# Patient Record
Sex: FEMALE | Race: WHITE | NOT HISPANIC OR LATINO
[De-identification: names, ages, dates, MRNs, and addresses within clinical notes are randomized per-mention and may not be internally consistent; named-entity substitution may affect disease eponyms.]

---

## 2017-01-26 ENCOUNTER — APPOINTMENT (OUTPATIENT)
Dept: OTOLARYNGOLOGY | Facility: CLINIC | Age: 82
End: 2017-01-26

## 2017-01-26 VITALS
HEIGHT: 60 IN | DIASTOLIC BLOOD PRESSURE: 66 MMHG | SYSTOLIC BLOOD PRESSURE: 123 MMHG | HEART RATE: 62 BPM | BODY MASS INDEX: 18.85 KG/M2 | WEIGHT: 96 LBS

## 2017-01-26 DIAGNOSIS — Z85.828 PERSONAL HISTORY OF OTHER MALIGNANT NEOPLASM OF SKIN: ICD-10-CM

## 2017-01-26 DIAGNOSIS — K64.9 UNSPECIFIED HEMORRHOIDS: ICD-10-CM

## 2017-01-26 DIAGNOSIS — M81.0 AGE-RELATED OSTEOPOROSIS W/OUT CURRENT PATHOLOGICAL FRACTURE: ICD-10-CM

## 2017-01-26 DIAGNOSIS — Z87.891 PERSONAL HISTORY OF NICOTINE DEPENDENCE: ICD-10-CM

## 2017-02-09 ENCOUNTER — OUTPATIENT (OUTPATIENT)
Dept: OUTPATIENT SERVICES | Facility: HOSPITAL | Age: 82
LOS: 1 days | End: 2017-02-09
Payer: MEDICARE

## 2017-02-09 PROCEDURE — 71250 CT THORAX DX C-: CPT | Mod: 26

## 2017-02-09 PROCEDURE — 71250 CT THORAX DX C-: CPT

## 2017-07-26 ENCOUNTER — APPOINTMENT (OUTPATIENT)
Dept: OTOLARYNGOLOGY | Facility: CLINIC | Age: 82
End: 2017-07-26

## 2017-07-26 VITALS — HEART RATE: 61 BPM | DIASTOLIC BLOOD PRESSURE: 66 MMHG | SYSTOLIC BLOOD PRESSURE: 127 MMHG

## 2017-08-05 ENCOUNTER — OUTPATIENT (OUTPATIENT)
Dept: OUTPATIENT SERVICES | Facility: HOSPITAL | Age: 82
LOS: 1 days | End: 2017-08-05
Payer: MEDICARE

## 2017-08-05 PROCEDURE — 70486 CT MAXILLOFACIAL W/O DYE: CPT | Mod: 26

## 2017-08-05 PROCEDURE — 70486 CT MAXILLOFACIAL W/O DYE: CPT

## 2017-08-10 ENCOUNTER — APPOINTMENT (OUTPATIENT)
Dept: OTOLARYNGOLOGY | Facility: CLINIC | Age: 82
End: 2017-08-10
Payer: MEDICARE

## 2017-08-10 VITALS
WEIGHT: 92 LBS | DIASTOLIC BLOOD PRESSURE: 61 MMHG | HEART RATE: 65 BPM | HEIGHT: 60 IN | BODY MASS INDEX: 18.06 KG/M2 | SYSTOLIC BLOOD PRESSURE: 113 MMHG

## 2017-08-10 PROCEDURE — 69210 REMOVE IMPACTED EAR WAX UNI: CPT

## 2017-08-10 PROCEDURE — 99214 OFFICE O/P EST MOD 30 MIN: CPT | Mod: 25

## 2018-02-14 ENCOUNTER — APPOINTMENT (OUTPATIENT)
Dept: OTOLARYNGOLOGY | Facility: CLINIC | Age: 83
End: 2018-02-14
Payer: MEDICARE

## 2018-02-14 VITALS
DIASTOLIC BLOOD PRESSURE: 53 MMHG | HEIGHT: 60 IN | BODY MASS INDEX: 18.06 KG/M2 | HEART RATE: 56 BPM | WEIGHT: 92 LBS | SYSTOLIC BLOOD PRESSURE: 125 MMHG

## 2018-02-14 PROCEDURE — 92700 UNLISTED ORL SERVICE/PX: CPT

## 2018-02-14 PROCEDURE — 31231 NASAL ENDOSCOPY DX: CPT

## 2018-02-14 PROCEDURE — 92557 COMPREHENSIVE HEARING TEST: CPT

## 2018-02-14 PROCEDURE — 99214 OFFICE O/P EST MOD 30 MIN: CPT | Mod: 25

## 2018-02-14 PROCEDURE — 92550 TYMPANOMETRY & REFLEX THRESH: CPT

## 2018-03-04 ENCOUNTER — FORM ENCOUNTER (OUTPATIENT)
Age: 83
End: 2018-03-04

## 2018-03-05 ENCOUNTER — APPOINTMENT (OUTPATIENT)
Dept: MRI IMAGING | Facility: HOSPITAL | Age: 83
End: 2018-03-05
Payer: MEDICARE

## 2018-03-05 ENCOUNTER — OUTPATIENT (OUTPATIENT)
Dept: OUTPATIENT SERVICES | Facility: HOSPITAL | Age: 83
LOS: 1 days | End: 2018-03-05
Payer: MEDICARE

## 2018-03-05 PROCEDURE — A9585: CPT

## 2018-03-05 PROCEDURE — 70553 MRI BRAIN STEM W/O & W/DYE: CPT | Mod: 26

## 2018-03-05 PROCEDURE — 70553 MRI BRAIN STEM W/O & W/DYE: CPT

## 2018-03-14 ENCOUNTER — APPOINTMENT (OUTPATIENT)
Dept: OTOLARYNGOLOGY | Facility: CLINIC | Age: 83
End: 2018-03-14
Payer: MEDICARE

## 2018-03-14 VITALS
SYSTOLIC BLOOD PRESSURE: 126 MMHG | WEIGHT: 92 LBS | DIASTOLIC BLOOD PRESSURE: 67 MMHG | HEIGHT: 60 IN | HEART RATE: 70 BPM | BODY MASS INDEX: 18.06 KG/M2

## 2018-03-14 PROCEDURE — 99213 OFFICE O/P EST LOW 20 MIN: CPT

## 2018-06-14 RX ORDER — CALCIUM/D3/MAG OX/COP/MANG/ZN 600 MG-20
600-800 TABLET ORAL
Refills: 0 | Status: DISCONTINUED | COMMUNITY
End: 2018-03-14

## 2018-06-15 ENCOUNTER — APPOINTMENT (OUTPATIENT)
Dept: OTOLARYNGOLOGY | Facility: CLINIC | Age: 83
End: 2018-06-15
Payer: MEDICARE

## 2018-06-15 VITALS — HEIGHT: 60 IN | BODY MASS INDEX: 17.87 KG/M2 | WEIGHT: 91 LBS

## 2018-06-15 VITALS
DIASTOLIC BLOOD PRESSURE: 74 MMHG | TEMPERATURE: 97.8 F | SYSTOLIC BLOOD PRESSURE: 122 MMHG | OXYGEN SATURATION: 100 % | HEART RATE: 63 BPM

## 2018-06-15 DIAGNOSIS — Z83.3 FAMILY HISTORY OF DIABETES MELLITUS: ICD-10-CM

## 2018-06-15 DIAGNOSIS — Z82.49 FAMILY HISTORY OF ISCHEMIC HEART DISEASE AND OTHER DISEASES OF THE CIRCULATORY SYSTEM: ICD-10-CM

## 2018-06-15 DIAGNOSIS — I99.9 UNSPECIFIED DISORDER OF CIRCULATORY SYSTEM: ICD-10-CM

## 2018-06-15 PROCEDURE — 31231 NASAL ENDOSCOPY DX: CPT

## 2018-06-15 PROCEDURE — 99214 OFFICE O/P EST MOD 30 MIN: CPT | Mod: 25

## 2018-06-20 ENCOUNTER — FORM ENCOUNTER (OUTPATIENT)
Age: 83
End: 2018-06-20

## 2018-06-21 ENCOUNTER — OUTPATIENT (OUTPATIENT)
Dept: OUTPATIENT SERVICES | Facility: HOSPITAL | Age: 83
LOS: 1 days | End: 2018-06-21
Payer: MEDICARE

## 2018-06-21 ENCOUNTER — APPOINTMENT (OUTPATIENT)
Dept: CT IMAGING | Facility: HOSPITAL | Age: 83
End: 2018-06-21
Payer: MEDICARE

## 2018-06-21 PROCEDURE — 70486 CT MAXILLOFACIAL W/O DYE: CPT

## 2018-06-21 PROCEDURE — 70486 CT MAXILLOFACIAL W/O DYE: CPT | Mod: 26

## 2018-06-29 ENCOUNTER — APPOINTMENT (OUTPATIENT)
Dept: OTOLARYNGOLOGY | Facility: CLINIC | Age: 83
End: 2018-06-29
Payer: MEDICARE

## 2018-06-29 VITALS
RESPIRATION RATE: 15 BRPM | DIASTOLIC BLOOD PRESSURE: 52 MMHG | HEART RATE: 63 BPM | SYSTOLIC BLOOD PRESSURE: 119 MMHG | TEMPERATURE: 98 F | OXYGEN SATURATION: 99 %

## 2018-06-29 PROCEDURE — 99214 OFFICE O/P EST MOD 30 MIN: CPT

## 2018-07-13 ENCOUNTER — APPOINTMENT (OUTPATIENT)
Dept: OTOLARYNGOLOGY | Facility: CLINIC | Age: 83
End: 2018-07-13
Payer: MEDICARE

## 2018-07-13 VITALS
OXYGEN SATURATION: 99 % | SYSTOLIC BLOOD PRESSURE: 106 MMHG | DIASTOLIC BLOOD PRESSURE: 68 MMHG | HEART RATE: 68 BPM | TEMPERATURE: 98 F

## 2018-07-13 PROCEDURE — 99214 OFFICE O/P EST MOD 30 MIN: CPT

## 2018-09-19 ENCOUNTER — APPOINTMENT (OUTPATIENT)
Dept: OTOLARYNGOLOGY | Facility: CLINIC | Age: 83
End: 2018-09-19
Payer: MEDICARE

## 2018-09-19 VITALS
SYSTOLIC BLOOD PRESSURE: 140 MMHG | DIASTOLIC BLOOD PRESSURE: 58 MMHG | BODY MASS INDEX: 18.26 KG/M2 | HEIGHT: 60 IN | HEART RATE: 68 BPM | WEIGHT: 93 LBS

## 2018-09-19 DIAGNOSIS — J34.89 OTHER SPECIFIED DISORDERS OF NOSE AND NASAL SINUSES: ICD-10-CM

## 2018-09-19 DIAGNOSIS — Z87.898 PERSONAL HISTORY OF OTHER SPECIFIED CONDITIONS: ICD-10-CM

## 2018-09-19 DIAGNOSIS — H61.23 IMPACTED CERUMEN, BILATERAL: ICD-10-CM

## 2018-09-19 DIAGNOSIS — Z87.09 PERSONAL HISTORY OF OTHER DISEASES OF THE RESPIRATORY SYSTEM: ICD-10-CM

## 2018-09-19 PROCEDURE — 99213 OFFICE O/P EST LOW 20 MIN: CPT | Mod: 25

## 2018-09-19 PROCEDURE — 31231 NASAL ENDOSCOPY DX: CPT | Mod: 52

## 2018-09-19 RX ORDER — MOMETASONE 50 UG/1
50 SPRAY, METERED NASAL TWICE DAILY
Qty: 1 | Refills: 3 | Status: COMPLETED | COMMUNITY
Start: 2018-02-14 | End: 2018-09-19

## 2018-09-19 RX ORDER — LEVOFLOXACIN 500 MG/1
500 TABLET, FILM COATED ORAL DAILY
Qty: 10 | Refills: 0 | Status: COMPLETED | COMMUNITY
Start: 2018-06-15 | End: 2018-09-19

## 2018-09-19 RX ORDER — LEVOFLOXACIN 500 MG/1
500 TABLET, FILM COATED ORAL DAILY
Qty: 10 | Refills: 0 | Status: COMPLETED | COMMUNITY
Start: 2018-06-29 | End: 2018-09-19

## 2018-09-19 RX ORDER — FLUTICASONE PROPIONATE 50 UG/1
50 SPRAY, METERED NASAL DAILY
Qty: 1 | Refills: 2 | Status: COMPLETED | COMMUNITY
Start: 2018-06-15 | End: 2018-09-19

## 2018-09-26 PROBLEM — Z87.09 HISTORY OF SINUSITIS: Status: RESOLVED | Noted: 2018-06-15 | Resolved: 2018-09-26

## 2018-09-26 NOTE — CONSULT LETTER
[Dear  ___] : Dear  [unfilled], [Courtesy Letter:] : I had the pleasure of seeing your patient, [unfilled], in my office today. [Consult Closing:] : Thank you very much for allowing me to participate in the care of this patient.  If you have any questions, please do not hesitate to contact me. [Sincerely,] : Sincerely, [FreeTextEntry2] : Alena Worthington MD\par [address] [facility]\par [place] [id number] [FreeTextEntry1] : \par Enclosed please find my office notes for September 19, 2018. \par \par \par  [FreeTextEntry3] : \par Genny Garces MD \par Otolaryngology, Head and Neck Surgery \par Residency site , [facility]\par  [___] : [unfilled]

## 2018-09-26 NOTE — PROCEDURE
[FreeTextEntry6] : \par Indication:  chronic sinusitis, nasal polyps\par -Verbal consent was obtained from patient prior to exam. \par - Donavon-Synephrine  spray applied to nose bilaterally.\par Nasal endoscopy was performed with flexible  scope.\par Findings: \par -- Inferior turbinates  edematous and  boggy  bilateral.  Inferior meatus clear bilateral.\par -- Septum was midline  .\par -- Polypoid change left middle turbinate. \par -- Mucus clear bilateral\par -- Middle and superior turbinates edematous bilateral\par -- Middle meatus congested bilateral.  SER congested bilateral.\par -- Nasopharynx without mass or exudate\par -- Adenoids were absent  \par -- Eustachian orifices were clear bilateral\par \par The patient tolerated the procedure well.\par \par

## 2018-09-26 NOTE — ASSESSMENT
[FreeTextEntry1] : Ms. Decker was evaluated with the following issues today:\par \par 1.) s/p acute exacerbation of chronic sinusitis - she does not want to start another nasal steroid spray because she is fearful of side effects.  I told her that diarrhea and leg pain are unlikely side effects of the fluticasone.\par We have discussed sinus surgery several times over the years, including today.\par 2.) very small nasal polyps\par 3.) right cerumen impaction removed\par \par PLAN:\par - Alkalol rinses to nose daily.  Samples given for her to try over next few days.\par If no change in sx, she will reconsider sinus surgery.\par Return in 3 months

## 2018-09-26 NOTE — HISTORY OF PRESENT ILLNESS
[de-identified] : Ms. Decker complains of continued nasal congestion and rhinorrhea.\par Was treated with 20 days of Levaquin and fluticasone nasal spray for acute sinusitis in June by Dr. Loya.  At that time, she had severe pain in L>R ethmoid areas and swelling of the nose.  Acute sx resolved after antibiotics.\par She stopped fluticasone nasal spray a few weeks ago because legs started to hurt and she developed diarrhea, both of which she attributed to the spray.\par Currently, chronic rhinorrhea and nasal stuffiness are at baseline.  No facial pain/swelling.\par Hx of small nasal polyps.\par Dizziness resolved since March 2018.\par \par She is currently taking no medications.\par \par \par CT SINUS noncontrast (June 21, 2018) at [facility] \par - Prior Studies: 08/05/2017 CT \par - Findings:\par *  Prior Surgery: None is evident\par *  Sinuses: As on the prior study there is paranasal sinus mucosal thickening throughout. Compared with the prior study, there is increase in mucosal thickening and minimal opacification of the left maxillary sinus whereas the right maxillary sinus shows similar but slightly decreased mucosal thickening. The sphenoid sinuses show fairly similar \par mild circumflex proximal mucosal thickening, and the ethmoid sinuses show persistent moderate bilateral opacification, though slightly improved. The frontal sinuses are better ventilated on the current exam. No fluid level, although there is bubbly secretions/debris in the left maxillary sinus lumen. No evidence of odontogenic sinusitis.\par *  Sinocranial and Sino-orbital Junctions: There remains intact and symmetric excretion at the anterior skull base. The bony orbits are preserved.\par *  Ostiomeatal Units: The bilateral maxillary ostia are opacified and again, the uncinate processes appear lateralized or even atelectatic along the lateral walls nasal cavity.\par *  Frontal Sinus Outflow Tracts: Difficult to trace bilaterally, though likely narrowed in the presence of ovarian frontal ethmoidal air cells. Sagittal image 38 shows a 6 mm rounded focus of tissue in the anterior middle meatus that could be a polyp.\par *  Sphenoethmoidal Recesses: Opacified on the right, presumably from inflammatory mucosa or polyp there is similar to slightly increased from the prior study. The left sphenoethmoidal recess is ventilated.\par *  Nasal Cavity/Nasopharynx: As above, there is small rounded focus of soft tissue the left middle meatus lateral to the middle turbinate that could be a polyp. There is additional asymmetric mucosal thickening or other soft tissue lateral the posterior middle turbinate. There is diffuse nasal cavity mucosal thickening at the olfactory clefts and sphenoethmoidal recesses, as on prior imaging. The nasal septum this predominantly midline. The noncontrast CT appearance of the nasopharynx is unremarkable.\par *  Orbits: No abnormal soft tissue or mass effect. The native ocular lenses have been replaced. There is unchanged symmetric degree of enophthalmos but is likely age-related with relative paucity of intraorbital fat. 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

## 2018-09-26 NOTE — PHYSICAL EXAM
[Nasal Endoscopy Performed] : nasal endoscopy was performed, see procedure section for findings [Midline] : trachea located in midline position [Removed] : palatine tonsils previously removed [Normal] : no neck adenopathy [de-identified] : Right cerumen impaction removed with curette.  Left EAC clear. [de-identified] : dental restorations

## 2018-12-19 ENCOUNTER — APPOINTMENT (OUTPATIENT)
Dept: OTOLARYNGOLOGY | Facility: CLINIC | Age: 83
End: 2018-12-19
Payer: MEDICARE

## 2018-12-19 VITALS
WEIGHT: 88 LBS | HEIGHT: 60 IN | BODY MASS INDEX: 17.28 KG/M2 | HEART RATE: 68 BPM | DIASTOLIC BLOOD PRESSURE: 58 MMHG | SYSTOLIC BLOOD PRESSURE: 112 MMHG

## 2018-12-19 PROCEDURE — 99213 OFFICE O/P EST LOW 20 MIN: CPT

## 2019-04-19 ENCOUNTER — APPOINTMENT (OUTPATIENT)
Dept: OTOLARYNGOLOGY | Facility: CLINIC | Age: 84
End: 2019-04-19
Payer: MEDICARE

## 2019-04-19 VITALS
HEIGHT: 60 IN | HEART RATE: 64 BPM | DIASTOLIC BLOOD PRESSURE: 63 MMHG | WEIGHT: 90 LBS | SYSTOLIC BLOOD PRESSURE: 142 MMHG | BODY MASS INDEX: 17.67 KG/M2

## 2019-04-19 DIAGNOSIS — H61.21 IMPACTED CERUMEN, RIGHT EAR: ICD-10-CM

## 2019-04-19 DIAGNOSIS — J06.9 ACUTE UPPER RESPIRATORY INFECTION, UNSPECIFIED: ICD-10-CM

## 2019-04-19 PROCEDURE — 92557 COMPREHENSIVE HEARING TEST: CPT

## 2019-04-19 PROCEDURE — 31231 NASAL ENDOSCOPY DX: CPT

## 2019-04-19 PROCEDURE — 99214 OFFICE O/P EST MOD 30 MIN: CPT | Mod: 25

## 2019-04-19 PROCEDURE — 92550 TYMPANOMETRY & REFLEX THRESH: CPT

## 2019-04-24 PROBLEM — J06.9 ACUTE URI: Status: RESOLVED | Noted: 2018-12-19 | Resolved: 2019-04-24

## 2019-04-24 PROBLEM — H61.21 IMPACTED CERUMEN OF RIGHT EAR: Status: RESOLVED | Noted: 2018-09-19 | Resolved: 2019-04-24

## 2019-04-24 NOTE — CONSULT LETTER
[Dear  ___] : Dear  [unfilled], [Courtesy Letter:] : I had the pleasure of seeing your patient, [unfilled], in my office today. [Consult Closing:] : Thank you very much for allowing me to participate in the care of this patient.  If you have any questions, please do not hesitate to contact me. [Sincerely,] : Sincerely, [FreeTextEntry1] : \par \par Enclosed please find my office notes for April 19, 2019. \par \par \par \par \par \par  [FreeTextEntry2] : Alena Worthington MD\par [address] [facility]\par [place] [id number] [FreeTextEntry3] : \par Genny Garces MD \par Otolaryngology, Head and Neck Surgery \par Residency site , [facility]\par

## 2019-04-24 NOTE — ASSESSMENT
[FreeTextEntry1] : Ms. JONES had the following issues addressed today:\par \par 1.) Left serous otitis media after URI\par 2.) Left eustachian tube dysfunction -- very slow to return to normal.  She has had ETD in both ears in past.\par 3.) Chronic sinusitis and Nasal polyps are not visible today\par \par Plan:\par - prednisone 20 mg daily x 5 days\par - continue Alkalol rinses\par \par Return in 2 weeks if no resolution of ear sx.

## 2019-04-24 NOTE — PHYSICAL EXAM
[Nasal Endoscopy Performed] : nasal endoscopy was performed, see procedure section for findings [Midline] : trachea located in midline position [Removed] : palatine tonsils previously removed [Normal] : no neck adenopathy [de-identified] : Small cerumen bilateral, removed with curette. [de-identified] : dental restorations [de-identified] : TMs intact bilateral.  Serous fluid in left middle ear.

## 2019-04-24 NOTE — DATA REVIEWED
[de-identified] : \par AUDIOGRAM (4/22/2019)\par RIGHT:  Hearing within normal limits through 3K Hz, sloping to mild SNHL.\par LEFT:   Hearing within normal limits through 1K Hz, sloping to mild to moderately-severe mixed HL\par Tympanograms  B  bilateral \par Word recognition 100%  on right; 90% on left\par \par AUDIOGRAM  (02/14/2018)\par RIGHT:  Hearing within normal limits.\par LEFT:   Hearing within normal limits through 2K Hz, sloping to moderate to mild SNHL.\par Tympanograms  As on left; C on right  \par Word recognition 100%  bilateral\par \par AUDIOGRAM (4/25/2010)\par RIGHT:  Hearing with mild conductive HL lower frequencies, otherwise normal limits.    \par LEFT:   Hearing with mild conductive HL lower frequencies, otherwise normal limits.     \par Tympanograms  A on  right; B on  left\par Word recognition 90% on right; 88%  on left\par

## 2019-04-24 NOTE — PROCEDURE
[FreeTextEntry6] : \par Indication: ETD\par -Verbal consent was obtained from patient prior to exam. \par - Donavon-Synephrine spray applied to nose bilaterally.\par Nasal endoscopy was performed with flexible scope.\par Findings: \par -- Inferior turbinates mildly edematous bilateral.\par -- Septum was S-shaped.\par -- No polyps seen today on either side nose\par -- Mucous clear bilateral\par -- Middle and superior turbinates normal bilateral\par -- Middle meatus mild congestion bilateral\par -- Nasopharynx without mass or exudate\par -- Adenoids were absent\par -- Eustachian orifices were mildly edematous but unobstructed bilateral\par \par The patient tolerated the procedure well.\par

## 2019-04-24 NOTE — HISTORY OF PRESENT ILLNESS
[de-identified] : Ms. JONES  is a 84 year old F being seen today for new ear issue. \par \par She was seen in Urgent Care twice (4/8, 4/15) for clogging and muffled hearing in left ear; was told she had left serous otitis media; no medications given.  She had preceding URI in late March.\par No ear pain, but left ear felt completely clogged for a few days.  She feels like there is something in ear. \par Still with muffled hearing on left that produces an echo. \par No drainage. No dizziness or tinnitus. \par When she sneezes or yawns, she feels increased pressure and popping (crackling) sound in left ear, but no relief with hearing. \par Right ear is fine. \par \par She reports slight nasal congestion and rhinorrhea, which are controlled with Alkalol nasal rinses; used daily x 1 week, then PRN.  She is happy with state of her nose at this time.\par Hx of small nasal polyps and recurrent acute sinusitis.  No sinus infections since last year. \par

## 2019-05-03 ENCOUNTER — APPOINTMENT (OUTPATIENT)
Dept: OTOLARYNGOLOGY | Facility: CLINIC | Age: 84
End: 2019-05-03
Payer: MEDICARE

## 2019-05-03 VITALS
DIASTOLIC BLOOD PRESSURE: 58 MMHG | BODY MASS INDEX: 18.06 KG/M2 | HEIGHT: 60 IN | HEART RATE: 61 BPM | SYSTOLIC BLOOD PRESSURE: 121 MMHG | WEIGHT: 92 LBS

## 2019-05-03 PROCEDURE — 99213 OFFICE O/P EST LOW 20 MIN: CPT

## 2019-05-03 RX ORDER — PREDNISONE 20 MG/1
20 TABLET ORAL DAILY
Qty: 5 | Refills: 0 | Status: COMPLETED | COMMUNITY
Start: 2019-04-19 | End: 2019-05-03

## 2019-05-03 NOTE — PHYSICAL EXAM
[Removed] : palatine tonsils previously removed [Midline] : trachea located in midline position [Normal] : no neck adenopathy [de-identified] : Mild edema bilateral inferior turbinates [de-identified] : dental restorations

## 2019-05-03 NOTE — HISTORY OF PRESENT ILLNESS
[de-identified] : Ms. Decker was seen in f/up for left acute serous OM.\par Left ear feels just about back to normal.  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  Has chronic rhinorrhea x years, as well as small polyps.\par \par PRIOR HX 4/19/2019:\par Seen in Urgent Care twice (4/8, 4/15) for clogging and muffled hearing in left ear; was told she had left serous otitis media; no medications given. She had preceding URI in late March.\par No ear pain, but left ear felt completely clogged for a few days. She feels like there is something in ear. \par Still with muffled hearing on left that produces an echo.   No drainage. No dizziness or tinnitus. \par When she sneezes or yawns, increased pressure and popping (crackling) sound in left ear, but no relief with hearing. \par Slight nasal congestion and rhinorrhea, which are controlled with Alkalol nasal rinses; used daily x 1 week, then PRN. She is happy with state of her nose at this time.\par Hx of small nasal polyps and recurrent acute sinusitis. No sinus infections since last year. \par \par

## 2019-05-03 NOTE — ASSESSMENT
[FreeTextEntry1] : Ms. JONES had the following issues addressed today:\par \par 1.) Left serous otitis media - resolved clinically and subjectively\par 2.) Left eustachian tube dysfunction -- resolved\par \par Plan:\par - continue Alkalol rinses for chronic sinusitis\par \par Return in December for reassessment sinuses/polyps

## 2019-05-03 NOTE — CONSULT LETTER
[Dear  ___] : Dear  [unfilled], [Sincerely,] : Sincerely, [Courtesy Letter:] : I had the pleasure of seeing your patient, [unfilled], in my office today. [Consult Closing:] : Thank you very much for allowing me to participate in the care of this patient.  If you have any questions, please do not hesitate to contact me. [FreeTextEntry1] : \par \par Enclosed please find my office notes for May 3, 2019. \par \par \par \par \par \par  [FreeTextEntry2] : Alena Worthington MD\par [address] [facility] [address]\par [place] [id number] [FreeTextEntry3] : \par Genny Garces MD \par Otolaryngology, Head and Neck Surgery \par Residency site , [facility]\par

## 2019-12-18 ENCOUNTER — APPOINTMENT (OUTPATIENT)
Dept: OTOLARYNGOLOGY | Facility: CLINIC | Age: 84
End: 2019-12-18
Payer: MEDICARE

## 2019-12-18 VITALS
HEIGHT: 60 IN | WEIGHT: 92 LBS | BODY MASS INDEX: 18.06 KG/M2 | HEART RATE: 65 BPM | SYSTOLIC BLOOD PRESSURE: 116 MMHG | DIASTOLIC BLOOD PRESSURE: 69 MMHG

## 2019-12-18 DIAGNOSIS — Z87.81 PERSONAL HISTORY OF (HEALED) TRAUMATIC FRACTURE: ICD-10-CM

## 2019-12-18 DIAGNOSIS — J01.80 OTHER ACUTE SINUSITIS: ICD-10-CM

## 2019-12-18 DIAGNOSIS — H65.02 ACUTE SEROUS OTITIS MEDIA, LEFT EAR: ICD-10-CM

## 2019-12-18 DIAGNOSIS — H69.82 OTHER SPECIFIED DISORDERS OF EUSTACHIAN TUBE, LEFT EAR: ICD-10-CM

## 2019-12-18 PROCEDURE — 31231 NASAL ENDOSCOPY DX: CPT | Mod: 52

## 2019-12-18 PROCEDURE — 99214 OFFICE O/P EST MOD 30 MIN: CPT | Mod: 25

## 2019-12-18 NOTE — PROCEDURE
[FreeTextEntry6] : \par Indication:  recurrent acute sinusitis, polyps\par -Verbal consent was obtained from patient prior to exam. \par - Donavon-Synephrine  spray applied to nose bilaterally.\par Nasal endoscopy was performed with flexible scope.\par Findings: \par -- Inferior turbinates moderate  edema bilateral.  Inferior meatus clear bilateral.\par -- Septum was mildly  deviated to the left \par -- Small polyps in left middle meatus which is congested and has cloudy mucus drainage.  Right middle meatus seems clear; tiny nonobstructing polyps.\par -- Middle turbinates mild edema.  Superior turbinates normal bilateral\par --  SER clear bilateral.\par -- Nasopharynx without mass or exudate\par -- Adenoids were absent  \par -- Eustachian orifices were clear bilateral\par \par The patient tolerated the procedure well.\par \par

## 2019-12-18 NOTE — CONSULT LETTER
[Dear  ___] : Dear  [unfilled], [Courtesy Letter:] : I had the pleasure of seeing your patient, [unfilled], in my office today. [Consult Closing:] : Thank you very much for allowing me to participate in the care of this patient.  If you have any questions, please do not hesitate to contact me. [Sincerely,] : Sincerely, [FreeTextEntry2] : Alena Worthington MD\par [address] [facility]\par [place] [id number] [FreeTextEntry1] : \par \par Enclosed please find my office notes for December 18, 2019. \par \par \par \par \par \par  [FreeTextEntry3] : \par Genny Garces MD \par Otolaryngology, Head and Neck Surgery \par Residency site , [facility]\par

## 2019-12-18 NOTE — ASSESSMENT
[FreeTextEntry1] : Ms. JONES had the following issues addressed today:\par \par 1.) Left acute sinusitis exacerbation of chronic sinusitis\par 2.) Small nasal polyps in middle meatus, L>R\par 3.) SNHL asymmetric - being followed\par \par Plan:\par - oxymetazoline nasal spray BID x 5 days\par - antibiotics if no change after 5 days\par - continue Alkalol rinses and saline spray for chronic sinusitis\par \par Return in April 2020 for new audiogram\par

## 2019-12-18 NOTE — PHYSICAL EXAM
[de-identified] : Excessive cerumen removed with curette bilaterally. [Nasal Endoscopy Performed] : nasal endoscopy was performed, see procedure section for findings [] : septum deviated to the left [Midline] : trachea located in midline position [Removed] : palatine tonsils previously removed [de-identified] : dental restorations [Normal] : no masses and lesions seen, face is symmetric

## 2019-12-18 NOTE — DATA REVIEWED
[de-identified] : \par AUDIOGRAM (4/22/2019)\par RIGHT:  Hearing within normal limits through 3K Hz, sloping to mild SNHL.\par LEFT:   Hearing within normal limits through 1K Hz, sloping to mild to moderately-severe mixed HL\par Tympanograms  B  bilateral \par Word recognition 100%  on right; 90% on left\par \par AUDIOGRAM  (02/14/2018)\par RIGHT:  Hearing within normal limits.\par LEFT:   Hearing within normal limits through 2K Hz, sloping to moderate to mild SNHL.\par Tympanograms  As on left; C on right  \par Word recognition 100%  bilateral\par \par AUDIOGRAM (4/25/2010)\par RIGHT:  Hearing with mild conductive HL lower frequencies, otherwise normal limits.    \par LEFT:   Hearing with mild conductive HL lower frequencies, otherwise normal limits.     \par Tympanograms  A on  right; B on  left\par Word recognition 90% on right; 88%  on left\par

## 2019-12-18 NOTE — HISTORY OF PRESENT ILLNESS
[de-identified] : Ms. Decker complains of increased nasal congestion and rhinorrhea from left nasal cavity over past 2 weeks.  \par No facial pain, fever, cough or vision change.  Has a baseline chronic rhinorrhea x years.\par Taking nasal saline spray and PRN Alkalol nasal rinses.\par Hx of small nasal polyps and recurrent acute sinusitis. No sinus infections since last year. 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

## 2020-10-19 ENCOUNTER — EMERGENCY (EMERGENCY)
Facility: HOSPITAL | Age: 85
LOS: 1 days | Discharge: HOME | End: 2020-10-19
Attending: EMERGENCY MEDICINE | Admitting: EMERGENCY MEDICINE
Payer: MEDICARE

## 2020-10-19 VITALS
HEART RATE: 70 BPM | SYSTOLIC BLOOD PRESSURE: 154 MMHG | DIASTOLIC BLOOD PRESSURE: 52 MMHG | WEIGHT: 82.89 LBS | TEMPERATURE: 98 F | RESPIRATION RATE: 18 BRPM | OXYGEN SATURATION: 96 % | HEIGHT: 60 IN

## 2020-10-19 VITALS
RESPIRATION RATE: 18 BRPM | DIASTOLIC BLOOD PRESSURE: 70 MMHG | HEART RATE: 63 BPM | OXYGEN SATURATION: 99 % | SYSTOLIC BLOOD PRESSURE: 146 MMHG

## 2020-10-19 DIAGNOSIS — Z98.49 CATARACT EXTRACTION STATUS, UNSPECIFIED EYE: Chronic | ICD-10-CM

## 2020-10-19 LAB
ALBUMIN SERPL ELPH-MCNC: 4.2 G/DL — SIGNIFICANT CHANGE UP (ref 3.3–5)
ALP SERPL-CCNC: 62 U/L — SIGNIFICANT CHANGE UP (ref 40–120)
ALT FLD-CCNC: 12 U/L — SIGNIFICANT CHANGE UP (ref 10–45)
ANION GAP SERPL CALC-SCNC: 14 MMOL/L — SIGNIFICANT CHANGE UP (ref 5–17)
APPEARANCE UR: CLEAR — SIGNIFICANT CHANGE UP
APTT BLD: 29.3 SEC — SIGNIFICANT CHANGE UP (ref 27.5–35.5)
AST SERPL-CCNC: 27 U/L — SIGNIFICANT CHANGE UP (ref 10–40)
BACTERIA # UR AUTO: PRESENT /HPF
BASOPHILS # BLD AUTO: 0.07 K/UL — SIGNIFICANT CHANGE UP (ref 0–0.2)
BASOPHILS NFR BLD AUTO: 0.9 % — SIGNIFICANT CHANGE UP (ref 0–2)
BILIRUB SERPL-MCNC: 0.3 MG/DL — SIGNIFICANT CHANGE UP (ref 0.2–1.2)
BILIRUB UR-MCNC: NEGATIVE — SIGNIFICANT CHANGE UP
BUN SERPL-MCNC: 20 MG/DL — SIGNIFICANT CHANGE UP (ref 7–23)
CALCIUM SERPL-MCNC: 9.3 MG/DL — SIGNIFICANT CHANGE UP (ref 8.4–10.5)
CHLORIDE SERPL-SCNC: 103 MMOL/L — SIGNIFICANT CHANGE UP (ref 96–108)
CO2 SERPL-SCNC: 22 MMOL/L — SIGNIFICANT CHANGE UP (ref 22–31)
COLOR SPEC: YELLOW — SIGNIFICANT CHANGE UP
CREAT SERPL-MCNC: 0.91 MG/DL — SIGNIFICANT CHANGE UP (ref 0.5–1.3)
DIFF PNL FLD: ABNORMAL
EOSINOPHIL # BLD AUTO: 0.14 K/UL — SIGNIFICANT CHANGE UP (ref 0–0.5)
EOSINOPHIL NFR BLD AUTO: 1.9 % — SIGNIFICANT CHANGE UP (ref 0–6)
EPI CELLS # UR: ABNORMAL /HPF (ref 0–5)
GLUCOSE SERPL-MCNC: 93 MG/DL — SIGNIFICANT CHANGE UP (ref 70–99)
GLUCOSE UR QL: NEGATIVE — SIGNIFICANT CHANGE UP
HCT VFR BLD CALC: 42 % — SIGNIFICANT CHANGE UP (ref 34.5–45)
HGB BLD-MCNC: 13.2 G/DL — SIGNIFICANT CHANGE UP (ref 11.5–15.5)
IMM GRANULOCYTES NFR BLD AUTO: 0.5 % — SIGNIFICANT CHANGE UP (ref 0–1.5)
INR BLD: 0.98 — SIGNIFICANT CHANGE UP (ref 0.88–1.16)
KETONES UR-MCNC: NEGATIVE — SIGNIFICANT CHANGE UP
LEUKOCYTE ESTERASE UR-ACNC: ABNORMAL
LYMPHOCYTES # BLD AUTO: 1.02 K/UL — SIGNIFICANT CHANGE UP (ref 1–3.3)
LYMPHOCYTES # BLD AUTO: 13.6 % — SIGNIFICANT CHANGE UP (ref 13–44)
MCHC RBC-ENTMCNC: 28.8 PG — SIGNIFICANT CHANGE UP (ref 27–34)
MCHC RBC-ENTMCNC: 31.4 GM/DL — LOW (ref 32–36)
MCV RBC AUTO: 91.5 FL — SIGNIFICANT CHANGE UP (ref 80–100)
MONOCYTES # BLD AUTO: 0.74 K/UL — SIGNIFICANT CHANGE UP (ref 0–0.9)
MONOCYTES NFR BLD AUTO: 9.8 % — SIGNIFICANT CHANGE UP (ref 2–14)
NEUTROPHILS # BLD AUTO: 5.51 K/UL — SIGNIFICANT CHANGE UP (ref 1.8–7.4)
NEUTROPHILS NFR BLD AUTO: 73.3 % — SIGNIFICANT CHANGE UP (ref 43–77)
NITRITE UR-MCNC: NEGATIVE — SIGNIFICANT CHANGE UP
NRBC # BLD: 0 /100 WBCS — SIGNIFICANT CHANGE UP (ref 0–0)
PH UR: 6 — SIGNIFICANT CHANGE UP (ref 5–8)
PLATELET # BLD AUTO: 244 K/UL — SIGNIFICANT CHANGE UP (ref 150–400)
POTASSIUM SERPL-MCNC: 4.6 MMOL/L — SIGNIFICANT CHANGE UP (ref 3.5–5.3)
POTASSIUM SERPL-SCNC: 4.6 MMOL/L — SIGNIFICANT CHANGE UP (ref 3.5–5.3)
PROT SERPL-MCNC: 7.5 G/DL — SIGNIFICANT CHANGE UP (ref 6–8.3)
PROT UR-MCNC: NEGATIVE MG/DL — SIGNIFICANT CHANGE UP
PROTHROM AB SERPL-ACNC: 11.8 SEC — SIGNIFICANT CHANGE UP (ref 10.6–13.6)
RBC # BLD: 4.59 M/UL — SIGNIFICANT CHANGE UP (ref 3.8–5.2)
RBC # FLD: 13.3 % — SIGNIFICANT CHANGE UP (ref 10.3–14.5)
RBC CASTS # UR COMP ASSIST: ABNORMAL /HPF
SARS-COV-2 RNA SPEC QL NAA+PROBE: SIGNIFICANT CHANGE UP
SODIUM SERPL-SCNC: 139 MMOL/L — SIGNIFICANT CHANGE UP (ref 135–145)
SP GR SPEC: 1.02 — SIGNIFICANT CHANGE UP (ref 1–1.03)
TROPONIN T SERPL-MCNC: <0.01 NG/ML — SIGNIFICANT CHANGE UP (ref 0–0.01)
UROBILINOGEN FLD QL: 0.2 E.U./DL — SIGNIFICANT CHANGE UP
WBC # BLD: 7.52 K/UL — SIGNIFICANT CHANGE UP (ref 3.8–10.5)
WBC # FLD AUTO: 7.52 K/UL — SIGNIFICANT CHANGE UP (ref 3.8–10.5)
WBC UR QL: ABNORMAL /HPF

## 2020-10-19 PROCEDURE — U0003: CPT

## 2020-10-19 PROCEDURE — 83690 ASSAY OF LIPASE: CPT

## 2020-10-19 PROCEDURE — 71046 X-RAY EXAM CHEST 2 VIEWS: CPT | Mod: 26

## 2020-10-19 PROCEDURE — 85610 PROTHROMBIN TIME: CPT

## 2020-10-19 PROCEDURE — 99284 EMERGENCY DEPT VISIT MOD MDM: CPT | Mod: 25

## 2020-10-19 PROCEDURE — 80053 COMPREHEN METABOLIC PANEL: CPT

## 2020-10-19 PROCEDURE — 99285 EMERGENCY DEPT VISIT HI MDM: CPT

## 2020-10-19 PROCEDURE — 93010 ELECTROCARDIOGRAM REPORT: CPT

## 2020-10-19 PROCEDURE — 85730 THROMBOPLASTIN TIME PARTIAL: CPT

## 2020-10-19 PROCEDURE — 87086 URINE CULTURE/COLONY COUNT: CPT

## 2020-10-19 PROCEDURE — 85025 COMPLETE CBC W/AUTO DIFF WBC: CPT

## 2020-10-19 PROCEDURE — 71046 X-RAY EXAM CHEST 2 VIEWS: CPT

## 2020-10-19 PROCEDURE — 36415 COLL VENOUS BLD VENIPUNCTURE: CPT

## 2020-10-19 PROCEDURE — 93005 ELECTROCARDIOGRAM TRACING: CPT

## 2020-10-19 PROCEDURE — 70450 CT HEAD/BRAIN W/O DYE: CPT | Mod: 26

## 2020-10-19 PROCEDURE — 70450 CT HEAD/BRAIN W/O DYE: CPT

## 2020-10-19 PROCEDURE — 81001 URINALYSIS AUTO W/SCOPE: CPT

## 2020-10-19 PROCEDURE — 84484 ASSAY OF TROPONIN QUANT: CPT

## 2020-10-19 NOTE — ED ADULT NURSE NOTE - OBJECTIVE STATEMENT
86 y/o female w/o significant PMHx presents to the ED from  for evaluation of abnormal EKG. Pt initially went to  because she states, "I haven't been feeling myself lately." Pt reports random episodes of lightheadedness over the past couple of days. Pt reports sx are not worse after exertion and happen at random. Denies fever, chills, cough, CP, SOB, abdominal pain, NVD, urinary sx, dizziness, headache, vision changes, and any other associated sx.

## 2020-10-19 NOTE — ED PROVIDER NOTE - OBJECTIVE STATEMENT
84 y/o F w/ PMHx osteoporosis, cataract surgery, no chronic meds, presents to the ED sent from  c/o intermittent episodes of dizziness w/ associated nausea for last x4 days w/ episodes lasting from 5-10 minutes before self resolving, not worse w/ sitting to standing, not associated w/ exertion, per pt episodes completely random, when episode occurs usually sits down until they go away. Denies feeling near syncopal. Denies vomiting, HA, CP, SOB, fever, chills, tinnitus or any other acute sx. Normal PO intake.

## 2020-10-19 NOTE — ED PROVIDER NOTE - PROGRESS NOTE DETAILS
doesnt want admission for workup, has important meeting w/  tomorrow. was able to arrange for 9am appt w/ cardiology dr. hallman instead. DC home in NAD with strict return precautions given.

## 2020-10-19 NOTE — ED PROVIDER NOTE - PATIENT PORTAL LINK FT
You can access the FollowMyHealth Patient Portal offered by [facility] by registering at the following website: [email or web address]. By joining [employer]’s FollowMyHealth portal, you will also be able to view your health information using other applications (apps) compatible with our system.

## 2020-10-19 NOTE — ED PROVIDER NOTE - CLINICAL SUMMARY MEDICAL DECISION MAKING FREE TEXT BOX
86 y/o otherwise healthy F no chronic meds presents c/o vague dizziness for last x4 days. Exam no focal findings. Will do infectious workup. Will order labs, CT Head, CXR and EKG. Likely d/c home for outpatient f/u.

## 2020-10-20 ENCOUNTER — APPOINTMENT (OUTPATIENT)
Dept: HEART AND VASCULAR | Facility: CLINIC | Age: 85
End: 2020-10-20

## 2020-10-20 ENCOUNTER — APPOINTMENT (OUTPATIENT)
Dept: HEART AND VASCULAR | Facility: CLINIC | Age: 85
End: 2020-10-20
Payer: MEDICARE

## 2020-10-20 VITALS
BODY MASS INDEX: 18.26 KG/M2 | HEIGHT: 60 IN | WEIGHT: 93 LBS | HEART RATE: 72 BPM | TEMPERATURE: 98.5 F | SYSTOLIC BLOOD PRESSURE: 120 MMHG | DIASTOLIC BLOOD PRESSURE: 70 MMHG | OXYGEN SATURATION: 97 %

## 2020-10-20 DIAGNOSIS — R94.31 ABNORMAL ELECTROCARDIOGRAM [ECG] [EKG]: ICD-10-CM

## 2020-10-20 DIAGNOSIS — R06.02 SHORTNESS OF BREATH: ICD-10-CM

## 2020-10-20 LAB
CULTURE RESULTS: NO GROWTH — SIGNIFICANT CHANGE UP
SPECIMEN SOURCE: SIGNIFICANT CHANGE UP

## 2020-10-20 PROCEDURE — 93306 TTE W/DOPPLER COMPLETE: CPT

## 2020-10-20 PROCEDURE — 99204 OFFICE O/P NEW MOD 45 MIN: CPT

## 2020-10-20 PROCEDURE — 93880 EXTRACRANIAL BILAT STUDY: CPT

## 2020-10-20 RX ORDER — MEN/EUC/THY/CAMP/BENZ/NACL/POT
SOLUTION, NON-ORAL NASAL
Qty: 1 | Refills: 0 | Status: DISCONTINUED | COMMUNITY
Start: 2018-09-19 | End: 2020-10-20

## 2020-10-20 RX ORDER — SULFAMETHOXAZOLE AND TRIMETHOPRIM 400; 80 MG/1; MG/1
400-80 TABLET ORAL TWICE DAILY
Qty: 20 | Refills: 0 | Status: DISCONTINUED | COMMUNITY
Start: 2019-12-18 | End: 2020-10-20

## 2020-10-20 NOTE — DISCUSSION/SUMMARY
[FreeTextEntry1] : negative non invasive cardiac evaluation\par probable vasovagal event\par results discussed/ reassurance\par to f/u if episodes persist

## 2020-10-20 NOTE — REVIEW OF SYSTEMS
[see HPI] : see HPI [Joint Pain] : joint pain [Joint Stiffness] : joint stiffness [Dizziness] : dizziness [Negative] : Heme/Lymph

## 2020-10-20 NOTE — REASON FOR VISIT
[Initial Evaluation] : an initial evaluation of [Abnormal ECG] : an abnormal ECG [Dizziness] : dizziness [Dyspnea] : dyspnea [FreeTextEntry1] : near syncope

## 2020-10-20 NOTE — PHYSICAL EXAM
[General Appearance - Well Developed] : well developed [Normal Appearance] : normal appearance [Well Groomed] : well groomed [General Appearance - Well Nourished] : well nourished [No Deformities] : no deformities [General Appearance - In No Acute Distress] : no acute distress [Normal Conjunctiva] : the conjunctiva exhibited no abnormalities [Eyelids - No Xanthelasma] : the eyelids demonstrated no xanthelasmas [Normal Oral Mucosa] : normal oral mucosa [No Oral Pallor] : no oral pallor [No Oral Cyanosis] : no oral cyanosis [Normal Jugular Venous A Waves Present] : normal jugular venous A waves present [Normal Jugular Venous V Waves Present] : normal jugular venous V waves present [No Jugular Venous Shea A Waves] : no jugular venous shea A waves [Respiration, Rhythm And Depth] : normal respiratory rhythm and effort [Auscultation Breath Sounds / Voice Sounds] : lungs were clear to auscultation bilaterally [Exaggerated Use Of Accessory Muscles For Inspiration] : no accessory muscle use [Heart Rate And Rhythm] : heart rate and rhythm were normal [Murmurs] : no murmurs present [Heart Sounds] : normal S1 and S2 [Abdomen Soft] : soft [Abdomen Tenderness] : non-tender [Abdomen Mass (___ Cm)] : no abdominal mass palpated [Abnormal Walk] : normal gait [Gait - Sufficient For Exercise Testing] : the gait was sufficient for exercise testing [Nail Clubbing] : no clubbing of the fingernails [Cyanosis, Localized] : no localized cyanosis [Petechial Hemorrhages (___cm)] : no petechial hemorrhages [] : no ischemic changes [Oriented To Time, Place, And Person] : oriented to person, place, and time [Affect] : the affect was normal [Mood] : the mood was normal [No Anxiety] : not feeling anxious

## 2020-10-20 NOTE — HISTORY OF PRESENT ILLNESS
[FreeTextEntry1] : seen in  yesterday and sent to ER\par several episodes over past several days of feeling dizziness and near syncope\par episodes come and go\par not related to activity\par sudden feeling dizziness and nausea\par some sob, no cp or palpitation\par last seconds, no episodes today\par CT head negative, r/o MI\par ecg sinus karon\par

## 2020-10-23 DIAGNOSIS — Z20.828 CONTACT WITH AND (SUSPECTED) EXPOSURE TO OTHER VIRAL COMMUNICABLE DISEASES: ICD-10-CM

## 2020-10-23 DIAGNOSIS — R42 DIZZINESS AND GIDDINESS: ICD-10-CM

## 2020-11-04 ENCOUNTER — APPOINTMENT (OUTPATIENT)
Dept: OTOLARYNGOLOGY | Facility: CLINIC | Age: 85
End: 2020-11-04
Payer: MEDICARE

## 2020-11-04 ENCOUNTER — APPOINTMENT (OUTPATIENT)
Dept: HEART AND VASCULAR | Facility: CLINIC | Age: 85
End: 2020-11-04

## 2020-11-04 VITALS
WEIGHT: 91.2 LBS | HEART RATE: 74 BPM | BODY MASS INDEX: 17.91 KG/M2 | DIASTOLIC BLOOD PRESSURE: 71 MMHG | TEMPERATURE: 97.4 F | HEIGHT: 60 IN | SYSTOLIC BLOOD PRESSURE: 151 MMHG

## 2020-11-04 DIAGNOSIS — R55 SYNCOPE AND COLLAPSE: ICD-10-CM

## 2020-11-04 DIAGNOSIS — R42 DIZZINESS AND GIDDINESS: ICD-10-CM

## 2020-11-04 DIAGNOSIS — J32.8 OTHER CHRONIC SINUSITIS: ICD-10-CM

## 2020-11-04 DIAGNOSIS — H90.3 SENSORINEURAL HEARING LOSS, BILATERAL: ICD-10-CM

## 2020-11-04 PROCEDURE — 31231 NASAL ENDOSCOPY DX: CPT

## 2020-11-04 PROCEDURE — 99072 ADDL SUPL MATRL&STAF TM PHE: CPT

## 2020-11-04 PROCEDURE — 92550 TYMPANOMETRY & REFLEX THRESH: CPT

## 2020-11-04 PROCEDURE — 92557 COMPREHENSIVE HEARING TEST: CPT

## 2020-11-04 PROCEDURE — 99214 OFFICE O/P EST MOD 30 MIN: CPT | Mod: 25

## 2020-11-21 ENCOUNTER — INPATIENT (INPATIENT)
Facility: HOSPITAL | Age: 85
LOS: 9 days | Discharge: HOME HEALTH | DRG: 163 | End: 2020-12-01
Attending: SPECIALIST | Admitting: SPECIALIST
Payer: MEDICARE

## 2020-11-21 VITALS
HEART RATE: 74 BPM | OXYGEN SATURATION: 98 % | HEIGHT: 60 IN | DIASTOLIC BLOOD PRESSURE: 75 MMHG | TEMPERATURE: 98 F | RESPIRATION RATE: 17 BRPM | WEIGHT: 91.93 LBS | SYSTOLIC BLOOD PRESSURE: 131 MMHG

## 2020-11-21 DIAGNOSIS — Z98.890 OTHER SPECIFIED POSTPROCEDURAL STATES: Chronic | ICD-10-CM

## 2020-11-21 DIAGNOSIS — R91.8 OTHER NONSPECIFIC ABNORMAL FINDING OF LUNG FIELD: ICD-10-CM

## 2020-11-21 DIAGNOSIS — Z90.49 ACQUIRED ABSENCE OF OTHER SPECIFIED PARTS OF DIGESTIVE TRACT: Chronic | ICD-10-CM

## 2020-11-21 DIAGNOSIS — J44.1 CHRONIC OBSTRUCTIVE PULMONARY DISEASE WITH (ACUTE) EXACERBATION: ICD-10-CM

## 2020-11-21 DIAGNOSIS — R63.8 OTHER SYMPTOMS AND SIGNS CONCERNING FOOD AND FLUID INTAKE: ICD-10-CM

## 2020-11-21 DIAGNOSIS — Z98.49 CATARACT EXTRACTION STATUS, UNSPECIFIED EYE: Chronic | ICD-10-CM

## 2020-11-21 LAB
APTT BLD: 27.6 SEC — SIGNIFICANT CHANGE UP (ref 27.5–35.5)
BASOPHILS # BLD AUTO: 0.05 K/UL — SIGNIFICANT CHANGE UP (ref 0–0.2)
BASOPHILS NFR BLD AUTO: 0.8 % — SIGNIFICANT CHANGE UP (ref 0–2)
BLD GP AB SCN SERPL QL: NEGATIVE — SIGNIFICANT CHANGE UP
EOSINOPHIL # BLD AUTO: 0.1 K/UL — SIGNIFICANT CHANGE UP (ref 0–0.5)
EOSINOPHIL NFR BLD AUTO: 1.5 % — SIGNIFICANT CHANGE UP (ref 0–6)
HCT VFR BLD CALC: 39.3 % — SIGNIFICANT CHANGE UP (ref 34.5–45)
HGB BLD-MCNC: 12.7 G/DL — SIGNIFICANT CHANGE UP (ref 11.5–15.5)
IMM GRANULOCYTES NFR BLD AUTO: 0.5 % — SIGNIFICANT CHANGE UP (ref 0–1.5)
INR BLD: 1.02 — SIGNIFICANT CHANGE UP (ref 0.88–1.16)
LYMPHOCYTES # BLD AUTO: 0.88 K/UL — LOW (ref 1–3.3)
LYMPHOCYTES # BLD AUTO: 13.6 % — SIGNIFICANT CHANGE UP (ref 13–44)
MCHC RBC-ENTMCNC: 29.7 PG — SIGNIFICANT CHANGE UP (ref 27–34)
MCHC RBC-ENTMCNC: 32.3 GM/DL — SIGNIFICANT CHANGE UP (ref 32–36)
MCV RBC AUTO: 91.8 FL — SIGNIFICANT CHANGE UP (ref 80–100)
MONOCYTES # BLD AUTO: 0.54 K/UL — SIGNIFICANT CHANGE UP (ref 0–0.9)
MONOCYTES NFR BLD AUTO: 8.3 % — SIGNIFICANT CHANGE UP (ref 2–14)
NEUTROPHILS # BLD AUTO: 4.88 K/UL — SIGNIFICANT CHANGE UP (ref 1.8–7.4)
NEUTROPHILS NFR BLD AUTO: 75.3 % — SIGNIFICANT CHANGE UP (ref 43–77)
NRBC # BLD: 0 /100 WBCS — SIGNIFICANT CHANGE UP (ref 0–0)
NT-PROBNP SERPL-SCNC: 196 PG/ML — SIGNIFICANT CHANGE UP (ref 0–300)
PLATELET # BLD AUTO: 255 K/UL — SIGNIFICANT CHANGE UP (ref 150–400)
PROTHROM AB SERPL-ACNC: 12.2 SEC — SIGNIFICANT CHANGE UP (ref 10.6–13.6)
RBC # BLD: 4.28 M/UL — SIGNIFICANT CHANGE UP (ref 3.8–5.2)
RBC # FLD: 13.4 % — SIGNIFICANT CHANGE UP (ref 10.3–14.5)
RH IG SCN BLD-IMP: POSITIVE — SIGNIFICANT CHANGE UP
SARS-COV-2 RNA SPEC QL NAA+PROBE: SIGNIFICANT CHANGE UP
TROPONIN T SERPL-MCNC: <0.01 NG/ML — SIGNIFICANT CHANGE UP (ref 0–0.01)
WBC # BLD: 6.48 K/UL — SIGNIFICANT CHANGE UP (ref 3.8–10.5)
WBC # FLD AUTO: 6.48 K/UL — SIGNIFICANT CHANGE UP (ref 3.8–10.5)

## 2020-11-21 PROCEDURE — 71275 CT ANGIOGRAPHY CHEST: CPT | Mod: 26

## 2020-11-21 PROCEDURE — 99285 EMERGENCY DEPT VISIT HI MDM: CPT

## 2020-11-21 PROCEDURE — 93010 ELECTROCARDIOGRAM REPORT: CPT

## 2020-11-21 PROCEDURE — 71045 X-RAY EXAM CHEST 1 VIEW: CPT | Mod: 26

## 2020-11-21 RX ORDER — IPRATROPIUM/ALBUTEROL SULFATE 18-103MCG
3 AEROSOL WITH ADAPTER (GRAM) INHALATION ONCE
Refills: 0 | Status: COMPLETED | OUTPATIENT
Start: 2020-11-21 | End: 2020-11-21

## 2020-11-21 RX ORDER — IPRATROPIUM/ALBUTEROL SULFATE 18-103MCG
3 AEROSOL WITH ADAPTER (GRAM) INHALATION EVERY 4 HOURS
Refills: 0 | Status: DISCONTINUED | OUTPATIENT
Start: 2020-11-21 | End: 2020-12-01

## 2020-11-21 RX ORDER — ENOXAPARIN SODIUM 100 MG/ML
30 INJECTION SUBCUTANEOUS ONCE
Refills: 0 | Status: COMPLETED | OUTPATIENT
Start: 2020-11-22 | End: 2020-11-22

## 2020-11-21 RX ORDER — PANTOPRAZOLE SODIUM 20 MG/1
40 TABLET, DELAYED RELEASE ORAL
Refills: 0 | Status: DISCONTINUED | OUTPATIENT
Start: 2020-11-22 | End: 2020-12-01

## 2020-11-21 RX ADMIN — Medication 40 MILLIGRAM(S): at 23:15

## 2020-11-21 RX ADMIN — Medication 3 MILLILITER(S): at 15:20

## 2020-11-21 RX ADMIN — Medication 3 MILLILITER(S): at 15:17

## 2020-11-21 RX ADMIN — Medication 125 MILLIGRAM(S): at 15:19

## 2020-11-21 NOTE — ED PROVIDER NOTE - PHYSICAL EXAMINATION
VITAL SIGNS: I have reviewed nursing notes and confirm.  CONSTITUTIONAL: Well-developed; well-nourished; in no acute distress.   SKIN:  warm and dry, no acute rash.   HEAD:  normocephalic, atraumatic.  EYES: EOM intact; conjunctiva and sclera clear.  ENT: No nasal discharge; airway clear.   NECK: Supple; non tender.  CARD: S1, S2 normal; Regular rate and rhythm.   RESP:  Mildly tachypneic, scattered rhonchi b/l with good air movement.   ABD: Normal bowel sounds; soft; non-distended; non-tender; no guarding/ rebound.  EXT: Normal ROM. No clubbing, cyanosis or edema. 2+ pulses to b/l ue/le.  NEURO: Alert, oriented, grossly unremarkable  PSYCH: Cooperative, mood and affect appropriate. VITAL SIGNS: I have reviewed nursing notes and confirm.  CONSTITUTIONAL: Well-developed; well-nourished; in no acute distress.   SKIN:  warm and dry, no acute rash.   HEAD:  normocephalic, atraumatic.  EYES: EOM intact; conjunctiva and sclera clear.  ENT: No nasal discharge; airway clear.   NECK: Supple; non tender.  CARD: S1, S2 normal; Regular rate and rhythm.   RESP:  Mildly tachypneic, scattered rhonchi b/l with good air movement.   ABD: Normal bowel sounds; soft; non-distended; non-tender; no guarding/ rebound.  EXT: Normal ROM. No clubbing, cyanosis or edema.  NEURO: Alert, oriented, grossly unremarkable  PSYCH: Cooperative, mood and affect appropriate.

## 2020-11-21 NOTE — ED ADULT NURSE NOTE - CHPI ED NUR SYMPTOMS NEG
no hemoptysis/no fever/no chills/no body aches/no cough/no edema/no headache/no chest pain/no diaphoresis/no wheezing

## 2020-11-21 NOTE — PROGRESS NOTE ADULT - SUBJECTIVE AND OBJECTIVE BOX
Interventional, Pulmonary, Critical, Chest Special Procedures. initial ER    Pt was seen and fully examined by myself.     Time spent with patient in minutes:    Patient is a 85y old  Female who presents with a chief complaint of progressively worsening SOB  HPI:      REVIEW OF SYSTEMS:  Constitutional: No fever, weight loss, chills or fatigue  Eyes: No eye pain, visual disturbances, or discharge  ENMT:  No difficulty hearing, tinnitus, vertigo; No sinus or throat pain. No epistaxis, dysphagia, dysphonia, hoarseness or odynophagia  Neck: No pain, stiffness or neck swelling.  No masses or deformities  Respiratory: No cough, wheezing, hemoptysis  - COPD  - ILD   - PE   - ASTHMA     - PNEUMONIA  Cardiovascular: No chest pain, dysrhythmia, palpitations, dizziness or edema - CAD   - CHF   - HTN  Gastrointestinal: No abdominal or epigastric pain. No nausea, vomiting or hematemesis; No diarrhea or constipation. No melena or hematochezia, Icterus.          Genitourinary: No dysuria, frequency, hematuria or incontinence   - CKD/RIDDHI      - ESRD  Neurological: No headaches, memory loss, loss of strength, numbness or tremors      -DEMENTIA     - STROKE    - SEIZURE  Skin: No itching, burning, rashes or lesions   Lymph Nodes: No enlarged glands  Endocrine: No heat or cold intolerance; No hair loss       - DM     - THYROID DISORDER  Musculoskeletal: No joint pain or swelling; No muscle, back or extremity pain, No edema  Psychiatric: No depression, anxiety, mood swings or difficulty sleeping  Heme/Lymph: No easy bruising or bleeding gums         - ANEMIA      - CANCER   -COAGULOPATHY  Allergy and Immunologic: No hives or eczema    PAST MEDICAL & SURGICAL HISTORY:  Osteoporosis  Osteoporosis    History of cataract surgery      FAMILY HISTORY:    SOCIAL HISTORY:      - Tobacco     - ETOH    Allergies    No Known Allergies    Intolerances      Vital Signs Last 24 Hrs  T(C): 36.6 (21 Nov 2020 14:01), Max: 36.6 (21 Nov 2020 14:01)  T(F): 97.8 (21 Nov 2020 14:01), Max: 97.8 (21 Nov 2020 14:01)  HR: 74 (21 Nov 2020 14:01) (74 - 74)  BP: 131/75 (21 Nov 2020 14:01) (131/75 - 131/75)  BP(mean): --  RR: 17 (21 Nov 2020 14:01) (17 - 17)  SpO2: 98% (21 Nov 2020 14:01) (98% - 98%)        MEDICATIONS:  MEDICATIONS  (STANDING):  albuterol/ipratropium for Nebulization. 3 milliLiter(s) Nebulizer once  albuterol/ipratropium for Nebulization. 3 milliLiter(s) Nebulizer once  methylPREDNISolone sodium succinate Injectable 125 milliGRAM(s) IV Push Once    MEDICATIONS  (PRN):      PHYSICAL EXAM:  Comfortable, no distress  Eyes: PERRL, EOM intact; conjunctiva and sclera clear  Head: Normocephalic;  No Trauma  ENMT: No nasal discharge, hoarseness, cough or hemoptysis  Neck: Supple; non tender; no masses or deformities.    No JVD  Respiratory: CTA,  - WHEEZING   - RHONCHI  - RALES  - CRACKLES.  Diminished breath sounds  BILATERAL  RIGHT  LEFT  Cardiovascular: Regular rate and rhythm. S1 and S2 Normal; No murmurs, gallops or rubs     - PPM/AICD  Gastrointestinal: Soft non-tender, non-distended; Normal bowel sounds; No hepatosplenomegaly.     -PEG    -  GT   - ALVES  Genitourinary: No costovertebral angle tenderness. No dysuria  Extremities: AROM, No clubbing, cyanosis or edema    Vascular: Peripheral pulses palpable 2+ bilaterally  Neurological: Alert and responisve to stimuli   Skin: Warm and dry. No obvious rash  Lymph Nodes: No acute cervical or supraclavicular adenopathy  Psychiatric: Cooperative and appropriate mood    DEVICES:  - DENTURES   +IV R / L     - ETUBE   -TRACH   -CTUBE  R / L    LABS:                          12.7   6.48  )-----------( 255      ( 21 Nov 2020 14:33 )             39.3     11-21    147<H>  |  112<H>  |  22  ----------------------------<  106<H>  4.5   |  23  |  1.07    Ca    9.4      21 Nov 2020 14:33    TPro  6.4  /  Alb  3.9  /  TBili  0.3  /  DBili  x   /  AST  24  /  ALT  11  /  AlkPhos  53  11-21    PT/INR - ( 21 Nov 2020 14:33 )   PT: 12.2 sec;   INR: 1.02          PTT - ( 21 Nov 2020 14:33 )  PTT:27.6 sec  RADIOLOGY & ADDITIONAL STUDIES (The following images were personally reviewed): outside PET CT reviewed Interventional, Pulmonary, Critical, Chest Special Procedures. initial ER    Pt was seen and fully examined by myself.     Time spent with patient in minutes:177    Patient is a 85y old  Female who presents with a chief complaint of progressively worsening SOB.   HPI:84 y/o F ex smoker quit 21 years ago PMH COPD not on home O2, not on home inhalers who presents due to increasing shortness of breath. Patient reports she has been short of breath for a while (few months?) now but that in the past 3 days it has gotten much worse.  Patient with chronic mild cough, no worsening cough, no wheezing, no chest pain, fevers, chills. Seen by PCP Dr Worthington and CT showed pulmonary nodules suspicious for malignancy Patient planned for lung biopsy on Monday but presented earlier due to increasing sob  Patient had PET scan one week ago that was consistent with prior CT showing large mediastinal mass suspicious for small cell carcinoma.       REVIEW OF SYSTEMS:  Constitutional: No fever, weight loss or fatigue  ENMT:  No difficulty hearing, tinnitus, vertigo; No sinus or throat pain  Respiratory: No cough, wheezing, chills or hemoptysis SOB better today  Cardiovascular: No chest pain, palpitations, dizziness or leg swelling  Gastrointestinal: No abdominal or epigastric pain. No nausea, vomiting or hematemesis; No diarrhea or constipation. No melena or hematochezia.  Skin: No itching, burning, rashes or lesions   Musculoskeletal: No joint pain or swelling; No muscle, back or extremity pain    PAST MEDICAL & SURGICAL HISTORY:  Osteoporosis  Osteoporosis    History of cataract surgery      FAMILY HISTORY:    SOCIAL HISTORY:      - Tobacco     - ETOH    Allergies    No Known Allergies    Intolerances      Vital Signs Last 24 Hrs  T(C): 36.6 (21 Nov 2020 14:01), Max: 36.6 (21 Nov 2020 14:01)  T(F): 97.8 (21 Nov 2020 14:01), Max: 97.8 (21 Nov 2020 14:01)  HR: 74 (21 Nov 2020 14:01) (74 - 74)  BP: 131/75 (21 Nov 2020 14:01) (131/75 - 131/75)  BP(mean): --  RR: 17 (21 Nov 2020 14:01) (17 - 17)  SpO2: 98% (21 Nov 2020 14:01) (98% - 98%)        MEDICATIONS:  MEDICATIONS  (STANDING):  albuterol/ipratropium for Nebulization. 3 milliLiter(s) Nebulizer once  albuterol/ipratropium for Nebulization. 3 milliLiter(s) Nebulizer once  methylPREDNISolone sodium succinate Injectable 125 milliGRAM(s) IV Push Once    MEDICATIONS  (PRN):      PHYSICAL EXAM:  Un Comfortable, no distress  Eyes: PERRL, EOM intact; conjunctiva and sclera clear  Head: Normocephalic;  No Trauma  ENMT: No nasal discharge,+ hoarseness, cough no  hemoptysis  Neck: Supple; non tender; no masses or deformities.    No JVD  Respiratory:,  - WHEEZING  + central  RHONCHI  - RALES  - CRACKLES.  Diminished breath sounds  BILATERAL  RIGHT  LEFT  Cardiovascular: Regular rate and rhythm. S1 and S2 Normal; No murmurs, gallops or rubs     - PPM/AICD  Gastrointestinal: Soft non-tender, non-distended; Normal bowel sounds; No hepatosplenomegaly.     -PEG    -  GT   - ALVES  Genitourinary: No costovertebral angle tenderness. No dysuria  Extremities: AROM, No clubbing, cyanosis or edema    Vascular: Peripheral pulses palpable 2+ bilaterally  Neurological: Alert and responisve to stimuli   Skin: Warm and dry. No obvious rash  Lymph Nodes: No acute cervical or supraclavicular adenopathy  Psychiatric: Cooperative and appropriate mood    DEVICES:  - DENTURES   +IV R / L     - ETUBE   -TRACH   -CTUBE  R / L    LABS:                          12.7   6.48  )-----------( 255      ( 21 Nov 2020 14:33 )             39.3     11-21    147<H>  |  112<H>  |  22  ----------------------------<  106<H>  4.5   |  23  |  1.07    Ca    9.4      21 Nov 2020 14:33    TPro  6.4  /  Alb  3.9  /  TBili  0.3  /  DBili  x   /  AST  24  /  ALT  11  /  AlkPhos  53  11-21    PT/INR - ( 21 Nov 2020 14:33 )   PT: 12.2 sec;   INR: 1.02          PTT - ( 21 Nov 2020 14:33 )  PTT:27.6 sec  RADIOLOGY & ADDITIONAL STUDIES (The following images were personally reviewed): outside PET CT reviewed

## 2020-11-21 NOTE — H&P ADULT - ASSESSMENT
86 y/o F PMH COPD with recent imaging findings c/f lung ca pending biopsy who presented due to shortness of breath admitted for COPD exacerbation and biopsy. 86 y/o F PMH COPD with recent imaging findings c/f lung ca pending biopsy who presented due to shortness of breath admitted for COPD exacerbation vs symptomatic lung ca and biopsy.

## 2020-11-21 NOTE — ED ADULT NURSE NOTE - NSFALLRSKOUTCOME_ED_ALL_ED
1/16/2018      RE: Brionna Rollins  [address]  [place] [address]       DERMATOLOGY CLINIC VISIT NOTE        DERMATOLOGY PROBLEM LIST:   1.  Seborrheic keratoses.   2.  Irritated seborrheic keratoses.   3.  Cherry angiomas.     CHIEF COMPLAINT:  Followup irritated seborrheic keratoses.      HISTORY OF PRESENT ILLNESS:  Ms. Rollins a 71-year-old female returning to Dermatology Clinic for ongoing evaluation of irritated seborrheic keratoses.  The patient has history of extensive, hundreds, seborrheic keratoses.  Occasionally, several of them become irritated and require treatment.  She was last seen in clinic on 10/31/2017. She has two lesions on the L breast and L flank that are itching.      Patient Active Problem List   Diagnosis     Osteoporosis     Internal derangement of knee     Estrogen replacement therapy     Colon Polyps     Microscopic hematuria     CARDIOVASCULAR SCREENING; LDL GOAL LESS THAN 160     ACP (advance care planning)     Mixed incontinence urge and stress     Fracture of distal end of radius     Hypertension goal BP (blood pressure) < 140/90     Atopic rhinitis     Allergy to anti-infective agent     Enteritis     Osteoarthritis of hip     Osteoarthritis of knee     Acute posthemorrhagic anemia     Surgical follow-up care     Diverticulitis of sigmoid colon     Inflamed seborrheic keratosis     SK (seborrheic keratosis)     Cherry angioma       Allergies   Allergen Reactions     Ciprofloxacin Swelling     Stiff and swollen neck     Amoxicillin-Pot Clavulanate [Augmentin]      Cefprozil Rash     Contrast Dye      gets cold and feels terrible  Pt was ok with Isovue-[id number] and no pre-meds     Doxycycline Hyclate Rash     Erythromycin Swelling     Flagyl [Metronidazole Hcl]      Meperidine Hcl      low blood pressure     Oxycodone      Prochlorperazine      agitated     Sulfa Drugs Rash     Tetracycline Rash         Current Outpatient Prescriptions   Medication     acetaminophen-codeine  (TYLENOL #3) 300-30 MG per tablet     ASPIRIN PO     cyclobenzaprine (FLEXERIL) 5 MG tablet     ranitidine (ZANTAC) 150 MG tablet     diclofenac (VOLTAREN) 1 % GEL     fluticasone (FLONASE) 50 MCG/ACT nasal spray     LACTOBACILLUS RHAMNOSUS, GG, PO     acetaminophen (MAPAP) 325 MG tablet     IBUPROFEN PO     hydrocortisone (CORTAID) 1 % cream     naproxen (NAPROSYN) 500 MG tablet     No current facility-administered medications for this visit.           SOCIAL HISTORY:  The patient is .  She lives in [place].  She has a daughter in the area.      REVIEW OF SYSTEMS:  Feels well without additional skin concerns.      PHYSICAL EXAMINATION:   GENERAL:  The patient is a healthy-appearing 71-year-old female in no distress.   HEENT:  Conjunctivae are clear.   PULMONARY:  Breathing comfortably on room air.   ABDOMEN:  No abdominal distention.   SKIN:  Examination today included the face and neck, back, chest, abdomen.  Skin exam was normal except for as follows:   - Waxy 3-6 mm papules >100 on the chest, back, abdomen, L inferior breast, flanks  - Examination of the forehead, lateral cheeks and infraorbital rims with scattered-appearing waxy, 2-3 mm papules.      ASSESSMENT AND PLAN:   1.  Seborrheic keratoses; benign keratotic papules.  No treatment advised.   2.  Irritated seborrheic keratoses around the eyes.  A total of 3 lesions were treated with 10-second freeze-thaw cycle with liquid nitrogen on the L flank and L breast. Wound info provided.      The patient to return to Dermatology Clinic as needed.     Keisha Carrillo MD  Dermatology Staff       cc:   Roe Connolly MD   [facility]   [address] [place]  [address]      Fall with Harm Risk

## 2020-11-21 NOTE — ED ADULT NURSE NOTE - NSIMPLEMENTINTERV_GEN_ALL_ED
Implemented All Fall with Harm Risk Interventions:  Deputy to call system. Call bell, personal items and telephone within reach. Instruct patient to call for assistance. Room bathroom lighting operational. Non-slip footwear when patient is off stretcher. Physically safe environment: no spills, clutter or unnecessary equipment. Stretcher in lowest position, wheels locked, appropriate side rails in place. Provide visual cue, wrist band, yellow gown, etc. Monitor gait and stability. Monitor for mental status changes and reorient to person, place, and time. Review medications for side effects contributing to fall risk. Reinforce activity limits and safety measures with patient and family. Provide visual clues: red socks.

## 2020-11-21 NOTE — H&P ADULT - PROBLEM SELECTOR PLAN 3
F: None  N: regular  DVT: lovenox 30 on 11/22 AM then hold for biopsy  PPI: while on steroids  FULL CODE  DISPO: MABLE

## 2020-11-21 NOTE — H&P ADULT - NSICDXPASTMEDICALHX_GEN_ALL_CORE_FT
PAST MEDICAL HISTORY:  Chronic obstructive pulmonary disease     Osteoporosis Osteoporosis     PAST MEDICAL HISTORY:  Basal cell carcinoma x2 - upper lip and arm    Chronic obstructive pulmonary disease     Hearing loss left ear - sees Dr. Garces ENT    Osteoporosis Osteoporosis

## 2020-11-21 NOTE — ED PROVIDER NOTE - PROGRESS NOTE DETAILS
Dr. Day, pulmonology, is in the ER to see the patient and we discussed the case. He requests a CT angio of the chest to r/o PE. If PE then recommend to put on Heparin. Either way plan to admit patient for biopsy on Monday. Noted that her recent CT scan showed a large mediastinal mass suspicious for small cell carcinoma, and the PET scan showed the same. Dr. Day, pulmonology, is in the ED to see the patient and we discussed the case. He requests a CT angio of the chest to r/o PE. If PE then recommend to put on Heparin. Either way plan to admit patient for biopsy on Monday. Noted that her recent CT scan showed a large mediastinal mass suspicious for small cell carcinoma, and the PET scan showed the same.

## 2020-11-21 NOTE — H&P ADULT - HISTORY OF PRESENT ILLNESS
86 y/o F PMH COPD not on home O2 who presents due to shortness of breath. Patient planned for lung biopsy on Monday but presented earlier due to symptoms. 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   In ED patient underwent CT-PE protocol which was negative.     ED vitals: afebrile, p74, /75, RR 17, 98% on RA  ED labs: Na 147, Cl 112,   EKG: NSR at 62    ED imaging  1. Since 2/9/2017, there are three new lung nodules in the right upper lobe and there is new large mediastinal and right hilar lymphadenopathy. These findings are suspicious for lung cancer with lymph node metastases.    2. No evidence of pulmonary embolism, but the right hilar adenopathy compresses right upper lobe pulmonary arteries, resulting in one branch being occluded and the other branch nearly occluded.    3. Moderate compression of superior vena cava by adenopathy with dilated internal jugular veins bilaterally.      Patient received solumedrol IV 125mg, duonebs x 2   84 y/o F PMH COPD not on home O2, not on home inhalers who presents due to shortness of breath. Patient reports she has been short of breath for a while (few months?) now but that in the past 3 days it has gotten much worse. She used to be able to walk 30 blocks without issue and now can walk less than half a block before becoming short of breath. Patient with chronic mild cough, no worsening cough, no wheezing, no chest pain, fevers, chills. Patient planned for lung biopsy on Monday but presented earlier due to these symptoms at the suggestion of her doctor. 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     ED vitals: afebrile, p74, /75, RR 17, 98% on RA  ED labs: Na 147, Cl 112,   EKG: NSR at 62    ED imaging  1. Since 2/9/2017, there are three new lung nodules in the right upper lobe and there is new large mediastinal and right hilar lymphadenopathy. These findings are suspicious for lung cancer with lymph node metastases.    2. No evidence of pulmonary embolism, but the right hilar adenopathy compresses right upper lobe pulmonary arteries, resulting in one branch being occluded and the other branch nearly occluded.    3. Moderate compression of superior vena cava by adenopathy with dilated internal jugular veins bilaterally.      Patient received solumedrol IV 125mg, duonebs x 2

## 2020-11-21 NOTE — H&P ADULT - NSHPSOCIALHISTORY_GEN_ALL_CORE
Lives alone. Support/family in the area are her cousins. No drug use. Smoked for 40 years (about 2.5 packs per day but might not finish whole cigarette) and quit 21 years ago. Has not drank alcohol in 47 years but was never a heavy drinker.

## 2020-11-21 NOTE — H&P ADULT - PROBLEM SELECTOR PLAN 1
s/p solumedrol 125 in ED  c/w solumedrol 40mg IV q8 per pulm recs  duonebs q4 prn some rhonchi, no wheezing but patient with symptomatic improvement after duonebs  s/p solumedrol 125 in ED  c/w solumedrol 40mg IV q8 per pulm recs  duonebs q4 prn

## 2020-11-21 NOTE — H&P ADULT - PROBLEM SELECTOR PLAN 2
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

## 2020-11-21 NOTE — ED PROVIDER NOTE - CLINICAL SUMMARY MEDICAL DECISION MAKING FREE TEXT BOX
Patient is an 85 year old female ex smoker with PMH of COPD who presents today with increasing SOB secondary to acute COPD exacerbation. Will obtain blood work, administer steroid, get chest x-ray. Will speak with pulmonologist, Dr. Day, and re evaluate. Patient is an 85 year old female ex smoker with PMH of COPD who presents today with increasing SOB secondary to acute COPD exacerbation. Will obtain blood work, administer steroid, get chest x-ray. Will speak with pulmonologist, Dr. Day, and re evaluate.  ED course: Pt HD stable. In NAD. CXR with no acute infiltrate. Given nebs and steroids for COPD exacerbation. CTA obtained and with no PE but with mass noted. Pt better post nebs and steroids and resting comfortably. Admitted for lung biopsy in a couple of days. Stable in ED.

## 2020-11-21 NOTE — ED PROVIDER NOTE - OBJECTIVE STATEMENT
Patient is an 85 year old female with PMH of COPD, not dependent on home oxygen and no intubation in the past for COPD, denies chronic medications and is an ex smoker who quit 20+ years ago. She presents to the ED today with 2 days of increasing SOB. Patient is followed by Dr. Day for pulmonology with whom she has a lung biopsy scheduled on Monday (2 days from now), but due to worsening SOB she came to the ED today for further evaluation.   Patient states that she has f/u CT scans for COPD every few years and she had a CT lungs a few weeks ago that showed a "shadow". She then proceeded to have a PET scan approximately one week ago and the patient is currently unaware of those results. She reportedly was told by Dr. Day and Dr. Camilo, PCP, to come for biopsy on Monday.   She reports a chronic cough. Pt denies chest pain, fever or chills, nausea, vomiting, diarrhea, and no history of COVID19 during the pandemic. Patient is an 85 year old female with PMH of COPD, not dependent on home oxygen and no intubations in the past for COPD, denies chronic medications and is an ex smoker who quit 20+ years ago presents to the ED today with 2 days of increasing SOB. Patient is followed by Dr. Day for pulmonology with whom she has a lung biopsy scheduled on Monday (2 days from now), but due to worsening SOB she came to the ED today for further evaluation.   Patient states that she has f/u CT scans for COPD every few years and she had a CT lungs a few weeks ago that showed a "shadow". She then proceeded to have a PET scan approximately one week ago and the patient is currently unaware of those results. She reportedly was told by Dr. Day and Dr. Camilo, PCP, to come for biopsy on Monday.    She reports a chronic cough. Pt denies chest pain, fever or chills, nausea, vomiting, diarrhea, and no history of COVID19 during the pandemic.

## 2020-11-21 NOTE — ED ADULT TRIAGE NOTE - CHIEF COMPLAINT QUOTE
"I have shortness of breath for a couple of days and my doctor told me to come to ER and I am getting a lung biopsy on monday".

## 2020-11-21 NOTE — H&P ADULT - NSICDXPASTSURGICALHX_GEN_ALL_CORE_FT
PAST SURGICAL HISTORY:  History of cataract surgery      PAST SURGICAL HISTORY:  H/O breast biopsy > 5 years ago, mass was benign and self-resolved    History of appendectomy     History of cataract surgery

## 2020-11-21 NOTE — H&P ADULT - NSHPLABSRESULTS_GEN_ALL_CORE
LABS:                         12.7   6.48  )-----------( 255      ( 21 Nov 2020 14:33 )             39.3     11-21    147<H>  |  112<H>  |  22  ----------------------------<  106<H>  4.5   |  23  |  1.07    Ca    9.4      21 Nov 2020 14:33  Mg     2.1     11-21    TPro  6.4  /  Alb  3.9  /  TBili  0.3  /  DBili  x   /  AST  24  /  ALT  11  /  AlkPhos  53  11-21    PT/INR - ( 21 Nov 2020 14:33 )   PT: 12.2 sec;   INR: 1.02          PTT - ( 21 Nov 2020 14:33 )  PTT:27.6 sec    CARDIAC MARKERS ( 21 Nov 2020 14:33 )  x     / <0.01 ng/mL / x     / x     / x          Serum Pro-Brain Natriuretic Peptide: 196 pg/mL (11-21 @ 14:33)        RADIOLOGY, EKG & ADDITIONAL TESTS: Reviewed.

## 2020-11-21 NOTE — H&P ADULT - NSHPPHYSICALEXAM_GEN_ALL_CORE
VITAL SIGNS:  T(C): 36.8 (11-21-20 @ 20:01), Max: 36.8 (11-21-20 @ 20:01)  T(F): 98.2 (11-21-20 @ 20:01), Max: 98.2 (11-21-20 @ 20:01)  HR: 74 (11-21-20 @ 20:01) (70 - 74)  BP: 143/72 (11-21-20 @ 20:01) (113/56 - 143/72)  BP(mean): --  RR: 18 (11-21-20 @ 20:01) (16 - 19)  SpO2: 97% (11-21-20 @ 20:01) (96% - 98%)  Wt(kg): --    PHYSICAL EXAM:    Constitutional: WDWN, lying comfortably in bed, NAD  Head: Nc/At  Eyes: PERRL, EOMI, clear conjunctiva  ENT: no nasal discharge; uvula midline, no oropharyngeal erythema or exudates; MMM  Neck: supple; no JVD or thyromegaly  Respiratory: CTA b/l, no wheezes, rales, or rhonchi  Cardiac: +S1/S2, +RRR, no murmurs, rubs, or gallops  Gastrointestinal: soft, non-tender, non-distended, no rebound/guarding, no palpable masses, normoactive bowel sounds x4  Back: spine midline, no bony tenderness or step-offs; no CVAT B/L  Extremities: WWP, no clubbing or cyanosis, no peripheral edema  Musculoskeletal: NROM x4; no joint swelling, tenderness or erythema  Vascular: 2+ radial and DP pulses b/l  Dermatologic: skin warm, dry and intact, no rashes, wounds, or scars  Lymphatic: no submandibular or cervical LAD  Neurologic: AAOx3, CNII-XII grossly intact, no focal deficits  Psychiatric: affect and characteristics of appearance, verbalizations, behaviors are appropriate PHYSICAL EXAM:    Constitutional: Thin but well-appearing lady in NAD  Head: Nc/At  Eyes: PERRL, EOMI, clear conjunctiva  ENT: no nasal discharge; uvula midline, no oropharyngeal erythema or exudates  Neck: supple  Respiratory: CTA b/l, no wheezes, rales, minimal rhonchi at bases, able to speak in full sentences, no increased WOB  Cardiac: +S1/S2, +RRR, no murmurs, rubs, or gallops  Gastrointestinal: soft, non-tender, non-distended, no rebound/guarding, no palpable masses, normoactive bowel sounds x4  Extremities: WWP, no clubbing or cyanosis, no peripheral edema  Musculoskeletal: NROM x4; no joint swelling, tenderness or erythema  Vascular: 2+ radial and DP pulses b/l  Dermatologic: skin warm, dry and intact, no rashes, wounds, or scars  Lymphatic: no submandibular or cervical LAD  Neurologic: AAOx3, CNII-XII grossly intact, no focal deficits  Psychiatric: affect and characteristics of appearance, verbalizations, behaviors are appropriate

## 2020-11-21 NOTE — ED ADULT NURSE NOTE - OBJECTIVE STATEMENT
85y F, A&ox3, presents to ed for worsening sob for weeks. Reports saw possible nodule in lung scan and was sent in for biopsy, states "they said I'll get one tomorrow" No cp, no fevers, no chills, no cough, no hemoptysis. EKG performed, iv placed, lab drawn. 85y F, A&ox3, presents to ed for worsening sob for weeks. Reports saw possible nodule in lung scan and was sent in for biopsy, states "they said I'll get one tomorrow" Minimal rhonchi noted bilateral.  No cp, no fevers, no chills, no cough, no hemoptysis. EKG performed, iv placed, lab drawn.

## 2020-11-22 LAB
ANION GAP SERPL CALC-SCNC: 16 MMOL/L — SIGNIFICANT CHANGE UP (ref 5–17)
BUN SERPL-MCNC: 21 MG/DL — SIGNIFICANT CHANGE UP (ref 7–23)
CALCIUM SERPL-MCNC: 8.8 MG/DL — SIGNIFICANT CHANGE UP (ref 8.4–10.5)
CHLORIDE SERPL-SCNC: 107 MMOL/L — SIGNIFICANT CHANGE UP (ref 96–108)
CO2 SERPL-SCNC: 21 MMOL/L — LOW (ref 22–31)
CREAT SERPL-MCNC: 1.01 MG/DL — SIGNIFICANT CHANGE UP (ref 0.5–1.3)
GLUCOSE SERPL-MCNC: 129 MG/DL — HIGH (ref 70–99)
MAGNESIUM SERPL-MCNC: 2.3 MG/DL — SIGNIFICANT CHANGE UP (ref 1.6–2.6)
PHOSPHATE SERPL-MCNC: 2.9 MG/DL — SIGNIFICANT CHANGE UP (ref 2.5–4.5)
POTASSIUM SERPL-MCNC: 4.2 MMOL/L — SIGNIFICANT CHANGE UP (ref 3.5–5.3)
POTASSIUM SERPL-SCNC: 4.2 MMOL/L — SIGNIFICANT CHANGE UP (ref 3.5–5.3)
SARS-COV-2 IGG SERPL QL IA: NEGATIVE — SIGNIFICANT CHANGE UP
SARS-COV-2 IGM SERPL IA-ACNC: 0.1 INDEX — SIGNIFICANT CHANGE UP
SODIUM SERPL-SCNC: 144 MMOL/L — SIGNIFICANT CHANGE UP (ref 135–145)

## 2020-11-22 RX ORDER — INFLUENZA VIRUS VACCINE 15; 15; 15; 15 UG/.5ML; UG/.5ML; UG/.5ML; UG/.5ML
0.7 SUSPENSION INTRAMUSCULAR ONCE
Refills: 0 | Status: COMPLETED | OUTPATIENT
Start: 2020-11-22 | End: 2020-11-22

## 2020-11-22 RX ORDER — INFLUENZA VIRUS VACCINE 15; 15; 15; 15 UG/.5ML; UG/.5ML; UG/.5ML; UG/.5ML
0.5 SUSPENSION INTRAMUSCULAR ONCE
Refills: 0 | Status: DISCONTINUED | OUTPATIENT
Start: 2020-11-22 | End: 2020-11-22

## 2020-11-22 RX ADMIN — PANTOPRAZOLE SODIUM 40 MILLIGRAM(S): 20 TABLET, DELAYED RELEASE ORAL at 06:35

## 2020-11-22 RX ADMIN — Medication 40 MILLIGRAM(S): at 22:42

## 2020-11-22 RX ADMIN — ENOXAPARIN SODIUM 30 MILLIGRAM(S): 100 INJECTION SUBCUTANEOUS at 06:35

## 2020-11-22 RX ADMIN — Medication 40 MILLIGRAM(S): at 06:35

## 2020-11-22 RX ADMIN — Medication 40 MILLIGRAM(S): at 13:11

## 2020-11-22 NOTE — PROGRESS NOTE ADULT - SUBJECTIVE AND OBJECTIVE BOX
86 y/o F ex smoker quit 21 years ago PMH COPD not on home O2, not on home inhalers who presents due to increasing shortness of breath. Patient reports she has been short of breath for a while (few months?) now but that in the past 3 days it has gotten much worse.  Patient with chronic mild cough, no worsening cough, no wheezing, no chest pain, fevers, chills. Seen by PCP Dr Worthington and CT showed pulmonary nodules suspicious for malignancy Patient planned for lung biopsy on Monday but presented earlier due to increasing sob  Patient had PET scan one week ago that was consistent with prior CT showing large mediastinal mass suspicious for small cell carcinoma.     Patient received solumedrol IV 125mg, duonebs x 2   (21 Nov 2020 20:14)      REVIEW OF SYSTEMS:  Constitutional: No fever, weight loss or fatigue  ENMT:  No difficulty hearing, tinnitus, vertigo; No sinus or throat pain  Respiratory: No cough, wheezing, chills or hemoptysis SOB better today  Cardiovascular: No chest pain, palpitations, dizziness or leg swelling  Gastrointestinal: No abdominal or epigastric pain. No nausea, vomiting or hematemesis; No diarrhea or constipation. No melena or hematochezia.  Skin: No itching, burning, rashes or lesions   Musculoskeletal: No joint pain or swelling; No muscle, back or extremity pain    PAST MEDICAL & SURGICAL HISTORY:  Hearing loss  left ear - sees Dr. Garces ENT    Basal cell carcinoma  x2 - upper lip and arm    Chronic obstructive pulmonary disease    Osteoporosis  Osteoporosis    H/O breast biopsy  &gt; 5 years ago, mass was benign and self-resolved    History of appendectomy    History of cataract surgery        FAMILY HISTORY:      SOCIAL HISTORY:  Smoking Status: [ ] Current, [ ] Former, [ ] Never  Pack Years:    MEDICATIONS:  MEDICATIONS  (STANDING):  influenza  Vaccine (HIGH DOSE) 0.7 milliLiter(s) IntraMuscular once  methylPREDNISolone sodium succinate Injectable 40 milliGRAM(s) IV Push every 8 hours  pantoprazole    Tablet 40 milliGRAM(s) Oral before breakfast    MEDICATIONS  (PRN):  albuterol/ipratropium for Nebulization. 3 milliLiter(s) Nebulizer every 4 hours PRN Shortness of Breath and/or Wheezing      Allergies    No Known Allergies    Intolerances        Vital Signs Last 24 Hrs  T(C): 36.2 (22 Nov 2020 08:15), Max: 36.8 (21 Nov 2020 20:01)  T(F): 97.2 (22 Nov 2020 08:15), Max: 98.2 (21 Nov 2020 20:01)  HR: 66 (22 Nov 2020 08:15) (65 - 75)  BP: 108/62 (22 Nov 2020 08:15) (107/60 - 143/72)  BP(mean): --  RR: 18 (22 Nov 2020 08:15) (16 - 19)  SpO2: 98% (22 Nov 2020 08:15) (95% - 99%)        PHYSICAL EXAM:    General: thin, pleasant; in no acute distress  HEENT: MMM, conjunctiva and sclera clear  Lungs: clear  Heart: regular  Gastrointestinal: Soft, non-tender non-distended; Normal bowel sounds; No rebound or guarding  Extremities: Normal range of motion, No clubbing, cyanosis or edema  Neurological: Alert and oriented x3  Skin: Warm and dry. No obvious rash      LABS:                        12.7   6.48  )-----------( 255      ( 21 Nov 2020 14:33 )             39.3     11-22    144  |  107  |  21  ----------------------------<  129<H>  4.2   |  21<L>  |  1.01    Ca    8.8      22 Nov 2020 06:45  Phos  2.9     11-22  Mg     2.3     11-22    TPro  6.4  /  Alb  3.9  /  TBili  0.3  /  DBili  x   /  AST  24  /  ALT  11  /  AlkPhos  53  11-21      Culture Results:   No growth at 12 hours (11-21 @ 16:23)  Culture Results:   No growth at 12 hours (11-21 @ 16:23)      RADIOLOGY & ADDITIONAL STUDIES:

## 2020-11-22 NOTE — PROGRESS NOTE ADULT - SUBJECTIVE AND OBJECTIVE BOX
OVERNIGHT EVENTS: EZE    SUBJECTIVE / INTERVAL HPI: Patient seen and examined at bedside. Denies dyspnea, chest pain, throat pain, headache, lightheadedness, abdominal pain, n/v, d/c.    VITAL SIGNS:  Vital Signs Last 24 Hrs  T(C): 36.4 (22 Nov 2020 15:43), Max: 36.8 (21 Nov 2020 20:01)  T(F): 97.6 (22 Nov 2020 15:43), Max: 98.2 (21 Nov 2020 20:01)  HR: 67 (22 Nov 2020 15:43) (65 - 75)  BP: 147/77 (22 Nov 2020 15:43) (107/60 - 147/77)  BP(mean): --  RR: 18 (22 Nov 2020 15:43) (16 - 19)  SpO2: 100% (22 Nov 2020 15:43) (95% - 100%)    PHYSICAL EXAM:    General: WDWN  HEENT: NC/AT; PERRL, anicteric sclera; MMM  Neck: supple  Cardiovascular: +S1/S2, RRR  Respiratory: CTA B/L; no W/R/R  Gastrointestinal: soft, NT/ND; +BSx4  Extremities: WWP; no edema, clubbing or cyanosis  Vascular: 2+ radial, DP/PT pulses B/L  Neurological: AAOx3; no focal deficits    MEDICATIONS:  MEDICATIONS  (STANDING):  influenza  Vaccine (HIGH DOSE) 0.7 milliLiter(s) IntraMuscular once  methylPREDNISolone sodium succinate Injectable 40 milliGRAM(s) IV Push every 8 hours  pantoprazole    Tablet 40 milliGRAM(s) Oral before breakfast    MEDICATIONS  (PRN):  albuterol/ipratropium for Nebulization. 3 milliLiter(s) Nebulizer every 4 hours PRN Shortness of Breath and/or Wheezing      ALLERGIES:  Allergies    No Known Allergies    Intolerances        LABS:                        12.7   6.48  )-----------( 255      ( 21 Nov 2020 14:33 )             39.3     11-22    144  |  107  |  21  ----------------------------<  129<H>  4.2   |  21<L>  |  1.01    Ca    8.8      22 Nov 2020 06:45  Phos  2.9     11-22  Mg     2.3     11-22    TPro  6.4  /  Alb  3.9  /  TBili  0.3  /  DBili  x   /  AST  24  /  ALT  11  /  AlkPhos  53  11-21    PT/INR - ( 21 Nov 2020 14:33 )   PT: 12.2 sec;   INR: 1.02          PTT - ( 21 Nov 2020 14:33 )  PTT:27.6 sec    CAPILLARY BLOOD GLUCOSE          RADIOLOGY & ADDITIONAL TESTS: Reviewed.

## 2020-11-22 NOTE — DIETITIAN INITIAL EVALUATION ADULT. - OTHER INFO
86 yo/female with PMHx COPD, admitted w/SOB. Had recent PET scan c/w prior CT showing large mediastinal mass. Suspicious for small cell carcinoma. Planned for biopsy on Monday. Pt seen in room, awake, alert, very pleasant. She endorsed having a very good appetite and eating all foods. She often cooks and likes to have oatmeal, eggs, salad, chicken, fish, sandwiches. NKFA. She does not drink ONS and does not feel the need for it at this time. She endorsed 100% intake of breakfast (I[place]ic toast, banana, and coffee). No N/V/C/D reported at this time. +BM 11/21. She denied difficulty chewing or swallowing. Skin intact pressure-wise; no edema present. No pain reported. She did endorse losing "a few pounds" over the past few months but unsure of UBW- per visit in ED 1 month ago, she has gained weight. NFPE significant for muscle and fat losses, though suspect much of it is age-related, as she endorsed being very active and eating consistently well. Encouraged PO intake with focus on protein foods. Will continue to follow per RD protocol.

## 2020-11-22 NOTE — DIETITIAN INITIAL EVALUATION ADULT. - OTHER CALCULATIONS
ActualBW used for calculations as pt between % of IBW (92% IBW). Needs estimated for age and adjusted for mild malnutrition, and hypermetabolic state

## 2020-11-22 NOTE — PROGRESS NOTE ADULT - SUBJECTIVE AND OBJECTIVE BOX
Interventional, Pulmonary, Critical, Chest Special Procedures.    Pt was seen and fully examined by myself.     Time spent with patient in minutes:77    Patient is a 85y old  Female who presents with a chief complaint of accelerated SOB (22 Nov 2020 13:26) The patient c dry cough and sob when talking, eupneic and engaging today    HPI:  84 y/o F PMH COPD not on home O2, not on home inhalers who presents due to shortness of breath. Patient reports she has been short of breath for a while (few months?) now but that in the past 3 days it has gotten much worse. She used to be able to walk 30 blocks without issue and now can walk less than half a block before becoming short of breath. Patient with chronic mild cough, no worsening cough, no wheezing, no chest pain, fevers, chills. Patient planned for lung biopsy on Monday but presented earlier due to these symptoms at the suggestion of her doctor. 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     ED vitals: afebrile, p74, /75, RR 17, 98% on RA  ED labs: Na 147, Cl 112,   EKG: NSR at 62    ED imaging  1. Since 2/9/2017, there are three new lung nodules in the right upper lobe and there is new large mediastinal and right hilar lymphadenopathy. These findings are suspicious for lung cancer with lymph node metastases.    2. No evidence of pulmonary embolism, but the right hilar adenopathy compresses right upper lobe pulmonary arteries, resulting in one branch being occluded and the other branch nearly occluded.    3. Moderate compression of superior vena cava by adenopathy with dilated internal jugular veins bilaterally.      Patient received solumedrol IV 125mg, duonebs x 2   (21 Nov 2020 20:14)      REVIEW OF SYSTEMS:  Constitutional: No fever, weight loss or fatigue  ENMT:  No difficulty hearing, tinnitus, vertigo; No sinus or throat pain  Respiratory: No cough, wheezing, chills or hemoptysis SOB better today  Cardiovascular: No chest pain, palpitations, dizziness or leg swelling  Gastrointestinal: No abdominal or epigastric pain. No nausea, vomiting or hematemesis; No diarrhea or constipation. No melena or hematochezia.  Skin: No itching, burning, rashes or lesions   Musculoskeletal: No joint pain or swelling; No muscle, back or extremity pain    PAST MEDICAL & SURGICAL HISTORY:  Hearing loss  left ear - sees Dr. Garces ENT    Basal cell carcinoma  x2 - upper lip and arm    Chronic obstructive pulmonary disease    Osteoporosis  Osteoporosis    H/O breast biopsy  &gt; 5 years ago, mass was benign and self-resolved    History of appendectomy    History of cataract surgery      FAMILY HISTORY:    SOCIAL HISTORY:      - Tobacco     - ETOH    Allergies    No Known Allergies    Intolerances      Vital Signs Last 24 Hrs  T(C): 36.4 (22 Nov 2020 15:43), Max: 36.8 (21 Nov 2020 20:01)  T(F): 97.6 (22 Nov 2020 15:43), Max: 98.2 (21 Nov 2020 20:01)  HR: 67 (22 Nov 2020 15:43) (65 - 75)  BP: 147/77 (22 Nov 2020 15:43) (107/60 - 147/77)  BP(mean): --  RR: 18 (22 Nov 2020 15:43) (16 - 19)  SpO2: 100% (22 Nov 2020 15:43) (95% - 100%)        MEDICATIONS:  MEDICATIONS  (STANDING):  influenza  Vaccine (HIGH DOSE) 0.7 milliLiter(s) IntraMuscular once  methylPREDNISolone sodium succinate Injectable 40 milliGRAM(s) IV Push every 8 hours  pantoprazole    Tablet 40 milliGRAM(s) Oral before breakfast    MEDICATIONS  (PRN):  albuterol/ipratropium for Nebulization. 3 milliLiter(s) Nebulizer every 4 hours PRN Shortness of Breath and/or Wheezing      PHYSICAL EXAM:  Un Comfortable, no distress  Eyes: PERRL, EOM intact; conjunctiva and sclera clear  Head: Normocephalic;  No Trauma  ENMT: No nasal discharge,+ hoarseness, cough no  hemoptysis  Neck: Supple; non tender; no masses or deformities.    No JVD  Respiratory:,  - WHEEZING  + central  RHONCHI  - RALES  - CRACKLES.  Diminished breath sounds  BILATERAL  RIGHT  LEFT  Cardiovascular: Regular rate and rhythm. S1 and S2 Normal; No murmurs, gallops or rubs     - PPM/AICD  Gastrointestinal: Soft non-tender, non-distended; Normal bowel sounds; No hepatosplenomegaly.     -PEG    -  GT   - ALVES  Genitourinary: No costovertebral angle tenderness. No dysuria  Extremities: AROM, No clubbing, cyanosis or edema    Vascular: Peripheral pulses palpable 2+ bilaterally  Neurological: Alert and responisve to stimuli   Skin: Warm and dry. No obvious rash  Lymph Nodes: No acute cervical or supraclavicular adenopathy  Psychiatric: Cooperative and appropriate mood  DEVICES:  - DENTURES   +IV R / L     - ETUBE   -TRACH   -CTUBE  R / L    LABS:                          12.7   6.48  )-----------( 255      ( 21 Nov 2020 14:33 )             39.3     11-22    144  |  107  |  21  ----------------------------<  129<H>  4.2   |  21<L>  |  1.01    Ca    8.8      22 Nov 2020 06:45  Phos  2.9     11-22  Mg     2.3     11-22    TPro  6.4  /  Alb  3.9  /  TBili  0.3  /  DBili  x   /  AST  24  /  ALT  11  /  AlkPhos  53  11-21    PT/INR - ( 21 Nov 2020 14:33 )   PT: 12.2 sec;   INR: 1.02          PTT - ( 21 Nov 2020 14:33 )  PTT:27.6 sec  RADIOLOGY & ADDITIONAL STUDIES (The following images were personally reviewed):< from: CT Angio Chest PE Protocol w/ IV Cont (11.21.20 @ 17:22) >  EXAM:  CT ANGIO CHEST PE PROTOCOL IC                          PROCEDURE DATE:  11/21/2020          INTERPRETATION:  CTA (CT angiography) of the CHEST and CT scan of Abdomen and Pelvis    INDICATION: Shortness of breath. Assess for pulmonary embolism.    TECHNIQUE: CT angiography of the chest was performed during bolus injection of intravenous contrast. Post-processing including the production of axial, coronal and sagittal multiplanar reformatted images and axial and coronal maximum intensity projections (MIPs) was performed.    PRIOR STUDY: Comparison made with most recent chest CT from 2/9/2017 and with additional prior CT scans dating back to 1/28/2015. Chest x-ray from 10/19/2020.    FINDINGS:    Pulmonary arteries: No pulmonary embolism is seen. However, one branch of the pulmonary artery to the right upper lobe is occluded by large right hilar lymphadenopathy and another branch is severely narrowed and nearly occluded.    Lungs and large airways: There are three new nodular opacities in the anterior segment of the right upper lobe. They measure 19 x 14 mm, 23 x 16 mm, and 6 mm. There is again mild biapical pleural-parenchymal scarring. Moderate centrilobular emphysema. Bronchial wall thickening. No change 8 mm nodular opacity apical posterior segment left upper lobe. There are again a few additional micronodules bilaterally.    Pleura:  No pleural effusion.    Mediastinum and hilar regions: There is new large mediastinal and right hilar lymphadenopathy. Adenopathy extends along the entire right paratracheal chain, with the largest 2R lymph node measuring 2.1 cm and the largest 4R lymph node measuring 2.9 cm. Station 10R lymph node measures 2.9 cm. Station 11Rs lymph node measures 2.6 cm. Station 12Rs lymph node measures 2.8 cm. Station 4L lymph node measures 1.1 cm.    Cardiovascular:  Heart size is normal. Small calcified plaque thoracic aorta. Moderate soft plaque descending aorta. Large calcified plaque abdominal aorta. The superior vena cava is moderately narrowed by the mediastinal and right hilar lymphadenopathy. Both internal jugular veins are dilated. Mild coronary artery calcification.    Pericardial effusion: No pericardial effusion.    Chest wall and lower neck:  Normal.    Upper abdomen: 3.3 cm and 1.5 cm cysts left kidney. Tiny gallstone.    Bones: Bones demineralized. Compression fracture T9 vertebral body, new from prior CT scans but present on prior chest x-rays. Degenerative changes of spine.      IMPRESSION:  1. Since 2/9/2017, there are three new lung nodules in the right upper lobe and there is new large mediastinal and right hilar lymphadenopathy. These findings are suspicious for lung cancer with lymph node metastases.    2. No evidence of pulmonary embolism, but the right hilar adenopathy compresses right upper lobe pulmonary arteries, resulting in one branch being occluded and the other branch nearly occluded.    3. Moderate compression of superior vena cava by adenopathy with dilated internal jugular veins bilaterally.    < end of copied text >

## 2020-11-22 NOTE — PROGRESS NOTE ADULT - PROBLEM SELECTOR PLAN 2
Pt found to have CT chest findings suggestive of lung CA with metastasis to lymph nodes. Pt notably has 80 pack year smoking history.  - NPO at midnight for biopsy tomorrow 11/23 at 9:30am  - SCDs only for DVT ppx, hold ACs for procedure  - f/u with Dr. Jones for recs post-operatively  - Dr. Day following as well

## 2020-11-22 NOTE — DIETITIAN INITIAL EVALUATION ADULT. - ADD RECOMMEND
1. Continue to encourage PO intake and provide assistance at meal times 2. Monitor lytes and replete prn. 3. Recommend multivitamin 4. Pain and bowel regimens

## 2020-11-22 NOTE — CHART NOTE - TREATMENT: THE FOLLOWING DIET HAS BEEN RECOMMENDED
Patient meets criteria for mild protein-calorie malnutrition 2/2 physical assessment    Continue with current diet and encourage PO intake  Pain and bowel regimens per team  Recommend multivitamin

## 2020-11-22 NOTE — DIETITIAN INITIAL EVALUATION ADULT. - NAME AND PHONE
Samantha Gitlin, RD, CDN, [facility], [id number] or available on [facility]

## 2020-11-22 NOTE — DIETITIAN INITIAL EVALUATION ADULT. - PROBLEM SELECTOR PLAN 1
some rhonchi, no wheezing but patient with symptomatic improvement after duonebs  s/p solumedrol 125 in ED  c/w solumedrol 40mg IV q8 per pulm recs  duonebs q4 prn

## 2020-11-22 NOTE — PROGRESS NOTE ADULT - PROBLEM SELECTOR PLAN 1
Pt presented initially with some rhonchi, no wheezing but patient with symptomatic improvement after duonebs. Currently feels subjectively well with no wheezes or rhonchi on exam.  s/p solumedrol 125 in ED  c/w solumedrol 40mg IV q8 per pulm recs  duonebs q4 prn

## 2020-11-23 ENCOUNTER — RESULT REVIEW (OUTPATIENT)
Age: 85
End: 2020-11-23

## 2020-11-23 LAB
ANION GAP SERPL CALC-SCNC: 9 MMOL/L — SIGNIFICANT CHANGE UP (ref 5–17)
APTT BLD: 24.8 SEC — LOW (ref 27.5–35.5)
BUN SERPL-MCNC: 32 MG/DL — HIGH (ref 7–23)
CALCIUM SERPL-MCNC: 9.6 MG/DL — SIGNIFICANT CHANGE UP (ref 8.4–10.5)
CHLORIDE SERPL-SCNC: 106 MMOL/L — SIGNIFICANT CHANGE UP (ref 96–108)
CO2 SERPL-SCNC: 25 MMOL/L — SIGNIFICANT CHANGE UP (ref 22–31)
CREAT SERPL-MCNC: 1.2 MG/DL — SIGNIFICANT CHANGE UP (ref 0.5–1.3)
GLUCOSE SERPL-MCNC: 125 MG/DL — HIGH (ref 70–99)
GRAM STN FLD: SIGNIFICANT CHANGE UP
HCT VFR BLD CALC: 39.2 % — SIGNIFICANT CHANGE UP (ref 34.5–45)
HGB BLD-MCNC: 12.6 G/DL — SIGNIFICANT CHANGE UP (ref 11.5–15.5)
INR BLD: 0.99 — SIGNIFICANT CHANGE UP (ref 0.88–1.16)
MAGNESIUM SERPL-MCNC: 2.2 MG/DL — SIGNIFICANT CHANGE UP (ref 1.6–2.6)
MCHC RBC-ENTMCNC: 29.3 PG — SIGNIFICANT CHANGE UP (ref 27–34)
MCHC RBC-ENTMCNC: 32.1 GM/DL — SIGNIFICANT CHANGE UP (ref 32–36)
MCV RBC AUTO: 91.2 FL — SIGNIFICANT CHANGE UP (ref 80–100)
NRBC # BLD: 0 /100 WBCS — SIGNIFICANT CHANGE UP (ref 0–0)
PLATELET # BLD AUTO: 257 K/UL — SIGNIFICANT CHANGE UP (ref 150–400)
POTASSIUM SERPL-MCNC: 4.9 MMOL/L — SIGNIFICANT CHANGE UP (ref 3.5–5.3)
POTASSIUM SERPL-SCNC: 4.9 MMOL/L — SIGNIFICANT CHANGE UP (ref 3.5–5.3)
PROTHROM AB SERPL-ACNC: 11.9 SEC — SIGNIFICANT CHANGE UP (ref 10.6–13.6)
RBC # BLD: 4.3 M/UL — SIGNIFICANT CHANGE UP (ref 3.8–5.2)
RBC # FLD: 13.4 % — SIGNIFICANT CHANGE UP (ref 10.3–14.5)
SODIUM SERPL-SCNC: 140 MMOL/L — SIGNIFICANT CHANGE UP (ref 135–145)
SPECIMEN SOURCE: SIGNIFICANT CHANGE UP
WBC # BLD: 16.06 K/UL — HIGH (ref 3.8–10.5)
WBC # FLD AUTO: 16.06 K/UL — HIGH (ref 3.8–10.5)

## 2020-11-23 PROCEDURE — 88342 IMHCHEM/IMCYTCHM 1ST ANTB: CPT | Mod: 26,59

## 2020-11-23 PROCEDURE — 88341 IMHCHEM/IMCYTCHM EA ADD ANTB: CPT | Mod: 26,59

## 2020-11-23 PROCEDURE — 88173 CYTOPATH EVAL FNA REPORT: CPT | Mod: 26

## 2020-11-23 PROCEDURE — 88112 CYTOPATH CELL ENHANCE TECH: CPT | Mod: 26,59

## 2020-11-23 PROCEDURE — 88344 IMHCHEM/IMCYTCHM EA MLT ANTB: CPT | Mod: 26,59

## 2020-11-23 PROCEDURE — 88305 TISSUE EXAM BY PATHOLOGIST: CPT | Mod: 26

## 2020-11-23 PROCEDURE — 88360 TUMOR IMMUNOHISTOCHEM/MANUAL: CPT | Mod: 26

## 2020-11-23 RX ADMIN — Medication 40 MILLIGRAM(S): at 06:37

## 2020-11-23 RX ADMIN — Medication 40 MILLIGRAM(S): at 22:40

## 2020-11-23 RX ADMIN — Medication 40 MILLIGRAM(S): at 15:11

## 2020-11-23 NOTE — PROGRESS NOTE ADULT - SUBJECTIVE AND OBJECTIVE BOX
Pt seen and examined   no complaints  breathing better    REVIEW OF SYSTEMS:  Constitutional: No fever,   Cardiovascular: No chest pain, palpitations, dizziness or leg swelling  Resp: breathing better  Gastrointestinal: No abdominal or epigastric pain. No nausea, vomiting or hematemesis; No diarrhea   Skin: No itching, burning, rashes or lesions       MEDICATIONS:  MEDICATIONS  (STANDING):  influenza  Vaccine (HIGH DOSE) 0.7 milliLiter(s) IntraMuscular once  methylPREDNISolone sodium succinate Injectable 40 milliGRAM(s) IV Push every 8 hours  pantoprazole    Tablet 40 milliGRAM(s) Oral before breakfast    MEDICATIONS  (PRN):  albuterol/ipratropium for Nebulization. 3 milliLiter(s) Nebulizer every 4 hours PRN Shortness of Breath and/or Wheezing      Allergies    No Known Allergies    Intolerances        Vital Signs Last 24 Hrs  T(C): 36.3 (23 Nov 2020 08:42), Max: 36.5 (22 Nov 2020 22:26)  T(F): 97.3 (23 Nov 2020 08:42), Max: 97.7 (22 Nov 2020 22:26)  HR: 65 (23 Nov 2020 08:42) (65 - 71)  BP: 134/66 (23 Nov 2020 08:42) (123/62 - 147/77)  BP(mean): --  RR: 18 (23 Nov 2020 08:42) (18 - 18)  SpO2: 95% (23 Nov 2020 08:42) (95% - 100%)      PHYSICAL EXAM:    General: thin in no acute distress  HEENT: MMM, conjunctiva and sclera clear  Lungs: clear  Heart: regular  Gastrointestinal: Soft non-tender non-distended; Normal bowel sounds;   Skin: Warm and dry. No obvious rash    LABS:      CBC Full  -  ( 23 Nov 2020 08:24 )  WBC Count : 16.06 K/uL  RBC Count : 4.30 M/uL  Hemoglobin : 12.6 g/dL  Hematocrit : 39.2 %  Platelet Count - Automated : 257 K/uL  Mean Cell Volume : 91.2 fl  Mean Cell Hemoglobin : 29.3 pg  Mean Cell Hemoglobin Concentration : 32.1 gm/dL  Auto Neutrophil # : x  Auto Lymphocyte # : x  Auto Monocyte # : x  Auto Eosinophil # : x  Auto Basophil # : x  Auto Neutrophil % : x  Auto Lymphocyte % : x  Auto Monocyte % : x  Auto Eosinophil % : x  Auto Basophil % : x    11-23    140  |  106  |  x   ----------------------------<  125<H>  4.9   |  25  |  1.20    Ca    9.6      23 Nov 2020 08:24  Phos  2.9     11-22  Mg     2.2     11-23    TPro  6.4  /  Alb  3.9  /  TBili  0.3  /  DBili  x   /  AST  24  /  ALT  11  /  AlkPhos  53  11-21    PT/INR - ( 23 Nov 2020 08:24 )   PT: 11.9 sec;   INR: 0.99          PTT - ( 23 Nov 2020 08:24 )  PTT:24.8 sec                  RADIOLOGY & ADDITIONAL STUDIES (The following images were personally reviewed):

## 2020-11-23 NOTE — PROGRESS NOTE ADULT - SUBJECTIVE AND OBJECTIVE BOX
OVERNIGHT EVENTS: EZE, NPO for procedure today    SUBJECTIVE / INTERVAL HPI: Patient seen and examined at bedside. Denies chest pain, dyspnea, abdominal pain, fever, n/v, d/c.    VITAL SIGNS:  Vital Signs Last 24 Hrs  T(C): 36.3 (23 Nov 2020 08:42), Max: 36.5 (22 Nov 2020 22:26)  T(F): 97.3 (23 Nov 2020 08:42), Max: 97.7 (22 Nov 2020 22:26)  HR: 65 (23 Nov 2020 08:42) (65 - 71)  BP: 134/66 (23 Nov 2020 08:42) (123/62 - 147/77)  BP(mean): --  RR: 18 (23 Nov 2020 08:42) (18 - 18)  SpO2: 95% (23 Nov 2020 08:42) (95% - 100%)    PHYSICAL EXAM:    General: WDWN  HEENT: NC/AT; PERRL, anicteric sclera; MMM  Neck: supple  Cardiovascular: +S1/S2, RRR  Respiratory: CTA B/L; no W/R/R  Gastrointestinal: soft, NT/ND; +BSx4  Extremities: WWP; no edema, clubbing or cyanosis  Vascular: 2+ radial, DP/PT pulses B/L  Neurological: AAOx3; no focal deficits    MEDICATIONS:  MEDICATIONS  (STANDING):  influenza  Vaccine (HIGH DOSE) 0.7 milliLiter(s) IntraMuscular once  methylPREDNISolone sodium succinate Injectable 40 milliGRAM(s) IV Push every 8 hours  pantoprazole    Tablet 40 milliGRAM(s) Oral before breakfast    MEDICATIONS  (PRN):  albuterol/ipratropium for Nebulization. 3 milliLiter(s) Nebulizer every 4 hours PRN Shortness of Breath and/or Wheezing      ALLERGIES:  Allergies    No Known Allergies    Intolerances        LABS:                        12.6   16.06 )-----------( 257      ( 23 Nov 2020 08:24 )             39.2     11-23    140  |  106  |  32<H>  ----------------------------<  125<H>  4.9   |  25  |  1.20    Ca    9.6      23 Nov 2020 08:24  Phos  2.9     11-22  Mg     2.2     11-23      PT/INR - ( 23 Nov 2020 08:24 )   PT: 11.9 sec;   INR: 0.99          PTT - ( 23 Nov 2020 08:24 )  PTT:24.8 sec    CAPILLARY BLOOD GLUCOSE          RADIOLOGY & ADDITIONAL TESTS: Reviewed.

## 2020-11-23 NOTE — CONSULT NOTE ADULT - ATTENDING COMMENTS
CEA antigen  should be drawn.    PET-CT scan   biopsy to include  enough material for molecular  analysis

## 2020-11-23 NOTE — PROGRESS NOTE ADULT - SUBJECTIVE AND OBJECTIVE BOX
Interventional, Pulmonary, Critical, Chest Special Procedures.    Pt was seen and fully examined by myself.     Time spent with patient in minutes:47    Patient is a 85y old  Female who presents with a chief complaint of worsening sob (23 Nov 2020 12:20) The patient  seen post op complex airway procedure and mediastinal mass BX.  Please see formal OR report. The pt now alert, pain free, eupneic on 2LNC, no hemoptysis, no dysphagia, some dysphonia present.    HPI:  86 y/o F PMH COPD not on home O2, not on home inhalers who presents due to shortness of breath. Patient reports she has been short of breath for a while (few months?) now but that in the past 3 days it has gotten much worse. She used to be able to walk 30 blocks without issue and now can walk less than half a block before becoming short of breath. Patient with chronic mild cough, no worsening cough, no wheezing, no chest pain, fevers, chills. Patient planned for lung biopsy on Monday but presented earlier due to these symptoms at the suggestion of her doctor. 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     ED vitals: afebrile, p74, /75, RR 17, 98% on RA  ED labs: Na 147, Cl 112,   EKG: NSR at 62    ED imaging  1. Since 2/9/2017, there are three new lung nodules in the right upper lobe and there is new large mediastinal and right hilar lymphadenopathy. These findings are suspicious for lung cancer with lymph node metastases.    2. No evidence of pulmonary embolism, but the right hilar adenopathy compresses right upper lobe pulmonary arteries, resulting in one branch being occluded and the other branch nearly occluded.    3. Moderate compression of superior vena cava by adenopathy with dilated internal jugular veins bilaterally.      Patient received solumedrol IV 125mg, duonebs x 2   (21 Nov 2020 20:14)      REVIEW OF SYSTEMS:  Constitutional: No fever, weight loss or fatigue  ENMT:  No difficulty hearing, tinnitus, vertigo; No sinus or throat pain  Respiratory: No cough, wheezing, chills or hemoptysis SOB better today  Cardiovascular: No chest pain, palpitations, dizziness or leg swelling  Gastrointestinal: No abdominal or epigastric pain. No nausea, vomiting or hematemesis; No diarrhea or constipation. No melena or hematochezia.  Skin: No itching, burning, rashes or lesions   Musculoskeletal: No joint pain or swelling; No muscle, back or extremity pain    PAST MEDICAL & SURGICAL HISTORY:  Hearing loss  left ear - sees Dr. Garces ENT    Basal cell carcinoma  x2 - upper lip and arm    Chronic obstructive pulmonary disease    Osteoporosis  Osteoporosis    H/O breast biopsy  &gt; 5 years ago, mass was benign and self-resolved    History of appendectomy    History of cataract surgery      FAMILY HISTORY:    SOCIAL HISTORY:      - Tobacco     - ETOH    Allergies    No Known Allergies    Intolerances      Vital Signs Last 24 Hrs  T(C): 36.3 (23 Nov 2020 08:42), Max: 36.5 (22 Nov 2020 22:26)  T(F): 97.3 (23 Nov 2020 08:42), Max: 97.7 (22 Nov 2020 22:26)  HR: 65 (23 Nov 2020 08:42) (65 - 71)  BP: 134/66 (23 Nov 2020 08:42) (123/62 - 147/77)  BP(mean): --  RR: 18 (23 Nov 2020 08:42) (18 - 18)  SpO2: 95% (23 Nov 2020 08:42) (95% - 100%)        MEDICATIONS:  MEDICATIONS  (STANDING):  influenza  Vaccine (HIGH DOSE) 0.7 milliLiter(s) IntraMuscular once  methylPREDNISolone sodium succinate Injectable 40 milliGRAM(s) IV Push every 8 hours  pantoprazole    Tablet 40 milliGRAM(s) Oral before breakfast    MEDICATIONS  (PRN):  albuterol/ipratropium for Nebulization. 3 milliLiter(s) Nebulizer every 4 hours PRN Shortness of Breath and/or Wheezing      PHYSICAL EXAM:  Un Comfortable, no distress  Eyes: PERRL, EOM intact; conjunctiva and sclera clear  Head: Normocephalic;  No Trauma  ENMT: No nasal discharge,+ hoarseness, cough no  hemoptysis  Neck: Supple; non tender; no masses or deformities.    No JVD  Respiratory:,  - WHEEZING  + central  RHONCHI  - RALES  - CRACKLES.  Diminished breath sounds  BILATERAL  RIGHT  LEFT  Cardiovascular: Regular rate and rhythm. S1 and S2 Normal; No murmurs, gallops or rubs     - PPM/AICD  Gastrointestinal: Soft non-tender, non-distended; Normal bowel sounds; No hepatosplenomegaly.     -PEG    -  GT   - ALVES  Genitourinary: No costovertebral angle tenderness. No dysuria  Extremities: AROM, No clubbing, cyanosis or edema    Vascular: Peripheral pulses palpable 2+ bilaterally  Neurological: Alert and responisve to stimuli   Skin: Warm and dry. No obvious rash  Lymph Nodes: No acute cervical or supraclavicular adenopathy    DEVICES:  - DENTURES   +IV R / L     - ETUBE   -TRACH   -CTUBE  R / L    LABS:                          12.6   16.06 )-----------( 257      ( 23 Nov 2020 08:24 )             39.2     11-23    140  |  106  |  32<H>  ----------------------------<  125<H>  4.9   |  25  |  1.20    Ca    9.6      23 Nov 2020 08:24  Phos  2.9     11-22  Mg     2.2     11-23      PT/INR - ( 23 Nov 2020 08:24 )   PT: 11.9 sec;   INR: 0.99          PTT - ( 23 Nov 2020 08:24 )  PTT:24.8 sec  RADIOLOGY & ADDITIONAL STUDIES (The following images were personally reviewed):

## 2020-11-23 NOTE — PROGRESS NOTE ADULT - PROBLEM SELECTOR PLAN 2
Pt found to have CT chest findings suggestive of lung CA with metastasis to lymph nodes. Pt notably has 80 pack year smoking history.  - s/p biopsy today, pending pathology results  - Restart Lovenox for DVT ppx  - CEA in AM per Dr. Jones  - f/u Dr. Jones/Dr. Day rec

## 2020-11-23 NOTE — CONSULT NOTE ADULT - SUBJECTIVE AND OBJECTIVE BOX
Patient is a 85y old  Female who presents with a chief complaint of progressively worsening SOB.   HPI:86 y/o F ex smoker quit 21 years ago PMH COPD not on home O2, not on home inhalers who presents due to increasing shortness of breath. Patient reports she has been short of breath for a while (few months?) now but that in the past 3 days it has gotten much worse.  Patient with chronic mild cough, no worsening cough, no wheezing, no chest pain, fevers, chills. Seen by PCP Dr Worthington and CT showed pulmonary nodules suspicious for malignancy Patient planned for lung biopsy on Monday but presented earlier due to increasing sob  Patient had PET scan one week ago that was consistent with prior CT showing large mediastinal mass suspicious for small cell carcinoma.       REVIEW OF SYSTEMS:  Constitutional: No fever, weight loss or fatigue  ENMT:  No difficulty hearing, tinnitus, vertigo; No sinus or throat pain  Respiratory: No cough, wheezing, chills or hemoptysis SOB better today  Cardiovascular: No chest pain, palpitations, dizziness or leg swelling  Gastrointestinal: No abdominal or epigastric pain. No nausea, vomiting or hematemesis; No diarrhea or constipation. No melena or hematochezia.  Skin: No itching, burning, rashes or lesions   Musculoskeletal: No joint pain or swelling; No muscle, back or extremity pain    PAST MEDICAL & SURGICAL HISTORY:  Osteoporosis  Osteoporosis    History of cataract surgery      FAMILY HISTORY:    SOCIAL HISTORY:      - Tobacco     - ETOH    Allergies    No Known Allergies    Intolerances      Vital Signs Last 24 Hrs  T(C): 36.6 (21 Nov 2020 14:01), Max: 36.6 (21 Nov 2020 14:01)  T(F): 97.8 (21 Nov 2020 14:01), Max: 97.8 (21 Nov 2020 14:01)  HR: 74 (21 Nov 2020 14:01) (74 - 74)  BP: 131/75 (21 Nov 2020 14:01) (131/75 - 131/75)  BP(mean): --  RR: 17 (21 Nov 2020 14:01) (17 - 17)  SpO2: 98% (21 Nov 2020 14:01) (98% - 98%)        MEDICATIONS:  MEDICATIONS  (STANDING):  albuterol/ipratropium for Nebulization. 3 milliLiter(s) Nebulizer once  albuterol/ipratropium for Nebulization. 3 milliLiter(s) Nebulizer once  methylPREDNISolone sodium succinate Injectable 125 milliGRAM(s) IV Push Once    MEDICATIONS  (PRN):      PHYSICAL EXAM:  Un Comfortable, no distress  Eyes: PERRL, EOM intact; conjunctiva and sclera clear  Head: Normocephalic;  No Trauma  ENMT: No nasal discharge,+ hoarseness, cough no  hemoptysis  Neck: Supple; non tender; no masses or deformities.    No JVD  Respiratory:,  - WHEEZING  + central  RHONCHI  - RALES  - CRACKLES.  Diminished breath sounds  BILATERAL  RIGHT  LEFT  Cardiovascular: Regular rate and rhythm. S1 and S2 Normal; No murmurs, gallops or rubs     - PPM/AICD  Gastrointestinal: Soft non-tender, non-distended; Normal bowel sounds; No hepatosplenomegaly.     -PEG    -  GT   - ALVES  Genitourinary: No costovertebral angle tenderness. No dysuria  Extremities: AROM, No clubbing, cyanosis or edema    Vascular: Peripheral pulses palpable 2+ bilaterally  Neurological: Alert and responisve to stimuli   Skin: Warm and dry. No obvious rash  Lymph Nodes: No acute cervical or supraclavicular adenopathy

## 2020-11-23 NOTE — PROGRESS NOTE ADULT - SUBJECTIVE AND OBJECTIVE BOX
CCM PUD IP ATTENDING    Being asked to assist, monitor hemodynamic and respiratory physiology, prevent and manage possible complications.    85 year old female retired [place]  with massive mediastinal lymphadenopathy (likely 4R, 4L and 7 and more) with RUL nodule, malignant appearing.  COPD, quit smoking 21 years ago, tumor encasing pulmonary artery, malnourished, muscle atrophy.    Discussed at length with anesthesia, Dr Day, Dr Parisi and bronchoscopy team    Fiberoptic bronchoscopic intubation with EMMANUEL 8.5.    Th190 for RUL lavage and airway surveillance of both lungs, including NDI visualization.    Spontaneous bleed of anterior segment of RUL.    Laser safety level 4. FiO2 < 0.3.    Argon plasma 1.5 mm, pulmonary tumor setting, 27 W, flow 0.5, forced.    Bleed stopped    Linear EBUS for safe evaluation.    Olympus #25 needle, 3 passes with VIVIEN.    Cell block included.    Observation of phyiology, including ETCO2 and SO2.    Observed pre and post extubation.    PACU monitoring.    PAST MEDICAL & SURGICAL HISTORY:  Hearing loss  left ear - sees Dr. Garces ENT    Basal cell carcinoma  x2 - upper lip and arm    Chronic obstructive pulmonary disease    Osteoporosis  Osteoporosis    H/O breast biopsy  &gt; 5 years ago, mass was benign and self-resolved    History of appendectomy    History of cataract surgery        FAMILY HISTORY:      SOCIAL HISTORY:    REVIEW OF SYSTEMS:  Constitutional: () weight change, () fever,  () chills, () fatigue, () night sweats  Eyes: () discharge, () eye pain, () visual change  ENT:  () hearing difficulty, () vertigo, () sinus pain,  () throat pain, () epistaxis, () dysphagia, () hoarseness  Neck: () pain, () stiffness, () swelling  Respiratory: () cough, () wheezing, () hemoptysis      Cardiovascular: () chest pain, ()palpitations, () dizziness   Gastrointestinal: () abdominal pain, () nausea, () vomiting, () hematemesis, () diarrhea,  () constipation, () melena  Genitourinary:  () dysuria, () frequency, () hematuria, () incontinence      Neurologic: () headache, () memory loss, () loss of strength, () numbness, () tremor     Skin: () itching, () burning, () rash, () lesions   Lymphatic: () enlarged lymph nodes  Endocrine: () hair loss, () temperature intolerance         Musculoskeletal: () back pain, () joint pain,  () extremity pain  Psychiatric: () visual change, () auditory change, () depression, () anxiety, () suicidal  Sleep: () disorder, () insomnia, () sleep deprivation  Heme/Lymph: () easy bruising, () bleeding gums            Allergy and Immunologic: () hives, () eczema    Vital Signs Last 24 Hrs  T(C): 36.3 (23 Nov 2020 08:42), Max: 36.5 (22 Nov 2020 22:26)  T(F): 97.3 (23 Nov 2020 08:42), Max: 97.7 (22 Nov 2020 22:26)  HR: 65 (23 Nov 2020 08:42) (65 - 71)  BP: 134/66 (23 Nov 2020 08:42) (123/62 - 147/77)  BP(mean): --  RR: 18 (23 Nov 2020 08:42) (18 - 18)  SpO2: 95% (23 Nov 2020 08:42) (95% - 100%)    I&O's Detail    PHYSICAL EXAM:    Malnourished, well developed, comfortable, - acute distress; vital signs are monitored continuously  Eyes: PERRLA, EOMI, -conjunctivitis, -scleritis   Head: no focal deficit, normocephalic,  no trauma  ENMT: moist tongue, no thrush, -nasal discharge, -hoarseness, normal hearing, -cough, -hemoptysis, trachea midline  Neck: supple, - lymphadenopathy,  -masses, -JVD  Respiratory: bilateral diminished breath sounds, -wheezing, -rhonchi, -rales, -crackles  Chest: -accessory muscle use, -paradoxical breathing  Cardiovascular: regular rate and sinus rhythm, S1 S2 normal, -S3, -S4, -murmur, -gallop, -rub  Gastrointestinal: soft, nontender, nondistended, normal bowel sounds, no hepatosplenomegaly  Genitourinary: -flank pain, -dysuria  Extremities: -clubbing, -cyanosis, -edema    Vascular: peripheral pulses palpable 2+ bilaterally  Neurological: alert, oriented x 3, no focal deficit, -tremor   Skin: warm, dry, -erythema, iv site intact and a line  Lymph nodes; no cervical, supraclavicular or axillary adenopathy  Psychiatric: cooperative, appropriate mood    MEDICATIONS  (STANDING):  influenza  Vaccine (HIGH DOSE) 0.7 milliLiter(s) IntraMuscular once  methylPREDNISolone sodium succinate Injectable 40 milliGRAM(s) IV Push every 8 hours  pantoprazole    Tablet 40 milliGRAM(s) Oral before breakfast    MEDICATIONS  (PRN):  albuterol/ipratropium for Nebulization. 3 milliLiter(s) Nebulizer every 4 hours PRN Shortness of Breath and/or Wheezing      Allergies    No Known Allergies    Intolerances        LABS:                        12.6   16.06 )-----------( 257      ( 23 Nov 2020 08:24 )             39.2     11-23    140  |  106  |  32<H>  ----------------------------<  125<H>  4.9   |  25  |  1.20    Ca    9.6      23 Nov 2020 08:24  Phos  2.9     11-22  Mg     2.2     11-23    TPro  6.4  /  Alb  3.9  /  TBili  0.3  /  DBili  x   /  AST  24  /  ALT  11  /  AlkPhos  53  11-21    PT/INR - ( 23 Nov 2020 08:24 )   PT: 11.9 sec;   INR: 0.99          PTT - ( 23 Nov 2020 08:24 )  PTT:24.8 sec    +DVT prophylaxis  +Sleep  +Nutrition goals  -Pain  -Decubital ulcer  +GI prophylaxis (PPV, coagulopathy, Hx)  +Aspiration prophylaxis (45 degrees)  Ventilator synchronized  Tracheal cuff pressure LOW throughout  +Sedation/analgesia stopped once  +ID (phos, CH, mupi, SB)  -Delirium  +Cardiac Beta/ACEI-ARB/ASA/statin  +Prevention  +Education  +Medication reviewed (drug-drug interactions, PDA)  Medical devices  Discussed with Isak Donahue, bronchoscopy team    RADIOLOGY & ADDITIONAL STUDIES:    tg    EXAM:  CT ANGIO CHEST PE PROTOCOL IC                          PROCEDURE DATE:  11/21/2020          INTERPRETATION:  CTA (CT angiography) of the CHEST and CT scan of Abdomen and Pelvis    INDICATION: Shortness of breath. Assess for pulmonary embolism.    TECHNIQUE: CT angiography of the chest was performed during bolus injection of intravenous contrast. Post-processing including the production of axial, coronal and sagittal multiplanar reformatted images and axial and coronal maximum intensity projections (MIPs) was performed.    PRIOR STUDY: Comparison made with most recent chest CT from 2/9/2017 and with additional prior CT scans dating back to 1/28/2015. Chest x-ray from 10/19/2020.    FINDINGS:    Pulmonary arteries: No pulmonary embolism is seen. However, one branch of the pulmonary artery to the right upper lobe is occluded by large right hilar lymphadenopathy and another branch is severely narrowed and nearly occluded.    Lungs and large airways: There are three new nodular opacities in the anterior segment of the right upper lobe. They measure 19 x 14 mm, 23 x 16 mm, and 6 mm. There is again mild biapical pleural-parenchymal scarring. Moderate centrilobular emphysema. Bronchial wall thickening. No change 8 mm nodular opacity apical posterior segment left upper lobe. There are again a few additional micronodules bilaterally.    Pleura:  No pleural effusion.    Mediastinum and hilar regions: There is new large mediastinal and right hilar lymphadenopathy. Adenopathy extends along the entire right paratracheal chain, with the largest 2R lymph node measuring 2.1 cm and the largest 4R lymph node measuring 2.9 cm. Station 10R lymph node measures 2.9 cm. Station 11Rs lymph node measures 2.6 cm. Station 12Rs lymph node measures 2.8 cm. Station 4L lymph node measures 1.1 cm.    Cardiovascular:  Heart size is normal. Small calcified plaque thoracic aorta. Moderate soft plaque descending aorta. Large calcified plaque abdominal aorta. The superior vena cava is moderately narrowed by the mediastinal and right hilar lymphadenopathy. Both internal jugular veins are dilated. Mild coronary artery calcification.    Pericardial effusion: No pericardial effusion.    Chest wall and lower neck:  Normal.    Upper abdomen: 3.3 cm and 1.5 cm cysts left kidney. Tiny gallstone.    Bones: Bones demineralized. Compression fracture T9 vertebral body, new from prior CT scans but present on prior chest x-rays. Degenerative changes of spine.      IMPRESSION:  1. Since 2/9/2017, there are three new lung nodules in the right upper lobe and there is new large mediastinal and right hilar lymphadenopathy. These findings are suspicious for lung cancer with lymph node metastases.    2. No evidence of pulmonary embolism, but the right hilar adenopathy compresses right upper lobe pulmonary arteries, resulting in one branch being occluded and the other branch nearly occluded.    3. Moderate compression of superior vena cava by adenopathy with dilated internal jugular veins bilaterally.          INTERPRETATION:  CT of the CHEST without intravenous contrast dated   2/9/2017 2:39 PM    INDICATION: History of basal cell carcinoma; follow-up pulmonary nodules    TECHNIQUE: CT of the chest was performed without intravenous contrast.    Intravenous contrast was not used , as requested. Axial, sagittal and   coronal images were produced and reviewed.    PRIOR STUDIES: CT chest 8/8/2016, 1/31/2016 and CTA of 1/28/2015    FINDINGS: The heart is normal in size. There is mild calcification of the   left coronary artery. No pericardial effusion is seen.  Evaluation of the   vasculature is limited without intravenous contrast, but there is no   aneurysmal dilatation of great vessels. There is mild calcification of   thoracic aorta.  Evaluation for adenopathy is limited without intravenous   contrast. Within that limitation, no mediastinal or hilar lymphadenopathy   is seen.    No pleural effusions are seen. There are stable tiny subpleural nodular   opacities which probably represent inflammatory process. Evaluation of   the pulmonary parenchyma demonstrates moderate centrilobular and mild   paraseptal emphysema.   There are multiple pulmonary nodules the largest   measures 0.4 cm subpleural apical segment of the right upper lobe (image   59 series 4) and 0.3 cm nodule lingula (image 222 series 4).     These nodules are stable since 1/28/2015.     No change in branching tubular opacity within posterior segment of the left upper lobe (image 128 series   4) which represent mucous. There is a 0.5 cm nodular opacity within   lingula adjacent to diaphragm which may represent scarring and stable   since 1/28/2015. There is also mucosal plugging within posterior segment   of the right upper lobe (image 132 series 4) unchanged. There is also 0.4   cm nodule in the left upper lobe (image 142 series 4) which probably   represent mucus plugging.  0.4 cm nodule in the superior segment of the   right upper lobe is not identified on current study. No change in 0.7 cm   groundglass opacity anterior result segment of the right lower lobe which   probably represent scarring. Stable 0.4 cm. Fissural nodule along right   minor fissure.    Limited evaluation of the upper abdomen demonstrates left renal cysts,   measuring up to 1.7 cm. There is dense calcification of upper abdominal   aorta. There are 2 tiny calcifications within second portion of the   duodenum, unchanged since 8/8/2016 and of unknown clinical significance.    Evaluation of the osseous structures demonstrates mild degenerative   changes. There is tiny calcification in the left glenohumeral joint.    IMPRESSION:  1. Moderate centrilobular and mild paraseptal emphysema.  2. Since 1/20/2015, no change in pulmonary nodules measuring up to 0.4   cm. No follow-up is needed as per Fleischner criteria.  3. Stable mucous plugging in the right and left upper lobes.

## 2020-11-24 LAB
ANION GAP SERPL CALC-SCNC: 10 MMOL/L — SIGNIFICANT CHANGE UP (ref 5–17)
BUN SERPL-MCNC: 27 MG/DL — HIGH (ref 7–23)
CALCIUM SERPL-MCNC: 8.8 MG/DL — SIGNIFICANT CHANGE UP (ref 8.4–10.5)
CEA SERPL-MCNC: 1.5 NG/ML — SIGNIFICANT CHANGE UP (ref 0–3.8)
CHLORIDE SERPL-SCNC: 103 MMOL/L — SIGNIFICANT CHANGE UP (ref 96–108)
CO2 SERPL-SCNC: 26 MMOL/L — SIGNIFICANT CHANGE UP (ref 22–31)
CREAT SERPL-MCNC: 1.08 MG/DL — SIGNIFICANT CHANGE UP (ref 0.5–1.3)
GLUCOSE SERPL-MCNC: 115 MG/DL — HIGH (ref 70–99)
HCT VFR BLD CALC: 41.7 % — SIGNIFICANT CHANGE UP (ref 34.5–45)
HGB BLD-MCNC: 13.3 G/DL — SIGNIFICANT CHANGE UP (ref 11.5–15.5)
MAGNESIUM SERPL-MCNC: 2.3 MG/DL — SIGNIFICANT CHANGE UP (ref 1.6–2.6)
MCHC RBC-ENTMCNC: 29.3 PG — SIGNIFICANT CHANGE UP (ref 27–34)
MCHC RBC-ENTMCNC: 31.9 GM/DL — LOW (ref 32–36)
MCV RBC AUTO: 91.9 FL — SIGNIFICANT CHANGE UP (ref 80–100)
NON-GYNECOLOGICAL CYTOLOGY STUDY: SIGNIFICANT CHANGE UP
NRBC # BLD: 0 /100 WBCS — SIGNIFICANT CHANGE UP (ref 0–0)
PLATELET # BLD AUTO: 274 K/UL — SIGNIFICANT CHANGE UP (ref 150–400)
POTASSIUM SERPL-MCNC: 4.8 MMOL/L — SIGNIFICANT CHANGE UP (ref 3.5–5.3)
POTASSIUM SERPL-SCNC: 4.8 MMOL/L — SIGNIFICANT CHANGE UP (ref 3.5–5.3)
RBC # BLD: 4.54 M/UL — SIGNIFICANT CHANGE UP (ref 3.8–5.2)
RBC # FLD: 13.4 % — SIGNIFICANT CHANGE UP (ref 10.3–14.5)
SODIUM SERPL-SCNC: 139 MMOL/L — SIGNIFICANT CHANGE UP (ref 135–145)
WBC # BLD: 15 K/UL — HIGH (ref 3.8–10.5)
WBC # FLD AUTO: 15 K/UL — HIGH (ref 3.8–10.5)

## 2020-11-24 RX ORDER — HEPARIN SODIUM 5000 [USP'U]/ML
5000 INJECTION INTRAVENOUS; SUBCUTANEOUS ONCE
Refills: 0 | Status: COMPLETED | OUTPATIENT
Start: 2020-11-24 | End: 2020-11-24

## 2020-11-24 RX ADMIN — PANTOPRAZOLE SODIUM 40 MILLIGRAM(S): 20 TABLET, DELAYED RELEASE ORAL at 07:07

## 2020-11-24 RX ADMIN — Medication 40 MILLIGRAM(S): at 07:07

## 2020-11-24 RX ADMIN — Medication 40 MILLIGRAM(S): at 13:35

## 2020-11-24 RX ADMIN — Medication 40 MILLIGRAM(S): at 22:21

## 2020-11-24 RX ADMIN — HEPARIN SODIUM 5000 UNIT(S): 5000 INJECTION INTRAVENOUS; SUBCUTANEOUS at 17:55

## 2020-11-24 NOTE — PROGRESS NOTE ADULT - SUBJECTIVE AND OBJECTIVE BOX
Interventional, Pulmonary, Critical, Chest Special Procedures.    Pt was seen and fully examined by myself.     Time spent with patient in minutes:37    Patient is a 85y old  Female who presents with a chief complaint of lung biopsy (24 Nov 2020 13:56) The paatient reports feeling much better today, cough subsided, no hemoptysis.    HPI:  84 y/o F PMH COPD not on home O2, not on home inhalers who presents due to shortness of breath. Patient reports she has been short of breath for a while (few months?) now but that in the past 3 days it has gotten much worse. She used to be able to walk 30 blocks without issue and now can walk less than half a block before becoming short of breath. Patient with chronic mild cough, no worsening cough, no wheezing, no chest pain, fevers, chills. Patient planned for lung biopsy on Monday but presented earlier due to these symptoms at the suggestion of her doctor. 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     ED vitals: afebrile, p74, /75, RR 17, 98% on RA  ED labs: Na 147, Cl 112,   EKG: NSR at 62    ED imaging  1. Since 2/9/2017, there are three new lung nodules in the right upper lobe and there is new large mediastinal and right hilar lymphadenopathy. These findings are suspicious for lung cancer with lymph node metastases.    2. No evidence of pulmonary embolism, but the right hilar adenopathy compresses right upper lobe pulmonary arteries, resulting in one branch being occluded and the other branch nearly occluded.    3. Moderate compression of superior vena cava by adenopathy with dilated internal jugular veins bilaterally.      Patient received solumedrol IV 125mg, duonebs x 2   (21 Nov 2020 20:14)      REVIEW OF SYSTEMS:  Constitutional: No fever, weight loss or fatigue  ENMT:  No difficulty hearing, tinnitus, vertigo; No sinus or throat pain  Respiratory: No cough, wheezing, chills or hemoptysis SOB better today  Cardiovascular: No chest pain, palpitations, dizziness or leg swelling  Gastrointestinal: No abdominal or epigastric pain. No nausea, vomiting or hematemesis; No diarrhea or constipation. No melena or hematochezia.  Skin: No itching, burning, rashes or lesions   Musculoskeletal: No joint pain or swelling; No muscle, back or extremity pain    PAST MEDICAL & SURGICAL HISTORY:  Chronic obstructive pulmonary disease, unspecified COPD type    Hearing loss  left ear - sees Dr. Garces ENT    Basal cell carcinoma  x2 - upper lip and arm    Chronic obstructive pulmonary disease    Osteoporosis  Osteoporosis    H/O breast biopsy  &gt; 5 years ago, mass was benign and self-resolved    History of appendectomy    History of cataract surgery      FAMILY HISTORY:    SOCIAL HISTORY:      - Tobacco     - ETOH    Allergies    No Known Allergies    Intolerances      Vital Signs Last 24 Hrs  T(C): 36.6 (24 Nov 2020 08:49), Max: 36.6 (24 Nov 2020 08:49)  T(F): 97.9 (24 Nov 2020 08:49), Max: 97.9 (24 Nov 2020 08:49)  HR: 67 (24 Nov 2020 08:49) (66 - 74)  BP: 140/57 (24 Nov 2020 08:49) (104/64 - 140/57)  BP(mean): --  RR: 16 (24 Nov 2020 08:49) (16 - 18)  SpO2: 95% (24 Nov 2020 08:49) (94% - 95%)        MEDICATIONS:  MEDICATIONS  (STANDING):  methylPREDNISolone sodium succinate Injectable 40 milliGRAM(s) IV Push every 8 hours  pantoprazole    Tablet 40 milliGRAM(s) Oral before breakfast    MEDICATIONS  (PRN):  albuterol/ipratropium for Nebulization. 3 milliLiter(s) Nebulizer every 4 hours PRN Shortness of Breath and/or Wheezing      PHYSICAL EXAM:  Comfortable, no distress  Eyes: PERRL, EOM intact; conjunctiva and sclera clear  Head: Normocephalic;  No Trauma  ENMT: No nasal discharge,+ hoarseness, cough no  hemoptysis  Neck: Supple; non tender; no masses or deformities.    No JVD  Respiratory:,  - WHEEZING  + central  RHONCHI  - RALES  - CRACKLES.  Diminished breath sounds  BILATERAL  RIGHT  LEFT  Cardiovascular: Regular rate and rhythm. S1 and S2 Normal; No murmurs, gallops or rubs     - PPM/AICD  Gastrointestinal: Soft non-tender, non-distended; Normal bowel sounds; No hepatosplenomegaly.     -PEG    -  GT   - ALVES  Genitourinary: No costovertebral angle tenderness. No dysuria  Extremities: AROM, No clubbing, cyanosis or edema    Vascular: Peripheral pulses palpable 2+ bilaterally  Neurological: Alert and responisve to stimuli   Skin: Warm and dry. No obvious rash  Lymph Nodes: No acute cervical or supraclavicular adenopathy    DEVICES:  - DENTURES   +IV R / L     - ETUBE   -TRACH   -CTUBE  R / L    LABS:                          13.3   15.00 )-----------( 274      ( 24 Nov 2020 07:36 )             41.7     11-24    139  |  103  |  27<H>  ----------------------------<  115<H>  4.8   |  26  |  1.08    Ca    8.8      24 Nov 2020 07:36  Mg     2.3     11-24      PT/INR - ( 23 Nov 2020 08:24 )   PT: 11.9 sec;   INR: 0.99          PTT - ( 23 Nov 2020 08:24 )  PTT:24.8 sec  RADIOLOGY & ADDITIONAL STUDIES (The following images were personally reviewed):

## 2020-11-24 NOTE — PROGRESS NOTE ADULT - PROBLEM SELECTOR PLAN 1
Pt presented initially with some rhonchi, no wheezing but patient with symptomatic improvement after duonebs. Currently feels subjectively well with no wheezes or rhonchi on exam.  - s/p solumedrol 125 in ED  - Taper from Solumedrol 40mg IV q8 to 40mg IV q12 beginning 11/25  - c/w Duonebs q4 prn

## 2020-11-24 NOTE — PROGRESS NOTE ADULT - ATTENDING COMMENTS
pathology is pending on this patient but it might be good to schedule her for a deep venous access device placed by Interventional Radiology tomorrow.  I will discuss this with the patient.  She has very high performance  And should be treated by her performance not by her age.

## 2020-11-24 NOTE — PROGRESS NOTE ADULT - SUBJECTIVE AND OBJECTIVE BOX
the patient feels well after her lung biopsy and has absolutely no constitutional complaints today.  The patient has a vigorous appetite and looks forward to breakfast.    Patient is a 85y old  Female who presents with a chief complaint of progressively worsening SOB.   HPI:84 y/o F ex smoker quit 21 years ago PMH COPD not on home O2, not on home inhalers who presents due to increasing shortness of breath. Patient reports she has been short of breath for a while (few months?) now but that in the past 3 days it has gotten much worse.  Patient with chronic mild cough, no worsening cough, no wheezing, no chest pain, fevers, chills. Seen by PCP Dr Worthington and CT showed pulmonary nodules suspicious for malignancy Patient planned for lung biopsy on Monday but presented earlier due to increasing sob.  The patient has approximately 120 pack-year smoking history but gave it up some 21 years ago.  The patient was hospitalized here with a shoulder  injury that did not require surgery.   The patient's father  in his early 60s from an illness that required a short hospital stay in which he ultimately succumbed and there was no diagnostic efforts made.  The patient's mother lived into her late 80s and  of complications of heart disease.  The patient had a brother who  of a heart attack.  The patient has never been pregnant and has no children.  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      Examination of the head ears eyes nose and throat demonstrates no abnormality.    Examination of the neck reveals no palpable lymphadenopathy, jugular venous distention or thyromegaly.    The lungs are clear to percussion and auscultation with somewhat distant breath sounds.  There is no forced expiratory wheeze.    The heart sounds are regular with no evidence of murmur, rub, gallop or ectopy.    There are no palpable axillary or breast pathologies found on breast exam bilaterally.    There is no truncal obesity and there is no obvious hepatosplenomegaly.    The patient has no chronic venous stasis changes in the lower extremities.  Neurologically   there is no gross abnormality in higher cortical, cerebellar, motor or sensory functions.  The patient has no rash.

## 2020-11-24 NOTE — PROGRESS NOTE ADULT - SUBJECTIVE AND OBJECTIVE BOX
Pt seen and examined   no complaints  no sob    REVIEW OF SYSTEMS:  Constitutional: No fever, weight loss or fatigue  Cardiovascular: No chest pain, palpitations, dizziness or leg swelling  Gastrointestinal: No abdominal or epigastric pain. No nausea, vomiting or hematemesis; No diarrhea or constipation. No melena or hematochezia.  Skin: No itching, burning, rashes or lesions       MEDICATIONS:  MEDICATIONS  (STANDING):  influenza  Vaccine (HIGH DOSE) 0.7 milliLiter(s) IntraMuscular once  methylPREDNISolone sodium succinate Injectable 40 milliGRAM(s) IV Push every 8 hours  pantoprazole    Tablet 40 milliGRAM(s) Oral before breakfast    MEDICATIONS  (PRN):  albuterol/ipratropium for Nebulization. 3 milliLiter(s) Nebulizer every 4 hours PRN Shortness of Breath and/or Wheezing      Allergies    No Known Allergies    Intolerances        Vital Signs Last 24 Hrs  T(C): 36.6 (24 Nov 2020 08:49), Max: 36.6 (24 Nov 2020 08:49)  T(F): 97.9 (24 Nov 2020 08:49), Max: 97.9 (24 Nov 2020 08:49)  HR: 67 (24 Nov 2020 08:49) (66 - 74)  BP: 140/57 (24 Nov 2020 08:49) (104/64 - 140/57)  BP(mean): --  RR: 16 (24 Nov 2020 08:49) (16 - 18)  SpO2: 95% (24 Nov 2020 08:49) (94% - 95%)      PHYSICAL EXAM:    General: thin; in no acute distress  HEENT: MMM, conjunctiva and sclera clear  Lungs: clear  Heart: regular  Gastrointestinal: Soft non-tender non-distended; Normal bowel sounds; No hepatosplenomegaly  Skin: Warm and dry. No obvious rash  Ext: no clubbing, no cyanosis    LABS:      CBC Full  -  ( 24 Nov 2020 07:36 )  WBC Count : 15.00 K/uL  RBC Count : 4.54 M/uL  Hemoglobin : 13.3 g/dL  Hematocrit : 41.7 %  Platelet Count - Automated : 274 K/uL  Mean Cell Volume : 91.9 fl  Mean Cell Hemoglobin : 29.3 pg  Mean Cell Hemoglobin Concentration : 31.9 gm/dL  Auto Neutrophil # : x  Auto Lymphocyte # : x  Auto Monocyte # : x  Auto Eosinophil # : x  Auto Basophil # : x  Auto Neutrophil % : x  Auto Lymphocyte % : x  Auto Monocyte % : x  Auto Eosinophil % : x  Auto Basophil % : x    11-24    139  |  103  |  27<H>  ----------------------------<  115<H>  4.8   |  26  |  1.08    Ca    8.8      24 Nov 2020 07:36  Mg     2.3     11-24      PT/INR - ( 23 Nov 2020 08:24 )   PT: 11.9 sec;   INR: 0.99          PTT - ( 23 Nov 2020 08:24 )  PTT:24.8 sec                  RADIOLOGY & ADDITIONAL STUDIES (The following images were personally reviewed):

## 2020-11-24 NOTE — PROGRESS NOTE ADULT - SUBJECTIVE AND OBJECTIVE BOX
OVERNIGHT EVENTS:    SUBJECTIVE / INTERVAL HPI: Patient seen and examined at bedside.     VITAL SIGNS:  Vital Signs Last 24 Hrs  T(C): 36.6 (24 Nov 2020 08:49), Max: 36.6 (24 Nov 2020 08:49)  T(F): 97.9 (24 Nov 2020 08:49), Max: 97.9 (24 Nov 2020 08:49)  HR: 67 (24 Nov 2020 08:49) (66 - 74)  BP: 140/57 (24 Nov 2020 08:49) (104/64 - 140/57)  BP(mean): --  RR: 16 (24 Nov 2020 08:49) (16 - 18)  SpO2: 95% (24 Nov 2020 08:49) (94% - 95%)    PHYSICAL EXAM:    General: WDWN  HEENT: NC/AT; PERRL, anicteric sclera; MMM  Neck: supple  Cardiovascular: +S1/S2, RRR  Respiratory: CTA B/L; no W/R/R  Gastrointestinal: soft, NT/ND; +BSx4  Extremities: WWP; no edema, clubbing or cyanosis  Vascular: 2+ radial, DP/PT pulses B/L  Neurological: AAOx3; no focal deficits    MEDICATIONS:  MEDICATIONS  (STANDING):  methylPREDNISolone sodium succinate Injectable 40 milliGRAM(s) IV Push every 8 hours  pantoprazole    Tablet 40 milliGRAM(s) Oral before breakfast    MEDICATIONS  (PRN):  albuterol/ipratropium for Nebulization. 3 milliLiter(s) Nebulizer every 4 hours PRN Shortness of Breath and/or Wheezing      ALLERGIES:  Allergies    No Known Allergies    Intolerances        LABS:                        13.3   15.00 )-----------( 274      ( 24 Nov 2020 07:36 )             41.7     11-24    139  |  103  |  27<H>  ----------------------------<  115<H>  4.8   |  26  |  1.08    Ca    8.8      24 Nov 2020 07:36  Mg     2.3     11-24      PT/INR - ( 23 Nov 2020 08:24 )   PT: 11.9 sec;   INR: 0.99          PTT - ( 23 Nov 2020 08:24 )  PTT:24.8 sec    CAPILLARY BLOOD GLUCOSE          RADIOLOGY & ADDITIONAL TESTS: Reviewed. OVERNIGHT EVENTS: EZE    SUBJECTIVE / INTERVAL HPI: Patient seen and examined at bedside. Denies dyspnea, chest pain, fatigue; in good spirits subjectively.    VITAL SIGNS:  Vital Signs Last 24 Hrs  T(C): 36.6 (24 Nov 2020 08:49), Max: 36.6 (24 Nov 2020 08:49)  T(F): 97.9 (24 Nov 2020 08:49), Max: 97.9 (24 Nov 2020 08:49)  HR: 67 (24 Nov 2020 08:49) (66 - 74)  BP: 140/57 (24 Nov 2020 08:49) (104/64 - 140/57)  BP(mean): --  RR: 16 (24 Nov 2020 08:49) (16 - 18)  SpO2: 95% (24 Nov 2020 08:49) (94% - 95%)    PHYSICAL EXAM:    General: WDWN  HEENT: NC/AT; PERRL, anicteric sclera; MMM  Neck: supple  Cardiovascular: +S1/S2, RRR  Respiratory: CTA B/L; no W/R/R  Gastrointestinal: soft, NT/ND; +BSx4  Extremities: WWP; no edema, clubbing or cyanosis  Vascular: 2+ radial, DP/PT pulses B/L  Neurological: AAOx3; no focal deficits    MEDICATIONS:  MEDICATIONS  (STANDING):  methylPREDNISolone sodium succinate Injectable 40 milliGRAM(s) IV Push every 8 hours  pantoprazole    Tablet 40 milliGRAM(s) Oral before breakfast    MEDICATIONS  (PRN):  albuterol/ipratropium for Nebulization. 3 milliLiter(s) Nebulizer every 4 hours PRN Shortness of Breath and/or Wheezing      ALLERGIES:  Allergies    No Known Allergies    Intolerances        LABS:                        13.3   15.00 )-----------( 274      ( 24 Nov 2020 07:36 )             41.7     11-24    139  |  103  |  27<H>  ----------------------------<  115<H>  4.8   |  26  |  1.08    Ca    8.8      24 Nov 2020 07:36  Mg     2.3     11-24      PT/INR - ( 23 Nov 2020 08:24 )   PT: 11.9 sec;   INR: 0.99          PTT - ( 23 Nov 2020 08:24 )  PTT:24.8 sec    CAPILLARY BLOOD GLUCOSE          RADIOLOGY & ADDITIONAL TESTS: Reviewed.

## 2020-11-25 ENCOUNTER — TRANSCRIPTION ENCOUNTER (OUTPATIENT)
Age: 85
End: 2020-11-25

## 2020-11-25 PROBLEM — R55 NEAR SYNCOPE: Status: ACTIVE | Noted: 2020-10-20

## 2020-11-25 PROBLEM — R42 DIZZINESS: Status: ACTIVE | Noted: 2020-10-20

## 2020-11-25 PROBLEM — H90.3 BILATERAL SENSORINEURAL HEARING LOSS: Status: ACTIVE | Noted: 2020-11-25

## 2020-11-25 PROBLEM — J32.8 OTHER CHRONIC SINUSITIS: Status: ACTIVE | Noted: 2017-01-26

## 2020-11-25 LAB
ANION GAP SERPL CALC-SCNC: 10 MMOL/L — SIGNIFICANT CHANGE UP (ref 5–17)
APTT BLD: 23.3 SEC — LOW (ref 27.5–35.5)
BASOPHILS # BLD AUTO: 0 K/UL — SIGNIFICANT CHANGE UP (ref 0–0.2)
BASOPHILS NFR BLD AUTO: 0 % — SIGNIFICANT CHANGE UP (ref 0–2)
BLD GP AB SCN SERPL QL: NEGATIVE — SIGNIFICANT CHANGE UP
BUN SERPL-MCNC: 35 MG/DL — HIGH (ref 7–23)
CALCIUM SERPL-MCNC: 8.6 MG/DL — SIGNIFICANT CHANGE UP (ref 8.4–10.5)
CHLORIDE SERPL-SCNC: 105 MMOL/L — SIGNIFICANT CHANGE UP (ref 96–108)
CO2 SERPL-SCNC: 24 MMOL/L — SIGNIFICANT CHANGE UP (ref 22–31)
CREAT SERPL-MCNC: 1.09 MG/DL — SIGNIFICANT CHANGE UP (ref 0.5–1.3)
EOSINOPHIL # BLD AUTO: 0 K/UL — SIGNIFICANT CHANGE UP (ref 0–0.5)
EOSINOPHIL NFR BLD AUTO: 0 % — SIGNIFICANT CHANGE UP (ref 0–6)
GIANT PLATELETS BLD QL SMEAR: PRESENT — SIGNIFICANT CHANGE UP
GLUCOSE SERPL-MCNC: 115 MG/DL — HIGH (ref 70–99)
HCT VFR BLD CALC: 39.2 % — SIGNIFICANT CHANGE UP (ref 34.5–45)
HGB BLD-MCNC: 12.9 G/DL — SIGNIFICANT CHANGE UP (ref 11.5–15.5)
INR BLD: 1.01 — SIGNIFICANT CHANGE UP (ref 0.88–1.16)
LYMPHOCYTES # BLD AUTO: 0.71 K/UL — LOW (ref 1–3.3)
LYMPHOCYTES # BLD AUTO: 5.2 % — LOW (ref 13–44)
MAGNESIUM SERPL-MCNC: 2.1 MG/DL — SIGNIFICANT CHANGE UP (ref 1.6–2.6)
MANUAL SMEAR VERIFICATION: SIGNIFICANT CHANGE UP
MCHC RBC-ENTMCNC: 29.9 PG — SIGNIFICANT CHANGE UP (ref 27–34)
MCHC RBC-ENTMCNC: 32.9 GM/DL — SIGNIFICANT CHANGE UP (ref 32–36)
MCV RBC AUTO: 91 FL — SIGNIFICANT CHANGE UP (ref 80–100)
MONOCYTES # BLD AUTO: 0.12 K/UL — SIGNIFICANT CHANGE UP (ref 0–0.9)
MONOCYTES NFR BLD AUTO: 0.9 % — LOW (ref 2–14)
NEUTROPHILS # BLD AUTO: 12.7 K/UL — HIGH (ref 1.8–7.4)
NEUTROPHILS NFR BLD AUTO: 93 % — HIGH (ref 43–77)
NON-GYNECOLOGICAL CYTOLOGY STUDY: SIGNIFICANT CHANGE UP
PLAT MORPH BLD: NORMAL — SIGNIFICANT CHANGE UP
PLATELET # BLD AUTO: 240 K/UL — SIGNIFICANT CHANGE UP (ref 150–400)
POTASSIUM SERPL-MCNC: 4.3 MMOL/L — SIGNIFICANT CHANGE UP (ref 3.5–5.3)
POTASSIUM SERPL-SCNC: 4.3 MMOL/L — SIGNIFICANT CHANGE UP (ref 3.5–5.3)
PROTHROM AB SERPL-ACNC: 12.1 SEC — SIGNIFICANT CHANGE UP (ref 10.6–13.6)
RBC # BLD: 4.31 M/UL — SIGNIFICANT CHANGE UP (ref 3.8–5.2)
RBC # FLD: 13.2 % — SIGNIFICANT CHANGE UP (ref 10.3–14.5)
RBC BLD AUTO: NORMAL — SIGNIFICANT CHANGE UP
RH IG SCN BLD-IMP: POSITIVE — SIGNIFICANT CHANGE UP
SODIUM SERPL-SCNC: 139 MMOL/L — SIGNIFICANT CHANGE UP (ref 135–145)
VARIANT LYMPHS # BLD: 0.9 % — SIGNIFICANT CHANGE UP (ref 0–6)
WBC # BLD: 13.66 K/UL — HIGH (ref 3.8–10.5)
WBC # FLD AUTO: 13.66 K/UL — HIGH (ref 3.8–10.5)

## 2020-11-25 PROCEDURE — 99152 MOD SED SAME PHYS/QHP 5/>YRS: CPT

## 2020-11-25 PROCEDURE — 77001 FLUOROGUIDE FOR VEIN DEVICE: CPT | Mod: 26

## 2020-11-25 PROCEDURE — 36561 INSERT TUNNELED CV CATH: CPT

## 2020-11-25 PROCEDURE — 76937 US GUIDE VASCULAR ACCESS: CPT | Mod: 26

## 2020-11-25 RX ORDER — IPRATROPIUM BROMIDE 42 UG/1
0.06 SPRAY NASAL
Qty: 1 | Refills: 5 | Status: ACTIVE | COMMUNITY
Start: 2020-11-25 | End: 1900-01-01

## 2020-11-25 RX ORDER — OXYMETAZOLINE HYDROCHLORIDE 0.05 G/100ML
0.05 SPRAY NASAL
Qty: 1 | Refills: 0 | Status: COMPLETED | COMMUNITY
Start: 2019-12-18 | End: 2019-12-30

## 2020-11-25 RX ADMIN — Medication 40 MILLIGRAM(S): at 21:19

## 2020-11-25 RX ADMIN — Medication 40 MILLIGRAM(S): at 10:09

## 2020-11-25 NOTE — HISTORY OF PRESENT ILLNESS
[de-identified] : Ms. Decker reports that she has been having random episodes of feeling funny x a few minutes each time over last 3-4 weeks.\par First episode occurred when she was walking outside - "felt funny" so stopped  for a few minutes.\par Since then, episodes can happen at any time - "weird feeling" happens - when standing, when sitting still. \par Occ nausea with the episodes but no vomiting.\par No actual vertigo.  No headache or blurred vision. No preceding illness or head trauma.\par Went to Urgent Care and EKG was done --> told to go to [facility] ED where multiple tests were done but not abnormal.\par She saw Dr. Hewitt who concluded no cardiac issues; maybe vasovagal reaction.\par \par Feels worse hearing on left side x few months.  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 \par MRI IAC +/- negative for IAC/CPA lesion in 2018.\par \par Nose was fine re rhinorrhea until the cold weather started.  Rhinorrhea is clear.  Mild nasal congestion.\par Hx of chronic sinusitis and tiny polyps.\par \par \par MR BRAIN with/without contrast (03/05/2018) at [facility]: \par - comparison to CT sinuses dated 8/5/2017 and CT temporal bones dated 4/24/2009.\par FINDINGS: \par -- Internal auditory canals and cerebellopontine angles demonstrate no mass lesion or abnormal enhancement. The left mastoid air cells are opacified at the mastoid process which appears to be a chronic finding comparing back to 2009.\par -- Ventricles, cisternal spaces, and cortical sulci to be appropriate for the patient's stated age. A few scattered \par punctate foci of increased T2 signal within the periventricular and subcortical white matter which is nonspecific and most likely represents the sequela of small vessel ischemic disease in this patient, mild in degree. The diffusion-weighted images demonstrate no evidence of recent infarction. The postcontrast images demonstrate no abnormal intracranial \par enhancement. There are preserved large vascular flow-voids. \par -- Pansinus inflammatory mucosal thickening with moderate circumferential mucosal thickening in the maxillary sinuses, and diffuse moderate opacification of the ethmoid air cells and right sphenoid sinus, similar to the prior CT sinus findings of 8/5/17. \par \par CT SINUS noncontrast (June 21, 2018) at [facility] \par - Prior Studies: 08/05/2017 CT \par - Findings:\par * Prior Surgery: None is evident\par * Sinuses: As on the prior study there is paranasal sinus mucosal thickening throughout. Compared with the prior study, there is increase in mucosal thickening and minimal opacification of the left maxillary sinus whereas the right maxillary sinus shows similar but slightly decreased mucosal thickening. The sphenoid sinuses show fairly similar \par mild circumflex proximal mucosal thickening, and the ethmoid sinuses show persistent moderate bilateral opacification, though slightly improved. The frontal sinuses are better ventilated on the current exam. No fluid level, although there is bubbly secretions/debris in the left maxillary sinus lumen. No evidence of odontogenic sinusitis.\par * Sinocranial and Sino-orbital Junctions: There remains intact and symmetric excretion at the anterior skull base. The bony orbits are preserved.\par * Ostiomeatal Units: The bilateral maxillary ostia are opacified and again, the uncinate processes appear lateralized or even atelectatic along the lateral walls nasal cavity.\par * Frontal Sinus Outflow Tracts: Difficult to trace bilaterally, though likely narrowed in the presence of ovarian frontal ethmoidal air cells. Sagittal image 38 shows a 6 mm rounded focus of tissue in the anterior middle meatus that could be a polyp.\par * Sphenoethmoidal Recesses: Opacified on the right, presumably from inflammatory mucosa or polyp there is similar to slightly increased from the prior study. The left sphenoethmoidal recess is ventilated.\par * Nasal Cavity/Nasopharynx: As above, there is small rounded focus of soft tissue the left middle meatus lateral to the middle turbinate that could be a polyp. There is additional asymmetric mucosal thickening or other soft tissue lateral the posterior middle turbinate. There is diffuse nasal cavity mucosal thickening at the olfactory clefts and sphenoethmoidal recesses, as on prior imaging. The nasal septum this predominantly midline. The noncontrast CT appearance of the nasopharynx is unremarkable.\par * Orbits: No abnormal soft tissue or mass effect. The native ocular lenses have been replaced. There is unchanged symmetric degree of enophthalmos but is likely age-related with relative paucity of intraorbital fat. 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

## 2020-11-25 NOTE — PROGRESS NOTE ADULT - PROBLEM SELECTOR PLAN 1
Pt presented initially with some rhonchi, no wheezing but patient with symptomatic improvement after duonebs. Currently feels subjectively well with no wheezes or rhonchi on exam.  - s/p solumedrol 125 in ED  - Taper from Solumedrol 40mg IV q8 to 40mg IV q12 beginning 11/25  -f/u pulm to discuss solumedrol taper  - c/w Duonebs q4 prn

## 2020-11-25 NOTE — PROGRESS NOTE ADULT - PROBLEM SELECTOR PLAN 2
Pt found to have CT chest findings suggestive of lung CA with metastasis to lymph nodes. Pt notably has 80 pack year smoking history.  - s/p biopsy, pending pathology results  - Per Dr. Day, Unfractionated Heparin for DVT ppx tonight 11/24  - NPO for IR chemoport placement tomorrow 11/25  - CEA 1.5 today  - Continue to f/u with Dr. Jones and Dr. Day for recs

## 2020-11-25 NOTE — PROGRESS NOTE ADULT - ATTENDING COMMENTS
The patient will have a deep venous access device placed which can be a low profile port.  The patient is very thin and frail and should not have a double lumen port.

## 2020-11-25 NOTE — PROGRESS NOTE ADULT - SUBJECTIVE AND OBJECTIVE BOX
Pt seen and examined   co complaints  cytology ?negative    REVIEW OF SYSTEMS:  Constitutional: No fever,   Cardiovascular: No chest pain, palpitations, dizziness or leg swelling  Gastrointestinal: No abdominal or epigastric pain. No nausea, vomiting ; No diarrhea   Skin: No itching, burning, rashes or lesions       MEDICATIONS:  MEDICATIONS  (STANDING):  methylPREDNISolone sodium succinate Injectable 40 milliGRAM(s) IV Push every 12 hours  pantoprazole    Tablet 40 milliGRAM(s) Oral before breakfast    MEDICATIONS  (PRN):  albuterol/ipratropium for Nebulization. 3 milliLiter(s) Nebulizer every 4 hours PRN Shortness of Breath and/or Wheezing      Allergies    No Known Allergies    Intolerances        Vital Signs Last 24 Hrs  T(C): 36.6 (25 Nov 2020 08:40), Max: 36.6 (24 Nov 2020 21:51)  T(F): 97.8 (25 Nov 2020 08:40), Max: 97.8 (24 Nov 2020 21:51)  HR: 66 (25 Nov 2020 08:40) (66 - 74)  BP: 138/65 (25 Nov 2020 08:40) (120/71 - 138/65)  BP(mean): --  RR: 18 (25 Nov 2020 08:40) (16 - 18)  SpO2: 97% (25 Nov 2020 08:40) (95% - 97%)      PHYSICAL EXAM:    General: thin; in no acute distress  HEENT: MMM, conjunctiva and sclera clear  Lungs: rhonchi  Heart: regular  Gastrointestinal: Soft non-tender non-distended; Normal bowel sounds;  Skin: Warm and dry. No obvious rash    LABS:      CBC Full  -  ( 25 Nov 2020 07:28 )  WBC Count : 13.66 K/uL  RBC Count : 4.31 M/uL  Hemoglobin : 12.9 g/dL  Hematocrit : 39.2 %  Platelet Count - Automated : 240 K/uL  Mean Cell Volume : 91.0 fl  Mean Cell Hemoglobin : 29.9 pg  Mean Cell Hemoglobin Concentration : 32.9 gm/dL  Auto Neutrophil # : 12.70 K/uL  Auto Lymphocyte # : 0.71 K/uL  Auto Monocyte # : 0.12 K/uL  Auto Eosinophil # : 0.00 K/uL  Auto Basophil # : 0.00 K/uL  Auto Neutrophil % : 93.0 %  Auto Lymphocyte % : 5.2 %  Auto Monocyte % : 0.9 %  Auto Eosinophil % : 0.0 %  Auto Basophil % : 0.0 %    11-25    139  |  105  |  35<H>  ----------------------------<  115<H>  4.3   |  24  |  1.09    Ca    8.6      25 Nov 2020 07:28  Mg     2.1     11-25      PT/INR - ( 25 Nov 2020 07:28 )   PT: 12.1 sec;   INR: 1.01          PTT - ( 25 Nov 2020 07:28 )  PTT:23.3 sec                  RADIOLOGY & ADDITIONAL STUDIES (The following images were personally reviewed):

## 2020-11-25 NOTE — CONSULT LETTER
[Dear  ___] : Dear  [unfilled], [Courtesy Letter:] : I had the pleasure of seeing your patient, [unfilled], in my office today. [Consult Closing:] : Thank you very much for allowing me to participate in the care of this patient.  If you have any questions, please do not hesitate to contact me. [Sincerely,] : Sincerely, [FreeTextEntry2] : Alena Worthington MD\par [address] [facility] [address]\par [place] [id number] [FreeTextEntry1] : \par \par \par \par \par  [FreeTextEntry3] : \par Genny Garces MD \par Otolaryngology, Head and Neck Surgery \par Residency site , [facility]\par

## 2020-11-25 NOTE — PHYSICAL EXAM
[Nasal Endoscopy Performed] : nasal endoscopy was performed, see procedure section for findings [Midline] : trachea located in midline position [Removed] : palatine tonsils previously removed [Normal] : no rashes [de-identified] : Mild .edema [de-identified] : dental restorations [] : Past Pointing test is negative [de-identified] : Carotid pulses 2+ bilateral.

## 2020-11-25 NOTE — PROCEDURE
[FreeTextEntry6] : \par Indication: chronic sinusitis, polyps\par -Verbal consent was obtained from patient prior to exam. \par - Donavon-Synephrine  spray applied to nose bilaterally.\par Nasal endoscopy was performed with flexible scope.\par Findings: \par -- Inferior turbinates mild  edema bilateral.  Inferior meatus clear bilateral.\par -- Septum was mildly deviated to the left \par -- Tiny polyps in left and right middle meati.  Clear mucus.\par -- Middle turbinates mild edema.  Superior turbinates normal bilateral\par --  SER clear bilateral.\par -- Nasopharynx without mass or exudate\par -- Adenoids were absent  \par -- Eustachian orifices were clear bilateral\par \par The patient tolerated the procedure well.\par \par

## 2020-11-25 NOTE — ASSESSMENT
[FreeTextEntry1] : Ms. Decker has new episodes of not quite dizziness but "funny feeling" with nausea over past 3-4 weeks.\par I do not think she has imbalance related to her ears.\par Cardiac assessment by Dr. Hewitt was unremarkable. \par Will refer to Dr. 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  Hearing on right is almost completely within normal limits.  Her word recognition is excellent.  \par She is not interested in hearing aids.\par MRI Brain with/without contrast in 2018 showed no IAC or CP angle lesion/abnormality.\par \par Chronic rhinosinusitis with active rhinorrhea now that cold weather has started.\par Nasal polyps are barely visible in middle meati now.  There is no acute infection.\par Will try ipratropium nasal spray to dry nose.\par \par Return in 4-5 months

## 2020-11-25 NOTE — DISCHARGE NOTE PROVIDER - CARE PROVIDER_API CALL
Leonel Jones)  Internal Medicine; Medical Oncology  [address] [place] [address]  Phone: [phone]  Fax: [phone]  Established Patient  Follow Up Time: 1 week    Thanh Day)  Medicine  [address] [place] [address]  Phone: [phone]  Fax: [phone]  Established Patient  Follow Up Time: 1 week   Leonel Jones)  Internal Medicine; Medical Oncology  [address] [place] [address]  Phone: [phone]  Fax: [phone]  Established Patient  Follow Up Time: 2 weeks    Alena Worthington  INTERNAL MEDICINE  [address] [place] [address]  Phone: [phone]  Fax: [phone]  Scheduled Appointment: 12/07/2020 02:15 PM

## 2020-11-25 NOTE — DISCHARGE NOTE PROVIDER - HOSPITAL COURSE
#Discharge: do not delete    Patient is 84yo F with past medical history of COPD  Presented with shortness of breath, found to have lung mass  Problem List/Main Diagnoses (system-based):      #Lung mass  Pt found to have CT chest findings suggestive of lung CA with metastasis to lymph nodes. Pt notably has 80 pack year smoking history.  - s/p biopsy, pending pathology results  - s/p chemoport placement    #Chronic obstructive pulmonary disease with acute exacerbation  Pt presented initially with some rhonchi, no wheezing but patient with symptomatic improvement after duonebs. Currently feels subjectively well with no wheezes or rhonchi on exam.  - s/p solumedrol 125 in ED  - Taper from Solumedrol 40mg IV q8 to 40mg IV q12 beginning 11/25, with last dose given 11/26 in AM    Inpatient treatment course: see above    Labs to be followed outpatient: lung pathology report     Patient is 86yo F with past medical history of COPD  Presented with shortness of breath, found to have lung mass  Problem List/Main Diagnoses (system-based):      #Lung mass  Pt found to have CT chest findings suggestive of lung CA with metastasis to lymph nodes. Pt notably has 80 pack year smoking history.  - s/p biopsy, pending pathology results  - s/p chemoport placement    #Chronic obstructive pulmonary disease with acute exacerbation  Pt presented initially with some rhonchi, no wheezing but patient with symptomatic improvement after duonebs. Currently feels subjectively well with no wheezes or rhonchi on exam.  - s/p solumedrol 125 in ED  - Taper from Solumedrol 40mg IV q8 to 40mg IV q12 beginning 11/25, with last dose given 11/26 in AM    Inpatient treatment course: see above    Labs to be followed outpatient: lung pathology report       86 y/o F PMH COPD with recent imaging findings c/f lung ca pending biopsy who presented due to shortness of breath admitted for COPD exacerbation and found to have metastatic lung adenocarcinoma.      Problem List/Main Diagnoses (system-based):      #Chronic obstructive pulmonary disease with acute exacerbation  Pt presented initially with some rhonchi, no wheezing but patient with symptomatic improvement after duonebs. Currently feels subjectively well with no wheezes or rhonchi on exam.  RESOLVED  - s/p solumedrol 125 in ED  - Tapered from Solumedrol 40mg IV q8 to 40mg IV q12 beginning 11/25, completed on 11/26    #Lung mass  Pt found to have CT chest findings suggestive of lung CA with metastasis to lymph nodes. Pt notably has 80 pack year smoking history.  - s/p biopsy, pathology showing poorly differentiated adenocarcinoma  - received chemotherapy consisting of: tamoxifen, gefitinib, and navelbine  - CXR, CT Chest confirming small, R apical pneumothorax  - PET CT  Impression:  1. Two hypermetabolic right upper lobe pulmonary nodules, largest measuring up to 2.3 cm (biopsy proven poorly differentiated carcinoma) with right hilar, paratracheal and supracervical lymphadenopathy.  2. Small right pneumothorax, likely postprocedural.    #CAP (community acquired pneumonia)  bronchial sputum culture growing strep pneumo  -c/w Ceftriaxone 1g X5 days (completed 12/1)    Inpatient treatment course: patient admitted for likely COPD exacerbation and completed IV to PO steroid taper.  CT chest suggestive of lung CA with lymph node metastasis.  biopsy confirmed poorly diferentiated adenocarcinoma of the lung, stage IIIb and received chemotherapy consisting of high-dose tamoxifen, gefitinib and navelbine.  bronchial sputum culture growing strep pneumo so treated with 5 day course of CTX for CAP.    Labs to be followed outpatient: none  Exam: none

## 2020-11-25 NOTE — PROGRESS NOTE ADULT - SUBJECTIVE AND OBJECTIVE BOX
Interventional, Pulmonary, Critical, Chest Special Procedures.    Pt was seen and fully examined by myself.     Time spent with patient in minutes:37    Patient is a 85y old  Female who presents with a chief complaint of sob (25 Nov 2020 10:54) Rhe patient c no new complaints en route Port placement    HPI:  84 y/o F PMH COPD not on home O2, not on home inhalers who presents due to shortness of breath. Patient reports she has been short of breath for a while (few months?) now but that in the past 3 days it has gotten much worse. She used to be able to walk 30 blocks without issue and now can walk less than half a block before becoming short of breath. Patient with chronic mild cough, no worsening cough, no wheezing, no chest pain, fevers, chills. Patient planned for lung biopsy on Monday but presented earlier due to these symptoms at the suggestion of her doctor. 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     ED vitals: afebrile, p74, /75, RR 17, 98% on RA  ED labs: Na 147, Cl 112,   EKG: NSR at 62    ED imaging  1. Since 2/9/2017, there are three new lung nodules in the right upper lobe and there is new large mediastinal and right hilar lymphadenopathy. These findings are suspicious for lung cancer with lymph node metastases.    2. No evidence of pulmonary embolism, but the right hilar adenopathy compresses right upper lobe pulmonary arteries, resulting in one branch being occluded and the other branch nearly occluded.    3. Moderate compression of superior vena cava by adenopathy with dilated internal jugular veins bilaterally.      Patient received solumedrol IV 125mg, duonebs x 2   (21 Nov 2020 20:14)      REVIEW OF SYSTEMS:  Constitutional: No fever, weight loss or fatigue  ENMT:  No difficulty hearing, tinnitus, vertigo; No sinus or throat pain  Respiratory: No cough, wheezing, chills or hemoptysis SOB better today  Cardiovascular: No chest pain, palpitations, dizziness or leg swelling  Gastrointestinal: No abdominal or epigastric pain. No nausea, vomiting or hematemesis; No diarrhea or constipation. No melena or hematochezia.  Skin: No itching, burning, rashes or lesions   Musculoskeletal: No joint pain or swelling; No muscle, back or extremity pain    PAST MEDICAL & SURGICAL HISTORY:  Chronic obstructive pulmonary disease, unspecified COPD type    Hearing loss  left ear - sees Dr. Garces ENT    Basal cell carcinoma  x2 - upper lip and arm    Chronic obstructive pulmonary disease    Osteoporosis  Osteoporosis    H/O breast biopsy  &gt; 5 years ago, mass was benign and self-resolved    History of appendectomy    History of cataract surgery      FAMILY HISTORY:    SOCIAL HISTORY:      - Tobacco     - ETOH    Allergies    No Known Allergies    Intolerances      Vital Signs Last 24 Hrs  T(C): 36.6 (25 Nov 2020 08:40), Max: 36.6 (24 Nov 2020 21:51)  T(F): 97.8 (25 Nov 2020 08:40), Max: 97.8 (24 Nov 2020 21:51)  HR: 66 (25 Nov 2020 08:40) (66 - 74)  BP: 138/65 (25 Nov 2020 08:40) (120/71 - 138/65)  BP(mean): --  RR: 18 (25 Nov 2020 08:40) (16 - 18)  SpO2: 97% (25 Nov 2020 08:40) (95% - 97%)        MEDICATIONS:  MEDICATIONS  (STANDING):  methylPREDNISolone sodium succinate Injectable 40 milliGRAM(s) IV Push every 12 hours  pantoprazole    Tablet 40 milliGRAM(s) Oral before breakfast    MEDICATIONS  (PRN):  albuterol/ipratropium for Nebulization. 3 milliLiter(s) Nebulizer every 4 hours PRN Shortness of Breath and/or Wheezing      PHYSICAL EXAM:  Comfortable, no distress  Eyes: PERRL, EOM intact; conjunctiva and sclera clear  Head: Normocephalic;  No Trauma  ENMT: No nasal discharge,+ hoarseness, cough no  hemoptysis  Neck: Supple; non tender; no masses or deformities.    No JVD  Respiratory:,  - WHEEZING  + central  RHONCHI  - RALES  - CRACKLES.  Diminished breath sounds  BILATERAL  RIGHT  LEFT  Cardiovascular: Regular rate and rhythm. S1 and S2 Normal; No murmurs, gallops or rubs     - PPM/AICD  Gastrointestinal: Soft non-tender, non-distended; Normal bowel sounds; No hepatosplenomegaly.     -PEG    -  GT   - ALVES  Genitourinary: No costovertebral angle tenderness. No dysuria  Extremities: AROM, No clubbing, cyanosis or edema    Vascular: Peripheral pulses palpable 2+ bilaterally  Neurological: Alert and responisve to stimuli   Skin: Warm and dry. No obvious rash  Lymph Nodes: No acute cervical or supraclavicular adenopathy    DEVICES:  - DENTURES   +IV R / L     - ETUBE   -TRACH   -CTUBE  R / L    LABS:                          12.9   13.66 )-----------( 240      ( 25 Nov 2020 07:28 )             39.2     11-25    139  |  105  |  35<H>  ----------------------------<  115<H>  4.3   |  24  |  1.09    Ca    8.6      25 Nov 2020 07:28  Mg     2.1     11-25      PT/INR - ( 25 Nov 2020 07:28 )   PT: 12.1 sec;   INR: 1.01          PTT - ( 25 Nov 2020 07:28 )  PTT:23.3 sec  RADIOLOGY & ADDITIONAL STUDIES (The following images were personally reviewed):

## 2020-11-25 NOTE — DISCHARGE NOTE PROVIDER - PROVIDER TOKENS
PROVIDER:[TOKEN:[[id number]],FOLLOWUP:[1 week],ESTABLISHEDPATIENT:[T]],PROVIDER:[[id number]],FOLLOWUP:[1 week],ESTABLISHEDPATIENT:[T]] PROVIDER:[TOKEN:[[id number]],FOLLOWUP:[2 weeks],ESTABLISHEDPATIENT:[T]],PROVIDER:[TOKEN:[[id number]],SCHEDULEDAPPT:[12/07/2020],SCHEDULEDAPPTTIME:[02:15 PM]]

## 2020-11-25 NOTE — DATA REVIEWED
[de-identified] : \par AUDIOGRAM (11/04/2020)\par RIGHT:  Hearing within normal limits except for mild SNHL at 8K Hz\par LEFT:   Hearing within normal limits through 2K Hz, sloping precipitously to mild to moderately severe SNHL through 4K Hz, then rising to moderate HL\par Tympanograms  B  bilateral \par Word recognition 100%  on right; 90% on left\par \par AUDIOGRAM (4/22/2019)\par RIGHT:  Hearing within normal limits through 3K Hz, sloping to mild SNHL.\par LEFT:   Hearing within normal limits through 1K Hz, sloping to mild to moderately-severe mixed HL\par Tympanograms  B  bilateral \par Word recognition 100%  on right; 90% on left\par \par AUDIOGRAM  (02/14/2018)\par RIGHT:  Hearing within normal limits.\par LEFT:   Hearing within normal limits through 2K Hz, sloping to moderate to mild SNHL.\par Tympanograms  As on left; C on right  \par Word recognition 100%  bilateral\par \par AUDIOGRAM (4/25/2010)\par RIGHT:  Hearing with mild conductive HL lower frequencies, otherwise normal limits.    \par LEFT:   Hearing with mild conductive HL lower frequencies, otherwise normal limits.     \par Tympanograms  A on  right; B on  left\par Word recognition 90% on right; 88%  on left\par

## 2020-11-25 NOTE — DISCHARGE NOTE PROVIDER - CARE PROVIDERS DIRECT ADDRESSES
,DirectAddress_Unknown,DirectAddress_Unknown ,DirectAddress_Unknown,[email or web address]

## 2020-11-25 NOTE — PROGRESS NOTE ADULT - SUBJECTIVE AND OBJECTIVE BOX
INTERVAL HPI/OVERNIGHT EVENTS: Overnight there were no acute events. This morning the patient has no acute complaints    .  VITAL SIGNS:  T(C): 36.6 (11-25-20 @ 05:32), Max: 36.6 (11-24-20 @ 08:49)  T(F): 97.8 (11-25-20 @ 05:32), Max: 97.9 (11-24-20 @ 08:49)  HR: 66 (11-25-20 @ 05:32) (66 - 74)  BP: 129/69 (11-25-20 @ 05:32) (120/71 - 140/57)  BP(mean): --  RR: 17 (11-25-20 @ 05:32) (16 - 17)  SpO2: 97% (11-25-20 @ 05:32) (95% - 97%)  Wt(kg): --    PHYSICAL EXAM:    Constitutional: WDWN resting comfortably in bed; NAD  Head: NC/AT  Neck: supple; no JVD or thyromegaly  Respiratory: CTA B/L; no W/R/R, no retractions  Cardiac: +S1/S2; RRR; no M/R/G;  Gastrointestinal: soft, NT/ND; no rebound or guarding;  Musculoskeletal: NROM x4; no joint swelling, tenderness or erythema  Vascular: 2+ radial, femoral, DP/PT pulses B/L  Dermatologic: skin warm, dry and intact; no rashes, wounds, or scars  Neurologic: AAOx3;  Psychiatric: affect and characteristics of appearance, verbalizations, behaviors are appropriate    MEDICATIONS  (STANDING):  methylPREDNISolone sodium succinate Injectable 40 milliGRAM(s) IV Push every 12 hours  pantoprazole    Tablet 40 milliGRAM(s) Oral before breakfast    MEDICATIONS  (PRN):  albuterol/ipratropium for Nebulization. 3 milliLiter(s) Nebulizer every 4 hours PRN Shortness of Breath and/or Wheezing      Allergies    No Known Allergies    Intolerances        LABS:                        12.9   13.66 )-----------( 240      ( 25 Nov 2020 07:28 )             39.2     11-25    139  |  105  |  35<H>  ----------------------------<  115<H>  4.3   |  24  |  1.09    Ca    8.6      25 Nov 2020 07:28  Mg     2.1     11-25      PT/INR - ( 25 Nov 2020 07:28 )   PT: 12.1 sec;   INR: 1.01          PTT - ( 25 Nov 2020 07:28 )  PTT:23.3 sec      RADIOLOGY & ADDITIONAL TESTS: Reviewed

## 2020-11-25 NOTE — CONSULT NOTE ADULT - ASSESSMENT
Assessment: case reviewed with Dr. Rodriguez and Dr. Tsang, will plan for double lumen port placement. elevated WBC count due to pt on steroid treatment        Recommendations: NPO at midnight prior to procedure with sedation. Last dose subq heparin 11/24/20 5 pm.         Communicated with: medicine team

## 2020-11-25 NOTE — DISCHARGE NOTE PROVIDER - NSDCCPCAREPLAN_GEN_ALL_CORE_FT
PRINCIPAL DISCHARGE DIAGNOSIS  Diagnosis: Lung mass  Assessment and Plan of Treatment: You were found to have a lung mass on imaging of your chest. Based on the imaging, it is highly likely that the diagnosis will be a type of lung cancer. You had a chemoport placed in your chest to assist in receiving treatment for the cancer.  When you follow up with your outpatient doctor you will receive a definitive diagnosis of what the mass is based on the result of the pathology report.       PRINCIPAL DISCHARGE DIAGNOSIS  Diagnosis: Lung mass  Assessment and Plan of Treatment: You were found to have a lung mass on imaging of your chest. Based on the imaging, it is highly likely that the diagnosis will be a type of lung cancer. You had a chemoport placed in your chest to assist in receiving treatment for the cancer.  When you follow up with your outpatient doctor you will receive a definitive diagnosis of what the mass is based on the result of the pathology report. Please schedule an appointment to see Dr. Jones within 1 week's time, [phone], or as soon as possible.       PRINCIPAL DISCHARGE DIAGNOSIS  Diagnosis: COPD exacerbation  Assessment and Plan of Treatment: COPD is a lung disease that makes it hard to breathe. In people with COPD, the airways (the branching tubes that carry air within the lungs) become narrow and damaged. This makes people feel out of breath and tired.  You might have heard COPD referred to as "chronic bronchitis" or "emphysema." These are types of COPD.  Current smoking or a previous history of smoking is a major risk factor.  We treated you with steroids to help calm down your shortness of breath.      SECONDARY DISCHARGE DIAGNOSES  Diagnosis: Lung cancer  Assessment and Plan of Treatment: This means that you have a cancer that started in your lungs and is starting to spread to other parts of your body.  This was discovered after we did a CT scan of your chest and confirmed with biopsy of your lymph nodes.  Dr. Jones diagnosed you with Adenocarcinoma of the lung, stage IIIb and started you on chemotherapy treatment here in the hospital.  It will be important for you to follow up with him at an appointment within 2 weeks of leaving the hospital.  You should hear back from his office but if you don't please call them at the following number and mention that Dr. Jones saw you in the hospital and wanted you to follow up with him.  His number is: [phone].  Please also follow up with your primary care physician, Dr. Worthingotn.  You have an appointment already scheduled on 12/7/20 at 2:15pm.    Diagnosis: CAP (community acquired pneumonia)  Assessment and Plan of Treatment: You were found to have bacteria growing in your lungs causing pneumonia.  We treated you with antibiotics for 5 days.

## 2020-11-25 NOTE — DISCHARGE NOTE PROVIDER - NSDCFUADDAPPT_GEN_ALL_CORE_FT
Please follow up with Dr. Worthington on 12/7/20 at 2:15pm.      Dr. Jones's office should be calling you to schedule an appointment within 2 weeks.  If you do not hear back from them within a few days, please call them at [phone].

## 2020-11-25 NOTE — CONSULT NOTE ADULT - SUBJECTIVE AND OBJECTIVE BOX
86 y/o F PMH COPD with recent imaging findings c/f lung ca pending biopsy who presented due to shortness of breath admitted for COPD exacerbation vs symptomatic lung ca and biopsy.    Clinical History: LUNG MASS    Handoff    MEWS Score    Chronic obstructive pulmonary disease, unspecified COPD type    Hearing loss    Basal cell carcinoma    Chronic obstructive pulmonary disease    Osteoporosis    Chronic obstructive pulmonary disease, unspecified COPD type    Lung mass    Nutrition, metabolism, and development symptoms    Lung mass    Chronic obstructive pulmonary disease with acute exacerbation    H/O breast biopsy    History of appendectomy    History of cataract surgery    History of cataract surgery    SOB    33    [email or web address]        Meds:albuterol/ipratropium for Nebulization. 3 milliLiter(s) Nebulizer every 4 hours PRN  methylPREDNISolone sodium succinate Injectable 40 milliGRAM(s) IV Push every 12 hours  pantoprazole    Tablet 40 milliGRAM(s) Oral before breakfast      Allergies:No Known Allergies        Labs:                           12.9   13.66 )-----------( 240      ( 25 Nov 2020 07:28 )             39.2     PT/INR - ( 25 Nov 2020 07:28 )   PT: 12.1 sec;   INR: 1.01          PTT - ( 25 Nov 2020 07:28 )  PTT:23.3 sec  11-25    139  |  105  |  35<H>  ----------------------------<  115<H>  4.3   |  24  |  1.09    Ca    8.6      25 Nov 2020 07:28  Mg     2.1     11-25

## 2020-11-25 NOTE — PROGRESS NOTE ADULT - SUBJECTIVE AND OBJECTIVE BOX
the patient feels generally well.  The patient has no pain.  The patient has no constitutional complaints.  She is anxious to find out what kind of cancer she has.    Examination of the head ears eyes nose and throat demonstrates no abnormality.    Examination of the neck reveals no palpable lymphadenopathy, jugular venous distention or thyromegaly.    The lungs are clear to percussion and auscultation with somewhat distant breath sounds.  There is no forced expiratory wheeze.    The heart sounds are regular with no evidence of murmur, rub, gallop or ectopy.    There are no palpable axillary or breast pathologies found on breast exam bilaterally.    There is no truncal obesity and there is no obvious hepatosplenomegaly.    The patient has no chronic venous stasis changes in the lower extremities.  Neurologically   there is no gross abnormality in higher cortical, cerebellar, motor or sensory functions.  The patient has no rash.

## 2020-11-26 ENCOUNTER — TRANSCRIPTION ENCOUNTER (OUTPATIENT)
Age: 85
End: 2020-11-26

## 2020-11-26 LAB
-  CEFTRIAXONE: SIGNIFICANT CHANGE UP
-  CLINDAMYCIN: SIGNIFICANT CHANGE UP
-  ERYTHROMYCIN: SIGNIFICANT CHANGE UP
-  PENICILLIN: SIGNIFICANT CHANGE UP
-  VANCOMYCIN: SIGNIFICANT CHANGE UP
ALBUMIN SERPL ELPH-MCNC: 3.4 G/DL — SIGNIFICANT CHANGE UP (ref 3.3–5)
ALP SERPL-CCNC: 50 U/L — SIGNIFICANT CHANGE UP (ref 40–120)
ALT FLD-CCNC: 17 U/L — SIGNIFICANT CHANGE UP (ref 10–45)
ANION GAP SERPL CALC-SCNC: 12 MMOL/L — SIGNIFICANT CHANGE UP (ref 5–17)
AST SERPL-CCNC: 16 U/L — SIGNIFICANT CHANGE UP (ref 10–40)
BASOPHILS # BLD AUTO: 0.02 K/UL — SIGNIFICANT CHANGE UP (ref 0–0.2)
BASOPHILS NFR BLD AUTO: 0.2 % — SIGNIFICANT CHANGE UP (ref 0–2)
BILIRUB SERPL-MCNC: 0.3 MG/DL — SIGNIFICANT CHANGE UP (ref 0.2–1.2)
BUN SERPL-MCNC: 30 MG/DL — HIGH (ref 7–23)
CALCIUM SERPL-MCNC: 8.5 MG/DL — SIGNIFICANT CHANGE UP (ref 8.4–10.5)
CHLORIDE SERPL-SCNC: 104 MMOL/L — SIGNIFICANT CHANGE UP (ref 96–108)
CO2 SERPL-SCNC: 22 MMOL/L — SIGNIFICANT CHANGE UP (ref 22–31)
CREAT SERPL-MCNC: 1.16 MG/DL — SIGNIFICANT CHANGE UP (ref 0.5–1.3)
CULTURE RESULTS: SIGNIFICANT CHANGE UP
EOSINOPHIL # BLD AUTO: 0 K/UL — SIGNIFICANT CHANGE UP (ref 0–0.5)
EOSINOPHIL NFR BLD AUTO: 0 % — SIGNIFICANT CHANGE UP (ref 0–6)
GLUCOSE BLDC GLUCOMTR-MCNC: 108 MG/DL — HIGH (ref 70–99)
GLUCOSE SERPL-MCNC: 120 MG/DL — HIGH (ref 70–99)
HCT VFR BLD CALC: 41 % — SIGNIFICANT CHANGE UP (ref 34.5–45)
HGB BLD-MCNC: 13.2 G/DL — SIGNIFICANT CHANGE UP (ref 11.5–15.5)
IMM GRANULOCYTES NFR BLD AUTO: 1.2 % — SIGNIFICANT CHANGE UP (ref 0–1.5)
LYMPHOCYTES # BLD AUTO: 0.39 K/UL — LOW (ref 1–3.3)
LYMPHOCYTES # BLD AUTO: 3.6 % — LOW (ref 13–44)
MAGNESIUM SERPL-MCNC: 2.2 MG/DL — SIGNIFICANT CHANGE UP (ref 1.6–2.6)
MCHC RBC-ENTMCNC: 29.1 PG — SIGNIFICANT CHANGE UP (ref 27–34)
MCHC RBC-ENTMCNC: 32.2 GM/DL — SIGNIFICANT CHANGE UP (ref 32–36)
MCV RBC AUTO: 90.3 FL — SIGNIFICANT CHANGE UP (ref 80–100)
METHOD TYPE: SIGNIFICANT CHANGE UP
METHOD TYPE: SIGNIFICANT CHANGE UP
MONOCYTES # BLD AUTO: 0.48 K/UL — SIGNIFICANT CHANGE UP (ref 0–0.9)
MONOCYTES NFR BLD AUTO: 4.4 % — SIGNIFICANT CHANGE UP (ref 2–14)
NEUTROPHILS # BLD AUTO: 9.91 K/UL — HIGH (ref 1.8–7.4)
NEUTROPHILS NFR BLD AUTO: 90.6 % — HIGH (ref 43–77)
NRBC # BLD: 0 /100 WBCS — SIGNIFICANT CHANGE UP (ref 0–0)
ORGANISM # SPEC MICROSCOPIC CNT: SIGNIFICANT CHANGE UP
PHOSPHATE SERPL-MCNC: 3.1 MG/DL — SIGNIFICANT CHANGE UP (ref 2.5–4.5)
PLATELET # BLD AUTO: 228 K/UL — SIGNIFICANT CHANGE UP (ref 150–400)
POTASSIUM SERPL-MCNC: 4.6 MMOL/L — SIGNIFICANT CHANGE UP (ref 3.5–5.3)
POTASSIUM SERPL-SCNC: 4.6 MMOL/L — SIGNIFICANT CHANGE UP (ref 3.5–5.3)
PROT SERPL-MCNC: 5.9 G/DL — LOW (ref 6–8.3)
RBC # BLD: 4.54 M/UL — SIGNIFICANT CHANGE UP (ref 3.8–5.2)
RBC # FLD: 13.2 % — SIGNIFICANT CHANGE UP (ref 10.3–14.5)
SODIUM SERPL-SCNC: 138 MMOL/L — SIGNIFICANT CHANGE UP (ref 135–145)
SPECIMEN SOURCE: SIGNIFICANT CHANGE UP
WBC # BLD: 10.93 K/UL — HIGH (ref 3.8–10.5)
WBC # FLD AUTO: 10.93 K/UL — HIGH (ref 3.8–10.5)

## 2020-11-26 RX ORDER — CALCIUM CARBONATE 500(1250)
1 TABLET ORAL ONCE
Refills: 0 | Status: COMPLETED | OUTPATIENT
Start: 2020-11-26 | End: 2020-11-26

## 2020-11-26 RX ORDER — CEFTRIAXONE 500 MG/1
1000 INJECTION, POWDER, FOR SOLUTION INTRAMUSCULAR; INTRAVENOUS EVERY 24 HOURS
Refills: 0 | Status: COMPLETED | OUTPATIENT
Start: 2020-11-26 | End: 2020-11-30

## 2020-11-26 RX ADMIN — Medication 40 MILLIGRAM(S): at 09:17

## 2020-11-26 RX ADMIN — PANTOPRAZOLE SODIUM 40 MILLIGRAM(S): 20 TABLET, DELAYED RELEASE ORAL at 06:25

## 2020-11-26 RX ADMIN — CEFTRIAXONE 100 MILLIGRAM(S): 500 INJECTION, POWDER, FOR SOLUTION INTRAMUSCULAR; INTRAVENOUS at 11:27

## 2020-11-26 RX ADMIN — Medication 1 TABLET(S): at 23:46

## 2020-11-26 NOTE — PROGRESS NOTE ADULT - SUBJECTIVE AND OBJECTIVE BOX
Interventional, Pulmonary, Critical, Chest Special Procedures.    Pt was seen and fully examined by myself.     Time spent with patient in minutes:37    Patient is a 85y old  Female who presents with a chief complaint of lung biopsy (26 Nov 2020 14:07) The patient c persisting dry cough, pain free when seen and eupneic.    HPI:  84 y/o F PMH COPD not on home O2, not on home inhalers who presents due to shortness of breath. Patient reports she has been short of breath for a while (few months?) now but that in the past 3 days it has gotten much worse. She used to be able to walk 30 blocks without issue and now can walk less than half a block before becoming short of breath. Patient with chronic mild cough, no worsening cough, no wheezing, no chest pain, fevers, chills. Patient planned for lung biopsy on Monday but presented earlier due to these symptoms at the suggestion of her doctor. 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     ED vitals: afebrile, p74, /75, RR 17, 98% on RA  ED labs: Na 147, Cl 112,   EKG: NSR at 62    ED imaging  1. Since 2/9/2017, there are three new lung nodules in the right upper lobe and there is new large mediastinal and right hilar lymphadenopathy. These findings are suspicious for lung cancer with lymph node metastases.    2. No evidence of pulmonary embolism, but the right hilar adenopathy compresses right upper lobe pulmonary arteries, resulting in one branch being occluded and the other branch nearly occluded.    3. Moderate compression of superior vena cava by adenopathy with dilated internal jugular veins bilaterally.      Patient received solumedrol IV 125mg, duonebs x 2   (21 Nov 2020 20:14)      REVIEW OF SYSTEMS:  Constitutional: No fever, weight loss or fatigue  ENMT:  No difficulty hearing, tinnitus, vertigo; No sinus or throat pain  Respiratory: No cough, wheezing, chills or hemoptysis SOB better today  Cardiovascular: No chest pain, palpitations, dizziness or leg swelling  Gastrointestinal: No abdominal or epigastric pain. No nausea, vomiting or hematemesis; No diarrhea or constipation. No melena or hematochezia.  Skin: No itching, burning, rashes or lesions   Musculoskeletal: No joint pain or swelling; No muscle, back or extremity pain  PAST MEDICAL & SURGICAL HISTORY:  Chronic obstructive pulmonary disease, unspecified COPD type    Hearing loss  left ear - sees Dr. Garces ENT    Basal cell carcinoma  x2 - upper lip and arm    Chronic obstructive pulmonary disease    Osteoporosis  Osteoporosis    H/O breast biopsy  &gt; 5 years ago, mass was benign and self-resolved    History of appendectomy    History of cataract surgery      FAMILY HISTORY:    SOCIAL HISTORY:      - Tobacco     - ETOH    Allergies    No Known Allergies    Intolerances      Vital Signs Last 24 Hrs  T(C): 36.3 (26 Nov 2020 08:48), Max: 36.5 (25 Nov 2020 16:36)  T(F): 97.3 (26 Nov 2020 08:48), Max: 97.7 (25 Nov 2020 16:36)  HR: 74 (26 Nov 2020 08:48) (69 - 76)  BP: 143/65 (26 Nov 2020 08:48) (129/59 - 144/72)  BP(mean): --  RR: 18 (26 Nov 2020 08:48) (16 - 18)  SpO2: 97% (26 Nov 2020 08:48) (94% - 97%)        MEDICATIONS:  MEDICATIONS  (STANDING):  cefTRIAXone   IVPB 1000 milliGRAM(s) IV Intermittent every 24 hours  pantoprazole    Tablet 40 milliGRAM(s) Oral before breakfast    MEDICATIONS  (PRN):  albuterol/ipratropium for Nebulization. 3 milliLiter(s) Nebulizer every 4 hours PRN Shortness of Breath and/or Wheezing      PHYSICAL EXAM:  Comfortable, no distress  Eyes: PERRL, EOM intact; conjunctiva and sclera clear  Head: Normocephalic;  No Trauma  ENMT: No nasal discharge,+ hoarseness, cough no  hemoptysis  Neck: Supple; non tender; no masses or deformities.    No JVD  Respiratory:,  - WHEEZING  + central  RHONCHI  - RALES  - CRACKLES.  Diminished breath sounds  BILATERAL  RIGHT  LEFT  Cardiovascular: Regular rate and rhythm. S1 and S2 Normal; No murmurs, gallops or rubs     - PPM/AICD  Gastrointestinal: Soft non-tender, non-distended; Normal bowel sounds; No hepatosplenomegaly.     -PEG    -  GT   - ALVES  Genitourinary: No costovertebral angle tenderness. No dysuria  Extremities: AROM, No clubbing, cyanosis or edema    Vascular: Peripheral pulses palpable 2+ bilaterally  Neurological: Alert and responisve to stimuli   Skin: Warm and dry. No obvious rash  Lymph Nodes: No acute cervical or supraclavicular adenopathy    DEVICES:  - DENTURES   +IV R / L     - ETUBE   -TRACH   -CTUBE  R / L    LABS:                          13.2   10.93 )-----------( 228      ( 26 Nov 2020 05:57 )             41.0     11-26    138  |  104  |  30<H>  ----------------------------<  120<H>  4.6   |  22  |  1.16    Ca    8.5      26 Nov 2020 05:57  Phos  3.1     11-26  Mg     2.2     11-26    TPro  5.9<L>  /  Alb  3.4  /  TBili  0.3  /  DBili  x   /  AST  16  /  ALT  17  /  AlkPhos  50  11-26    PT/INR - ( 25 Nov 2020 07:28 )   PT: 12.1 sec;   INR: 1.01          PTT - ( 25 Nov 2020 07:28 )  PTT:23.3 sec  RADIOLOGY & ADDITIONAL STUDIES (The following images were personally reviewed):

## 2020-11-26 NOTE — PROGRESS NOTE ADULT - SUBJECTIVE AND OBJECTIVE BOX
Pt seen and examined   no new complaints  she was told of + undiff adenoCa by Dr Day    REVIEW OF SYSTEMS:  Constitutional: No fever, weight loss or fatigue  Cardiovascular: No chest pain, palpitations, dizziness or leg swelling  Gastrointestinal: No abdominal or epigastric pain. No nausea, vomiting or hematemesis; No diarrhea or constipation. No melena or hematochezia.  Skin: No itching, burning, rashes or lesions       MEDICATIONS:  MEDICATIONS  (STANDING):  cefTRIAXone   IVPB 1000 milliGRAM(s) IV Intermittent every 24 hours  pantoprazole    Tablet 40 milliGRAM(s) Oral before breakfast    MEDICATIONS  (PRN):  albuterol/ipratropium for Nebulization. 3 milliLiter(s) Nebulizer every 4 hours PRN Shortness of Breath and/or Wheezing      Allergies    No Known Allergies    Intolerances        Vital Signs Last 24 Hrs  T(C): 36.3 (26 Nov 2020 08:48), Max: 36.5 (25 Nov 2020 16:36)  T(F): 97.3 (26 Nov 2020 08:48), Max: 97.7 (25 Nov 2020 16:36)  HR: 74 (26 Nov 2020 08:48) (69 - 76)  BP: 143/65 (26 Nov 2020 08:48) (129/59 - 144/72)  BP(mean): --  RR: 18 (26 Nov 2020 08:48) (16 - 18)  SpO2: 97% (26 Nov 2020 08:48) (94% - 97%)      PHYSICAL EXAM:    General:  in no acute distress  HEENT: MMM, conjunctiva and sclera clear  Lungs: clear  Heart: regular  Gastrointestinal: Soft non-tender non-distended; Normal bowel sounds;   Skin: Warm and dry. No obvious rash    LABS:      CBC Full  -  ( 26 Nov 2020 05:57 )  WBC Count : 10.93 K/uL  RBC Count : 4.54 M/uL  Hemoglobin : 13.2 g/dL  Hematocrit : 41.0 %  Platelet Count - Automated : 228 K/uL  Mean Cell Volume : 90.3 fl  Mean Cell Hemoglobin : 29.1 pg  Mean Cell Hemoglobin Concentration : 32.2 gm/dL  Auto Neutrophil # : 9.91 K/uL  Auto Lymphocyte # : 0.39 K/uL  Auto Monocyte # : 0.48 K/uL  Auto Eosinophil # : 0.00 K/uL  Auto Basophil # : 0.02 K/uL  Auto Neutrophil % : 90.6 %  Auto Lymphocyte % : 3.6 %  Auto Monocyte % : 4.4 %  Auto Eosinophil % : 0.0 %  Auto Basophil % : 0.2 %    11-26    138  |  104  |  30<H>  ----------------------------<  120<H>  4.6   |  22  |  1.16    Ca    8.5      26 Nov 2020 05:57  Phos  3.1     11-26  Mg     2.2     11-26    TPro  5.9<L>  /  Alb  3.4  /  TBili  0.3  /  DBili  x   /  AST  16  /  ALT  17  /  AlkPhos  50  11-26    PT/INR - ( 25 Nov 2020 07:28 )   PT: 12.1 sec;   INR: 1.01          PTT - ( 25 Nov 2020 07:28 )  PTT:23.3 sec                  RADIOLOGY & ADDITIONAL STUDIES (The following images were personally reviewed):

## 2020-11-26 NOTE — DISCHARGE NOTE NURSING/CASE MANAGEMENT/SOCIAL WORK - PATIENT PORTAL LINK FT
You can access the FollowMyHealth Patient Portal offered by [facility] by registering at the following website: [email or web address]. By joining [employer]’s FollowMyHealth portal, you will also be able to view your health information using other applications (apps) compatible with our system.

## 2020-11-27 ENCOUNTER — RESULT REVIEW (OUTPATIENT)
Age: 85
End: 2020-11-27

## 2020-11-27 DIAGNOSIS — J18.9 PNEUMONIA, UNSPECIFIED ORGANISM: ICD-10-CM

## 2020-11-27 LAB
ANION GAP SERPL CALC-SCNC: 9 MMOL/L — SIGNIFICANT CHANGE UP (ref 5–17)
APTT BLD: 21.8 SEC — LOW (ref 27.5–35.5)
BLD GP AB SCN SERPL QL: NEGATIVE — SIGNIFICANT CHANGE UP
BUN SERPL-MCNC: 34 MG/DL — HIGH (ref 7–23)
CALCIUM SERPL-MCNC: 9.3 MG/DL — SIGNIFICANT CHANGE UP (ref 8.4–10.5)
CHLORIDE SERPL-SCNC: 103 MMOL/L — SIGNIFICANT CHANGE UP (ref 96–108)
CO2 SERPL-SCNC: 27 MMOL/L — SIGNIFICANT CHANGE UP (ref 22–31)
CREAT SERPL-MCNC: 1.14 MG/DL — SIGNIFICANT CHANGE UP (ref 0.5–1.3)
GLUCOSE SERPL-MCNC: 82 MG/DL — SIGNIFICANT CHANGE UP (ref 70–99)
HCT VFR BLD CALC: 42.5 % — SIGNIFICANT CHANGE UP (ref 34.5–45)
HGB BLD-MCNC: 13.8 G/DL — SIGNIFICANT CHANGE UP (ref 11.5–15.5)
INR BLD: 1.01 — SIGNIFICANT CHANGE UP (ref 0.88–1.16)
MAGNESIUM SERPL-MCNC: 2.2 MG/DL — SIGNIFICANT CHANGE UP (ref 1.6–2.6)
MCHC RBC-ENTMCNC: 29.3 PG — SIGNIFICANT CHANGE UP (ref 27–34)
MCHC RBC-ENTMCNC: 32.5 GM/DL — SIGNIFICANT CHANGE UP (ref 32–36)
MCV RBC AUTO: 90.2 FL — SIGNIFICANT CHANGE UP (ref 80–100)
NRBC # BLD: 0 /100 WBCS — SIGNIFICANT CHANGE UP (ref 0–0)
PLATELET # BLD AUTO: 225 K/UL — SIGNIFICANT CHANGE UP (ref 150–400)
POTASSIUM SERPL-MCNC: 4.6 MMOL/L — SIGNIFICANT CHANGE UP (ref 3.5–5.3)
POTASSIUM SERPL-SCNC: 4.6 MMOL/L — SIGNIFICANT CHANGE UP (ref 3.5–5.3)
PROTHROM AB SERPL-ACNC: 12.1 SEC — SIGNIFICANT CHANGE UP (ref 10.6–13.6)
RBC # BLD: 4.71 M/UL — SIGNIFICANT CHANGE UP (ref 3.8–5.2)
RBC # FLD: 13.1 % — SIGNIFICANT CHANGE UP (ref 10.3–14.5)
RH IG SCN BLD-IMP: POSITIVE — SIGNIFICANT CHANGE UP
SODIUM SERPL-SCNC: 139 MMOL/L — SIGNIFICANT CHANGE UP (ref 135–145)
WBC # BLD: 11.78 K/UL — HIGH (ref 3.8–10.5)
WBC # FLD AUTO: 11.78 K/UL — HIGH (ref 3.8–10.5)

## 2020-11-27 PROCEDURE — 71045 X-RAY EXAM CHEST 1 VIEW: CPT | Mod: 26,76

## 2020-11-27 PROCEDURE — 32405: CPT | Mod: 79

## 2020-11-27 PROCEDURE — 88342 IMHCHEM/IMCYTCHM 1ST ANTB: CPT | Mod: 26,59

## 2020-11-27 PROCEDURE — 88341 IMHCHEM/IMCYTCHM EA ADD ANTB: CPT | Mod: 26,59

## 2020-11-27 PROCEDURE — 88305 TISSUE EXAM BY PATHOLOGIST: CPT | Mod: 26

## 2020-11-27 PROCEDURE — 77012 CT SCAN FOR NEEDLE BIOPSY: CPT | Mod: 26

## 2020-11-27 PROCEDURE — 88173 CYTOPATH EVAL FNA REPORT: CPT | Mod: 26

## 2020-11-27 PROCEDURE — 88360 TUMOR IMMUNOHISTOCHEM/MANUAL: CPT | Mod: 26

## 2020-11-27 RX ORDER — VINORELBINE 10 MG/ML
40 INJECTION, SOLUTION INTRAVENOUS ONCE
Refills: 0 | Status: COMPLETED | OUTPATIENT
Start: 2020-11-28 | End: 2020-11-28

## 2020-11-27 RX ORDER — GEFITINIB 250 MG/1
250 TABLET, FILM COATED ORAL
Refills: 0 | Status: COMPLETED | OUTPATIENT
Start: 2020-11-27 | End: 2020-11-29

## 2020-11-27 RX ORDER — TAMOXIFEN CITRATE 20 MG/1
110 TABLET, FILM COATED ORAL
Refills: 0 | Status: COMPLETED | OUTPATIENT
Start: 2020-11-27 | End: 2020-11-29

## 2020-11-27 RX ORDER — ENOXAPARIN SODIUM 100 MG/ML
30 INJECTION SUBCUTANEOUS EVERY 24 HOURS
Refills: 0 | Status: DISCONTINUED | OUTPATIENT
Start: 2020-11-27 | End: 2020-12-01

## 2020-11-27 RX ADMIN — GEFITINIB 250 MILLIGRAM(S): 250 TABLET, FILM COATED ORAL at 13:55

## 2020-11-27 RX ADMIN — TAMOXIFEN CITRATE 110 MILLIGRAM(S): 20 TABLET, FILM COATED ORAL at 13:56

## 2020-11-27 RX ADMIN — CEFTRIAXONE 100 MILLIGRAM(S): 500 INJECTION, POWDER, FOR SOLUTION INTRAMUSCULAR; INTRAVENOUS at 14:12

## 2020-11-27 RX ADMIN — TAMOXIFEN CITRATE 110 MILLIGRAM(S): 20 TABLET, FILM COATED ORAL at 23:59

## 2020-11-27 RX ADMIN — PANTOPRAZOLE SODIUM 40 MILLIGRAM(S): 20 TABLET, DELAYED RELEASE ORAL at 06:03

## 2020-11-27 RX ADMIN — GEFITINIB 250 MILLIGRAM(S): 250 TABLET, FILM COATED ORAL at 21:12

## 2020-11-27 RX ADMIN — ENOXAPARIN SODIUM 30 MILLIGRAM(S): 100 INJECTION SUBCUTANEOUS at 13:55

## 2020-11-27 NOTE — PROGRESS NOTE ADULT - PROBLEM SELECTOR PLAN 2
Pt found to have CT chest findings suggestive of lung CA with metastasis to lymph nodes. Pt notably has 80 pack year smoking history.  - s/p biopsy, pending pathology results  - on gefitinib and tamoxifen chemo  - f/u formal read of post-transfusion lung bx. radiology resident wet read w/small RUL pneumo.  repeat cxr later in day with wet read resolution of pneumo.  - Continue to f/u with Dr. Jones and Dr. Day for recs

## 2020-11-27 NOTE — PROGRESS NOTE ADULT - SUBJECTIVE AND OBJECTIVE BOX
the patient feels well and has no ill affects from the low-profile small port placed into the patient's  left anterior chest.  She is actually looking forward to the chemotherapy.    Examination of the head ears eyes nose and throat demonstrates no abnormality.    Examination of the neck reveals no palpable lymphadenopathy, jugular venous distention or thyromegaly.    The lungs are clear to percussion and auscultation with somewhat distant breath sounds.  There is no forced expiratory wheeze.    The heart sounds are regular with no evidence of murmur, rub, gallop or ectopy.    There are no palpable axillary or breast pathologies found on breast exam bilaterally.    There is no truncal obesity and there is no obvious hepatosplenomegaly.    The patient has no chronic venous stasis changes in the lower extremities.  Neurologically   there is no gross abnormality in higher cortical, cerebellar, motor or sensory functions.  The patient has no rash.

## 2020-11-27 NOTE — PROGRESS NOTE ADULT - SUBJECTIVE AND OBJECTIVE BOX
Interventional, Pulmonary, Critical, Chest Special Procedures.    Pt was seen and fully examined by myself.     Time spent with patient in minutes:67    Patient is a 85y old  Female who presents with a chief complaint of lung biopsy (27 Nov 2020 09:44) The patient perioperative RUL IR CT guided BX.  Dry cough persisting, pain free     HPI:  86 y/o F PMH COPD not on home O2, not on home inhalers who presents due to shortness of breath. Patient reports she has been short of breath for a while (few months?) now but that in the past 3 days it has gotten much worse. She used to be able to walk 30 blocks without issue and now can walk less than half a block before becoming short of breath. Patient with chronic mild cough, no worsening cough, no wheezing, no chest pain, fevers, chills. Patient planned for lung biopsy on Monday but presented earlier due to these symptoms at the suggestion of her doctor. 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     ED vitals: afebrile, p74, /75, RR 17, 98% on RA  ED labs: Na 147, Cl 112,   EKG: NSR at 62    ED imaging  1. Since 2/9/2017, there are three new lung nodules in the right upper lobe and there is new large mediastinal and right hilar lymphadenopathy. These findings are suspicious for lung cancer with lymph node metastases.    2. No evidence of pulmonary embolism, but the right hilar adenopathy compresses right upper lobe pulmonary arteries, resulting in one branch being occluded and the other branch nearly occluded.    3. Moderate compression of superior vena cava by adenopathy with dilated internal jugular veins bilaterally.      Patient received solumedrol IV 125mg, duonebs x 2   (21 Nov 2020 20:14)      REVIEW OF SYSTEMS:  Constitutional: No fever, weight loss or fatigue  ENMT:  No difficulty hearing, tinnitus, vertigo; No sinus or throat pain  Respiratory: No cough, wheezing, chills or hemoptysis SOB better today  Cardiovascular: No chest pain, palpitations, dizziness or leg swelling  Gastrointestinal: No abdominal or epigastric pain. No nausea, vomiting or hematemesis; No diarrhea or constipation. No melena or hematochezia.  Skin: No itching, burning, rashes or lesions   Musculoskeletal: No joint pain or swelling; No muscle, back or extremity pain  PAST MEDICAL & SURGICAL HISTORY:  Chronic obstructive pulmonary disease, unspecified COPD type    PAST MEDICAL & SURGICAL HISTORY:  Chronic obstructive pulmonary disease, unspecified COPD type    Hearing loss  left ear - sees Dr. Garces ENT    Basal cell carcinoma  x2 - upper lip and arm    Chronic obstructive pulmonary disease    Osteoporosis  Osteoporosis    H/O breast biopsy  &gt; 5 years ago, mass was benign and self-resolved    History of appendectomy    History of cataract surgery      FAMILY HISTORY:    SOCIAL HISTORY:      - Tobacco     - ETOH    Allergies    No Known Allergies    Intolerances      Vital Signs Last 24 Hrs  T(C): 36.3 (27 Nov 2020 05:38), Max: 36.6 (26 Nov 2020 17:06)  T(F): 97.4 (27 Nov 2020 05:38), Max: 97.8 (26 Nov 2020 17:06)  HR: 62 (27 Nov 2020 05:38) (62 - 78)  BP: 136/65 (27 Nov 2020 05:38) (114/58 - 150/69)  BP(mean): --  RR: 18 (27 Nov 2020 05:38) (16 - 18)  SpO2: 94% (27 Nov 2020 05:38) (94% - 95%)        MEDICATIONS:  MEDICATIONS  (STANDING):  cefTRIAXone   IVPB 1000 milliGRAM(s) IV Intermittent every 24 hours  gefitinib 250 milliGRAM(s) Oral <User Schedule>  pantoprazole    Tablet 40 milliGRAM(s) Oral before breakfast  tamoxifen 110 milliGRAM(s) Oral two times a day    MEDICATIONS  (PRN):  albuterol/ipratropium for Nebulization. 3 milliLiter(s) Nebulizer every 4 hours PRN Shortness of Breath and/or Wheezing      PHYSICAL EXAM:  UN Comfortable, no distress  Eyes: PERRL, EOM intact; conjunctiva and sclera clear  Head: Normocephalic;  No Trauma  ENMT: No nasal discharge,+ hoarseness, cough no  hemoptysis  Neck: Supple; non tender; no masses or deformities.    No JVD  Respiratory:,  - WHEEZING  + central  RHONCHI  - RALES  - CRACKLES.  Diminished breath sounds  BILATERAL  RIGHT  LEFT  Cardiovascular: Regular rate and rhythm. S1 and S2 Normal; No murmurs, gallops or rubs     - PPM/AICD  Gastrointestinal: Soft non-tender, non-distended; Normal bowel sounds; No hepatosplenomegaly.     -PEG    -  GT   - ALVES  Genitourinary: No costovertebral angle tenderness. No dysuria  Extremities: AROM, No clubbing, cyanosis or edema    Vascular: Peripheral pulses palpable 2+ bilaterally  Neurological: Alert and responisve to stimuli   Skin: Warm and dry. No obvious rash  Lymph Nodes: No acute cervical or supraclavicular adenopathy  DEVICES:  - DENTURES   +IV R / L     - ETUBE   -TRACH   -CTUBE  R / L    LABS:                          13.8   11.78 )-----------( 225      ( 27 Nov 2020 06:02 )             42.5     11-27    139  |  103  |  34<H>  ----------------------------<  82  4.6   |  27  |  1.14    Ca    9.3      27 Nov 2020 06:02  Phos  3.1     11-26  Mg     2.2     11-27    TPro  5.9<L>  /  Alb  3.4  /  TBili  0.3  /  DBili  x   /  AST  16  /  ALT  17  /  AlkPhos  50  11-26    PT/INR - ( 27 Nov 2020 06:02 )   PT: 12.1 sec;   INR: 1.01          PTT - ( 27 Nov 2020 06:02 )  PTT:21.8 sec  RADIOLOGY & ADDITIONAL STUDIES (The following images were personally reviewed):< from: CT Biopsy Lung/Mediastinum (11.27.20 @ 09:23) >  EXAM:  CT BX LUNG OR MEDIASTINUM#                          PROCEDURE DATE:  11/27/2020          INTERPRETATION:  Procedures performed:  1.  CT guidance.  2.  Right upper lobe lung mass fine-needle aspiration.  3.  Right upper lobe mass core needle biopsy.    Clinical indication: Lung cancer. Needs tissue for immunostains.    Attending: Jarred Rodriguez M.D.    Medications/sedation: Moderate conscious sedation time 30 minutes.    Contrast: None.    Description of procedure/findings:    Informedconsent was obtained.    Patient was placed supine on the CT table. CT images through the upper lobes were obtained. In the right upper lobe there is a 2.2 cm lung mass. Overlying skin prepped in usual sterile fashion. From a parasternal approach, taking care to avoid the internal mammary artery, a 19-gauge coaxial was advanced into the mass from a medial to lateral approach. Initially, two 22-gauge fine-needle aspiration samples followed by three 20-gauge core needle biopsies were obtained. Postbiopsy images demonstrate no immediate complications. Sterile dressing was applied.    Impression:    Right upper lobe lung mass fine-needle aspiration and core needle biopsy.    < end of copied text >

## 2020-11-27 NOTE — PROGRESS NOTE ADULT - SUBJECTIVE AND OBJECTIVE BOX
Pt seen and examined   no new complaints    REVIEW OF SYSTEMS:  Constitutional: No fever  Cardiovascular: No chest pain, palpitations, dizziness or leg swelling  Gastrointestinal: No abdominal or epigastric pain. No nausea; No diarrhea  Skin: No itching, burning, rashes or lesions       MEDICATIONS:  MEDICATIONS  (STANDING):  cefTRIAXone   IVPB 1000 milliGRAM(s) IV Intermittent every 24 hours  gefitinib 250 milliGRAM(s) Oral <User Schedule>  pantoprazole    Tablet 40 milliGRAM(s) Oral before breakfast  tamoxifen 110 milliGRAM(s) Oral two times a day    MEDICATIONS  (PRN):  albuterol/ipratropium for Nebulization. 3 milliLiter(s) Nebulizer every 4 hours PRN Shortness of Breath and/or Wheezing      Allergies    No Known Allergies    Intolerances        Vital Signs Last 24 Hrs  T(C): 36.3 (27 Nov 2020 05:38), Max: 36.6 (26 Nov 2020 17:06)  T(F): 97.4 (27 Nov 2020 05:38), Max: 97.8 (26 Nov 2020 17:06)  HR: 62 (27 Nov 2020 05:38) (62 - 78)  BP: 136/65 (27 Nov 2020 05:38) (114/58 - 150/69)  BP(mean): --  RR: 18 (27 Nov 2020 05:38) (16 - 18)  SpO2: 94% (27 Nov 2020 05:38) (94% - 95%)      PHYSICAL EXAM:    General: thin; in no acute distress  HEENT: MMM, conjunctiva and sclera clear  Lungs clear  Heart:regular  Gastrointestinal: Soft non-tender non-distended; Normal bowel sounds;   Skin: Warm and dry. No obvious rash    LABS:      CBC Full  -  ( 27 Nov 2020 06:02 )  WBC Count : 11.78 K/uL  RBC Count : 4.71 M/uL  Hemoglobin : 13.8 g/dL  Hematocrit : 42.5 %  Platelet Count - Automated : 225 K/uL  Mean Cell Volume : 90.2 fl  Mean Cell Hemoglobin : 29.3 pg  Mean Cell Hemoglobin Concentration : 32.5 gm/dL  Auto Neutrophil # : x  Auto Lymphocyte # : x  Auto Monocyte # : x  Auto Eosinophil # : x  Auto Basophil # : x  Auto Neutrophil % : x  Auto Lymphocyte % : x  Auto Monocyte % : x  Auto Eosinophil % : x  Auto Basophil % : x    11-27    139  |  103  |  34<H>  ----------------------------<  82  4.6   |  27  |  1.14    Ca    9.3      27 Nov 2020 06:02  Phos  3.1     11-26  Mg     2.2     11-27    TPro  5.9<L>  /  Alb  3.4  /  TBili  0.3  /  DBili  x   /  AST  16  /  ALT  17  /  AlkPhos  50  11-26    PT/INR - ( 27 Nov 2020 06:02 )   PT: 12.1 sec;   INR: 1.01          PTT - ( 27 Nov 2020 06:02 )  PTT:21.8 sec                  RADIOLOGY & ADDITIONAL STUDIES (The following images were personally reviewed):

## 2020-11-27 NOTE — PROGRESS NOTE ADULT - ATTENDING COMMENTS
Transfer to [address] for chemotherapy.    Have the patient spend all meals in a chair and out of bed walking in the hallways

## 2020-11-27 NOTE — PROGRESS NOTE ADULT - SUBJECTIVE AND OBJECTIVE BOX
TRANSFER ACCEPT FROM [facility] TO [address] [place] [address] Course: 84 y/o F PMH COPD with recent imaging findings c/f lung ca pending biopsy who presented due to shortness of breath admitted for COPD exacerbation vs symptomatic lung ca and biopsy. Patient had presented initially with some rhonchi, no wheezing but patient with symptomatic improvement after duonebs. Currently feels subjectively well with no wheezes or rhonchi on exam. She is s/p solumedrol 125 in ED as well as a solumedrol taper that was completed on 11/26. Pt found to have CT chest findings suggestive of lung CA with metastasis to lymph nodes. Pt notably has 80 pack year smoking history. She is s/p biopsy, pending pathology results. Had bronchoscopy performed on 11/26, rare strep pneumo found. She is pending IR transthoracic biopsy of RUL for immune studies, to be performed on 11/27. Patient is currently being transfered to [facility] for initiation of chemotherapy.       SUBJECTIVE / INTERVAL HPI: Patient seen and examined at bedside.  C/o back pain (hx of hairline fx).  Upon ROS, endorses dark black melanotic stool wed.  had a normal brown stool yesterday.  denies any blood in stool.  endorses urinating more frequently but no pain, blood, or drinking more than usual.      VITAL SIGNS:  Vital Signs Last 24 Hrs  T(C): 36.3 (27 Nov 2020 12:02), Max: 36.6 (26 Nov 2020 21:28)  T(F): 97.4 (27 Nov 2020 12:02), Max: 97.8 (26 Nov 2020 21:28)  HR: 66 (27 Nov 2020 12:02) (62 - 71)  BP: 139/64 (27 Nov 2020 12:02) (136/65 - 150/69)  BP(mean): --  RR: 18 (27 Nov 2020 12:02) (18 - 18)  SpO2: 99% (27 Nov 2020 12:02) (94% - 99%)    PHYSICAL EXAM:    General: WDWN  HEENT: NCAT; PERRL, anicteric sclera; MMM  Neck: supple, trachea midline  Cardiovascular: S1, S2 normal; RRR, no M/G/R  Respiratory: CTABL; no W/R/R, no egophany  Gastrointestinal: soft, nontender, nondistended. bowel sounds present.  Skin: no ulcerations or visible rashes appreciated  Back: tenderness inferior and medial to R scapula, no spinal tenderness down spinal column  Extremities: WWP; no edema, clubbing or cyanosis  Vascular: 2+ radial, DP/PT pulses B/L  Neurological: AAOx3; CN II-XII grossly intact; no focal deficits    MEDICATIONS:  MEDICATIONS  (STANDING):  cefTRIAXone   IVPB 1000 milliGRAM(s) IV Intermittent every 24 hours  enoxaparin Injectable 30 milliGRAM(s) SubCutaneous every 24 hours  gefitinib 250 milliGRAM(s) Oral <User Schedule>  pantoprazole    Tablet 40 milliGRAM(s) Oral before breakfast  tamoxifen 110 milliGRAM(s) Oral two times a day    MEDICATIONS  (PRN):  albuterol/ipratropium for Nebulization. 3 milliLiter(s) Nebulizer every 4 hours PRN Shortness of Breath and/or Wheezing      ALLERGIES:  Allergies    No Known Allergies    Intolerances        LABS:                        13.8   11.78 )-----------( 225      ( 27 Nov 2020 06:02 )             42.5     11-27    139  |  103  |  34<H>  ----------------------------<  82  4.6   |  27  |  1.14    Ca    9.3      27 Nov 2020 06:02  Phos  3.1     11-26  Mg     2.2     11-27    TPro  5.9<L>  /  Alb  3.4  /  TBili  0.3  /  DBili  x   /  AST  16  /  ALT  17  /  AlkPhos  50  11-26    PT/INR - ( 27 Nov 2020 06:02 )   PT: 12.1 sec;   INR: 1.01          PTT - ( 27 Nov 2020 06:02 )  PTT:21.8 sec    CAPILLARY BLOOD GLUCOSE      POCT Blood Glucose.: 108 mg/dL (26 Nov 2020 23:33)      RADIOLOGY & ADDITIONAL TESTS: Reviewed.

## 2020-11-27 NOTE — PROGRESS NOTE ADULT - SUBJECTIVE AND OBJECTIVE BOX
INTERVAL HPI/OVERNIGHT EVENTS: Overnight there were no acute events. This morning the patient denies any acute complaints    VITAL SIGNS:  T(C): 36.3 (11-27-20 @ 05:38), Max: 36.6 (11-26-20 @ 17:06)  T(F): 97.4 (11-27-20 @ 05:38), Max: 97.8 (11-26-20 @ 17:06)  HR: 62 (11-27-20 @ 05:38) (62 - 78)  BP: 136/65 (11-27-20 @ 05:38) (114/58 - 150/69)  BP(mean): --  RR: 18 (11-27-20 @ 05:38) (16 - 18)  SpO2: 94% (11-27-20 @ 05:38) (94% - 97%)  Wt(kg): --    PHYSICAL EXAM:    Constitutional: WDWN resting comfortably in bed; NAD  Head: NC/AT  Eyes: PERRL, EOMI, anicteric sclera  ENT: no nasal discharge;   Neck: supple;   Respiratory: CTA B/L; no W/R/R, no retractions  Cardiac: +S1/S2; RRR; no M/R/G;  Gastrointestinal: soft, NT/ND; no rebound or guarding;  Dermatologic: skin warm, dry and intact; no rashes, wounds, or scars  Neurologic: AAOx3;   Psychiatric: affect and characteristics of appearance, verbalizations, behaviors are appropriate    MEDICATIONS  (STANDING):  cefTRIAXone   IVPB 1000 milliGRAM(s) IV Intermittent every 24 hours  pantoprazole    Tablet 40 milliGRAM(s) Oral before breakfast    MEDICATIONS  (PRN):  albuterol/ipratropium for Nebulization. 3 milliLiter(s) Nebulizer every 4 hours PRN Shortness of Breath and/or Wheezing      Allergies    No Known Allergies    Intolerances        LABS:                        13.8   11.78 )-----------( 225      ( 27 Nov 2020 06:02 )             42.5     11-27    139  |  103  |  34<H>  ----------------------------<  82  4.6   |  27  |  1.14    Ca    9.3      27 Nov 2020 06:02  Phos  3.1     11-26  Mg     2.2     11-27    TPro  5.9<L>  /  Alb  3.4  /  TBili  0.3  /  DBili  x   /  AST  16  /  ALT  17  /  AlkPhos  50  11-26    PT/INR - ( 27 Nov 2020 06:02 )   PT: 12.1 sec;   INR: 1.01          PTT - ( 27 Nov 2020 06:02 )  PTT:21.8 sec      RADIOLOGY & ADDITIONAL TESTS: Reviewed     Hospital Course: 84 y/o F PMH COPD with recent imaging findings c/f lung ca pending biopsy who presented due to shortness of breath admitted for COPD exacerbation vs symptomatic lung ca and biopsy.       INTERVAL HPI/OVERNIGHT EVENTS: Overnight there were no acute events. This morning the patient denies any acute complaints.    VITAL SIGNS:  T(C): 36.3 (11-27-20 @ 05:38), Max: 36.6 (11-26-20 @ 17:06)  T(F): 97.4 (11-27-20 @ 05:38), Max: 97.8 (11-26-20 @ 17:06)  HR: 62 (11-27-20 @ 05:38) (62 - 78)  BP: 136/65 (11-27-20 @ 05:38) (114/58 - 150/69)  BP(mean): --  RR: 18 (11-27-20 @ 05:38) (16 - 18)  SpO2: 94% (11-27-20 @ 05:38) (94% - 97%)  Wt(kg): --    PHYSICAL EXAM:    Constitutional: WDWN resting comfortably in bed; NAD  Head: NC/AT  Eyes: PERRL, EOMI, anicteric sclera  ENT: no nasal discharge;   Neck: supple;   Respiratory: CTA B/L; no W/R/R, no retractions  Cardiac: +S1/S2; RRR; no M/R/G;  Gastrointestinal: soft, NT/ND; no rebound or guarding;  Dermatologic: skin warm, dry and intact; no rashes, wounds, or scars  Neurologic: AAOx3;   Psychiatric: affect and characteristics of appearance, verbalizations, behaviors are appropriate    MEDICATIONS  (STANDING):  cefTRIAXone   IVPB 1000 milliGRAM(s) IV Intermittent every 24 hours  pantoprazole    Tablet 40 milliGRAM(s) Oral before breakfast    MEDICATIONS  (PRN):  albuterol/ipratropium for Nebulization. 3 milliLiter(s) Nebulizer every 4 hours PRN Shortness of Breath and/or Wheezing      Allergies    No Known Allergies    Intolerances        LABS:                        13.8   11.78 )-----------( 225      ( 27 Nov 2020 06:02 )             42.5     11-27    139  |  103  |  34<H>  ----------------------------<  82  4.6   |  27  |  1.14    Ca    9.3      27 Nov 2020 06:02  Phos  3.1     11-26  Mg     2.2     11-27    TPro  5.9<L>  /  Alb  3.4  /  TBili  0.3  /  DBili  x   /  AST  16  /  ALT  17  /  AlkPhos  50  11-26    PT/INR - ( 27 Nov 2020 06:02 )   PT: 12.1 sec;   INR: 1.01          PTT - ( 27 Nov 2020 06:02 )  PTT:21.8 sec      RADIOLOGY & ADDITIONAL TESTS: Reviewed     Hospital Course: 84 y/o F PMH COPD with recent imaging findings c/f lung ca pending biopsy who presented due to shortness of breath admitted for COPD exacerbation vs symptomatic lung ca and biopsy. Patient had presented initially with some rhonchi, no wheezing but patient with symptomatic improvement after duonebs. Currently feels subjectively well with no wheezes or rhonchi on exam. She is s/p solumedrol 125 in ED as well as a solumedrol taper that was completed on 11/26. Pt found to have CT chest findings suggestive of lung CA with metastasis to lymph nodes. Pt notably has 80 pack year smoking history. She is s/p biopsy, pending pathology results. Had bronchoscopy performed on 11/26, rare strep pneumo found. She is pending IR transthoracic biopsy of RUL for immune studies, to be performed on 11/27. Patient is currently being transfered to [facility] for initiation of chemotherapy.     INTERVAL HPI/OVERNIGHT EVENTS: Overnight there were no acute events. This morning the patient denies any acute complaints.    VITAL SIGNS:  T(C): 36.3 (11-27-20 @ 05:38), Max: 36.6 (11-26-20 @ 17:06)  T(F): 97.4 (11-27-20 @ 05:38), Max: 97.8 (11-26-20 @ 17:06)  HR: 62 (11-27-20 @ 05:38) (62 - 78)  BP: 136/65 (11-27-20 @ 05:38) (114/58 - 150/69)  BP(mean): --  RR: 18 (11-27-20 @ 05:38) (16 - 18)  SpO2: 94% (11-27-20 @ 05:38) (94% - 97%)  Wt(kg): --    PHYSICAL EXAM:    Constitutional: WDWN resting comfortably in bed; NAD  Head: NC/AT  Eyes: PERRL, EOMI, anicteric sclera  ENT: no nasal discharge;   Neck: supple;   Respiratory: CTA B/L; no W/R/R, no retractions  Cardiac: +S1/S2; RRR; no M/R/G;  Gastrointestinal: soft, NT/ND; no rebound or guarding;  Dermatologic: skin warm, dry and intact; no rashes, wounds, or scars  Neurologic: AAOx3;   Psychiatric: affect and characteristics of appearance, verbalizations, behaviors are appropriate    MEDICATIONS  (STANDING):  cefTRIAXone   IVPB 1000 milliGRAM(s) IV Intermittent every 24 hours  pantoprazole    Tablet 40 milliGRAM(s) Oral before breakfast    MEDICATIONS  (PRN):  albuterol/ipratropium for Nebulization. 3 milliLiter(s) Nebulizer every 4 hours PRN Shortness of Breath and/or Wheezing      Allergies    No Known Allergies    Intolerances        LABS:                        13.8   11.78 )-----------( 225      ( 27 Nov 2020 06:02 )             42.5     11-27    139  |  103  |  34<H>  ----------------------------<  82  4.6   |  27  |  1.14    Ca    9.3      27 Nov 2020 06:02  Phos  3.1     11-26  Mg     2.2     11-27    TPro  5.9<L>  /  Alb  3.4  /  TBili  0.3  /  DBili  x   /  AST  16  /  ALT  17  /  AlkPhos  50  11-26    PT/INR - ( 27 Nov 2020 06:02 )   PT: 12.1 sec;   INR: 1.01          PTT - ( 27 Nov 2020 06:02 )  PTT:21.8 sec      RADIOLOGY & ADDITIONAL TESTS: Reviewed

## 2020-11-27 NOTE — PROGRESS NOTE ADULT - PROBLEM SELECTOR PLAN 1
Pt presented initially with some rhonchi, no wheezing but patient with symptomatic improvement after duonebs. Currently feels subjectively well with no wheezes or rhonchi on exam.  - s/p solumedrol 125 in ED  - Taper from Solumedrol 40mg IV q8 to 40mg IV q12 beginning 11/25  -f/u pulm to discuss solumedrol taper  - c/w Duonebs q4 prn Pt presented initially with some rhonchi, no wheezing but patient with symptomatic improvement after duonebs. Currently feels subjectively well with no wheezes or rhonchi on exam.  - s/p solumedrol 125 in ED  - Tapered from Solumedrol 40mg IV q8 to 40mg IV q12 beginning 11/25, completed on 11/26  - c/w Duonebs q4 prn

## 2020-11-27 NOTE — CHART NOTE - NSCHARTNOTEFT_GEN_A_CORE
Admitting Diagnosis:   Patient is a 85y old  Female who presents with a chief complaint of lung biopsy (27 Nov 2020 09:44)      PAST MEDICAL & SURGICAL HISTORY:  Chronic obstructive pulmonary disease, unspecified COPD type    Hearing loss  left ear - sees Dr. Garces ENT    Basal cell carcinoma  x2 - upper lip and arm    Chronic obstructive pulmonary disease    Osteoporosis  Osteoporosis    H/O breast biopsy  &gt; 5 years ago, mass was benign and self-resolved    History of appendectomy    History of cataract surgery        Current Nutrition Order:   Diet, NPO after Midnight:      NPO Start Date: 26-Nov-2020,   NPO Start Time: 23:59  Except Medications (11-26-20 @ 12:25)    PO Intake: Good (%) [   ]  Fair (50-75%) [   ] Poor (<25%) [   ]  Please see Below    GI Issues:   WDL per flow sheets    Pain:  none per flow sheets    Skin Integrity:  No edema  No pressure ulcers     Labs:   11-27    139  |  103  |  34<H>  ----------------------------<  82  4.6   |  27  |  1.14    Ca    9.3      27 Nov 2020 06:02  Phos  3.1     11-26  Mg     2.2     11-27    TPro  5.9<L>  /  Alb  3.4  /  TBili  0.3  /  DBili  x   /  AST  16  /  ALT  17  /  AlkPhos  50  11-26    CAPILLARY BLOOD GLUCOSE      POCT Blood Glucose.: 108 mg/dL (26 Nov 2020 23:33)      Medications:  MEDICATIONS  (STANDING):  cefTRIAXone   IVPB 1000 milliGRAM(s) IV Intermittent every 24 hours  gefitinib 250 milliGRAM(s) Oral <User Schedule>  pantoprazole    Tablet 40 milliGRAM(s) Oral before breakfast  tamoxifen 110 milliGRAM(s) Oral two times a day    MEDICATIONS  (PRN):  albuterol/ipratropium for Nebulization. 3 milliLiter(s) Nebulizer every 4 hours PRN Shortness of Breath and/or Wheezing    5'0''  pounds  Weight 91 pounds   BMI 18; %IBW=91    No new EMR wts at this time   Pt does endorse losing "a few pounds" over the past few months but unsure of UBW- per visit in ED 1 month ago, she has gained weight.     NFPE significant for muscle and fat losses, though suspect much of it is age-related; please see RD 11/22 for full details + malnutrition note 11/22     Estimated energy needs:   ActualBW used for calculations as pt between % of IBW (92% IBW)  Needs estimated for age and adjusted for mild malnutrition, and hypermetabolic state  1025-1230kcal/day 25-30kcal/kg  49-57gm/day 1.2-1.4gm/kg  1025-1230ml/day 25-30ml/day  ** Aim for Upper End of needs     Subjective:   84 yo/female with PMHx COPD, admitted w/SOB. Had recent PET scan c/w prior CT showing large mediastinal mass. 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 Per RN plan to transfer to [facility] Pending. Pt D/C pending hospital course.   RD attempted to see pt for F/U today, pt at IR for CT/BX - RN reports pt NPO for this; noted orders for Regular diet 11/25, NPO@12 11/26. RN unsure of %PO intake prior to NPO as this is her first day with the pt. Per RD initial assessment PTA pt endorsed having a very good appetite and eating all foods; often cooks and likes to have oatmeal, eggs, salad, chicken, fish, sandwiches. NKFA. She does not drink ONS and does not feel the need for it at this time. Had been with good PO intake at that time and denied difficulty chewing or swallowing.   Please see below for nutritions recommendations.     Prior PES: Increased Nutrient Needs Etiology RT increased demand for protein-calorie intake Signs/Symptoms AEB hypermetabolic state.   ON GOING AT THIS TIME   Goal/Expected Outcome Pt will consistently meet % of EER.    Recommendations:  1. Continue with Regular diet.  2. Encourage PO intake during meals.  3. Monitor %PO intake and diet tolerance.  > If agreeable recommend use of oral nutrition supplements, noted pt declined prior.  4. Labs: monitor BMP, CBC, glucose, lytes, trend renal indices, LFTs.  5. Monitor Skin, Wts, GI, GOC.  6. RD to remain available for additional nutrition interventions as needed.     Education: NA pt off unit     Risk Level: High [  x ] Moderate [   ] Low [   ]

## 2020-11-27 NOTE — PROGRESS NOTE ADULT - PROBLEM SELECTOR PLAN 1
Pt presented initially with some rhonchi, no wheezing but patient with symptomatic improvement after duonebs. Currently feels subjectively well with no wheezes or rhonchi on exam.  - s/p solumedrol 125 in ED  - Tapered from Solumedrol 40mg IV q8 to 40mg IV q12 beginning 11/25, completed on 11/26  - c/w Duonebs q4 prn

## 2020-11-27 NOTE — PROGRESS NOTE ADULT - PROBLEM SELECTOR PLAN 2
Pt found to have CT chest findings suggestive of lung CA with metastasis to lymph nodes. Pt notably has 80 pack year smoking history.  - s/p biopsy, pending pathology results  - Per Dr. Day, Unfractionated Heparin for DVT ppx tonight 11/24  - NPO for IR chemoport placement tomorrow 11/25  - CEA 1.5 today  - Continue to f/u with Dr. Jones and Dr. Day for recs Pt found to have CT chest findings suggestive of lung CA with metastasis to lymph nodes. Pt notably has 80 pack year smoking history.  - s/p biopsy, pending pathology results  - Per Dr. Day, Unfractionated Heparin for DVT ppx tonight 11/24  - NPO for IR chemoport placement tomorrow 11/25  - s/p bronchoscopy on 11/26, rare strep pneumo found  - pending IR transthoracic biopsy of RUL for immune studies, to be performed on 11/27  - transfer to [facility] for initiation of chemotherapy   - Continue to f/u with Dr. Jones and Dr. Day for recs Pt found to have CT chest findings suggestive of lung CA with metastasis to lymph nodes. Pt notably has 80 pack year smoking history.  - s/p biopsy, pending pathology results  - Per Dr. Day, Unfractionated Heparin for DVT ppx tonight 11/24  - s/p bronchoscopy on 11/26, rare strep pneumo found  - pending IR transthoracic biopsy of RUL for immune studies, to be performed on 11/27  - transfer to [facility] for initiation of chemotherapy   - Continue to f/u with Dr. Jones and Dr. Day for recs

## 2020-11-28 LAB
ALBUMIN SERPL ELPH-MCNC: 3.2 G/DL — LOW (ref 3.3–5)
ALP SERPL-CCNC: 51 U/L — SIGNIFICANT CHANGE UP (ref 40–120)
ALT FLD-CCNC: 18 U/L — SIGNIFICANT CHANGE UP (ref 10–45)
ANION GAP SERPL CALC-SCNC: 13 MMOL/L — SIGNIFICANT CHANGE UP (ref 5–17)
AST SERPL-CCNC: 21 U/L — SIGNIFICANT CHANGE UP (ref 10–40)
BILIRUB SERPL-MCNC: 0.4 MG/DL — SIGNIFICANT CHANGE UP (ref 0.2–1.2)
BUN SERPL-MCNC: 29 MG/DL — HIGH (ref 7–23)
CALCIUM SERPL-MCNC: 9 MG/DL — SIGNIFICANT CHANGE UP (ref 8.4–10.5)
CHLORIDE SERPL-SCNC: 100 MMOL/L — SIGNIFICANT CHANGE UP (ref 96–108)
CO2 SERPL-SCNC: 25 MMOL/L — SIGNIFICANT CHANGE UP (ref 22–31)
CREAT SERPL-MCNC: 1.04 MG/DL — SIGNIFICANT CHANGE UP (ref 0.5–1.3)
GLUCOSE SERPL-MCNC: 87 MG/DL — SIGNIFICANT CHANGE UP (ref 70–99)
HCT VFR BLD CALC: 43.9 % — SIGNIFICANT CHANGE UP (ref 34.5–45)
HGB BLD-MCNC: 14.2 G/DL — SIGNIFICANT CHANGE UP (ref 11.5–15.5)
MAGNESIUM SERPL-MCNC: 2.2 MG/DL — SIGNIFICANT CHANGE UP (ref 1.6–2.6)
MCHC RBC-ENTMCNC: 29 PG — SIGNIFICANT CHANGE UP (ref 27–34)
MCHC RBC-ENTMCNC: 32.3 GM/DL — SIGNIFICANT CHANGE UP (ref 32–36)
MCV RBC AUTO: 89.6 FL — SIGNIFICANT CHANGE UP (ref 80–100)
NRBC # BLD: 0 /100 WBCS — SIGNIFICANT CHANGE UP (ref 0–0)
PLATELET # BLD AUTO: 206 K/UL — SIGNIFICANT CHANGE UP (ref 150–400)
POTASSIUM SERPL-MCNC: 4.4 MMOL/L — SIGNIFICANT CHANGE UP (ref 3.5–5.3)
POTASSIUM SERPL-SCNC: 4.4 MMOL/L — SIGNIFICANT CHANGE UP (ref 3.5–5.3)
PROT SERPL-MCNC: 5.8 G/DL — LOW (ref 6–8.3)
RBC # BLD: 4.9 M/UL — SIGNIFICANT CHANGE UP (ref 3.8–5.2)
RBC # FLD: 13 % — SIGNIFICANT CHANGE UP (ref 10.3–14.5)
SODIUM SERPL-SCNC: 138 MMOL/L — SIGNIFICANT CHANGE UP (ref 135–145)
WBC # BLD: 10.04 K/UL — SIGNIFICANT CHANGE UP (ref 3.8–10.5)
WBC # FLD AUTO: 10.04 K/UL — SIGNIFICANT CHANGE UP (ref 3.8–10.5)

## 2020-11-28 PROCEDURE — 71046 X-RAY EXAM CHEST 2 VIEWS: CPT | Mod: 26

## 2020-11-28 RX ORDER — DEXAMETHASONE 0.5 MG/5ML
10 ELIXIR ORAL ONCE
Refills: 0 | Status: COMPLETED | OUTPATIENT
Start: 2020-11-28 | End: 2020-11-28

## 2020-11-28 RX ORDER — ONDANSETRON 8 MG/1
10 TABLET, FILM COATED ORAL ONCE
Refills: 0 | Status: COMPLETED | OUTPATIENT
Start: 2020-11-28 | End: 2020-11-28

## 2020-11-28 RX ADMIN — CEFTRIAXONE 100 MILLIGRAM(S): 500 INJECTION, POWDER, FOR SOLUTION INTRAMUSCULAR; INTRAVENOUS at 10:06

## 2020-11-28 RX ADMIN — ENOXAPARIN SODIUM 30 MILLIGRAM(S): 100 INJECTION SUBCUTANEOUS at 14:33

## 2020-11-28 RX ADMIN — Medication 153 MILLIGRAM(S): at 14:33

## 2020-11-28 RX ADMIN — VINORELBINE 208 MILLIGRAM(S): 10 INJECTION, SOLUTION INTRAVENOUS at 15:22

## 2020-11-28 RX ADMIN — ONDANSETRON 165 MILLIGRAM(S): 8 TABLET, FILM COATED ORAL at 14:55

## 2020-11-28 RX ADMIN — GEFITINIB 250 MILLIGRAM(S): 250 TABLET, FILM COATED ORAL at 22:02

## 2020-11-28 RX ADMIN — GEFITINIB 250 MILLIGRAM(S): 250 TABLET, FILM COATED ORAL at 10:05

## 2020-11-28 RX ADMIN — TAMOXIFEN CITRATE 110 MILLIGRAM(S): 20 TABLET, FILM COATED ORAL at 23:17

## 2020-11-28 RX ADMIN — TAMOXIFEN CITRATE 110 MILLIGRAM(S): 20 TABLET, FILM COATED ORAL at 10:06

## 2020-11-28 RX ADMIN — PANTOPRAZOLE SODIUM 40 MILLIGRAM(S): 20 TABLET, DELAYED RELEASE ORAL at 07:23

## 2020-11-28 NOTE — PROGRESS NOTE ADULT - SUBJECTIVE AND OBJECTIVE BOX
Pt seen and examined   no complaints   no sob    REVIEW OF SYSTEMS:  Constitutional: No fever,   Cardiovascular: No chest pain, palpitations, dizziness or leg swelling  Gastrointestinal: No abdominal or epigastric pain. No nausea, no vomiting  Skin: No itching, burning, rashes or lesions       MEDICATIONS:  MEDICATIONS  (STANDING):  cefTRIAXone   IVPB 1000 milliGRAM(s) IV Intermittent every 24 hours  enoxaparin Injectable 30 milliGRAM(s) SubCutaneous every 24 hours  gefitinib 250 milliGRAM(s) Oral <User Schedule>  pantoprazole    Tablet 40 milliGRAM(s) Oral before breakfast  tamoxifen 110 milliGRAM(s) Oral two times a day    MEDICATIONS  (PRN):  albuterol/ipratropium for Nebulization. 3 milliLiter(s) Nebulizer every 4 hours PRN Shortness of Breath and/or Wheezing      Allergies    No Known Allergies    Intolerances        Vital Signs Last 24 Hrs  T(C): 36.4 (28 Nov 2020 05:17), Max: 36.6 (27 Nov 2020 21:08)  T(F): 97.6 (28 Nov 2020 05:17), Max: 97.8 (27 Nov 2020 21:08)  HR: 68 (28 Nov 2020 05:17) (66 - 76)  BP: 146/68 (28 Nov 2020 05:17) (124/65 - 146/68)  BP(mean): --  RR: 19 (28 Nov 2020 05:17) (17 - 19)  SpO2: 94% (28 Nov 2020 05:17) (94% - 99%)      PHYSICAL EXAM:    General: thin; in no acute distress  HEENT: MMM, conjunctiva and sclera clear  Heart: regular  Lungs: clear port left chest  Gastrointestinal: Soft non-tender non-distended; Normal bowel sounds;   Skin: Warm and dry. No obvious rash    LABS:      CBC Full  -  ( 28 Nov 2020 06:26 )  WBC Count : 10.04 K/uL  RBC Count : 4.90 M/uL  Hemoglobin : 14.2 g/dL  Hematocrit : 43.9 %  Platelet Count - Automated : 206 K/uL  Mean Cell Volume : 89.6 fl  Mean Cell Hemoglobin : 29.0 pg  Mean Cell Hemoglobin Concentration : 32.3 gm/dL  Auto Neutrophil # : x  Auto Lymphocyte # : x  Auto Monocyte # : x  Auto Eosinophil # : x  Auto Basophil # : x  Auto Neutrophil % : x  Auto Lymphocyte % : x  Auto Monocyte % : x  Auto Eosinophil % : x  Auto Basophil % : x    11-28    138  |  100  |  29<H>  ----------------------------<  87  4.4   |  25  |  1.04    Ca    9.0      28 Nov 2020 06:26  Mg     2.2     11-28    TPro  5.8<L>  /  Alb  3.2<L>  /  TBili  0.4  /  DBili  x   /  AST  21  /  ALT  18  /  AlkPhos  51  11-28    PT/INR - ( 27 Nov 2020 06:02 )   PT: 12.1 sec;   INR: 1.01          PTT - ( 27 Nov 2020 06:02 )  PTT:21.8 sec                  RADIOLOGY & ADDITIONAL STUDIES (The following images were personally reviewed):

## 2020-11-28 NOTE — PROGRESS NOTE ADULT - SUBJECTIVE AND OBJECTIVE BOX
Interventional, Pulmonary, Critical, Chest Special Procedures.    Pt was seen and fully examined by myself.     Time spent with patient in minutes:37    Patient is a 85y old  Female who presents with a chief complaint of lung biopsy (28 Nov 2020 11:05) The patient paucisymptomatic, eupneic on RA, pain free when seen    HPI:  86 y/o F PMH COPD not on home O2, not on home inhalers who presents due to shortness of breath. Patient reports she has been short of breath for a while (few months?) now but that in the past 3 days it has gotten much worse. She used to be able to walk 30 blocks without issue and now can walk less than half a block before becoming short of breath. Patient with chronic mild cough, no worsening cough, no wheezing, no chest pain, fevers, chills. Patient planned for lung biopsy on Monday but presented earlier due to these symptoms at the suggestion of her doctor. 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     ED vitals: afebrile, p74, /75, RR 17, 98% on RA  ED labs: Na 147, Cl 112,   EKG: NSR at 62    ED imaging  1. Since 2/9/2017, there are three new lung nodules in the right upper lobe and there is new large mediastinal and right hilar lymphadenopathy. These findings are suspicious for lung cancer with lymph node metastases.    2. No evidence of pulmonary embolism, but the right hilar adenopathy compresses right upper lobe pulmonary arteries, resulting in one branch being occluded and the other branch nearly occluded.    3. Moderate compression of superior vena cava by adenopathy with dilated internal jugular veins bilaterally.      Patient received solumedrol IV 125mg, duonebs x 2   (21 Nov 2020 20:14)      REVIEW OF SYSTEMS:  Constitutional: No fever, weight loss or fatigue  ENMT:  No difficulty hearing, tinnitus, vertigo; No sinus or throat pain  Respiratory: No cough, wheezing, chills or hemoptysis SOB better today  Cardiovascular: No chest pain, palpitations, dizziness or leg swelling  Gastrointestinal: No abdominal or epigastric pain. No nausea, vomiting or hematemesis; No diarrhea or constipation. No melena or hematochezia.  Skin: No itching, burning, rashes or lesions   Musculoskeletal: No joint pain or swelling; No muscle, back or extremity pain  PAST MEDICAL & SURGICAL HISTORY:  Chronic obstructive pulmonary disease, unspecified COPD type  PAST MEDICAL & SURGICAL HISTORY:  Chronic obstructive pulmonary disease, unspecified COPD type    Hearing loss  left ear - sees Dr. Garces ENT    Basal cell carcinoma  x2 - upper lip and arm    Chronic obstructive pulmonary disease    Osteoporosis  Osteoporosis    H/O breast biopsy  &gt; 5 years ago, mass was benign and self-resolved    History of appendectomy    History of cataract surgery      FAMILY HISTORY:    SOCIAL HISTORY:      - Tobacco     - ETOH    Allergies    No Known Allergies    Intolerances      Vital Signs Last 24 Hrs  T(C): 36.4 (28 Nov 2020 05:17), Max: 36.6 (27 Nov 2020 21:08)  T(F): 97.6 (28 Nov 2020 05:17), Max: 97.8 (27 Nov 2020 21:08)  HR: 68 (28 Nov 2020 05:17) (68 - 76)  BP: 146/68 (28 Nov 2020 05:17) (124/65 - 146/68)  BP(mean): --  RR: 19 (28 Nov 2020 05:17) (17 - 19)  SpO2: 94% (28 Nov 2020 05:17) (94% - 98%)        MEDICATIONS:  MEDICATIONS  (STANDING):  cefTRIAXone   IVPB 1000 milliGRAM(s) IV Intermittent every 24 hours  dexAMETHasone  IVPB 10 milliGRAM(s) IV Intermittent once  enoxaparin Injectable 30 milliGRAM(s) SubCutaneous every 24 hours  gefitinib 250 milliGRAM(s) Oral <User Schedule>  ondansetron  IVPB 10 milliGRAM(s) IV Intermittent once  pantoprazole    Tablet 40 milliGRAM(s) Oral before breakfast  tamoxifen 110 milliGRAM(s) Oral two times a day  vinORELbine IVPB (eMAR) 40 milliGRAM(s) IV Intermittent once    MEDICATIONS  (PRN):  albuterol/ipratropium for Nebulization. 3 milliLiter(s) Nebulizer every 4 hours PRN Shortness of Breath and/or Wheezing      PHYSICAL EXAM:  UN Comfortable, no distress  Eyes: PERRL, EOM intact; conjunctiva and sclera clear  Head: Normocephalic;  No Trauma  ENMT: No nasal discharge,+ hoarseness, cough no  hemoptysis  Neck: Supple; non tender; no masses or deformities.    No JVD  Respiratory:,  - WHEEZING  + central  RHONCHI  - RALES  - CRACKLES.  Diminished breath sounds  BILATERAL  RIGHT  LEFT  Cardiovascular: Regular rate and rhythm. S1 and S2 Normal; No murmurs, gallops or rubs     - PPM/AICD  Gastrointestinal: Soft non-tender, non-distended; Normal bowel sounds; No hepatosplenomegaly.     -PEG    -  GT   - ALVES  Genitourinary: No costovertebral angle tenderness. No dysuria  Extremities: AROM, No clubbing, cyanosis or edema    Vascular: Peripheral pulses palpable 2+ bilaterally  Neurological: Alert and responisve to stimuli   Skin: Warm and dry. No obvious rash  Lymph Nodes: No acute cervical or supraclavicular adenopathy    DEVICES:  - DENTURES   +IV R / L     - ETUBE   -TRACH   -CTUBE  R / L    LABS:                          14.2   10.04 )-----------( 206      ( 28 Nov 2020 06:26 )             43.9     11-28    138  |  100  |  29<H>  ----------------------------<  87  4.4   |  25  |  1.04    Ca    9.0      28 Nov 2020 06:26  Mg     2.2     11-28    TPro  5.8<L>  /  Alb  3.2<L>  /  TBili  0.4  /  DBili  x   /  AST  21  /  ALT  18  /  AlkPhos  51  11-28    PT/INR - ( 27 Nov 2020 06:02 )   PT: 12.1 sec;   INR: 1.01          PTT - ( 27 Nov 2020 06:02 )  PTT:21.8 sec  RADIOLOGY & ADDITIONAL STUDIES (The following images were personally reviewed):< from: Xray Chest 1 View- PORTABLE-Routine (Xray Chest 1 View- PORTABLE-Routine .) (11.27.20 @ 19:52) >  EXAM:  XR CHEST PORTABLE ROUTINE 1V                          PROCEDURE DATE:  11/27/2020          INTERPRETATION:  Clinical History: Postbiopsy.    Frontal examination of the chest demonstrates the heart to be within normal limits in transverse diameter. Previously described minimal right apical pneumothorax noted on films dated 11/27/2020 12:35 PM is not demonstrated on present examination. Otherwise no interval change lung pathology in comparison to prior examination of the chest. No intervalchange this remaining support devices. Calcification right flank region which represent renal calculus    Impression: No evidence of pneumothorax. Otherwise no interval change lung pathology in comparison to prior examination of the chest 11/27/2020    < end of copied text >

## 2020-11-29 LAB
ALBUMIN SERPL ELPH-MCNC: 3.4 G/DL — SIGNIFICANT CHANGE UP (ref 3.3–5)
ALP SERPL-CCNC: 52 U/L — SIGNIFICANT CHANGE UP (ref 40–120)
ALT FLD-CCNC: 19 U/L — SIGNIFICANT CHANGE UP (ref 10–45)
ANION GAP SERPL CALC-SCNC: 10 MMOL/L — SIGNIFICANT CHANGE UP (ref 5–17)
AST SERPL-CCNC: 22 U/L — SIGNIFICANT CHANGE UP (ref 10–40)
BILIRUB SERPL-MCNC: 0.6 MG/DL — SIGNIFICANT CHANGE UP (ref 0.2–1.2)
BUN SERPL-MCNC: 29 MG/DL — HIGH (ref 7–23)
CALCIUM SERPL-MCNC: 8.8 MG/DL — SIGNIFICANT CHANGE UP (ref 8.4–10.5)
CHLORIDE SERPL-SCNC: 102 MMOL/L — SIGNIFICANT CHANGE UP (ref 96–108)
CO2 SERPL-SCNC: 26 MMOL/L — SIGNIFICANT CHANGE UP (ref 22–31)
CREAT SERPL-MCNC: 1.07 MG/DL — SIGNIFICANT CHANGE UP (ref 0.5–1.3)
GLUCOSE SERPL-MCNC: 124 MG/DL — HIGH (ref 70–99)
HCT VFR BLD CALC: 44.2 % — SIGNIFICANT CHANGE UP (ref 34.5–45)
HGB BLD-MCNC: 14.3 G/DL — SIGNIFICANT CHANGE UP (ref 11.5–15.5)
MAGNESIUM SERPL-MCNC: 2.3 MG/DL — SIGNIFICANT CHANGE UP (ref 1.6–2.6)
MCHC RBC-ENTMCNC: 29.1 PG — SIGNIFICANT CHANGE UP (ref 27–34)
MCHC RBC-ENTMCNC: 32.4 GM/DL — SIGNIFICANT CHANGE UP (ref 32–36)
MCV RBC AUTO: 90 FL — SIGNIFICANT CHANGE UP (ref 80–100)
NRBC # BLD: 0 /100 WBCS — SIGNIFICANT CHANGE UP (ref 0–0)
PHOSPHATE SERPL-MCNC: 3.2 MG/DL — SIGNIFICANT CHANGE UP (ref 2.5–4.5)
PLATELET # BLD AUTO: 165 K/UL — SIGNIFICANT CHANGE UP (ref 150–400)
POTASSIUM SERPL-MCNC: 4.4 MMOL/L — SIGNIFICANT CHANGE UP (ref 3.5–5.3)
POTASSIUM SERPL-SCNC: 4.4 MMOL/L — SIGNIFICANT CHANGE UP (ref 3.5–5.3)
PROT SERPL-MCNC: 6.1 G/DL — SIGNIFICANT CHANGE UP (ref 6–8.3)
RBC # BLD: 4.91 M/UL — SIGNIFICANT CHANGE UP (ref 3.8–5.2)
RBC # FLD: 12.9 % — SIGNIFICANT CHANGE UP (ref 10.3–14.5)
SODIUM SERPL-SCNC: 138 MMOL/L — SIGNIFICANT CHANGE UP (ref 135–145)
WBC # BLD: 12.4 K/UL — HIGH (ref 3.8–10.5)
WBC # FLD AUTO: 12.4 K/UL — HIGH (ref 3.8–10.5)

## 2020-11-29 PROCEDURE — 71045 X-RAY EXAM CHEST 1 VIEW: CPT | Mod: 26

## 2020-11-29 RX ORDER — ACETAMINOPHEN 500 MG
500 TABLET ORAL EVERY 6 HOURS
Refills: 0 | Status: DISCONTINUED | OUTPATIENT
Start: 2020-11-29 | End: 2020-12-01

## 2020-11-29 RX ADMIN — ENOXAPARIN SODIUM 30 MILLIGRAM(S): 100 INJECTION SUBCUTANEOUS at 15:06

## 2020-11-29 RX ADMIN — TAMOXIFEN CITRATE 110 MILLIGRAM(S): 20 TABLET, FILM COATED ORAL at 21:36

## 2020-11-29 RX ADMIN — GEFITINIB 250 MILLIGRAM(S): 250 TABLET, FILM COATED ORAL at 10:54

## 2020-11-29 RX ADMIN — PANTOPRAZOLE SODIUM 40 MILLIGRAM(S): 20 TABLET, DELAYED RELEASE ORAL at 08:02

## 2020-11-29 RX ADMIN — TAMOXIFEN CITRATE 110 MILLIGRAM(S): 20 TABLET, FILM COATED ORAL at 10:55

## 2020-11-29 RX ADMIN — GEFITINIB 250 MILLIGRAM(S): 250 TABLET, FILM COATED ORAL at 21:36

## 2020-11-29 RX ADMIN — Medication 500 MILLIGRAM(S): at 15:05

## 2020-11-29 RX ADMIN — Medication 500 MILLIGRAM(S): at 15:35

## 2020-11-29 RX ADMIN — CEFTRIAXONE 100 MILLIGRAM(S): 500 INJECTION, POWDER, FOR SOLUTION INTRAMUSCULAR; INTRAVENOUS at 10:55

## 2020-11-29 NOTE — PROGRESS NOTE ADULT - SUBJECTIVE AND OBJECTIVE BOX
Pt seen and examined   no complaints except back pain "left side"    REVIEW OF SYSTEMS:  Constitutional: No fever  Cardiovascular: No chest pain, palpitations, dizziness or leg swelling  Resp: no sob  Gastrointestinal: No abdominal or epigastric pain. No nausea, no vomiting  Skin: No itching, burning, rashes or lesions       MEDICATIONS:  MEDICATIONS  (STANDING):  cefTRIAXone   IVPB 1000 milliGRAM(s) IV Intermittent every 24 hours  enoxaparin Injectable 30 milliGRAM(s) SubCutaneous every 24 hours  gefitinib 250 milliGRAM(s) Oral <User Schedule>  pantoprazole    Tablet 40 milliGRAM(s) Oral before breakfast  tamoxifen 110 milliGRAM(s) Oral two times a day    MEDICATIONS  (PRN):  albuterol/ipratropium for Nebulization. 3 milliLiter(s) Nebulizer every 4 hours PRN Shortness of Breath and/or Wheezing      Allergies    No Known Allergies    Intolerances        Vital Signs Last 24 Hrs  T(C): 36.6 (29 Nov 2020 12:45), Max: 36.7 (28 Nov 2020 15:03)  T(F): 97.8 (29 Nov 2020 12:45), Max: 98.1 (28 Nov 2020 15:03)  HR: 70 (29 Nov 2020 12:45) (66 - 73)  BP: 138/78 (29 Nov 2020 12:45) (127/62 - 138/78)  BP(mean): --  RR: 16 (29 Nov 2020 12:45) (16 - 18)  SpO2: 96% (29 Nov 2020 12:45) (92% - 97%)      PHYSICAL EXAM:    General: ; in no acute distress  HEENT: MMM, conjunctiva and sclera clear  Lungs: decreased BS  Heart: regular  Gastrointestinal: Soft non-tender non-distended; Normal bowel sounds;  Skin: Warm and dry. No obvious rash    LABS:      CBC Full  -  ( 29 Nov 2020 06:38 )  WBC Count : 12.40 K/uL  RBC Count : 4.91 M/uL  Hemoglobin : 14.3 g/dL  Hematocrit : 44.2 %  Platelet Count - Automated : 165 K/uL  Mean Cell Volume : 90.0 fl  Mean Cell Hemoglobin : 29.1 pg  Mean Cell Hemoglobin Concentration : 32.4 gm/dL  Auto Neutrophil # : x  Auto Lymphocyte # : x  Auto Monocyte # : x  Auto Eosinophil # : x  Auto Basophil # : x  Auto Neutrophil % : x  Auto Lymphocyte % : x  Auto Monocyte % : x  Auto Eosinophil % : x  Auto Basophil % : x    11-29    138  |  102  |  29<H>  ----------------------------<  124<H>  4.4   |  26  |  1.07    Ca    8.8      29 Nov 2020 06:38  Phos  3.2     11-29  Mg     2.3     11-29    TPro  6.1  /  Alb  3.4  /  TBili  0.6  /  DBili  x   /  AST  22  /  ALT  19  /  AlkPhos  52  11-29                      RADIOLOGY & ADDITIONAL STUDIES (The following images were personally reviewed):

## 2020-11-29 NOTE — PROGRESS NOTE ADULT - PROBLEM SELECTOR PLAN 1
Pt presented initially with some rhonchi, no wheezing but patient with symptomatic improvement after duonebs. Currently feels subjectively well with no wheezes or rhonchi on exam.  - s/p solumedrol 125 in ED  - Tapered from Solumedrol 40mg IV q8 to 40mg IV q12 beginning 11/25, completed on 11/26  - c/w Duonebs q4 prn  -CXR showed right side pneumothorax  -f/u CT chest  -pt is not in respiratory distress

## 2020-11-29 NOTE — PROGRESS NOTE ADULT - SUBJECTIVE AND OBJECTIVE BOX
Interventional, Pulmonary, Critical, Chest Special Procedures.    Pt was seen and fully examined by myself.     Time spent with patient in minutes:38    Patient is a 85y old  Female who presents with a chief complaint of lung biopsy (29 Nov 2020 13:39)The patient paucisymptomatic, eupneic on RA, pain free when seen. Mngmnt of R apical PTX discussed c her and .    HPI:  84 y/o F PMH COPD not on home O2, not on home inhalers who presents due to shortness of breath. Patient reports she has been short of breath for a while (few months?) now but that in the past 3 days it has gotten much worse. She used to be able to walk 30 blocks without issue and now can walk less than half a block before becoming short of breath. Patient with chronic mild cough, no worsening cough, no wheezing, no chest pain, fevers, chills. Patient planned for lung biopsy on Monday but presented earlier due to these symptoms at the suggestion of her doctor. 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     ED vitals: afebrile, p74, /75, RR 17, 98% on RA  ED labs: Na 147, Cl 112,   EKG: NSR at 62    ED imaging  1. Since 2/9/2017, there are three new lung nodules in the right upper lobe and there is new large mediastinal and right hilar lymphadenopathy. These findings are suspicious for lung cancer with lymph node metastases.    2. No evidence of pulmonary embolism, but the right hilar adenopathy compresses right upper lobe pulmonary arteries, resulting in one branch being occluded and the other branch nearly occluded.    3. Moderate compression of superior vena cava by adenopathy with dilated internal jugular veins bilaterally.      Patient received solumedrol IV 125mg, duonebs x 2   (21 Nov 2020 20:14)      REVIEW OF SYSTEMS:  Constitutional: No fever, weight loss or fatigue  ENMT:  No difficulty hearing, tinnitus, vertigo; No sinus or throat pain  Respiratory: No cough, wheezing, chills or hemoptysis SOB better today  Cardiovascular: No chest pain, palpitations, dizziness or leg swelling  Gastrointestinal: No abdominal or epigastric pain. No nausea, vomiting or hematemesis; No diarrhea or constipation. No melena or hematochezia.  Skin: No itching, burning, rashes or lesions   Musculoskeletal: No joint pain or swelling; No muscle, back or extremity pain  PAST MEDICAL & SURGICAL HISTORY:  Chronic obstructive pulmonary disease, unspecified COPD type  PAST MEDICAL & SURGICAL HISTORY:  Chronic obstructive pulmonary disease, unspecified COPD type    PAST MEDICAL & SURGICAL HISTORY:  Chronic obstructive pulmonary disease, unspecified COPD type    Hearing loss  left ear - sees Dr. Garces ENT    Basal cell carcinoma  x2 - upper lip and arm    Chronic obstructive pulmonary disease    Osteoporosis  Osteoporosis    H/O breast biopsy  &gt; 5 years ago, mass was benign and self-resolved    History of appendectomy    History of cataract surgery      FAMILY HISTORY:    SOCIAL HISTORY:      - Tobacco     - ETOH    Allergies    No Known Allergies    Intolerances      Vital Signs Last 24 Hrs  T(C): 36.6 (29 Nov 2020 12:45), Max: 36.6 (28 Nov 2020 16:34)  T(F): 97.8 (29 Nov 2020 12:45), Max: 97.8 (28 Nov 2020 16:34)  HR: 70 (29 Nov 2020 12:45) (66 - 73)  BP: 138/78 (29 Nov 2020 12:45) (127/62 - 138/78)  BP(mean): --  RR: 16 (29 Nov 2020 12:45) (16 - 18)  SpO2: 96% (29 Nov 2020 12:45) (92% - 96%)        MEDICATIONS:  MEDICATIONS  (STANDING):  cefTRIAXone   IVPB 1000 milliGRAM(s) IV Intermittent every 24 hours  enoxaparin Injectable 30 milliGRAM(s) SubCutaneous every 24 hours  gefitinib 250 milliGRAM(s) Oral <User Schedule>  pantoprazole    Tablet 40 milliGRAM(s) Oral before breakfast  tamoxifen 110 milliGRAM(s) Oral two times a day    MEDICATIONS  (PRN):  acetaminophen   Tablet .. 500 milliGRAM(s) Oral every 6 hours PRN Mild Pain (1 - 3)  albuterol/ipratropium for Nebulization. 3 milliLiter(s) Nebulizer every 4 hours PRN Shortness of Breath and/or Wheezing      PHYSICAL EXAM:  UN Comfortable, no distress  Eyes: PERRL, EOM intact; conjunctiva and sclera clear  Head: Normocephalic;  No Trauma  ENMT: No nasal discharge,+ hoarseness, cough no  hemoptysis  Neck: Supple; non tender; no masses or deformities.    No JVD  Respiratory:,  - WHEEZING  + central  RHONCHI  - RALES  - CRACKLES.  Diminished breath sounds  R 1/4 cephalad today  Cardiovascular: Regular rate and rhythm. S1 and S2 Normal; No murmurs, gallops or rubs     - PPM/AICD  Gastrointestinal: Soft non-tender, non-distended; Normal bowel sounds; No hepatosplenomegaly.     -PEG    -  GT   - ALVES  Genitourinary: No costovertebral angle tenderness. No dysuria  Extremities: AROM, No clubbing, cyanosis or edema    Vascular: Peripheral pulses palpable 2+ bilaterally  Neurological: Alert and responisve to stimuli   Skin: Warm and dry. No obvious rash  Lymph Nodes: No acute cervical or supraclavicular adenopathy    DEVICES:  - DENTURES   +IV R / L     - ETUBE   -TRACH   -CTUBE  R / L    LABS:                          14.3   12.40 )-----------( 165      ( 29 Nov 2020 06:38 )             44.2     11-29    138  |  102  |  29<H>  ----------------------------<  124<H>  4.4   |  26  |  1.07    Ca    8.8      29 Nov 2020 06:38  Phos  3.2     11-29  Mg     2.3     11-29    TPro  6.1  /  Alb  3.4  /  TBili  0.6  /  DBili  x   /  AST  22  /  ALT  19  /  AlkPhos  52  11-29      RADIOLOGY & ADDITIONAL STUDIES (The following images were personally reviewed):< from: Xray Chest 1 View AP/PA (11.29.20 @ 09:42) >    EXAM:  XR CHEST AP OR PA 1V                          PROCEDURE DATE:  11/29/2020          INTERPRETATION:  Clinical History: Postbiopsy     Frontal examination chest demonstrates no interval change lung pathology in comparison to prior examination of the chest 11/28/2020. Right apical pneumothorax noted. No interval change position left Port-A-Cath.    Impression: No interval change lung pathology. Right apical pneumothorax.    < end of copied text >

## 2020-11-29 NOTE — PROGRESS NOTE ADULT - SUBJECTIVE AND OBJECTIVE BOX
OVERNIGHT EVENTS:    SUBJECTIVE / INTERVAL HPI: Patient seen and examined at bedside.     VITAL SIGNS:  Vital Signs Last 24 Hrs  T(C): 36.3 (29 Nov 2020 05:36), Max: 36.7 (28 Nov 2020 15:03)  T(F): 97.4 (29 Nov 2020 05:36), Max: 98.1 (28 Nov 2020 15:03)  HR: 66 (29 Nov 2020 05:36) (66 - 73)  BP: 135/66 (29 Nov 2020 05:36) (127/62 - 138/78)  BP(mean): --  RR: 16 (29 Nov 2020 05:36) (16 - 18)  SpO2: 94% (29 Nov 2020 05:36) (92% - 97%)    PHYSICAL EXAM:    General: Well developed, well nourished, no acute distress  HEENT: NC/AT; PERRL, anicteric sclera; MMM  Neck: supple  Cardiovascular: +S1/S2, RRR, no murmurs, rubs, gallops  Respiratory: CTA B/L; no W/R/R  Gastrointestinal: soft, NT/ND; +BSx4  Extremities: WWP; no edema, clubbing or cyanosis  Vascular: 2+ radial, DP/PT pulses B/L  Neurological: AAOx3; no focal deficits    MEDICATIONS:  MEDICATIONS  (STANDING):  cefTRIAXone   IVPB 1000 milliGRAM(s) IV Intermittent every 24 hours  enoxaparin Injectable 30 milliGRAM(s) SubCutaneous every 24 hours  gefitinib 250 milliGRAM(s) Oral <User Schedule>  pantoprazole    Tablet 40 milliGRAM(s) Oral before breakfast  tamoxifen 110 milliGRAM(s) Oral two times a day    MEDICATIONS  (PRN):  albuterol/ipratropium for Nebulization. 3 milliLiter(s) Nebulizer every 4 hours PRN Shortness of Breath and/or Wheezing      ALLERGIES:  Allergies    No Known Allergies    Intolerances        LABS:                        14.3   12.40 )-----------( 165      ( 29 Nov 2020 06:38 )             44.2     11-29    138  |  102  |  29<H>  ----------------------------<  124<H>  4.4   |  26  |  1.07    Ca    8.8      29 Nov 2020 06:38  Phos  3.2     11-29  Mg     2.3     11-29    TPro  6.1  /  Alb  3.4  /  TBili  0.6  /  DBili  x   /  AST  22  /  ALT  19  /  AlkPhos  52  11-29        CAPILLARY BLOOD GLUCOSE          RADIOLOGY & ADDITIONAL TESTS: Reviewed.    PLAN: OVERNIGHT EVENTS: EZE    SUBJECTIVE / INTERVAL HPI: Patient seen and examined at bedside. She reports pain in right side of chest which getting worst with breathing. She denies SOB, dizziness, n/v,     VITAL SIGNS:  Vital Signs Last 24 Hrs  T(C): 36.3 (29 Nov 2020 05:36), Max: 36.7 (28 Nov 2020 15:03)  T(F): 97.4 (29 Nov 2020 05:36), Max: 98.1 (28 Nov 2020 15:03)  HR: 66 (29 Nov 2020 05:36) (66 - 73)  BP: 135/66 (29 Nov 2020 05:36) (127/62 - 138/78)  BP(mean): --  RR: 16 (29 Nov 2020 05:36) (16 - 18)  SpO2: 94% (29 Nov 2020 05:36) (92% - 97%)    PHYSICAL EXAM:    General: Well developed, well nourished, no acute distress  HEENT: NC/AT; PERRL, anicteric sclera; MMM  Neck: supple  Cardiovascular: +S1/S2, RRR, no murmurs, rubs, gallops  Respiratory: CTA B/L; no W/R/R  Gastrointestinal: soft, NT/ND; +BSx4  Extremities: WWP; no edema, clubbing or cyanosis  Vascular: 2+ radial, DP/PT pulses B/L  Neurological: AAOx3; no focal deficits    MEDICATIONS:  MEDICATIONS  (STANDING):  cefTRIAXone   IVPB 1000 milliGRAM(s) IV Intermittent every 24 hours  enoxaparin Injectable 30 milliGRAM(s) SubCutaneous every 24 hours  gefitinib 250 milliGRAM(s) Oral <User Schedule>  pantoprazole    Tablet 40 milliGRAM(s) Oral before breakfast  tamoxifen 110 milliGRAM(s) Oral two times a day    MEDICATIONS  (PRN):  albuterol/ipratropium for Nebulization. 3 milliLiter(s) Nebulizer every 4 hours PRN Shortness of Breath and/or Wheezing      ALLERGIES:  Allergies    No Known Allergies    Intolerances        LABS:                        14.3   12.40 )-----------( 165      ( 29 Nov 2020 06:38 )             44.2     11-29    138  |  102  |  29<H>  ----------------------------<  124<H>  4.4   |  26  |  1.07    Ca    8.8      29 Nov 2020 06:38  Phos  3.2     11-29  Mg     2.3     11-29    TPro  6.1  /  Alb  3.4  /  TBili  0.6  /  DBili  x   /  AST  22  /  ALT  19  /  AlkPhos  52  11-29        CAPILLARY BLOOD GLUCOSE          RADIOLOGY & ADDITIONAL TESTS: Reviewed.    PLAN: OVERNIGHT EVENTS: EZE    SUBJECTIVE / INTERVAL HPI: Patient seen and examined at bedside. She reports pain in right side of chest which getting worst with breathing. She denies SOB, dizziness, n/v, abdominal pain, c/d.     VITAL SIGNS:  Vital Signs Last 24 Hrs  T(C): 36.3 (29 Nov 2020 05:36), Max: 36.7 (28 Nov 2020 15:03)  T(F): 97.4 (29 Nov 2020 05:36), Max: 98.1 (28 Nov 2020 15:03)  HR: 66 (29 Nov 2020 05:36) (66 - 73)  BP: 135/66 (29 Nov 2020 05:36) (127/62 - 138/78)  BP(mean): --  RR: 16 (29 Nov 2020 05:36) (16 - 18)  SpO2: 94% (29 Nov 2020 05:36) (92% - 97%)    PHYSICAL EXAM:    General: WDWN  HEENT: NCAT; PERRL, anicteric sclera; MMM  Neck: supple, trachea midline  Cardiovascular: S1, S2 normal; RRR, no M/G/R  Respiratory: CTABL; no W/R/R, no egophany,   Gastrointestinal: soft, nontender, nondistended. bowel sounds present.  Skin: no ulcerations or visible rashes appreciated  Back: tenderness inferior and medial to R scapula, no spinal tenderness down spinal column  Extremities: WWP; no edema, clubbing or cyanosis  Vascular: 2+ radial, DP/PT pulses B/L  Neurological: AAOx3; CN II-XII grossly intact; no focal deficits    MEDICATIONS:  MEDICATIONS  (STANDING):  cefTRIAXone   IVPB 1000 milliGRAM(s) IV Intermittent every 24 hours  enoxaparin Injectable 30 milliGRAM(s) SubCutaneous every 24 hours  gefitinib 250 milliGRAM(s) Oral <User Schedule>  pantoprazole    Tablet 40 milliGRAM(s) Oral before breakfast  tamoxifen 110 milliGRAM(s) Oral two times a day    MEDICATIONS  (PRN):  albuterol/ipratropium for Nebulization. 3 milliLiter(s) Nebulizer every 4 hours PRN Shortness of Breath and/or Wheezing      ALLERGIES:  Allergies    No Known Allergies    Intolerances        LABS:                        14.3   12.40 )-----------( 165      ( 29 Nov 2020 06:38 )             44.2     11-29    138  |  102  |  29<H>  ----------------------------<  124<H>  4.4   |  26  |  1.07    Ca    8.8      29 Nov 2020 06:38  Phos  3.2     11-29  Mg     2.3     11-29    TPro  6.1  /  Alb  3.4  /  TBili  0.6  /  DBili  x   /  AST  22  /  ALT  19  /  AlkPhos  52  11-29        CAPILLARY BLOOD GLUCOSE          RADIOLOGY & ADDITIONAL TESTS: Reviewed.    PLAN:

## 2020-11-30 LAB
ANION GAP SERPL CALC-SCNC: 13 MMOL/L — SIGNIFICANT CHANGE UP (ref 5–17)
BUN SERPL-MCNC: 37 MG/DL — HIGH (ref 7–23)
CALCIUM SERPL-MCNC: 8.8 MG/DL — SIGNIFICANT CHANGE UP (ref 8.4–10.5)
CHLORIDE SERPL-SCNC: 105 MMOL/L — SIGNIFICANT CHANGE UP (ref 96–108)
CO2 SERPL-SCNC: 22 MMOL/L — SIGNIFICANT CHANGE UP (ref 22–31)
CREAT SERPL-MCNC: 1.13 MG/DL — SIGNIFICANT CHANGE UP (ref 0.5–1.3)
GLUCOSE BLDC GLUCOMTR-MCNC: 89 MG/DL — SIGNIFICANT CHANGE UP (ref 70–99)
GLUCOSE SERPL-MCNC: 104 MG/DL — HIGH (ref 70–99)
HCT VFR BLD CALC: 41.9 % — SIGNIFICANT CHANGE UP (ref 34.5–45)
HGB BLD-MCNC: 13.4 G/DL — SIGNIFICANT CHANGE UP (ref 11.5–15.5)
MCHC RBC-ENTMCNC: 28.6 PG — SIGNIFICANT CHANGE UP (ref 27–34)
MCHC RBC-ENTMCNC: 32 GM/DL — SIGNIFICANT CHANGE UP (ref 32–36)
MCV RBC AUTO: 89.3 FL — SIGNIFICANT CHANGE UP (ref 80–100)
NRBC # BLD: 0 /100 WBCS — SIGNIFICANT CHANGE UP (ref 0–0)
PLATELET # BLD AUTO: 113 K/UL — LOW (ref 150–400)
POTASSIUM SERPL-MCNC: 5 MMOL/L — SIGNIFICANT CHANGE UP (ref 3.5–5.3)
POTASSIUM SERPL-SCNC: 5 MMOL/L — SIGNIFICANT CHANGE UP (ref 3.5–5.3)
RBC # BLD: 4.69 M/UL — SIGNIFICANT CHANGE UP (ref 3.8–5.2)
RBC # FLD: 13 % — SIGNIFICANT CHANGE UP (ref 10.3–14.5)
SODIUM SERPL-SCNC: 140 MMOL/L — SIGNIFICANT CHANGE UP (ref 135–145)
WBC # BLD: 9.25 K/UL — SIGNIFICANT CHANGE UP (ref 3.8–10.5)
WBC # FLD AUTO: 9.25 K/UL — SIGNIFICANT CHANGE UP (ref 3.8–10.5)

## 2020-11-30 PROCEDURE — 71250 CT THORAX DX C-: CPT | Mod: 26

## 2020-11-30 PROCEDURE — 78815 PET IMAGE W/CT SKULL-THIGH: CPT | Mod: 26,PI

## 2020-11-30 RX ORDER — SODIUM CHLORIDE 9 MG/ML
500 INJECTION INTRAMUSCULAR; INTRAVENOUS; SUBCUTANEOUS ONCE
Refills: 0 | Status: COMPLETED | OUTPATIENT
Start: 2020-11-30 | End: 2020-11-30

## 2020-11-30 RX ADMIN — PANTOPRAZOLE SODIUM 40 MILLIGRAM(S): 20 TABLET, DELAYED RELEASE ORAL at 06:05

## 2020-11-30 RX ADMIN — Medication 500 MILLIGRAM(S): at 06:11

## 2020-11-30 RX ADMIN — ENOXAPARIN SODIUM 30 MILLIGRAM(S): 100 INJECTION SUBCUTANEOUS at 17:51

## 2020-11-30 RX ADMIN — CEFTRIAXONE 100 MILLIGRAM(S): 500 INJECTION, POWDER, FOR SOLUTION INTRAMUSCULAR; INTRAVENOUS at 11:15

## 2020-11-30 RX ADMIN — Medication 500 MILLIGRAM(S): at 20:44

## 2020-11-30 RX ADMIN — Medication 500 MILLIGRAM(S): at 21:50

## 2020-11-30 RX ADMIN — Medication 500 MILLIGRAM(S): at 07:11

## 2020-11-30 RX ADMIN — SODIUM CHLORIDE 500 MILLILITER(S): 9 INJECTION INTRAMUSCULAR; INTRAVENOUS; SUBCUTANEOUS at 07:50

## 2020-11-30 NOTE — PHYSICAL THERAPY INITIAL EVALUATION ADULT - CRITERIA FOR SKILLED THERAPEUTIC INTERVENTIONS
risk reduction/prevention/functional limitations in following categories/predicted duration of therapy intervention/anticipated equipment needs at discharge/impairments found/rehab potential/therapy frequency/anticipated discharge recommendation

## 2020-11-30 NOTE — PROGRESS NOTE ADULT - SUBJECTIVE AND OBJECTIVE BOX
Interventional, Pulmonary, Critical, Chest Special Procedures.    Pt was seen and fully examined by myself.     Time spent with patient in minutes:    Patient is a 85y old  Female who presents with a chief complaint of lung biopsy, sob (30 Nov 2020 12:35)    HPI:  86 y/o F PMH COPD not on home O2, not on home inhalers who presents due to shortness of breath. Patient reports she has been short of breath for a while (few months?) now but that in the past 3 days it has gotten much worse. She used to be able to walk 30 blocks without issue and now can walk less than half a block before becoming short of breath. Patient with chronic mild cough, no worsening cough, no wheezing, no chest pain, fevers, chills. Patient planned for lung biopsy on Monday but presented earlier due to these symptoms at the suggestion of her doctor. 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     ED vitals: afebrile, p74, /75, RR 17, 98% on RA  ED labs: Na 147, Cl 112,   EKG: NSR at 62    ED imaging  1. Since 2/9/2017, there are three new lung nodules in the right upper lobe and there is new large mediastinal and right hilar lymphadenopathy. These findings are suspicious for lung cancer with lymph node metastases.    2. No evidence of pulmonary embolism, but the right hilar adenopathy compresses right upper lobe pulmonary arteries, resulting in one branch being occluded and the other branch nearly occluded.    3. Moderate compression of superior vena cava by adenopathy with dilated internal jugular veins bilaterally.      Patient received solumedrol IV 125mg, duonebs x 2   (21 Nov 2020 20:14)      REVIEW OF SYSTEMS:  Constitutional: No fever, weight loss, chills or fatigue  Eyes: No eye pain, visual disturbances, or discharge  ENMT:  No difficulty hearing, tinnitus, vertigo; No sinus or throat pain. No epistaxis, dysphagia, dysphonia, hoarseness or odynophagia  Neck: No pain, stiffness or neck swelling.  No masses or deformities  Respiratory: No cough, wheezing, hemoptysis  - COPD  - ILD   - PE   - ASTHMA     - PNEUMONIA  Cardiovascular: No chest pain, dysrhythmia, palpitations, dizziness or edema - CAD   - CHF   - HTN  Gastrointestinal: No abdominal or epigastric pain. No nausea, vomiting or hematemesis; No diarrhea or constipation. No melena or hematochezia, Icterus.          Genitourinary: No dysuria, frequency, hematuria or incontinence   - CKD/RIDDHI      - ESRD  Neurological: No headaches, memory loss, loss of strength, numbness or tremors      -DEMENTIA     - STROKE    - SEIZURE  Skin: No itching, burning, rashes or lesions   Lymph Nodes: No enlarged glands  Endocrine: No heat or cold intolerance; No hair loss       - DM     - THYROID DISORDER  Musculoskeletal: No joint pain or swelling; No muscle, back or extremity pain, No edema  Psychiatric: No depression, anxiety, mood swings or difficulty sleeping  Heme/Lymph: No easy bruising or bleeding gums         - ANEMIA      - CANCER   -COAGULOPATHY  Allergy and Immunologic: No hives or eczema    PAST MEDICAL & SURGICAL HISTORY:  Chronic obstructive pulmonary disease, unspecified COPD type    Hearing loss  left ear - sees Dr. Garces ENT    Basal cell carcinoma  x2 - upper lip and arm    Chronic obstructive pulmonary disease    Osteoporosis  Osteoporosis    H/O breast biopsy  &gt; 5 years ago, mass was benign and self-resolved    History of appendectomy    History of cataract surgery      FAMILY HISTORY:    SOCIAL HISTORY:      - Tobacco     - ETOH    Allergies    No Known Allergies    Intolerances      Vital Signs Last 24 Hrs  T(C): 36.6 (30 Nov 2020 11:20), Max: 36.7 (29 Nov 2020 21:34)  T(F): 97.8 (30 Nov 2020 11:20), Max: 98.1 (29 Nov 2020 21:34)  HR: 76 (30 Nov 2020 11:20) (75 - 87)  BP: 99/64 (30 Nov 2020 13:45) (95/58 - 130/68)  BP(mean): --  RR: 16 (30 Nov 2020 11:20) (16 - 16)  SpO2: 95% (30 Nov 2020 11:20) (92% - 95%)        MEDICATIONS:  MEDICATIONS  (STANDING):  enoxaparin Injectable 30 milliGRAM(s) SubCutaneous every 24 hours  pantoprazole    Tablet 40 milliGRAM(s) Oral before breakfast    MEDICATIONS  (PRN):  acetaminophen   Tablet .. 500 milliGRAM(s) Oral every 6 hours PRN Mild Pain (1 - 3)  albuterol/ipratropium for Nebulization. 3 milliLiter(s) Nebulizer every 4 hours PRN Shortness of Breath and/or Wheezing      PHYSICAL EXAM:  Comfortable, no distress  Eyes: PERRL, EOM intact; conjunctiva and sclera clear  Head: Normocephalic;  No Trauma  ENMT: No nasal discharge, hoarseness, cough or hemoptysis  Neck: Supple; non tender; no masses or deformities.    No JVD  Respiratory: CTA,  - WHEEZING   - RHONCHI  - RALES  - CRACKLES.  Diminished breath sounds  BILATERAL  RIGHT  LEFT  Cardiovascular: Regular rate and rhythm. S1 and S2 Normal; No murmurs, gallops or rubs     - PPM/AICD  Gastrointestinal: Soft non-tender, non-distended; Normal bowel sounds; No hepatosplenomegaly.     -PEG    -  GT   - ALVES  Genitourinary: No costovertebral angle tenderness. No dysuria  Extremities: AROM, No clubbing, cyanosis or edema    Vascular: Peripheral pulses palpable 2+ bilaterally  Neurological: Alert and responisve to stimuli   Skin: Warm and dry. No obvious rash  Lymph Nodes: No acute cervical or supraclavicular adenopathy  Psychiatric: Cooperative and appropriate mood    DEVICES:  - DENTURES   +IV R / L     - ETUBE   -TRACH   -CTUBE  R / L    LABS:                          13.4   9.25  )-----------( 113      ( 30 Nov 2020 06:34 )             41.9     11-30    140  |  105  |  37<H>  ----------------------------<  104<H>  5.0   |  22  |  1.13    Ca    8.8      30 Nov 2020 06:34  Phos  3.2     11-29  Mg     2.3     11-29    TPro  6.1  /  Alb  3.4  /  TBili  0.6  /  DBili  x   /  AST  22  /  ALT  19  /  AlkPhos  52  11-29      RADIOLOGY & ADDITIONAL STUDIES (The following images were personally reviewed):

## 2020-11-30 NOTE — PROGRESS NOTE ADULT - PROBLEM SELECTOR PLAN 2
Pt found to have CT chest findings suggestive of lung CA with metastasis to lymph nodes. Pt notably has 80 pack year smoking history.  - s/p biopsy, pending pathology results  - on gefitinib and tamoxifen chemo  - CXR, CT Chest confirming small, R apical pneumothorax  - PET CT today

## 2020-11-30 NOTE — PHYSICAL THERAPY INITIAL EVALUATION ADULT - PERTINENT HX OF CURRENT PROBLEM, REHAB EVAL
84 y/o F PMH COPD with recent imaging findings c/f lung ca pending biopsy who presented due to shortness of breath admitted for COPD exacerbation vs symptomatic lung ca and biopsy.

## 2020-11-30 NOTE — PHYSICAL THERAPY INITIAL EVALUATION ADULT - PLANNED THERAPY INTERVENTIONS, PT EVAL
postural re-education/strengthening/transfer training/balance training/gait training/neuromuscular re-education/ROM/bed mobility training/manual therapy techniques/stretching

## 2020-11-30 NOTE — PHYSICAL THERAPY INITIAL EVALUATION ADULT - IMPAIRMENTS FOUND, PT EVAL
muscle strength/poor safety awareness/aerobic capacity/endurance/gait, locomotion, and balance/posture/joint integrity and mobility/ergonomics and body mechanics/gross motor/ROM

## 2020-11-30 NOTE — PROGRESS NOTE ADULT - PROBLEM SELECTOR PROBLEM 1
Chronic obstructive pulmonary disease with acute exacerbation

## 2020-11-30 NOTE — PROGRESS NOTE ADULT - SUBJECTIVE AND OBJECTIVE BOX
the patient feels generally well although she has some pain in the right flank and back.  The pain is no longer pleuritic but somewhat constant.  It reminds a somewhat of a cracked rib which was on the contralateral side.    Examination of the head ears eyes nose and throat demonstrates no abnormality.    Examination of the neck reveals no palpable lymphadenopathy, jugular venous distention or thyromegaly.    The lungs are clear to percussion and auscultation with somewhat distant breath sounds.  The patient has no percussion tenderness in the area of the pain.  There is no forced expiratory wheeze.    The heart sounds are regular with no evidence of murmur, rub, gallop or ectopy.    There are no palpable axillary or breast pathologies found on breast exam bilaterally.    There is no truncal obesity and there is no obvious hepatosplenomegaly.    The patient has no chronic venous stasis changes in the lower extremities.  Neurologically   there is no gross abnormality in higher cortical, cerebellar, motor or sensory functions.  The patient has no rash.

## 2020-11-30 NOTE — PROGRESS NOTE ADULT - SUBJECTIVE AND OBJECTIVE BOX
Pt seen and examined   no complaints except back pain relieved by acetaminophen    REVIEW OF SYSTEMS:  Constitutional: No fever  Resp: no sob  Cardiovascular: No chest pain, palpitations, dizziness or leg swelling  Gastrointestinal: No abdominal or epigastric pain. No nausea, no vomiting   Skin: No itching, burning, rashes or lesions   MS: back pain      MEDICATIONS:  MEDICATIONS  (STANDING):  enoxaparin Injectable 30 milliGRAM(s) SubCutaneous every 24 hours  pantoprazole    Tablet 40 milliGRAM(s) Oral before breakfast    MEDICATIONS  (PRN):  acetaminophen   Tablet .. 500 milliGRAM(s) Oral every 6 hours PRN Mild Pain (1 - 3)  albuterol/ipratropium for Nebulization. 3 milliLiter(s) Nebulizer every 4 hours PRN Shortness of Breath and/or Wheezing      Allergies    No Known Allergies    Intolerances        Vital Signs Last 24 Hrs  T(C): 36.6 (30 Nov 2020 11:20), Max: 36.7 (29 Nov 2020 21:34)  T(F): 97.8 (30 Nov 2020 11:20), Max: 98.1 (29 Nov 2020 21:34)  HR: 76 (30 Nov 2020 11:20) (70 - 87)  BP: 113/67 (30 Nov 2020 11:20) (95/58 - 138/78)  BP(mean): --  RR: 16 (30 Nov 2020 11:20) (16 - 16)  SpO2: 95% (30 Nov 2020 11:20) (92% - 96%)      PHYSICAL EXAM:    General: thin in no acute distress  HEENT: MMM, conjunctiva and sclera clear  Lungs: distant BDS but clear  Heart: regular  Gastrointestinal: Soft non-tender non-distended; Normal bowel sounds;   Skin: Warm and dry. No obvious rash    LABS:      CBC Full  -  ( 30 Nov 2020 06:34 )  WBC Count : 9.25 K/uL  RBC Count : 4.69 M/uL  Hemoglobin : 13.4 g/dL  Hematocrit : 41.9 %  Platelet Count - Automated : 113 K/uL  Mean Cell Volume : 89.3 fl  Mean Cell Hemoglobin : 28.6 pg  Mean Cell Hemoglobin Concentration : 32.0 gm/dL  Auto Neutrophil # : x  Auto Lymphocyte # : x  Auto Monocyte # : x  Auto Eosinophil # : x  Auto Basophil # : x  Auto Neutrophil % : x  Auto Lymphocyte % : x  Auto Monocyte % : x  Auto Eosinophil % : x  Auto Basophil % : x    11-30    140  |  105  |  37<H>  ----------------------------<  104<H>  5.0   |  22  |  1.13    Ca    8.8      30 Nov 2020 06:34  Phos  3.2     11-29  Mg     2.3     11-29    TPro  6.1  /  Alb  3.4  /  TBili  0.6  /  DBili  x   /  AST  22  /  ALT  19  /  AlkPhos  52  11-29                      RADIOLOGY & ADDITIONAL STUDIES (The following images were personally reviewed):

## 2020-11-30 NOTE — PROGRESS NOTE ADULT - PROBLEM SELECTOR PLAN 3
F: None  N: regular  DVT: lovenox 30 on 11/22 AM then hold for biopsy  PPI: while on steroids  FULL CODE  DISPO: [facility]
F: None  N: regular  DVT: lovenox 30 on 11/22 AM then hold for biopsy  PPI: while on steroids  FULL CODE  DISPO: [facility]
F: None  N: regular, NPO for IR  DVT: Heparin 5k x1  PPI: while on steroids  FULL CODE  DISPO: [facility]
F: None  N: regular, NPO for IR  DVT: Heparin 5k x1  PPI: while on steroids  FULL CODE  DISPO: [facility]
bronchial sputum culture growing strep pneumo  -c/w Ceftriaxone 1g X5 days (end 12/1)
F: None  N: regular, NPO for IR  DVT: Heparin 5k x1  PPI: while on steroids  FULL CODE  DISPO: [facility]
bronchial sputum culture growing strep pneumo  -c/w Ceftriaxone 1g X5 days (end 12/1)
bronchial sputum culture growing strep pneumo  -c/w Ceftriaxone 1g X5 days (end 12/1)

## 2020-11-30 NOTE — PROGRESS NOTE ADULT - SUBJECTIVE AND OBJECTIVE BOX
OVERNIGHT EVENTS:    SUBJECTIVE / INTERVAL HPI: Patient seen and examined at bedside.     VITAL SIGNS:  Vital Signs Last 24 Hrs  T(C): 36.6 (30 Nov 2020 11:20), Max: 36.7 (29 Nov 2020 21:34)  T(F): 97.8 (30 Nov 2020 11:20), Max: 98.1 (29 Nov 2020 21:34)  HR: 76 (30 Nov 2020 11:20) (75 - 87)  BP: 99/64 (30 Nov 2020 13:45) (95/58 - 130/68)  BP(mean): --  RR: 16 (30 Nov 2020 11:20) (16 - 16)  SpO2: 95% (30 Nov 2020 11:20) (92% - 95%)    PHYSICAL EXAM:    General: WDWN  HEENT: NCAT; PERRL, anicteric sclera; MMM  Neck: supple, trachea midline  Cardiovascular: S1, S2 normal; RRR, no M/G/R  Respiratory: CTABL; no W/R/R  Gastrointestinal: soft, nontender, nondistended. bowel sounds present.  Skin: no ulcerations or visible rashes appreciated  Extremities: WWP; no edema, clubbing or cyanosis  Vascular: 2+ radial, DP/PT pulses B/L  Neurological: AAOx3; CN II-XII grossly intact; no focal deficits    MEDICATIONS:  MEDICATIONS  (STANDING):  enoxaparin Injectable 30 milliGRAM(s) SubCutaneous every 24 hours  pantoprazole    Tablet 40 milliGRAM(s) Oral before breakfast    MEDICATIONS  (PRN):  acetaminophen   Tablet .. 500 milliGRAM(s) Oral every 6 hours PRN Mild Pain (1 - 3)  albuterol/ipratropium for Nebulization. 3 milliLiter(s) Nebulizer every 4 hours PRN Shortness of Breath and/or Wheezing      ALLERGIES:  Allergies    No Known Allergies    Intolerances        LABS:                        13.4   9.25  )-----------( 113      ( 30 Nov 2020 06:34 )             41.9     11-30    140  |  105  |  37<H>  ----------------------------<  104<H>  5.0   |  22  |  1.13    Ca    8.8      30 Nov 2020 06:34  Phos  3.2     11-29  Mg     2.3     11-29    TPro  6.1  /  Alb  3.4  /  TBili  0.6  /  DBili  x   /  AST  22  /  ALT  19  /  AlkPhos  52  11-29        CAPILLARY BLOOD GLUCOSE      POCT Blood Glucose.: 89 mg/dL (30 Nov 2020 13:34)      RADIOLOGY & ADDITIONAL TESTS: Reviewed. OVERNIGHT EVENTS: none    SUBJECTIVE / INTERVAL HPI: Patient seen and examined at bedside.  Patient states she was able to sleep last night doesn't feel well today.  Has back pain that radiates to her R breast but tylenol and ice packs help.  Currently no pain.  Was nauseous, but that passed.  She is able to eat and drink.  Last BM was 2 days ago.  Denies any fevers, cp, sob, abdominal pain.      VITAL SIGNS:  Vital Signs Last 24 Hrs  T(C): 36.6 (30 Nov 2020 11:20), Max: 36.7 (29 Nov 2020 21:34)  T(F): 97.8 (30 Nov 2020 11:20), Max: 98.1 (29 Nov 2020 21:34)  HR: 76 (30 Nov 2020 11:20) (75 - 87)  BP: 99/64 (30 Nov 2020 13:45) (95/58 - 130/68)  BP(mean): --  RR: 16 (30 Nov 2020 11:20) (16 - 16)  SpO2: 95% (30 Nov 2020 11:20) (92% - 95%)    PHYSICAL EXAM:    General: WDWN  HEENT: NCAT; PERRL, anicteric sclera; MMM  Neck: supple, trachea midline  Cardiovascular: S1, S2 normal; RRR, no M/G/R  Respiratory: CTABL; no W/R/R, no egophany  Gastrointestinal: soft, nontender, nondistended. bowel sounds present.  Skin: no ulcerations or visible rashes appreciated  Back: tenderness inferior and medial to R scapula, no spinal tenderness down spinal column  Extremities: WWP; no edema, clubbing or cyanosis  Vascular: 2+ radial, DP/PT pulses B/L  Neurological: AAOx3; CN II-XII grossly intact; no focal deficits    MEDICATIONS:  MEDICATIONS  (STANDING):  enoxaparin Injectable 30 milliGRAM(s) SubCutaneous every 24 hours  pantoprazole    Tablet 40 milliGRAM(s) Oral before breakfast    MEDICATIONS  (PRN):  acetaminophen   Tablet .. 500 milliGRAM(s) Oral every 6 hours PRN Mild Pain (1 - 3)  albuterol/ipratropium for Nebulization. 3 milliLiter(s) Nebulizer every 4 hours PRN Shortness of Breath and/or Wheezing      ALLERGIES:  Allergies    No Known Allergies    Intolerances        LABS:                        13.4   9.25  )-----------( 113      ( 30 Nov 2020 06:34 )             41.9     11-30    140  |  105  |  37<H>  ----------------------------<  104<H>  5.0   |  22  |  1.13    Ca    8.8      30 Nov 2020 06:34  Phos  3.2     11-29  Mg     2.3     11-29    TPro  6.1  /  Alb  3.4  /  TBili  0.6  /  DBili  x   /  AST  22  /  ALT  19  /  AlkPhos  52  11-29        CAPILLARY BLOOD GLUCOSE      POCT Blood Glucose.: 89 mg/dL (30 Nov 2020 13:34)      RADIOLOGY & ADDITIONAL TESTS: Reviewed.

## 2020-11-30 NOTE — PROGRESS NOTE ADULT - PROBLEM SELECTOR PLAN 1
Pt presented initially with some rhonchi, no wheezing but patient with symptomatic improvement after duonebs. Currently feels subjectively well with no wheezes or rhonchi on exam.  RESOLVED  - s/p solumedrol 125 in ED  - Tapered from Solumedrol 40mg IV q8 to 40mg IV q12 beginning 11/25, completed on 11/26  - c/w Duonebs q4 prn

## 2020-11-30 NOTE — PROGRESS NOTE ADULT - PROBLEM SELECTOR PROBLEM 3
Nutrition, metabolism, and development symptoms
Nutrition, metabolism, and development symptoms
CAP (community acquired pneumonia)
Nutrition, metabolism, and development symptoms
Nutrition, metabolism, and development symptoms
CAP (community acquired pneumonia)
Nutrition, metabolism, and development symptoms
CAP (community acquired pneumonia)

## 2020-11-30 NOTE — PROGRESS NOTE ADULT - PROBLEM SELECTOR PLAN 4
F: None  N: regular  DVT: Lovenox 30mg Q24  PPI: protonix 40mg QD  FULL CODE  DISPO: [email or web address]

## 2020-12-01 VITALS
HEART RATE: 82 BPM | DIASTOLIC BLOOD PRESSURE: 70 MMHG | SYSTOLIC BLOOD PRESSURE: 119 MMHG | RESPIRATION RATE: 16 BRPM | TEMPERATURE: 98 F | OXYGEN SATURATION: 92 %

## 2020-12-01 LAB
ANION GAP SERPL CALC-SCNC: 16 MMOL/L — SIGNIFICANT CHANGE UP (ref 5–17)
BUN SERPL-MCNC: 28 MG/DL — HIGH (ref 7–23)
CALCIUM SERPL-MCNC: 8.6 MG/DL — SIGNIFICANT CHANGE UP (ref 8.4–10.5)
CANCER AG125 SERPL-ACNC: 145 U/ML — HIGH
CEA SERPL-MCNC: 1.1 NG/ML — SIGNIFICANT CHANGE UP (ref 0–3.8)
CHLORIDE SERPL-SCNC: 98 MMOL/L — SIGNIFICANT CHANGE UP (ref 96–108)
CO2 SERPL-SCNC: 24 MMOL/L — SIGNIFICANT CHANGE UP (ref 22–31)
CREAT SERPL-MCNC: 1.04 MG/DL — SIGNIFICANT CHANGE UP (ref 0.5–1.3)
GLUCOSE SERPL-MCNC: 91 MG/DL — SIGNIFICANT CHANGE UP (ref 70–99)
HCT VFR BLD CALC: 42.1 % — SIGNIFICANT CHANGE UP (ref 34.5–45)
HGB BLD-MCNC: 13.7 G/DL — SIGNIFICANT CHANGE UP (ref 11.5–15.5)
MAGNESIUM SERPL-MCNC: 2.2 MG/DL — SIGNIFICANT CHANGE UP (ref 1.6–2.6)
MCHC RBC-ENTMCNC: 29.5 PG — SIGNIFICANT CHANGE UP (ref 27–34)
MCHC RBC-ENTMCNC: 32.5 GM/DL — SIGNIFICANT CHANGE UP (ref 32–36)
MCV RBC AUTO: 90.7 FL — SIGNIFICANT CHANGE UP (ref 80–100)
NRBC # BLD: 0 /100 WBCS — SIGNIFICANT CHANGE UP (ref 0–0)
PLATELET # BLD AUTO: 106 K/UL — LOW (ref 150–400)
POTASSIUM SERPL-MCNC: 4.3 MMOL/L — SIGNIFICANT CHANGE UP (ref 3.5–5.3)
POTASSIUM SERPL-SCNC: 4.3 MMOL/L — SIGNIFICANT CHANGE UP (ref 3.5–5.3)
RBC # BLD: 4.64 M/UL — SIGNIFICANT CHANGE UP (ref 3.8–5.2)
RBC # FLD: 12.9 % — SIGNIFICANT CHANGE UP (ref 10.3–14.5)
SODIUM SERPL-SCNC: 138 MMOL/L — SIGNIFICANT CHANGE UP (ref 135–145)
WBC # BLD: 8.63 K/UL — SIGNIFICANT CHANGE UP (ref 3.8–10.5)
WBC # FLD AUTO: 8.63 K/UL — SIGNIFICANT CHANGE UP (ref 3.8–10.5)

## 2020-12-01 RX ADMIN — ENOXAPARIN SODIUM 30 MILLIGRAM(S): 100 INJECTION SUBCUTANEOUS at 13:11

## 2020-12-01 RX ADMIN — PANTOPRAZOLE SODIUM 40 MILLIGRAM(S): 20 TABLET, DELAYED RELEASE ORAL at 06:25

## 2020-12-01 NOTE — PROGRESS NOTE ADULT - SUBJECTIVE AND OBJECTIVE BOX
Interventional, Pulmonary, Critical, Chest Special Procedures.    Pt was seen and fully examined by myself.     Time spent with patient in minutes:37    Patient is a 85y old  Female who presents with a chief complaint of lung biopsy (01 Dec 2020 10:13) The patient asymptomatic today. Eupneic on RA and pain free.    HPI:  86 y/o F PMH COPD not on home O2, not on home inhalers who presents due to shortness of breath. Patient reports she has been short of breath for a while (few months?) now but that in the past 3 days it has gotten much worse. She used to be able to walk 30 blocks without issue and now can walk less than half a block before becoming short of breath. Patient with chronic mild cough, no worsening cough, no wheezing, no chest pain, fevers, chills. Patient planned for lung biopsy on Monday but presented earlier due to these symptoms at the suggestion of her doctor. 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     ED vitals: afebrile, p74, /75, RR 17, 98% on RA  ED labs: Na 147, Cl 112,   EKG: NSR at 62    ED imaging  1. Since 2/9/2017, there are three new lung nodules in the right upper lobe and there is new large mediastinal and right hilar lymphadenopathy. These findings are suspicious for lung cancer with lymph node metastases.    2. No evidence of pulmonary embolism, but the right hilar adenopathy compresses right upper lobe pulmonary arteries, resulting in one branch being occluded and the other branch nearly occluded.    3. Moderate compression of superior vena cava by adenopathy with dilated internal jugular veins bilaterally.      Patient received solumedrol IV 125mg, duonebs x 2   (21 Nov 2020 20:14)      REVIEW OF SYSTEMS:  Constitutional: No fever, weight loss or fatigue  ENMT:  No difficulty hearing, tinnitus, vertigo; No sinus or throat pain  Respiratory: No cough, wheezing, chills or hemoptysis SOB better today  Cardiovascular: No chest pain, palpitations, dizziness or leg swelling  Gastrointestinal: No abdominal or epigastric pain. No nausea, vomiting or hematemesis; No diarrhea or constipation. No melena or hematochezia.  Skin: No itching, burning, rashes or lesions   Musculoskeletal: No joint pain or swelling; No muscle, back or extremity pain  PAST MEDICAL & SURGICAL HISTORY:  Chronic obstructive pulmonary disease, unspecified COPD type  PAST MEDICAL & SURGICAL HISTORY:  Chronic obstructive pulmonary disease, unspecified COPD type    PAST MEDICAL & SURGICAL HISTORY:  Chronic obstructive pulmonary disease, unspecified COPD type    Hearing loss  left ear - sees Dr. Garces ENT    Basal cell carcinoma  x2 - upper lip and arm    Chronic obstructive pulmonary disease    Osteoporosis  Osteoporosis    H/O breast biopsy  &gt; 5 years ago, mass was benign and self-resolved    History of appendectomy    History of cataract surgery      FAMILY HISTORY:    SOCIAL HISTORY:      - Tobacco     - ETOH    Allergies    No Known Allergies    Intolerances      Vital Signs Last 24 Hrs  T(C): 36.7 (01 Dec 2020 11:17), Max: 36.8 (30 Nov 2020 20:54)  T(F): 98.1 (01 Dec 2020 11:17), Max: 98.3 (30 Nov 2020 20:54)  HR: 82 (01 Dec 2020 11:17) (71 - 85)  BP: 119/70 (01 Dec 2020 11:17) (99/64 - 119/70)  BP(mean): --  RR: 16 (01 Dec 2020 11:17) (16 - 18)  SpO2: 92% (01 Dec 2020 11:17) (92% - 95%)        MEDICATIONS:  MEDICATIONS  (STANDING):  enoxaparin Injectable 30 milliGRAM(s) SubCutaneous every 24 hours  pantoprazole    Tablet 40 milliGRAM(s) Oral before breakfast    MEDICATIONS  (PRN):  acetaminophen   Tablet .. 500 milliGRAM(s) Oral every 6 hours PRN Mild Pain (1 - 3)  albuterol/ipratropium for Nebulization. 3 milliLiter(s) Nebulizer every 4 hours PRN Shortness of Breath and/or Wheezing      PHYSICAL EXAM:  UN Comfortable, no distress  Eyes: PERRL, EOM intact; conjunctiva and sclera clear  Head: Normocephalic;  No Trauma  ENMT: No nasal discharge,+ hoarseness, cough no  hemoptysis  Neck: Supple; non tender; no masses or deformities.    No JVD  Respiratory:,  - WHEEZING  + central  RHONCHI  - RALES  - CRACKLES.  Diminished breath sounds  R 1/4 cephalad today  Cardiovascular: Regular rate and rhythm. S1 and S2 Normal; No murmurs, gallops or rubs     - PPM/AICD  Gastrointestinal: Soft non-tender, non-distended; Normal bowel sounds; No hepatosplenomegaly.     -PEG    -  GT   - ALVES  Genitourinary: No costovertebral angle tenderness. No dysuria  Extremities: AROM, No clubbing, cyanosis or edema    Vascular: Peripheral pulses palpable 2+ bilaterally  Neurological: Alert and responisve to stimuli   Skin: Warm and dry. No obvious rash  Lymph Nodes: No acute cervical or supraclavicular adenopathy    DEVICES:  - DENTURES   +IV R / L     - ETUBE   -TRACH   -CTUBE  R / L    LABS:                          13.7   8.63  )-----------( 106      ( 01 Dec 2020 06:27 )             42.1     12-01    138  |  98  |  28<H>  ----------------------------<  91  4.3   |  24  |  1.04    Ca    8.6      01 Dec 2020 06:27  Mg     2.2     12-01        RADIOLOGY & ADDITIONAL STUDIES (The following images were personally reviewed):< from: NM PET/CT Onc FDG Skull to Thigh, Inital (11.30.20 @ 18:09) >  EXAM:  PETCT [facility] ONC FDG INIT                          PROCEDURE DATE:  11/30/2020          INTERPRETATION:  PET-CT ONCOLOGIC STUDY    INDICATION: Lung cancer; poorly differentiated carcinoma in the right upper lobe. Baseline study to help determine initial treatment strategy.    TECHNIQUE: Blood glucose: 89 mg/dL Intravenous access was established and the patient injected with 5.5 mCi of [id number]. After approximately 1 hour in a quiet room, the patient was positioned on theimaging table with the arms as high above the head as possible. PET/CT imaging was then performed with the standard protocol from the base of the skull to the mid thighs. The CT scan is a low dose protocol with images used for attenuation correction and localization only.    PET images were reconstructed in axial, sagittal and coronal planes using CT-based attenuation correction. Images were displayed as PET, CT and fused data sets as well as maximum intensity pixel projections.    COMPARISON: Multiple prior studies, most recent CT chest dated 11/30/2020.    FINDINGS:    Reference mean SUV, Liver: SUV 2.4.    Skull base and neck: 1.0 x 0.8 cm hypermetabolic right supraclavicular node.    Lungs and large airways: There are 2 hypermetabolic pulmonary nodules in the anterior segment right upper lobe, measuring up to 19 mm (SUV max 6.8) and 23 mm (SUV max 8.1). The 23 mm nodule corresponds to biopsy-proven carcinoma. No abnormal FDG uptake from 6 mm nodule right upper lobe which abuts the mediastinum. Small right pneumothorax, unchanged since 11/27/2020. 8 mm non-FDG avid nodular opacity in the posterior segment left upper lobe, abutting the major fissure. Moderate centrilobular emphysema.  Passive atelectasis right lower lobe secondary to effusion. Biapical pleural-parenchymal scarring.    Pleura:  Small right pleural effusion, new since 11/21/2020.    Mediastinum and hilar regions: There is hypermetabolic mediastinal and hilar lymphadenopathy (SUV max 9.9), most notable for right paratracheal and right perihilar nodes, measuring up to 2.9 cm.    Heart and pericardium:  Heart size is normal. No pericardial effusion. Left-sided Port-A-Cath in situ. Mild coronary artery calcifications.    Vessels:  Severe calcified plaque of the aortoiliac system.    Chest wall:  Normal.    Liver: Few subcentimeter hypodensities, too small to characterize.    Gallbladder: Tiny radiopaque gallstones.    Spleen:  Normal.    Pancreas:  Normal.    Adrenal glands:  Normal.    Kidneys: Left renal cysts. Normal right kidney.    Abdominal and pelvic adenopathy:  No lymphadenopathy in abdomen or pelvis.    Ascites: None.    Gastrointestinal tract: Normal noncontrast exam.    Pelvic organs: Decompressed bladder. Normal uterus. 2.4 x 1.9 cm partially calcified right adnexal lesion, may represent dermoid cyst.    Soft tissues: Normal.    Bones: Degenerative changes of the spine.    Impression:  1.  Two hypermetabolic right upper lobe pulmonary nodules, largest measuring up to 2.3 cm (biopsy proven poorly differentiated carcinoma) with right hilar, paratracheal and supracervical lymphadenopathy.  2.  Small right pneumothorax, likely postprocedural.    < end of copied text >  < from: NM PET/CT Onc FDG Skull to Thigh, Inital (11.30.20 @ 18:09) >  [facility]  [facility]                          PROCEDURE DATE:  11/30/2020          INTERPRETATION:  PET-CT ONCOLOGIC STUDY    INDICATION: Lung cancer; poorly differentiated carcinoma in the right upper lobe. Baseline study to help determine initial treatment strategy.    TECHNIQUE: Blood glucose: 89 mg/dL Intravenous access was established and the patient injected with 5.5 mCi of [id number]. After approximately 1 hour in a quiet room, the patient was positioned on theimaging table with the arms as high above the head as possible. PET/CT imaging was then performed with the standard protocol from the base of the skull to the mid thighs. The CT scan is a low dose protocol with images used for attenuation correction and localization only.    PET images were reconstructed in axial, sagittal and coronal planes using CT-based attenuation correction. Images were displayed as PET, CT and fused data sets as well as maximum intensity pixel projections.    COMPARISON: Multiple prior studies, most recent CT chest dated 11/30/2020.    FINDINGS:    Reference mean SUV, Liver: SUV 2.4.    Skull base and neck: 1.0 x 0.8 cm hypermetabolic right supraclavicular node.    Lungs and large airways: There are 2 hypermetabolic pulmonary nodules in the anterior segment right upper lobe, measuring up to 19 mm (SUV max 6.8) and 23 mm (SUV max 8.1). The 23 mm nodule corresponds to biopsy-proven carcinoma. No abnormal FDG uptake from 6 mm nodule right upper lobe which abuts the mediastinum. Small right pneumothorax, unchanged since 11/27/2020. 8 mm non-FDG avid nodular opacity in the posterior segment left upper lobe, abutting the major fissure. Moderate centrilobular emphysema.  Passive atelectasis right lower lobe secondary to effusion. Biapical pleural-parenchymal scarring.    Pleura:  Small right pleural effusion, new since 11/21/2020.    Mediastinum and hilar regions: There is hypermetabolic mediastinal and hilar lymphadenopathy (SUV max 9.9), most notable for right paratracheal and right perihilar nodes, measuring up to 2.9 cm.    Heart and pericardium:  Heart size is normal. No pericardial effusion. Left-sided Port-A-Cath in situ. Mild coronary artery calcifications.    Vessels:  Severe calcified plaque of the aortoiliac system.    Chest wall:  Normal.    Liver: Few subcentimeter hypodensities, too small to characterize.    Gallbladder: Tiny radiopaque gallstones.    Spleen:  Normal.    Pancreas:  Normal.    Adrenal glands:  Normal.    Kidneys: Left renal cysts. Normal right kidney.    Abdominal and pelvic adenopathy:  No lymphadenopathy in abdomen or pelvis.    Ascites: None.    Gastrointestinal tract: Normal noncontrast exam.    Pelvic organs: Decompressed bladder. Normal uterus. 2.4 x 1.9 cm partially calcified right adnexal lesion, may represent dermoid cyst.    Soft tissues: Normal.    Bones: Degenerative changes of the spine.    Impression:  1.  Two hypermetabolic right upper lobe pulmonary nodules, largest measuring up to 2.3 cm (biopsy proven poorly differentiated carcinoma) with right hilar, paratracheal and supracervical lymphadenopathy.  2.  Small right pneumothorax, likely postprocedural.    < end of copied text >  < from: NM PET/CT Onc FDG Skull to Thigh, Inital (11.30.20 @ 18:09) >  EXAM:  PETCT [facility] ONC FDG INIT                          PROCEDURE DATE:  11/30/2020          INTERPRETATION:  PET-CT ONCOLOGIC STUDY    INDICATION: Lung cancer; poorly differentiated carcinoma in the right upper lobe. Baseline study to help determine initial treatment strategy.    TECHNIQUE: Blood glucose: 89 mg/dL Intravenous access was established and the patient injected with 5.5 mCi of [id number]. After approximately 1 hour in a quiet room, the patient was positioned on theimaging table with the arms as high above the head as possible. PET/CT imaging was then performed with the standard protocol from the base of the skull to the mid thighs. The CT scan is a low dose protocol with images used for attenuation correction and localization only.    PET images were reconstructed in axial, sagittal and coronal planes using CT-based attenuation correction. Images were displayed as PET, CT and fused data sets as well as maximum intensity pixel projections.    COMPARISON: Multiple prior studies, most recent CT chest dated 11/30/2020.    FINDINGS:    Reference mean SUV, Liver: SUV 2.4.    Skull base and neck: 1.0 x 0.8 cm hypermetabolic right supraclavicular node.    Lungs and large airways: There are 2 hypermetabolic pulmonary nodules in the anterior segment right upper lobe, measuring up to 19 mm (SUV max 6.8) and 23 mm (SUV max 8.1). The 23 mm nodule corresponds to biopsy-proven carcinoma. No abnormal FDG uptake from 6 mm nodule right upper lobe which abuts the mediastinum. Small right pneumothorax, unchanged since 11/27/2020. 8 mm non-FDG avid nodular opacity in the posterior segment left upper lobe, abutting the major fissure. Moderate centrilobular emphysema.  Passive atelectasis right lower lobe secondary to effusion. Biapical pleural-parenchymal scarring.    Pleura:  Small right pleural effusion, new since 11/21/2020.    Mediastinum and hilar regions: There is hypermetabolic mediastinal and hilar lymphadenopathy (SUV max 9.9), most notable for right paratracheal and right perihilar nodes, measuring up to 2.9 cm.    Heart and pericardium:  Heart size is normal. No pericardial effusion. Left-sided Port-A-Cath in situ. Mild coronary artery calcifications.    Vessels:  Severe calcified plaque of the aortoiliac system.    Chest wall:  Normal.    Liver: Few subcentimeter hypodensities, too small to characterize.    Gallbladder: Tiny radiopaque gallstones.    Spleen:  Normal.    Pancreas:  Normal.    Adrenal glands:  Normal.    Kidneys: Left renal cysts. Normal right kidney.    Abdominal and pelvic adenopathy:  No lymphadenopathy in abdomen or pelvis.    Ascites: None.    Gastrointestinal tract: Normal noncontrast exam.    Pelvic organs: Decompressed bladder. Normal uterus. 2.4 x 1.9 cm partially calcified right adnexal lesion, may represent dermoid cyst.    Soft tissues: Normal.    Bones: Degenerative changes of the spine.    Impression:  1.  Two hypermetabolic right upper lobe pulmonary nodules, largest measuring up to 2.3 cm (biopsy proven poorly differentiated carcinoma) with right hilar, paratracheal and supracervical lymphadenopathy.  2.  Small right pneumothorax, likely postprocedural.    < end of copied text >

## 2020-12-01 NOTE — PROGRESS NOTE ADULT - SUBJECTIVE AND OBJECTIVE BOX
Pt seen and examined   no complaints  no sob    REVIEW OF SYSTEMS:  Constitutional: No fever, weight loss or fatigue  Cardiovascular: No chest pain, palpitations, dizziness or leg swelling  Gastrointestinal: No abdominal or epigastric pain. No nausea, vomiting or hematemesis; No diarrhea   Skin: No itching, burning, rashes or lesions       MEDICATIONS:  MEDICATIONS  (STANDING):  enoxaparin Injectable 30 milliGRAM(s) SubCutaneous every 24 hours  pantoprazole    Tablet 40 milliGRAM(s) Oral before breakfast    MEDICATIONS  (PRN):  acetaminophen   Tablet .. 500 milliGRAM(s) Oral every 6 hours PRN Mild Pain (1 - 3)  albuterol/ipratropium for Nebulization. 3 milliLiter(s) Nebulizer every 4 hours PRN Shortness of Breath and/or Wheezing      Allergies    No Known Allergies    Intolerances        Vital Signs Last 24 Hrs  T(C): 36.8 (01 Dec 2020 05:18), Max: 36.8 (30 Nov 2020 20:54)  T(F): 98.2 (01 Dec 2020 05:18), Max: 98.3 (30 Nov 2020 20:54)  HR: 71 (01 Dec 2020 05:18) (71 - 85)  BP: 108/61 (01 Dec 2020 05:18) (99/64 - 113/67)  BP(mean): --  RR: 16 (01 Dec 2020 05:18) (16 - 18)  SpO2: 95% (01 Dec 2020 05:18) (93% - 95%)      PHYSICAL EXAM:    General: thin; in no acute distress  HEENT: MMM, conjunctiva and sclera clear  Lungs: clear but distant  Heart: regular  Gastrointestinal: Soft non-tender non-distended; Normal bowel sounds;   Skin: Warm and dry. No obvious rash    LABS:      CBC Full  -  ( 01 Dec 2020 06:27 )  WBC Count : 8.63 K/uL  RBC Count : 4.64 M/uL  Hemoglobin : 13.7 g/dL  Hematocrit : 42.1 %  Platelet Count - Automated : 106 K/uL  Mean Cell Volume : 90.7 fl  Mean Cell Hemoglobin : 29.5 pg  Mean Cell Hemoglobin Concentration : 32.5 gm/dL  Auto Neutrophil # : x  Auto Lymphocyte # : x  Auto Monocyte # : x  Auto Eosinophil # : x  Auto Basophil # : x  Auto Neutrophil % : x  Auto Lymphocyte % : x  Auto Monocyte % : x  Auto Eosinophil % : x  Auto Basophil % : x    12-01    138  |  98  |  28<H>  ----------------------------<  91  4.3   |  24  |  1.04    Ca    8.6      01 Dec 2020 06:27  Mg     2.2     12-01                        RADIOLOGY & ADDITIONAL STUDIES (The following images were personally reviewed):

## 2020-12-01 NOTE — PROGRESS NOTE ADULT - SUBJECTIVE AND OBJECTIVE BOX
the patient feels quite well and does not even feel like she had chemotherapy as she has no fatigue, nausea, or discomfort other than a small productive cough.    Examination of the head ears eyes nose and throat demonstrates no abnormality.    Examination of the neck reveals no palpable lymphadenopathy, jugular venous distention or thyromegaly.    The lungs are clear to percussion and auscultation with somewhat distant breath sounds.  The patient has no percussion tenderness in the area of the pain.  There is no forced expiratory wheeze.    The heart sounds are regular with no evidence of murmur, rub, gallop or ectopy.    There are no palpable axillary or breast pathologies found on breast exam bilaterally.    There is no truncal obesity and there is no obvious hepatosplenomegaly.    The patient has no chronic venous stasis changes in the lower extremities.  Neurologically   there is no gross abnormality in higher cortical, cerebellar, motor or sensory functions.  The patient has no rash.

## 2020-12-01 NOTE — PROGRESS NOTE ADULT - ASSESSMENT
86 y/o F PMH COPD with recent imaging findings c/f lung ca pending biopsy who presented due to shortness of breath admitted for COPD exacerbation vs symptomatic lung ca and biopsy.
86 y/o F PMH COPD with recent imaging findings c/f lung ca pending biopsy who presented due to shortness of breath admitted for COPD exacerbation vs symptomatic lung ca and biopsy.
? exacerbation of COPD  lung nodules  sob better on steroids  bronchoscopy per pulmonary
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 Stfreptococcal tracheobronchitis    1. O2 attempt off  2. Bronchodilators:  Atrovent/ albuterol q 4 – 6 hours as needed  3. Corticosteroids:  now  off  4. ID/Antibiotics: Ceftriaxone to continue  5. Cardiac/HTN: optimize BP  6. GI: Rx/ prophylaxis c PPI/H2B  7. Heme: Rx/VT prophylaxis c SQH/SCD/ continue  Enoxaparin    8. Aspiration precautions  9. Mobilize, OOB  10. Use incentive spirometer  11.IR RUL BX for immunestudies requested, IR BX done  12.Repeat CXR to PA/ABAT standing  R/O R post BX PTX  Discussed with managing team
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 Stfreptococcal tracheobronchitis    1. O2 attempt off  2. Bronchodilators:  Atrovent/ albuterol q 4 – 6 hours as needed  3. Corticosteroids:  now  off  4. ID/Antibiotics: Ceftriaxone to continue  5. Cardiac/HTN: optimize BP  6. GI: Rx/ prophylaxis c PPI/H2B  7. Heme: Rx/VT prophylaxis c SQH/SCD/ continue  Enoxaparin    8. Aspiration precautions  9. Mobilize, OOB  10. Use incentive spirometer  11.IR RUL BX for immunestudies requested, IR BX done  12.Repeat CXR to R/O R post BX PTX  Discussed with managing team , 
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 Stfreptococcal tracheobronchitis    1. O2 attempt off  2. Bronchodilators:  Atrovent/ albuterol q 4 – 6 hours as needed  3. Corticosteroids:  now  off  4. ID/Antibiotics: Ceftriaxone to continue  5. Cardiac/HTN: optimize BP  6. GI: Rx/ prophylaxis c PPI/H2B  7. Heme: Rx/VT prophylaxis c SQH/SCD/ continue  Enoxaparin    8. Aspiration precautions  9. Mobilize, OOB  10. Use incentive spirometer  11.IR RUL BX for immunestudies requested, IR BX done  12Follow on formal CT results   Discussed with managing team 
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 Streptococcal tracheobronchitis, Right pneumothorax    1. O2 attempt off  2. Bronchodilators:  Atrovent/ albuterol q 4 – 6 hours as needed  3. Corticosteroids:  now  off  4. ID/Antibiotics: Ceftriaxone to complete   5. Cardiac/HTN: optimize BP  6. GI: Rx/ prophylaxis c PPI/H2B  7. Heme: Rx/VT prophylaxis c SQH/SCD/ continue  Enoxaparin    8. Aspiration precautions  9. Mobilize, OOB  10. Use incentive spirometer  11.IR RUL BX for immunestudies requested, IR BX done  12.IR verify whether the patient dtsble for D/C  Discussed with managing team
ASSESSMENT/PLAN 84 y/o female pt c worsening SOB, RUL and Central mediastinal mass clinically metastatic lung cancer.    1. O2 2LNC humidify today  2. Bronchodilators:  Atrovent/ albuterol q 4 – 6 hours as needed  3. Corticosteroids: continue  SoluMedrol 40mg q 8hrs today  4. ID/Antibiotics: off  5. Cardiac/HTN: optimize BP  6. GI: Rx/ prophylaxis c PPI/H2B  7. Heme: Rx/VT prophylaxis c SQH/SCD/ may resume Enoxaparin today   8. Aspiration precautions  9. Mobilize, OOB  10. Use incentive spirometer  11. Oncology consult Dr.Grace brady, pt discussed c him  Discussed with managing team
ASSESSMENT/PLAN 84 y/o female pt c worsening SOB, RUL and Central mediastinal mass suspected of malignancy, concern for PE    1. O2 2LNC humidify  2. Bronchodilators:  Atrovent/ albuterol q 4 – 6 hours as needed  3. Corticosteroids: continue  SoluMedrol 40mg q 8hrs  4. ID/Antibiotics: off  5. Cardiac/HTN: optimize BP  6. GI: Rx/ prophylaxis c PPI/H2B  7. Heme: Rx/VT prophylaxis c SQH/SCD/   8. Aspiration precautions  9. NPO after midnight for bronchoscopy tomorrow  10. Use incentive spirometer  Discussed with managing team
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 Stfreptococcal tracheobronchitis    1. O2 attempt off  2. Bronchodilators:  Atrovent/ albuterol q 4 – 6 hours as needed  3. Corticosteroids:  now  off  4. ID/Antibiotics: Ceftriaxone started   5. Cardiac/HTN: optimize BP  6. GI: Rx/ prophylaxis c PPI/H2B  7. Heme: Rx/VT prophylaxis c SQH/SCD/ continue  Enoxaparin    8. Aspiration precautions  9. Mobilize, OOB  10. Use incentive spirometer  11.IR RUL BX for immunestudies requested  Discussed with managing team , 
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 Stfreptococcal tracheobronchitis    1. O2 attempt off  2. Bronchodilators:  Atrovent/ albuterol q 4 – 6 hours as needed  3. Corticosteroids:  now  off  4. ID/Antibiotics: Ceftriaxone to continue  5. Cardiac/HTN: optimize BP  6. GI: Rx/ prophylaxis c PPI/H2B  7. Heme: Rx/VT prophylaxis c SQH/SCD/ continue  Enoxaparin    8. Aspiration precautions  9. Mobilize, OOB  10. Use incentive spirometer  11.IR RUL BX for immunestudies requested, IR BX done  12.Obtain CT chest non contrast to evaluate extent of R PTX in details  Discussed with managing team 
ASSESSMENT/PLAN 86 y/o female pt c worsening SOB, RUL and Central mediastinal mass adenocarcinomalung cancer.    1. O2 attempt off  2. Bronchodilators:  Atrovent/ albuterol q 4 – 6 hours as needed  3. Corticosteroids:  SoluMedrol taper to off  4. ID/Antibiotics: off  5. Cardiac/HTN: optimize BP  6. GI: Rx/ prophylaxis c PPI/H2B  7. Heme: Rx/VT prophylaxis c SQH/SCD/ continue  Enoxaparin    8. Aspiration precautions  9. Mobilize, OOB  10. Use incentive spirometer  11.PORT R sided today  Discussed with managing team , 
ASSESSMENT/PLAN 86 y/o female pt c worsening SOB, RUL and Central mediastinal mass clinically metastatic lung cancer.    1. O2 attempt off  2. Bronchodilators:  Atrovent/ albuterol q 4 – 6 hours as needed  3. Corticosteroids: continue  SoluMedrol taper  4. ID/Antibiotics: off  5. Cardiac/HTN: optimize BP  6. GI: Rx/ prophylaxis c PPI/H2B  7. Heme: Rx/VT prophylaxis c SQH/SCD/ continue  Enoxaparin    8. Aspiration precautions  9. Mobilize, OOB  10. Use incentive spirometer  11. Recommend Chemotx access PORT R sided  Discussed with managing team , 
ASSESSMENT/PLAN 86 y/o female pt c worsening SOB, RUL and Central mediastinal mass suspected of malignancy, concern for PE    1. O2 2LNC humidify  2. Bronchodilators:  Atrovent/ albuterol q 4 – 6 hours as needed  3. Corticosteroids: start SoluMedrol 40mg q 8hrs  4. ID/Antibiotics: off  5. Cardiac/HTN: optimize BP  6. GI: Rx/ prophylaxis c PPI/H2B  7. Heme: Rx/VT prophylaxis c SQH/SCD/ if positive for PE anticoagulate c initially unfractionated Heparin  8. Aspiration precautions  9. Obtain CTA PE protocol  10. Admit  Discussed with managing team ER ,       
Chest Xray right apical pneumothorax  WBC up today?  Plans: tylenol prn pain  Rxs per Pulmonary
Port in  antibiotics as bronch cultures positive for strep pneumo  patient will stay for chemo per Dr Day  broncholdilators prn
Rxs per Pulmonary  d/c planning
The differential diagnosis is that the patient has pain from her pneumothorax which came as a consequence of her lung biopsy which was done to get molecular testing accurately performed on this patient who has no immunohistochemical markers for the origins of this cancer.  The other differential diagnosis is a pulmonary embolism coming from the hypercoagulability of effective chemotherapy for this disease.
The patient has a right lower lobe malignancy involving a great deal of mediastinal lymphadenopathy.  Pathology is pending.   this is suspicious for small cell lung cancer although the patient does not have had evidence for paraneoplastic processes such as a low-sodium or Eaton-Lambert syndrome.
The patient has adenocarcinoma of the lung and stage IIIB.  The patient will go for chemotherapy today on 7 [facility].  The chemotherapy will consist of high-dose tamoxifen, gefitinib and Navelbine.  This is a kind and gentle but very effective chemotherapy for many cancers but this includes non-small cell adenocarcinoma of the lung.
The patient has had her PET-CT scan and it demonstrates only 1 right supraclavicular lymph node which is extra thoracic but clearly something that can be encompassed in 1 radiation field.
The patient's cancer is positive for adenocarcinoma.  Molecular studies are pending and this will be very important to determine whether or the patient gets chemotherapy with radiation therapy to be followed by immunotherapy or is managed with targeted therapies.
chemo per Dr Jones
discharge today  ff up with Dr Jones
for infusaport  tapering steroid  Rx per Dr Day
in endoscopy suite now for bronchoscopy  cointinue steroids per pulmonary  beta agonists
stable  d/c when okay with puilmonary
to move to chemo floor  Rxs per oncolgy
84 y/o F PMH COPD with recent imaging findings c/f lung ca pending biopsy who presented due to shortness of breath admitted for COPD exacerbation vs symptomatic lung ca and biopsy.
86 y/o F PMH COPD with recent imaging findings c/f lung ca pending biopsy who presented due to shortness of breath admitted for COPD exacerbation vs symptomatic lung ca and biopsy.
86 y/o F PMH COPD with recent imaging findings c/f lung ca pending biopsy who presented due to shortness of breath admitted for COPD exacerbation vs symptomatic lung ca and biopsy.
84 y/o F PMH COPD with recent imaging findings c/f lung ca pending biopsy who presented due to shortness of breath admitted for COPD exacerbation vs symptomatic lung ca and biopsy.

## 2020-12-01 NOTE — PROGRESS NOTE ADULT - REASON FOR ADMISSION
lung biopsy
lung biopsy sob
lung biopsy, sob
lung biopsy

## 2020-12-01 NOTE — PROGRESS NOTE ADULT - NUTRITIONAL ASSESSMENT
This patient has been assessed with a concern for Malnutrition and has been determined to have a diagnosis/diagnoses of Mild protein-calorie malnutrition and Underweight/BMI < 19.    This patient is being managed with:   Diet NPO after Midnight-     NPO Start Date: 24-Nov-2020   NPO Start Time: 23:59  Entered: Nov 24 2020  3:44PM    Diet Regular-  Entered: Nov 21 2020  8:42PM    
This patient has been assessed with a concern for Malnutrition and has been determined to have a diagnosis/diagnoses of Mild protein-calorie malnutrition and Underweight/BMI < 19.    This patient is being managed with:   Diet NPO after Midnight-     NPO Start Date: 26-Nov-2020   NPO Start Time: 23:59  Except Medications  Entered: Nov 26 2020 12:25PM    Diet Regular-  Entered: Nov 21 2020  8:42PM    
This patient has been assessed with a concern for Malnutrition and has been determined to have a diagnosis/diagnoses of Mild protein-calorie malnutrition and Underweight/BMI < 19.    This patient is being managed with:   Diet Regular-  Entered: Nov 21 2020  8:42PM    
This patient has been assessed with a concern for Malnutrition and has been determined to have a diagnosis/diagnoses of Mild protein-calorie malnutrition and Underweight/BMI < 19.    This patient is being managed with:   Diet NPO after Midnight-     NPO Start Date: 22-Nov-2020   NPO Start Time: 23:59  Entered: Nov 22 2020  1:24PM    Diet Regular-  Entered: Nov 21 2020  8:42PM    
This patient has been assessed with a concern for Malnutrition and has been determined to have a diagnosis/diagnoses of Mild protein-calorie malnutrition and Underweight/BMI < 19.    This patient is being managed with:   Diet NPO after Midnight-     NPO Start Date: 24-Nov-2020   NPO Start Time: 23:59  Entered: Nov 24 2020  3:44PM    Diet Regular-  Entered: Nov 21 2020  8:42PM    
This patient has been assessed with a concern for Malnutrition and has been determined to have a diagnosis/diagnoses of Mild protein-calorie malnutrition and Underweight/BMI < 19.    This patient is being managed with:   Diet NPO after Midnight-     NPO Start Date: 26-Nov-2020   NPO Start Time: 23:59  Except Medications  Entered: Nov 26 2020 12:25PM    Diet Regular-  Entered: Nov 21 2020  8:42PM    
This patient has been assessed with a concern for Malnutrition and has been determined to have a diagnosis/diagnoses of Mild protein-calorie malnutrition and Underweight/BMI < 19.    This patient is being managed with:   Diet NPO after Midnight-     NPO Start Date: 26-Nov-2020   NPO Start Time: 23:59  Except Medications  Entered: Nov 26 2020 12:25PM    Diet Regular-  Entered: Nov 21 2020  8:42PM    
This patient has been assessed with a concern for Malnutrition and has been determined to have a diagnosis/diagnoses of Mild protein-calorie malnutrition and Underweight/BMI < 19.    This patient is being managed with:   Diet Regular-  Entered: Nov 21 2020  8:42PM    
This patient has been assessed with a concern for Malnutrition and has been determined to have a diagnosis/diagnoses of Mild protein-calorie malnutrition and Underweight/BMI < 19.    This patient is being managed with:   Diet Regular-  Entered: Nov 30 2020  2:43PM    
This patient has been assessed with a concern for Malnutrition and has been determined to have a diagnosis/diagnoses of Mild protein-calorie malnutrition and Underweight/BMI < 19.    This patient is being managed with:   Diet Regular-  Entered: Nov 30 2020  2:43PM    
This patient has been assessed with a concern for Malnutrition and has been determined to have a diagnosis/diagnoses of Mild protein-calorie malnutrition and Underweight/BMI < 19.    This patient is being managed with:   Diet NPO after Midnight-     NPO Start Date: 22-Nov-2020   NPO Start Time: 23:59  Entered: Nov 22 2020  1:24PM    Diet Regular-  Entered: Nov 21 2020  8:42PM    
This patient has been assessed with a concern for Malnutrition and has been determined to have a diagnosis/diagnoses of Mild protein-calorie malnutrition and Underweight/BMI < 19.    This patient is being managed with:   Diet NPO after Midnight-     NPO Start Date: 22-Nov-2020   NPO Start Time: 23:59  Entered: Nov 22 2020  1:24PM    Diet Regular-  Entered: Nov 21 2020  8:42PM    
This patient has been assessed with a concern for Malnutrition and has been determined to have a diagnosis/diagnoses of Mild protein-calorie malnutrition and Underweight/BMI < 19.    This patient is being managed with:   Diet NPO after Midnight-     NPO Start Date: 24-Nov-2020   NPO Start Time: 23:59  Entered: Nov 24 2020  3:44PM    Diet Regular-  Entered: Nov 21 2020  8:42PM    
This patient has been assessed with a concern for Malnutrition and has been determined to have a diagnosis/diagnoses of Mild protein-calorie malnutrition and Underweight/BMI < 19.    This patient is being managed with:   Diet NPO after Midnight-     NPO Start Date: 26-Nov-2020   NPO Start Time: 23:59  Except Medications  Entered: Nov 26 2020 12:25PM    Diet Regular-  Entered: Nov 21 2020  8:42PM    
This patient has been assessed with a concern for Malnutrition and has been determined to have a diagnosis/diagnoses of Mild protein-calorie malnutrition and Underweight/BMI < 19.    This patient is being managed with:   Diet NPO after Midnight-     NPO Start Date: 26-Nov-2020   NPO Start Time: 23:59  Except Medications  Entered: Nov 26 2020 12:25PM    Diet Regular-  Entered: Nov 21 2020  8:42PM    
This patient has been assessed with a concern for Malnutrition and has been determined to have a diagnosis/diagnoses of Mild protein-calorie malnutrition and Underweight/BMI < 19.    This patient is being managed with:   Diet NPO-  NPO for Procedure/Test     NPO Start Date: 30-Nov-2020   NPO Start Time: 12:00  Entered: Nov 30 2020  8:23AM    
This patient has been assessed with a concern for Malnutrition and has been determined to have a diagnosis/diagnoses of Mild protein-calorie malnutrition and Underweight/BMI < 19.    This patient is being managed with:   Diet Regular-  Entered: Nov 21 2020  8:42PM    
This patient has been assessed with a concern for Malnutrition and has been determined to have a diagnosis/diagnoses of Mild protein-calorie malnutrition and Underweight/BMI < 19.    This patient is being managed with:   Diet Regular-  Entered: Nov 30 2020  2:43PM    
This patient has been assessed with a concern for Malnutrition and has been determined to have a diagnosis/diagnoses of Mild protein-calorie malnutrition and Underweight/BMI < 19.    This patient is being managed with:   Diet NPO after Midnight-     NPO Start Date: 24-Nov-2020   NPO Start Time: 23:59  Entered: Nov 24 2020  3:44PM    Diet Regular-  Entered: Nov 21 2020  8:42PM    
This patient has been assessed with a concern for Malnutrition and has been determined to have a diagnosis/diagnoses of Mild protein-calorie malnutrition and Underweight/BMI < 19.    This patient is being managed with:   Diet NPO after Midnight-     NPO Start Date: 26-Nov-2020   NPO Start Time: 23:59  Except Medications  Entered: Nov 26 2020 12:25PM    Diet Regular-  Entered: Nov 21 2020  8:42PM    
This patient has been assessed with a concern for Malnutrition and has been determined to have a diagnosis/diagnoses of Mild protein-calorie malnutrition and Underweight/BMI < 19.    This patient is being managed with:   Diet Regular-  Entered: Nov 21 2020  8:42PM    
This patient has been assessed with a concern for Malnutrition and has been determined to have a diagnosis/diagnoses of Mild protein-calorie malnutrition and Underweight/BMI < 19.    This patient is being managed with:   Diet Regular-  Entered: Nov 21 2020  8:42PM    
This patient has been assessed with a concern for Malnutrition and has been determined to have a diagnosis/diagnoses of Mild protein-calorie malnutrition and Underweight/BMI < 19.    This patient is being managed with:   Diet Regular-  Entered: Nov 30 2020  2:43PM    
This patient has been assessed with a concern for Malnutrition and has been determined to have a diagnosis/diagnoses of Mild protein-calorie malnutrition and Underweight/BMI < 19.    This patient is being managed with:   Diet NPO after Midnight-     NPO Start Date: 22-Nov-2020   NPO Start Time: 23:59  Entered: Nov 22 2020  1:24PM    Diet Regular-  Entered: Nov 21 2020  8:42PM    
This patient has been assessed with a concern for Malnutrition and has been determined to have a diagnosis/diagnoses of Mild protein-calorie malnutrition and Underweight/BMI < 19.    This patient is being managed with:   Diet Regular-  Entered: Nov 21 2020  8:42PM

## 2020-12-02 LAB
NON-GYNECOLOGICAL CYTOLOGY STUDY: SIGNIFICANT CHANGE UP
SURGICAL PATHOLOGY STUDY: SIGNIFICANT CHANGE UP

## 2020-12-07 DIAGNOSIS — J95.811 POSTPROCEDURAL PNEUMOTHORAX: ICD-10-CM

## 2020-12-07 DIAGNOSIS — Z87.891 PERSONAL HISTORY OF NICOTINE DEPENDENCE: ICD-10-CM

## 2020-12-07 DIAGNOSIS — M81.0 AGE-RELATED OSTEOPOROSIS WITHOUT CURRENT PATHOLOGICAL FRACTURE: ICD-10-CM

## 2020-12-07 DIAGNOSIS — H91.92 UNSPECIFIED HEARING LOSS, LEFT EAR: ICD-10-CM

## 2020-12-07 DIAGNOSIS — C34.90 MALIGNANT NEOPLASM OF UNSPECIFIED PART OF UNSPECIFIED BRONCHUS OR LUNG: ICD-10-CM

## 2020-12-07 DIAGNOSIS — Z90.49 ACQUIRED ABSENCE OF OTHER SPECIFIED PARTS OF DIGESTIVE TRACT: ICD-10-CM

## 2020-12-07 DIAGNOSIS — C77.1 SECONDARY AND UNSPECIFIED MALIGNANT NEOPLASM OF INTRATHORACIC LYMPH NODES: ICD-10-CM

## 2020-12-07 DIAGNOSIS — J44.1 CHRONIC OBSTRUCTIVE PULMONARY DISEASE WITH (ACUTE) EXACERBATION: ICD-10-CM

## 2020-12-07 DIAGNOSIS — J18.9 PNEUMONIA, UNSPECIFIED ORGANISM: ICD-10-CM

## 2020-12-07 DIAGNOSIS — Z85.828 PERSONAL HISTORY OF OTHER MALIGNANT NEOPLASM OF SKIN: ICD-10-CM

## 2020-12-07 DIAGNOSIS — E44.1 MILD PROTEIN-CALORIE MALNUTRITION: ICD-10-CM

## 2020-12-07 DIAGNOSIS — J98.4 OTHER DISORDERS OF LUNG: ICD-10-CM

## 2020-12-23 LAB
CULTURE RESULTS: SIGNIFICANT CHANGE UP
SPECIMEN SOURCE: SIGNIFICANT CHANGE UP

## 2020-12-28 PROCEDURE — 86769 SARS-COV-2 COVID-19 ANTIBODY: CPT

## 2020-12-28 PROCEDURE — C1769: CPT

## 2020-12-28 PROCEDURE — 84100 ASSAY OF PHOSPHORUS: CPT

## 2020-12-28 PROCEDURE — 88305 TISSUE EXAM BY PATHOLOGIST: CPT

## 2020-12-28 PROCEDURE — 86901 BLOOD TYPING SEROLOGIC RH(D): CPT

## 2020-12-28 PROCEDURE — 84484 ASSAY OF TROPONIN QUANT: CPT

## 2020-12-28 PROCEDURE — 87040 BLOOD CULTURE FOR BACTERIA: CPT

## 2020-12-28 PROCEDURE — 82378 CARCINOEMBRYONIC ANTIGEN: CPT

## 2020-12-28 PROCEDURE — 88360 TUMOR IMMUNOHISTOCHEM/MANUAL: CPT

## 2020-12-28 PROCEDURE — 97530 THERAPEUTIC ACTIVITIES: CPT

## 2020-12-28 PROCEDURE — 88173 CYTOPATH EVAL FNA REPORT: CPT

## 2020-12-28 PROCEDURE — 94640 AIRWAY INHALATION TREATMENT: CPT

## 2020-12-28 PROCEDURE — 88344 IMHCHEM/IMCYTCHM EA MLT ANTB: CPT

## 2020-12-28 PROCEDURE — 85027 COMPLETE CBC AUTOMATED: CPT

## 2020-12-28 PROCEDURE — 99152 MOD SED SAME PHYS/QHP 5/>YRS: CPT

## 2020-12-28 PROCEDURE — 71045 X-RAY EXAM CHEST 1 VIEW: CPT

## 2020-12-28 PROCEDURE — 88341 IMHCHEM/IMCYTCHM EA ADD ANTB: CPT

## 2020-12-28 PROCEDURE — 36561 INSERT TUNNELED CV CATH: CPT

## 2020-12-28 PROCEDURE — 87635 SARS-COV-2 COVID-19 AMP PRB: CPT

## 2020-12-28 PROCEDURE — C1788: CPT

## 2020-12-28 PROCEDURE — 86850 RBC ANTIBODY SCREEN: CPT

## 2020-12-28 PROCEDURE — 97116 GAIT TRAINING THERAPY: CPT

## 2020-12-28 PROCEDURE — 83880 ASSAY OF NATRIURETIC PEPTIDE: CPT

## 2020-12-28 PROCEDURE — 85730 THROMBOPLASTIN TIME PARTIAL: CPT

## 2020-12-28 PROCEDURE — 76937 US GUIDE VASCULAR ACCESS: CPT

## 2020-12-28 PROCEDURE — 87102 FUNGUS ISOLATION CULTURE: CPT

## 2020-12-28 PROCEDURE — 86900 BLOOD TYPING SEROLOGIC ABO: CPT

## 2020-12-28 PROCEDURE — A9552: CPT

## 2020-12-28 PROCEDURE — 99285 EMERGENCY DEPT VISIT HI MDM: CPT | Mod: 25

## 2020-12-28 PROCEDURE — 71275 CT ANGIOGRAPHY CHEST: CPT

## 2020-12-28 PROCEDURE — 85025 COMPLETE CBC W/AUTO DIFF WBC: CPT

## 2020-12-28 PROCEDURE — 85610 PROTHROMBIN TIME: CPT

## 2020-12-28 PROCEDURE — 32405: CPT

## 2020-12-28 PROCEDURE — 87184 SC STD DISK METHOD PER PLATE: CPT

## 2020-12-28 PROCEDURE — 88112 CYTOPATH CELL ENHANCE TECH: CPT

## 2020-12-28 PROCEDURE — 80053 COMPREHEN METABOLIC PANEL: CPT

## 2020-12-28 PROCEDURE — 99153 MOD SED SAME PHYS/QHP EA: CPT

## 2020-12-28 PROCEDURE — 71046 X-RAY EXAM CHEST 2 VIEWS: CPT

## 2020-12-28 PROCEDURE — 87070 CULTURE OTHR SPECIMN AEROBIC: CPT

## 2020-12-28 PROCEDURE — 80048 BASIC METABOLIC PNL TOTAL CA: CPT

## 2020-12-28 PROCEDURE — 97161 PT EVAL LOW COMPLEX 20 MIN: CPT

## 2020-12-28 PROCEDURE — 87181 SC STD AGAR DILUTION PER AGT: CPT

## 2020-12-28 PROCEDURE — 93005 ELECTROCARDIOGRAM TRACING: CPT

## 2020-12-28 PROCEDURE — 96374 THER/PROPH/DIAG INJ IV PUSH: CPT | Mod: XU

## 2020-12-28 PROCEDURE — 71250 CT THORAX DX C-: CPT

## 2020-12-28 PROCEDURE — 78815 PET IMAGE W/CT SKULL-THIGH: CPT

## 2020-12-28 PROCEDURE — 77001 FLUOROGUIDE FOR VEIN DEVICE: CPT

## 2020-12-28 PROCEDURE — 83735 ASSAY OF MAGNESIUM: CPT

## 2020-12-28 PROCEDURE — 86304 IMMUNOASSAY TUMOR CA 125: CPT

## 2020-12-28 PROCEDURE — 36415 COLL VENOUS BLD VENIPUNCTURE: CPT

## 2020-12-28 PROCEDURE — 88342 IMHCHEM/IMCYTCHM 1ST ANTB: CPT

## 2020-12-28 PROCEDURE — 77012 CT SCAN FOR NEEDLE BIOPSY: CPT

## 2020-12-28 PROCEDURE — 82962 GLUCOSE BLOOD TEST: CPT

## 2021-01-01 ENCOUNTER — LABORATORY RESULT (OUTPATIENT)
Age: 86
End: 2021-01-01

## 2021-01-01 ENCOUNTER — APPOINTMENT (OUTPATIENT)
Dept: OTOLARYNGOLOGY | Facility: CLINIC | Age: 86
End: 2021-01-01
Payer: MEDICARE

## 2021-01-01 ENCOUNTER — APPOINTMENT (OUTPATIENT)
Dept: UROLOGY | Facility: CLINIC | Age: 86
End: 2021-01-01
Payer: MEDICARE

## 2021-01-01 ENCOUNTER — OUTPATIENT (OUTPATIENT)
Dept: OUTPATIENT SERVICES | Facility: HOSPITAL | Age: 86
LOS: 1 days | End: 2021-01-01
Payer: MEDICARE

## 2021-01-01 ENCOUNTER — APPOINTMENT (OUTPATIENT)
Dept: INTERVENTIONAL RADIOLOGY/VASCULAR | Facility: HOSPITAL | Age: 86
End: 2021-01-01

## 2021-01-01 ENCOUNTER — NON-APPOINTMENT (OUTPATIENT)
Age: 86
End: 2021-01-01

## 2021-01-01 ENCOUNTER — INPATIENT (INPATIENT)
Facility: HOSPITAL | Age: 86
LOS: 3 days | Discharge: HOME | DRG: 736 | End: 2021-11-22
Attending: OBSTETRICS & GYNECOLOGY | Admitting: OBSTETRICS & GYNECOLOGY
Payer: MEDICARE

## 2021-01-01 ENCOUNTER — APPOINTMENT (OUTPATIENT)
Dept: GYNECOLOGIC ONCOLOGY | Facility: CLINIC | Age: 86
End: 2021-01-01
Payer: MEDICARE

## 2021-01-01 ENCOUNTER — APPOINTMENT (OUTPATIENT)
Dept: GYNECOLOGIC ONCOLOGY | Facility: HOSPITAL | Age: 86
End: 2021-01-01
Payer: MEDICARE

## 2021-01-01 ENCOUNTER — RESULT REVIEW (OUTPATIENT)
Age: 86
End: 2021-01-01

## 2021-01-01 ENCOUNTER — TRANSCRIPTION ENCOUNTER (OUTPATIENT)
Age: 86
End: 2021-01-01

## 2021-01-01 ENCOUNTER — INPATIENT (INPATIENT)
Facility: HOSPITAL | Age: 86
LOS: 11 days | Discharge: HOME HEALTH | DRG: 871 | End: 2021-03-09
Attending: INTERNAL MEDICINE | Admitting: INTERNAL MEDICINE
Payer: MEDICARE

## 2021-01-01 ENCOUNTER — EMERGENCY (EMERGENCY)
Facility: HOSPITAL | Age: 86
LOS: 1 days | Discharge: HOME | End: 2021-01-01
Attending: STUDENT IN AN ORGANIZED HEALTH CARE EDUCATION/TRAINING PROGRAM | Admitting: EMERGENCY MEDICINE
Payer: MEDICARE

## 2021-01-01 ENCOUNTER — APPOINTMENT (OUTPATIENT)
Dept: UROLOGY | Facility: CLINIC | Age: 86
End: 2021-01-01

## 2021-01-01 ENCOUNTER — APPOINTMENT (OUTPATIENT)
Dept: OTOLARYNGOLOGY | Facility: CLINIC | Age: 86
End: 2021-01-01

## 2021-01-01 ENCOUNTER — INPATIENT (INPATIENT)
Facility: HOSPITAL | Age: 86
LOS: 2 days | Discharge: HOME | DRG: 391 | End: 2021-11-29
Attending: OBSTETRICS & GYNECOLOGY | Admitting: OBSTETRICS & GYNECOLOGY
Payer: MEDICARE

## 2021-01-01 VITALS — DIASTOLIC BLOOD PRESSURE: 75 MMHG | HEART RATE: 89 BPM | TEMPERATURE: 97.3 F | SYSTOLIC BLOOD PRESSURE: 112 MMHG

## 2021-01-01 VITALS
HEART RATE: 101 BPM | OXYGEN SATURATION: 97 % | WEIGHT: 85.98 LBS | RESPIRATION RATE: 16 BRPM | SYSTOLIC BLOOD PRESSURE: 96 MMHG | TEMPERATURE: 97 F | DIASTOLIC BLOOD PRESSURE: 62 MMHG | HEIGHT: 60 IN

## 2021-01-01 VITALS
RESPIRATION RATE: 18 BRPM | DIASTOLIC BLOOD PRESSURE: 55 MMHG | HEART RATE: 69 BPM | OXYGEN SATURATION: 98 % | SYSTOLIC BLOOD PRESSURE: 107 MMHG | TEMPERATURE: 98 F

## 2021-01-01 VITALS
WEIGHT: 89.95 LBS | OXYGEN SATURATION: 95 % | DIASTOLIC BLOOD PRESSURE: 58 MMHG | SYSTOLIC BLOOD PRESSURE: 130 MMHG | HEART RATE: 105 BPM | TEMPERATURE: 100 F | RESPIRATION RATE: 32 BRPM | HEIGHT: 60 IN

## 2021-01-01 VITALS
OXYGEN SATURATION: 97 % | TEMPERATURE: 97 F | HEART RATE: 84 BPM | DIASTOLIC BLOOD PRESSURE: 56 MMHG | HEIGHT: 60 IN | SYSTOLIC BLOOD PRESSURE: 114 MMHG | WEIGHT: 82.01 LBS | RESPIRATION RATE: 18 BRPM

## 2021-01-01 VITALS — SYSTOLIC BLOOD PRESSURE: 99 MMHG | TEMPERATURE: 97.5 F | DIASTOLIC BLOOD PRESSURE: 61 MMHG | HEART RATE: 93 BPM

## 2021-01-01 VITALS
WEIGHT: 91 LBS | SYSTOLIC BLOOD PRESSURE: 115 MMHG | HEART RATE: 70 BPM | HEIGHT: 60 IN | BODY MASS INDEX: 17.87 KG/M2 | TEMPERATURE: 97 F | DIASTOLIC BLOOD PRESSURE: 72 MMHG

## 2021-01-01 VITALS
HEART RATE: 72 BPM | RESPIRATION RATE: 18 BRPM | SYSTOLIC BLOOD PRESSURE: 112 MMHG | WEIGHT: 77 LBS | BODY MASS INDEX: 15.12 KG/M2 | HEIGHT: 60 IN | TEMPERATURE: 94.8 F | OXYGEN SATURATION: 99 % | DIASTOLIC BLOOD PRESSURE: 66 MMHG

## 2021-01-01 VITALS
SYSTOLIC BLOOD PRESSURE: 110 MMHG | HEART RATE: 76 BPM | DIASTOLIC BLOOD PRESSURE: 54 MMHG | RESPIRATION RATE: 17 BRPM | TEMPERATURE: 98 F | OXYGEN SATURATION: 95 %

## 2021-01-01 VITALS
SYSTOLIC BLOOD PRESSURE: 134 MMHG | TEMPERATURE: 98 F | HEART RATE: 74 BPM | HEIGHT: 60 IN | DIASTOLIC BLOOD PRESSURE: 56 MMHG | OXYGEN SATURATION: 100 % | WEIGHT: 86.2 LBS | RESPIRATION RATE: 16 BRPM

## 2021-01-01 VITALS
DIASTOLIC BLOOD PRESSURE: 62 MMHG | SYSTOLIC BLOOD PRESSURE: 118 MMHG | BODY MASS INDEX: 16.69 KG/M2 | HEART RATE: 78 BPM | TEMPERATURE: 93.9 F | HEIGHT: 60 IN | WEIGHT: 85 LBS | OXYGEN SATURATION: 94 %

## 2021-01-01 VITALS
OXYGEN SATURATION: 94 % | HEIGHT: 60 IN | DIASTOLIC BLOOD PRESSURE: 72 MMHG | SYSTOLIC BLOOD PRESSURE: 116 MMHG | HEART RATE: 81 BPM | TEMPERATURE: 95.1 F | WEIGHT: 85 LBS | BODY MASS INDEX: 16.69 KG/M2

## 2021-01-01 VITALS — DIASTOLIC BLOOD PRESSURE: 74 MMHG | HEART RATE: 74 BPM | SYSTOLIC BLOOD PRESSURE: 121 MMHG | TEMPERATURE: 97.3 F

## 2021-01-01 VITALS
RESPIRATION RATE: 22 BRPM | TEMPERATURE: 98 F | SYSTOLIC BLOOD PRESSURE: 100 MMHG | HEART RATE: 89 BPM | DIASTOLIC BLOOD PRESSURE: 58 MMHG | OXYGEN SATURATION: 95 %

## 2021-01-01 VITALS
SYSTOLIC BLOOD PRESSURE: 106 MMHG | TEMPERATURE: 96.7 F | HEART RATE: 70 BPM | DIASTOLIC BLOOD PRESSURE: 55 MMHG | OXYGEN SATURATION: 100 %

## 2021-01-01 VITALS — TEMPERATURE: 98 F

## 2021-01-01 DIAGNOSIS — Z90.710 ACQUIRED ABSENCE OF BOTH CERVIX AND UTERUS: Chronic | ICD-10-CM

## 2021-01-01 DIAGNOSIS — E87.2 ACIDOSIS: ICD-10-CM

## 2021-01-01 DIAGNOSIS — Z98.890 OTHER SPECIFIED POSTPROCEDURAL STATES: Chronic | ICD-10-CM

## 2021-01-01 DIAGNOSIS — H91.92 UNSPECIFIED HEARING LOSS, LEFT EAR: ICD-10-CM

## 2021-01-01 DIAGNOSIS — J18.9 PNEUMONIA, UNSPECIFIED ORGANISM: ICD-10-CM

## 2021-01-01 DIAGNOSIS — C34.90 MALIGNANT NEOPLASM OF UNSPECIFIED PART OF UNSPECIFIED BRONCHUS OR LUNG: ICD-10-CM

## 2021-01-01 DIAGNOSIS — J15.9 UNSPECIFIED BACTERIAL PNEUMONIA: ICD-10-CM

## 2021-01-01 DIAGNOSIS — D64.81 ANEMIA DUE TO ANTINEOPLASTIC CHEMOTHERAPY: ICD-10-CM

## 2021-01-01 DIAGNOSIS — J44.9 CHRONIC OBSTRUCTIVE PULMONARY DISEASE, UNSPECIFIED: ICD-10-CM

## 2021-01-01 DIAGNOSIS — J31.0 CHRONIC RHINITIS: ICD-10-CM

## 2021-01-01 DIAGNOSIS — K29.71 GASTRITIS, UNSPECIFIED, WITH BLEEDING: ICD-10-CM

## 2021-01-01 DIAGNOSIS — E43 UNSPECIFIED SEVERE PROTEIN-CALORIE MALNUTRITION: ICD-10-CM

## 2021-01-01 DIAGNOSIS — R33.9 RETENTION OF URINE, UNSPECIFIED: ICD-10-CM

## 2021-01-01 DIAGNOSIS — J44.0 CHRONIC OBSTRUCTIVE PULMONARY DISEASE WITH (ACUTE) LOWER RESPIRATORY INFECTION: ICD-10-CM

## 2021-01-01 DIAGNOSIS — H91.90 UNSPECIFIED HEARING LOSS, UNSPECIFIED EAR: ICD-10-CM

## 2021-01-01 DIAGNOSIS — J33.9 NASAL POLYP, UNSPECIFIED: ICD-10-CM

## 2021-01-01 DIAGNOSIS — S09.90XA UNSPECIFIED INJURY OF HEAD, INITIAL ENCOUNTER: ICD-10-CM

## 2021-01-01 DIAGNOSIS — E87.1 HYPO-OSMOLALITY AND HYPONATREMIA: ICD-10-CM

## 2021-01-01 DIAGNOSIS — R63.6 UNDERWEIGHT: ICD-10-CM

## 2021-01-01 DIAGNOSIS — E46 UNSPECIFIED PROTEIN-CALORIE MALNUTRITION: ICD-10-CM

## 2021-01-01 DIAGNOSIS — N39.0 URINARY TRACT INFECTION, SITE NOT SPECIFIED: ICD-10-CM

## 2021-01-01 DIAGNOSIS — C78.00 SECONDARY MALIGNANT NEOPLASM OF UNSPECIFIED LUNG: ICD-10-CM

## 2021-01-01 DIAGNOSIS — Z98.49 CATARACT EXTRACTION STATUS, UNSPECIFIED EYE: Chronic | ICD-10-CM

## 2021-01-01 DIAGNOSIS — Z85.118 PERSONAL HISTORY OF OTHER MALIGNANT NEOPLASM OF BRONCHUS AND LUNG: ICD-10-CM

## 2021-01-01 DIAGNOSIS — Z90.49 ACQUIRED ABSENCE OF OTHER SPECIFIED PARTS OF DIGESTIVE TRACT: Chronic | ICD-10-CM

## 2021-01-01 DIAGNOSIS — A41.9 SEPSIS, UNSPECIFIED ORGANISM: ICD-10-CM

## 2021-01-01 DIAGNOSIS — Z85.828 PERSONAL HISTORY OF OTHER MALIGNANT NEOPLASM OF SKIN: ICD-10-CM

## 2021-01-01 DIAGNOSIS — Z66 DO NOT RESUSCITATE: ICD-10-CM

## 2021-01-01 DIAGNOSIS — Z71.89 OTHER SPECIFIED COUNSELING: ICD-10-CM

## 2021-01-01 DIAGNOSIS — D63.0 ANEMIA IN NEOPLASTIC DISEASE: ICD-10-CM

## 2021-01-01 DIAGNOSIS — Z92.21 PERSONAL HISTORY OF ANTINEOPLASTIC CHEMOTHERAPY: ICD-10-CM

## 2021-01-01 DIAGNOSIS — M81.0 AGE-RELATED OSTEOPOROSIS WITHOUT CURRENT PATHOLOGICAL FRACTURE: ICD-10-CM

## 2021-01-01 DIAGNOSIS — H90.5 UNSPECIFIED SENSORINEURAL HEARING LOSS: ICD-10-CM

## 2021-01-01 DIAGNOSIS — E86.0 DEHYDRATION: ICD-10-CM

## 2021-01-01 DIAGNOSIS — R74.01 ELEVATION OF LEVELS OF LIVER TRANSAMINASE LEVELS: ICD-10-CM

## 2021-01-01 DIAGNOSIS — J47.9 BRONCHIECTASIS, UNCOMPLICATED: ICD-10-CM

## 2021-01-01 DIAGNOSIS — D39.10 NEOPLASM OF UNCERTAIN BEHAVIOR OF UNSPECIFIED OVARY: ICD-10-CM

## 2021-01-01 DIAGNOSIS — Z90.49 ACQUIRED ABSENCE OF OTHER SPECIFIED PARTS OF DIGESTIVE TRACT: ICD-10-CM

## 2021-01-01 DIAGNOSIS — J90 PLEURAL EFFUSION, NOT ELSEWHERE CLASSIFIED: ICD-10-CM

## 2021-01-01 DIAGNOSIS — R65.20 SEVERE SEPSIS WITHOUT SEPTIC SHOCK: ICD-10-CM

## 2021-01-01 DIAGNOSIS — K92.2 GASTROINTESTINAL HEMORRHAGE, UNSPECIFIED: ICD-10-CM

## 2021-01-01 DIAGNOSIS — H61.22 IMPACTED CERUMEN, LEFT EAR: ICD-10-CM

## 2021-01-01 DIAGNOSIS — J43.9 EMPHYSEMA, UNSPECIFIED: ICD-10-CM

## 2021-01-01 DIAGNOSIS — J96.01 ACUTE RESPIRATORY FAILURE WITH HYPOXIA: ICD-10-CM

## 2021-01-01 DIAGNOSIS — Z29.9 ENCOUNTER FOR PROPHYLACTIC MEASURES, UNSPECIFIED: ICD-10-CM

## 2021-01-01 DIAGNOSIS — N85.8 OTHER SPECIFIED NONINFLAMMATORY DISORDERS OF UTERUS: ICD-10-CM

## 2021-01-01 DIAGNOSIS — C78.7 SECONDARY MALIGNANT NEOPLASM OF LIVER AND INTRAHEPATIC BILE DUCT: ICD-10-CM

## 2021-01-01 DIAGNOSIS — H93.8X2 OTHER SPECIFIED DISORDERS OF LEFT EAR: ICD-10-CM

## 2021-01-01 DIAGNOSIS — E83.39 OTHER DISORDERS OF PHOSPHORUS METABOLISM: ICD-10-CM

## 2021-01-01 DIAGNOSIS — K59.09 OTHER CONSTIPATION: ICD-10-CM

## 2021-01-01 DIAGNOSIS — J81.1 CHRONIC PULMONARY EDEMA: ICD-10-CM
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
## 2021-01-01 DIAGNOSIS — D64.9 ANEMIA, UNSPECIFIED: ICD-10-CM

## 2021-01-01 DIAGNOSIS — Z92.3 PERSONAL HISTORY OF IRRADIATION: ICD-10-CM

## 2021-01-01 DIAGNOSIS — D62 ACUTE POSTHEMORRHAGIC ANEMIA: ICD-10-CM

## 2021-01-01 DIAGNOSIS — Z87.891 PERSONAL HISTORY OF NICOTINE DEPENDENCE: ICD-10-CM

## 2021-01-01 DIAGNOSIS — Z00.00 ENCOUNTER FOR GENERAL ADULT MEDICAL EXAMINATION W/OUT ABNORMAL FINDINGS: ICD-10-CM

## 2021-01-01 DIAGNOSIS — I47.2 VENTRICULAR TACHYCARDIA: ICD-10-CM

## 2021-01-01 DIAGNOSIS — J16.0 CHLAMYDIAL PNEUMONIA: ICD-10-CM

## 2021-01-01 DIAGNOSIS — R06.02 SHORTNESS OF BREATH: ICD-10-CM

## 2021-01-01 DIAGNOSIS — Z85.820 PERSONAL HISTORY OF MALIGNANT MELANOMA OF SKIN: ICD-10-CM

## 2021-01-01 DIAGNOSIS — I10 ESSENTIAL (PRIMARY) HYPERTENSION: ICD-10-CM

## 2021-01-01 DIAGNOSIS — B37.0 CANDIDAL STOMATITIS: ICD-10-CM
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
## 2021-01-01 LAB
-  AMPICILLIN: SIGNIFICANT CHANGE UP
-  CIPROFLOXACIN: SIGNIFICANT CHANGE UP
-  LEVOFLOXACIN: SIGNIFICANT CHANGE UP
-  NITROFURANTOIN: SIGNIFICANT CHANGE UP
-  TETRACYCLINE: SIGNIFICANT CHANGE UP
-  VANCOMYCIN: SIGNIFICANT CHANGE UP
ALBUMIN SERPL ELPH-MCNC: 1.9 G/DL — LOW (ref 3.3–5)
ALBUMIN SERPL ELPH-MCNC: 2 G/DL — LOW (ref 3.3–5)
ALBUMIN SERPL ELPH-MCNC: 2 G/DL — LOW (ref 3.3–5)
ALBUMIN SERPL ELPH-MCNC: 2.3 G/DL — LOW (ref 3.3–5)
ALBUMIN SERPL ELPH-MCNC: 2.3 G/DL — LOW (ref 3.3–5)
ALBUMIN SERPL ELPH-MCNC: 2.4 G/DL — LOW (ref 3.3–5)
ALBUMIN SERPL ELPH-MCNC: 2.5 G/DL — LOW (ref 3.3–5)
ALBUMIN SERPL ELPH-MCNC: 2.5 G/DL — LOW (ref 3.3–5)
ALBUMIN SERPL ELPH-MCNC: 2.6 G/DL — LOW (ref 3.3–5)
ALBUMIN SERPL ELPH-MCNC: 2.6 G/DL — LOW (ref 3.3–5)
ALBUMIN SERPL ELPH-MCNC: 3.2 G/DL — LOW (ref 3.3–5)
ALBUMIN SERPL ELPH-MCNC: 3.4 G/DL — SIGNIFICANT CHANGE UP (ref 3.3–5)
ALBUMIN SERPL ELPH-MCNC: 3.9 G/DL — SIGNIFICANT CHANGE UP (ref 3.3–5)
ALP SERPL-CCNC: 104 U/L — SIGNIFICANT CHANGE UP (ref 40–120)
ALP SERPL-CCNC: 44 U/L — SIGNIFICANT CHANGE UP (ref 40–120)
ALP SERPL-CCNC: 47 U/L — SIGNIFICANT CHANGE UP (ref 40–120)
ALP SERPL-CCNC: 51 U/L — SIGNIFICANT CHANGE UP (ref 40–120)
ALP SERPL-CCNC: 54 U/L — SIGNIFICANT CHANGE UP (ref 40–120)
ALP SERPL-CCNC: 56 U/L — SIGNIFICANT CHANGE UP (ref 40–120)
ALP SERPL-CCNC: 58 U/L — SIGNIFICANT CHANGE UP (ref 40–120)
ALP SERPL-CCNC: 59 U/L — SIGNIFICANT CHANGE UP (ref 40–120)
ALP SERPL-CCNC: 65 U/L — SIGNIFICANT CHANGE UP (ref 40–120)
ALP SERPL-CCNC: 65 U/L — SIGNIFICANT CHANGE UP (ref 40–120)
ALP SERPL-CCNC: 74 U/L — SIGNIFICANT CHANGE UP (ref 40–120)
ALP SERPL-CCNC: 83 U/L — SIGNIFICANT CHANGE UP (ref 40–120)
ALP SERPL-CCNC: 84 U/L — SIGNIFICANT CHANGE UP (ref 40–120)
ALT FLD-CCNC: 15 U/L — SIGNIFICANT CHANGE UP (ref 10–45)
ALT FLD-CCNC: 19 U/L — SIGNIFICANT CHANGE UP (ref 10–45)
ALT FLD-CCNC: 21 U/L — SIGNIFICANT CHANGE UP (ref 10–45)
ALT FLD-CCNC: 23 U/L — SIGNIFICANT CHANGE UP (ref 10–45)
ALT FLD-CCNC: 23 U/L — SIGNIFICANT CHANGE UP (ref 10–45)
ALT FLD-CCNC: 25 U/L — SIGNIFICANT CHANGE UP (ref 10–45)
ALT FLD-CCNC: 25 U/L — SIGNIFICANT CHANGE UP (ref 10–45)
ALT FLD-CCNC: 35 U/L — SIGNIFICANT CHANGE UP (ref 10–45)
ALT FLD-CCNC: 47 U/L — HIGH (ref 10–45)
ALT FLD-CCNC: 50 U/L — HIGH (ref 10–45)
ALT FLD-CCNC: 54 U/L — HIGH (ref 10–45)
ALT FLD-CCNC: 59 U/L — HIGH (ref 10–45)
ALT FLD-CCNC: 86 U/L — HIGH (ref 10–45)
ANION GAP SERPL CALC-SCNC: 10 MMOL/L — SIGNIFICANT CHANGE UP (ref 5–17)
ANION GAP SERPL CALC-SCNC: 11 MMOL/L — SIGNIFICANT CHANGE UP (ref 5–17)
ANION GAP SERPL CALC-SCNC: 12 MMOL/L — SIGNIFICANT CHANGE UP (ref 5–17)
ANION GAP SERPL CALC-SCNC: 13 MMOL/L — SIGNIFICANT CHANGE UP (ref 5–17)
ANION GAP SERPL CALC-SCNC: 5 MMOL/L — SIGNIFICANT CHANGE UP (ref 5–17)
ANION GAP SERPL CALC-SCNC: 6 MMOL/L — SIGNIFICANT CHANGE UP (ref 5–17)
ANION GAP SERPL CALC-SCNC: 7 MMOL/L — SIGNIFICANT CHANGE UP (ref 5–17)
ANION GAP SERPL CALC-SCNC: 7 MMOL/L — SIGNIFICANT CHANGE UP (ref 5–17)
ANION GAP SERPL CALC-SCNC: 8 MMOL/L — SIGNIFICANT CHANGE UP (ref 5–17)
ANION GAP SERPL CALC-SCNC: 9 MMOL/L — SIGNIFICANT CHANGE UP (ref 5–17)
ANISOCYTOSIS BLD QL: SIGNIFICANT CHANGE UP
ANISOCYTOSIS BLD QL: SLIGHT — SIGNIFICANT CHANGE UP
APPEARANCE UR: CLEAR — SIGNIFICANT CHANGE UP
APPEARANCE UR: CLEAR — SIGNIFICANT CHANGE UP
APTT BLD: 30.9 SEC — SIGNIFICANT CHANGE UP (ref 27.5–35.5)
APTT BLD: 52.8 SEC — HIGH (ref 27.5–35.5)
AST SERPL-CCNC: 18 U/L — SIGNIFICANT CHANGE UP (ref 10–40)
AST SERPL-CCNC: 22 U/L — SIGNIFICANT CHANGE UP (ref 10–40)
AST SERPL-CCNC: 22 U/L — SIGNIFICANT CHANGE UP (ref 10–40)
AST SERPL-CCNC: 25 U/L — SIGNIFICANT CHANGE UP (ref 10–40)
AST SERPL-CCNC: 25 U/L — SIGNIFICANT CHANGE UP (ref 10–40)
AST SERPL-CCNC: 28 U/L — SIGNIFICANT CHANGE UP (ref 10–40)
AST SERPL-CCNC: 32 U/L — SIGNIFICANT CHANGE UP (ref 10–40)
AST SERPL-CCNC: 34 U/L — SIGNIFICANT CHANGE UP (ref 10–40)
AST SERPL-CCNC: 36 U/L — SIGNIFICANT CHANGE UP (ref 10–40)
AST SERPL-CCNC: 49 U/L — HIGH (ref 10–40)
AST SERPL-CCNC: 56 U/L — HIGH (ref 10–40)
BACTERIA # UR AUTO: ABNORMAL /HPF
BACTERIA # UR AUTO: PRESENT /HPF
BACTERIA UR CULT: ABNORMAL
BACTERIA UR CULT: NORMAL
BACTERIA UR CULT: NORMAL
BASOPHILS # BLD AUTO: 0 K/UL — SIGNIFICANT CHANGE UP (ref 0–0.2)
BASOPHILS # BLD AUTO: 0.01 K/UL — SIGNIFICANT CHANGE UP (ref 0–0.2)
BASOPHILS # BLD AUTO: 0.01 K/UL — SIGNIFICANT CHANGE UP (ref 0–0.2)
BASOPHILS # BLD AUTO: 0.02 K/UL — SIGNIFICANT CHANGE UP (ref 0–0.2)
BASOPHILS # BLD AUTO: 0.03 K/UL — SIGNIFICANT CHANGE UP (ref 0–0.2)
BASOPHILS # BLD AUTO: 0.04 K/UL — SIGNIFICANT CHANGE UP (ref 0–0.2)
BASOPHILS # BLD AUTO: 0.04 K/UL — SIGNIFICANT CHANGE UP (ref 0–0.2)
BASOPHILS # BLD AUTO: 0.05 K/UL — SIGNIFICANT CHANGE UP (ref 0–0.2)
BASOPHILS # BLD AUTO: 0.05 K/UL — SIGNIFICANT CHANGE UP (ref 0–0.2)
BASOPHILS NFR BLD AUTO: 0 % — SIGNIFICANT CHANGE UP (ref 0–2)
BASOPHILS NFR BLD AUTO: 0.1 % — SIGNIFICANT CHANGE UP (ref 0–2)
BASOPHILS NFR BLD AUTO: 0.2 % — SIGNIFICANT CHANGE UP (ref 0–2)
BASOPHILS NFR BLD AUTO: 0.2 % — SIGNIFICANT CHANGE UP (ref 0–2)
BASOPHILS NFR BLD AUTO: 0.3 % — SIGNIFICANT CHANGE UP (ref 0–2)
BASOPHILS NFR BLD AUTO: 0.4 % — SIGNIFICANT CHANGE UP (ref 0–2)
BASOPHILS NFR BLD AUTO: 0.5 % — SIGNIFICANT CHANGE UP (ref 0–2)
BILIRUB SERPL-MCNC: 0.2 MG/DL — SIGNIFICANT CHANGE UP (ref 0.2–1.2)
BILIRUB SERPL-MCNC: 0.2 MG/DL — SIGNIFICANT CHANGE UP (ref 0.2–1.2)
BILIRUB SERPL-MCNC: 0.3 MG/DL — SIGNIFICANT CHANGE UP (ref 0.2–1.2)
BILIRUB SERPL-MCNC: 0.4 MG/DL — SIGNIFICANT CHANGE UP (ref 0.2–1.2)
BILIRUB SERPL-MCNC: 0.6 MG/DL — SIGNIFICANT CHANGE UP (ref 0.2–1.2)
BILIRUB SERPL-MCNC: <0.2 MG/DL — SIGNIFICANT CHANGE UP (ref 0.2–1.2)
BILIRUB UR QL STRIP: NORMAL
BILIRUB UR-MCNC: NEGATIVE — SIGNIFICANT CHANGE UP
BILIRUB UR-MCNC: NEGATIVE — SIGNIFICANT CHANGE UP
BLD GP AB SCN SERPL QL: NEGATIVE — SIGNIFICANT CHANGE UP
BUN SERPL-MCNC: 10 MG/DL — SIGNIFICANT CHANGE UP (ref 7–23)
BUN SERPL-MCNC: 11 MG/DL — SIGNIFICANT CHANGE UP (ref 7–23)
BUN SERPL-MCNC: 12 MG/DL — SIGNIFICANT CHANGE UP (ref 7–23)
BUN SERPL-MCNC: 16 MG/DL — SIGNIFICANT CHANGE UP (ref 7–23)
BUN SERPL-MCNC: 19 MG/DL — SIGNIFICANT CHANGE UP (ref 7–23)
BUN SERPL-MCNC: 20 MG/DL — SIGNIFICANT CHANGE UP (ref 7–23)
BUN SERPL-MCNC: 21 MG/DL — SIGNIFICANT CHANGE UP (ref 7–23)
BUN SERPL-MCNC: 22 MG/DL — SIGNIFICANT CHANGE UP (ref 7–23)
BUN SERPL-MCNC: 22 MG/DL — SIGNIFICANT CHANGE UP (ref 7–23)
BUN SERPL-MCNC: 23 MG/DL — SIGNIFICANT CHANGE UP (ref 7–23)
BUN SERPL-MCNC: 25 MG/DL — HIGH (ref 7–23)
BUN SERPL-MCNC: 26 MG/DL — HIGH (ref 7–23)
BUN SERPL-MCNC: 29 MG/DL — HIGH (ref 7–23)
BUN SERPL-MCNC: 8 MG/DL — SIGNIFICANT CHANGE UP (ref 7–23)
BUN SERPL-MCNC: 9 MG/DL — SIGNIFICANT CHANGE UP (ref 7–23)
BURR CELLS BLD QL SMEAR: PRESENT — SIGNIFICANT CHANGE UP
BURR CELLS BLD QL SMEAR: PRESENT — SIGNIFICANT CHANGE UP
CALCIUM SERPL-MCNC: 10.3 MG/DL — SIGNIFICANT CHANGE UP (ref 8.4–10.5)
CALCIUM SERPL-MCNC: 7.3 MG/DL — LOW (ref 8.4–10.5)
CALCIUM SERPL-MCNC: 7.8 MG/DL — LOW (ref 8.4–10.5)
CALCIUM SERPL-MCNC: 8.3 MG/DL — LOW (ref 8.4–10.5)
CALCIUM SERPL-MCNC: 8.4 MG/DL — SIGNIFICANT CHANGE UP (ref 8.4–10.5)
CALCIUM SERPL-MCNC: 8.5 MG/DL — SIGNIFICANT CHANGE UP (ref 8.4–10.5)
CALCIUM SERPL-MCNC: 8.5 MG/DL — SIGNIFICANT CHANGE UP (ref 8.4–10.5)
CALCIUM SERPL-MCNC: 8.7 MG/DL — SIGNIFICANT CHANGE UP (ref 8.4–10.5)
CALCIUM SERPL-MCNC: 8.8 MG/DL — SIGNIFICANT CHANGE UP (ref 8.4–10.5)
CALCIUM SERPL-MCNC: 8.8 MG/DL — SIGNIFICANT CHANGE UP (ref 8.4–10.5)
CALCIUM SERPL-MCNC: 9 MG/DL — SIGNIFICANT CHANGE UP (ref 8.4–10.5)
CALCIUM SERPL-MCNC: 9 MG/DL — SIGNIFICANT CHANGE UP (ref 8.4–10.5)
CALCIUM SERPL-MCNC: 9.1 MG/DL — SIGNIFICANT CHANGE UP (ref 8.4–10.5)
CALCIUM SERPL-MCNC: 9.1 MG/DL — SIGNIFICANT CHANGE UP (ref 8.4–10.5)
CALCIUM SERPL-MCNC: 9.2 MG/DL — SIGNIFICANT CHANGE UP (ref 8.4–10.5)
CALCIUM SERPL-MCNC: 9.3 MG/DL — SIGNIFICANT CHANGE UP (ref 8.4–10.5)
CHLORIDE SERPL-SCNC: 100 MMOL/L — SIGNIFICANT CHANGE UP (ref 96–108)
CHLORIDE SERPL-SCNC: 100 MMOL/L — SIGNIFICANT CHANGE UP (ref 96–108)
CHLORIDE SERPL-SCNC: 101 MMOL/L — SIGNIFICANT CHANGE UP (ref 96–108)
CHLORIDE SERPL-SCNC: 102 MMOL/L — SIGNIFICANT CHANGE UP (ref 96–108)
CHLORIDE SERPL-SCNC: 102 MMOL/L — SIGNIFICANT CHANGE UP (ref 96–108)
CHLORIDE SERPL-SCNC: 104 MMOL/L — SIGNIFICANT CHANGE UP (ref 96–108)
CHLORIDE SERPL-SCNC: 105 MMOL/L — SIGNIFICANT CHANGE UP (ref 96–108)
CHLORIDE SERPL-SCNC: 106 MMOL/L — SIGNIFICANT CHANGE UP (ref 96–108)
CHLORIDE SERPL-SCNC: 108 MMOL/L — SIGNIFICANT CHANGE UP (ref 96–108)
CHLORIDE SERPL-SCNC: 109 MMOL/L — HIGH (ref 96–108)
CHLORIDE SERPL-SCNC: 110 MMOL/L — HIGH (ref 96–108)
CHLORIDE SERPL-SCNC: 110 MMOL/L — HIGH (ref 96–108)
CHLORIDE SERPL-SCNC: 111 MMOL/L — HIGH (ref 96–108)
CHLORIDE SERPL-SCNC: 111 MMOL/L — HIGH (ref 96–108)
CHLORIDE SERPL-SCNC: 98 MMOL/L — SIGNIFICANT CHANGE UP (ref 96–108)
CHLORIDE SERPL-SCNC: 98 MMOL/L — SIGNIFICANT CHANGE UP (ref 96–108)
CK MB CFR SERPL CALC: <1 NG/ML — SIGNIFICANT CHANGE UP (ref 0–6.7)
CK SERPL-CCNC: 17 U/L — LOW (ref 25–170)
CLARITY UR: CLEAR
CLARITY UR: CLEAR
CLARITY UR: NORMAL
CO2 SERPL-SCNC: 16 MMOL/L — LOW (ref 22–31)
CO2 SERPL-SCNC: 16 MMOL/L — LOW (ref 22–31)
CO2 SERPL-SCNC: 18 MMOL/L — LOW (ref 22–31)
CO2 SERPL-SCNC: 18 MMOL/L — LOW (ref 22–31)
CO2 SERPL-SCNC: 19 MMOL/L — LOW (ref 22–31)
CO2 SERPL-SCNC: 20 MMOL/L — LOW (ref 22–31)
CO2 SERPL-SCNC: 21 MMOL/L — LOW (ref 22–31)
CO2 SERPL-SCNC: 22 MMOL/L — SIGNIFICANT CHANGE UP (ref 22–31)
CO2 SERPL-SCNC: 23 MMOL/L — SIGNIFICANT CHANGE UP (ref 22–31)
CO2 SERPL-SCNC: 24 MMOL/L — SIGNIFICANT CHANGE UP (ref 22–31)
CO2 SERPL-SCNC: 25 MMOL/L — SIGNIFICANT CHANGE UP (ref 22–31)
CO2 SERPL-SCNC: 26 MMOL/L — SIGNIFICANT CHANGE UP (ref 22–31)
COD CRY URNS QL: ABNORMAL /HPF
COLLECTION METHOD: NORMAL
COLOR SPEC: YELLOW — SIGNIFICANT CHANGE UP
COLOR SPEC: YELLOW — SIGNIFICANT CHANGE UP
COMMENT - URINE: SIGNIFICANT CHANGE UP
COVID-19 NUCLEOCAPSID GAM AB INTERP: NEGATIVE — SIGNIFICANT CHANGE UP
COVID-19 NUCLEOCAPSID GAM AB INTERP: NEGATIVE — SIGNIFICANT CHANGE UP
COVID-19 NUCLEOCAPSID TOTAL GAM ANTIBODY RESULT: 0.07 INDEX — SIGNIFICANT CHANGE UP
COVID-19 NUCLEOCAPSID TOTAL GAM ANTIBODY RESULT: 0.08 INDEX — SIGNIFICANT CHANGE UP
COVID-19 SPIKE DOMAIN AB INTERP: POSITIVE
COVID-19 SPIKE DOMAIN AB INTERP: POSITIVE
COVID-19 SPIKE DOMAIN ANTIBODY RESULT: >250 U/ML — HIGH
COVID-19 SPIKE DOMAIN ANTIBODY RESULT: >250 U/ML — HIGH
CREAT SERPL-MCNC: 0.69 MG/DL — SIGNIFICANT CHANGE UP (ref 0.5–1.3)
CREAT SERPL-MCNC: 0.86 MG/DL — SIGNIFICANT CHANGE UP (ref 0.5–1.3)
CREAT SERPL-MCNC: 0.88 MG/DL — SIGNIFICANT CHANGE UP (ref 0.5–1.3)
CREAT SERPL-MCNC: 0.91 MG/DL — SIGNIFICANT CHANGE UP (ref 0.5–1.3)
CREAT SERPL-MCNC: 0.91 MG/DL — SIGNIFICANT CHANGE UP (ref 0.5–1.3)
CREAT SERPL-MCNC: 0.92 MG/DL — SIGNIFICANT CHANGE UP (ref 0.5–1.3)
CREAT SERPL-MCNC: 0.93 MG/DL — SIGNIFICANT CHANGE UP (ref 0.5–1.3)
CREAT SERPL-MCNC: 0.94 MG/DL — SIGNIFICANT CHANGE UP (ref 0.5–1.3)
CREAT SERPL-MCNC: 0.95 MG/DL — SIGNIFICANT CHANGE UP (ref 0.5–1.3)
CREAT SERPL-MCNC: 0.96 MG/DL — SIGNIFICANT CHANGE UP (ref 0.5–1.3)
CREAT SERPL-MCNC: 0.96 MG/DL — SIGNIFICANT CHANGE UP (ref 0.5–1.3)
CREAT SERPL-MCNC: 0.99 MG/DL — SIGNIFICANT CHANGE UP (ref 0.5–1.3)
CREAT SERPL-MCNC: 1.03 MG/DL — SIGNIFICANT CHANGE UP (ref 0.5–1.3)
CREAT SERPL-MCNC: 1.05 MG/DL — SIGNIFICANT CHANGE UP (ref 0.5–1.3)
CREAT SERPL-MCNC: 1.07 MG/DL — SIGNIFICANT CHANGE UP (ref 0.5–1.3)
CREAT SERPL-MCNC: 1.1 MG/DL — SIGNIFICANT CHANGE UP (ref 0.5–1.3)
CREAT SERPL-MCNC: 1.14 MG/DL — SIGNIFICANT CHANGE UP (ref 0.5–1.3)
CREAT SERPL-MCNC: 1.16 MG/DL — SIGNIFICANT CHANGE UP (ref 0.5–1.3)
CREAT SERPL-MCNC: 1.17 MG/DL — SIGNIFICANT CHANGE UP (ref 0.5–1.3)
CULTURE RESULTS: NO GROWTH — SIGNIFICANT CHANGE UP
CULTURE RESULTS: SIGNIFICANT CHANGE UP
DACRYOCYTES BLD QL SMEAR: SLIGHT — SIGNIFICANT CHANGE UP
DACRYOCYTES BLD QL SMEAR: SLIGHT — SIGNIFICANT CHANGE UP
DIFF PNL FLD: ABNORMAL
DIFF PNL FLD: NEGATIVE — SIGNIFICANT CHANGE UP
EOSINOPHIL # BLD AUTO: 0 K/UL — SIGNIFICANT CHANGE UP (ref 0–0.5)
EOSINOPHIL # BLD AUTO: 0.09 K/UL — SIGNIFICANT CHANGE UP (ref 0–0.5)
EOSINOPHIL # BLD AUTO: 0.1 K/UL — SIGNIFICANT CHANGE UP (ref 0–0.5)
EOSINOPHIL # BLD AUTO: 0.28 K/UL — SIGNIFICANT CHANGE UP (ref 0–0.5)
EOSINOPHIL # BLD AUTO: 0.39 K/UL — SIGNIFICANT CHANGE UP (ref 0–0.5)
EOSINOPHIL # BLD AUTO: 0.48 K/UL — SIGNIFICANT CHANGE UP (ref 0–0.5)
EOSINOPHIL # BLD AUTO: 0.82 K/UL — HIGH (ref 0–0.5)
EOSINOPHIL # BLD AUTO: 0.95 K/UL — HIGH (ref 0–0.5)
EOSINOPHIL # BLD AUTO: 0.96 K/UL — HIGH (ref 0–0.5)
EOSINOPHIL # BLD AUTO: 1.07 K/UL — HIGH (ref 0–0.5)
EOSINOPHIL # BLD AUTO: 1.74 K/UL — HIGH (ref 0–0.5)
EOSINOPHIL # BLD AUTO: 2.84 K/UL — HIGH (ref 0–0.5)
EOSINOPHIL NFR BLD AUTO: 0 % — SIGNIFICANT CHANGE UP (ref 0–6)
EOSINOPHIL NFR BLD AUTO: 0.7 % — SIGNIFICANT CHANGE UP (ref 0–6)
EOSINOPHIL NFR BLD AUTO: 0.9 % — SIGNIFICANT CHANGE UP (ref 0–6)
EOSINOPHIL NFR BLD AUTO: 10.4 % — HIGH (ref 0–6)
EOSINOPHIL NFR BLD AUTO: 10.8 % — HIGH (ref 0–6)
EOSINOPHIL NFR BLD AUTO: 13.1 % — HIGH (ref 0–6)
EOSINOPHIL NFR BLD AUTO: 16.4 % — HIGH (ref 0–6)
EOSINOPHIL NFR BLD AUTO: 18.7 % — HIGH (ref 0–6)
EOSINOPHIL NFR BLD AUTO: 2.8 % — SIGNIFICANT CHANGE UP (ref 0–6)
EOSINOPHIL NFR BLD AUTO: 23.9 % — HIGH (ref 0–6)
EOSINOPHIL NFR BLD AUTO: 7.1 % — HIGH (ref 0–6)
EOSINOPHIL NFR BLD AUTO: 7.6 % — HIGH (ref 0–6)
EPI CELLS # UR: ABNORMAL /HPF (ref 0–5)
EPI CELLS # UR: SIGNIFICANT CHANGE UP /HPF (ref 0–5)
FUNGITELL: <31 PG/ML — SIGNIFICANT CHANGE UP
GALACTOMANNAN AG SERPL-ACNC: <0.5 INDEX — SIGNIFICANT CHANGE UP
GIANT PLATELETS BLD QL SMEAR: PRESENT — SIGNIFICANT CHANGE UP
GLUCOSE BLDC GLUCOMTR-MCNC: 72 MG/DL — SIGNIFICANT CHANGE UP (ref 70–99)
GLUCOSE BLDC GLUCOMTR-MCNC: 79 MG/DL — SIGNIFICANT CHANGE UP (ref 70–99)
GLUCOSE SERPL-MCNC: 100 MG/DL — HIGH (ref 70–99)
GLUCOSE SERPL-MCNC: 105 MG/DL — HIGH (ref 70–99)
GLUCOSE SERPL-MCNC: 113 MG/DL — HIGH (ref 70–99)
GLUCOSE SERPL-MCNC: 128 MG/DL — HIGH (ref 70–99)
GLUCOSE SERPL-MCNC: 131 MG/DL — HIGH (ref 70–99)
GLUCOSE SERPL-MCNC: 142 MG/DL — HIGH (ref 70–99)
GLUCOSE SERPL-MCNC: 144 MG/DL — HIGH (ref 70–99)
GLUCOSE SERPL-MCNC: 167 MG/DL — HIGH (ref 70–99)
GLUCOSE SERPL-MCNC: 79 MG/DL — SIGNIFICANT CHANGE UP (ref 70–99)
GLUCOSE SERPL-MCNC: 82 MG/DL — SIGNIFICANT CHANGE UP (ref 70–99)
GLUCOSE SERPL-MCNC: 89 MG/DL — SIGNIFICANT CHANGE UP (ref 70–99)
GLUCOSE SERPL-MCNC: 90 MG/DL — SIGNIFICANT CHANGE UP (ref 70–99)
GLUCOSE SERPL-MCNC: 90 MG/DL — SIGNIFICANT CHANGE UP (ref 70–99)
GLUCOSE SERPL-MCNC: 91 MG/DL — SIGNIFICANT CHANGE UP (ref 70–99)
GLUCOSE SERPL-MCNC: 92 MG/DL — SIGNIFICANT CHANGE UP (ref 70–99)
GLUCOSE SERPL-MCNC: 94 MG/DL — SIGNIFICANT CHANGE UP (ref 70–99)
GLUCOSE SERPL-MCNC: 97 MG/DL — SIGNIFICANT CHANGE UP (ref 70–99)
GLUCOSE SERPL-MCNC: 98 MG/DL — SIGNIFICANT CHANGE UP (ref 70–99)
GLUCOSE SERPL-MCNC: 99 MG/DL — SIGNIFICANT CHANGE UP (ref 70–99)
GLUCOSE UR QL: NEGATIVE — SIGNIFICANT CHANGE UP
GLUCOSE UR QL: NEGATIVE — SIGNIFICANT CHANGE UP
GLUCOSE UR-MCNC: NORMAL
GRAM STN FLD: SIGNIFICANT CHANGE UP
HCG UR QL: 0.2 EU/DL
HCT VFR BLD CALC: 22.5 % — LOW (ref 34.5–45)
HCT VFR BLD CALC: 22.6 % — LOW (ref 34.5–45)
HCT VFR BLD CALC: 22.7 % — LOW (ref 34.5–45)
HCT VFR BLD CALC: 22.8 % — LOW (ref 34.5–45)
HCT VFR BLD CALC: 23.4 % — LOW (ref 34.5–45)
HCT VFR BLD CALC: 24.1 % — LOW (ref 34.5–45)
HCT VFR BLD CALC: 25.2 % — LOW (ref 34.5–45)
HCT VFR BLD CALC: 25.4 % — LOW (ref 34.5–45)
HCT VFR BLD CALC: 25.9 % — LOW (ref 34.5–45)
HCT VFR BLD CALC: 26.1 % — LOW (ref 34.5–45)
HCT VFR BLD CALC: 27.2 % — LOW (ref 34.5–45)
HCT VFR BLD CALC: 28.4 % — LOW (ref 34.5–45)
HCT VFR BLD CALC: 29.3 % — LOW (ref 34.5–45)
HCT VFR BLD CALC: 33.4 % — LOW (ref 34.5–45)
HCT VFR BLD CALC: 33.7 % — LOW (ref 34.5–45)
HCT VFR BLD CALC: 34.7 % — SIGNIFICANT CHANGE UP (ref 34.5–45)
HCT VFR BLD CALC: 35.3 % — SIGNIFICANT CHANGE UP (ref 34.5–45)
HCT VFR BLD CALC: 35.5 % — SIGNIFICANT CHANGE UP (ref 34.5–45)
HCT VFR BLD CALC: 35.6 % — SIGNIFICANT CHANGE UP (ref 34.5–45)
HCT VFR BLD CALC: 36.2 % — SIGNIFICANT CHANGE UP (ref 34.5–45)
HCT VFR BLD CALC: 40.9 % — SIGNIFICANT CHANGE UP (ref 34.5–45)
HGB BLD-MCNC: 10.6 G/DL — LOW (ref 11.5–15.5)
HGB BLD-MCNC: 10.6 G/DL — LOW (ref 11.5–15.5)
HGB BLD-MCNC: 11.1 G/DL — LOW (ref 11.5–15.5)
HGB BLD-MCNC: 11.3 G/DL — LOW (ref 11.5–15.5)
HGB BLD-MCNC: 11.4 G/DL — LOW (ref 11.5–15.5)
HGB BLD-MCNC: 11.4 G/DL — LOW (ref 11.5–15.5)
HGB BLD-MCNC: 12 G/DL — SIGNIFICANT CHANGE UP (ref 11.5–15.5)
HGB BLD-MCNC: 13.5 G/DL — SIGNIFICANT CHANGE UP (ref 11.5–15.5)
HGB BLD-MCNC: 6.8 G/DL — CRITICAL LOW (ref 11.5–15.5)
HGB BLD-MCNC: 6.8 G/DL — CRITICAL LOW (ref 11.5–15.5)
HGB BLD-MCNC: 6.9 G/DL — CRITICAL LOW (ref 11.5–15.5)
HGB BLD-MCNC: 6.9 G/DL — CRITICAL LOW (ref 11.5–15.5)
HGB BLD-MCNC: 7 G/DL — CRITICAL LOW (ref 11.5–15.5)
HGB BLD-MCNC: 7.4 G/DL — LOW (ref 11.5–15.5)
HGB BLD-MCNC: 7.5 G/DL — LOW (ref 11.5–15.5)
HGB BLD-MCNC: 7.6 G/DL — LOW (ref 11.5–15.5)
HGB BLD-MCNC: 7.8 G/DL — LOW (ref 11.5–15.5)
HGB BLD-MCNC: 7.8 G/DL — LOW (ref 11.5–15.5)
HGB BLD-MCNC: 8.3 G/DL — LOW (ref 11.5–15.5)
HGB BLD-MCNC: 8.5 G/DL — LOW (ref 11.5–15.5)
HGB BLD-MCNC: 8.7 G/DL — LOW (ref 11.5–15.5)
HGB UR QL STRIP.AUTO: NORMAL
HYPOCHROMIA BLD QL: SIGNIFICANT CHANGE UP
HYPOCHROMIA BLD QL: SIGNIFICANT CHANGE UP
HYPOCHROMIA BLD QL: SLIGHT — SIGNIFICANT CHANGE UP
IMM GRANULOCYTES NFR BLD AUTO: 0.5 % — SIGNIFICANT CHANGE UP (ref 0–1.5)
IMM GRANULOCYTES NFR BLD AUTO: 0.5 % — SIGNIFICANT CHANGE UP (ref 0–1.5)
IMM GRANULOCYTES NFR BLD AUTO: 0.6 % — SIGNIFICANT CHANGE UP (ref 0–1.5)
IMM GRANULOCYTES NFR BLD AUTO: 0.7 % — SIGNIFICANT CHANGE UP (ref 0–1.5)
IMM GRANULOCYTES NFR BLD AUTO: 0.7 % — SIGNIFICANT CHANGE UP (ref 0–1.5)
IMM GRANULOCYTES NFR BLD AUTO: 0.9 % — SIGNIFICANT CHANGE UP (ref 0–1.5)
IMM GRANULOCYTES NFR BLD AUTO: 1 % — SIGNIFICANT CHANGE UP (ref 0–1.5)
IMM GRANULOCYTES NFR BLD AUTO: 4 % — HIGH (ref 0–1.5)
IMM GRANULOCYTES NFR BLD AUTO: 4.4 % — HIGH (ref 0–1.5)
INR BLD: 1.5 — HIGH (ref 0.88–1.16)
INR BLD: 1.53 — HIGH (ref 0.88–1.16)
KETONES UR-MCNC: 15 MG/DL
KETONES UR-MCNC: NEGATIVE — SIGNIFICANT CHANGE UP
KETONES UR-MCNC: NORMAL
LACTATE SERPL-SCNC: 1.4 MMOL/L — SIGNIFICANT CHANGE UP (ref 0.5–2)
LACTATE SERPL-SCNC: 2.3 MMOL/L — HIGH (ref 0.5–2)
LDH SERPL L TO P-CCNC: 282 U/L — HIGH (ref 50–242)
LEGIONELLA AG UR QL: NEGATIVE — SIGNIFICANT CHANGE UP
LEUKOCYTE ESTERASE UR QL STRIP: NORMAL
LEUKOCYTE ESTERASE UR-ACNC: ABNORMAL
LEUKOCYTE ESTERASE UR-ACNC: NEGATIVE — SIGNIFICANT CHANGE UP
LIDOCAIN IGE QN: 15 U/L — SIGNIFICANT CHANGE UP (ref 7–60)
LYMPHOCYTES # BLD AUTO: 0 % — LOW (ref 13–44)
LYMPHOCYTES # BLD AUTO: 0 K/UL — LOW (ref 1–3.3)
LYMPHOCYTES # BLD AUTO: 0.09 K/UL — LOW (ref 1–3.3)
LYMPHOCYTES # BLD AUTO: 0.14 K/UL — LOW (ref 1–3.3)
LYMPHOCYTES # BLD AUTO: 0.31 K/UL — LOW (ref 1–3.3)
LYMPHOCYTES # BLD AUTO: 0.32 K/UL — LOW (ref 1–3.3)
LYMPHOCYTES # BLD AUTO: 0.34 K/UL — LOW (ref 1–3.3)
LYMPHOCYTES # BLD AUTO: 0.38 K/UL — LOW (ref 1–3.3)
LYMPHOCYTES # BLD AUTO: 0.39 K/UL — LOW (ref 1–3.3)
LYMPHOCYTES # BLD AUTO: 0.44 K/UL — LOW (ref 1–3.3)
LYMPHOCYTES # BLD AUTO: 0.57 K/UL — LOW (ref 1–3.3)
LYMPHOCYTES # BLD AUTO: 0.61 K/UL — LOW (ref 1–3.3)
LYMPHOCYTES # BLD AUTO: 0.61 K/UL — LOW (ref 1–3.3)
LYMPHOCYTES # BLD AUTO: 0.63 K/UL — LOW (ref 1–3.3)
LYMPHOCYTES # BLD AUTO: 0.66 K/UL — LOW (ref 1–3.3)
LYMPHOCYTES # BLD AUTO: 0.67 K/UL — LOW (ref 1–3.3)
LYMPHOCYTES # BLD AUTO: 0.71 K/UL — LOW (ref 1–3.3)
LYMPHOCYTES # BLD AUTO: 0.87 K/UL — LOW (ref 1–3.3)
LYMPHOCYTES # BLD AUTO: 0.9 % — LOW (ref 13–44)
LYMPHOCYTES # BLD AUTO: 1.8 % — LOW (ref 13–44)
LYMPHOCYTES # BLD AUTO: 15.8 % — SIGNIFICANT CHANGE UP (ref 13–44)
LYMPHOCYTES # BLD AUTO: 2.6 % — LOW (ref 13–44)
LYMPHOCYTES # BLD AUTO: 2.6 % — LOW (ref 13–44)
LYMPHOCYTES # BLD AUTO: 3.5 % — LOW (ref 13–44)
LYMPHOCYTES # BLD AUTO: 3.6 % — LOW (ref 13–44)
LYMPHOCYTES # BLD AUTO: 3.6 % — LOW (ref 13–44)
LYMPHOCYTES # BLD AUTO: 4.2 % — LOW (ref 13–44)
LYMPHOCYTES # BLD AUTO: 6.2 % — LOW (ref 13–44)
LYMPHOCYTES # BLD AUTO: 6.5 % — LOW (ref 13–44)
LYMPHOCYTES # BLD AUTO: 6.9 % — LOW (ref 13–44)
LYMPHOCYTES # BLD AUTO: 7.1 % — LOW (ref 13–44)
LYMPHOCYTES # BLD AUTO: 7.6 % — LOW (ref 13–44)
LYMPHOCYTES # BLD AUTO: 8.6 % — LOW (ref 13–44)
LYMPHOCYTES # BLD AUTO: 8.7 % — LOW (ref 13–44)
MACROCYTES BLD QL: SLIGHT — SIGNIFICANT CHANGE UP
MAGNESIUM SERPL-MCNC: 1.6 MG/DL — SIGNIFICANT CHANGE UP (ref 1.6–2.6)
MAGNESIUM SERPL-MCNC: 1.8 MG/DL — SIGNIFICANT CHANGE UP (ref 1.6–2.6)
MAGNESIUM SERPL-MCNC: 1.9 MG/DL — SIGNIFICANT CHANGE UP (ref 1.6–2.6)
MAGNESIUM SERPL-MCNC: 2 MG/DL — SIGNIFICANT CHANGE UP (ref 1.6–2.6)
MAGNESIUM SERPL-MCNC: 2.1 MG/DL — SIGNIFICANT CHANGE UP (ref 1.6–2.6)
MAGNESIUM SERPL-MCNC: 2.2 MG/DL — SIGNIFICANT CHANGE UP (ref 1.6–2.6)
MAGNESIUM SERPL-MCNC: 2.3 MG/DL — SIGNIFICANT CHANGE UP (ref 1.6–2.6)
MANUAL SMEAR VERIFICATION: SIGNIFICANT CHANGE UP
MCHC RBC-ENTMCNC: 26.2 PG — LOW (ref 27–34)
MCHC RBC-ENTMCNC: 26.5 PG — LOW (ref 27–34)
MCHC RBC-ENTMCNC: 26.6 PG — LOW (ref 27–34)
MCHC RBC-ENTMCNC: 26.7 PG — LOW (ref 27–34)
MCHC RBC-ENTMCNC: 26.7 PG — LOW (ref 27–34)
MCHC RBC-ENTMCNC: 26.8 PG — LOW (ref 27–34)
MCHC RBC-ENTMCNC: 26.8 PG — LOW (ref 27–34)
MCHC RBC-ENTMCNC: 26.9 PG — LOW (ref 27–34)
MCHC RBC-ENTMCNC: 27 PG — SIGNIFICANT CHANGE UP (ref 27–34)
MCHC RBC-ENTMCNC: 27.2 PG — SIGNIFICANT CHANGE UP (ref 27–34)
MCHC RBC-ENTMCNC: 28 PG — SIGNIFICANT CHANGE UP (ref 27–34)
MCHC RBC-ENTMCNC: 29 GM/DL — LOW (ref 32–36)
MCHC RBC-ENTMCNC: 29.1 GM/DL — LOW (ref 32–36)
MCHC RBC-ENTMCNC: 29.5 GM/DL — LOW (ref 32–36)
MCHC RBC-ENTMCNC: 29.7 PG — SIGNIFICANT CHANGE UP (ref 27–34)
MCHC RBC-ENTMCNC: 29.8 GM/DL — LOW (ref 32–36)
MCHC RBC-ENTMCNC: 29.9 PG — SIGNIFICANT CHANGE UP (ref 27–34)
MCHC RBC-ENTMCNC: 29.9 PG — SIGNIFICANT CHANGE UP (ref 27–34)
MCHC RBC-ENTMCNC: 30 PG — SIGNIFICANT CHANGE UP (ref 27–34)
MCHC RBC-ENTMCNC: 30.1 GM/DL — LOW (ref 32–36)
MCHC RBC-ENTMCNC: 30.1 GM/DL — LOW (ref 32–36)
MCHC RBC-ENTMCNC: 30.2 GM/DL — LOW (ref 32–36)
MCHC RBC-ENTMCNC: 30.4 GM/DL — LOW (ref 32–36)
MCHC RBC-ENTMCNC: 30.5 GM/DL — LOW (ref 32–36)
MCHC RBC-ENTMCNC: 30.6 GM/DL — LOW (ref 32–36)
MCHC RBC-ENTMCNC: 30.7 GM/DL — LOW (ref 32–36)
MCHC RBC-ENTMCNC: 30.8 PG — SIGNIFICANT CHANGE UP (ref 27–34)
MCHC RBC-ENTMCNC: 30.9 PG — SIGNIFICANT CHANGE UP (ref 27–34)
MCHC RBC-ENTMCNC: 31.1 PG — SIGNIFICANT CHANGE UP (ref 27–34)
MCHC RBC-ENTMCNC: 31.1 PG — SIGNIFICANT CHANGE UP (ref 27–34)
MCHC RBC-ENTMCNC: 31.5 GM/DL — LOW (ref 32–36)
MCHC RBC-ENTMCNC: 31.7 GM/DL — LOW (ref 32–36)
MCHC RBC-ENTMCNC: 31.7 GM/DL — LOW (ref 32–36)
MCHC RBC-ENTMCNC: 32 GM/DL — SIGNIFICANT CHANGE UP (ref 32–36)
MCHC RBC-ENTMCNC: 32.1 GM/DL — SIGNIFICANT CHANGE UP (ref 32–36)
MCHC RBC-ENTMCNC: 32.3 GM/DL — SIGNIFICANT CHANGE UP (ref 32–36)
MCHC RBC-ENTMCNC: 33 GM/DL — SIGNIFICANT CHANGE UP (ref 32–36)
MCHC RBC-ENTMCNC: 33.1 GM/DL — SIGNIFICANT CHANGE UP (ref 32–36)
MCV RBC AUTO: 85.5 FL — SIGNIFICANT CHANGE UP (ref 80–100)
MCV RBC AUTO: 87.7 FL — SIGNIFICANT CHANGE UP (ref 80–100)
MCV RBC AUTO: 88 FL — SIGNIFICANT CHANGE UP (ref 80–100)
MCV RBC AUTO: 88.2 FL — SIGNIFICANT CHANGE UP (ref 80–100)
MCV RBC AUTO: 88.3 FL — SIGNIFICANT CHANGE UP (ref 80–100)
MCV RBC AUTO: 88.4 FL — SIGNIFICANT CHANGE UP (ref 80–100)
MCV RBC AUTO: 89.2 FL — SIGNIFICANT CHANGE UP (ref 80–100)
MCV RBC AUTO: 89.7 FL — SIGNIFICANT CHANGE UP (ref 80–100)
MCV RBC AUTO: 90.4 FL — SIGNIFICANT CHANGE UP (ref 80–100)
MCV RBC AUTO: 90.9 FL — SIGNIFICANT CHANGE UP (ref 80–100)
MCV RBC AUTO: 91 FL — SIGNIFICANT CHANGE UP (ref 80–100)
MCV RBC AUTO: 91.1 FL — SIGNIFICANT CHANGE UP (ref 80–100)
MCV RBC AUTO: 93.1 FL — SIGNIFICANT CHANGE UP (ref 80–100)
MCV RBC AUTO: 93.6 FL — SIGNIFICANT CHANGE UP (ref 80–100)
MCV RBC AUTO: 93.7 FL — SIGNIFICANT CHANGE UP (ref 80–100)
MCV RBC AUTO: 93.8 FL — SIGNIFICANT CHANGE UP (ref 80–100)
MCV RBC AUTO: 93.9 FL — SIGNIFICANT CHANGE UP (ref 80–100)
MCV RBC AUTO: 94.1 FL — SIGNIFICANT CHANGE UP (ref 80–100)
MCV RBC AUTO: 94.9 FL — SIGNIFICANT CHANGE UP (ref 80–100)
MCV RBC AUTO: 96.2 FL — SIGNIFICANT CHANGE UP (ref 80–100)
MCV RBC AUTO: 96.7 FL — SIGNIFICANT CHANGE UP (ref 80–100)
METHOD TYPE: SIGNIFICANT CHANGE UP
MICROCYTES BLD QL: SLIGHT — SIGNIFICANT CHANGE UP
MONOCYTES # BLD AUTO: 0 K/UL — SIGNIFICANT CHANGE UP (ref 0–0.9)
MONOCYTES # BLD AUTO: 0.09 K/UL — SIGNIFICANT CHANGE UP (ref 0–0.9)
MONOCYTES # BLD AUTO: 0.09 K/UL — SIGNIFICANT CHANGE UP (ref 0–0.9)
MONOCYTES # BLD AUTO: 0.27 K/UL — SIGNIFICANT CHANGE UP (ref 0–0.9)
MONOCYTES # BLD AUTO: 0.32 K/UL — SIGNIFICANT CHANGE UP (ref 0–0.9)
MONOCYTES # BLD AUTO: 0.39 K/UL — SIGNIFICANT CHANGE UP (ref 0–0.9)
MONOCYTES # BLD AUTO: 0.45 K/UL — SIGNIFICANT CHANGE UP (ref 0–0.9)
MONOCYTES # BLD AUTO: 0.51 K/UL — SIGNIFICANT CHANGE UP (ref 0–0.9)
MONOCYTES # BLD AUTO: 0.52 K/UL — SIGNIFICANT CHANGE UP (ref 0–0.9)
MONOCYTES # BLD AUTO: 0.56 K/UL — SIGNIFICANT CHANGE UP (ref 0–0.9)
MONOCYTES # BLD AUTO: 0.57 K/UL — SIGNIFICANT CHANGE UP (ref 0–0.9)
MONOCYTES # BLD AUTO: 0.6 K/UL — SIGNIFICANT CHANGE UP (ref 0–0.9)
MONOCYTES # BLD AUTO: 0.61 K/UL — SIGNIFICANT CHANGE UP (ref 0–0.9)
MONOCYTES # BLD AUTO: 0.65 K/UL — SIGNIFICANT CHANGE UP (ref 0–0.9)
MONOCYTES # BLD AUTO: 0.79 K/UL — SIGNIFICANT CHANGE UP (ref 0–0.9)
MONOCYTES # BLD AUTO: 0.89 K/UL — SIGNIFICANT CHANGE UP (ref 0–0.9)
MONOCYTES # BLD AUTO: 1.04 K/UL — HIGH (ref 0–0.9)
MONOCYTES NFR BLD AUTO: 0 % — LOW (ref 2–14)
MONOCYTES NFR BLD AUTO: 0.9 % — LOW (ref 2–14)
MONOCYTES NFR BLD AUTO: 0.9 % — LOW (ref 2–14)
MONOCYTES NFR BLD AUTO: 10.4 % — SIGNIFICANT CHANGE UP (ref 2–14)
MONOCYTES NFR BLD AUTO: 2.6 % — SIGNIFICANT CHANGE UP (ref 2–14)
MONOCYTES NFR BLD AUTO: 3.1 % — SIGNIFICANT CHANGE UP (ref 2–14)
MONOCYTES NFR BLD AUTO: 3.6 % — SIGNIFICANT CHANGE UP (ref 2–14)
MONOCYTES NFR BLD AUTO: 4.3 % — SIGNIFICANT CHANGE UP (ref 2–14)
MONOCYTES NFR BLD AUTO: 6.2 % — SIGNIFICANT CHANGE UP (ref 2–14)
MONOCYTES NFR BLD AUTO: 6.4 % — SIGNIFICANT CHANGE UP (ref 2–14)
MONOCYTES NFR BLD AUTO: 7.1 % — SIGNIFICANT CHANGE UP (ref 2–14)
MONOCYTES NFR BLD AUTO: 7.2 % — SIGNIFICANT CHANGE UP (ref 2–14)
MONOCYTES NFR BLD AUTO: 7.4 % — SIGNIFICANT CHANGE UP (ref 2–14)
MONOCYTES NFR BLD AUTO: 7.8 % — SIGNIFICANT CHANGE UP (ref 2–14)
MONOCYTES NFR BLD AUTO: 7.9 % — SIGNIFICANT CHANGE UP (ref 2–14)
MONOCYTES NFR BLD AUTO: 8.6 % — SIGNIFICANT CHANGE UP (ref 2–14)
MONOCYTES NFR BLD AUTO: 9.3 % — SIGNIFICANT CHANGE UP (ref 2–14)
MRSA PCR RESULT.: NEGATIVE — SIGNIFICANT CHANGE UP
MRSA PCR RESULT.: NEGATIVE — SIGNIFICANT CHANGE UP
NEUTROPHILS # BLD AUTO: 11.58 K/UL — HIGH (ref 1.8–7.4)
NEUTROPHILS # BLD AUTO: 12 K/UL — HIGH (ref 1.8–7.4)
NEUTROPHILS # BLD AUTO: 12.62 K/UL — HIGH (ref 1.8–7.4)
NEUTROPHILS # BLD AUTO: 3.62 K/UL — SIGNIFICANT CHANGE UP (ref 1.8–7.4)
NEUTROPHILS # BLD AUTO: 4.04 K/UL — SIGNIFICANT CHANGE UP (ref 1.8–7.4)
NEUTROPHILS # BLD AUTO: 4.76 K/UL — SIGNIFICANT CHANGE UP (ref 1.8–7.4)
NEUTROPHILS # BLD AUTO: 5.12 K/UL — SIGNIFICANT CHANGE UP (ref 1.8–7.4)
NEUTROPHILS # BLD AUTO: 6.26 K/UL — SIGNIFICANT CHANGE UP (ref 1.8–7.4)
NEUTROPHILS # BLD AUTO: 6.3 K/UL — SIGNIFICANT CHANGE UP (ref 1.8–7.4)
NEUTROPHILS # BLD AUTO: 6.46 K/UL — SIGNIFICANT CHANGE UP (ref 1.8–7.4)
NEUTROPHILS # BLD AUTO: 8 K/UL — HIGH (ref 1.8–7.4)
NEUTROPHILS # BLD AUTO: 8.1 K/UL — HIGH (ref 1.8–7.4)
NEUTROPHILS # BLD AUTO: 8.23 K/UL — HIGH (ref 1.8–7.4)
NEUTROPHILS # BLD AUTO: 9.04 K/UL — HIGH (ref 1.8–7.4)
NEUTROPHILS # BLD AUTO: 9.55 K/UL — HIGH (ref 1.8–7.4)
NEUTROPHILS # BLD AUTO: 9.68 K/UL — HIGH (ref 1.8–7.4)
NEUTROPHILS # BLD AUTO: 9.94 K/UL — HIGH (ref 1.8–7.4)
NEUTROPHILS NFR BLD AUTO: 62 % — SIGNIFICANT CHANGE UP (ref 43–77)
NEUTROPHILS NFR BLD AUTO: 65.8 % — SIGNIFICANT CHANGE UP (ref 43–77)
NEUTROPHILS NFR BLD AUTO: 67.3 % — SIGNIFICANT CHANGE UP (ref 43–77)
NEUTROPHILS NFR BLD AUTO: 69.2 % — SIGNIFICANT CHANGE UP (ref 43–77)
NEUTROPHILS NFR BLD AUTO: 70 % — SIGNIFICANT CHANGE UP (ref 43–77)
NEUTROPHILS NFR BLD AUTO: 73.3 % — SIGNIFICANT CHANGE UP (ref 43–77)
NEUTROPHILS NFR BLD AUTO: 75.4 % — SIGNIFICANT CHANGE UP (ref 43–77)
NEUTROPHILS NFR BLD AUTO: 80 % — HIGH (ref 43–77)
NEUTROPHILS NFR BLD AUTO: 81.1 % — HIGH (ref 43–77)
NEUTROPHILS NFR BLD AUTO: 83.2 % — HIGH (ref 43–77)
NEUTROPHILS NFR BLD AUTO: 86.9 % — HIGH (ref 43–77)
NEUTROPHILS NFR BLD AUTO: 91.8 % — HIGH (ref 43–77)
NEUTROPHILS NFR BLD AUTO: 92.3 % — HIGH (ref 43–77)
NEUTROPHILS NFR BLD AUTO: 95.5 % — HIGH (ref 43–77)
NEUTROPHILS NFR BLD AUTO: 96.5 % — HIGH (ref 43–77)
NEUTROPHILS NFR BLD AUTO: 97.4 % — HIGH (ref 43–77)
NEUTROPHILS NFR BLD AUTO: 98.2 % — HIGH (ref 43–77)
NITRITE UR QL STRIP: NORMAL
NITRITE UR QL STRIP: NORMAL
NITRITE UR QL STRIP: POSITIVE
NITRITE UR-MCNC: NEGATIVE — SIGNIFICANT CHANGE UP
NITRITE UR-MCNC: NEGATIVE — SIGNIFICANT CHANGE UP
NON-GYNECOLOGICAL CYTOLOGY STUDY: SIGNIFICANT CHANGE UP
NON-GYNECOLOGICAL CYTOLOGY STUDY: SIGNIFICANT CHANGE UP
NRBC # BLD: 0 /100 WBCS — SIGNIFICANT CHANGE UP (ref 0–0)
NT-PROBNP SERPL-SCNC: 1232 PG/ML — HIGH (ref 0–300)
NT-PROBNP SERPL-SCNC: 2108 PG/ML — HIGH (ref 0–300)
ORGANISM # SPEC MICROSCOPIC CNT: SIGNIFICANT CHANGE UP
ORGANISM # SPEC MICROSCOPIC CNT: SIGNIFICANT CHANGE UP
OVALOCYTES BLD QL SMEAR: SLIGHT — SIGNIFICANT CHANGE UP
PH UR STRIP: 6
PH UR STRIP: 6
PH UR STRIP: 6.5
PH UR: 5.5 — SIGNIFICANT CHANGE UP (ref 5–8)
PH UR: 6 — SIGNIFICANT CHANGE UP (ref 5–8)
PHOSPHATE SERPL-MCNC: 1.7 MG/DL — LOW (ref 2.5–4.5)
PHOSPHATE SERPL-MCNC: 2 MG/DL — LOW (ref 2.5–4.5)
PHOSPHATE SERPL-MCNC: 2.2 MG/DL — LOW (ref 2.5–4.5)
PHOSPHATE SERPL-MCNC: 2.5 MG/DL — SIGNIFICANT CHANGE UP (ref 2.5–4.5)
PHOSPHATE SERPL-MCNC: 2.6 MG/DL — SIGNIFICANT CHANGE UP (ref 2.5–4.5)
PHOSPHATE SERPL-MCNC: 2.6 MG/DL — SIGNIFICANT CHANGE UP (ref 2.5–4.5)
PHOSPHATE SERPL-MCNC: 2.7 MG/DL — SIGNIFICANT CHANGE UP (ref 2.5–4.5)
PHOSPHATE SERPL-MCNC: 2.7 MG/DL — SIGNIFICANT CHANGE UP (ref 2.5–4.5)
PHOSPHATE SERPL-MCNC: 2.8 MG/DL — SIGNIFICANT CHANGE UP (ref 2.5–4.5)
PHOSPHATE SERPL-MCNC: 2.8 MG/DL — SIGNIFICANT CHANGE UP (ref 2.5–4.5)
PHOSPHATE SERPL-MCNC: 2.9 MG/DL — SIGNIFICANT CHANGE UP (ref 2.5–4.5)
PHOSPHATE SERPL-MCNC: 3 MG/DL — SIGNIFICANT CHANGE UP (ref 2.5–4.5)
PHOSPHATE SERPL-MCNC: 3.2 MG/DL — SIGNIFICANT CHANGE UP (ref 2.5–4.5)
PHOSPHATE SERPL-MCNC: 3.2 MG/DL — SIGNIFICANT CHANGE UP (ref 2.5–4.5)
PHOSPHATE SERPL-MCNC: 3.3 MG/DL — SIGNIFICANT CHANGE UP (ref 2.5–4.5)
PHOSPHATE SERPL-MCNC: 3.7 MG/DL — SIGNIFICANT CHANGE UP (ref 2.5–4.5)
PHOSPHATE SERPL-MCNC: 3.8 MG/DL — SIGNIFICANT CHANGE UP (ref 2.5–4.5)
PLAT MORPH BLD: ABNORMAL
PLAT MORPH BLD: NORMAL — SIGNIFICANT CHANGE UP
PLAT MORPH BLD: NORMAL — SIGNIFICANT CHANGE UP
PLATELET # BLD AUTO: 213 K/UL — SIGNIFICANT CHANGE UP (ref 150–400)
PLATELET # BLD AUTO: 239 K/UL — SIGNIFICANT CHANGE UP (ref 150–400)
PLATELET # BLD AUTO: 241 K/UL — SIGNIFICANT CHANGE UP (ref 150–400)
PLATELET # BLD AUTO: 253 K/UL — SIGNIFICANT CHANGE UP (ref 150–400)
PLATELET # BLD AUTO: 291 K/UL — SIGNIFICANT CHANGE UP (ref 150–400)
PLATELET # BLD AUTO: 345 K/UL — SIGNIFICANT CHANGE UP (ref 150–400)
PLATELET # BLD AUTO: 355 K/UL — SIGNIFICANT CHANGE UP (ref 150–400)
PLATELET # BLD AUTO: 359 K/UL — SIGNIFICANT CHANGE UP (ref 150–400)
PLATELET # BLD AUTO: 367 K/UL — SIGNIFICANT CHANGE UP (ref 150–400)
PLATELET # BLD AUTO: 373 K/UL — SIGNIFICANT CHANGE UP (ref 150–400)
PLATELET # BLD AUTO: 374 K/UL — SIGNIFICANT CHANGE UP (ref 150–400)
PLATELET # BLD AUTO: 380 K/UL — SIGNIFICANT CHANGE UP (ref 150–400)
PLATELET # BLD AUTO: 381 K/UL — SIGNIFICANT CHANGE UP (ref 150–400)
PLATELET # BLD AUTO: 403 K/UL — HIGH (ref 150–400)
PLATELET # BLD AUTO: 420 K/UL — HIGH (ref 150–400)
PLATELET # BLD AUTO: 438 K/UL — HIGH (ref 150–400)
PLATELET # BLD AUTO: 439 K/UL — HIGH (ref 150–400)
PLATELET # BLD AUTO: 442 K/UL — HIGH (ref 150–400)
PLATELET # BLD AUTO: 472 K/UL — HIGH (ref 150–400)
PLATELET # BLD AUTO: 478 K/UL — HIGH (ref 150–400)
PLATELET # BLD AUTO: 479 K/UL — HIGH (ref 150–400)
POIKILOCYTOSIS BLD QL AUTO: SLIGHT — SIGNIFICANT CHANGE UP
POLYCHROMASIA BLD QL SMEAR: SIGNIFICANT CHANGE UP
POLYCHROMASIA BLD QL SMEAR: SLIGHT — SIGNIFICANT CHANGE UP
POTASSIUM SERPL-MCNC: 3.5 MMOL/L — SIGNIFICANT CHANGE UP (ref 3.5–5.3)
POTASSIUM SERPL-MCNC: 3.8 MMOL/L — SIGNIFICANT CHANGE UP (ref 3.5–5.3)
POTASSIUM SERPL-MCNC: 3.9 MMOL/L — SIGNIFICANT CHANGE UP (ref 3.5–5.3)
POTASSIUM SERPL-MCNC: 4 MMOL/L — SIGNIFICANT CHANGE UP (ref 3.5–5.3)
POTASSIUM SERPL-MCNC: 4.1 MMOL/L — SIGNIFICANT CHANGE UP (ref 3.5–5.3)
POTASSIUM SERPL-MCNC: 4.2 MMOL/L — SIGNIFICANT CHANGE UP (ref 3.5–5.3)
POTASSIUM SERPL-MCNC: 4.2 MMOL/L — SIGNIFICANT CHANGE UP (ref 3.5–5.3)
POTASSIUM SERPL-MCNC: 4.3 MMOL/L — SIGNIFICANT CHANGE UP (ref 3.5–5.3)
POTASSIUM SERPL-MCNC: 4.4 MMOL/L — SIGNIFICANT CHANGE UP (ref 3.5–5.3)
POTASSIUM SERPL-MCNC: 4.5 MMOL/L — SIGNIFICANT CHANGE UP (ref 3.5–5.3)
POTASSIUM SERPL-MCNC: 4.5 MMOL/L — SIGNIFICANT CHANGE UP (ref 3.5–5.3)
POTASSIUM SERPL-MCNC: 4.6 MMOL/L — SIGNIFICANT CHANGE UP (ref 3.5–5.3)
POTASSIUM SERPL-MCNC: 4.7 MMOL/L — SIGNIFICANT CHANGE UP (ref 3.5–5.3)
POTASSIUM SERPL-MCNC: 4.7 MMOL/L — SIGNIFICANT CHANGE UP (ref 3.5–5.3)
POTASSIUM SERPL-MCNC: 4.8 MMOL/L — SIGNIFICANT CHANGE UP (ref 3.5–5.3)
POTASSIUM SERPL-MCNC: 4.8 MMOL/L — SIGNIFICANT CHANGE UP (ref 3.5–5.3)
POTASSIUM SERPL-MCNC: 5.1 MMOL/L — SIGNIFICANT CHANGE UP (ref 3.5–5.3)
POTASSIUM SERPL-MCNC: 5.1 MMOL/L — SIGNIFICANT CHANGE UP (ref 3.5–5.3)
POTASSIUM SERPL-MCNC: 5.3 MMOL/L — SIGNIFICANT CHANGE UP (ref 3.5–5.3)
POTASSIUM SERPL-SCNC: 3.5 MMOL/L — SIGNIFICANT CHANGE UP (ref 3.5–5.3)
POTASSIUM SERPL-SCNC: 3.8 MMOL/L — SIGNIFICANT CHANGE UP (ref 3.5–5.3)
POTASSIUM SERPL-SCNC: 3.9 MMOL/L — SIGNIFICANT CHANGE UP (ref 3.5–5.3)
POTASSIUM SERPL-SCNC: 4 MMOL/L — SIGNIFICANT CHANGE UP (ref 3.5–5.3)
POTASSIUM SERPL-SCNC: 4.1 MMOL/L — SIGNIFICANT CHANGE UP (ref 3.5–5.3)
POTASSIUM SERPL-SCNC: 4.2 MMOL/L — SIGNIFICANT CHANGE UP (ref 3.5–5.3)
POTASSIUM SERPL-SCNC: 4.2 MMOL/L — SIGNIFICANT CHANGE UP (ref 3.5–5.3)
POTASSIUM SERPL-SCNC: 4.3 MMOL/L — SIGNIFICANT CHANGE UP (ref 3.5–5.3)
POTASSIUM SERPL-SCNC: 4.4 MMOL/L — SIGNIFICANT CHANGE UP (ref 3.5–5.3)
POTASSIUM SERPL-SCNC: 4.5 MMOL/L — SIGNIFICANT CHANGE UP (ref 3.5–5.3)
POTASSIUM SERPL-SCNC: 4.5 MMOL/L — SIGNIFICANT CHANGE UP (ref 3.5–5.3)
POTASSIUM SERPL-SCNC: 4.6 MMOL/L — SIGNIFICANT CHANGE UP (ref 3.5–5.3)
POTASSIUM SERPL-SCNC: 4.7 MMOL/L — SIGNIFICANT CHANGE UP (ref 3.5–5.3)
POTASSIUM SERPL-SCNC: 4.7 MMOL/L — SIGNIFICANT CHANGE UP (ref 3.5–5.3)
POTASSIUM SERPL-SCNC: 4.8 MMOL/L — SIGNIFICANT CHANGE UP (ref 3.5–5.3)
POTASSIUM SERPL-SCNC: 4.8 MMOL/L — SIGNIFICANT CHANGE UP (ref 3.5–5.3)
POTASSIUM SERPL-SCNC: 5.1 MMOL/L — SIGNIFICANT CHANGE UP (ref 3.5–5.3)
POTASSIUM SERPL-SCNC: 5.1 MMOL/L — SIGNIFICANT CHANGE UP (ref 3.5–5.3)
POTASSIUM SERPL-SCNC: 5.3 MMOL/L — SIGNIFICANT CHANGE UP (ref 3.5–5.3)
PROCALCITONIN SERPL-MCNC: 0.28 NG/ML — HIGH (ref 0.02–0.1)
PROT SERPL-MCNC: 5.1 G/DL — LOW (ref 6–8.3)
PROT SERPL-MCNC: 5.5 G/DL — LOW (ref 6–8.3)
PROT SERPL-MCNC: 5.9 G/DL — LOW (ref 6–8.3)
PROT SERPL-MCNC: 6.1 G/DL — SIGNIFICANT CHANGE UP (ref 6–8.3)
PROT SERPL-MCNC: 6.1 G/DL — SIGNIFICANT CHANGE UP (ref 6–8.3)
PROT SERPL-MCNC: 6.2 G/DL — SIGNIFICANT CHANGE UP (ref 6–8.3)
PROT SERPL-MCNC: 6.5 G/DL — SIGNIFICANT CHANGE UP (ref 6–8.3)
PROT SERPL-MCNC: 7 G/DL — SIGNIFICANT CHANGE UP (ref 6–8.3)
PROT SERPL-MCNC: 7.4 G/DL — SIGNIFICANT CHANGE UP (ref 6–8.3)
PROT UR STRIP-MCNC: NORMAL
PROT UR-MCNC: NEGATIVE MG/DL — SIGNIFICANT CHANGE UP
PROT UR-MCNC: NEGATIVE MG/DL — SIGNIFICANT CHANGE UP
PROTHROM AB SERPL-ACNC: 17.6 SEC — HIGH (ref 10.6–13.6)
PROTHROM AB SERPL-ACNC: 18 SEC — HIGH (ref 10.6–13.6)
RAPID RVP RESULT: SIGNIFICANT CHANGE UP
RBC # BLD: 2.5 M/UL — LOW (ref 3.8–5.2)
RBC # BLD: 2.55 M/UL — LOW (ref 3.8–5.2)
RBC # BLD: 2.57 M/UL — LOW (ref 3.8–5.2)
RBC # BLD: 2.57 M/UL — LOW (ref 3.8–5.2)
RBC # BLD: 2.59 M/UL — LOW (ref 3.8–5.2)
RBC # BLD: 2.79 M/UL — LOW (ref 3.8–5.2)
RBC # BLD: 2.81 M/UL — LOW (ref 3.8–5.2)
RBC # BLD: 2.82 M/UL — LOW (ref 3.8–5.2)
RBC # BLD: 2.87 M/UL — LOW (ref 3.8–5.2)
RBC # BLD: 2.93 M/UL — LOW (ref 3.8–5.2)
RBC # BLD: 3.05 M/UL — LOW (ref 3.8–5.2)
RBC # BLD: 3.12 M/UL — LOW (ref 3.8–5.2)
RBC # BLD: 3.24 M/UL — LOW (ref 3.8–5.2)
RBC # BLD: 3.55 M/UL — LOW (ref 3.8–5.2)
RBC # BLD: 3.55 M/UL — LOW (ref 3.8–5.2)
RBC # BLD: 3.67 M/UL — LOW (ref 3.8–5.2)
RBC # BLD: 3.67 M/UL — LOW (ref 3.8–5.2)
RBC # BLD: 3.7 M/UL — LOW (ref 3.8–5.2)
RBC # BLD: 3.8 M/UL — SIGNIFICANT CHANGE UP (ref 3.8–5.2)
RBC # BLD: 3.89 M/UL — SIGNIFICANT CHANGE UP (ref 3.8–5.2)
RBC # BLD: 4.37 M/UL — SIGNIFICANT CHANGE UP (ref 3.8–5.2)
RBC # FLD: 13.2 % — SIGNIFICANT CHANGE UP (ref 10.3–14.5)
RBC # FLD: 13.3 % — SIGNIFICANT CHANGE UP (ref 10.3–14.5)
RBC # FLD: 13.3 % — SIGNIFICANT CHANGE UP (ref 10.3–14.5)
RBC # FLD: 13.5 % — SIGNIFICANT CHANGE UP (ref 10.3–14.5)
RBC # FLD: 13.7 % — SIGNIFICANT CHANGE UP (ref 10.3–14.5)
RBC # FLD: 13.8 % — SIGNIFICANT CHANGE UP (ref 10.3–14.5)
RBC # FLD: 18.3 % — HIGH (ref 10.3–14.5)
RBC # FLD: 18.7 % — HIGH (ref 10.3–14.5)
RBC # FLD: 19 % — HIGH (ref 10.3–14.5)
RBC # FLD: 19.2 % — HIGH (ref 10.3–14.5)
RBC # FLD: 19.4 % — HIGH (ref 10.3–14.5)
RBC # FLD: 19.5 % — HIGH (ref 10.3–14.5)
RBC # FLD: 19.6 % — HIGH (ref 10.3–14.5)
RBC # FLD: 19.9 % — HIGH (ref 10.3–14.5)
RBC # FLD: 20.7 % — HIGH (ref 10.3–14.5)
RBC # FLD: 21.2 % — HIGH (ref 10.3–14.5)
RBC # FLD: 21.2 % — HIGH (ref 10.3–14.5)
RBC BLD AUTO: ABNORMAL
RBC CASTS # UR COMP ASSIST: < 5 /HPF — SIGNIFICANT CHANGE UP
RBC CASTS # UR COMP ASSIST: < 5 /HPF — SIGNIFICANT CHANGE UP
RH IG SCN BLD-IMP: POSITIVE — SIGNIFICANT CHANGE UP
S AUREUS DNA NOSE QL NAA+PROBE: NEGATIVE — SIGNIFICANT CHANGE UP
S AUREUS DNA NOSE QL NAA+PROBE: NEGATIVE — SIGNIFICANT CHANGE UP
S PNEUM AG UR QL: NEGATIVE — SIGNIFICANT CHANGE UP
SARS-COV-2 IGG+IGM SERPL QL IA: 0.07 INDEX — SIGNIFICANT CHANGE UP
SARS-COV-2 IGG+IGM SERPL QL IA: 0.08 INDEX — SIGNIFICANT CHANGE UP
SARS-COV-2 IGG+IGM SERPL QL IA: >250 U/ML — HIGH
SARS-COV-2 IGG+IGM SERPL QL IA: >250 U/ML — HIGH
SARS-COV-2 IGG+IGM SERPL QL IA: NEGATIVE — SIGNIFICANT CHANGE UP
SARS-COV-2 IGG+IGM SERPL QL IA: NEGATIVE — SIGNIFICANT CHANGE UP
SARS-COV-2 IGG+IGM SERPL QL IA: POSITIVE
SARS-COV-2 IGG+IGM SERPL QL IA: POSITIVE
SARS-COV-2 RNA SPEC QL NAA+PROBE: NEGATIVE — SIGNIFICANT CHANGE UP
SARS-COV-2 RNA SPEC QL NAA+PROBE: NEGATIVE — SIGNIFICANT CHANGE UP
SARS-COV-2 RNA SPEC QL NAA+PROBE: SIGNIFICANT CHANGE UP
SCHISTOCYTES BLD QL AUTO: SLIGHT — SIGNIFICANT CHANGE UP
SMUDGE CELLS # BLD: PRESENT — SIGNIFICANT CHANGE UP
SODIUM SERPL-SCNC: 130 MMOL/L — LOW (ref 135–145)
SODIUM SERPL-SCNC: 131 MMOL/L — LOW (ref 135–145)
SODIUM SERPL-SCNC: 131 MMOL/L — LOW (ref 135–145)
SODIUM SERPL-SCNC: 132 MMOL/L — LOW (ref 135–145)
SODIUM SERPL-SCNC: 134 MMOL/L — LOW (ref 135–145)
SODIUM SERPL-SCNC: 135 MMOL/L — SIGNIFICANT CHANGE UP (ref 135–145)
SODIUM SERPL-SCNC: 135 MMOL/L — SIGNIFICANT CHANGE UP (ref 135–145)
SODIUM SERPL-SCNC: 136 MMOL/L — SIGNIFICANT CHANGE UP (ref 135–145)
SODIUM SERPL-SCNC: 137 MMOL/L — SIGNIFICANT CHANGE UP (ref 135–145)
SODIUM SERPL-SCNC: 138 MMOL/L — SIGNIFICANT CHANGE UP (ref 135–145)
SODIUM SERPL-SCNC: 139 MMOL/L — SIGNIFICANT CHANGE UP (ref 135–145)
SODIUM SERPL-SCNC: 141 MMOL/L — SIGNIFICANT CHANGE UP (ref 135–145)
SP GR SPEC: 1.02 — SIGNIFICANT CHANGE UP (ref 1–1.03)
SP GR SPEC: 1.02 — SIGNIFICANT CHANGE UP (ref 1–1.03)
SP GR UR STRIP: 1.01
SP GR UR STRIP: 1.02
SP GR UR STRIP: 1.02
SPECIMEN SOURCE: SIGNIFICANT CHANGE UP
SPHEROCYTES BLD QL SMEAR: SLIGHT — SIGNIFICANT CHANGE UP
STRONGYLOIDES AB SER-ACNC: POSITIVE
SURGICAL PATHOLOGY STUDY: SIGNIFICANT CHANGE UP
TROPONIN T SERPL-MCNC: <0.01 NG/ML — SIGNIFICANT CHANGE UP (ref 0–0.01)
UROBILINOGEN FLD QL: 0.2 E.U./DL — SIGNIFICANT CHANGE UP
UROBILINOGEN FLD QL: 0.2 E.U./DL — SIGNIFICANT CHANGE UP
WBC # BLD: 10.3 K/UL — SIGNIFICANT CHANGE UP (ref 3.8–10.5)
WBC # BLD: 10.49 K/UL — SIGNIFICANT CHANGE UP (ref 3.8–10.5)
WBC # BLD: 11.9 K/UL — HIGH (ref 3.8–10.5)
WBC # BLD: 12.32 K/UL — HIGH (ref 3.8–10.5)
WBC # BLD: 12.6 K/UL — HIGH (ref 3.8–10.5)
WBC # BLD: 14.51 K/UL — HIGH (ref 3.8–10.5)
WBC # BLD: 5.5 K/UL — SIGNIFICANT CHANGE UP (ref 3.8–10.5)
WBC # BLD: 6.04 K/UL — SIGNIFICANT CHANGE UP (ref 3.8–10.5)
WBC # BLD: 6.31 K/UL — SIGNIFICANT CHANGE UP (ref 3.8–10.5)
WBC # BLD: 6.52 K/UL — SIGNIFICANT CHANGE UP (ref 3.8–10.5)
WBC # BLD: 7.32 K/UL — SIGNIFICANT CHANGE UP (ref 3.8–10.5)
WBC # BLD: 7.87 K/UL — SIGNIFICANT CHANGE UP (ref 3.8–10.5)
WBC # BLD: 8.58 K/UL — SIGNIFICANT CHANGE UP (ref 3.8–10.5)
WBC # BLD: 8.81 K/UL — SIGNIFICANT CHANGE UP (ref 3.8–10.5)
WBC # BLD: 9.32 K/UL — SIGNIFICANT CHANGE UP (ref 3.8–10.5)
WBC # BLD: 9.47 K/UL — SIGNIFICANT CHANGE UP (ref 3.8–10.5)
WBC # BLD: 9.61 K/UL — SIGNIFICANT CHANGE UP (ref 3.8–10.5)
WBC # BLD: 9.66 K/UL — SIGNIFICANT CHANGE UP (ref 3.8–10.5)
WBC # BLD: 9.73 K/UL — SIGNIFICANT CHANGE UP (ref 3.8–10.5)
WBC # BLD: 9.86 K/UL — SIGNIFICANT CHANGE UP (ref 3.8–10.5)
WBC # BLD: 9.99 K/UL — SIGNIFICANT CHANGE UP (ref 3.8–10.5)
WBC # FLD AUTO: 10.3 K/UL — SIGNIFICANT CHANGE UP (ref 3.8–10.5)
WBC # FLD AUTO: 10.49 K/UL — SIGNIFICANT CHANGE UP (ref 3.8–10.5)
WBC # FLD AUTO: 11.9 K/UL — HIGH (ref 3.8–10.5)
WBC # FLD AUTO: 12.32 K/UL — HIGH (ref 3.8–10.5)
WBC # FLD AUTO: 12.6 K/UL — HIGH (ref 3.8–10.5)
WBC # FLD AUTO: 14.51 K/UL — HIGH (ref 3.8–10.5)
WBC # FLD AUTO: 5.5 K/UL — SIGNIFICANT CHANGE UP (ref 3.8–10.5)
WBC # FLD AUTO: 6.04 K/UL — SIGNIFICANT CHANGE UP (ref 3.8–10.5)
WBC # FLD AUTO: 6.31 K/UL — SIGNIFICANT CHANGE UP (ref 3.8–10.5)
WBC # FLD AUTO: 6.52 K/UL — SIGNIFICANT CHANGE UP (ref 3.8–10.5)
WBC # FLD AUTO: 7.32 K/UL — SIGNIFICANT CHANGE UP (ref 3.8–10.5)
WBC # FLD AUTO: 7.87 K/UL — SIGNIFICANT CHANGE UP (ref 3.8–10.5)
WBC # FLD AUTO: 8.58 K/UL — SIGNIFICANT CHANGE UP (ref 3.8–10.5)
WBC # FLD AUTO: 8.81 K/UL — SIGNIFICANT CHANGE UP (ref 3.8–10.5)
WBC # FLD AUTO: 9.32 K/UL — SIGNIFICANT CHANGE UP (ref 3.8–10.5)
WBC # FLD AUTO: 9.47 K/UL — SIGNIFICANT CHANGE UP (ref 3.8–10.5)
WBC # FLD AUTO: 9.61 K/UL — SIGNIFICANT CHANGE UP (ref 3.8–10.5)
WBC # FLD AUTO: 9.66 K/UL — SIGNIFICANT CHANGE UP (ref 3.8–10.5)
WBC # FLD AUTO: 9.73 K/UL — SIGNIFICANT CHANGE UP (ref 3.8–10.5)
WBC # FLD AUTO: 9.86 K/UL — SIGNIFICANT CHANGE UP (ref 3.8–10.5)
WBC # FLD AUTO: 9.99 K/UL — SIGNIFICANT CHANGE UP (ref 3.8–10.5)
WBC UR QL: ABNORMAL /HPF
WBC UR QL: ABNORMAL /HPF

## 2021-01-01 PROCEDURE — 88341 IMHCHEM/IMCYTCHM EA ADD ANTB: CPT | Mod: 26,59

## 2021-01-01 PROCEDURE — 83550 IRON BINDING TEST: CPT

## 2021-01-01 PROCEDURE — 93005 ELECTROCARDIOGRAM TRACING: CPT

## 2021-01-01 PROCEDURE — 87177 OVA AND PARASITES SMEARS: CPT

## 2021-01-01 PROCEDURE — 99205 OFFICE O/P NEW HI 60 MIN: CPT

## 2021-01-01 PROCEDURE — 83735 ASSAY OF MAGNESIUM: CPT

## 2021-01-01 PROCEDURE — C9399: CPT

## 2021-01-01 PROCEDURE — 83605 ASSAY OF LACTIC ACID: CPT

## 2021-01-01 PROCEDURE — C1889: CPT

## 2021-01-01 PROCEDURE — C1769: CPT

## 2021-01-01 PROCEDURE — 82962 GLUCOSE BLOOD TEST: CPT

## 2021-01-01 PROCEDURE — 82553 CREATINE MB FRACTION: CPT

## 2021-01-01 PROCEDURE — 80048 BASIC METABOLIC PNL TOTAL CA: CPT

## 2021-01-01 PROCEDURE — 94640 AIRWAY INHALATION TREATMENT: CPT

## 2021-01-01 PROCEDURE — 80053 COMPREHEN METABOLIC PANEL: CPT

## 2021-01-01 PROCEDURE — 99232 SBSQ HOSP IP/OBS MODERATE 35: CPT

## 2021-01-01 PROCEDURE — 99284 EMERGENCY DEPT VISIT MOD MDM: CPT

## 2021-01-01 PROCEDURE — ZZZZZ: CPT

## 2021-01-01 PROCEDURE — 85027 COMPLETE CBC AUTOMATED: CPT

## 2021-01-01 PROCEDURE — 85730 THROMBOPLASTIN TIME PARTIAL: CPT

## 2021-01-01 PROCEDURE — 51702 INSERT TEMP BLADDER CATH: CPT

## 2021-01-01 PROCEDURE — 84439 ASSAY OF FREE THYROXINE: CPT

## 2021-01-01 PROCEDURE — U0003: CPT

## 2021-01-01 PROCEDURE — 36590 REMOVAL TUNNELED CV CATH: CPT

## 2021-01-01 PROCEDURE — 99238 HOSP IP/OBS DSCHRG MGMT 30/<: CPT

## 2021-01-01 PROCEDURE — 99222 1ST HOSP IP/OBS MODERATE 55: CPT

## 2021-01-01 PROCEDURE — 0225U NFCT DS DNA&RNA 21 SARSCOV2: CPT

## 2021-01-01 PROCEDURE — 71045 X-RAY EXAM CHEST 1 VIEW: CPT | Mod: 26

## 2021-01-01 PROCEDURE — 88331 PATH CONSLTJ SURG 1 BLK 1SPC: CPT

## 2021-01-01 PROCEDURE — 74019 RADEX ABDOMEN 2 VIEWS: CPT | Mod: 26

## 2021-01-01 PROCEDURE — 58150 TOTAL HYSTERECTOMY: CPT

## 2021-01-01 PROCEDURE — 86923 COMPATIBILITY TEST ELECTRIC: CPT

## 2021-01-01 PROCEDURE — 70450 CT HEAD/BRAIN W/O DYE: CPT

## 2021-01-01 PROCEDURE — 36415 COLL VENOUS BLD VENIPUNCTURE: CPT

## 2021-01-01 PROCEDURE — 87635 SARS-COV-2 COVID-19 AMP PRB: CPT

## 2021-01-01 PROCEDURE — 74177 CT ABD & PELVIS W/CONTRAST: CPT | Mod: MA

## 2021-01-01 PROCEDURE — 93010 ELECTROCARDIOGRAM REPORT: CPT

## 2021-01-01 PROCEDURE — 99285 EMERGENCY DEPT VISIT HI MDM: CPT | Mod: 25

## 2021-01-01 PROCEDURE — 99072 ADDL SUPL MATRL&STAF TM PHE: CPT

## 2021-01-01 PROCEDURE — 36561 INSERT TUNNELED CV CATH: CPT

## 2021-01-01 PROCEDURE — 87086 URINE CULTURE/COLONY COUNT: CPT

## 2021-01-01 PROCEDURE — 87040 BLOOD CULTURE FOR BACTERIA: CPT

## 2021-01-01 PROCEDURE — 36430 TRANSFUSION BLD/BLD COMPNT: CPT

## 2021-01-01 PROCEDURE — 84484 ASSAY OF TROPONIN QUANT: CPT

## 2021-01-01 PROCEDURE — 87070 CULTURE OTHR SPECIMN AEROBIC: CPT

## 2021-01-01 PROCEDURE — 99204 OFFICE O/P NEW MOD 45 MIN: CPT | Mod: 25

## 2021-01-01 PROCEDURE — 84550 ASSAY OF BLOOD/URIC ACID: CPT

## 2021-01-01 PROCEDURE — 87641 MR-STAPH DNA AMP PROBE: CPT

## 2021-01-01 PROCEDURE — 87640 STAPH A DNA AMP PROBE: CPT

## 2021-01-01 PROCEDURE — 88341 IMHCHEM/IMCYTCHM EA ADD ANTB: CPT

## 2021-01-01 PROCEDURE — 32555 ASPIRATE PLEURA W/ IMAGING: CPT | Mod: LT

## 2021-01-01 PROCEDURE — 86850 RBC ANTIBODY SCREEN: CPT

## 2021-01-01 PROCEDURE — 84100 ASSAY OF PHOSPHORUS: CPT

## 2021-01-01 PROCEDURE — 99153 MOD SED SAME PHYS/QHP EA: CPT

## 2021-01-01 PROCEDURE — 83690 ASSAY OF LIPASE: CPT

## 2021-01-01 PROCEDURE — 99024 POSTOP FOLLOW-UP VISIT: CPT

## 2021-01-01 PROCEDURE — 71275 CT ANGIOGRAPHY CHEST: CPT

## 2021-01-01 PROCEDURE — 82607 VITAMIN B-12: CPT

## 2021-01-01 PROCEDURE — 88305 TISSUE EXAM BY PATHOLOGIST: CPT | Mod: 26

## 2021-01-01 PROCEDURE — 88305 TISSUE EXAM BY PATHOLOGIST: CPT

## 2021-01-01 PROCEDURE — 82803 BLOOD GASES ANY COMBINATION: CPT

## 2021-01-01 PROCEDURE — 87205 SMEAR GRAM STAIN: CPT

## 2021-01-01 PROCEDURE — 76937 US GUIDE VASCULAR ACCESS: CPT

## 2021-01-01 PROCEDURE — 71045 X-RAY EXAM CHEST 1 VIEW: CPT

## 2021-01-01 PROCEDURE — 82728 ASSAY OF FERRITIN: CPT

## 2021-01-01 PROCEDURE — 85610 PROTHROMBIN TIME: CPT

## 2021-01-01 PROCEDURE — 74177 CT ABD & PELVIS W/CONTRAST: CPT

## 2021-01-01 PROCEDURE — 76937 US GUIDE VASCULAR ACCESS: CPT | Mod: 26

## 2021-01-01 PROCEDURE — U0005: CPT

## 2021-01-01 PROCEDURE — A9556: CPT

## 2021-01-01 PROCEDURE — 88112 CYTOPATH CELL ENHANCE TECH: CPT

## 2021-01-01 PROCEDURE — 88360 TUMOR IMMUNOHISTOCHEM/MANUAL: CPT

## 2021-01-01 PROCEDURE — 83880 ASSAY OF NATRIURETIC PEPTIDE: CPT

## 2021-01-01 PROCEDURE — 86900 BLOOD TYPING SEROLOGIC ABO: CPT

## 2021-01-01 PROCEDURE — A9552: CPT

## 2021-01-01 PROCEDURE — 97162 PT EVAL MOD COMPLEX 30 MIN: CPT

## 2021-01-01 PROCEDURE — 74176 CT ABD & PELVIS W/O CONTRAST: CPT | Mod: 26

## 2021-01-01 PROCEDURE — 78815 PET IMAGE W/CT SKULL-THIGH: CPT | Mod: 26

## 2021-01-01 PROCEDURE — 74019 RADEX ABDOMEN 2 VIEWS: CPT

## 2021-01-01 PROCEDURE — 99152 MOD SED SAME PHYS/QHP 5/>YRS: CPT

## 2021-01-01 PROCEDURE — 72125 CT NECK SPINE W/O DYE: CPT

## 2021-01-01 PROCEDURE — 84443 ASSAY THYROID STIM HORMONE: CPT

## 2021-01-01 PROCEDURE — 87075 CULTR BACTERIA EXCEPT BLOOD: CPT

## 2021-01-01 PROCEDURE — 82977 ASSAY OF GGT: CPT

## 2021-01-01 PROCEDURE — 70450 CT HEAD/BRAIN W/O DYE: CPT | Mod: 26

## 2021-01-01 PROCEDURE — 87899 AGENT NOS ASSAY W/OPTIC: CPT

## 2021-01-01 PROCEDURE — C1788: CPT

## 2021-01-01 PROCEDURE — 87186 SC STD MICRODIL/AGAR DIL: CPT

## 2021-01-01 PROCEDURE — 88344 IMHCHEM/IMCYTCHM EA MLT ANTB: CPT | Mod: 26,59

## 2021-01-01 PROCEDURE — 99213 OFFICE O/P EST LOW 20 MIN: CPT | Mod: 25

## 2021-01-01 PROCEDURE — 81001 URINALYSIS AUTO W/SCOPE: CPT

## 2021-01-01 PROCEDURE — 32555 ASPIRATE PLEURA W/ IMAGING: CPT

## 2021-01-01 PROCEDURE — 99213 OFFICE O/P EST LOW 20 MIN: CPT

## 2021-01-01 PROCEDURE — 88112 CYTOPATH CELL ENHANCE TECH: CPT | Mod: 26

## 2021-01-01 PROCEDURE — 84145 PROCALCITONIN (PCT): CPT

## 2021-01-01 PROCEDURE — 88360 TUMOR IMMUNOHISTOCHEM/MANUAL: CPT | Mod: 26

## 2021-01-01 PROCEDURE — 86682 HELMINTH ANTIBODY: CPT

## 2021-01-01 PROCEDURE — P9040: CPT

## 2021-01-01 PROCEDURE — 82746 ASSAY OF FOLIC ACID SERUM: CPT

## 2021-01-01 PROCEDURE — 71250 CT THORAX DX C-: CPT | Mod: 26

## 2021-01-01 PROCEDURE — 93306 TTE W/DOPPLER COMPLETE: CPT | Mod: 26

## 2021-01-01 PROCEDURE — 71275 CT ANGIOGRAPHY CHEST: CPT | Mod: 26

## 2021-01-01 PROCEDURE — 85025 COMPLETE CBC W/AUTO DIFF WBC: CPT

## 2021-01-01 PROCEDURE — 87305 ASPERGILLUS AG IA: CPT

## 2021-01-01 PROCEDURE — 88344 IMHCHEM/IMCYTCHM EA MLT ANTB: CPT

## 2021-01-01 PROCEDURE — 86901 BLOOD TYPING SEROLOGIC RH(D): CPT

## 2021-01-01 PROCEDURE — 82150 ASSAY OF AMYLASE: CPT

## 2021-01-01 PROCEDURE — 74176 CT ABD & PELVIS W/O CONTRAST: CPT

## 2021-01-01 PROCEDURE — 74177 CT ABD & PELVIS W/CONTRAST: CPT | Mod: 26,MA

## 2021-01-01 PROCEDURE — 88342 IMHCHEM/IMCYTCHM 1ST ANTB: CPT | Mod: 26,59

## 2021-01-01 PROCEDURE — 88307 TISSUE EXAM BY PATHOLOGIST: CPT

## 2021-01-01 PROCEDURE — 78802 RP LOCLZJ TUM WHBDY 1 D IMG: CPT | Mod: 26

## 2021-01-01 PROCEDURE — 69210 REMOVE IMPACTED EAR WAX UNI: CPT | Mod: LT

## 2021-01-01 PROCEDURE — 99284 EMERGENCY DEPT VISIT MOD MDM: CPT | Mod: 25

## 2021-01-01 PROCEDURE — 78802 RP LOCLZJ TUM WHBDY 1 D IMG: CPT

## 2021-01-01 PROCEDURE — 99212 OFFICE O/P EST SF 10 MIN: CPT

## 2021-01-01 PROCEDURE — 86769 SARS-COV-2 COVID-19 ANTIBODY: CPT

## 2021-01-01 PROCEDURE — 72125 CT NECK SPINE W/O DYE: CPT | Mod: 26

## 2021-01-01 PROCEDURE — 85045 AUTOMATED RETICULOCYTE COUNT: CPT

## 2021-01-01 PROCEDURE — 71250 CT THORAX DX C-: CPT

## 2021-01-01 PROCEDURE — 97116 GAIT TRAINING THERAPY: CPT

## 2021-01-01 PROCEDURE — 93306 TTE W/DOPPLER COMPLETE: CPT

## 2021-01-01 PROCEDURE — 83540 ASSAY OF IRON: CPT

## 2021-01-01 PROCEDURE — XXXXX: CPT

## 2021-01-01 PROCEDURE — 78815 PET IMAGE W/CT SKULL-THIGH: CPT

## 2021-01-01 PROCEDURE — 87449 NOS EACH ORGANISM AG IA: CPT

## 2021-01-01 PROCEDURE — 82550 ASSAY OF CK (CPK): CPT

## 2021-01-01 PROCEDURE — 74018 RADEX ABDOMEN 1 VIEW: CPT

## 2021-01-01 PROCEDURE — 94660 CPAP INITIATION&MGMT: CPT

## 2021-01-01 PROCEDURE — 99222 1ST HOSP IP/OBS MODERATE 55: CPT | Mod: GC

## 2021-01-01 PROCEDURE — 88307 TISSUE EXAM BY PATHOLOGIST: CPT | Mod: 26

## 2021-01-01 PROCEDURE — 88331 PATH CONSLTJ SURG 1 BLK 1SPC: CPT | Mod: 26

## 2021-01-01 PROCEDURE — 99285 EMERGENCY DEPT VISIT HI MDM: CPT

## 2021-01-01 PROCEDURE — 81003 URINALYSIS AUTO W/O SCOPE: CPT | Mod: QW

## 2021-01-01 PROCEDURE — 97530 THERAPEUTIC ACTIVITIES: CPT

## 2021-01-01 PROCEDURE — 96374 THER/PROPH/DIAG INJ IV PUSH: CPT

## 2021-01-01 RX ORDER — MORPHINE SULFATE 50 MG/1
4 CAPSULE, EXTENDED RELEASE ORAL ONCE
Refills: 0 | Status: DISCONTINUED | OUTPATIENT
Start: 2021-01-01 | End: 2021-01-01

## 2021-01-01 RX ORDER — ONDANSETRON 8 MG/1
4 TABLET, FILM COATED ORAL EVERY 6 HOURS
Refills: 0 | Status: DISCONTINUED | OUTPATIENT
Start: 2021-01-01 | End: 2021-01-01

## 2021-01-01 RX ORDER — ACETAMINOPHEN 500 MG
650 TABLET ORAL EVERY 6 HOURS
Refills: 0 | Status: DISCONTINUED | OUTPATIENT
Start: 2021-01-01 | End: 2021-01-01

## 2021-01-01 RX ORDER — DOCUSATE SODIUM 100 MG
100 CAPSULE ORAL THREE TIMES A DAY
Refills: 0 | Status: DISCONTINUED | OUTPATIENT
Start: 2021-01-01 | End: 2021-01-01

## 2021-01-01 RX ORDER — ENOXAPARIN SODIUM 100 MG/ML
30 INJECTION SUBCUTANEOUS EVERY 24 HOURS
Refills: 0 | Status: DISCONTINUED | OUTPATIENT
Start: 2021-01-01 | End: 2021-01-01

## 2021-01-01 RX ORDER — IOHEXOL 300 MG/ML
30 INJECTION, SOLUTION INTRAVENOUS ONCE
Refills: 0 | Status: COMPLETED | OUTPATIENT
Start: 2021-01-01 | End: 2021-01-01

## 2021-01-01 RX ORDER — IBUPROFEN 800 MG/1
800 TABLET, FILM COATED ORAL 3 TIMES DAILY
Qty: 90 | Refills: 1 | Status: ACTIVE | COMMUNITY
Start: 2021-01-01 | End: 1900-01-01

## 2021-01-01 RX ORDER — ACETAMINOPHEN 500 MG
2 TABLET ORAL
Qty: 0 | Refills: 0 | DISCHARGE
Start: 2021-01-01

## 2021-01-01 RX ORDER — OXYCODONE HYDROCHLORIDE 5 MG/1
1 TABLET ORAL
Qty: 4 | Refills: 0
Start: 2021-01-01

## 2021-01-01 RX ORDER — ACETAMINOPHEN 500 MG
650 TABLET ORAL ONCE
Refills: 0 | Status: COMPLETED | OUTPATIENT
Start: 2021-01-01 | End: 2021-01-01

## 2021-01-01 RX ORDER — TAMSULOSIN HYDROCHLORIDE 0.4 MG/1
0.4 CAPSULE ORAL AT BEDTIME
Refills: 0 | Status: DISCONTINUED | OUTPATIENT
Start: 2021-01-01 | End: 2021-01-01

## 2021-01-01 RX ORDER — ACETAMINOPHEN 500 MG
1000 TABLET ORAL EVERY 6 HOURS
Refills: 0 | Status: DISCONTINUED | OUTPATIENT
Start: 2021-01-01 | End: 2021-01-01

## 2021-01-01 RX ORDER — SODIUM,POTASSIUM PHOSPHATES 278-250MG
1 POWDER IN PACKET (EA) ORAL ONCE
Refills: 0 | Status: COMPLETED | OUTPATIENT
Start: 2021-01-01 | End: 2021-01-01

## 2021-01-01 RX ORDER — CELECOXIB 200 MG/1
400 CAPSULE ORAL ONCE
Refills: 0 | Status: COMPLETED | OUTPATIENT
Start: 2021-01-01 | End: 2021-01-01

## 2021-01-01 RX ORDER — SENNA PLUS 8.6 MG/1
2 TABLET ORAL AT BEDTIME
Refills: 0 | Status: DISCONTINUED | OUTPATIENT
Start: 2021-01-01 | End: 2021-01-01

## 2021-01-01 RX ORDER — PANTOPRAZOLE SODIUM 20 MG/1
40 TABLET, DELAYED RELEASE ORAL EVERY 12 HOURS
Refills: 0 | Status: DISCONTINUED | OUTPATIENT
Start: 2021-01-01 | End: 2021-01-01

## 2021-01-01 RX ORDER — OXYCODONE AND ACETAMINOPHEN 5; 325 MG/1; MG/1
1 TABLET ORAL ONCE
Refills: 0 | Status: DISCONTINUED | OUTPATIENT
Start: 2021-01-01 | End: 2021-01-01

## 2021-01-01 RX ORDER — IBUPROFEN 200 MG
600 TABLET ORAL EVERY 6 HOURS
Refills: 0 | Status: COMPLETED | OUTPATIENT
Start: 2021-01-01 | End: 2022-10-17

## 2021-01-01 RX ORDER — PIPERACILLIN AND TAZOBACTAM 4; .5 G/20ML; G/20ML
3.38 INJECTION, POWDER, LYOPHILIZED, FOR SOLUTION INTRAVENOUS ONCE
Refills: 0 | Status: DISCONTINUED | OUTPATIENT
Start: 2021-01-01 | End: 2021-01-01

## 2021-01-01 RX ORDER — IBUPROFEN 200 MG
1 TABLET ORAL
Qty: 0 | Refills: 0 | DISCHARGE
Start: 2021-01-01

## 2021-01-01 RX ORDER — PIPERACILLIN AND TAZOBACTAM 4; .5 G/20ML; G/20ML
3.38 INJECTION, POWDER, LYOPHILIZED, FOR SOLUTION INTRAVENOUS EVERY 6 HOURS
Refills: 0 | Status: DISCONTINUED | OUTPATIENT
Start: 2021-01-01 | End: 2021-01-01

## 2021-01-01 RX ORDER — BENZOCAINE 10 %
1 GEL (GRAM) MUCOUS MEMBRANE
Refills: 0 | Status: DISCONTINUED | OUTPATIENT
Start: 2021-01-01 | End: 2021-01-01

## 2021-01-01 RX ORDER — NAPHAZOLINE HCL 0.1 %
0 DROPS OPHTHALMIC (EYE)
Qty: 0 | Refills: 0 | DISCHARGE

## 2021-01-01 RX ORDER — SIMETHICONE 80 MG/1
80 TABLET, CHEWABLE ORAL EVERY 6 HOURS
Refills: 0 | Status: DISCONTINUED | OUTPATIENT
Start: 2021-01-01 | End: 2021-01-01

## 2021-01-01 RX ORDER — KETOROLAC TROMETHAMINE 30 MG/ML
15 SYRINGE (ML) INJECTION EVERY 8 HOURS
Refills: 0 | Status: DISCONTINUED | OUTPATIENT
Start: 2021-01-01 | End: 2021-01-01

## 2021-01-01 RX ORDER — CEFTRIAXONE 500 MG/1
1000 INJECTION, POWDER, FOR SOLUTION INTRAMUSCULAR; INTRAVENOUS ONCE
Refills: 0 | Status: COMPLETED | OUTPATIENT
Start: 2021-01-01 | End: 2021-01-01

## 2021-01-01 RX ORDER — MAGNESIUM HYDROXIDE 400 MG/5ML
400 SUSPENSION ORAL
Qty: 1 | Refills: 3 | Status: ACTIVE | COMMUNITY
Start: 2021-01-01 | End: 1900-01-01

## 2021-01-01 RX ORDER — POLYETHYLENE GLYCOL 3350 17 G/17G
17 POWDER, FOR SOLUTION ORAL DAILY
Refills: 0 | Status: DISCONTINUED | OUTPATIENT
Start: 2021-01-01 | End: 2021-01-01

## 2021-01-01 RX ORDER — SODIUM CHLORIDE 9 MG/ML
1000 INJECTION, SOLUTION INTRAVENOUS
Refills: 0 | Status: DISCONTINUED | OUTPATIENT
Start: 2021-01-01 | End: 2021-01-01

## 2021-01-01 RX ORDER — FUROSEMIDE 40 MG
20 TABLET ORAL ONCE
Refills: 0 | Status: COMPLETED | OUTPATIENT
Start: 2021-01-01 | End: 2021-01-01

## 2021-01-01 RX ORDER — OXYCODONE HYDROCHLORIDE 5 MG/1
5 TABLET ORAL EVERY 6 HOURS
Refills: 0 | Status: DISCONTINUED | OUTPATIENT
Start: 2021-01-01 | End: 2021-01-01

## 2021-01-01 RX ORDER — IBUPROFEN 200 MG
600 TABLET ORAL EVERY 6 HOURS
Refills: 0 | Status: DISCONTINUED | OUTPATIENT
Start: 2021-01-01 | End: 2021-01-01

## 2021-01-01 RX ORDER — SODIUM CHLORIDE 9 MG/ML
1000 INJECTION INTRAMUSCULAR; INTRAVENOUS; SUBCUTANEOUS
Refills: 0 | Status: DISCONTINUED | OUTPATIENT
Start: 2021-01-01 | End: 2021-01-01

## 2021-01-01 RX ORDER — SULFAMETHOXAZOLE AND TRIMETHOPRIM 800; 160 MG/1; MG/1
800-160 TABLET ORAL
Qty: 14 | Refills: 0 | Status: COMPLETED | COMMUNITY
Start: 2021-01-01 | End: 2021-01-01

## 2021-01-01 RX ORDER — IVERMECTIN 3 MG/1
9 TABLET ORAL EVERY 24 HOURS
Refills: 0 | Status: COMPLETED | OUTPATIENT
Start: 2021-01-01 | End: 2021-01-01

## 2021-01-01 RX ORDER — POLYETHYLENE GLYCOL 3350 17 G/17G
17 POWDER, FOR SOLUTION ORAL EVERY 24 HOURS
Refills: 0 | Status: DISCONTINUED | OUTPATIENT
Start: 2021-01-01 | End: 2021-01-01

## 2021-01-01 RX ORDER — METOCLOPRAMIDE HCL 10 MG
5 TABLET ORAL ONCE
Refills: 0 | Status: COMPLETED | OUTPATIENT
Start: 2021-01-01 | End: 2021-01-01

## 2021-01-01 RX ORDER — MAGNESIUM SULFATE 500 MG/ML
1 VIAL (ML) INJECTION ONCE
Refills: 0 | Status: COMPLETED | OUTPATIENT
Start: 2021-01-01 | End: 2021-01-01

## 2021-01-01 RX ORDER — DOCUSATE SODIUM 100 MG
1 CAPSULE ORAL
Qty: 0 | Refills: 0 | DISCHARGE
Start: 2021-01-01

## 2021-01-01 RX ORDER — BUPIVACAINE 13.3 MG/ML
20 INJECTION, SUSPENSION, LIPOSOMAL INFILTRATION ONCE
Refills: 0 | Status: DISCONTINUED | OUTPATIENT
Start: 2021-01-01 | End: 2021-01-01

## 2021-01-01 RX ORDER — AZITHROMYCIN 500 MG/1
500 TABLET, FILM COATED ORAL EVERY 24 HOURS
Refills: 0 | Status: DISCONTINUED | OUTPATIENT
Start: 2021-01-01 | End: 2021-01-01

## 2021-01-01 RX ORDER — IBUPROFEN 200 MG
1 TABLET ORAL
Qty: 15 | Refills: 0
Start: 2021-01-01

## 2021-01-01 RX ORDER — GABAPENTIN 400 MG/1
300 CAPSULE ORAL ONCE
Refills: 0 | Status: DISCONTINUED | OUTPATIENT
Start: 2021-01-01 | End: 2021-01-01

## 2021-01-01 RX ORDER — DOCUSATE SODIUM 100 MG/1
100 CAPSULE ORAL TWICE DAILY
Qty: 40 | Refills: 0 | Status: ACTIVE | COMMUNITY
Start: 2021-01-01 | End: 1900-01-01

## 2021-01-01 RX ORDER — FUROSEMIDE 40 MG
20 TABLET ORAL ONCE
Refills: 0 | Status: DISCONTINUED | OUTPATIENT
Start: 2021-01-01 | End: 2021-01-01

## 2021-01-01 RX ORDER — TIOTROPIUM BROMIDE 18 UG/1
1 CAPSULE ORAL; RESPIRATORY (INHALATION) DAILY
Refills: 0 | Status: DISCONTINUED | OUTPATIENT
Start: 2021-01-01 | End: 2021-01-01

## 2021-01-01 RX ORDER — ONDANSETRON 8 MG/1
4 TABLET, FILM COATED ORAL ONCE
Refills: 0 | Status: COMPLETED | OUTPATIENT
Start: 2021-01-01 | End: 2021-01-01

## 2021-01-01 RX ORDER — ENOXAPARIN SODIUM 100 MG/ML
40 INJECTION SUBCUTANEOUS EVERY 24 HOURS
Refills: 0 | Status: DISCONTINUED | OUTPATIENT
Start: 2021-01-01 | End: 2021-01-01

## 2021-01-01 RX ORDER — ACETAMINOPHEN 500 MG
600 TABLET ORAL ONCE
Refills: 0 | Status: COMPLETED | OUTPATIENT
Start: 2021-01-01 | End: 2021-01-01

## 2021-01-01 RX ORDER — PSYLLIUM SEED (WITH DEXTROSE)
1 POWDER (GRAM) ORAL DAILY
Refills: 0 | Status: DISCONTINUED | OUTPATIENT
Start: 2021-01-01 | End: 2021-01-01

## 2021-01-01 RX ORDER — VANCOMYCIN HCL 1 G
750 VIAL (EA) INTRAVENOUS EVERY 24 HOURS
Refills: 0 | Status: DISCONTINUED | OUTPATIENT
Start: 2021-01-01 | End: 2021-01-01

## 2021-01-01 RX ORDER — OMEPRAZOLE 10 MG/1
1 CAPSULE, DELAYED RELEASE ORAL
Qty: 0 | Refills: 0 | DISCHARGE

## 2021-01-01 RX ORDER — FOLIC ACID 0.8 MG
1 TABLET ORAL DAILY
Refills: 0 | Status: DISCONTINUED | OUTPATIENT
Start: 2021-01-01 | End: 2021-01-01

## 2021-01-01 RX ORDER — CHOLECALCIFEROL (VITAMIN D3) 125 MCG
1 CAPSULE ORAL
Qty: 0 | Refills: 0 | DISCHARGE

## 2021-01-01 RX ORDER — BUDESONIDE AND FORMOTEROL FUMARATE DIHYDRATE 160; 4.5 UG/1; UG/1
2 AEROSOL RESPIRATORY (INHALATION)
Refills: 0 | Status: DISCONTINUED | OUTPATIENT
Start: 2021-01-01 | End: 2021-01-01

## 2021-01-01 RX ORDER — MEROPENEM 1 G/30ML
1000 INJECTION INTRAVENOUS EVERY 12 HOURS
Refills: 0 | Status: DISCONTINUED | OUTPATIENT
Start: 2021-01-01 | End: 2021-01-01

## 2021-01-01 RX ORDER — ACETAMINOPHEN 500 MG
1000 TABLET ORAL ONCE
Refills: 0 | Status: COMPLETED | OUTPATIENT
Start: 2021-01-01 | End: 2021-01-01

## 2021-01-01 RX ORDER — OXYCODONE AND ACETAMINOPHEN 5; 325 MG/1; MG/1
5-325 TABLET ORAL
Qty: 10 | Refills: 0 | Status: ACTIVE | COMMUNITY
Start: 2021-01-01 | End: 1900-01-01

## 2021-01-01 RX ORDER — SODIUM CHLORIDE 9 MG/ML
3 INJECTION INTRAMUSCULAR; INTRAVENOUS; SUBCUTANEOUS EVERY 8 HOURS
Refills: 0 | Status: DISCONTINUED | OUTPATIENT
Start: 2021-01-01 | End: 2021-01-01

## 2021-01-01 RX ORDER — IPRATROPIUM/ALBUTEROL SULFATE 18-103MCG
3 AEROSOL WITH ADAPTER (GRAM) INHALATION EVERY 6 HOURS
Refills: 0 | Status: DISCONTINUED | OUTPATIENT
Start: 2021-01-01 | End: 2021-01-01

## 2021-01-01 RX ORDER — MAGNESIUM SULFATE 500 MG/ML
2 VIAL (ML) INJECTION ONCE
Refills: 0 | Status: COMPLETED | OUTPATIENT
Start: 2021-01-01 | End: 2021-01-01

## 2021-01-01 RX ORDER — POLYETHYLENE GLYCOL 3350 17 G/17G
17 POWDER, FOR SOLUTION ORAL
Qty: 0 | Refills: 0 | DISCHARGE
Start: 2021-01-01

## 2021-01-01 RX ORDER — METOCLOPRAMIDE HCL 10 MG
5 TABLET ORAL EVERY 8 HOURS
Refills: 0 | Status: DISCONTINUED | OUTPATIENT
Start: 2021-01-01 | End: 2021-01-01

## 2021-01-01 RX ORDER — POTASSIUM CHLORIDE 20 MEQ
20 PACKET (EA) ORAL ONCE
Refills: 0 | Status: COMPLETED | OUTPATIENT
Start: 2021-01-01 | End: 2021-01-01

## 2021-01-01 RX ORDER — MEROPENEM 1 G/30ML
1 INJECTION INTRAVENOUS EVERY 12 HOURS
Refills: 0 | Status: DISCONTINUED | OUTPATIENT
Start: 2021-01-01 | End: 2021-01-01

## 2021-01-01 RX ORDER — POTASSIUM CHLORIDE 20 MEQ
40 PACKET (EA) ORAL ONCE
Refills: 0 | Status: COMPLETED | OUTPATIENT
Start: 2021-01-01 | End: 2021-01-01

## 2021-01-01 RX ORDER — MEGESTROL ACETATE 40 MG/ML
1 SUSPENSION ORAL
Qty: 0 | Refills: 0 | DISCHARGE

## 2021-01-01 RX ORDER — OXYCODONE HYDROCHLORIDE 5 MG/1
5 TABLET ORAL ONCE
Refills: 0 | Status: DISCONTINUED | OUTPATIENT
Start: 2021-01-01 | End: 2021-01-01

## 2021-01-01 RX ORDER — HEPARIN SODIUM 5000 [USP'U]/ML
5000 INJECTION INTRAVENOUS; SUBCUTANEOUS ONCE
Refills: 0 | Status: COMPLETED | OUTPATIENT
Start: 2021-01-01 | End: 2021-01-01

## 2021-01-01 RX ORDER — FLUTICASONE FUROATE, UMECLIDINIUM BROMIDE AND VILANTEROL TRIFENATATE 200; 62.5; 25 UG/1; UG/1; UG/1
1 POWDER RESPIRATORY (INHALATION)
Qty: 0 | Refills: 0 | DISCHARGE

## 2021-01-01 RX ORDER — SENNA PLUS 8.6 MG/1
2 TABLET ORAL
Qty: 0 | Refills: 0 | DISCHARGE
Start: 2021-01-01

## 2021-01-01 RX ORDER — METOCLOPRAMIDE HCL 10 MG
5 TABLET ORAL EVERY 6 HOURS
Refills: 0 | Status: DISCONTINUED | OUTPATIENT
Start: 2021-01-01 | End: 2021-01-01

## 2021-01-01 RX ADMIN — BUDESONIDE AND FORMOTEROL FUMARATE DIHYDRATE 2 PUFF(S): 160; 4.5 AEROSOL RESPIRATORY (INHALATION) at 06:33

## 2021-01-01 RX ADMIN — SIMETHICONE 80 MILLIGRAM(S): 80 TABLET, CHEWABLE ORAL at 06:32

## 2021-01-01 RX ADMIN — PANTOPRAZOLE SODIUM 40 MILLIGRAM(S): 20 TABLET, DELAYED RELEASE ORAL at 17:57

## 2021-01-01 RX ADMIN — Medication 1000 MILLIGRAM(S): at 12:41

## 2021-01-01 RX ADMIN — Medication 3 MILLILITER(S): at 16:10

## 2021-01-01 RX ADMIN — SENNA PLUS 2 TABLET(S): 8.6 TABLET ORAL at 21:48

## 2021-01-01 RX ADMIN — OXYCODONE HYDROCHLORIDE 5 MILLIGRAM(S): 5 TABLET ORAL at 14:16

## 2021-01-01 RX ADMIN — Medication 62.5 MILLIMOLE(S): at 09:28

## 2021-01-01 RX ADMIN — MEROPENEM 100 MILLIGRAM(S): 1 INJECTION INTRAVENOUS at 06:04

## 2021-01-01 RX ADMIN — Medication 3 MILLILITER(S): at 17:50

## 2021-01-01 RX ADMIN — OXYCODONE HYDROCHLORIDE 5 MILLIGRAM(S): 5 TABLET ORAL at 06:22

## 2021-01-01 RX ADMIN — Medication 3 MILLILITER(S): at 17:30

## 2021-01-01 RX ADMIN — SENNA PLUS 2 TABLET(S): 8.6 TABLET ORAL at 22:08

## 2021-01-01 RX ADMIN — PANTOPRAZOLE SODIUM 40 MILLIGRAM(S): 20 TABLET, DELAYED RELEASE ORAL at 07:03

## 2021-01-01 RX ADMIN — Medication 5 MILLIGRAM(S): at 16:23

## 2021-01-01 RX ADMIN — Medication 650 MILLIGRAM(S): at 05:20

## 2021-01-01 RX ADMIN — Medication 212.5 MILLIGRAM(S): at 18:32

## 2021-01-01 RX ADMIN — PANTOPRAZOLE SODIUM 40 MILLIGRAM(S): 20 TABLET, DELAYED RELEASE ORAL at 05:18

## 2021-01-01 RX ADMIN — MORPHINE SULFATE 4 MILLIGRAM(S): 50 CAPSULE, EXTENDED RELEASE ORAL at 09:59

## 2021-01-01 RX ADMIN — Medication 1000 MILLIGRAM(S): at 07:24

## 2021-01-01 RX ADMIN — Medication 212.5 MILLIGRAM(S): at 17:35

## 2021-01-01 RX ADMIN — OXYCODONE HYDROCHLORIDE 5 MILLIGRAM(S): 5 TABLET ORAL at 13:11

## 2021-01-01 RX ADMIN — Medication 100 MILLIGRAM(S): at 05:38

## 2021-01-01 RX ADMIN — Medication 1 MILLIGRAM(S): at 12:10

## 2021-01-01 RX ADMIN — Medication 650 MILLIGRAM(S): at 16:34

## 2021-01-01 RX ADMIN — Medication 650 MILLIGRAM(S): at 22:32

## 2021-01-01 RX ADMIN — MEROPENEM 100 MILLIGRAM(S): 1 INJECTION INTRAVENOUS at 18:19

## 2021-01-01 RX ADMIN — Medication 650 MILLIGRAM(S): at 01:39

## 2021-01-01 RX ADMIN — OXYCODONE HYDROCHLORIDE 5 MILLIGRAM(S): 5 TABLET ORAL at 08:30

## 2021-01-01 RX ADMIN — Medication 3 MILLILITER(S): at 11:14

## 2021-01-01 RX ADMIN — Medication 1 SPRAY(S): at 02:23

## 2021-01-01 RX ADMIN — SENNA PLUS 2 TABLET(S): 8.6 TABLET ORAL at 23:43

## 2021-01-01 RX ADMIN — MEROPENEM 100 MILLIGRAM(S): 1 INJECTION INTRAVENOUS at 05:47

## 2021-01-01 RX ADMIN — PANTOPRAZOLE SODIUM 40 MILLIGRAM(S): 20 TABLET, DELAYED RELEASE ORAL at 06:34

## 2021-01-01 RX ADMIN — Medication 1 SPRAY(S): at 22:29

## 2021-01-01 RX ADMIN — SODIUM CHLORIDE 3 MILLILITER(S): 9 INJECTION INTRAMUSCULAR; INTRAVENOUS; SUBCUTANEOUS at 06:00

## 2021-01-01 RX ADMIN — Medication 40 MILLIGRAM(S): at 05:47

## 2021-01-01 RX ADMIN — Medication 1000 MILLIGRAM(S): at 13:22

## 2021-01-01 RX ADMIN — Medication 15 MILLIGRAM(S): at 14:15

## 2021-01-01 RX ADMIN — Medication 1 PACKET(S): at 10:27

## 2021-01-01 RX ADMIN — Medication 650 MILLIGRAM(S): at 17:03

## 2021-01-01 RX ADMIN — Medication 650 MILLIGRAM(S): at 17:00

## 2021-01-01 RX ADMIN — OXYCODONE HYDROCHLORIDE 5 MILLIGRAM(S): 5 TABLET ORAL at 14:44

## 2021-01-01 RX ADMIN — IOHEXOL 30 MILLILITER(S): 300 INJECTION, SOLUTION INTRAVENOUS at 10:15

## 2021-01-01 RX ADMIN — Medication 3 MILLILITER(S): at 00:07

## 2021-01-01 RX ADMIN — IVERMECTIN 9 MILLIGRAM(S): 3 TABLET ORAL at 17:57

## 2021-01-01 RX ADMIN — Medication 5 MILLIGRAM(S): at 06:04

## 2021-01-01 RX ADMIN — Medication 1 MILLIGRAM(S): at 11:25

## 2021-01-01 RX ADMIN — ENOXAPARIN SODIUM 30 MILLIGRAM(S): 100 INJECTION SUBCUTANEOUS at 06:04

## 2021-01-01 RX ADMIN — BUDESONIDE AND FORMOTEROL FUMARATE DIHYDRATE 2 PUFF(S): 160; 4.5 AEROSOL RESPIRATORY (INHALATION) at 05:47

## 2021-01-01 RX ADMIN — Medication 650 MILLIGRAM(S): at 11:03

## 2021-01-01 RX ADMIN — Medication 650 MILLIGRAM(S): at 23:00

## 2021-01-01 RX ADMIN — Medication 100 MILLIGRAM(S): at 14:16

## 2021-01-01 RX ADMIN — Medication 212.5 MILLIGRAM(S): at 18:33

## 2021-01-01 RX ADMIN — Medication 650 MILLIGRAM(S): at 09:18

## 2021-01-01 RX ADMIN — Medication 3 MILLILITER(S): at 04:15

## 2021-01-01 RX ADMIN — Medication 240 MILLIGRAM(S): at 17:56

## 2021-01-01 RX ADMIN — PANTOPRAZOLE SODIUM 40 MILLIGRAM(S): 20 TABLET, DELAYED RELEASE ORAL at 17:41

## 2021-01-01 RX ADMIN — PIPERACILLIN AND TAZOBACTAM 200 GRAM(S): 4; .5 INJECTION, POWDER, LYOPHILIZED, FOR SOLUTION INTRAVENOUS at 00:52

## 2021-01-01 RX ADMIN — TIOTROPIUM BROMIDE 1 CAPSULE(S): 18 CAPSULE ORAL; RESPIRATORY (INHALATION) at 06:33

## 2021-01-01 RX ADMIN — ENOXAPARIN SODIUM 30 MILLIGRAM(S): 100 INJECTION SUBCUTANEOUS at 07:14

## 2021-01-01 RX ADMIN — Medication 650 MILLIGRAM(S): at 21:51

## 2021-01-01 RX ADMIN — TAMSULOSIN HYDROCHLORIDE 0.4 MILLIGRAM(S): 0.4 CAPSULE ORAL at 22:32

## 2021-01-01 RX ADMIN — MEROPENEM 100 MILLIGRAM(S): 1 INJECTION INTRAVENOUS at 17:04

## 2021-01-01 RX ADMIN — PIPERACILLIN AND TAZOBACTAM 200 GRAM(S): 4; .5 INJECTION, POWDER, LYOPHILIZED, FOR SOLUTION INTRAVENOUS at 11:50

## 2021-01-01 RX ADMIN — Medication 1 MILLIGRAM(S): at 11:58

## 2021-01-01 RX ADMIN — BUDESONIDE AND FORMOTEROL FUMARATE DIHYDRATE 2 PUFF(S): 160; 4.5 AEROSOL RESPIRATORY (INHALATION) at 17:43

## 2021-01-01 RX ADMIN — TAMSULOSIN HYDROCHLORIDE 0.4 MILLIGRAM(S): 0.4 CAPSULE ORAL at 21:16

## 2021-01-01 RX ADMIN — Medication 1 PACKET(S): at 10:07

## 2021-01-01 RX ADMIN — PANTOPRAZOLE SODIUM 40 MILLIGRAM(S): 20 TABLET, DELAYED RELEASE ORAL at 17:35

## 2021-01-01 RX ADMIN — Medication 3 MILLILITER(S): at 15:48

## 2021-01-01 RX ADMIN — Medication 50 GRAM(S): at 12:23

## 2021-01-01 RX ADMIN — Medication 85 MILLIMOLE(S): at 11:03

## 2021-01-01 RX ADMIN — Medication 1 PACKET(S): at 11:16

## 2021-01-01 RX ADMIN — Medication 650 MILLIGRAM(S): at 00:11

## 2021-01-01 RX ADMIN — PANTOPRAZOLE SODIUM 40 MILLIGRAM(S): 20 TABLET, DELAYED RELEASE ORAL at 06:57

## 2021-01-01 RX ADMIN — Medication 300 UNIT(S): at 12:30

## 2021-01-01 RX ADMIN — Medication 1 PACKET(S): at 17:35

## 2021-01-01 RX ADMIN — SIMETHICONE 80 MILLIGRAM(S): 80 TABLET, CHEWABLE ORAL at 12:20

## 2021-01-01 RX ADMIN — Medication 40 MILLIGRAM(S): at 06:34

## 2021-01-01 RX ADMIN — SODIUM CHLORIDE 3 MILLILITER(S): 9 INJECTION INTRAMUSCULAR; INTRAVENOUS; SUBCUTANEOUS at 21:53

## 2021-01-01 RX ADMIN — BUDESONIDE AND FORMOTEROL FUMARATE DIHYDRATE 2 PUFF(S): 160; 4.5 AEROSOL RESPIRATORY (INHALATION) at 06:04

## 2021-01-01 RX ADMIN — MEROPENEM 100 MILLIGRAM(S): 1 INJECTION INTRAVENOUS at 07:03

## 2021-01-01 RX ADMIN — OXYCODONE HYDROCHLORIDE 5 MILLIGRAM(S): 5 TABLET ORAL at 03:29

## 2021-01-01 RX ADMIN — Medication 212.5 MILLIGRAM(S): at 06:59

## 2021-01-01 RX ADMIN — TIOTROPIUM BROMIDE 1 CAPSULE(S): 18 CAPSULE ORAL; RESPIRATORY (INHALATION) at 05:58

## 2021-01-01 RX ADMIN — Medication 1 MILLIGRAM(S): at 12:33

## 2021-01-01 RX ADMIN — PIPERACILLIN AND TAZOBACTAM 200 GRAM(S): 4; .5 INJECTION, POWDER, LYOPHILIZED, FOR SOLUTION INTRAVENOUS at 17:41

## 2021-01-01 RX ADMIN — Medication 3 MILLILITER(S): at 20:46

## 2021-01-01 RX ADMIN — Medication 650 MILLIGRAM(S): at 20:43

## 2021-01-01 RX ADMIN — Medication 650 MILLIGRAM(S): at 21:30

## 2021-01-01 RX ADMIN — Medication 650 MILLIGRAM(S): at 21:41

## 2021-01-01 RX ADMIN — Medication 600 MILLIGRAM(S): at 18:27

## 2021-01-01 RX ADMIN — POLYETHYLENE GLYCOL 3350 17 GRAM(S): 17 POWDER, FOR SOLUTION ORAL at 11:25

## 2021-01-01 RX ADMIN — Medication 600 MILLIGRAM(S): at 14:01

## 2021-01-01 RX ADMIN — TAMSULOSIN HYDROCHLORIDE 0.4 MILLIGRAM(S): 0.4 CAPSULE ORAL at 20:46

## 2021-01-01 RX ADMIN — Medication 3 MILLILITER(S): at 06:32

## 2021-01-01 RX ADMIN — ENOXAPARIN SODIUM 30 MILLIGRAM(S): 100 INJECTION SUBCUTANEOUS at 07:05

## 2021-01-01 RX ADMIN — Medication 650 MILLIGRAM(S): at 06:14

## 2021-01-01 RX ADMIN — Medication 3 MILLILITER(S): at 17:54

## 2021-01-01 RX ADMIN — ENOXAPARIN SODIUM 30 MILLIGRAM(S): 100 INJECTION SUBCUTANEOUS at 00:11

## 2021-01-01 RX ADMIN — Medication 3 MILLILITER(S): at 22:37

## 2021-01-01 RX ADMIN — Medication 650 MILLIGRAM(S): at 13:24

## 2021-01-01 RX ADMIN — BUDESONIDE AND FORMOTEROL FUMARATE DIHYDRATE 2 PUFF(S): 160; 4.5 AEROSOL RESPIRATORY (INHALATION) at 17:50

## 2021-01-01 RX ADMIN — Medication 650 MILLIGRAM(S): at 00:50

## 2021-01-01 RX ADMIN — SIMETHICONE 80 MILLIGRAM(S): 80 TABLET, CHEWABLE ORAL at 23:43

## 2021-01-01 RX ADMIN — Medication 3 MILLILITER(S): at 10:27

## 2021-01-01 RX ADMIN — Medication 100 MILLIGRAM(S): at 22:27

## 2021-01-01 RX ADMIN — SODIUM CHLORIDE 3 MILLILITER(S): 9 INJECTION INTRAMUSCULAR; INTRAVENOUS; SUBCUTANEOUS at 14:56

## 2021-01-01 RX ADMIN — PIPERACILLIN AND TAZOBACTAM 200 GRAM(S): 4; .5 INJECTION, POWDER, LYOPHILIZED, FOR SOLUTION INTRAVENOUS at 06:55

## 2021-01-01 RX ADMIN — Medication 100 MILLIGRAM(S): at 06:04

## 2021-01-01 RX ADMIN — Medication 650 MILLIGRAM(S): at 22:01

## 2021-01-01 RX ADMIN — OXYCODONE HYDROCHLORIDE 5 MILLIGRAM(S): 5 TABLET ORAL at 12:04

## 2021-01-01 RX ADMIN — OXYCODONE HYDROCHLORIDE 5 MILLIGRAM(S): 5 TABLET ORAL at 07:30

## 2021-01-01 RX ADMIN — CELECOXIB 400 MILLIGRAM(S): 200 CAPSULE ORAL at 07:24

## 2021-01-01 RX ADMIN — Medication 1 MILLIGRAM(S): at 11:16

## 2021-01-01 RX ADMIN — Medication 1 MILLIGRAM(S): at 10:27

## 2021-01-01 RX ADMIN — PANTOPRAZOLE SODIUM 40 MILLIGRAM(S): 20 TABLET, DELAYED RELEASE ORAL at 17:45

## 2021-01-01 RX ADMIN — PIPERACILLIN AND TAZOBACTAM 200 GRAM(S): 4; .5 INJECTION, POWDER, LYOPHILIZED, FOR SOLUTION INTRAVENOUS at 12:33

## 2021-01-01 RX ADMIN — OXYCODONE HYDROCHLORIDE 5 MILLIGRAM(S): 5 TABLET ORAL at 23:45

## 2021-01-01 RX ADMIN — Medication 650 MILLIGRAM(S): at 12:20

## 2021-01-01 RX ADMIN — Medication 5 MILLIGRAM(S): at 01:23

## 2021-01-01 RX ADMIN — ONDANSETRON 4 MILLIGRAM(S): 8 TABLET, FILM COATED ORAL at 09:58

## 2021-01-01 RX ADMIN — BUDESONIDE AND FORMOTEROL FUMARATE DIHYDRATE 2 PUFF(S): 160; 4.5 AEROSOL RESPIRATORY (INHALATION) at 07:03

## 2021-01-01 RX ADMIN — Medication 40 MILLIGRAM(S): at 17:32

## 2021-01-01 RX ADMIN — ENOXAPARIN SODIUM 30 MILLIGRAM(S): 100 INJECTION SUBCUTANEOUS at 23:36

## 2021-01-01 RX ADMIN — MEROPENEM 100 MILLIGRAM(S): 1 INJECTION INTRAVENOUS at 18:23

## 2021-01-01 RX ADMIN — Medication 3 MILLILITER(S): at 09:10

## 2021-01-01 RX ADMIN — Medication 3 MILLILITER(S): at 06:03

## 2021-01-01 RX ADMIN — Medication 20 MILLIEQUIVALENT(S): at 09:53

## 2021-01-01 RX ADMIN — Medication 3 MILLILITER(S): at 17:04

## 2021-01-01 RX ADMIN — Medication 40 MILLIGRAM(S): at 06:47

## 2021-01-01 RX ADMIN — Medication 20 MILLIGRAM(S): at 17:57

## 2021-01-01 RX ADMIN — ENOXAPARIN SODIUM 30 MILLIGRAM(S): 100 INJECTION SUBCUTANEOUS at 06:34

## 2021-01-01 RX ADMIN — Medication 50 GRAM(S): at 11:38

## 2021-01-01 RX ADMIN — BUDESONIDE AND FORMOTEROL FUMARATE DIHYDRATE 2 PUFF(S): 160; 4.5 AEROSOL RESPIRATORY (INHALATION) at 17:33

## 2021-01-01 RX ADMIN — Medication 650 MILLIGRAM(S): at 21:57

## 2021-01-01 RX ADMIN — MEROPENEM 100 MILLIGRAM(S): 1 INJECTION INTRAVENOUS at 21:45

## 2021-01-01 RX ADMIN — Medication 3 MILLILITER(S): at 06:57

## 2021-01-01 RX ADMIN — PIPERACILLIN AND TAZOBACTAM 200 GRAM(S): 4; .5 INJECTION, POWDER, LYOPHILIZED, FOR SOLUTION INTRAVENOUS at 07:15

## 2021-01-01 RX ADMIN — Medication 40 MILLIGRAM(S): at 17:30

## 2021-01-01 RX ADMIN — PIPERACILLIN AND TAZOBACTAM 200 GRAM(S): 4; .5 INJECTION, POWDER, LYOPHILIZED, FOR SOLUTION INTRAVENOUS at 20:00

## 2021-01-01 RX ADMIN — Medication 650 MILLIGRAM(S): at 07:35

## 2021-01-01 RX ADMIN — PIPERACILLIN AND TAZOBACTAM 200 GRAM(S): 4; .5 INJECTION, POWDER, LYOPHILIZED, FOR SOLUTION INTRAVENOUS at 00:12

## 2021-01-01 RX ADMIN — OXYCODONE HYDROCHLORIDE 5 MILLIGRAM(S): 5 TABLET ORAL at 13:44

## 2021-01-01 RX ADMIN — Medication 650 MILLIGRAM(S): at 17:47

## 2021-01-01 RX ADMIN — Medication 5 MILLIGRAM(S): at 17:32

## 2021-01-01 RX ADMIN — BUDESONIDE AND FORMOTEROL FUMARATE DIHYDRATE 2 PUFF(S): 160; 4.5 AEROSOL RESPIRATORY (INHALATION) at 17:04

## 2021-01-01 RX ADMIN — SODIUM CHLORIDE 3 MILLILITER(S): 9 INJECTION INTRAMUSCULAR; INTRAVENOUS; SUBCUTANEOUS at 13:25

## 2021-01-01 RX ADMIN — SENNA PLUS 2 TABLET(S): 8.6 TABLET ORAL at 22:36

## 2021-01-01 RX ADMIN — SODIUM CHLORIDE 3 MILLILITER(S): 9 INJECTION INTRAMUSCULAR; INTRAVENOUS; SUBCUTANEOUS at 23:06

## 2021-01-01 RX ADMIN — IVERMECTIN 9 MILLIGRAM(S): 3 TABLET ORAL at 17:04

## 2021-01-01 RX ADMIN — SENNA PLUS 2 TABLET(S): 8.6 TABLET ORAL at 21:55

## 2021-01-01 RX ADMIN — PANTOPRAZOLE SODIUM 40 MILLIGRAM(S): 20 TABLET, DELAYED RELEASE ORAL at 17:21

## 2021-01-01 RX ADMIN — PANTOPRAZOLE SODIUM 40 MILLIGRAM(S): 20 TABLET, DELAYED RELEASE ORAL at 05:47

## 2021-01-01 RX ADMIN — Medication 3 MILLILITER(S): at 20:40

## 2021-01-01 RX ADMIN — BUDESONIDE AND FORMOTEROL FUMARATE DIHYDRATE 2 PUFF(S): 160; 4.5 AEROSOL RESPIRATORY (INHALATION) at 06:58

## 2021-01-01 RX ADMIN — PANTOPRAZOLE SODIUM 40 MILLIGRAM(S): 20 TABLET, DELAYED RELEASE ORAL at 06:04

## 2021-01-01 RX ADMIN — OXYCODONE AND ACETAMINOPHEN 1 TABLET(S): 5; 325 TABLET ORAL at 18:04

## 2021-01-01 RX ADMIN — OXYCODONE HYDROCHLORIDE 5 MILLIGRAM(S): 5 TABLET ORAL at 12:29

## 2021-01-01 RX ADMIN — Medication 15 MILLIGRAM(S): at 12:00

## 2021-01-01 RX ADMIN — Medication 20 MILLIGRAM(S): at 22:36

## 2021-01-01 RX ADMIN — ENOXAPARIN SODIUM 30 MILLIGRAM(S): 100 INJECTION SUBCUTANEOUS at 06:32

## 2021-01-01 RX ADMIN — Medication 650 MILLIGRAM(S): at 21:45

## 2021-01-01 RX ADMIN — Medication 1 MILLIGRAM(S): at 11:50

## 2021-01-01 RX ADMIN — Medication 40 MILLIGRAM(S): at 05:18

## 2021-01-01 RX ADMIN — Medication 212.5 MILLIGRAM(S): at 06:32

## 2021-01-01 RX ADMIN — Medication 212.5 MILLIGRAM(S): at 06:47

## 2021-01-01 RX ADMIN — OXYCODONE HYDROCHLORIDE 5 MILLIGRAM(S): 5 TABLET ORAL at 21:47

## 2021-01-01 RX ADMIN — SODIUM CHLORIDE 3 MILLILITER(S): 9 INJECTION INTRAMUSCULAR; INTRAVENOUS; SUBCUTANEOUS at 23:18

## 2021-01-01 RX ADMIN — Medication 650 MILLIGRAM(S): at 12:24

## 2021-01-01 RX ADMIN — Medication 100 MILLIGRAM(S): at 16:23

## 2021-01-01 RX ADMIN — Medication 650 MILLIGRAM(S): at 19:21

## 2021-01-01 RX ADMIN — Medication 1 MILLIGRAM(S): at 11:19

## 2021-01-01 RX ADMIN — Medication 1 MILLIGRAM(S): at 11:38

## 2021-01-01 RX ADMIN — SODIUM CHLORIDE 3 MILLILITER(S): 9 INJECTION INTRAMUSCULAR; INTRAVENOUS; SUBCUTANEOUS at 05:39

## 2021-01-01 RX ADMIN — MEROPENEM 100 MILLIGRAM(S): 1 INJECTION INTRAVENOUS at 06:34

## 2021-01-01 RX ADMIN — Medication 212.5 MILLIGRAM(S): at 07:34

## 2021-01-01 RX ADMIN — Medication 100 MILLIGRAM(S): at 23:42

## 2021-01-01 RX ADMIN — Medication 212.5 MILLIGRAM(S): at 06:57

## 2021-01-01 RX ADMIN — Medication 212.5 MILLIGRAM(S): at 07:03

## 2021-01-01 RX ADMIN — Medication 15 MILLIGRAM(S): at 01:39

## 2021-01-01 RX ADMIN — Medication 3 MILLILITER(S): at 11:34

## 2021-01-01 RX ADMIN — BUDESONIDE AND FORMOTEROL FUMARATE DIHYDRATE 2 PUFF(S): 160; 4.5 AEROSOL RESPIRATORY (INHALATION) at 17:56

## 2021-01-01 RX ADMIN — SODIUM CHLORIDE 3 MILLILITER(S): 9 INJECTION INTRAMUSCULAR; INTRAVENOUS; SUBCUTANEOUS at 06:35

## 2021-01-01 RX ADMIN — MEROPENEM 100 MILLIGRAM(S): 1 INJECTION INTRAVENOUS at 17:31

## 2021-01-01 RX ADMIN — Medication 1 PACKET(S): at 11:50

## 2021-01-01 RX ADMIN — Medication 212.5 MILLIGRAM(S): at 17:55

## 2021-01-01 RX ADMIN — Medication 1 MILLIGRAM(S): at 11:34

## 2021-01-01 RX ADMIN — OXYCODONE HYDROCHLORIDE 5 MILLIGRAM(S): 5 TABLET ORAL at 20:34

## 2021-01-01 RX ADMIN — PANTOPRAZOLE SODIUM 40 MILLIGRAM(S): 20 TABLET, DELAYED RELEASE ORAL at 17:54

## 2021-01-01 RX ADMIN — Medication 650 MILLIGRAM(S): at 13:20

## 2021-01-01 RX ADMIN — Medication 5 MILLIGRAM(S): at 17:42

## 2021-01-01 RX ADMIN — BUDESONIDE AND FORMOTEROL FUMARATE DIHYDRATE 2 PUFF(S): 160; 4.5 AEROSOL RESPIRATORY (INHALATION) at 06:35

## 2021-01-01 RX ADMIN — OXYCODONE HYDROCHLORIDE 5 MILLIGRAM(S): 5 TABLET ORAL at 14:11

## 2021-01-01 RX ADMIN — Medication 212.5 MILLIGRAM(S): at 17:40

## 2021-01-01 RX ADMIN — PIPERACILLIN AND TAZOBACTAM 200 GRAM(S): 4; .5 INJECTION, POWDER, LYOPHILIZED, FOR SOLUTION INTRAVENOUS at 23:40

## 2021-01-01 RX ADMIN — Medication 20 MILLIGRAM(S): at 11:44

## 2021-01-01 RX ADMIN — MEROPENEM 100 MILLIGRAM(S): 1 INJECTION INTRAVENOUS at 05:18

## 2021-01-01 RX ADMIN — Medication 3 MILLILITER(S): at 09:18

## 2021-01-01 RX ADMIN — PANTOPRAZOLE SODIUM 40 MILLIGRAM(S): 20 TABLET, DELAYED RELEASE ORAL at 17:02

## 2021-01-01 RX ADMIN — Medication 3 MILLILITER(S): at 11:19

## 2021-01-01 RX ADMIN — MEROPENEM 100 MILLIGRAM(S): 1 INJECTION INTRAVENOUS at 17:57

## 2021-01-01 RX ADMIN — Medication 15 MILLIGRAM(S): at 14:46

## 2021-01-01 RX ADMIN — Medication 3 MILLILITER(S): at 03:26

## 2021-01-01 RX ADMIN — MORPHINE SULFATE 4 MILLIGRAM(S): 50 CAPSULE, EXTENDED RELEASE ORAL at 10:30

## 2021-01-01 RX ADMIN — OXYCODONE HYDROCHLORIDE 5 MILLIGRAM(S): 5 TABLET ORAL at 21:30

## 2021-01-01 RX ADMIN — Medication 3 MILLILITER(S): at 17:32

## 2021-01-01 RX ADMIN — Medication 3 MILLILITER(S): at 17:58

## 2021-01-01 RX ADMIN — PIPERACILLIN AND TAZOBACTAM 200 GRAM(S): 4; .5 INJECTION, POWDER, LYOPHILIZED, FOR SOLUTION INTRAVENOUS at 11:38

## 2021-01-01 RX ADMIN — TAMSULOSIN HYDROCHLORIDE 0.4 MILLIGRAM(S): 0.4 CAPSULE ORAL at 21:48

## 2021-01-01 RX ADMIN — Medication 250 MILLIGRAM(S): at 21:13

## 2021-01-01 RX ADMIN — PANTOPRAZOLE SODIUM 40 MILLIGRAM(S): 20 TABLET, DELAYED RELEASE ORAL at 06:47

## 2021-01-01 RX ADMIN — Medication 3 MILLILITER(S): at 21:51

## 2021-01-01 RX ADMIN — OXYCODONE AND ACETAMINOPHEN 1 TABLET(S): 5; 325 TABLET ORAL at 16:55

## 2021-01-01 RX ADMIN — Medication 40 MILLIGRAM(S): at 17:41

## 2021-01-01 RX ADMIN — Medication 100 GRAM(S): at 09:28

## 2021-01-01 RX ADMIN — ENOXAPARIN SODIUM 30 MILLIGRAM(S): 100 INJECTION SUBCUTANEOUS at 06:50

## 2021-01-01 RX ADMIN — Medication 650 MILLIGRAM(S): at 03:26

## 2021-01-01 RX ADMIN — Medication 650 MILLIGRAM(S): at 22:45

## 2021-01-01 RX ADMIN — SENNA PLUS 2 TABLET(S): 8.6 TABLET ORAL at 22:32

## 2021-01-01 RX ADMIN — Medication 40 MILLIGRAM(S): at 17:54

## 2021-01-01 RX ADMIN — SODIUM CHLORIDE 3 MILLILITER(S): 9 INJECTION INTRAMUSCULAR; INTRAVENOUS; SUBCUTANEOUS at 05:28

## 2021-01-01 RX ADMIN — Medication 650 MILLIGRAM(S): at 04:16

## 2021-01-01 RX ADMIN — TIOTROPIUM BROMIDE 1 CAPSULE(S): 18 CAPSULE ORAL; RESPIRATORY (INHALATION) at 05:38

## 2021-01-01 RX ADMIN — Medication 650 MILLIGRAM(S): at 20:45

## 2021-01-01 RX ADMIN — Medication 650 MILLIGRAM(S): at 12:29

## 2021-01-01 RX ADMIN — OXYCODONE AND ACETAMINOPHEN 1 TABLET(S): 5; 325 TABLET ORAL at 15:47

## 2021-01-01 RX ADMIN — MEROPENEM 100 MILLIGRAM(S): 1 INJECTION INTRAVENOUS at 17:02

## 2021-01-01 RX ADMIN — TAMSULOSIN HYDROCHLORIDE 0.4 MILLIGRAM(S): 0.4 CAPSULE ORAL at 22:36

## 2021-01-01 RX ADMIN — OXYCODONE HYDROCHLORIDE 5 MILLIGRAM(S): 5 TABLET ORAL at 22:50

## 2021-01-01 RX ADMIN — OXYCODONE HYDROCHLORIDE 5 MILLIGRAM(S): 5 TABLET ORAL at 08:24

## 2021-01-01 RX ADMIN — PANTOPRAZOLE SODIUM 40 MILLIGRAM(S): 20 TABLET, DELAYED RELEASE ORAL at 20:01

## 2021-01-01 RX ADMIN — PANTOPRAZOLE SODIUM 40 MILLIGRAM(S): 20 TABLET, DELAYED RELEASE ORAL at 06:33

## 2021-01-01 RX ADMIN — PANTOPRAZOLE SODIUM 40 MILLIGRAM(S): 20 TABLET, DELAYED RELEASE ORAL at 07:15

## 2021-01-01 RX ADMIN — BUDESONIDE AND FORMOTEROL FUMARATE DIHYDRATE 2 PUFF(S): 160; 4.5 AEROSOL RESPIRATORY (INHALATION) at 06:47

## 2021-01-01 RX ADMIN — Medication 1 MILLIGRAM(S): at 11:14

## 2021-01-01 RX ADMIN — OXYCODONE AND ACETAMINOPHEN 1 TABLET(S): 5; 325 TABLET ORAL at 16:58

## 2021-01-01 RX ADMIN — AZITHROMYCIN 255 MILLIGRAM(S): 500 TABLET, FILM COATED ORAL at 23:39

## 2021-01-01 RX ADMIN — Medication 650 MILLIGRAM(S): at 04:35

## 2021-01-01 RX ADMIN — PANTOPRAZOLE SODIUM 40 MILLIGRAM(S): 20 TABLET, DELAYED RELEASE ORAL at 17:32

## 2021-01-01 RX ADMIN — Medication 3 MILLILITER(S): at 22:08

## 2021-01-01 RX ADMIN — Medication 3 MILLILITER(S): at 04:35

## 2021-01-01 RX ADMIN — CEFTRIAXONE 100 MILLIGRAM(S): 500 INJECTION, POWDER, FOR SOLUTION INTRAMUSCULAR; INTRAVENOUS at 17:27

## 2021-01-01 RX ADMIN — Medication 5 MILLIGRAM(S): at 05:38

## 2021-01-01 RX ADMIN — TAMSULOSIN HYDROCHLORIDE 0.4 MILLIGRAM(S): 0.4 CAPSULE ORAL at 22:08

## 2021-01-01 RX ADMIN — Medication 50 GRAM(S): at 12:18

## 2021-01-01 RX ADMIN — ENOXAPARIN SODIUM 30 MILLIGRAM(S): 100 INJECTION SUBCUTANEOUS at 23:44

## 2021-01-01 RX ADMIN — Medication 650 MILLIGRAM(S): at 02:20

## 2021-01-01 RX ADMIN — MEROPENEM 100 MILLIGRAM(S): 1 INJECTION INTRAVENOUS at 06:57

## 2021-01-01 RX ADMIN — BUDESONIDE AND FORMOTEROL FUMARATE DIHYDRATE 2 PUFF(S): 160; 4.5 AEROSOL RESPIRATORY (INHALATION) at 06:55

## 2021-01-01 RX ADMIN — PIPERACILLIN AND TAZOBACTAM 200 GRAM(S): 4; .5 INJECTION, POWDER, LYOPHILIZED, FOR SOLUTION INTRAVENOUS at 23:23

## 2021-01-01 RX ADMIN — Medication 1 PACKET(S): at 12:48

## 2021-01-01 RX ADMIN — Medication 3 MILLILITER(S): at 01:19

## 2021-01-01 RX ADMIN — SODIUM CHLORIDE 3 MILLILITER(S): 9 INJECTION INTRAMUSCULAR; INTRAVENOUS; SUBCUTANEOUS at 13:48

## 2021-01-01 RX ADMIN — Medication 650 MILLIGRAM(S): at 01:50

## 2021-01-01 RX ADMIN — Medication 62.5 MILLIMOLE(S): at 13:32

## 2021-01-01 RX ADMIN — PIPERACILLIN AND TAZOBACTAM 200 GRAM(S): 4; .5 INJECTION, POWDER, LYOPHILIZED, FOR SOLUTION INTRAVENOUS at 12:09

## 2021-01-01 RX ADMIN — Medication 3 MILLILITER(S): at 05:31

## 2021-01-01 RX ADMIN — BUDESONIDE AND FORMOTEROL FUMARATE DIHYDRATE 2 PUFF(S): 160; 4.5 AEROSOL RESPIRATORY (INHALATION) at 18:20

## 2021-01-01 RX ADMIN — Medication 650 MILLIGRAM(S): at 23:30

## 2021-01-01 RX ADMIN — Medication 3 MILLILITER(S): at 18:23

## 2021-01-01 RX ADMIN — Medication 5 MILLIGRAM(S): at 16:09

## 2021-01-01 RX ADMIN — Medication 212.5 MILLIGRAM(S): at 17:57

## 2021-01-01 RX ADMIN — Medication 650 MILLIGRAM(S): at 09:51

## 2021-01-01 RX ADMIN — BUDESONIDE AND FORMOTEROL FUMARATE DIHYDRATE 2 PUFF(S): 160; 4.5 AEROSOL RESPIRATORY (INHALATION) at 17:31

## 2021-01-01 RX ADMIN — Medication 650 MILLIGRAM(S): at 05:50

## 2021-01-01 RX ADMIN — BUDESONIDE AND FORMOTEROL FUMARATE DIHYDRATE 2 PUFF(S): 160; 4.5 AEROSOL RESPIRATORY (INHALATION) at 05:17

## 2021-01-01 RX ADMIN — CEFTRIAXONE 1000 MILLIGRAM(S): 500 INJECTION, POWDER, FOR SOLUTION INTRAMUSCULAR; INTRAVENOUS at 19:37

## 2021-01-01 RX ADMIN — Medication 100 MILLIGRAM(S): at 13:31

## 2021-01-01 RX ADMIN — Medication 650 MILLIGRAM(S): at 23:42

## 2021-01-01 RX ADMIN — Medication 650 MILLIGRAM(S): at 06:35

## 2021-01-01 RX ADMIN — OXYCODONE HYDROCHLORIDE 5 MILLIGRAM(S): 5 TABLET ORAL at 07:23

## 2021-01-01 RX ADMIN — Medication 1 TABLET(S): at 09:21

## 2021-01-01 RX ADMIN — Medication 212.5 MILLIGRAM(S): at 06:28

## 2021-01-01 RX ADMIN — SODIUM CHLORIDE 3 MILLILITER(S): 9 INJECTION INTRAMUSCULAR; INTRAVENOUS; SUBCUTANEOUS at 06:27

## 2021-01-01 RX ADMIN — Medication 20 MILLIGRAM(S): at 18:54

## 2021-01-01 RX ADMIN — HEPARIN SODIUM 5000 UNIT(S): 5000 INJECTION INTRAVENOUS; SUBCUTANEOUS at 07:25

## 2021-01-01 RX ADMIN — PANTOPRAZOLE SODIUM 40 MILLIGRAM(S): 20 TABLET, DELAYED RELEASE ORAL at 17:50

## 2021-01-01 RX ADMIN — Medication 40 MILLIGRAM(S): at 07:03

## 2021-01-01 RX ADMIN — Medication 40 MILLIGRAM(S): at 18:26

## 2021-01-01 RX ADMIN — PANTOPRAZOLE SODIUM 40 MILLIGRAM(S): 20 TABLET, DELAYED RELEASE ORAL at 17:04

## 2021-01-01 RX ADMIN — PANTOPRAZOLE SODIUM 40 MILLIGRAM(S): 20 TABLET, DELAYED RELEASE ORAL at 06:55

## 2021-01-01 RX ADMIN — Medication 1 PACKET(S): at 11:58

## 2021-01-01 RX ADMIN — BUDESONIDE AND FORMOTEROL FUMARATE DIHYDRATE 2 PUFF(S): 160; 4.5 AEROSOL RESPIRATORY (INHALATION) at 18:26

## 2021-01-01 RX ADMIN — ENOXAPARIN SODIUM 30 MILLIGRAM(S): 100 INJECTION SUBCUTANEOUS at 07:15

## 2021-01-01 RX ADMIN — ENOXAPARIN SODIUM 30 MILLIGRAM(S): 100 INJECTION SUBCUTANEOUS at 22:19

## 2021-01-01 RX ADMIN — OXYCODONE HYDROCHLORIDE 5 MILLIGRAM(S): 5 TABLET ORAL at 13:16

## 2021-01-01 RX ADMIN — PANTOPRAZOLE SODIUM 40 MILLIGRAM(S): 20 TABLET, DELAYED RELEASE ORAL at 18:25

## 2021-01-01 RX ADMIN — MEROPENEM 100 MILLIGRAM(S): 1 INJECTION INTRAVENOUS at 06:47

## 2021-01-01 RX ADMIN — SODIUM CHLORIDE 3 MILLILITER(S): 9 INJECTION INTRAMUSCULAR; INTRAVENOUS; SUBCUTANEOUS at 13:01

## 2021-01-01 RX ADMIN — Medication 3 MILLILITER(S): at 21:16

## 2021-01-01 RX ADMIN — Medication 212.5 MILLIGRAM(S): at 18:13

## 2021-01-01 RX ADMIN — Medication 20 MILLIGRAM(S): at 15:36

## 2021-01-01 RX ADMIN — Medication 600 MILLIGRAM(S): at 13:31

## 2021-01-01 RX ADMIN — Medication 3 MILLILITER(S): at 11:29

## 2021-01-01 RX ADMIN — PIPERACILLIN AND TAZOBACTAM 200 GRAM(S): 4; .5 INJECTION, POWDER, LYOPHILIZED, FOR SOLUTION INTRAVENOUS at 06:50

## 2021-01-01 RX ADMIN — SODIUM CHLORIDE 3 MILLILITER(S): 9 INJECTION INTRAMUSCULAR; INTRAVENOUS; SUBCUTANEOUS at 21:00

## 2021-01-01 RX ADMIN — PIPERACILLIN AND TAZOBACTAM 200 GRAM(S): 4; .5 INJECTION, POWDER, LYOPHILIZED, FOR SOLUTION INTRAVENOUS at 17:21

## 2021-01-01 RX ADMIN — TAMSULOSIN HYDROCHLORIDE 0.4 MILLIGRAM(S): 0.4 CAPSULE ORAL at 21:51

## 2021-01-01 RX ADMIN — PIPERACILLIN AND TAZOBACTAM 200 GRAM(S): 4; .5 INJECTION, POWDER, LYOPHILIZED, FOR SOLUTION INTRAVENOUS at 17:35

## 2021-01-01 RX ADMIN — POLYETHYLENE GLYCOL 3350 17 GRAM(S): 17 POWDER, FOR SOLUTION ORAL at 22:27

## 2021-01-01 RX ADMIN — Medication 650 MILLIGRAM(S): at 22:13

## 2021-01-01 RX ADMIN — Medication 15 MILLIGRAM(S): at 21:09

## 2021-01-01 RX ADMIN — Medication 3 MILLILITER(S): at 10:22

## 2021-01-01 RX ADMIN — Medication 650 MILLIGRAM(S): at 07:17

## 2021-01-01 RX ADMIN — SODIUM CHLORIDE 3 MILLILITER(S): 9 INJECTION INTRAMUSCULAR; INTRAVENOUS; SUBCUTANEOUS at 13:23

## 2021-01-01 RX ADMIN — PANTOPRAZOLE SODIUM 40 MILLIGRAM(S): 20 TABLET, DELAYED RELEASE ORAL at 06:50

## 2021-01-01 RX ADMIN — BUDESONIDE AND FORMOTEROL FUMARATE DIHYDRATE 2 PUFF(S): 160; 4.5 AEROSOL RESPIRATORY (INHALATION) at 17:57

## 2021-01-01 RX ADMIN — Medication 650 MILLIGRAM(S): at 08:00

## 2021-01-01 RX ADMIN — Medication 1 MILLIGRAM(S): at 10:06

## 2021-01-01 RX ADMIN — Medication 3 MILLILITER(S): at 11:25

## 2021-01-01 RX ADMIN — Medication 650 MILLIGRAM(S): at 06:34

## 2021-01-01 RX ADMIN — Medication 1 PACKET(S): at 11:19

## 2021-01-01 RX ADMIN — MEROPENEM 100 MILLIGRAM(S): 1 INJECTION INTRAVENOUS at 15:38

## 2021-01-01 RX ADMIN — Medication 100 MILLIGRAM(S): at 21:44

## 2021-01-01 RX ADMIN — Medication 650 MILLIGRAM(S): at 04:25

## 2021-01-01 RX ADMIN — OXYCODONE HYDROCHLORIDE 5 MILLIGRAM(S): 5 TABLET ORAL at 00:30

## 2021-01-01 RX ADMIN — Medication 15 MILLIGRAM(S): at 12:19

## 2021-02-03 ENCOUNTER — INPATIENT (INPATIENT)
Facility: HOSPITAL | Age: 86
LOS: 13 days | Discharge: HOME | DRG: 377 | End: 2021-02-17
Attending: SPECIALIST | Admitting: SPECIALIST
Payer: MEDICARE

## 2021-02-03 VITALS
RESPIRATION RATE: 28 BRPM | TEMPERATURE: 98 F | SYSTOLIC BLOOD PRESSURE: 110 MMHG | DIASTOLIC BLOOD PRESSURE: 60 MMHG | WEIGHT: 84 LBS | HEIGHT: 60 IN

## 2021-02-03 DIAGNOSIS — K92.2 GASTROINTESTINAL HEMORRHAGE, UNSPECIFIED: ICD-10-CM

## 2021-02-03 DIAGNOSIS — Z71.89 OTHER SPECIFIED COUNSELING: ICD-10-CM

## 2021-02-03 DIAGNOSIS — D63.0 ANEMIA IN NEOPLASTIC DISEASE: ICD-10-CM

## 2021-02-03 DIAGNOSIS — J44.9 CHRONIC OBSTRUCTIVE PULMONARY DISEASE, UNSPECIFIED: ICD-10-CM

## 2021-02-03 DIAGNOSIS — C34.91 MALIGNANT NEOPLASM OF UNSPECIFIED PART OF RIGHT BRONCHUS OR LUNG: ICD-10-CM

## 2021-02-03 DIAGNOSIS — Z98.890 OTHER SPECIFIED POSTPROCEDURAL STATES: Chronic | ICD-10-CM

## 2021-02-03 DIAGNOSIS — Z90.49 ACQUIRED ABSENCE OF OTHER SPECIFIED PARTS OF DIGESTIVE TRACT: Chronic | ICD-10-CM

## 2021-02-03 DIAGNOSIS — Z98.49 CATARACT EXTRACTION STATUS, UNSPECIFIED EYE: Chronic | ICD-10-CM

## 2021-02-03 DIAGNOSIS — R63.8 OTHER SYMPTOMS AND SIGNS CONCERNING FOOD AND FLUID INTAKE: ICD-10-CM

## 2021-02-03 PROBLEM — C44.91 BASAL CELL CARCINOMA OF SKIN, UNSPECIFIED: Chronic | Status: ACTIVE | Noted: 2020-11-21

## 2021-02-03 PROBLEM — H91.90 UNSPECIFIED HEARING LOSS, UNSPECIFIED EAR: Chronic | Status: ACTIVE | Noted: 2020-11-21

## 2021-02-03 LAB
ALBUMIN SERPL ELPH-MCNC: 2.8 G/DL — LOW (ref 3.3–5)
ALP SERPL-CCNC: 44 U/L — SIGNIFICANT CHANGE UP (ref 40–120)
ALT FLD-CCNC: 51 U/L — HIGH (ref 10–45)
ANION GAP SERPL CALC-SCNC: 13 MMOL/L — SIGNIFICANT CHANGE UP (ref 5–17)
APTT BLD: 28.7 SEC — SIGNIFICANT CHANGE UP (ref 27.5–35.5)
AST SERPL-CCNC: 61 U/L — HIGH (ref 10–40)
BASOPHILS # BLD AUTO: 0.09 K/UL — SIGNIFICANT CHANGE UP (ref 0–0.2)
BASOPHILS NFR BLD AUTO: 0.9 % — SIGNIFICANT CHANGE UP (ref 0–2)
BILIRUB SERPL-MCNC: 0.4 MG/DL — SIGNIFICANT CHANGE UP (ref 0.2–1.2)
BLD GP AB SCN SERPL QL: NEGATIVE — SIGNIFICANT CHANGE UP
BUN SERPL-MCNC: 17 MG/DL — SIGNIFICANT CHANGE UP (ref 7–23)
CALCIUM SERPL-MCNC: 8.4 MG/DL — SIGNIFICANT CHANGE UP (ref 8.4–10.5)
CHLORIDE SERPL-SCNC: 102 MMOL/L — SIGNIFICANT CHANGE UP (ref 96–108)
CO2 BLDV-SCNC: 0 MMOL/L — LOW (ref 15.1–21.7)
CO2 SERPL-SCNC: 23 MMOL/L — SIGNIFICANT CHANGE UP (ref 22–31)
CREAT SERPL-MCNC: 1.41 MG/DL — HIGH (ref 0.5–1.3)
EOSINOPHIL # BLD AUTO: 0 K/UL — SIGNIFICANT CHANGE UP (ref 0–0.5)
EOSINOPHIL NFR BLD AUTO: 0 % — SIGNIFICANT CHANGE UP (ref 0–6)
GGT SERPL-CCNC: 25 U/L — SIGNIFICANT CHANGE UP (ref 8–40)
GLUCOSE SERPL-MCNC: 137 MG/DL — HIGH (ref 70–99)
HCT VFR BLD CALC: 23.2 % — LOW (ref 34.5–45)
HCT VFR BLD CALC: 24.7 % — LOW (ref 34.5–45)
HCT VFR BLD CALC: 25.3 % — LOW (ref 34.5–45)
HGB BLD-MCNC: 7.1 G/DL — LOW (ref 11.5–15.5)
HGB BLD-MCNC: 7.5 G/DL — LOW (ref 11.5–15.5)
HGB BLD-MCNC: 7.6 G/DL — LOW (ref 11.5–15.5)
INR BLD: 1.29 — HIGH (ref 0.88–1.16)
LACTATE SERPL-SCNC: 2.1 MMOL/L — HIGH (ref 0.5–2)
LYMPHOCYTES # BLD AUTO: 0.45 K/UL — LOW (ref 1–3.3)
LYMPHOCYTES # BLD AUTO: 4.3 % — LOW (ref 13–44)
MAGNESIUM SERPL-MCNC: 2.2 MG/DL — SIGNIFICANT CHANGE UP (ref 1.6–2.6)
MCHC RBC-ENTMCNC: 26.4 PG — LOW (ref 27–34)
MCHC RBC-ENTMCNC: 27.1 PG — SIGNIFICANT CHANGE UP (ref 27–34)
MCHC RBC-ENTMCNC: 27.7 PG — SIGNIFICANT CHANGE UP (ref 27–34)
MCHC RBC-ENTMCNC: 29.6 GM/DL — LOW (ref 32–36)
MCHC RBC-ENTMCNC: 30.6 GM/DL — LOW (ref 32–36)
MCHC RBC-ENTMCNC: 30.8 GM/DL — LOW (ref 32–36)
MCV RBC AUTO: 88.2 FL — SIGNIFICANT CHANGE UP (ref 80–100)
MCV RBC AUTO: 89.1 FL — SIGNIFICANT CHANGE UP (ref 80–100)
MCV RBC AUTO: 90.6 FL — SIGNIFICANT CHANGE UP (ref 80–100)
MONOCYTES # BLD AUTO: 1.93 K/UL — HIGH (ref 0–0.9)
MONOCYTES NFR BLD AUTO: 18.3 % — HIGH (ref 2–14)
NEUTROPHILS # BLD AUTO: 7.95 K/UL — HIGH (ref 1.8–7.4)
NEUTROPHILS NFR BLD AUTO: 75.6 % — SIGNIFICANT CHANGE UP (ref 43–77)
NRBC # BLD: 0 /100 WBCS — SIGNIFICANT CHANGE UP (ref 0–0)
NRBC # BLD: 0 /100 WBCS — SIGNIFICANT CHANGE UP (ref 0–0)
PCO2 BLDV: 34 MMHG — LOW (ref 41–51)
PH BLDV: 7.46 — HIGH (ref 7.32–7.43)
PHOSPHATE SERPL-MCNC: 3.3 MG/DL — SIGNIFICANT CHANGE UP (ref 2.5–4.5)
PLATELET # BLD AUTO: 517 K/UL — HIGH (ref 150–400)
PLATELET # BLD AUTO: 581 K/UL — HIGH (ref 150–400)
PLATELET # BLD AUTO: 586 K/UL — HIGH (ref 150–400)
PO2 BLDV: 19 MMHG — SIGNIFICANT CHANGE UP
POTASSIUM SERPL-MCNC: 4.3 MMOL/L — SIGNIFICANT CHANGE UP (ref 3.5–5.3)
POTASSIUM SERPL-SCNC: 4.3 MMOL/L — SIGNIFICANT CHANGE UP (ref 3.5–5.3)
PROT SERPL-MCNC: 6.5 G/DL — SIGNIFICANT CHANGE UP (ref 6–8.3)
PROTHROM AB SERPL-ACNC: 15.3 SEC — HIGH (ref 10.6–13.6)
RBC # BLD: 2.56 M/UL — LOW (ref 3.8–5.2)
RBC # BLD: 2.8 M/UL — LOW (ref 3.8–5.2)
RBC # BLD: 2.84 M/UL — LOW (ref 3.8–5.2)
RBC # FLD: 15.1 % — HIGH (ref 10.3–14.5)
RBC # FLD: 15.2 % — HIGH (ref 10.3–14.5)
RBC # FLD: 15.3 % — HIGH (ref 10.3–14.5)
RETICS #: 67.5 K/UL — SIGNIFICANT CHANGE UP (ref 25–125)
RETICS/RBC NFR: 2.4 % — SIGNIFICANT CHANGE UP (ref 0.5–2.5)
RH IG SCN BLD-IMP: POSITIVE — SIGNIFICANT CHANGE UP
SAO2 % BLDV: 27 % — SIGNIFICANT CHANGE UP
SARS-COV-2 RNA SPEC QL NAA+PROBE: SIGNIFICANT CHANGE UP
SODIUM SERPL-SCNC: 138 MMOL/L — SIGNIFICANT CHANGE UP (ref 135–145)
URATE SERPL-MCNC: 4.6 MG/DL — SIGNIFICANT CHANGE UP (ref 2.5–7)
WBC # BLD: 10.52 K/UL — HIGH (ref 3.8–10.5)
WBC # BLD: 9.4 K/UL — SIGNIFICANT CHANGE UP (ref 3.8–10.5)
WBC # BLD: 9.9 K/UL — SIGNIFICANT CHANGE UP (ref 3.8–10.5)
WBC # FLD AUTO: 10.52 K/UL — HIGH (ref 3.8–10.5)
WBC # FLD AUTO: 9.4 K/UL — SIGNIFICANT CHANGE UP (ref 3.8–10.5)
WBC # FLD AUTO: 9.9 K/UL — SIGNIFICANT CHANGE UP (ref 3.8–10.5)

## 2021-02-03 PROCEDURE — 71045 X-RAY EXAM CHEST 1 VIEW: CPT | Mod: 26

## 2021-02-03 PROCEDURE — 99285 EMERGENCY DEPT VISIT HI MDM: CPT

## 2021-02-03 PROCEDURE — 93010 ELECTROCARDIOGRAM REPORT: CPT

## 2021-02-03 PROCEDURE — 71275 CT ANGIOGRAPHY CHEST: CPT | Mod: 26,MA

## 2021-02-03 RX ORDER — POLYETHYLENE GLYCOL 3350 17 G/17G
17 POWDER, FOR SOLUTION ORAL DAILY
Refills: 0 | Status: DISCONTINUED | OUTPATIENT
Start: 2021-02-03 | End: 2021-02-05

## 2021-02-03 RX ORDER — ACETAMINOPHEN 500 MG
650 TABLET ORAL EVERY 8 HOURS
Refills: 0 | Status: DISCONTINUED | OUTPATIENT
Start: 2021-02-03 | End: 2021-02-06

## 2021-02-03 RX ORDER — CEFTRIAXONE 500 MG/1
1000 INJECTION, POWDER, FOR SOLUTION INTRAMUSCULAR; INTRAVENOUS EVERY 24 HOURS
Refills: 0 | Status: DISCONTINUED | OUTPATIENT
Start: 2021-02-03 | End: 2021-02-06

## 2021-02-03 RX ORDER — FERROUS SULFATE 325(65) MG
325 TABLET ORAL DAILY
Refills: 0 | Status: DISCONTINUED | OUTPATIENT
Start: 2021-02-03 | End: 2021-02-17

## 2021-02-03 RX ORDER — INFLUENZA VIRUS VACCINE 15; 15; 15; 15 UG/.5ML; UG/.5ML; UG/.5ML; UG/.5ML
0.5 SUSPENSION INTRAMUSCULAR ONCE
Refills: 0 | Status: DISCONTINUED | OUTPATIENT
Start: 2021-02-03 | End: 2021-02-03

## 2021-02-03 RX ORDER — PANTOPRAZOLE SODIUM 20 MG/1
40 TABLET, DELAYED RELEASE ORAL EVERY 12 HOURS
Refills: 0 | Status: DISCONTINUED | OUTPATIENT
Start: 2021-02-03 | End: 2021-02-17

## 2021-02-03 RX ORDER — MEGESTROL ACETATE 40 MG/ML
40 SUSPENSION ORAL THREE TIMES A DAY
Refills: 0 | Status: DISCONTINUED | OUTPATIENT
Start: 2021-02-03 | End: 2021-02-17

## 2021-02-03 RX ORDER — INFLUENZA VIRUS VACCINE 15; 15; 15; 15 UG/.5ML; UG/.5ML; UG/.5ML; UG/.5ML
0.7 SUSPENSION INTRAMUSCULAR ONCE
Refills: 0 | Status: DISCONTINUED | OUTPATIENT
Start: 2021-02-03 | End: 2021-02-17

## 2021-02-03 RX ORDER — FOLIC ACID 0.8 MG
1 TABLET ORAL DAILY
Refills: 0 | Status: DISCONTINUED | OUTPATIENT
Start: 2021-02-03 | End: 2021-02-17

## 2021-02-03 RX ORDER — SODIUM CHLORIDE 9 MG/ML
500 INJECTION INTRAMUSCULAR; INTRAVENOUS; SUBCUTANEOUS ONCE
Refills: 0 | Status: COMPLETED | OUTPATIENT
Start: 2021-02-03 | End: 2021-02-03

## 2021-02-03 RX ORDER — OMEPRAZOLE 10 MG/1
1 CAPSULE, DELAYED RELEASE ORAL
Qty: 0 | Refills: 0 | DISCHARGE

## 2021-02-03 RX ORDER — IPRATROPIUM/ALBUTEROL SULFATE 18-103MCG
3 AEROSOL WITH ADAPTER (GRAM) INHALATION EVERY 6 HOURS
Refills: 0 | Status: DISCONTINUED | OUTPATIENT
Start: 2021-02-03 | End: 2021-02-17

## 2021-02-03 RX ORDER — AZITHROMYCIN 500 MG/1
250 TABLET, FILM COATED ORAL EVERY 24 HOURS
Refills: 0 | Status: COMPLETED | OUTPATIENT
Start: 2021-02-04 | End: 2021-02-05

## 2021-02-03 RX ORDER — SODIUM CHLORIDE 9 MG/ML
10 INJECTION INTRAMUSCULAR; INTRAVENOUS; SUBCUTANEOUS ONCE
Refills: 0 | Status: COMPLETED | OUTPATIENT
Start: 2021-02-03 | End: 2021-02-03

## 2021-02-03 RX ORDER — AZITHROMYCIN 500 MG/1
500 TABLET, FILM COATED ORAL ONCE
Refills: 0 | Status: COMPLETED | OUTPATIENT
Start: 2021-02-03 | End: 2021-02-03

## 2021-02-03 RX ADMIN — Medication 325 MILLIGRAM(S): at 21:04

## 2021-02-03 RX ADMIN — Medication 1 TABLET(S): at 21:04

## 2021-02-03 RX ADMIN — AZITHROMYCIN 500 MILLIGRAM(S): 500 TABLET, FILM COATED ORAL at 19:38

## 2021-02-03 RX ADMIN — SODIUM CHLORIDE 500 MILLILITER(S): 9 INJECTION INTRAMUSCULAR; INTRAVENOUS; SUBCUTANEOUS at 15:23

## 2021-02-03 RX ADMIN — SODIUM CHLORIDE 500 MILLILITER(S): 9 INJECTION INTRAMUSCULAR; INTRAVENOUS; SUBCUTANEOUS at 13:15

## 2021-02-03 RX ADMIN — MEGESTROL ACETATE 40 MILLIGRAM(S): 40 SUSPENSION ORAL at 22:52

## 2021-02-03 RX ADMIN — CEFTRIAXONE 100 MILLIGRAM(S): 500 INJECTION, POWDER, FOR SOLUTION INTRAMUSCULAR; INTRAVENOUS at 19:38

## 2021-02-03 RX ADMIN — Medication 1 MILLIGRAM(S): at 21:04

## 2021-02-03 RX ADMIN — Medication 650 MILLIGRAM(S): at 21:04

## 2021-02-03 RX ADMIN — SODIUM CHLORIDE 10 MILLILITER(S): 9 INJECTION INTRAMUSCULAR; INTRAVENOUS; SUBCUTANEOUS at 22:52

## 2021-02-03 RX ADMIN — POLYETHYLENE GLYCOL 3350 17 GRAM(S): 17 POWDER, FOR SOLUTION ORAL at 21:04

## 2021-02-03 RX ADMIN — PANTOPRAZOLE SODIUM 40 MILLIGRAM(S): 20 TABLET, DELAYED RELEASE ORAL at 18:20

## 2021-02-03 NOTE — H&P ADULT - PROBLEM SELECTOR PLAN 4
Per chart has history of COPD but patient adamantly denies history of COPD. Former smoker quit in 1999 unable to recall when she started to quantify pack-years. Not wheezing on admission exam.  - consider duonebs if wheezing or hypoxic  - allow desaturations to 88% if patient is asymptomatic MOLST completed outpatient by PCP Alena Worthington. DNR, DNI, no pressors. On admission went over form with patient and confirmed wishes, copy of MOLST in chart. Patient interested in further discussions on goals of care with stated goal remaining functional at home.  - consider palliative care consultation in AM

## 2021-02-03 NOTE — H&P ADULT - NSHPLABSRESULTS_GEN_ALL_CORE
LABS:                         7.6    10.52 )-----------( 581      ( 03 Feb 2021 11:52 )             24.7     02-03    138  |  102  |  17  ----------------------------<  137<H>  4.3   |  23  |  1.41<H>    Ca    8.4      03 Feb 2021 11:52  Phos  3.3     02-03  Mg     2.2     02-03    TPro  6.5  /  Alb  2.8<L>  /  TBili  0.4  /  DBili  x   /  AST  61<H>  /  ALT  51<H>  /  AlkPhos  44  02-03    PT/INR - ( 03 Feb 2021 11:52 )   PT: 15.3 sec;   INR: 1.29          PTT - ( 03 Feb 2021 11:52 )  PTT:28.7 sec          Lactate, Blood: 2.1 mmol/L (02-03 @ 11:50)      RADIOLOGY, EKG & ADDITIONAL TESTS: Reviewed.
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
## 2021-02-03 NOTE — H&P ADULT - PROBLEM SELECTOR PLAN 1
2 weeks black stools, abdominal pain, weakness, dyspnea found to have hemoglobin 7.6 from baseline 13.7. 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  - Protonix 40mg IV BID  - full liquid diet  - bowel regimen 2 weeks black stools, abdominal pain, weakness, dyspnea found to have hemoglobin 7.6 from baseline 13.7. 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  - Protonix 40mg IV BID  - full liquid diet  - NPO at midnight pending GI and Heme/Onc discussion  - consider EGD in AM

## 2021-02-03 NOTE — GOALS OF CARE CONVERSATION - ADVANCED CARE PLANNING - CONVERSATION DETAILS
Patient presented a photocopy of MOLST completed with PCP Alena Worthington. Verbally confirmed with patient the contents of the MOLST form including DNR, DNI, no IV pressors desired. Patient does want IV fluids, IV antibiotics, and noninvasive supplemental oxygen if it is necessary to save her life and temporary.  Patient's stated goal of hospitalization was to get stronger and be able to function more independently at home. Patient aware that aggressive chemotherapy may be a route to extending her lifespan but may not help her reach her goals of maximizing her time with high functional status. Patient requested further conversations be deferred until after acute management of potential GI bleed and acute anemia have resolved.

## 2021-02-03 NOTE — PROGRESS NOTE ADULT - ASSESSMENT
ASSESSMENT/PLAN 86 y/o female pt c worsening SOB, RUL and Central mediastinal mass poorly differentiated carcinoma favored adenocarcinoma. admitted for severe anemia c/f upper GI bleed, possible aspiration pneumonia vs lymphangitic spread.    1. O2 2LNC humidified  2. Bronchodilators:  Atrovent/ albuterol q 4 – 6 hours as needed  3. Corticosteroids:  now  off  4. ID/Antibiotics:  continue ABX, obtain sputum CX, Legionella Ag, UCX, RVP, pt is COVID NEG.  5. Cardiac/HTN: optimize BP  6. GI: Rx/ prophylaxis c PPI/H2B, GI   7. Heme: Rx/VT prophylaxis c SQH/SCD/ continue SCD in view of possible GI bleeding, transfuse PRBC  8. Aspiration precautions at all times  9. Mobilize, OOB  10. Use incentive spirometer  11. Nutritional Consult  Discussed with managing team

## 2021-02-03 NOTE — H&P ADULT - NSICDXPASTSURGICALHX_GEN_ALL_CORE_FT
PAST SURGICAL HISTORY:  H/O breast biopsy > 5 years ago, mass was benign and self-resolved    History of appendectomy     History of cataract surgery

## 2021-02-03 NOTE — ED PROVIDER NOTE - CARE PLAN
Principal Discharge DX:	SOB (shortness of breath)  Secondary Diagnosis:	Lung cancer   Principal Discharge DX:	SOB (shortness of breath)  Secondary Diagnosis:	Lung cancer  Secondary Diagnosis:	Dehydration
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
## 2021-02-03 NOTE — H&P ADULT - ASSESSMENT
86 F with PMHx COPD, DNR/DNI, recently diagnosed adenocarcinoma s/p radiation ~12/2020 now receiving chemo every other week with Dr. Jones, admitted for anemia c/f upper GI bleed.
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
## 2021-02-03 NOTE — H&P ADULT - PROBLEM SELECTOR PLAN 2
On gefitinib and tamoxifen Likely combination of hypoproliferation due to chemotherapy with subacute GI blood loss from 2 weeks of black stools. No BM 2-3 days prior to admission and rectal exam normal so bleed may have spontaneously resolved.  - UGIB management as above  - transfuse for Hgb < 7.0  - recheck CBC after IVF resuscitation, anticipate transfusion

## 2021-02-03 NOTE — H&P ADULT - NSHPREVIEWOFSYSTEMS_GEN_ALL_CORE
REVIEW OF SYSTEMS:  CONSTITUTIONAL: No weakness, fevers, chills, changes in weight  EYES/ENT: No visual changes;  No vertigo or throat pain   NECK: No pain or stiffness  RESPIRATORY: No cough, wheezing, hemoptysis; No shortness of breath  CARDIOVASCULAR: no chest pain or palpitations  GASTROINTESTINAL: +abdominal pain, black stools, constipation. No nausea, vomiting, or hematemesis; No diarrhea or hematochezia.  GENITOURINARY: No dysuria, frequency or hematuria  NEUROLOGICAL: No numbness or weakness  SKIN: No itching, rashes

## 2021-02-03 NOTE — GOALS OF CARE CONVERSATION - ADVANCED CARE PLANNING - WHAT MATTERS MOST
Functional status, being able to move around the house and take care of herself with minimal assistance.

## 2021-02-03 NOTE — ED PROVIDER NOTE - PHYSICAL EXAMINATION
GEN: elderly, thin, frail, awake, alert, oriented to person, place, time/situation and in no apparent distress. NTAF  ENT: Airway patent, Nasal mucosa clear. Mouth with normal mucosa.  EYES: Clear bilaterally. PERRL, EOMI  RESPIRATORY: Breathing comfortably with normal RR. No W/C/R, no hypoxia or resp distress.  CARDIAC: Regular rate and rhythm, no M/R/G  ABDOMEN: Soft, nontender, +bowel sounds, no rebound, rigidity, or guarding.  MSK: Range of motion is not limited, no deformities noted.  NEURO: Alert and oriented, no focal deficits.  SKIN: Skin normal color for race, warm, dry and intact. No evidence of rash.  PSYCH: Alert and oriented to person, place, time/situation. normal mood and affect. no apparent risk to self or others.

## 2021-02-03 NOTE — H&P ADULT - PROBLEM SELECTOR PLAN 6
F: none  E: replete K<4.0, Mg<2.0, Phos<2.5  N: Liquid  DVT prophylaxis: Lovenox  Access: PIV L arm  Code Status: DNR/DNI no pressors, MOLST in chart

## 2021-02-03 NOTE — ED ADULT NURSE NOTE - OBJECTIVE STATEMENT
Pt is an 86y female presents to ED w/ caretaker, sent by Dr. Jones for further eval of SOB. Pt recently Dx w/ lung CA in november, as per caretaker she goes to radiation 5 times a week and chemo on Mondays but did not attend this Monday's session. Pt presents with increased SOB, tachypneic. Pt denies fever/chills/n/v/CP.
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
## 2021-02-03 NOTE — H&P ADULT - NSICDXPASTMEDICALHX_GEN_ALL_CORE_FT
PAST MEDICAL HISTORY:  Basal cell carcinoma x2 - upper lip and arm    Chronic obstructive pulmonary disease     Chronic obstructive pulmonary disease, unspecified COPD type     Hearing loss left ear - sees Dr. Garces ENT    Osteoporosis Osteoporosis

## 2021-02-03 NOTE — H&P ADULT - NSHPPHYSICALEXAM_GEN_ALL_CORE
VITAL SIGNS:  T(C): 37.4 (02-03-21 @ 14:37), Max: 37.4 (02-03-21 @ 14:37)  T(F): 99.3 (02-03-21 @ 14:37), Max: 99.3 (02-03-21 @ 14:37)  HR: 103 (02-03-21 @ 14:37) (100 - 103)  BP: 97/57 (02-03-21 @ 14:37) (97/57 - 110/60)  BP(mean): --  RR: 20 (02-03-21 @ 14:37) (20 - 28)  SpO2: 97% (02-03-21 @ 14:37) (97% - 98%)  Wt(kg): --    PHYSICAL EXAM:  Constitutional: WDWN resting in bed; in no acute distress but slightly uncomfortable appearing  Eyes: PER, EOMI, anicteric sclera  ENT: no nasal discharge; uvula midline, no oropharyngeal erythema or exudates; MMM  Neck: supple; no JVD or thyromegaly  Respiratory: CTA B/L; no W/R/R, no retractions; breathing 24/min with abdominal recruitment on presentation  Cardiac: +S1/S2; RRR; no M/R/G  Gastrointestinal: abdomen soft, tender to light palpation evenly across epigastric, RUQ, RLQ, and suprapubic regions, no rebound  Rectal: scant light brown stool in vault  Extremities: WWP, no clubbing or cyanosis; no peripheral edema  Vascular: 2+ radial, femoral, DP/PT pulses B/L  Neurologic: AAOx3; CNII-XII grossly intact; moves extremities to command, 5/5 UE strength and can independently turn from back to side  Psychiatric: affect and characteristics of appearance, verbalizations, behaviors are appropriate

## 2021-02-03 NOTE — ED PROVIDER NOTE - RATE
Post-Care Instructions: I reviewed with the patient in detail post-care instructions. Patient is to keep the biopsy site dry overnight, and then apply bacitracin twice daily until healed. Patient may apply hydrogen peroxide soaks to remove any crusting. Bill For Surgical Tray: no 112 Biopsy Method: 15 blade Medical Necessity Clause: This procedure was medically necessary because the lesion that was treated was Detail Level: Detailed Hemostasis: Aluminum Chloride Size Of Lesion In Cm (Required): 0.3 Notification Instructions: Patient will be notified of biopsy results. However, patient instructed to call the office if not contacted within 2 weeks. Medical Necessity Information: It is in your best interest to select a reason for this procedure from the list below. All of these items fulfill various CMS LCD requirements except the new and changing color options. X Size Of Lesion In Cm (Optional): 0 Lab: [facility] Anesthesia Volume In Cc: 1 Consent: The provider's intent is to therapeutically remove the lesion in it's entirety while at the same time obtaining a tissue sample for histopathologic examination. Consent was obtained from the patient. The risks and benefits to therapy were discussed in detail. Specifically, the risks of infection, scarring, bleeding, prolonged wound healing, incomplete removal, allergy to anesthesia, nerve injury and recurrence were addressed. Prior to the procedure, the treatment site was clearly identified and confirmed by the patient. All components of Universal Protocol/PAUSE Rule completed. Billing Type: [employer] Lab Facility: [id number] Was A Bandage Applied: Yes Wound Care: Vaseline Anesthesia Type: 1% lidocaine without epinephrine

## 2021-02-04 LAB
ANION GAP SERPL CALC-SCNC: 10 MMOL/L — SIGNIFICANT CHANGE UP (ref 5–17)
BUN SERPL-MCNC: 18 MG/DL — SIGNIFICANT CHANGE UP (ref 7–23)
CALCIUM SERPL-MCNC: 8 MG/DL — LOW (ref 8.4–10.5)
CHLORIDE SERPL-SCNC: 106 MMOL/L — SIGNIFICANT CHANGE UP (ref 96–108)
CO2 SERPL-SCNC: 23 MMOL/L — SIGNIFICANT CHANGE UP (ref 22–31)
CREAT SERPL-MCNC: 1.19 MG/DL — SIGNIFICANT CHANGE UP (ref 0.5–1.3)
FERRITIN SERPL-MCNC: 1574 NG/ML — HIGH (ref 15–150)
GLUCOSE SERPL-MCNC: 103 MG/DL — HIGH (ref 70–99)
GRAM STN FLD: SIGNIFICANT CHANGE UP
HCT VFR BLD CALC: 26.7 % — LOW (ref 34.5–45)
HGB BLD-MCNC: 8.4 G/DL — LOW (ref 11.5–15.5)
IRON SATN MFR SERPL: 45 UG/DL — SIGNIFICANT CHANGE UP (ref 30–160)
IRON SATN MFR SERPL: 46 % — SIGNIFICANT CHANGE UP (ref 14–50)
LEGIONELLA AG UR QL: NEGATIVE — SIGNIFICANT CHANGE UP
MAGNESIUM SERPL-MCNC: 2.2 MG/DL — SIGNIFICANT CHANGE UP (ref 1.6–2.6)
MCHC RBC-ENTMCNC: 26.7 PG — LOW (ref 27–34)
MCHC RBC-ENTMCNC: 31.5 GM/DL — LOW (ref 32–36)
MCV RBC AUTO: 84.8 FL — SIGNIFICANT CHANGE UP (ref 80–100)
NRBC # BLD: 0 /100 WBCS — SIGNIFICANT CHANGE UP (ref 0–0)
PHOSPHATE SERPL-MCNC: 2.7 MG/DL — SIGNIFICANT CHANGE UP (ref 2.5–4.5)
PLATELET # BLD AUTO: 472 K/UL — HIGH (ref 150–400)
POTASSIUM SERPL-MCNC: 4 MMOL/L — SIGNIFICANT CHANGE UP (ref 3.5–5.3)
POTASSIUM SERPL-SCNC: 4 MMOL/L — SIGNIFICANT CHANGE UP (ref 3.5–5.3)
RBC # BLD: 3.15 M/UL — LOW (ref 3.8–5.2)
RBC # BLD: 3.15 M/UL — LOW (ref 3.8–5.2)
RBC # FLD: 17.9 % — HIGH (ref 10.3–14.5)
RETICS #: 63 K/UL — SIGNIFICANT CHANGE UP (ref 25–125)
RETICS/RBC NFR: 2 % — SIGNIFICANT CHANGE UP (ref 0.5–2.5)
S PNEUM AG UR QL: NEGATIVE — SIGNIFICANT CHANGE UP
SODIUM SERPL-SCNC: 139 MMOL/L — SIGNIFICANT CHANGE UP (ref 135–145)
SPECIMEN SOURCE: SIGNIFICANT CHANGE UP
TIBC SERPL-MCNC: 97 UG/DL — LOW (ref 220–430)
UIBC SERPL-MCNC: 52 UG/DL — LOW (ref 110–370)
WBC # BLD: 9.15 K/UL — SIGNIFICANT CHANGE UP (ref 3.8–10.5)
WBC # FLD AUTO: 9.15 K/UL — SIGNIFICANT CHANGE UP (ref 3.8–10.5)

## 2021-02-04 RX ORDER — KETOROLAC TROMETHAMINE 30 MG/ML
15 SYRINGE (ML) INJECTION EVERY 12 HOURS
Refills: 0 | Status: DISCONTINUED | OUTPATIENT
Start: 2021-02-04 | End: 2021-02-04

## 2021-02-04 RX ORDER — LIDOCAINE 4 G/100G
1 CREAM TOPICAL DAILY
Refills: 0 | Status: DISCONTINUED | OUTPATIENT
Start: 2021-02-04 | End: 2021-02-15

## 2021-02-04 RX ORDER — IBUPROFEN 200 MG
600 TABLET ORAL ONCE
Refills: 0 | Status: COMPLETED | OUTPATIENT
Start: 2021-02-04 | End: 2021-02-04

## 2021-02-04 RX ORDER — ENOXAPARIN SODIUM 100 MG/ML
30 INJECTION SUBCUTANEOUS EVERY 24 HOURS
Refills: 0 | Status: DISCONTINUED | OUTPATIENT
Start: 2021-02-04 | End: 2021-02-17

## 2021-02-04 RX ORDER — SODIUM CHLORIDE 9 MG/ML
1000 INJECTION, SOLUTION INTRAVENOUS
Refills: 0 | Status: DISCONTINUED | OUTPATIENT
Start: 2021-02-04 | End: 2021-02-05

## 2021-02-04 RX ADMIN — MEGESTROL ACETATE 40 MILLIGRAM(S): 40 SUSPENSION ORAL at 22:39

## 2021-02-04 RX ADMIN — Medication 1 TABLET(S): at 12:33

## 2021-02-04 RX ADMIN — Medication 650 MILLIGRAM(S): at 12:47

## 2021-02-04 RX ADMIN — ENOXAPARIN SODIUM 30 MILLIGRAM(S): 100 INJECTION SUBCUTANEOUS at 21:18

## 2021-02-04 RX ADMIN — LIDOCAINE 1 PATCH: 4 CREAM TOPICAL at 19:28

## 2021-02-04 RX ADMIN — CEFTRIAXONE 100 MILLIGRAM(S): 500 INJECTION, POWDER, FOR SOLUTION INTRAMUSCULAR; INTRAVENOUS at 18:02

## 2021-02-04 RX ADMIN — Medication 1 MILLIGRAM(S): at 12:33

## 2021-02-04 RX ADMIN — MEGESTROL ACETATE 40 MILLIGRAM(S): 40 SUSPENSION ORAL at 13:40

## 2021-02-04 RX ADMIN — Medication 325 MILLIGRAM(S): at 12:33

## 2021-02-04 RX ADMIN — AZITHROMYCIN 250 MILLIGRAM(S): 500 TABLET, FILM COATED ORAL at 18:05

## 2021-02-04 RX ADMIN — SODIUM CHLORIDE 80 MILLILITER(S): 9 INJECTION, SOLUTION INTRAVENOUS at 16:21

## 2021-02-04 RX ADMIN — MEGESTROL ACETATE 40 MILLIGRAM(S): 40 SUSPENSION ORAL at 05:44

## 2021-02-04 RX ADMIN — Medication 650 MILLIGRAM(S): at 05:43

## 2021-02-04 RX ADMIN — PANTOPRAZOLE SODIUM 40 MILLIGRAM(S): 20 TABLET, DELAYED RELEASE ORAL at 05:43

## 2021-02-04 RX ADMIN — LIDOCAINE 1 PATCH: 4 CREAM TOPICAL at 18:02

## 2021-02-04 RX ADMIN — Medication 600 MILLIGRAM(S): at 16:21

## 2021-02-04 RX ADMIN — PANTOPRAZOLE SODIUM 40 MILLIGRAM(S): 20 TABLET, DELAYED RELEASE ORAL at 16:22

## 2021-02-04 RX ADMIN — POLYETHYLENE GLYCOL 3350 17 GRAM(S): 17 POWDER, FOR SOLUTION ORAL at 12:33

## 2021-02-04 NOTE — DIETITIAN INITIAL EVALUATION ADULT. - OTHER INFO
86 F with PMHx COPD, DNR/DNI, recently diagnosed adenocarcinoma s/p radiation ~12/2020 now receiving chemo every other week with Dr. Jones, admitted for anemia c/f upper GI bleed.    Pt seen in room, sitting up and eating breakfast. Diet advanced to full liquid 2/3. Pt reports tolerating full liquids well, observed pt eating broth and cream of wheat. Pt reports fair PO intake PTA, unable to provide full diet recall. Denies chewing/swallowing difficulty. Per chart review, pt was 91lbs in November admission. EMR showing conflicting wts 2/3 83lbs, 2/3 90lbs. Continue to trend wts to establish baseline. Pt reports she does not like Ensure, is not receptive to receiving ONS at this time. RD encouraged PO intake as tolerated, discussed diet advancement as tolerated. Pt denies N/V at present, reports constipation. On miralax. 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 Per GOC note, pt DNR, DNI, no IV pressors desired, patient does want IV fluids, IV antibiotics. No nutrition related GOC documented at this time. Please see recs below. Will continue to follow per RD protocol.

## 2021-02-04 NOTE — DIETITIAN INITIAL EVALUATION ADULT. - PROBLEM SELECTOR PLAN 5
Per chart has history of COPD but patient adamantly denies history of COPD. Former smoker quit in 1999 unable to recall when she started to quantify pack-years. Not wheezing on admission exam.  - consider duonebs if wheezing or hypoxic  - allow desaturations to 88% if patient is asymptomatic

## 2021-02-04 NOTE — PROGRESS NOTE ADULT - SUBJECTIVE AND OBJECTIVE BOX
Interventional, Pulmonary, Critical, Chest Special Procedures.    Pt was seen and fully examined by myself.     Time spent with patient in minutes:87    Patient is a 86y old  Female who presents with a chief complaint of Anemia, GI Bleed (04 Feb 2021 12:38) The patient anxious, reporting generalized discomfort and malaise. Eupneic at rest    HPI:  86 F with PMHx COPD on no home O2, DNR/DNI, recently diagnosed lung adenocarcinoma November 2020 presented to ED with 2 weeks of black tarry stools, weakness, dyspnea on exertion, abdominal pain, poor PO tolerance, and dry cough. She has been getting chemotherapy every other week with last dose 1/25/21, she skipped Monday 2/1/21 because of the snow storm. She was recently seen on 1/29/21 by her PCP Dr. Alena Worthington who recommended that she come to the ED if her symptoms continued to worsen. She denies lightheadedness, syncope, visual changes, chest pain, hemoptysis, diarrhea, focal weakness/numbness, dysuria. She reports urinary incontinence over the last few weeks and constipation with no BM for 2-3 days.  She lives alone with a home health aide who was present on admission. Her aide works with her 8 hours/day 5 days/week and receives assistance primarily with IADLs but occasionally needs help with toileting.  Patient notably has active MOLST completed with PCP. Patient is DNR/DNI and does not want pressors, but would want noninvasive ventilation, blood transfusions, IV fluids, and IV antibiotics.    In the ED:  Vitals: T 98.3->99.3, , /80->97/57, RR 28->20, SpO2 97% RA  Labs sig for: WBC 10.52, Hgb 7.6, plt 581, INR 1.29, cr 1.41 (baseline 1), albumin 2.8, AST/ALT 61/51, Lactate 2.1, VBG Ph 7.46/PCO2 34  EKG: sinus tachy  Imaging:    CXR: no acute infiltrate or consolidation    CTA: No pulmonary embolus. New few small scattered nodular opacities bilaterally with more confluent groundglass density and suspected interlobular septal thickening in posterior right upper lobe.  Medication: 500cc NSx2 (03 Feb 2021 16:24)      REVIEW OF SYSTEMS:  Constitutional: No fever, +weight loss, profound  fatigue  Eyes: No eye pain, visual disturbances, or discharge  ENMT:  + difficulty hearing, tinnitus, vertigo; No sinus or throat pain. No epistaxis, +dysphagia, dysphonia, hoarseness no  odynophagia  Neck: No pain, stiffness or neck swelling.  No masses or deformities  Respiratory: +cough, no wheezing, hemoptysis  + COPD  - ILD   - PE   - ASTHMA     - PNEUMONIA  Cardiovascular: No chest pain, dysrhythmia, palpitations, dizziness or edema - CAD   - CHF   - HTN  Gastrointestinal: + abdominal or epigastric pain. No nausea, vomiting or hematemesis; No diarrhea or constipation. + melena no Icterus.          Genitourinary: No dysuria, frequency, hematuria + incontinence   - CKD/RIDDHI      - ESRD  Neurological: No headaches, memory loss, loss of strength, numbness or tremors      -DEMENTIA     - STROKE    - SEIZURE  Skin: No itching, burning, rashes or lesions   Lymph Nodes: No enlarged glands  Endocrine: No heat or cold intolerance; No hair loss       - DM     - THYROID DISORDER  Musculoskeletal: No joint pain or swelling; + muscle, +back or extremity pain, No edema  Psychiatric: No depression, anxiety, mood swings or difficulty sleeping  Heme/Lymph: No easy bruising or bleeding gums         + ANEMIA      + CANCER   -COAGULOPATHY    PAST MEDICAL & SURGICAL HISTORY:  Chronic obstructive pulmonary disease, unspecified COPD type    Hearing loss  left ear - sees Dr. Garces ENT    Basal cell carcinoma  x2 - upper lip and arm    Chronic obstructive pulmonary disease    Osteoporosis  Osteoporosis    H/O breast biopsy  &gt; 5 years ago, mass was benign and self-resolved    History of appendectomy    History of cataract surgery      FAMILY HISTORY:    SOCIAL HISTORY:      - Tobacco     - ETOH    Allergies    No Known Allergies    Intolerances      Vital Signs Last 24 Hrs  T(C): 36.7 (04 Feb 2021 10:54), Max: 37.4 (03 Feb 2021 14:37)  T(F): 98 (04 Feb 2021 10:54), Max: 99.3 (03 Feb 2021 14:37)  HR: 86 (04 Feb 2021 10:54) (73 - 103)  BP: 110/71 (04 Feb 2021 10:54) (94/55 - 119/68)  BP(mean): --  RR: 16 (04 Feb 2021 10:54) (16 - 20)  SpO2: 98% (04 Feb 2021 10:54) (95% - 99%)    02-03 @ 07:01  -  02-04 @ 07:00  --------------------------------------------------------  IN: 300 mL / OUT: 1300 mL / NET: -1000 mL          MEDICATIONS:  MEDICATIONS  (STANDING):  azithromycin   Tablet 250 milliGRAM(s) Oral every 24 hours  cefTRIAXone   IVPB 1000 milliGRAM(s) IV Intermittent every 24 hours  ferrous    sulfate 325 milliGRAM(s) Oral daily  folic acid 1 milliGRAM(s) Oral daily  influenza  Vaccine (HIGH DOSE) 0.7 milliLiter(s) IntraMuscular once  megestrol 40 milliGRAM(s) Oral three times a day  multivitamin 1 Tablet(s) Oral daily  pantoprazole  Injectable 40 milliGRAM(s) IV Push every 12 hours  polyethylene glycol 3350 17 Gram(s) Oral daily    MEDICATIONS  (PRN):  acetaminophen   Tablet .. 650 milliGRAM(s) Oral every 8 hours PRN Mild Pain (1 - 3)  albuterol/ipratropium for Nebulization 3 milliLiter(s) Nebulizer every 6 hours PRN Shortness of Breath and/or Wheezing      PHYSICAL EXAM:  Un Comfortable, no immediate distress  Eyes: PERRL, EOM intact; conjunctiva and sclera clear  Head: Normocephalic;  No Trauma  ENMT: No nasal discharge, +hoarseness, cough + hemoptysis  Neck: Supple; non tender; no masses or deformities.    No JVD  Respiratory:  - WHEEZING   + scattered  RHONCHI  - RALES  - CRACKLES.  Diminished breath sounds  BILATERAL  RIGHT  LEFT bases   Cardiovascular: Regular rate and rhythm. S1 and S2 Normal; No murmurs, gallops or rubs     - PPM/AICD  Gastrointestinal: Soft mildly tender, non-distended; Normal bowel sounds; No hepatosplenomegaly.     -PEG    -  GT   - ALVES  Genitourinary: No costovertebral angle tenderness. No dysuria  Extremities: AROM, No clubbing, cyanosis or edema    Vascular: Peripheral pulses palpable 2+ bilaterally  Neurological: Alert and responisve to stimuli   Skin: Warm and dry. No obvious rash  Lymph Nodes: No acute cervical or supraclavicular adenopathy  Psychiatric: Cooperative and appropriate mood    DEVICES:  - DENTURES   +IV R / L     - ETUBE   -TRACH   -CTUBE  R / L    LABS:                          8.4    9.15  )-----------( 472      ( 04 Feb 2021 08:28 )             26.7     02-04    139  |  106  |  18  ----------------------------<  103<H>  4.0   |  23  |  1.19    Ca    8.0<L>      04 Feb 2021 08:28  Phos  2.7     02-04  Mg     2.2     02-04    TPro  6.5  /  Alb  2.8<L>  /  TBili  0.4  /  DBili  x   /  AST  61<H>  /  ALT  51<H>  /  AlkPhos  44  02-03    PT/INR - ( 03 Feb 2021 11:52 )   PT: 15.3 sec;   INR: 1.29          PTT - ( 03 Feb 2021 11:52 )  PTT:28.7 sec  RADIOLOGY & ADDITIONAL STUDIES (The following images were personally reviewed):

## 2021-02-04 NOTE — PROGRESS NOTE ADULT - ASSESSMENT
1. Undifferentiated lung carcinoma with spread to mediastinal nodes: there is improvement in the the size of intrathoracic adenopathy, no measurable RUL mass. Current lung nodularity: residual tumor versus aspiration effect versus infection. There is also on CT decrease in right pleural effusion. No further treatment of the malignancy  2. Anemia, mutifactorial; previous bleeding, related to persistent of malignancy. Consider PRBC transfusion for hemoglobin less than7.0  3. Code status: unchanged DNR/DNI.

## 2021-02-04 NOTE — PROGRESS NOTE ADULT - ASSESSMENT
ASSESSMENT/PLAN 86 y/o female pt c worsening SOB, RUL and Central mediastinal mass poorly differentiated carcinoma favored adenocarcinoma. admitted for severe anemia c/f upper GI bleed, possible aspiration pneumonia vs lymphangitic spread.    1. O2 2LNC humidified  2. Bronchodilators:  Atrovent/ albuterol q 4 – 6 hours as needed  3. Corticosteroids:  now  off  4. ID/Antibiotics:  continue ABX, obtain sputum CX, Legionella Ag, UCX, RVP, pt is COVID NEG.  5. Cardiac/HTN: optimize BP  6. GI: Rx/ prophylaxis c PPI/H2B, GI   7. Heme: Rx/VT prophylaxis c SQH/SCD/ continue SCD in view of possible GI bleeding, transfuse PRBC, PET CT  8. Aspiration precautions at all times  9. Mobilize, OOB  10. Use incentive spirometer  11. Nutritional Consult  Discussed with managing team

## 2021-02-04 NOTE — PROGRESS NOTE ADULT - SUBJECTIVE AND OBJECTIVE BOX
This is an 86 year old female, currently DNR/DNI, with stage IV POORLY DIFFERENTIATED LUNG CARCINOMA, ADMITTED FOR SEVERE ANEMIA AND CACHEXIA. Patient is a formed smoker. Was treated with lung RT . Not on active chemotherapy. Currently complains of weakness and shortness  of breath. In no pain  Past Medical history significant for melena in November 2020, S/P Appendectomy and osteoporosis  Review of Systems: No fever or chills; weakness, No active bleeding at this time; constipated x 5 days. No abdominal pain. bedridden  Medications: acetaminophen, albuterol/itraponium nebulizer.  Allergy: none  Physical exam: Oriented x 3.Appears emaciated.  No oral mucositis. Dentures present, Supple neck. No peripheral cervical or axillary adenopathy. Clear lungs to percussion/auscultation. Cor: normal S1,S2, no murmur or gallop. Abdomen; no organomegaly or palpable masses, bowel sounds present. Extremities; without cyanosis or edema. Neuro: non-focal  CBC Full  -  ( 04 Feb 2021 08:28 )  WBC Count : 9.15 K/uL  RBC Count : 3.15 M/uL  Hemoglobin : 8.4 g/dL  Hematocrit : 26.7 %  Platelet Count - Automated : 472 K/uL  Mean Cell Volume : 84.8 fl  Mean Cell Hemoglobin : 26.7 pg  Mean Cell Hemoglobin Concentration : 31.5 gm/dL  Auto Neutrophil # : x  Auto Lymphocyte # : x  Auto Monocyte # : x  Auto Eosinophil # : x  Auto Basophil # : x  Auto Neutrophil % : x  Auto Lymphocyte % : x  Auto Monocyte % : x  Auto Eosinophil % : x  Auto Basophil % : x  02-04    139  |  106  |  18  ----------------------------<  103<H>  4.0   |  23  |  1.19    Ca    8.0<L>      04 Feb 2021 08:28  Phos  2.7     02-04  Mg     2.2     02-04    TPro  6.5  /  Alb  2.8<L>  /  TBili  0.4  /  DBili  x   /  AST  61<H>  /  ALT  51<H>  /  AlkPhos  44  02-03  Vital Signs Last 24 Hrs  T(C): 36.4 (04 Feb 2021 06:00), Max: 37.4 (03 Feb 2021 14:37)  T(F): 97.6 (04 Feb 2021 06:00), Max: 99.3 (03 Feb 2021 14:37)  HR: 78 (04 Feb 2021 06:00) (73 - 103)  BP: 119/68 (04 Feb 2021 06:00) (94/55 - 119/68)  BP(mean): --  RR: 16 (04 Feb 2021 06:00) (16 - 28)  SpO2: 96% (04 Feb 2021 06:00) (95% - 99%)

## 2021-02-04 NOTE — DIETITIAN INITIAL EVALUATION ADULT. - PROBLEM SELECTOR PLAN 1
2 weeks black stools, abdominal pain, weakness, dyspnea found to have hemoglobin 7.6 from baseline 13.7. 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  - Protonix 40mg IV BID  - full liquid diet  - NPO at midnight pending GI and Heme/Onc discussion  - consider EGD in AM

## 2021-02-04 NOTE — DIETITIAN INITIAL EVALUATION ADULT. - PROBLEM SELECTOR PLAN 2
Likely combination of hypoproliferation due to chemotherapy with subacute GI blood loss from 2 weeks of black stools. No BM 2-3 days prior to admission and rectal exam normal so bleed may have spontaneously resolved.  - UGIB management as above  - transfuse for Hgb < 7.0  - recheck CBC after IVF resuscitation, anticipate transfusion

## 2021-02-04 NOTE — PROGRESS NOTE ADULT - ASSESSMENT
anemia = new, stable  abdominal pain ? urinary retention  malignant neoplasm: Rx per oncology  await cultures

## 2021-02-04 NOTE — DIETITIAN NUTRITION RISK NOTIFICATION - ADDITIONAL COMMENTS/DIETITIAN RECOMMENDATIONS
1. Continue full liquid diet, monitor GI recs for diet advancement   2. Encourage PO intake and monitor receptiveness to ONS   3. Establish nutrition related GOC   4. Trend wts   5. Continue MVI   6. Pain and bowel regimens per team   7. RD to remain available prn

## 2021-02-04 NOTE — PROGRESS NOTE ADULT - SUBJECTIVE AND OBJECTIVE BOX
coverage Dr Angel    86 F known to me from prior admit with PMHx COPD on no home O2, DNR/DNI, recently diagnosed lung adenocarcinoma November 2020 presented to ED with 2 weeks of black tarry stools, weakness, dyspnea on exertion, abdominal pain, poor PO tolerance, and dry cough. She has been getting chemotherapy every other week with last dose 1/25/21.Recently seen on 1/29/21 by her PCP Dr. Alena Worthington who recommended that she come to the ED if her symptoms continued to worsen. She denies lightheadedness, syncope, visual changes, chest pain, hemoptysis, diarrhea, focal weakness/numbness, dysuria. She reports urinary incontinence over the last few weeks and constipation with no BM for 2-3 days. Admitted for anemia and weakness.  She lives alone with a home health aide        REVIEW OF SYSTEMS:  Constitutional: No fever, + weight loss or fatigue  ENMT:  No difficulty hearing, tinnitus, vertigo; No sinus or throat pain  Respiratory: No cough, wheezing, chills or hemoptysis  Cardiovascular: No chest pain, palpitations, dizziness or leg swelling  Gastrointestinal: + low abdominal  pain. No nausea, vomiting or hematemesis; No BM today  Skin: No itching, burning, rashes or lesions   Musculoskeletal: No joint pain or swelling; No muscle, back or extremity pain    PAST MEDICAL & SURGICAL HISTORY:  Chronic obstructive pulmonary disease, unspecified COPD type    Hearing loss  left ear - sees Dr. Garces ENT    Basal cell carcinoma  x2 - upper lip and arm    Chronic obstructive pulmonary disease    Osteoporosis  Osteoporosis    H/O breast biopsy  &gt; 5 years ago, mass was benign and self-resolved    History of appendectomy    History of cataract surgery        FAMILY HISTORY:      SOCIAL HISTORY:  Smoking Status: [ ] Current, [ ] Former, [ ] Never  Pack Years:    MEDICATIONS:  MEDICATIONS  (STANDING):  azithromycin   Tablet 250 milliGRAM(s) Oral every 24 hours  cefTRIAXone   IVPB 1000 milliGRAM(s) IV Intermittent every 24 hours  ferrous    sulfate 325 milliGRAM(s) Oral daily  folic acid 1 milliGRAM(s) Oral daily  influenza  Vaccine (HIGH DOSE) 0.7 milliLiter(s) IntraMuscular once  megestrol 40 milliGRAM(s) Oral three times a day  multivitamin 1 Tablet(s) Oral daily  pantoprazole  Injectable 40 milliGRAM(s) IV Push every 12 hours  polyethylene glycol 3350 17 Gram(s) Oral daily    MEDICATIONS  (PRN):  acetaminophen   Tablet .. 650 milliGRAM(s) Oral every 8 hours PRN Mild Pain (1 - 3)  albuterol/ipratropium for Nebulization 3 milliLiter(s) Nebulizer every 6 hours PRN Shortness of Breath and/or Wheezing      Allergies    No Known Allergies    Intolerances        Vital Signs Last 24 Hrs  T(C): 36.7 (04 Feb 2021 10:54), Max: 37.4 (03 Feb 2021 14:37)  T(F): 98 (04 Feb 2021 10:54), Max: 99.3 (03 Feb 2021 14:37)  HR: 86 (04 Feb 2021 10:54) (73 - 103)  BP: 110/71 (04 Feb 2021 10:54) (94/55 - 119/68)  BP(mean): --  RR: 16 (04 Feb 2021 10:54) (16 - 20)  SpO2: 98% (04 Feb 2021 10:54) (95% - 99%)    02-03 @ 07:01  -  02-04 @ 07:00  --------------------------------------------------------  IN: 300 mL / OUT: 1300 mL / NET: -1000 mL          PHYSICAL EXAM:    General: thin, frail; in no acute distress  HEENT: MMM, conjunctiva and sclera clear  Lungs: poor inspiratory effort  Heart: regular  Gastrointestinal: Soft, non-tender mildly-distended; Normal bowel sounds; no guarding  Extremities: Normal range of motion, No clubbing, cyanosis or edema  Neurological: Alert and oriented x3  Skin: Warm and dry. No obvious rash      LABS:                        8.4    9.15  )-----------( 472      ( 04 Feb 2021 08:28 )             26.7     02-04    139  |  106  |  18  ----------------------------<  103<H>  4.0   |  23  |  1.19    Ca    8.0<L>      04 Feb 2021 08:28  Phos  2.7     02-04  Mg     2.2     02-04    TPro  6.5  /  Alb  2.8<L>  /  TBili  0.4  /  DBili  x   /  AST  61<H>  /  ALT  51<H>  /  AlkPhos  44  02-03      Culture Results:   No growth at 12 hours (02-03 @ 12:04)  Culture Results:   No growth at 12 hours (02-03 @ 12:04)      RADIOLOGY & ADDITIONAL STUDIES:

## 2021-02-04 NOTE — DIETITIAN INITIAL EVALUATION ADULT. - PROBLEM SELECTOR PLAN 4
MOLST completed outpatient by PCP Alena Worthington. DNR, DNI, no pressors. On admission went over form with patient and confirmed wishes, copy of MOLST in chart. Patient interested in further discussions on goals of care with stated goal remaining functional at home.  - consider palliative care consultation in AM

## 2021-02-05 DIAGNOSIS — K59.00 CONSTIPATION, UNSPECIFIED: ICD-10-CM

## 2021-02-05 LAB
ALBUMIN SERPL ELPH-MCNC: 2.4 G/DL — LOW (ref 3.3–5)
ALP SERPL-CCNC: 48 U/L — SIGNIFICANT CHANGE UP (ref 40–120)
ALT FLD-CCNC: 35 U/L — SIGNIFICANT CHANGE UP (ref 10–45)
ANION GAP SERPL CALC-SCNC: 12 MMOL/L — SIGNIFICANT CHANGE UP (ref 5–17)
AST SERPL-CCNC: 28 U/L — SIGNIFICANT CHANGE UP (ref 10–40)
BILIRUB SERPL-MCNC: 0.4 MG/DL — SIGNIFICANT CHANGE UP (ref 0.2–1.2)
BUN SERPL-MCNC: 11 MG/DL — SIGNIFICANT CHANGE UP (ref 7–23)
CALCIUM SERPL-MCNC: 7.9 MG/DL — LOW (ref 8.4–10.5)
CHLORIDE SERPL-SCNC: 103 MMOL/L — SIGNIFICANT CHANGE UP (ref 96–108)
CO2 SERPL-SCNC: 21 MMOL/L — LOW (ref 22–31)
CREAT SERPL-MCNC: 1 MG/DL — SIGNIFICANT CHANGE UP (ref 0.5–1.3)
FOLATE SERPL-MCNC: 17.4 NG/ML — SIGNIFICANT CHANGE UP
GLUCOSE BLDC GLUCOMTR-MCNC: 96 MG/DL — SIGNIFICANT CHANGE UP (ref 70–99)
GLUCOSE SERPL-MCNC: 91 MG/DL — SIGNIFICANT CHANGE UP (ref 70–99)
HCT VFR BLD CALC: 30.6 % — LOW (ref 34.5–45)
HGB BLD-MCNC: 9.6 G/DL — LOW (ref 11.5–15.5)
MAGNESIUM SERPL-MCNC: 2.1 MG/DL — SIGNIFICANT CHANGE UP (ref 1.6–2.6)
MCHC RBC-ENTMCNC: 26.4 PG — LOW (ref 27–34)
MCHC RBC-ENTMCNC: 31.4 GM/DL — LOW (ref 32–36)
MCV RBC AUTO: 84.1 FL — SIGNIFICANT CHANGE UP (ref 80–100)
NRBC # BLD: 0 /100 WBCS — SIGNIFICANT CHANGE UP (ref 0–0)
PHOSPHATE SERPL-MCNC: 1.9 MG/DL — LOW (ref 2.5–4.5)
PLATELET # BLD AUTO: 571 K/UL — HIGH (ref 150–400)
POTASSIUM SERPL-MCNC: 4.2 MMOL/L — SIGNIFICANT CHANGE UP (ref 3.5–5.3)
POTASSIUM SERPL-SCNC: 4.2 MMOL/L — SIGNIFICANT CHANGE UP (ref 3.5–5.3)
PROT SERPL-MCNC: 6.2 G/DL — SIGNIFICANT CHANGE UP (ref 6–8.3)
RBC # BLD: 3.64 M/UL — LOW (ref 3.8–5.2)
RBC # FLD: 17.5 % — HIGH (ref 10.3–14.5)
SODIUM SERPL-SCNC: 136 MMOL/L — SIGNIFICANT CHANGE UP (ref 135–145)
VIT B12 SERPL-MCNC: 385 PG/ML — SIGNIFICANT CHANGE UP (ref 232–1245)
WBC # BLD: 9.23 K/UL — SIGNIFICANT CHANGE UP (ref 3.8–10.5)
WBC # FLD AUTO: 9.23 K/UL — SIGNIFICANT CHANGE UP (ref 3.8–10.5)

## 2021-02-05 PROCEDURE — 78815 PET IMAGE W/CT SKULL-THIGH: CPT | Mod: 26

## 2021-02-05 PROCEDURE — 74018 RADEX ABDOMEN 1 VIEW: CPT | Mod: 26

## 2021-02-05 RX ORDER — SENNA PLUS 8.6 MG/1
2 TABLET ORAL AT BEDTIME
Refills: 0 | Status: DISCONTINUED | OUTPATIENT
Start: 2021-02-05 | End: 2021-02-17

## 2021-02-05 RX ORDER — POLYETHYLENE GLYCOL 3350 17 G/17G
17 POWDER, FOR SOLUTION ORAL EVERY 12 HOURS
Refills: 0 | Status: DISCONTINUED | OUTPATIENT
Start: 2021-02-05 | End: 2021-02-17

## 2021-02-05 RX ORDER — SODIUM CHLORIDE 9 MG/ML
1000 INJECTION INTRAMUSCULAR; INTRAVENOUS; SUBCUTANEOUS
Refills: 0 | Status: DISCONTINUED | OUTPATIENT
Start: 2021-02-05 | End: 2021-02-08

## 2021-02-05 RX ORDER — SODIUM CHLORIDE 9 MG/ML
1000 INJECTION, SOLUTION INTRAVENOUS
Refills: 0 | Status: DISCONTINUED | OUTPATIENT
Start: 2021-02-05 | End: 2021-02-08

## 2021-02-05 RX ORDER — IBUPROFEN 200 MG
400 TABLET ORAL EVERY 6 HOURS
Refills: 0 | Status: DISCONTINUED | OUTPATIENT
Start: 2021-02-05 | End: 2021-02-17

## 2021-02-05 RX ORDER — MAGNESIUM SULFATE 500 MG/ML
2 VIAL (ML) INJECTION ONCE
Refills: 0 | Status: DISCONTINUED | OUTPATIENT
Start: 2021-02-05 | End: 2021-02-05

## 2021-02-05 RX ORDER — SODIUM,POTASSIUM PHOSPHATES 278-250MG
2 POWDER IN PACKET (EA) ORAL ONCE
Refills: 0 | Status: COMPLETED | OUTPATIENT
Start: 2021-02-05 | End: 2021-02-05

## 2021-02-05 RX ADMIN — Medication 2 PACKET(S): at 21:47

## 2021-02-05 RX ADMIN — Medication 325 MILLIGRAM(S): at 11:40

## 2021-02-05 RX ADMIN — Medication 1 MILLIGRAM(S): at 11:40

## 2021-02-05 RX ADMIN — LIDOCAINE 1 PATCH: 4 CREAM TOPICAL at 06:52

## 2021-02-05 RX ADMIN — POLYETHYLENE GLYCOL 3350 17 GRAM(S): 17 POWDER, FOR SOLUTION ORAL at 11:40

## 2021-02-05 RX ADMIN — CEFTRIAXONE 100 MILLIGRAM(S): 500 INJECTION, POWDER, FOR SOLUTION INTRAMUSCULAR; INTRAVENOUS at 18:42

## 2021-02-05 RX ADMIN — AZITHROMYCIN 250 MILLIGRAM(S): 500 TABLET, FILM COATED ORAL at 18:42

## 2021-02-05 RX ADMIN — PANTOPRAZOLE SODIUM 40 MILLIGRAM(S): 20 TABLET, DELAYED RELEASE ORAL at 05:39

## 2021-02-05 RX ADMIN — SENNA PLUS 2 TABLET(S): 8.6 TABLET ORAL at 21:36

## 2021-02-05 RX ADMIN — Medication 1 TABLET(S): at 11:40

## 2021-02-05 RX ADMIN — MEGESTROL ACETATE 40 MILLIGRAM(S): 40 SUSPENSION ORAL at 06:53

## 2021-02-05 RX ADMIN — POLYETHYLENE GLYCOL 3350 17 GRAM(S): 17 POWDER, FOR SOLUTION ORAL at 17:35

## 2021-02-05 RX ADMIN — SODIUM CHLORIDE 80 MILLILITER(S): 9 INJECTION, SOLUTION INTRAVENOUS at 23:36

## 2021-02-05 RX ADMIN — Medication 400 MILLIGRAM(S): at 09:18

## 2021-02-05 RX ADMIN — ENOXAPARIN SODIUM 30 MILLIGRAM(S): 100 INJECTION SUBCUTANEOUS at 21:36

## 2021-02-05 RX ADMIN — SODIUM CHLORIDE 80 MILLILITER(S): 9 INJECTION, SOLUTION INTRAVENOUS at 16:00

## 2021-02-05 RX ADMIN — PANTOPRAZOLE SODIUM 40 MILLIGRAM(S): 20 TABLET, DELAYED RELEASE ORAL at 17:35

## 2021-02-05 RX ADMIN — SODIUM CHLORIDE 100 MILLILITER(S): 9 INJECTION INTRAMUSCULAR; INTRAVENOUS; SUBCUTANEOUS at 13:15

## 2021-02-05 NOTE — CONSULT NOTE ADULT - ASSESSMENT
The patient has no appetite but denies being depressed although she does not feel well overall.  Patient states she has not had a bowel movement since last Saturday and is likely quite constipated.  It was noted by the nursing staff that her bowel movements were hard round balls.  The patient has been adequately hydrated to a normal creatinine and BUN but now complains that she has abdominal discomfort.

## 2021-02-05 NOTE — PROGRESS NOTE ADULT - PROBLEM SELECTOR PLAN 3
Pt reporting constipation for past 1 week and generalized abdominal pain for past couple days.   - abdominal xray showing moderately distended loops of bowel  - will increase bowel regimen to senna 2 tabs qd, miralax 17g q12hrs, and dulcolax 10mg PRN  - if pt continues to be constipated, will consider adding on lactulose

## 2021-02-05 NOTE — PROCEDURE NOTE - NSURITECHNIQUE_GU_A_CORE
Proper hand hygiene was performed/Sterile gloves were worn for the duration of the procedure/A sterile drape was used to cover all adjacent areas/The site was cleaned with soap/water and sterile solution (betadine)/The catheter was secured with a securement device (e.g. StatLock)/The urinary drainage system is closed at the end of the procedure/The collection bag is below the level of the patient and urinary bladder/All applicable medical record documentation is completed

## 2021-02-05 NOTE — PROGRESS NOTE ADULT - ASSESSMENT
ASSESSMENT/PLAN 86 y/o female pt c worsening SOB, RUL and Central mediastinal mass poorly differentiated carcinoma favored adenocarcinoma. admitted for severe anemia c/f upper GI bleed, possible aspiration pneumonia vs lymphangitic spread.    1. O2 2LNC humidified  2. Bronchodilators:  Atrovent/ albuterol q 4 – 6 hours as needed  3. Corticosteroids:  now  off  4. ID/Antibiotics:  continue ABX, follow sputum CX,  UCX, RVP, pt is COVID NEG. legionella NEG  5. Cardiac/HTN: optimize BP  6. GI: Rx/ prophylaxis c PPI/H2B, GI   7. Heme: Rx/VT prophylaxis c SQH/SCD/ continue SCD in view of possible GI bleeding, follow on , PET CT  8. Aspiration precautions at all times  9. Mobilize, OOB  10. Use incentive spirometer  11. Nutritional Consult and support  Discussed with managing team

## 2021-02-05 NOTE — PROGRESS NOTE ADULT - SUBJECTIVE AND OBJECTIVE BOX
SUBJECTIVE/OVERNIGHT EVENTS: No acute overnight events. Pt seen in AM at bedside, resting in bed, and does not appear to be in any acute distress. She reports persistent chronic lower back pain and generalized abdominal discomfort. She also reports poor appetite, and says that she has not had a bowel movement for the past 1 week. She otherwise denies fever, chills, nausea, vomiting, headache, acute sob, chest pain, extremity pain or swelling. Straight cath overnight draining approximately 700cc clear urine over past 12 hours.    VITAL SIGNS:  Vital Signs Last 24 Hrs  T(C): 36.3 (05 Feb 2021 11:50), Max: 37.1 (05 Feb 2021 04:58)  T(F): 97.3 (05 Feb 2021 11:50), Max: 98.7 (05 Feb 2021 04:58)  HR: 105 (05 Feb 2021 11:50) (72 - 105)  BP: 95/55 (05 Feb 2021 11:50) (95/55 - 110/65)  BP(mean): --  RR: 16 (05 Feb 2021 11:50) (16 - 16)  SpO2: 93% (05 Feb 2021 11:50) (93% - 97%)    PHYSICAL EXAM:  Constitutional: WDWN resting in bed; in no acute distress but slightly uncomfortable appearing  HEENT: NCAT, EOMI, anicteric sclera, no nasal discharge; uvula midline, no oropharyngeal erythema or exudates; MMM  Respiratory: CTA B/L; no W/R/R, no retractions; breathing 24/min with abdominal recruitment on presentation  Cardiac: +S1/S2; RRR; no M/R/G  Gastrointestinal: abdomen soft, tender to light palpation evenly across epigastric, RUQ, RLQ, and suprapubic regions, no rebound  Extremities: WWP, no clubbing or cyanosis; no peripheral edema  Vascular: 2+ radial, femoral, DP/PT pulses B/L  Neurologic: AAOx3; CNII-XII grossly intact; moves extremities to command, 5/5 UE strength and can independently turn from back to side    MEDICATIONS:  MEDICATIONS  (STANDING):  azithromycin   Tablet 250 milliGRAM(s) Oral every 24 hours  cefTRIAXone   IVPB 1000 milliGRAM(s) IV Intermittent every 24 hours  dextrose 5% + sodium chloride 0.45%. 1000 milliLiter(s) (80 mL/Hr) IV Continuous <Continuous>  enoxaparin Injectable 30 milliGRAM(s) SubCutaneous every 24 hours  ferrous    sulfate 325 milliGRAM(s) Oral daily  folic acid 1 milliGRAM(s) Oral daily  influenza  Vaccine (HIGH DOSE) 0.7 milliLiter(s) IntraMuscular once  lidocaine   Patch 1 Patch Transdermal daily  megestrol 40 milliGRAM(s) Oral three times a day  multivitamin 1 Tablet(s) Oral daily  pantoprazole  Injectable 40 milliGRAM(s) IV Push every 12 hours  polyethylene glycol 3350 17 Gram(s) Oral every 12 hours  senna 2 Tablet(s) Oral at bedtime  sodium chloride 0.9%. 1000 milliLiter(s) (100 mL/Hr) IV Continuous <Continuous>    MEDICATIONS  (PRN):  acetaminophen   Tablet .. 650 milliGRAM(s) Oral every 8 hours PRN Mild Pain (1 - 3)  albuterol/ipratropium for Nebulization 3 milliLiter(s) Nebulizer every 6 hours PRN Shortness of Breath and/or Wheezing  bisacodyl Suppository 10 milliGRAM(s) Rectal daily PRN Constipation  ibuprofen  Tablet. 400 milliGRAM(s) Oral every 6 hours PRN Moderate Pain (4 - 6)      ALLERGIES:  Allergies    No Known Allergies    Intolerances        LABS:                        9.6    9.23  )-----------( 571      ( 05 Feb 2021 07:25 )             30.6     02-05    136  |  103  |  11  ----------------------------<  91  4.2   |  21<L>  |  1.00    Ca    7.9<L>      05 Feb 2021 07:25  Phos  1.9     02-05  Mg     2.1     02-05    TPro  6.2  /  Alb  2.4<L>  /  TBili  0.4  /  DBili  x   /  AST  28  /  ALT  35  /  AlkPhos  48  02-05        RADIOLOGY & ADDITIONAL TESTS: Reviewed.

## 2021-02-05 NOTE — PROGRESS NOTE ADULT - SUBJECTIVE AND OBJECTIVE BOX
coverage for Dr Angel    Pt seen and examined   catheter helped pain but again had retention  c/o low abdominal pain    REVIEW OF SYSTEMS:  Constitutional: No fever, +weight loss  Cardiovascular: No chest pain, palpitations, dizziness or leg swelling  Gastrointestinal: sl less pain. No nausea, vomiting  No diarrhea  Skin: No itching, burning, rashes or lesions       MEDICATIONS:  MEDICATIONS  (STANDING):  azithromycin   Tablet 250 milliGRAM(s) Oral every 24 hours  cefTRIAXone   IVPB 1000 milliGRAM(s) IV Intermittent every 24 hours  dextrose 5% + sodium chloride 0.45%. 1000 milliLiter(s) (80 mL/Hr) IV Continuous <Continuous>  enoxaparin Injectable 30 milliGRAM(s) SubCutaneous every 24 hours  ferrous    sulfate 325 milliGRAM(s) Oral daily  folic acid 1 milliGRAM(s) Oral daily  influenza  Vaccine (HIGH DOSE) 0.7 milliLiter(s) IntraMuscular once  lidocaine   Patch 1 Patch Transdermal daily  megestrol 40 milliGRAM(s) Oral three times a day  multivitamin 1 Tablet(s) Oral daily  pantoprazole  Injectable 40 milliGRAM(s) IV Push every 12 hours  polyethylene glycol 3350 17 Gram(s) Oral daily    MEDICATIONS  (PRN):  acetaminophen   Tablet .. 650 milliGRAM(s) Oral every 8 hours PRN Mild Pain (1 - 3)  albuterol/ipratropium for Nebulization 3 milliLiter(s) Nebulizer every 6 hours PRN Shortness of Breath and/or Wheezing  ibuprofen  Tablet. 400 milliGRAM(s) Oral every 6 hours PRN Moderate Pain (4 - 6)      Allergies    No Known Allergies    Intolerances        Vital Signs Last 24 Hrs  T(C): 37.1 (05 Feb 2021 04:58), Max: 37.1 (05 Feb 2021 04:58)  T(F): 98.7 (05 Feb 2021 04:58), Max: 98.7 (05 Feb 2021 04:58)  HR: 79 (05 Feb 2021 04:58) (72 - 79)  BP: 110/65 (05 Feb 2021 04:58) (95/60 - 110/65)  BP(mean): --  RR: 16 (05 Feb 2021 04:58) (16 - 16)  SpO2: 97% (05 Feb 2021 04:58) (96% - 97%)    02-04 @ 07:01  -  02-05 @ 07:00  --------------------------------------------------------  IN: 560 mL / OUT: 700 mL / NET: -140 mL        PHYSICAL EXAM:    General: thin and elderly; in no acute distress  HEENT: MMM, conjunctiva and sclera clear  Lungs: poor insp  Heart: regular  Gastrointestinal: Soft non-tender non-distended; Normal bowel sounds;   Skin: Warm and dry. No obvious rash  Ext: no jes    LABS:      CBC Full  -  ( 05 Feb 2021 07:25 )  WBC Count : 9.23 K/uL  RBC Count : 3.64 M/uL  Hemoglobin : 9.6 g/dL  Hematocrit : 30.6 %  Platelet Count - Automated : 571 K/uL  Mean Cell Volume : 84.1 fl  Mean Cell Hemoglobin : 26.4 pg  Mean Cell Hemoglobin Concentration : 31.4 gm/dL  Auto Neutrophil # : x  Auto Lymphocyte # : x  Auto Monocyte # : x  Auto Eosinophil # : x  Auto Basophil # : x  Auto Neutrophil % : x  Auto Lymphocyte % : x  Auto Monocyte % : x  Auto Eosinophil % : x  Auto Basophil % : x    02-05    136  |  103  |  11  ----------------------------<  91  4.2   |  21<L>  |  1.00    Ca    7.9<L>      05 Feb 2021 07:25  Phos  1.9     02-05  Mg     2.1     02-05    TPro  6.2  /  Alb  2.4<L>  /  TBili  0.4  /  DBili  x   /  AST  28  /  ALT  35  /  AlkPhos  48  02-05    PT/INR - ( 03 Feb 2021 11:52 )   PT: 15.3 sec;   INR: 1.29          PTT - ( 03 Feb 2021 11:52 )  PTT:28.7 sec                  RADIOLOGY & ADDITIONAL STUDIES (The following images were personally reviewed):

## 2021-02-05 NOTE — CONSULT NOTE ADULT - SUBJECTIVE AND OBJECTIVE BOX
Malignant neoplasm of unspecified part of unspecified bronchus or lung  Other Medical Problems:  Other Medical Problems:  Reason for Today's Visit:  2021:  History of Present Illness  The patient got dizzy on the street and dizzy at home which started in October.  He occasionally this would be associated with nausea.   The patient went to an urgent care center where she had a cardiogram which showed some irregularities which had them refer her to [facility] where she had a chest x­ray your and then an eventual CT scan which showed a lung mass and mediastinal  lymphadenopathy.  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  The cancer had no definitive markers for lung cancer and had a high Ki­67.  The patient was started on chemotherapy  with gefitinib, tamoxifen in high doses and  Navelbine.  The patient has not had any return of these initial symptoms since her  hospitalization.  The patient was recommended to receive radiation therapy and chemotherapy in an attempt to cure her.  As of 2020: Coverage for MD [facility]; PATIENT EXAMINED AND CHART REVIEWED. This is a female with stage pT2N3 Mx  large cell lung cancer on chemotherapy, with Gemzar and Navelbine . Previous treatment was 10 days ago. ANC today is 1.7 and likely to  decrease after today's treatment. Patient complains only of last week diarrhea bur denies cough, shortness of breath or palpitations.  Remains afebrile. Plan is to administer today chemotherapy but have patient return tomorrow for Neupogen administration.aS OF  2020 PATIENT STATES SHE FEELS BETTER  As of 2020: While she remains asymptomatic, her ANC keeps decreasing despite Zarxio 480 mcg given sq over last 2 days. Given  today third dose of Zarxio and prescribed prophylactic oral Ciprofoxacin    Wednesday, February 3, 2021   the patient was reported to be not taking any oral intake and was cyst lying around in bed all day.    The patient's aide was worried about her and she was instructed to bring her to the emergency room  At [facility].    In the emergency room the patient was found to be dehydrated today and possibly have pneumonia.  X-rays in the emergency room did not demonstrate the large primary tumor that the patient had earlier last year.    Yesterday the patient was not communicative and still was rather dehydrated although there had been improvement in the BUN and creatinine with IV hydration.  Today the patient is more animated and complaining   of abdominal discomfort and stating that she has not had a bowel movement since last Saturday.  Past medical History  The patient had appendicitis at age 16.  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  Social History  Patient is single. Patient lives alone. Former Smoker. Not applicable. Denies any prior alcohol use. Denies any illicit drug use. 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  Family History  The patient's father  age 66 from complications in the hospital. The patient's mother  age 87 of a coronary artery thrombosis. The  patient's brother  of complications from coronary bypass surgery at age 64  Personal History  Patient denies having ever received blood products in the past.  GYN History  Menarche: Age of Menarche was:16 Menopause occurred in :62 [place]  Allergies  No Known Allergies  Medications  Aranesp 200 mcg/0.4 mL (in polysorbate) injection syringe, cyproheptadine 4 mg tablet, gefitinib 250 mg tablet, megestrol 400 mg/10 mL  (10 mL) oral suspension, no med at this time, omeprazole 40 mg capsule,delayed release, tamoxifen 20 mg tablet, Tarceva 150 mg tablet,  Zarxio 480 mcg/0.8 mL injection syringe  Altagracia Decker : [phone] ([id number]) Page 1 of 3  Review Of Systems  Constitutional: Pt reports no loss of appetite. Patient denies night sweats. Fever : No Fever. Chills No Rigors. No Chills. Pt reports feeling  fatigued at times. Patient reports a recent weight loss of %% lbs.  Eyes: Pt reports no blurred vision. Pt reports no double vision. Patient denies any changes in vision.   Ears, Nose, Mouth, Throat: Pt reports no hearing loss. Pt reports no ringing in ears. Pt reports no sinus trouble. Pt reports no trouble  swallowing. Pt reports no sore throat. Pt reports no recent episodes of epistaxis. Pt reports no hoarseness.   Cardiac: Pt reports no chest pains. Pt reports no heart palpitations. Pt reports no light headedness. Pt reports no swelling in legs. Pt  reports no episodes of passing out.   Respiratory: Pt reports no cough. Pt reports no sputum production. Pt reports no hemoptysis. Pt reports no orthopnea. Pt reports no  nocturnal paroxysmal dyspnea. Pt reports shortness of breath.  Gastrointestinal: Pt reports no nausea. Pt reports no vomiting. Pt reports no heartburn. Pt reports no constipation. Pt reports no diarrhea.  Pt reports no rectal bleeding. Pt reports no bowel incontinence.   Genito­Urinary: No vaginal bleeding. Pt reports no burning on urination. Pt reports no blood in urine. Pt reports no frequent urination. Pt  reports no urinary incontinence.   MusculoSkeletal: Pt reports no muscle pain. Pt reports no stiffness. Pt reports no joint pains. Patient denies any joint swelling. Pt  reports no back pains.   Skin:Denies any skin rashes.   Neurological: Pt reports no headaches. Pt reports no seizures. Pt reports no dizziness. Pt reports no loss of balance. Pt reports no  weakness of limbs. Pt reports no loss of sensation. Pt reports no tingling sensations. Pt reports no memory loss. Pt reports no thinking  difficulty.   Psychiatric: Pt reports no nervousness. Pt reports no depression Patient denies restlessness. Patient denies any difficulty sleeping.   Hematologic/Lymphatic/Immunologic: Patient denies bruising Patient denies any bleeding. Pt reports no lumps in arm­pits. Pt reports  no lumps in neck. Pt reports no lumps in groin.  Vitals  Vitals on 2021 12:11:00 PM: Height=60in, Weight=85.2lb, Temp=97.2f, Pulse=111, ZrycrsvvAY=427, DiastolicBP=65, O2 Sat=97%  Physical Exam  Skin: Normal turgor. No rash noted. No lesions.   Eyes: Eyes with PERRLA. EOM's in­tact Sclera clear,no jaundice noted. Conjunctiva clear.   Ears, Nose,Mouth & Throat: Teeth normal, no missing teeth or dentures present. No ulcers. No thrush Auditory canals normal.  Description: %% Oropharynx without lesions. Mucositis None   Neck: No thyromegaly. No lymphadenopathy noted.   Cardiovascular: S1, S2 without murmurs. No Extrasystole. Tachycardia. No breast masses appreciated. No axillary lymphadenopathy.  No skin changes. No nipple discharge present. No dimpling noted.  Chest/Respiratory: No dyspnea. No rhonchi. No wheezing. No decreased breath sounds at bases. No rales. Unlabored breathing. No  accessory muscle use. Abdomen is soft and non­tender to touch. No hepatomegaly noted. No splenomegaly noted. No abdominal pain or  guarding noted. No abdominal mass(es) appreciated. No ascites noted.   Genito­Urinary: Deferred.  Hematologic/Lymphatic: No petechiae noted. No purpura noted. No lymphadenopathy noted.   Musculoskeletal: No Kyphosis. No weakness. No spinal tenderness on percussion. No pain on hip rotation reported by patient. Normal  range of motion in upper and lower extremities.
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
## 2021-02-05 NOTE — PROGRESS NOTE ADULT - PROBLEM SELECTOR PLAN 7
F: none  E: replete K<4.0, Mg<2.0, Phos<2.5  N: mechanical soft  DVT prophylaxis: Lovenox  Access: PIV L arm  Code Status: DNR/DNI no pressors, MOLST in chart

## 2021-02-05 NOTE — PROGRESS NOTE ADULT - PROBLEM SELECTOR PLAN 1
2 weeks black stools, abdominal pain, weakness, dyspnea found to have hemoglobin 7.6 from baseline 13.7. 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 s/p 1u pRBC in ED  - h/h stable (2/5)  - Protonix 40mg IV BID  - will advance diet to mechanical soft (2/5)

## 2021-02-06 LAB
ANION GAP SERPL CALC-SCNC: 16 MMOL/L — SIGNIFICANT CHANGE UP (ref 5–17)
APPEARANCE UR: CLEAR — SIGNIFICANT CHANGE UP
BACTERIA # UR AUTO: PRESENT /HPF
BILIRUB UR-MCNC: NEGATIVE — SIGNIFICANT CHANGE UP
BLD GP AB SCN SERPL QL: NEGATIVE — SIGNIFICANT CHANGE UP
BUN SERPL-MCNC: 9 MG/DL — SIGNIFICANT CHANGE UP (ref 7–23)
C PNEUM DNA SPEC QL NAA+PROBE: DETECTED
CALCIUM SERPL-MCNC: 7 MG/DL — LOW (ref 8.4–10.5)
CHLORIDE SERPL-SCNC: 100 MMOL/L — SIGNIFICANT CHANGE UP (ref 96–108)
CO2 SERPL-SCNC: 18 MMOL/L — LOW (ref 22–31)
COLOR SPEC: YELLOW — SIGNIFICANT CHANGE UP
CREAT SERPL-MCNC: 1.01 MG/DL — SIGNIFICANT CHANGE UP (ref 0.5–1.3)
CULTURE RESULTS: SIGNIFICANT CHANGE UP
DIFF PNL FLD: ABNORMAL
EPI CELLS # UR: SIGNIFICANT CHANGE UP /HPF (ref 0–5)
GLUCOSE SERPL-MCNC: 282 MG/DL — HIGH (ref 70–99)
GLUCOSE UR QL: NEGATIVE — SIGNIFICANT CHANGE UP
HCT VFR BLD CALC: 25.9 % — LOW (ref 34.5–45)
HGB BLD-MCNC: 7.9 G/DL — LOW (ref 11.5–15.5)
KETONES UR-MCNC: NEGATIVE — SIGNIFICANT CHANGE UP
LEUKOCYTE ESTERASE UR-ACNC: NEGATIVE — SIGNIFICANT CHANGE UP
MAGNESIUM SERPL-MCNC: 1.8 MG/DL — SIGNIFICANT CHANGE UP (ref 1.6–2.6)
MCHC RBC-ENTMCNC: 26.4 PG — LOW (ref 27–34)
MCHC RBC-ENTMCNC: 30.5 GM/DL — LOW (ref 32–36)
MCV RBC AUTO: 86.6 FL — SIGNIFICANT CHANGE UP (ref 80–100)
NITRITE UR-MCNC: NEGATIVE — SIGNIFICANT CHANGE UP
NRBC # BLD: 0 /100 WBCS — SIGNIFICANT CHANGE UP (ref 0–0)
PH UR: 7 — SIGNIFICANT CHANGE UP (ref 5–8)
PHOSPHATE SERPL-MCNC: 3 MG/DL — SIGNIFICANT CHANGE UP (ref 2.5–4.5)
PLATELET # BLD AUTO: 478 K/UL — HIGH (ref 150–400)
POTASSIUM SERPL-MCNC: 3.6 MMOL/L — SIGNIFICANT CHANGE UP (ref 3.5–5.3)
POTASSIUM SERPL-SCNC: 3.6 MMOL/L — SIGNIFICANT CHANGE UP (ref 3.5–5.3)
PROCALCITONIN SERPL-MCNC: 0.91 NG/ML — HIGH (ref 0.02–0.1)
PROT UR-MCNC: NEGATIVE MG/DL — SIGNIFICANT CHANGE UP
RAPID RVP RESULT: DETECTED
RBC # BLD: 2.99 M/UL — LOW (ref 3.8–5.2)
RBC # FLD: 17.2 % — HIGH (ref 10.3–14.5)
RBC CASTS # UR COMP ASSIST: ABNORMAL /HPF
RH IG SCN BLD-IMP: POSITIVE — SIGNIFICANT CHANGE UP
SARS-COV-2 RNA SPEC QL NAA+PROBE: SIGNIFICANT CHANGE UP
SODIUM SERPL-SCNC: 134 MMOL/L — LOW (ref 135–145)
SP GR SPEC: 1.01 — SIGNIFICANT CHANGE UP (ref 1–1.03)
SPECIMEN SOURCE: SIGNIFICANT CHANGE UP
UROBILINOGEN FLD QL: 0.2 E.U./DL — SIGNIFICANT CHANGE UP
WBC # BLD: 7.23 K/UL — SIGNIFICANT CHANGE UP (ref 3.8–10.5)
WBC # FLD AUTO: 7.23 K/UL — SIGNIFICANT CHANGE UP (ref 3.8–10.5)
WBC UR QL: < 5 /HPF — SIGNIFICANT CHANGE UP

## 2021-02-06 PROCEDURE — 71045 X-RAY EXAM CHEST 1 VIEW: CPT | Mod: 26

## 2021-02-06 RX ORDER — CEFEPIME 1 G/1
INJECTION, POWDER, FOR SOLUTION INTRAMUSCULAR; INTRAVENOUS
Refills: 0 | Status: DISCONTINUED | OUTPATIENT
Start: 2021-02-06 | End: 2021-02-12

## 2021-02-06 RX ORDER — VANCOMYCIN HCL 1 G
750 VIAL (EA) INTRAVENOUS ONCE
Refills: 0 | Status: COMPLETED | OUTPATIENT
Start: 2021-02-06 | End: 2021-02-06

## 2021-02-06 RX ORDER — CEFEPIME 1 G/1
1000 INJECTION, POWDER, FOR SOLUTION INTRAMUSCULAR; INTRAVENOUS ONCE
Refills: 0 | Status: COMPLETED | OUTPATIENT
Start: 2021-02-06 | End: 2021-02-06

## 2021-02-06 RX ORDER — CEFTRIAXONE 500 MG/1
2000 INJECTION, POWDER, FOR SOLUTION INTRAMUSCULAR; INTRAVENOUS ONCE
Refills: 0 | Status: DISCONTINUED | OUTPATIENT
Start: 2021-02-06 | End: 2021-02-06

## 2021-02-06 RX ORDER — CEFTRIAXONE 500 MG/1
2000 INJECTION, POWDER, FOR SOLUTION INTRAMUSCULAR; INTRAVENOUS EVERY 24 HOURS
Refills: 0 | Status: DISCONTINUED | OUTPATIENT
Start: 2021-02-06 | End: 2021-02-06

## 2021-02-06 RX ORDER — ACETAMINOPHEN 500 MG
650 TABLET ORAL EVERY 8 HOURS
Refills: 0 | Status: DISCONTINUED | OUTPATIENT
Start: 2021-02-06 | End: 2021-02-17

## 2021-02-06 RX ORDER — MAGNESIUM SULFATE 500 MG/ML
1 VIAL (ML) INJECTION ONCE
Refills: 0 | Status: COMPLETED | OUTPATIENT
Start: 2021-02-06 | End: 2021-02-06

## 2021-02-06 RX ORDER — CEFEPIME 1 G/1
1000 INJECTION, POWDER, FOR SOLUTION INTRAMUSCULAR; INTRAVENOUS EVERY 8 HOURS
Refills: 0 | Status: DISCONTINUED | OUTPATIENT
Start: 2021-02-06 | End: 2021-02-12

## 2021-02-06 RX ORDER — POTASSIUM CHLORIDE 20 MEQ
40 PACKET (EA) ORAL ONCE
Refills: 0 | Status: COMPLETED | OUTPATIENT
Start: 2021-02-06 | End: 2021-02-06

## 2021-02-06 RX ORDER — AZITHROMYCIN 500 MG/1
500 TABLET, FILM COATED ORAL DAILY
Refills: 0 | Status: DISCONTINUED | OUTPATIENT
Start: 2021-02-06 | End: 2021-02-07

## 2021-02-06 RX ADMIN — POLYETHYLENE GLYCOL 3350 17 GRAM(S): 17 POWDER, FOR SOLUTION ORAL at 22:24

## 2021-02-06 RX ADMIN — SENNA PLUS 2 TABLET(S): 8.6 TABLET ORAL at 22:26

## 2021-02-06 RX ADMIN — PANTOPRAZOLE SODIUM 40 MILLIGRAM(S): 20 TABLET, DELAYED RELEASE ORAL at 05:18

## 2021-02-06 RX ADMIN — MEGESTROL ACETATE 40 MILLIGRAM(S): 40 SUSPENSION ORAL at 05:29

## 2021-02-06 RX ADMIN — Medication 1 TABLET(S): at 11:34

## 2021-02-06 RX ADMIN — Medication 650 MILLIGRAM(S): at 05:18

## 2021-02-06 RX ADMIN — POLYETHYLENE GLYCOL 3350 17 GRAM(S): 17 POWDER, FOR SOLUTION ORAL at 05:18

## 2021-02-06 RX ADMIN — Medication 650 MILLIGRAM(S): at 14:01

## 2021-02-06 RX ADMIN — Medication 250 MILLIGRAM(S): at 14:36

## 2021-02-06 RX ADMIN — ENOXAPARIN SODIUM 30 MILLIGRAM(S): 100 INJECTION SUBCUTANEOUS at 22:25

## 2021-02-06 RX ADMIN — CEFEPIME 100 MILLIGRAM(S): 1 INJECTION, POWDER, FOR SOLUTION INTRAMUSCULAR; INTRAVENOUS at 22:24

## 2021-02-06 RX ADMIN — Medication 1 MILLIGRAM(S): at 11:34

## 2021-02-06 RX ADMIN — CEFEPIME 100 MILLIGRAM(S): 1 INJECTION, POWDER, FOR SOLUTION INTRAMUSCULAR; INTRAVENOUS at 13:53

## 2021-02-06 RX ADMIN — AZITHROMYCIN 500 MILLIGRAM(S): 500 TABLET, FILM COATED ORAL at 22:47

## 2021-02-06 RX ADMIN — Medication 40 MILLIEQUIVALENT(S): at 11:33

## 2021-02-06 RX ADMIN — PANTOPRAZOLE SODIUM 40 MILLIGRAM(S): 20 TABLET, DELAYED RELEASE ORAL at 18:55

## 2021-02-06 RX ADMIN — CEFTRIAXONE 100 MILLIGRAM(S): 500 INJECTION, POWDER, FOR SOLUTION INTRAMUSCULAR; INTRAVENOUS at 08:38

## 2021-02-06 RX ADMIN — Medication 325 MILLIGRAM(S): at 11:36

## 2021-02-06 RX ADMIN — SODIUM CHLORIDE 80 MILLILITER(S): 9 INJECTION, SOLUTION INTRAVENOUS at 11:32

## 2021-02-06 RX ADMIN — MEGESTROL ACETATE 40 MILLIGRAM(S): 40 SUSPENSION ORAL at 23:20

## 2021-02-06 RX ADMIN — Medication 100 GRAM(S): at 11:32

## 2021-02-06 NOTE — PROGRESS NOTE ADULT - ASSESSMENT
ASSESSMENT/PLAN 84 y/o female pt c worsening SOB, RUL and Central mediastinal mass poorly differentiated carcinoma favored adenocarcinoma. admitted for severe anemia c/f upper GI bleed, possible aspiration pneumonia vs lymphangitic spread.    1. O2 2LNC humidified  2. Bronchodilators:  Atrovent/ albuterol q 4 – 6 hours as needed  3. Corticosteroids:  now  off  4. ID/Antibiotics:  continue ABX, follow sputum CX,  UCX, RVP, pt is COVID NEG. legionella NEG, request ID feedback  5. Cardiac/HTN: optimize BP  6. GI: Rx/ prophylaxis c PPI/H2B, GI   7. Heme: Rx/VT prophylaxis c SQH/SCD/ continue SCD in view of possible GI bleeding, follow on , PET CT  8. Aspiration precautions at all times  9. Mobilize, OOB  10. Use incentive spirometer  11. Nutritional Consult and support  Discussed with managing team, discussed c ID

## 2021-02-06 NOTE — PROGRESS NOTE ADULT - SUBJECTIVE AND OBJECTIVE BOX
coverage for DR Angel  currently asleep  has indwelling due to repeated retention  T max 102.5 notes  CXR worsening infiltrates  appreciate ID  new cultures done

## 2021-02-06 NOTE — PROGRESS NOTE ADULT - SUBJECTIVE AND OBJECTIVE BOX
Interventional, Pulmonary, Critical, Chest Special Procedures.    Pt was seen and fully examined by myself.     Time spent with patient in minutes:77    Patient is a 86y old  Female who presents with a chief complaint of Anemia, GI Bleed (05 Feb 2021 19:57) The patient c new fever. The patient ill appearing, emaciated, cachectic, reporting generalized discomfort, no new cough, eupneic on RA.     HPI:  86 F with PMHx COPD on no home O2, DNR/DNI, recently diagnosed lung adenocarcinoma November 2020 presented to ED with 2 weeks of black tarry stools, weakness, dyspnea on exertion, abdominal pain, poor PO tolerance, and dry cough. She has been getting chemotherapy every other week with last dose 1/25/21, she skipped Monday 2/1/21 because of the snow storm. She was recently seen on 1/29/21 by her PCP Dr. Alena Worthington who recommended that she come to the ED if her symptoms continued to worsen. She denies lightheadedness, syncope, visual changes, chest pain, hemoptysis, diarrhea, focal weakness/numbness, dysuria. She reports urinary incontinence over the last few weeks and constipation with no BM for 2-3 days.  She lives alone with a home health aide who was present on admission. Her aide works with her 8 hours/day 5 days/week and receives assistance primarily with IADLs but occasionally needs help with toileting.  Patient notably has active MOLST completed with PCP. Patient is DNR/DNI and does not want pressors, but would want noninvasive ventilation, blood transfusions, IV fluids, and IV antibiotics.    In the ED:  Vitals: T 98.3->99.3, , /80->97/57, RR 28->20, SpO2 97% RA  Labs sig for: WBC 10.52, Hgb 7.6, plt 581, INR 1.29, cr 1.41 (baseline 1), albumin 2.8, AST/ALT 61/51, Lactate 2.1, VBG Ph 7.46/PCO2 34  EKG: sinus tachy  Imaging:    CXR: no acute infiltrate or consolidation    CTA: No pulmonary embolus. New few small scattered nodular opacities bilaterally with more confluent groundglass density and suspected interlobular septal thickening in posterior right upper lobe.  Medication: 500cc NSx2 (03 Feb 2021 16:24)      REVIEW OF SYSTEMS:  Constitutional: No fever, +weight loss, profound  fatigue  Eyes: No eye pain, visual disturbances, or discharge  ENMT:  + difficulty hearing, tinnitus, vertigo; No sinus or throat pain. No epistaxis, +dysphagia, dysphonia, hoarseness no  odynophagia  Neck: No pain, stiffness or neck swelling.  No masses or deformities  Respiratory: +cough, no wheezing, hemoptysis  + COPD  - ILD   - PE   - ASTHMA     - PNEUMONIA  Cardiovascular: No chest pain, dysrhythmia, palpitations, dizziness or edema - CAD   - CHF   - HTN  Gastrointestinal: + abdominal or epigastric pain. No nausea, vomiting or hematemesis; No diarrhea or constipation. + melena no Icterus.          Genitourinary: No dysuria, frequency, hematuria + incontinence   - CKD/RIDDHI      - ESRD  Neurological: No headaches, memory loss, loss of strength, numbness or tremors      -DEMENTIA     - STROKE    - SEIZURE  Skin: No itching, burning, rashes or lesions   Lymph Nodes: No enlarged glands  Endocrine: No heat or cold intolerance; No hair loss       - DM     - THYROID DISORDER  Musculoskeletal: No joint pain or swelling; + muscle, +back or extremity pain, No edema  Psychiatric: No depression, anxiety, mood swings or difficulty sleeping  Heme/Lymph: No easy bruising or bleeding gums         + ANEMIA      + CANCER   -COAGULOPATHY  PAST MEDICAL & SURGICAL HISTORY:  Chronic obstructive pulmonary disease, unspecified COPD type    Hearing loss  left ear - sees Dr. Garces ENT    Basal cell carcinoma  x2 - upper lip and arm    Chronic obstructive pulmonary disease    Osteoporosis  Osteoporosis    H/O breast biopsy  &gt; 5 years ago, mass was benign and self-resolved    History of appendectomy    History of cataract surgery      FAMILY HISTORY:    SOCIAL HISTORY:      - Tobacco     - ETOH    Allergies    No Known Allergies    Intolerances      Vital Signs Last 24 Hrs  T(C): 37.2 (06 Feb 2021 11:37), Max: 39.2 (06 Feb 2021 04:55)  T(F): 99 (06 Feb 2021 11:37), Max: 102.5 (06 Feb 2021 04:55)  HR: 96 (06 Feb 2021 11:37) (86 - 101)  BP: 117/68 (06 Feb 2021 11:37) (105/59 - 124/63)  BP(mean): --  RR: 18 (06 Feb 2021 11:37) (18 - 20)  SpO2: 95% (06 Feb 2021 11:37) (93% - 95%)    02-05 @ 07:01  -  02-06 @ 07:00  --------------------------------------------------------  IN: 960 mL / OUT: 700 mL / NET: 260 mL          MEDICATIONS:  MEDICATIONS  (STANDING):  cefTRIAXone   IVPB 2000 milliGRAM(s) IV Intermittent every 24 hours  dextrose 5% + sodium chloride 0.45%. 1000 milliLiter(s) (80 mL/Hr) IV Continuous <Continuous>  enoxaparin Injectable 30 milliGRAM(s) SubCutaneous every 24 hours  ferrous    sulfate 325 milliGRAM(s) Oral daily  folic acid 1 milliGRAM(s) Oral daily  influenza  Vaccine (HIGH DOSE) 0.7 milliLiter(s) IntraMuscular once  lidocaine   Patch 1 Patch Transdermal daily  megestrol 40 milliGRAM(s) Oral three times a day  multivitamin 1 Tablet(s) Oral daily  pantoprazole  Injectable 40 milliGRAM(s) IV Push every 12 hours  polyethylene glycol 3350 17 Gram(s) Oral every 12 hours  senna 2 Tablet(s) Oral at bedtime  sodium chloride 0.9%. 1000 milliLiter(s) (100 mL/Hr) IV Continuous <Continuous>    MEDICATIONS  (PRN):  acetaminophen   Tablet .. 650 milliGRAM(s) Oral every 8 hours PRN Temp greater or equal to 38C (100.4F), Mild Pain (1 - 3)  albuterol/ipratropium for Nebulization 3 milliLiter(s) Nebulizer every 6 hours PRN Shortness of Breath and/or Wheezing  bisacodyl Suppository 10 milliGRAM(s) Rectal daily PRN Constipation  ibuprofen  Tablet. 400 milliGRAM(s) Oral every 6 hours PRN Moderate Pain (4 - 6)      PHYSICAL EXAM:  Un Comfortable, no immediate distress  Eyes: PERRL, EOM intact; conjunctiva and sclera clear  Head: Normocephalic;  No Trauma  ENMT: No nasal discharge, +hoarseness, cough + hemoptysis  Neck: Supple; non tender; no masses or deformities.    No JVD  Respiratory:  - WHEEZING   + scattered  RHONCHI  - RALES  - CRACKLES.  Diminished breath sounds  BILATERAL  RIGHT  LEFT bases   Cardiovascular: Regular rate and rhythm. S1 and S2 Normal; No murmurs, gallops or rubs     - PPM/AICD  Gastrointestinal: Soft mildly tender, non-distended; Normal bowel sounds; No hepatosplenomegaly.     -PEG    -  GT   - ALVES  Genitourinary: No costovertebral angle tenderness. No dysuria  Extremities: AROM, No clubbing, cyanosis or edema    Vascular: Peripheral pulses palpable 2+ bilaterally  Neurological: Alert and responisve to stimuli   Skin: Warm and dry. No obvious rash  Lymph Nodes: No acute cervical or supraclavicular adenopathy  Psychiatric: Cooperative and appropriate mood  DEVICES:  - DENTURES   +IV R / L     - ETUBE   -TRACH   -CTUBE  R / L    LABS:                          7.9    7.23  )-----------( 478      ( 06 Feb 2021 09:46 )             25.9     02-06    134<L>  |  100  |  9   ----------------------------<  282<H>  3.6   |  18<L>  |  1.01    Ca    7.0<L>      06 Feb 2021 09:46  Phos  3.0     02-06  Mg     1.8     02-06    TPro  6.2  /  Alb  2.4<L>  /  TBili  0.4  /  DBili  x   /  AST  28  /  ALT  35  /  AlkPhos  48  02-05      RADIOLOGY & ADDITIONAL STUDIES (The following images were personally reviewed):< from: Xray Chest 1 View- PORTABLE-Urgent (Xray Chest 1 View- PORTABLE-Urgent .) (02.06.21 @ 05:56) >    EXAM:  XR CHEST PORTABLE URGENT 1V                          PROCEDURE DATE:  02/06/2021          INTERPRETATION:  Clinical History: Fever    Frontal examination of the chest demonstrates the heart to be within normal limits in transverse diameter. Bilateral infiltrates. Left Port-A-Cath noted with tip overlying junction superior vena cava and right atrium.    Impression: Bilateral infiltrates with progression comparison to prior examination of the chest 2/3/2021        < end of copied text >

## 2021-02-06 NOTE — CONSULT NOTE ADULT - SUBJECTIVE AND OBJECTIVE BOX
HPI:  86 F with PMHx COPD on no home O2, DNR/DNI, recently diagnosed lung adenocarcinoma 2020 presented to ED with 2 weeks of black tarry stools, weakness, dyspnea on exertion, abdominal pain, poor PO tolerance, and dry cough. She has been getting chemotherapy every other week with last dose 21, she skipped 21 because of the snow storm. She was recently seen on 21 by her PCP Dr. Alena Worthington who recommended that she come to the ED if her symptoms continued to worsen. She denies lightheadedness, syncope, visual changes, chest pain, hemoptysis, diarrhea, focal weakness/numbness, dysuria. She reports urinary incontinence over the last few weeks and constipation with no BM for 2-3 days.  She lives alone with a home health aide who was present on admission. Her aide works with her 8 hours/day 5 days/week and receives assistance primarily with IADLs but occasionally needs help with toileting.  Patient notably has active MOLST completed with PCP. Patient is DNR/DNI and does not want pressors, but would want noninvasive ventilation, blood transfusions, IV fluids, and IV antibiotics.    In the ED:  Vitals: T 98.3->99.3, , /80->97/57, RR 28->20, SpO2 97% RA  Labs sig for: WBC 10.52, Hgb 7.6, plt 581, INR 1.29, cr 1.41 (baseline 1), albumin 2.8, AST/ALT 61/51, Lactate 2.1, VBG Ph 7.46/PCO2 34  EKG: sinus tachy  Imaging:    CXR: no acute infiltrate or consolidation    CTA: No pulmonary embolus. New few small scattered nodular opacities bilaterally with more confluent groundglass density and suspected interlobular septal thickening in posterior right upper lobe.  Medication: 500cc NSx2 (2021 16:24)      PAST MEDICAL & SURGICAL HISTORY:  Chronic obstructive pulmonary disease, unspecified COPD type    Hearing loss  left ear - sees Dr. Garces ENT    Basal cell carcinoma  x2 - upper lip and arm    Chronic obstructive pulmonary disease    Osteoporosis  Osteoporosis    H/O breast biopsy  &gt; 5 years ago, mass was benign and self-resolved    History of appendectomy    History of cataract surgery        REVIEW OF SYSTEMS      General:	    Skin/Breast:  	  Ophthalmologic:  	  ENMT:	    Respiratory and Thorax:  	  Cardiovascular:	    Gastrointestinal:	    Genitourinary:	    Musculoskeletal:	    Neurological:	    Psychiatric:	    Hematology/Lymphatics:	    Endocrine:	    Allergic/Immunologic:	    MEDICATIONS  (STANDING):  cefTRIAXone   IVPB 2000 milliGRAM(s) IV Intermittent every 24 hours  dextrose 5% + sodium chloride 0.45%. 1000 milliLiter(s) (80 mL/Hr) IV Continuous <Continuous>  enoxaparin Injectable 30 milliGRAM(s) SubCutaneous every 24 hours  ferrous    sulfate 325 milliGRAM(s) Oral daily  folic acid 1 milliGRAM(s) Oral daily  influenza  Vaccine (HIGH DOSE) 0.7 milliLiter(s) IntraMuscular once  lidocaine   Patch 1 Patch Transdermal daily  megestrol 40 milliGRAM(s) Oral three times a day  multivitamin 1 Tablet(s) Oral daily  pantoprazole  Injectable 40 milliGRAM(s) IV Push every 12 hours  polyethylene glycol 3350 17 Gram(s) Oral every 12 hours  senna 2 Tablet(s) Oral at bedtime  sodium chloride 0.9%. 1000 milliLiter(s) (100 mL/Hr) IV Continuous <Continuous>    MEDICATIONS  (PRN):  acetaminophen   Tablet .. 650 milliGRAM(s) Oral every 8 hours PRN Temp greater or equal to 38C (100.4F), Mild Pain (1 - 3)  albuterol/ipratropium for Nebulization 3 milliLiter(s) Nebulizer every 6 hours PRN Shortness of Breath and/or Wheezing  bisacodyl Suppository 10 milliGRAM(s) Rectal daily PRN Constipation  ibuprofen  Tablet. 400 milliGRAM(s) Oral every 6 hours PRN Moderate Pain (4 - 6)      Allergies    No Known Allergies    Intolerances        SOCIAL HISTORY:    FAMILY HISTORY:      Vital Signs Last 24 Hrs  T(C): 37.2 (2021 11:37), Max: 39.2 (2021 04:55)  T(F): 99 (2021 11:37), Max: 102.5 (2021 04:55)  HR: 96 (2021 11:37) (86 - 101)  BP: 117/68 (2021 11:37) (105/59 - 124/63)  BP(mean): --  RR: 18 (2021 11:37) (18 - 20)  SpO2: 95% (2021 11:37) (93% - 95%)    PHYSICAL EXAM:        LABS:                        7.9    7.23  )-----------( 478      ( 2021 09:46 )             25.9     02-06    134<L>  |  100  |  9   ----------------------------<  282<H>  3.6   |  18<L>  |  1.01    Ca    7.0<L>      2021 09:46  Phos  3.0     02-06  Mg     1.8     02-06    TPro  6.2  /  Alb  2.4<L>  /  TBili  0.4  /  DBili  x   /  AST  28  /  ALT  35  /  AlkPhos  48  -      Urinalysis Basic - ( 2021 05:24 )    Color: Yellow / Appearance: Clear / S.010 / pH: x  Gluc: x / Ketone: NEGATIVE  / Bili: Negative / Urobili: 0.2 E.U./dL   Blood: x / Protein: NEGATIVE mg/dL / Nitrite: NEGATIVE   Leuk Esterase: NEGATIVE / RBC: 5-10 /HPF / WBC < 5 /HPF   Sq Epi: x / Non Sq Epi: 0-5 /HPF / Bacteria: Present /HPF    Culture - Sputum . (21 @ 05:43)    Gram Stain:   Few epithelial cells  Few WBC's  Few Gram Positive Rods    Specimen Source: .Sputum Sputum    Culture Results:   Normal Respiratory Karissa present    Culture - Blood (21 @ 12:04)    Specimen Source: .Blood Blood-Peripheral    Culture Results:   No growth at 2 days.    Culture - Blood (21 @ 12:04)    Specimen Source: .Blood Blood-Peripheral    Culture Results:   No growth at 2 days.        RADIOLOGY & ADDITIONAL STUDIES:    < from: CT Angio Chest PE Protocol w/ IV Cont (21 @ 13:32) >  EXAM:  CT ANGIO CHEST PE PROTOCOL IC                          *** ADDENDUM 2021  ***    ADDENDUM: Patient reported to have underwent interval radiotherapy. Differential for nodular opacities in the right upper lung includes aspiration/infection, lymphangitic carcinomatosis, versus post radiotherapy changes. Assessment limited by artifact from respiratory motion. Recommend clinical correlation and short-term follow-up would be helpful. END OF ADDENDUM.    *** END OF ADDENDUM 2021  ***      PROCEDURE DATE:  2021          INTERPRETATION:  CTA (CT angiography) of the CHEST dated 2/3/2021 1:32 PM    INDICATION: Shortness of breath. Metastatic lung cancer on chemotherapy. Assess for pulmonary embolism.    TECHNIQUE: CT angiography of the chest was performed during bolus injection of intravenous contrast.  Post-processing including the production of axial, coronal and sagittal multiplanar reformatted images and axial and coronal maximum intensity projections (MIPs) was performed.    PRIOR STUDY: CT chest 2020, 2020, and 2017.    FINDINGS:    Pulmonary arteries: No pulmonary embolus seen.    Lungs and large airways: Respiratory motion limits assessment of fine pulmonary parenchymal detail..Trace dependent secretions distal trachea. No nodular and no tracheal or endobronchial lesion.  Bilateral apical pleural parenchymal scarring. Unchanged mild peribronchial wall thickening and cylindrical bronchiectasis bilaterally. Scattered nodular groundglass opacities bilaterally, more confluent in right upper lobe posterior segment with apparent interlobular septal thickening. Mild compressive atelectasis right lower lobe. Few nodular consolidative opacities left lower lobe, subsegmental atelectasis versus mild consolidation. No measurable residual masses in right upper lobe anterior segment with linear opacity in this region probably representing scarring. New few scattered bilateral pulmonary nodules, for example:  *  Right upper lobe subpleural 0.5 cm (series 6 image 160).  *  Right upper lobe anterior segment 0.5 cm (series 6 image 132)  *  Left lower lobe 0.5 cm (series 6 image 204)    Pleura:  Resolved right pneumothorax. Decreased trace residual right pleural effusion.    Mediastinum and hilar regions: Significantly decreased mediastinal and hilar adenopathy, for example right lower paratracheal 1.1 x 0.8 cm, previously 4.4 x 2.9 cm. No significant change mildly prominent subcarinal node 1.2 cm short axis. Resolved right lower cervical adenopathy.    Heart and pericardium:  Heart size is normal. No pericardial effusion.    Vessels:  No aneurysm. Mild scattered calcific atherosclerosis.    Chest wall and lower neck:  Normal.    Upper abdomen: Unchanged few left renal cysts, largest 3.5 cm.    Bones: No suspicious lytic or blastic lesion. Mild degenerative changes thoracic spine with prominent degenerative Schmorl's node inferior endplate T9 vertebral body.      IMPRESSION:  1.  No pulmonary embolus.  2.  New few small scattered nodular opacities bilaterally with more confluent groundglass density and suspected interlobular septal thickening in posterior right upper lobe. Differential includes aspiration/infection versus lymphangitic carcinomatosis. Assessment limited by artifact from respiratory motion. Recommend clinical correlation and short-term follow-up would be helpful.  3.  No measurable residual right upper lobe mass with scarring in this region.  4.  Significantly decreased thoracic adenopathy.  5.  Decreased trace residual right pleural effusion.  6.  Resolved right pneumothorax.        EXAM:  XR CHEST PORTABLE URGENT 1V                          PROCEDURE DATE:  2021          INTERPRETATION:  Clinical History: Fever    Frontal examination of the chest demonstrates the heart to be within normal limits in transverse diameter. Bilateral infiltrates. Left Port-A-Cath noted with tip overlying junction superior vena cava and right atrium.    Impression: Bilateral infiltrates with progression comparison to prior examination of the chest 2/3/2021         HPI:  86 F with PMHx COPD on no home O2, DNR/DNI, recently diagnosed lung adenocarcinoma 2020 presented to ED with 2 weeks of black tarry stools, weakness, dyspnea on exertion, abdominal pain, poor PO tolerance, and dry cough. She has been getting chemotherapy every other week with last dose 21, she skipped 21 because of the snow storm. She was recently seen on 21 by her PCP Dr. Alena Worthington who recommended that she come to the ED if her symptoms continued to worsen. She denies lightheadedness, syncope, visual changes, chest pain, hemoptysis, diarrhea, focal weakness/numbness, dysuria. She reports urinary incontinence over the last few weeks and constipation with no BM for 2-3 days.    She lives alone with a home health aide who was present on admission. Her aide works with her 8 hours/day 5 days/week and receives assistance primarily with IADLs but occasionally needs help with toileting.    Patient is DNR/DNI and does not want pressors, but would want noninvasive ventilation, blood transfusions, IV fluids, and IV antibiotics.     In the ED:  Vitals: T 98.3->99.3, , /80->97/57, RR 28->20, SpO2 97% RA  Labs sig for: WBC 10.52, Hgb 7.6, plt 581, INR 1.29, cr 1.41 (baseline 1), albumin 2.8, AST/ALT 61/51, Lactate 2.1, VBG Ph 7.46/PCO2 34  EKG: sinus tachy  Imaging:    CXR: no acute infiltrate or consolidation    CTA: No pulmonary embolus. New few small scattered nodular opacities bilaterally with more confluent groundglass density and suspected interlobular septal thickening in posterior right upper lobe.  Medication: 500cc NSx2 (2021 16:24)    ------------------------------------    Patient spiked a fever last night.  She has been on Ceftriaxone since admission  CXR now developing some infiltrates ad there is a concern for emerging pneumonia.  No desaturations  No diarrhea  Otherwise ROS negative      PAST MEDICAL & SURGICAL HISTORY:  Chronic obstructive pulmonary disease, unspecified COPD type    Hearing loss  left ear - sees Dr. Garces ENT    Basal cell carcinoma  x2 - upper lip and arm    Chronic obstructive pulmonary disease    Osteoporosis    H/O breast biopsy  &gt; 5 years ago, mass was benign and self-resolved    History of appendectomy    History of cataract surgery  	    MEDICATIONS  (STANDING):  cefTRIAXone   IVPB 2000 milliGRAM(s) IV Intermittent every 24 hours  dextrose 5% + sodium chloride 0.45%. 1000 milliLiter(s) (80 mL/Hr) IV Continuous <Continuous>  enoxaparin Injectable 30 milliGRAM(s) SubCutaneous every 24 hours  ferrous    sulfate 325 milliGRAM(s) Oral daily  folic acid 1 milliGRAM(s) Oral daily  influenza  Vaccine (HIGH DOSE) 0.7 milliLiter(s) IntraMuscular once  lidocaine   Patch 1 Patch Transdermal daily  megestrol 40 milliGRAM(s) Oral three times a day  multivitamin 1 Tablet(s) Oral daily  pantoprazole  Injectable 40 milliGRAM(s) IV Push every 12 hours  polyethylene glycol 3350 17 Gram(s) Oral every 12 hours  senna 2 Tablet(s) Oral at bedtime  sodium chloride 0.9%. 1000 milliLiter(s) (100 mL/Hr) IV Continuous <Continuous>    MEDICATIONS  (PRN):  acetaminophen   Tablet .. 650 milliGRAM(s) Oral every 8 hours PRN Temp greater or equal to 38C (100.4F), Mild Pain (1 - 3)  albuterol/ipratropium for Nebulization 3 milliLiter(s) Nebulizer every 6 hours PRN Shortness of Breath and/or Wheezing  bisacodyl Suppository 10 milliGRAM(s) Rectal daily PRN Constipation  ibuprofen  Tablet. 400 milliGRAM(s) Oral every 6 hours PRN Moderate Pain (4 - 6)      Allergies    No Known Allergies      SOCIAL HISTORY:  Lives at home  Hx of smoking      FAMILY HISTORY:  Noncontributory    EXAM  Vital Signs Last 24 Hrs  T(C): 37.2 (2021 11:37), Max: 39.2 (2021 04:55)  T(F): 99 (2021 11:37), Max: 102.5 (2021 04:55)  HR: 96 (2021 11:37) (86 - 101)  BP: 117/68 (2021 11:37) (105/59 - 124/63)  BP(mean): --  RR: 18 (2021 11:37) (18 - 20)  SpO2: 95% (2021 11:37) (93% - 95%)  Appears somnolent  On RA without respiratory distress  Awakens and responds appropriately to questions and is oriented to place and time  C/O being cold otherwise without localizing symptoms   Shivering  No rash  No apparent jaundice  Oral mucosa mildly dry  Neck supple CV with borderline regular tachy  Chest CTA  Abd soft ND NT  LEs thin without edema  Schumacher draining verna urine  No phlebitis  Left chest infusaport in place without edema or erythema or tenderness      LABS:                        7.9    7.23  )-----------( 478      ( 2021 09:46 )             25.9     02-    134<L>  |  100  |  9   ----------------------------<  282<H>  3.6   |  18<L>  |  1.01    Ca    7.0<L>      2021 09:46  Phos  3.0     -  Mg     1.8     -    TPro  6.2  /  Alb  2.4<L>  /  TBili  0.4  /  DBili  x   /  AST  28  /  ALT  35  /  AlkPhos  48  02-      Urinalysis Basic - ( 2021 05:24 )    Color: Yellow / Appearance: Clear / S.010 / pH: x  Gluc: x / Ketone: NEGATIVE  / Bili: Negative / Urobili: 0.2 E.U./dL   Blood: x / Protein: NEGATIVE mg/dL / Nitrite: NEGATIVE   Leuk Esterase: NEGATIVE / RBC: 5-10 /HPF / WBC < 5 /HPF   Sq Epi: x / Non Sq Epi: 0-5 /HPF / Bacteria: Present /HPF    Culture - Sputum . (21 @ 05:43)    Gram Stain:   Few epithelial cells  Few WBC's  Few Gram Positive Rods    Specimen Source: .Sputum Sputum    Culture Results:   Normal Respiratory Karissa present    Culture - Blood (21 @ 12:04)    Specimen Source: .Blood Blood-Peripheral    Culture Results:   No growth at 2 days.    Culture - Blood (21 @ 12:04)    Specimen Source: .Blood Blood-Peripheral    Culture Results:   No growth at 2 days.    New blood cultures pending      RADIOLOGY & ADDITIONAL STUDIES:    CT Angio Chest PE Protocol w/ IV Cont (21 @ 13:32) >  EXAM:  CT ANGIO CHEST PE PROTOCOL IC                          *** ADDENDUM 2021  ***    ADDENDUM: Patient reported to have underwent interval radiotherapy. Differential for nodular opacities in the right upper lung includes aspiration/infection, lymphangitic carcinomatosis, versus post radiotherapy changes. Assessment limited by artifact from respiratory motion. Recommend clinical correlation and short-term follow-up would be helpful. END OF ADDENDUM.    *** END OF ADDENDUM 2021  ***      PROCEDURE DATE:  2021          INTERPRETATION:  CTA (CT angiography) of the CHEST dated 2/3/2021 1:32 PM    INDICATION: Shortness of breath. Metastatic lung cancer on chemotherapy. Assess for pulmonary embolism.    TECHNIQUE: CT angiography of the chest was performed during bolus injection of intravenous contrast.  Post-processing including the production of axial, coronal and sagittal multiplanar reformatted images and axial and coronal maximum intensity projections (MIPs) was performed.    PRIOR STUDY: CT chest 2020, 2020, and 2017.    FINDINGS:    Pulmonary arteries: No pulmonary embolus seen.    Lungs and large airways: Respiratory motion limits assessment of fine pulmonary parenchymal detail..Trace dependent secretions distal trachea. No nodular and no tracheal or endobronchial lesion.  Bilateral apical pleural parenchymal scarring. Unchanged mild peribronchial wall thickening and cylindrical bronchiectasis bilaterally. Scattered nodular groundglass opacities bilaterally, more confluent in right upper lobe posterior segment with apparent interlobular septal thickening. Mild compressive atelectasis right lower lobe. Few nodular consolidative opacities left lower lobe, subsegmental atelectasis versus mild consolidation. No measurable residual masses in right upper lobe anterior segment with linear opacity in this region probably representing scarring. New few scattered bilateral pulmonary nodules, for example:  *  Right upper lobe subpleural 0.5 cm (series 6 image 160).  *  Right upper lobe anterior segment 0.5 cm (series 6 image 132)  *  Left lower lobe 0.5 cm (series 6 image 204)    Pleura:  Resolved right pneumothorax. Decreased trace residual right pleural effusion.    Mediastinum and hilar regions: Significantly decreased mediastinal and hilar adenopathy, for example right lower paratracheal 1.1 x 0.8 cm, previously 4.4 x 2.9 cm. No significant change mildly prominent subcarinal node 1.2 cm short axis. Resolved right lower cervical adenopathy.    Heart and pericardium:  Heart size is normal. No pericardial effusion.    Vessels:  No aneurysm. Mild scattered calcific atherosclerosis.    Chest wall and lower neck:  Normal.    Upper abdomen: Unchanged few left renal cysts, largest 3.5 cm.    Bones: No suspicious lytic or blastic lesion. Mild degenerative changes thoracic spine with prominent degenerative Schmorl's node inferior endplate T9 vertebral body.      IMPRESSION:  1.  No pulmonary embolus.  2.  New few small scattered nodular opacities bilaterally with more confluent groundglass density and suspected interlobular septal thickening in posterior right upper lobe. Differential includes aspiration/infection versus lymphangitic carcinomatosis. Assessment limited by artifact from respiratory motion. Recommend clinical correlation and short-term follow-up would be helpful.  3.  No measurable residual right upper lobe mass with scarring in this region.  4.  Significantly decreased thoracic adenopathy.  5.  Decreased trace residual right pleural effusion.  6.  Resolved right pneumothorax.        EXAM:  XR CHEST PORTABLE URGENT 1V                          PROCEDURE DATE:  2021          INTERPRETATION:  Clinical History: Fever    Frontal examination of the chest demonstrates the heart to be within normal limits in transverse diameter. Bilateral infiltrates. Left Port-A-Cath noted with tip overlying junction superior vena cava and right atrium.    Impression: Bilateral infiltrates with progression comparison to prior examination of the chest 2/3/2021

## 2021-02-06 NOTE — CONSULT NOTE ADULT - ATTENDING COMMENTS
CT scan with IV and oral contrast.  If the patient has retained stool lactulose 30 mL every 2 hours until the patients bowels move once.    Have rehabilitation get the patient up and around walking in the hallway on a regular basis and place her on a high-fiber diet.
Patient seen and examined and evaluation completed by me in person

## 2021-02-06 NOTE — CONSULT NOTE ADULT - ASSESSMENT
RECOMMEND  Procalcitonin level  NP swab RVP  Fungitell level   D/C Ceftriaxone  Give Cefepime 1 gm q 12   Vancomycin 1 gm IV once   Check Vanco level at AM  Sepsis  Emerging pneumonia  Rigors / Concern for a blood stream infection   Lung CA       RECOMMEND  Procalcitonin level  Naso-pharyngeal swab for RVP  Fungitell level   D/C Ceftriaxone  Give Cefepime 1 gm q 8 hours - first dose STAT  Loading dose of IV Vancomycin - could give 750 mg to 1 gm IV just once - STAT  Check Vanco level at AM     If not stabilized / improved, give Meropenem 1 gm IV q 8 hours

## 2021-02-07 LAB
ALBUMIN SERPL ELPH-MCNC: 2 G/DL — LOW (ref 3.3–5)
ALP SERPL-CCNC: 40 U/L — SIGNIFICANT CHANGE UP (ref 40–120)
ALT FLD-CCNC: 22 U/L — SIGNIFICANT CHANGE UP (ref 10–45)
ANION GAP SERPL CALC-SCNC: 10 MMOL/L — SIGNIFICANT CHANGE UP (ref 5–17)
ANISOCYTOSIS BLD QL: SLIGHT — SIGNIFICANT CHANGE UP
AST SERPL-CCNC: 27 U/L — SIGNIFICANT CHANGE UP (ref 10–40)
BASOPHILS # BLD AUTO: 0.2 K/UL — SIGNIFICANT CHANGE UP (ref 0–0.2)
BASOPHILS NFR BLD AUTO: 2.6 % — HIGH (ref 0–2)
BILIRUB SERPL-MCNC: 0.3 MG/DL — SIGNIFICANT CHANGE UP (ref 0.2–1.2)
BUN SERPL-MCNC: 7 MG/DL — SIGNIFICANT CHANGE UP (ref 7–23)
BURR CELLS BLD QL SMEAR: PRESENT — SIGNIFICANT CHANGE UP
CALCIUM SERPL-MCNC: 7.2 MG/DL — LOW (ref 8.4–10.5)
CHLORIDE SERPL-SCNC: 108 MMOL/L — SIGNIFICANT CHANGE UP (ref 96–108)
CO2 SERPL-SCNC: 17 MMOL/L — LOW (ref 22–31)
CREAT SERPL-MCNC: 0.94 MG/DL — SIGNIFICANT CHANGE UP (ref 0.5–1.3)
CULTURE RESULTS: NO GROWTH — SIGNIFICANT CHANGE UP
DACRYOCYTES BLD QL SMEAR: SLIGHT — SIGNIFICANT CHANGE UP
EOSINOPHIL # BLD AUTO: 0.07 K/UL — SIGNIFICANT CHANGE UP (ref 0–0.5)
EOSINOPHIL NFR BLD AUTO: 0.9 % — SIGNIFICANT CHANGE UP (ref 0–6)
GIANT PLATELETS BLD QL SMEAR: PRESENT — SIGNIFICANT CHANGE UP
GLUCOSE SERPL-MCNC: 105 MG/DL — HIGH (ref 70–99)
HCT VFR BLD CALC: 25.3 % — LOW (ref 34.5–45)
HGB BLD-MCNC: 7.8 G/DL — LOW (ref 11.5–15.5)
LYMPHOCYTES # BLD AUTO: 0.27 K/UL — LOW (ref 1–3.3)
LYMPHOCYTES # BLD AUTO: 3.5 % — LOW (ref 13–44)
MAGNESIUM SERPL-MCNC: 1.8 MG/DL — SIGNIFICANT CHANGE UP (ref 1.6–2.6)
MANUAL SMEAR VERIFICATION: SIGNIFICANT CHANGE UP
MCHC RBC-ENTMCNC: 26.1 PG — LOW (ref 27–34)
MCHC RBC-ENTMCNC: 30.8 GM/DL — LOW (ref 32–36)
MCV RBC AUTO: 84.6 FL — SIGNIFICANT CHANGE UP (ref 80–100)
MICROCYTES BLD QL: SLIGHT — SIGNIFICANT CHANGE UP
MONOCYTES # BLD AUTO: 0.6 K/UL — SIGNIFICANT CHANGE UP (ref 0–0.9)
MONOCYTES NFR BLD AUTO: 7.9 % — SIGNIFICANT CHANGE UP (ref 2–14)
NEUTROPHILS # BLD AUTO: 6.45 K/UL — SIGNIFICANT CHANGE UP (ref 1.8–7.4)
NEUTROPHILS NFR BLD AUTO: 85.1 % — HIGH (ref 43–77)
OVALOCYTES BLD QL SMEAR: SLIGHT — SIGNIFICANT CHANGE UP
PHOSPHATE SERPL-MCNC: 1.9 MG/DL — LOW (ref 2.5–4.5)
PLAT MORPH BLD: NORMAL — SIGNIFICANT CHANGE UP
PLATELET # BLD AUTO: 486 K/UL — HIGH (ref 150–400)
POIKILOCYTOSIS BLD QL AUTO: SLIGHT — SIGNIFICANT CHANGE UP
POLYCHROMASIA BLD QL SMEAR: SLIGHT — SIGNIFICANT CHANGE UP
POTASSIUM SERPL-MCNC: 4 MMOL/L — SIGNIFICANT CHANGE UP (ref 3.5–5.3)
POTASSIUM SERPL-SCNC: 4 MMOL/L — SIGNIFICANT CHANGE UP (ref 3.5–5.3)
PROT SERPL-MCNC: 5.6 G/DL — LOW (ref 6–8.3)
RBC # BLD: 2.99 M/UL — LOW (ref 3.8–5.2)
RBC # FLD: 16.8 % — HIGH (ref 10.3–14.5)
RBC BLD AUTO: ABNORMAL
SCHISTOCYTES BLD QL AUTO: SLIGHT — SIGNIFICANT CHANGE UP
SODIUM SERPL-SCNC: 135 MMOL/L — SIGNIFICANT CHANGE UP (ref 135–145)
SPECIMEN SOURCE: SIGNIFICANT CHANGE UP
WBC # BLD: 7.58 K/UL — SIGNIFICANT CHANGE UP (ref 3.8–10.5)
WBC # FLD AUTO: 7.58 K/UL — SIGNIFICANT CHANGE UP (ref 3.8–10.5)

## 2021-02-07 PROCEDURE — 93010 ELECTROCARDIOGRAM REPORT: CPT

## 2021-02-07 RX ORDER — MAGNESIUM SULFATE 500 MG/ML
2 VIAL (ML) INJECTION ONCE
Refills: 0 | Status: COMPLETED | OUTPATIENT
Start: 2021-02-07 | End: 2021-02-07

## 2021-02-07 RX ORDER — AZITHROMYCIN 500 MG/1
250 TABLET, FILM COATED ORAL EVERY 24 HOURS
Refills: 0 | Status: COMPLETED | OUTPATIENT
Start: 2021-02-07 | End: 2021-02-10

## 2021-02-07 RX ORDER — SODIUM,POTASSIUM PHOSPHATES 278-250MG
1 POWDER IN PACKET (EA) ORAL
Refills: 0 | Status: COMPLETED | OUTPATIENT
Start: 2021-02-07 | End: 2021-02-07

## 2021-02-07 RX ADMIN — CEFEPIME 100 MILLIGRAM(S): 1 INJECTION, POWDER, FOR SOLUTION INTRAMUSCULAR; INTRAVENOUS at 06:10

## 2021-02-07 RX ADMIN — POLYETHYLENE GLYCOL 3350 17 GRAM(S): 17 POWDER, FOR SOLUTION ORAL at 11:36

## 2021-02-07 RX ADMIN — MEGESTROL ACETATE 40 MILLIGRAM(S): 40 SUSPENSION ORAL at 13:30

## 2021-02-07 RX ADMIN — Medication 50 GRAM(S): at 08:22

## 2021-02-07 RX ADMIN — PANTOPRAZOLE SODIUM 40 MILLIGRAM(S): 20 TABLET, DELAYED RELEASE ORAL at 17:17

## 2021-02-07 RX ADMIN — Medication 325 MILLIGRAM(S): at 11:36

## 2021-02-07 RX ADMIN — MEGESTROL ACETATE 40 MILLIGRAM(S): 40 SUSPENSION ORAL at 06:10

## 2021-02-07 RX ADMIN — PANTOPRAZOLE SODIUM 40 MILLIGRAM(S): 20 TABLET, DELAYED RELEASE ORAL at 06:10

## 2021-02-07 RX ADMIN — CEFEPIME 100 MILLIGRAM(S): 1 INJECTION, POWDER, FOR SOLUTION INTRAMUSCULAR; INTRAVENOUS at 13:30

## 2021-02-07 RX ADMIN — Medication 1 MILLIGRAM(S): at 11:36

## 2021-02-07 RX ADMIN — Medication 400 MILLIGRAM(S): at 10:27

## 2021-02-07 RX ADMIN — MEGESTROL ACETATE 40 MILLIGRAM(S): 40 SUSPENSION ORAL at 22:21

## 2021-02-07 RX ADMIN — LIDOCAINE 1 PATCH: 4 CREAM TOPICAL at 22:01

## 2021-02-07 RX ADMIN — AZITHROMYCIN 250 MILLIGRAM(S): 500 TABLET, FILM COATED ORAL at 22:21

## 2021-02-07 RX ADMIN — LIDOCAINE 1 PATCH: 4 CREAM TOPICAL at 17:51

## 2021-02-07 RX ADMIN — SODIUM CHLORIDE 80 MILLILITER(S): 9 INJECTION, SOLUTION INTRAVENOUS at 15:54

## 2021-02-07 RX ADMIN — Medication 1 TABLET(S): at 11:36

## 2021-02-07 RX ADMIN — Medication 1 PACKET(S): at 09:16

## 2021-02-07 RX ADMIN — POLYETHYLENE GLYCOL 3350 17 GRAM(S): 17 POWDER, FOR SOLUTION ORAL at 17:18

## 2021-02-07 RX ADMIN — ENOXAPARIN SODIUM 30 MILLIGRAM(S): 100 INJECTION SUBCUTANEOUS at 22:21

## 2021-02-07 RX ADMIN — Medication 1 PACKET(S): at 17:18

## 2021-02-07 RX ADMIN — Medication 10 MILLIGRAM(S): at 13:30

## 2021-02-07 RX ADMIN — LIDOCAINE 1 PATCH: 4 CREAM TOPICAL at 10:16

## 2021-02-07 RX ADMIN — CEFEPIME 100 MILLIGRAM(S): 1 INJECTION, POWDER, FOR SOLUTION INTRAMUSCULAR; INTRAVENOUS at 22:21

## 2021-02-07 NOTE — PROGRESS NOTE ADULT - SUBJECTIVE AND OBJECTIVE BOX
coverage for Dr Angel    Pt seen and examined   no new changes  Tmax 99.9    REVIEW OF SYSTEMS:  Constitutional: No fever,   Cardiovascular: No chest pain,   Gastrointestinal: No abdominal or epigastric pain. No nausea; No diarrhea   Skin: No itching, burning, rashes or lesions       MEDICATIONS:  MEDICATIONS  (STANDING):  azithromycin   Tablet 250 milliGRAM(s) Oral every 24 hours  cefepime   IVPB      cefepime   IVPB 1000 milliGRAM(s) IV Intermittent every 8 hours  dextrose 5% + sodium chloride 0.45%. 1000 milliLiter(s) (80 mL/Hr) IV Continuous <Continuous>  enoxaparin Injectable 30 milliGRAM(s) SubCutaneous every 24 hours  ferrous    sulfate 325 milliGRAM(s) Oral daily  folic acid 1 milliGRAM(s) Oral daily  influenza  Vaccine (HIGH DOSE) 0.7 milliLiter(s) IntraMuscular once  lidocaine   Patch 1 Patch Transdermal daily  megestrol 40 milliGRAM(s) Oral three times a day  multivitamin 1 Tablet(s) Oral daily  pantoprazole  Injectable 40 milliGRAM(s) IV Push every 12 hours  polyethylene glycol 3350 17 Gram(s) Oral every 12 hours  potassium phosphate / sodium phosphate Powder (PHOS-NaK) 1 Packet(s) Oral two times a day with meals  senna 2 Tablet(s) Oral at bedtime  sodium chloride 0.9%. 1000 milliLiter(s) (100 mL/Hr) IV Continuous <Continuous>    MEDICATIONS  (PRN):  acetaminophen   Tablet .. 650 milliGRAM(s) Oral every 8 hours PRN Temp greater or equal to 38C (100.4F), Mild Pain (1 - 3)  albuterol/ipratropium for Nebulization 3 milliLiter(s) Nebulizer every 6 hours PRN Shortness of Breath and/or Wheezing  bisacodyl Suppository 10 milliGRAM(s) Rectal daily PRN Constipation  ibuprofen  Tablet. 400 milliGRAM(s) Oral every 6 hours PRN Moderate Pain (4 - 6)      Allergies    No Known Allergies    Intolerances        Vital Signs Last 24 Hrs  T(C): 36.5 (2021 11:54), Max: 37.6 (2021 05:35)  T(F): 97.7 (2021 11:54), Max: 99.6 (2021 05:35)  HR: 99 (2021 11:54) (90 - 99)  BP: 98/65 (2021 11:54) (98/65 - 125/60)  BP(mean): --  RR: 18 (2021 11:54) (16 - 18)  SpO2: 94% (2021 11:54) (94% - 97%)     @ 07:01  -  02 @ 07:00  --------------------------------------------------------  IN: 2320 mL / OUT: 1800 mL / NET: 520 mL        PHYSICAL EXAM:    General: thin; in no acute distress  HEENT: MMM, conjunctiva and sclera clear  Lungs: poor ins  Heart: regular  Gastrointestinal: Soft non-tender non-distended; Normal bowel sounds;  Skin: Warm and dry. No obvious rash    LABS:      CBC Full  -  ( 2021 06:19 )  WBC Count : 7.58 K/uL  RBC Count : 2.99 M/uL  Hemoglobin : 7.8 g/dL  Hematocrit : 25.3 %  Platelet Count - Automated : 486 K/uL  Mean Cell Volume : 84.6 fl  Mean Cell Hemoglobin : 26.1 pg  Mean Cell Hemoglobin Concentration : 30.8 gm/dL  Auto Neutrophil # : 6.45 K/uL  Auto Lymphocyte # : 0.27 K/uL  Auto Monocyte # : 0.60 K/uL  Auto Eosinophil # : 0.07 K/uL  Auto Basophil # : 0.20 K/uL  Auto Neutrophil % : 85.1 %  Auto Lymphocyte % : 3.5 %  Auto Monocyte % : 7.9 %  Auto Eosinophil % : 0.9 %  Auto Basophil % : 2.6 %        135  |  108  |  7   ----------------------------<  105<H>  4.0   |  17<L>  |  0.94    Ca    7.2<L>      2021 06:19  Phos  1.9     02-07  Mg     1.8     -    TPro  5.6<L>  /  Alb  2.0<L>  /  TBili  0.3  /  DBili  x   /  AST  27  /  ALT  22  /  AlkPhos  40  02-07          Urinalysis Basic - ( 2021 05:24 )    Color: Yellow / Appearance: Clear / S.010 / pH: x  Gluc: x / Ketone: NEGATIVE  / Bili: Negative / Urobili: 0.2 E.U./dL   Blood: x / Protein: NEGATIVE mg/dL / Nitrite: NEGATIVE   Leuk Esterase: NEGATIVE / RBC: 5-10 /HPF / WBC < 5 /HPF   Sq Epi: x / Non Sq Epi: 0-5 /HPF / Bacteria: Present /HPF                RADIOLOGY & ADDITIONAL STUDIES (The following images were personally reviewed):

## 2021-02-07 NOTE — PHYSICAL THERAPY INITIAL EVALUATION ADULT - PERTINENT HX OF CURRENT PROBLEM, REHAB EVAL
Per EMR, 86 F with PMHx COPD, DNR/DNI, recently diagnosed adenocarcinoma s/p radiation ~12/2020 now receiving chemo every other week with Dr. Jones, admitted for anemia c/f upper GI bleed.

## 2021-02-07 NOTE — PROGRESS NOTE ADULT - ASSESSMENT
RECOMMEND  Check QT interval on EKG  If QT normal, start Azithromycin 500 mg IV first dose then 250 mg daily.  Subsequent doses can be given PO but always with a meal.  If QT borderline, start from 250 mg   Continue Cefepime  Check nasal swab for MRSA           Pneumonia, possibly due to Chlamydia pneumoniae  COPD  Lung CA  S/P Chemotherapy - patient immunocompromised  Fever - improved      RECOMMEND  Monitor QT interval on EKG - borderline interval on admit EKG  Change Azithromycin to 250 mg daily  Continue Cefepime  Check nasal swab for MRSA.  If negative, no need to continue Vancomycin

## 2021-02-07 NOTE — PROGRESS NOTE ADULT - SUBJECTIVE AND OBJECTIVE BOX
INTERVAL HPI/OVERNIGHT EVENTS:    ANTIBIOTICS/RELEVANT:    MEDICATIONS  (STANDING):  azithromycin   Tablet 500 milliGRAM(s) Oral daily  cefepime   IVPB      cefepime   IVPB 1000 milliGRAM(s) IV Intermittent every 8 hours  dextrose 5% + sodium chloride 0.45%. 1000 milliLiter(s) (80 mL/Hr) IV Continuous <Continuous>  enoxaparin Injectable 30 milliGRAM(s) SubCutaneous every 24 hours  ferrous    sulfate 325 milliGRAM(s) Oral daily  folic acid 1 milliGRAM(s) Oral daily  influenza  Vaccine (HIGH DOSE) 0.7 milliLiter(s) IntraMuscular once  lidocaine   Patch 1 Patch Transdermal daily  megestrol 40 milliGRAM(s) Oral three times a day  multivitamin 1 Tablet(s) Oral daily  pantoprazole  Injectable 40 milliGRAM(s) IV Push every 12 hours  polyethylene glycol 3350 17 Gram(s) Oral every 12 hours  potassium phosphate / sodium phosphate Powder (PHOS-NaK) 1 Packet(s) Oral two times a day with meals  senna 2 Tablet(s) Oral at bedtime  sodium chloride 0.9%. 1000 milliLiter(s) (100 mL/Hr) IV Continuous <Continuous>    MEDICATIONS  (PRN):  acetaminophen   Tablet .. 650 milliGRAM(s) Oral every 8 hours PRN Temp greater or equal to 38C (100.4F), Mild Pain (1 - 3)  albuterol/ipratropium for Nebulization 3 milliLiter(s) Nebulizer every 6 hours PRN Shortness of Breath and/or Wheezing  bisacodyl Suppository 10 milliGRAM(s) Rectal daily PRN Constipation  ibuprofen  Tablet. 400 milliGRAM(s) Oral every 6 hours PRN Moderate Pain (4 - 6)      Allergies    No Known Allergies      Vital Signs Last 24 Hrs  T(C): 37.6 (2021 05:35), Max: 37.6 (2021 05:35)  T(F): 99.6 (2021 05:35), Max: 99.6 (2021 05:35)  HR: 93 (2021 05:35) (90 - 96)  BP: 114/64 (2021 05:35) (114/64 - 125/60)  BP(mean): --  RR: 17 (2021 05:35) (16 - 18)  SpO2: 94% (2021 05:35) (94% - 97%)            LABS:                        7.8    7.58  )-----------( 486      ( 2021 06:19 )             25.3     02-07    135  |  108  |  7   ----------------------------<  105<H>  4.0   |  17<L>  |  0.94    Ca    7.2<L>      2021 06:19  Phos  1.9     02-07  Mg     1.8     02-07    TPro  5.6<L>  /  Alb  2.0<L>  /  TBili  0.3  /  DBili  x   /  AST  27  /  ALT  22  /  AlkPhos  40  02-07      Urinalysis Basic - ( 2021 05:24 )    Color: Yellow / Appearance: Clear / S.010 / pH: x  Gluc: x / Ketone: NEGATIVE  / Bili: Negative / Urobili: 0.2 E.U./dL   Blood: x / Protein: NEGATIVE mg/dL / Nitrite: NEGATIVE   Leuk Esterase: NEGATIVE / RBC: 5-10 /HPF / WBC < 5 /HPF   Sq Epi: x / Non Sq Epi: 0-5 /HPF / Bacteria: Present /HPF        MICROBIOLOGY:    RADIOLOGY & ADDITIONAL STUDIES: INTERVAL HPI/OVERNIGHT EVENTS:    Appears improved  Eating  Was ambulating earlier    MEDICATIONS  (STANDING):  azithromycin   Tablet 500 milliGRAM(s) Oral daily  cefepime   IVPB      cefepime   IVPB 1000 milliGRAM(s) IV Intermittent every 8 hours  dextrose 5% + sodium chloride 0.45%. 1000 milliLiter(s) (80 mL/Hr) IV Continuous <Continuous>  enoxaparin Injectable 30 milliGRAM(s) SubCutaneous every 24 hours  ferrous    sulfate 325 milliGRAM(s) Oral daily  folic acid 1 milliGRAM(s) Oral daily  influenza  Vaccine (HIGH DOSE) 0.7 milliLiter(s) IntraMuscular once  lidocaine   Patch 1 Patch Transdermal daily  megestrol 40 milliGRAM(s) Oral three times a day  multivitamin 1 Tablet(s) Oral daily  pantoprazole  Injectable 40 milliGRAM(s) IV Push every 12 hours  polyethylene glycol 3350 17 Gram(s) Oral every 12 hours  potassium phosphate / sodium phosphate Powder (PHOS-NaK) 1 Packet(s) Oral two times a day with meals  senna 2 Tablet(s) Oral at bedtime  sodium chloride 0.9%. 1000 milliLiter(s) (100 mL/Hr) IV Continuous <Continuous>    MEDICATIONS  (PRN):  acetaminophen   Tablet .. 650 milliGRAM(s) Oral every 8 hours PRN Temp greater or equal to 38C (100.4F), Mild Pain (1 - 3)  albuterol/ipratropium for Nebulization 3 milliLiter(s) Nebulizer every 6 hours PRN Shortness of Breath and/or Wheezing  bisacodyl Suppository 10 milliGRAM(s) Rectal daily PRN Constipation  ibuprofen  Tablet. 400 milliGRAM(s) Oral every 6 hours PRN Moderate Pain (4 - 6)      Allergies    No Known Allergies    EXAM  Vital Signs Last 24 Hrs  T(C): 37.6 (2021 05:35), Max: 37.6 (2021 05:35)  T(F): 99.6 (2021 05:35), Max: 99.6 (2021 05:35)  HR: 93 (2021 05:35) (90 - 96)  BP: 114/64 (2021 05:35) (114/64 - 125/60)  BP(mean): --  RR: 17 (2021 05:35) (16 - 18)  SpO2: 94% (2021 05:35) (94% - 97%)  NCO2  No distress  No rash  RR  Chest clear  Abd soft without over tenderness    LABS:                        7.8    7.58  )-----------( 486      ( 2021 06:19 )             25.3     02-07    135  |  108  |  7   ----------------------------<  105<H>  4.0   |  17<L>  |  0.94    Ca    7.2<L>      2021 06:19  Phos  1.9     02-07  Mg     1.8     02-07    TPro  5.6<L>  /  Alb  2.0<L>  /  TBili  0.3  /  DBili  x   /  AST  27  /  ALT  22  /  AlkPhos  40  02-07      Urinalysis Basic - ( 2021 05:24 )    Color: Yellow / Appearance: Clear / S.010 / pH: x  Gluc: x / Ketone: NEGATIVE  / Bili: Negative / Urobili: 0.2 E.U./dL   Blood: x / Protein: NEGATIVE mg/dL / Nitrite: NEGATIVE   Leuk Esterase: NEGATIVE / RBC: 5-10 /HPF / WBC < 5 /HPF   Sq Epi: x / Non Sq Epi: 0-5 /HPF / Bacteria: Present /HPF        MICROBIOLOGY:    Respiratory Viral Panel with COVID-19 by JOSE (21 @ 13:43)    Rapid RVP Result: Detected    SARS-CoV-2: NotDetec: This Respiratory Panel uses polymerase chain reaction (PCR) to detect for  adenovirus; coronavirus (HKU1, NL63, 229E, OC43); human metapneumovirus  (hMPV); human enterovirus/rhinovirus (Entero/RV); influenza A; influenza  A/H1; influenza A/H3; influenza A/H1-2009; influenza B; parainfluenza  viruses 1, 2, 3, 4; respiratory syncytial virus; Mycoplasma pneumoniae;  Chlamydophila pneumoniae; and SARS-CoV-2.    Chlamydia pneumoniae (RapRVP): Detected

## 2021-02-07 NOTE — PROGRESS NOTE ADULT - SUBJECTIVE AND OBJECTIVE BOX
Interventional, Pulmonary, Critical, Chest Special Procedures.    Pt was seen and fully examined by myself.     Time spent with patient in minutes:    Patient is a 86y old  Female who presents with a chief complaint of Anemia, GI Bleed (07 Feb 2021 12:13) The patient found to have positive Chlamydia serology. The patient is now eupneic on RA, pain free and engaging    HPI:  86 F with PMHx COPD on no home O2, DNR/DNI, recently diagnosed lung adenocarcinoma November 2020 presented to ED with 2 weeks of black tarry stools, weakness, dyspnea on exertion, abdominal pain, poor PO tolerance, and dry cough. She has been getting chemotherapy every other week with last dose 1/25/21, she skipped Monday 2/1/21 because of the snow storm. She was recently seen on 1/29/21 by her PCP Dr. Alena Worthington who recommended that she come to the ED if her symptoms continued to worsen. She denies lightheadedness, syncope, visual changes, chest pain, hemoptysis, diarrhea, focal weakness/numbness, dysuria. She reports urinary incontinence over the last few weeks and constipation with no BM for 2-3 days.  She lives alone with a home health aide who was present on admission. Her aide works with her 8 hours/day 5 days/week and receives assistance primarily with IADLs but occasionally needs help with toileting.  Patient notably has active MOLST completed with PCP. Patient is DNR/DNI and does not want pressors, but would want noninvasive ventilation, blood transfusions, IV fluids, and IV antibiotics.    In the ED:  Vitals: T 98.3->99.3, , /80->97/57, RR 28->20, SpO2 97% RA  Labs sig for: WBC 10.52, Hgb 7.6, plt 581, INR 1.29, cr 1.41 (baseline 1), albumin 2.8, AST/ALT 61/51, Lactate 2.1, VBG Ph 7.46/PCO2 34  EKG: sinus tachy  Imaging:    CXR: no acute infiltrate or consolidation    CTA: No pulmonary embolus. New few small scattered nodular opacities bilaterally with more confluent groundglass density and suspected interlobular septal thickening in posterior right upper lobe.  Medication: 500cc NSx2 (03 Feb 2021 16:24)      REVIEW OF SYSTEMS:  Constitutional: No fever, +weight loss, profound  fatigue  Eyes: No eye pain, visual disturbances, or discharge  ENMT:  + difficulty hearing, tinnitus, vertigo; No sinus or throat pain. No epistaxis, +dysphagia, dysphonia, hoarseness no  odynophagia  Neck: No pain, stiffness or neck swelling.  No masses or deformities  Respiratory: +cough, no wheezing, hemoptysis  + COPD  - ILD   - PE   - ASTHMA     - PNEUMONIA  Cardiovascular: No chest pain, dysrhythmia, palpitations, dizziness or edema - CAD   - CHF   - HTN  Gastrointestinal: + abdominal or epigastric pain. No nausea, vomiting or hematemesis; No diarrhea or constipation. + melena no Icterus.          Genitourinary: No dysuria, frequency, hematuria + incontinence   - CKD/RIDDHI      - ESRD  Neurological: No headaches, memory loss, loss of strength, numbness or tremors      -DEMENTIA     - STROKE    - SEIZURE  Skin: No itching, burning, rashes or lesions   Lymph Nodes: No enlarged glands  Endocrine: No heat or cold intolerance; No hair loss       - DM     - THYROID DISORDER  Musculoskeletal: No joint pain or swelling; + muscle, +back or extremity pain, No edema  Psychiatric: No depression, anxiety, mood swings or difficulty sleeping  Heme/Lymph: No easy bruising or bleeding gums         + ANEMIA      + CANCER   -COAGULOPATHY  PAST MEDICAL & SURGICAL HISTORY:  Chronic obstructive pulmonary disease, unspecified COPD type    Hearing loss  left ear - sees Dr. Garces ENT    Basal cell carcinoma  x2 - upper lip and arm    Chronic obstructive pulmonary disease    Osteoporosis  Osteoporosis    H/O breast biopsy  &gt; 5 years ago, mass was benign and self-resolved    History of appendectomy    History of cataract surgery      FAMILY HISTORY:    SOCIAL HISTORY:      - Tobacco     - ETOH    Allergies    No Known Allergies    Intolerances      Vital Signs Last 24 Hrs  T(C): 36.5 (07 Feb 2021 11:54), Max: 37.6 (07 Feb 2021 05:35)  T(F): 97.7 (07 Feb 2021 11:54), Max: 99.6 (07 Feb 2021 05:35)  HR: 108 (07 Feb 2021 12:37) (90 - 108)  BP: 104/53 (07 Feb 2021 12:37) (98/65 - 125/60)  BP(mean): --  RR: 18 (07 Feb 2021 11:54) (16 - 18)  SpO2: 94% (07 Feb 2021 12:37) (94% - 97%)    02-06 @ 07:01  -  02-07 @ 07:00  --------------------------------------------------------  IN: 2320 mL / OUT: 1800 mL / NET: 520 mL          MEDICATIONS:  MEDICATIONS  (STANDING):  azithromycin   Tablet 250 milliGRAM(s) Oral every 24 hours  cefepime   IVPB      cefepime   IVPB 1000 milliGRAM(s) IV Intermittent every 8 hours  dextrose 5% + sodium chloride 0.45%. 1000 milliLiter(s) (80 mL/Hr) IV Continuous <Continuous>  enoxaparin Injectable 30 milliGRAM(s) SubCutaneous every 24 hours  ferrous    sulfate 325 milliGRAM(s) Oral daily  folic acid 1 milliGRAM(s) Oral daily  influenza  Vaccine (HIGH DOSE) 0.7 milliLiter(s) IntraMuscular once  lidocaine   Patch 1 Patch Transdermal daily  megestrol 40 milliGRAM(s) Oral three times a day  multivitamin 1 Tablet(s) Oral daily  pantoprazole  Injectable 40 milliGRAM(s) IV Push every 12 hours  polyethylene glycol 3350 17 Gram(s) Oral every 12 hours  potassium phosphate / sodium phosphate Powder (PHOS-NaK) 1 Packet(s) Oral two times a day with meals  senna 2 Tablet(s) Oral at bedtime  sodium chloride 0.9%. 1000 milliLiter(s) (100 mL/Hr) IV Continuous <Continuous>    MEDICATIONS  (PRN):  acetaminophen   Tablet .. 650 milliGRAM(s) Oral every 8 hours PRN Temp greater or equal to 38C (100.4F), Mild Pain (1 - 3)  albuterol/ipratropium for Nebulization 3 milliLiter(s) Nebulizer every 6 hours PRN Shortness of Breath and/or Wheezing  bisacodyl Suppository 10 milliGRAM(s) Rectal daily PRN Constipation  ibuprofen  Tablet. 400 milliGRAM(s) Oral every 6 hours PRN Moderate Pain (4 - 6)      PHYSICAL EXAM:  more Comfortable, no immediate distress  Eyes: PERRL, EOM intact; conjunctiva and sclera clear  Head: Normocephalic;  No Trauma  ENMT: No nasal discharge, +hoarseness, cough + hemoptysis  Neck: Supple; non tender; no masses or deformities.    No JVD  Respiratory:  - WHEEZING   + scattered  RHONCHI  - RALES  - CRACKLES.  Diminished breath sounds  BILATERAL  RIGHT  LEFT bases   Cardiovascular: Regular rate and rhythm. S1 and S2 Normal; No murmurs, gallops or rubs     - PPM/AICD  Gastrointestinal: Soft mildly tender, non-distended; Normal bowel sounds; No hepatosplenomegaly.     -PEG    -  GT   - ALVES  Genitourinary: No costovertebral angle tenderness. No dysuria  Extremities: AROM, No clubbing, cyanosis or edema    Vascular: Peripheral pulses palpable 2+ bilaterally  Neurological: Alert and responisve to stimuli   Skin: Warm and dry. No obvious rash  Lymph Nodes: No acute cervical or supraclavicular adenopathy  Psychiatric: Cooperative and appropriate mood    DEVICES:  - DENTURES   +IV R / L     - ETUBE   -TRACH   -CTUBE  R / L    LABS:                          7.8    7.58  )-----------( 486      ( 07 Feb 2021 06:19 )             25.3     02-07    135  |  108  |  7   ----------------------------<  105<H>  4.0   |  17<L>  |  0.94    Ca    7.2<L>      07 Feb 2021 06:19  Phos  1.9     02-07  Mg     1.8     02-07    TPro  5.6<L>  /  Alb  2.0<L>  /  TBili  0.3  /  DBili  x   /  AST  27  /  ALT  22  /  AlkPhos  40  02-07      RADIOLOGY & ADDITIONAL STUDIES (The following images were personally reviewed):< from: Xray Chest 1 View- PORTABLE-Urgent (Xray Chest 1 View- PORTABLE-Urgent .) (02.06.21 @ 05:56) >  EXAM:  XR CHEST PORTABLE URGENT 1V                          PROCEDURE DATE:  02/06/2021          INTERPRETATION:  Clinical History: Fever    Frontal examination of the chest demonstrates the heart to be within normal limits in transverse diameter. Bilateral infiltrates. Left Port-A-Cath noted with tip overlying junction superior vena cava and right atrium.    Impression: Bilateral infiltrates with progression comparison to prior examination of the chest 2/3/2021    < end of copied text >

## 2021-02-07 NOTE — PHYSICAL THERAPY INITIAL EVALUATION ADULT - ADDITIONAL COMMENTS
Pt reports living alone in an apartment that has 4 steps to enter. She reports being independent in all ADLs and ambulation without a device. She states she owns a SC but have not been using it in a long time. She states she has HHA Mondays to Fridays from 8-6PM. She states she regularly walk to [employer] everyday from her house at [address] She states sometimes she walks with a friend but they complain that she walks too fast and too far.

## 2021-02-07 NOTE — PROGRESS NOTE ADULT - ASSESSMENT
ASSESSMENT/PLAN 84 y/o female pt c worsening SOB, RUL and Central mediastinal mass poorly differentiated carcinoma favored adenocarcinoma. admitted for severe anemia c/f upper GI bleed, possible aspiration pneumonia vs lymphangitic spread.    1. O2 2LNC humidified  2. Bronchodilators:  Atrovent/ albuterol q 4 – 6 hours as needed  3. Corticosteroids:  now  off  4. ID/Antibiotics:  continue ABX, Zithromax, Rocephin, follow sputum CX,  UCX, RVP, pt is COVID NEG. legionella NEG, request ID feedback  5. Cardiac/HTN: optimize BP  6. GI: Rx/ prophylaxis c PPI/H2B, GI   7. Heme: Rx/VT prophylaxis c SQH/SCD/ continue SCD in view of possible GI bleeding, follow on , PET CT  8. Aspiration precautions at all times  9. Mobilize, OOB  10. Use incentive spirometer  11. Nutritional Consult and support  Discussed with managing team, discussed c ID

## 2021-02-07 NOTE — PROGRESS NOTE ADULT - SUBJECTIVE AND OBJECTIVE BOX
SUBJECTIVE/OVERNIGHT EVENTS:     VITAL SIGNS:  Vital Signs Last 24 Hrs  T(C): 37.6 (07 Feb 2021 05:35), Max: 37.6 (07 Feb 2021 05:35)  T(F): 99.6 (07 Feb 2021 05:35), Max: 99.6 (07 Feb 2021 05:35)  HR: 93 (07 Feb 2021 05:35) (90 - 96)  BP: 114/64 (07 Feb 2021 05:35) (114/64 - 125/60)  BP(mean): --  RR: 17 (07 Feb 2021 05:35) (16 - 18)  SpO2: 94% (07 Feb 2021 05:35) (94% - 97%)    PHYSICAL EXAM:  Constitutional: WDWN resting in bed; in no acute distress but slightly uncomfortable appearing  HEENT: NCAT, EOMI, anicteric sclera, no nasal discharge; uvula midline, no oropharyngeal erythema or exudates; MMM  Respiratory: CTA B/L; no W/R/R, no retractions; breathing 24/min with abdominal recruitment on presentation  Cardiac: +S1/S2; RRR; no M/R/G  Gastrointestinal: abdomen soft, tender to light palpation evenly across epigastric, RUQ, RLQ, and suprapubic regions, no rebound  Extremities: WWP, no clubbing or cyanosis; no peripheral edema  Vascular: 2+ radial, femoral, DP/PT pulses B/L  Neurologic: AAOx3; CNII-XII grossly intact; moves extremities to command, 5/5 UE strength and can independently turn from back to side    MEDICATIONS:  MEDICATIONS  (STANDING):  azithromycin   Tablet 500 milliGRAM(s) Oral daily  cefepime   IVPB      cefepime   IVPB 1000 milliGRAM(s) IV Intermittent every 8 hours  dextrose 5% + sodium chloride 0.45%. 1000 milliLiter(s) (80 mL/Hr) IV Continuous <Continuous>  enoxaparin Injectable 30 milliGRAM(s) SubCutaneous every 24 hours  ferrous    sulfate 325 milliGRAM(s) Oral daily  folic acid 1 milliGRAM(s) Oral daily  influenza  Vaccine (HIGH DOSE) 0.7 milliLiter(s) IntraMuscular once  lidocaine   Patch 1 Patch Transdermal daily  magnesium sulfate  IVPB 2 Gram(s) IV Intermittent once  megestrol 40 milliGRAM(s) Oral three times a day  multivitamin 1 Tablet(s) Oral daily  pantoprazole  Injectable 40 milliGRAM(s) IV Push every 12 hours  polyethylene glycol 3350 17 Gram(s) Oral every 12 hours  potassium phosphate / sodium phosphate Powder (PHOS-NaK) 1 Packet(s) Oral two times a day with meals  senna 2 Tablet(s) Oral at bedtime  sodium chloride 0.9%. 1000 milliLiter(s) (100 mL/Hr) IV Continuous <Continuous>    MEDICATIONS  (PRN):  acetaminophen   Tablet .. 650 milliGRAM(s) Oral every 8 hours PRN Temp greater or equal to 38C (100.4F), Mild Pain (1 - 3)  albuterol/ipratropium for Nebulization 3 milliLiter(s) Nebulizer every 6 hours PRN Shortness of Breath and/or Wheezing  bisacodyl Suppository 10 milliGRAM(s) Rectal daily PRN Constipation  ibuprofen  Tablet. 400 milliGRAM(s) Oral every 6 hours PRN Moderate Pain (4 - 6)      ALLERGIES:  Allergies    No Known Allergies    Intolerances        LABS:                        7.8    7.58  )-----------( 486      ( 07 Feb 2021 06:19 )             25.3     02-07    135  |  108  |  7   ----------------------------<  105<H>  4.0   |  17<L>  |  0.94    Ca    7.2<L>      07 Feb 2021 06:19  Phos  1.9     02-07  Mg     1.8     02-07    TPro  5.6<L>  /  Alb  2.0<L>  /  TBili  0.3  /  DBili  x   /  AST  27  /  ALT  22  /  AlkPhos  40  02-07        RADIOLOGY & ADDITIONAL TESTS: Reviewed. SUBJECTIVE/OVERNIGHT EVENTS: No acute overnight events. Pt seen in Am at bedside, resting in bed, and does not appear to be in acute distress. She reports persistent abdominal pain, and has not had a bowel movement since admission. She continues to report poor appetite and poor PO intake. She otherwise denies fever, chills, headache, changes in vision/hearing, sob, chest pain, extremity pain or swelling.    VITAL SIGNS:  Vital Signs Last 24 Hrs  T(C): 37.6 (07 Feb 2021 05:35), Max: 37.6 (07 Feb 2021 05:35)  T(F): 99.6 (07 Feb 2021 05:35), Max: 99.6 (07 Feb 2021 05:35)  HR: 93 (07 Feb 2021 05:35) (90 - 96)  BP: 114/64 (07 Feb 2021 05:35) (114/64 - 125/60)  BP(mean): --  RR: 17 (07 Feb 2021 05:35) (16 - 18)  SpO2: 94% (07 Feb 2021 05:35) (94% - 97%)    PHYSICAL EXAM:  Constitutional: WDWN resting in bed; in no acute distress but slightly uncomfortable appearing  HEENT: NCAT, EOMI, anicteric sclera, no nasal discharge; uvula midline, no oropharyngeal erythema or exudates; MMM  Respiratory: CTA B/L; no W/R/R, no retractions; breathing 24/min with abdominal recruitment on presentation  Cardiac: +S1/S2; RRR; no M/R/G  Gastrointestinal: abdomen soft, tender to light palpation evenly across epigastric, RUQ, RLQ, and suprapubic regions, no rebound  Extremities: WWP, no clubbing or cyanosis; no peripheral edema  Vascular: 2+ radial, femoral, DP/PT pulses B/L  Neurologic: AAOx3; CNII-XII grossly intact; moves extremities to command, 5/5 UE strength and can independently turn from back to side    MEDICATIONS:  MEDICATIONS  (STANDING):  azithromycin   Tablet 500 milliGRAM(s) Oral daily  cefepime   IVPB      cefepime   IVPB 1000 milliGRAM(s) IV Intermittent every 8 hours  dextrose 5% + sodium chloride 0.45%. 1000 milliLiter(s) (80 mL/Hr) IV Continuous <Continuous>  enoxaparin Injectable 30 milliGRAM(s) SubCutaneous every 24 hours  ferrous    sulfate 325 milliGRAM(s) Oral daily  folic acid 1 milliGRAM(s) Oral daily  influenza  Vaccine (HIGH DOSE) 0.7 milliLiter(s) IntraMuscular once  lidocaine   Patch 1 Patch Transdermal daily  magnesium sulfate  IVPB 2 Gram(s) IV Intermittent once  megestrol 40 milliGRAM(s) Oral three times a day  multivitamin 1 Tablet(s) Oral daily  pantoprazole  Injectable 40 milliGRAM(s) IV Push every 12 hours  polyethylene glycol 3350 17 Gram(s) Oral every 12 hours  potassium phosphate / sodium phosphate Powder (PHOS-NaK) 1 Packet(s) Oral two times a day with meals  senna 2 Tablet(s) Oral at bedtime  sodium chloride 0.9%. 1000 milliLiter(s) (100 mL/Hr) IV Continuous <Continuous>    MEDICATIONS  (PRN):  acetaminophen   Tablet .. 650 milliGRAM(s) Oral every 8 hours PRN Temp greater or equal to 38C (100.4F), Mild Pain (1 - 3)  albuterol/ipratropium for Nebulization 3 milliLiter(s) Nebulizer every 6 hours PRN Shortness of Breath and/or Wheezing  bisacodyl Suppository 10 milliGRAM(s) Rectal daily PRN Constipation  ibuprofen  Tablet. 400 milliGRAM(s) Oral every 6 hours PRN Moderate Pain (4 - 6)      ALLERGIES:  Allergies    No Known Allergies    Intolerances        LABS:                        7.8    7.58  )-----------( 486      ( 07 Feb 2021 06:19 )             25.3     02-07    135  |  108  |  7   ----------------------------<  105<H>  4.0   |  17<L>  |  0.94    Ca    7.2<L>      07 Feb 2021 06:19  Phos  1.9     02-07  Mg     1.8     02-07    TPro  5.6<L>  /  Alb  2.0<L>  /  TBili  0.3  /  DBili  x   /  AST  27  /  ALT  22  /  AlkPhos  40  02-07        RADIOLOGY & ADDITIONAL TESTS: Reviewed.

## 2021-02-08 LAB
ALBUMIN SERPL ELPH-MCNC: 1.9 G/DL — LOW (ref 3.3–5)
ALP SERPL-CCNC: 41 U/L — SIGNIFICANT CHANGE UP (ref 40–120)
ALT FLD-CCNC: 17 U/L — SIGNIFICANT CHANGE UP (ref 10–45)
AMYLASE P1 CFR SERPL: 56 U/L — SIGNIFICANT CHANGE UP (ref 25–125)
ANION GAP SERPL CALC-SCNC: 9 MMOL/L — SIGNIFICANT CHANGE UP (ref 5–17)
AST SERPL-CCNC: 19 U/L — SIGNIFICANT CHANGE UP (ref 10–40)
BASOPHILS # BLD AUTO: 0.07 K/UL — SIGNIFICANT CHANGE UP (ref 0–0.2)
BASOPHILS NFR BLD AUTO: 0.8 % — SIGNIFICANT CHANGE UP (ref 0–2)
BILIRUB SERPL-MCNC: 0.4 MG/DL — SIGNIFICANT CHANGE UP (ref 0.2–1.2)
BUN SERPL-MCNC: 9 MG/DL — SIGNIFICANT CHANGE UP (ref 7–23)
CALCIUM SERPL-MCNC: 7.5 MG/DL — LOW (ref 8.4–10.5)
CHLORIDE SERPL-SCNC: 106 MMOL/L — SIGNIFICANT CHANGE UP (ref 96–108)
CO2 SERPL-SCNC: 16 MMOL/L — LOW (ref 22–31)
CREAT SERPL-MCNC: 0.9 MG/DL — SIGNIFICANT CHANGE UP (ref 0.5–1.3)
CULTURE RESULTS: SIGNIFICANT CHANGE UP
CULTURE RESULTS: SIGNIFICANT CHANGE UP
EOSINOPHIL # BLD AUTO: 0.09 K/UL — SIGNIFICANT CHANGE UP (ref 0–0.5)
EOSINOPHIL NFR BLD AUTO: 1 % — SIGNIFICANT CHANGE UP (ref 0–6)
GLUCOSE SERPL-MCNC: 115 MG/DL — HIGH (ref 70–99)
HCT VFR BLD CALC: 24.7 % — LOW (ref 34.5–45)
HGB BLD-MCNC: 7.6 G/DL — LOW (ref 11.5–15.5)
IMM GRANULOCYTES NFR BLD AUTO: 1.5 % — SIGNIFICANT CHANGE UP (ref 0–1.5)
LIDOCAIN IGE QN: 18 U/L — SIGNIFICANT CHANGE UP (ref 7–60)
LYMPHOCYTES # BLD AUTO: 0.38 K/UL — LOW (ref 1–3.3)
LYMPHOCYTES # BLD AUTO: 4.4 % — LOW (ref 13–44)
MAGNESIUM SERPL-MCNC: 1.7 MG/DL — SIGNIFICANT CHANGE UP (ref 1.6–2.6)
MCHC RBC-ENTMCNC: 25.6 PG — LOW (ref 27–34)
MCHC RBC-ENTMCNC: 30.8 GM/DL — LOW (ref 32–36)
MCV RBC AUTO: 83.2 FL — SIGNIFICANT CHANGE UP (ref 80–100)
MONOCYTES # BLD AUTO: 0.83 K/UL — SIGNIFICANT CHANGE UP (ref 0–0.9)
MONOCYTES NFR BLD AUTO: 9.6 % — SIGNIFICANT CHANGE UP (ref 2–14)
NEUTROPHILS # BLD AUTO: 7.15 K/UL — SIGNIFICANT CHANGE UP (ref 1.8–7.4)
NEUTROPHILS NFR BLD AUTO: 82.7 % — HIGH (ref 43–77)
NRBC # BLD: 0 /100 WBCS — SIGNIFICANT CHANGE UP (ref 0–0)
PHOSPHATE SERPL-MCNC: 2.5 MG/DL — SIGNIFICANT CHANGE UP (ref 2.5–4.5)
PLATELET # BLD AUTO: 489 K/UL — HIGH (ref 150–400)
POTASSIUM SERPL-MCNC: 3.7 MMOL/L — SIGNIFICANT CHANGE UP (ref 3.5–5.3)
POTASSIUM SERPL-SCNC: 3.7 MMOL/L — SIGNIFICANT CHANGE UP (ref 3.5–5.3)
PROT SERPL-MCNC: 5.5 G/DL — LOW (ref 6–8.3)
RBC # BLD: 2.97 M/UL — LOW (ref 3.8–5.2)
RBC # FLD: 16.7 % — HIGH (ref 10.3–14.5)
SODIUM SERPL-SCNC: 131 MMOL/L — LOW (ref 135–145)
SPECIMEN SOURCE: SIGNIFICANT CHANGE UP
SPECIMEN SOURCE: SIGNIFICANT CHANGE UP
WBC # BLD: 8.65 K/UL — SIGNIFICANT CHANGE UP (ref 3.8–10.5)
WBC # FLD AUTO: 8.65 K/UL — SIGNIFICANT CHANGE UP (ref 3.8–10.5)

## 2021-02-08 PROCEDURE — 74018 RADEX ABDOMEN 1 VIEW: CPT | Mod: 26

## 2021-02-08 RX ORDER — MAGNESIUM SULFATE 500 MG/ML
2 VIAL (ML) INJECTION ONCE
Refills: 0 | Status: COMPLETED | OUTPATIENT
Start: 2021-02-08 | End: 2021-02-08

## 2021-02-08 RX ADMIN — Medication 650 MILLIGRAM(S): at 05:19

## 2021-02-08 RX ADMIN — CEFEPIME 100 MILLIGRAM(S): 1 INJECTION, POWDER, FOR SOLUTION INTRAMUSCULAR; INTRAVENOUS at 22:44

## 2021-02-08 RX ADMIN — LIDOCAINE 1 PATCH: 4 CREAM TOPICAL at 18:16

## 2021-02-08 RX ADMIN — Medication 50 GRAM(S): at 09:04

## 2021-02-08 RX ADMIN — SODIUM CHLORIDE 80 MILLILITER(S): 9 INJECTION, SOLUTION INTRAVENOUS at 05:12

## 2021-02-08 RX ADMIN — LIDOCAINE 1 PATCH: 4 CREAM TOPICAL at 10:57

## 2021-02-08 RX ADMIN — MEGESTROL ACETATE 40 MILLIGRAM(S): 40 SUSPENSION ORAL at 05:19

## 2021-02-08 RX ADMIN — SENNA PLUS 2 TABLET(S): 8.6 TABLET ORAL at 22:45

## 2021-02-08 RX ADMIN — ENOXAPARIN SODIUM 30 MILLIGRAM(S): 100 INJECTION SUBCUTANEOUS at 22:45

## 2021-02-08 RX ADMIN — Medication 325 MILLIGRAM(S): at 10:57

## 2021-02-08 RX ADMIN — AZITHROMYCIN 250 MILLIGRAM(S): 500 TABLET, FILM COATED ORAL at 22:45

## 2021-02-08 RX ADMIN — MEGESTROL ACETATE 40 MILLIGRAM(S): 40 SUSPENSION ORAL at 22:45

## 2021-02-08 RX ADMIN — PANTOPRAZOLE SODIUM 40 MILLIGRAM(S): 20 TABLET, DELAYED RELEASE ORAL at 05:18

## 2021-02-08 RX ADMIN — MEGESTROL ACETATE 40 MILLIGRAM(S): 40 SUSPENSION ORAL at 13:30

## 2021-02-08 RX ADMIN — LIDOCAINE 1 PATCH: 4 CREAM TOPICAL at 22:50

## 2021-02-08 RX ADMIN — CEFEPIME 100 MILLIGRAM(S): 1 INJECTION, POWDER, FOR SOLUTION INTRAMUSCULAR; INTRAVENOUS at 13:30

## 2021-02-08 RX ADMIN — Medication 1 MILLIGRAM(S): at 10:57

## 2021-02-08 RX ADMIN — PANTOPRAZOLE SODIUM 40 MILLIGRAM(S): 20 TABLET, DELAYED RELEASE ORAL at 17:03

## 2021-02-08 RX ADMIN — POLYETHYLENE GLYCOL 3350 17 GRAM(S): 17 POWDER, FOR SOLUTION ORAL at 17:03

## 2021-02-08 RX ADMIN — CEFEPIME 100 MILLIGRAM(S): 1 INJECTION, POWDER, FOR SOLUTION INTRAMUSCULAR; INTRAVENOUS at 05:19

## 2021-02-08 RX ADMIN — Medication 1 TABLET(S): at 10:57

## 2021-02-08 NOTE — PROGRESS NOTE ADULT - SUBJECTIVE AND OBJECTIVE BOX
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      PAST MEDICAL & SURGICAL HISTORY:  Chronic obstructive pulmonary disease, unspecified COPD type  Hearing loss  left ear - sees Dr. Garces ENT  Basal cell carcinoma  x2 - upper lip and arm  Chronic obstructive pulmonary disease  Osteoporosis  H/O breast biopsy, 5 years ago, mass was benign and self-resolved  History of appendectomy  History of cataract surgery    SOCHX:   tobacco,  -  alcohol        FAMILY HISTORY: FA/MO  - contributory     ROS reviewed below with positive findings marked (+) :  GEN:  fever, chills ENT: tracheostomy,   epistaxis,  sinusitis COR: CAD, CHF,  HTN, dysrhythmia PUL: COPD, ILD, asthma, pneumonia GI: PEG, dysphagia, hemorrhage, other HALLIE: kidney disease, electrolyte disorder HEM:  anemia, thrombus, coagulopathy, cancer ENDO:  thyroid disease, diabetes mellitus CNS:  dementia, stroke, seizure, PSY:  depression, anxiety, other      MEDICATIONS  (STANDING):  azithromycin   Tablet 250 milliGRAM(s) Oral every 24 hours  cefepime   IVPB      cefepime   IVPB 1000 milliGRAM(s) IV Intermittent every 8 hours  dextrose 5% + sodium chloride 0.45%. 1000 milliLiter(s) (80 mL/Hr) IV Continuous <Continuous>  enoxaparin Injectable 30 milliGRAM(s) SubCutaneous every 24 hours  ferrous    sulfate 325 milliGRAM(s) Oral daily  folic acid 1 milliGRAM(s) Oral daily  influenza  Vaccine (HIGH DOSE) 0.7 milliLiter(s) IntraMuscular once  lidocaine   Patch 1 Patch Transdermal daily  megestrol 40 milliGRAM(s) Oral three times a day  multivitamin 1 Tablet(s) Oral daily  pantoprazole  Injectable 40 milliGRAM(s) IV Push every 12 hours  polyethylene glycol 3350 17 Gram(s) Oral every 12 hours  senna 2 Tablet(s) Oral at bedtime  sodium chloride 0.9%. 1000 milliLiter(s) (100 mL/Hr) IV Continuous <Continuous>    MEDICATIONS  (PRN):  acetaminophen   Tablet .. 650 milliGRAM(s) Oral every 8 hours PRN Temp greater or equal to 38C (100.4F), Mild Pain (1 - 3)  albuterol/ipratropium for Nebulization 3 milliLiter(s) Nebulizer every 6 hours PRN Shortness of Breath and/or Wheezing  bisacodyl Suppository 10 milliGRAM(s) Rectal daily PRN Constipation  ibuprofen  Tablet. 400 milliGRAM(s) Oral every 6 hours PRN Moderate Pain (4 - 6)      Vital Signs Last 24 Hrs  T(C): 38.3 (08 Feb 2021 05:26), Max: 38.3 (08 Feb 2021 05:26)  T(F): 101 (08 Feb 2021 05:26), Max: 101 (08 Feb 2021 05:26)  HR: 104 (08 Feb 2021 05:26) (99 - 108)  BP: 132/74 (08 Feb 2021 05:26) (98/65 - 141/74)  BP(mean): --  RR: 18 (08 Feb 2021 05:26) (18 - 18)  SpO2: 94% (08 Feb 2021 05:26) (94% - 96%)    GENERAL:         comfortable,  - distress.  HEENT:            - trauma,  - icterus,  - injection,  - nasal discharge.  NECK:              - jugular venous distention, - thyromegaly.  LYMPH:           - lymphadenopathy, - masses.  RESP:               - clear,   - rales,   - rhonchi,   - wheezes.   COR:                S1S2   - gallops,  - rubs.  ABD:                bowel sounds,   soft, - tender, - distended, - organomegaly.  EXT/MSC:         - cyanosis,  - clubbing,  edema.    NEURO:             alert                           7.6    8.65  )-----------( 489      ( 08 Feb 2021 07:32 )             24.7     02-08    131<L>  |  106  |  9   ----------------------------<  115<H>  3.7   |  16<L>  |  0.90    Ca    7.5<L>      08 Feb 2021 07:32  Phos  2.5     02-08  Mg     1.7     02-08    TPro  5.5<L>  /  Alb  1.9<L>  /  TBili  0.4  /  DBili  x   /  AST  19  /  ALT  17  /  AlkPhos  41  02-08    ASSESSMENT/PLAN    1)  2)  3)  4)    Oxygen as needed for a satisfactory SpO2  Bronchodilators:  Atrovent/ albuterol q 4 – 6 hours as needed  Corticosteroids:  ID/Antibiotics:  Cardiac/HTN:  GI: Rx/ prophylaxis c PPI/H2B  Heme: Rx/VT prophylaxis c SQH/SCD/AC  Discuss with medical team       NOTE IN PROGRESS- INCOMPLETE    CC/ HPI Patient is an 86 year old female with chronic obstructive pulmonary disease, CHRISTINE and 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      PAST MEDICAL & SURGICAL HISTORY:  Chronic obstructive pulmonary disease, unspecified COPD type  Hearing loss  left ear - sees Dr. Garces ENT  Basal cell carcinoma  x2 - upper lip and arm  Chronic obstructive pulmonary disease  Osteoporosis  H/O breast biopsy, 5 years ago, mass was benign and self-resolved  History of appendectomy  History of cataract surgery    SOCHX:   tobacco,  -  alcohol        FAMILY HISTORY: FA/MO  - contributory     ROS reviewed below with positive findings marked (+) :  GEN:  fever, chills ENT: tracheostomy,   epistaxis,  sinusitis COR: CAD, CHF,  HTN, dysrhythmia PUL: COPD, ILD, asthma, pneumonia GI: PEG, dysphagia, hemorrhage, other HALLIE: kidney disease, electrolyte disorder HEM:  anemia, thrombus, coagulopathy, cancer ENDO:  thyroid disease, diabetes mellitus CNS:  dementia, stroke, seizure, PSY:  depression, anxiety, other      MEDICATIONS  (STANDING):  azithromycin   Tablet 250 milliGRAM(s) Oral every 24 hours  cefepime   IVPB      cefepime   IVPB 1000 milliGRAM(s) IV Intermittent every 8 hours  dextrose 5% + sodium chloride 0.45%. 1000 milliLiter(s) (80 mL/Hr) IV Continuous <Continuous>  enoxaparin Injectable 30 milliGRAM(s) SubCutaneous every 24 hours  ferrous    sulfate 325 milliGRAM(s) Oral daily  folic acid 1 milliGRAM(s) Oral daily  influenza  Vaccine (HIGH DOSE) 0.7 milliLiter(s) IntraMuscular once  lidocaine   Patch 1 Patch Transdermal daily  megestrol 40 milliGRAM(s) Oral three times a day  multivitamin 1 Tablet(s) Oral daily  pantoprazole  Injectable 40 milliGRAM(s) IV Push every 12 hours  polyethylene glycol 3350 17 Gram(s) Oral every 12 hours  senna 2 Tablet(s) Oral at bedtime  sodium chloride 0.9%. 1000 milliLiter(s) (100 mL/Hr) IV Continuous <Continuous>    MEDICATIONS  (PRN):  acetaminophen   Tablet .. 650 milliGRAM(s) Oral every 8 hours PRN Temp greater or equal to 38C (100.4F), Mild Pain (1 - 3)  albuterol/ipratropium for Nebulization 3 milliLiter(s) Nebulizer every 6 hours PRN Shortness of Breath and/or Wheezing  bisacodyl Suppository 10 milliGRAM(s) Rectal daily PRN Constipation  ibuprofen  Tablet. 400 milliGRAM(s) Oral every 6 hours PRN Moderate Pain (4 - 6)      Vital Signs Last 24 Hrs  T(C): 38.3 (08 Feb 2021 05:26), Max: 38.3 (08 Feb 2021 05:26)  T(F): 101 (08 Feb 2021 05:26), Max: 101 (08 Feb 2021 05:26)  HR: 104 (08 Feb 2021 05:26) (99 - 108)  BP: 132/74 (08 Feb 2021 05:26) (98/65 - 141/74)  BP(mean): --  RR: 18 (08 Feb 2021 05:26) (18 - 18)  SpO2: 94% (08 Feb 2021 05:26) (94% - 96%)    GENERAL:         comfortable,  - distress.  HEENT:            - trauma,  - icterus,  - injection,  - nasal discharge.  NECK:              - jugular venous distention, - thyromegaly.  LYMPH:           - lymphadenopathy, - masses.  RESP:               - clear,   - rales,   - rhonchi,   - wheezes.   COR:                S1S2   - gallops,  - rubs.  ABD:                bowel sounds,   soft, - tender, - distended, - organomegaly.  EXT/MSC:         - cyanosis,  - clubbing,  edema.    NEURO:             alert                           7.6    8.65  )-----------( 489      ( 08 Feb 2021 07:32 )             24.7     02-08    131<L>  |  106  |  9   ----------------------------<  115<H>  3.7   |  16<L>  |  0.90    Ca    7.5<L>      08 Feb 2021 07:32  Phos  2.5     02-08  Mg     1.7     02-08    TPro  5.5<L>  /  Alb  1.9<L>  /  TBili  0.4  /  DBili  x   /  AST  19  /  ALT  17  /  AlkPhos  41  02-08    ASSESSMENT/PLAN    1)  2)  3)  4)    Oxygen as needed for a satisfactory SpO2  Bronchodilators:  Atrovent/ albuterol q 4 – 6 hours as needed  Corticosteroids:  ID/Antibiotics:  Cardiac/HTN:  GI: Rx/ prophylaxis c PPI/H2B  Heme: Rx/VT prophylaxis c SQH/SCD/AC  Discuss with medical team       CC/ HPI Patient is an 86 year old female with chronic obstructive pulmonary disease, lung cancer, poorly differentiated adenocarcinoma, status post radiation therapy, Dec 2020, admitted for severe anemia, upper GI bleed, RVP positive Chlamydia pneumonia, seen this morning admits to cough, no acute respiratory complaint.      PAST MEDICAL & SURGICAL HISTORY:  Chronic obstructive pulmonary disease, unspecified COPD type  Hearing loss  left ear - sees Dr. Garces ENT  Basal cell carcinoma  x2 - upper lip and arm  Osteoporosis  H/O breast biopsy, 5 years ago, mass was benign and self-resolved  History of appendectomy  History of cataract surgery    SOCHX:   tobacco,  -  alcohol      FAMILY HISTORY: FA/MO  - contributory     ROS reviewed below with positive findings marked (+) :  GEN:  fever, chills ENT: tracheostomy,   epistaxis,  sinusitis COR: CAD, CHF,  HTN, dysrhythmia PUL: +COPD, ILD, asthma, pneumonia GI: PEG, dysphagia, hemorrhage, other HALLIE: kidney disease, electrolyte disorder HEM:  anemia, thrombus, coagulopathy, cancer ENDO:  thyroid disease, diabetes mellitus CNS:  dementia, stroke, seizure, PSY:  depression, anxiety, other      MEDICATIONS  (STANDING):  azithromycin   Tablet 250 milliGRAM(s) Oral every 24 hours  cefepime   IVPB      cefepime   IVPB 1000 milliGRAM(s) IV Intermittent every 8 hours  dextrose 5% + sodium chloride 0.45%. 1000 milliLiter(s) (80 mL/Hr) IV Continuous <Continuous>  enoxaparin Injectable 30 milliGRAM(s) SubCutaneous every 24 hours  ferrous    sulfate 325 milliGRAM(s) Oral daily  folic acid 1 milliGRAM(s) Oral daily  influenza  Vaccine (HIGH DOSE) 0.7 milliLiter(s) IntraMuscular once  lidocaine   Patch 1 Patch Transdermal daily  megestrol 40 milliGRAM(s) Oral three times a day  multivitamin 1 Tablet(s) Oral daily  pantoprazole  Injectable 40 milliGRAM(s) IV Push every 12 hours  polyethylene glycol 3350 17 Gram(s) Oral every 12 hours  senna 2 Tablet(s) Oral at bedtime  sodium chloride 0.9%. 1000 milliLiter(s) (100 mL/Hr) IV Continuous <Continuous>    MEDICATIONS  (PRN):  acetaminophen   Tablet .. 650 milliGRAM(s) Oral every 8 hours PRN Temp greater or equal to 38C (100.4F), Mild Pain (1 - 3)  albuterol/ipratropium for Nebulization 3 milliLiter(s) Nebulizer every 6 hours PRN Shortness of Breath and/or Wheezing  bisacodyl Suppository 10 milliGRAM(s) Rectal daily PRN Constipation  ibuprofen  Tablet. 400 milliGRAM(s) Oral every 6 hours PRN Moderate Pain (4 - 6)      Vital Signs Last 24 Hrs  T(C): 38.3 (08 Feb 2021 05:26), Max: 38.3 (08 Feb 2021 05:26)  T(F): 101 (08 Feb 2021 05:26), Max: 101 (08 Feb 2021 05:26)  HR: 104 (08 Feb 2021 05:26) (99 - 108)  BP: 132/74 (08 Feb 2021 05:26) (98/65 - 141/74)  RR: 18 (08 Feb 2021 05:26) (18 - 18)  SpO2: 94% (08 Feb 2021 05:26) (94% - 96%)    GENERAL:         comfortable,  - distress.  HEENT:            - trauma,  - icterus,  - injection,  - nasal discharge.  NECK:              - jugular venous distention, - thyromegaly.  LYMPH:           - lymphadenopathy, - masses.  RESP:              + crackles,  - rhonchi,   - wheezes.   COR:                S1S2   - gallops,  - rubs.  ABD:                bowel sounds,   soft, - tender, - distended.  EXT/MSC:         - cyanosis,  - clubbing, - edema.    NEURO:           +  alert and responsive                          7.6    8.65  )-----------( 489      ( 08 Feb 2021 07:32 )             24.7     02-08    131<L>  |  106  |  9   ----------------------------<  115<H>  3.7   |  16<L>  |  0.90    Ca    7.5<L>      08 Feb 2021 07:32  Phos  2.5     02-08  Mg     1.7     02-08      X-ray Chest (02.06.21)  Bilateral infiltrates with progression comparison to prior examination of the chest 2/3/2021    CT Angio Chest PE Protocol w/ IV Cont (02.03.21)  Pulmonary arteries: No pulmonary embolus seen.  Lungs and large airways: Respiratory motion limits assessment of fine pulmonary parenchymal detail. Trace dependent secretions distal trachea. No nodular and no tracheal or endobronchial lesion.  Bilateral apical pleural parenchymal scarring. Unchanged mild peribronchial wall thickening and cylindrical bronchiectasis bilaterally. Scattered nodular groundglass opacities bilaterally, more confluent in right upper lobe posterior segment with apparent interlobular septal thickening. Mild compressive atelectasis right lower lobe. Few nodular consolidative opacities left lower lobe, subsegmental atelectasis versus mild consolidation. No measurable residual masses in right upper lobe anterior segment with linear opacity in this region probably representing scarring. New few scattered bilateral pulmonary nodules, for example:  *  Right upper lobe subpleural 0.5 cm (series 6 image 160).  *  Right upper lobe anterior segment 0.5 cm (series 6 image 132)  *  Left lower lobe 0.5 cm (series 6 image 204)  Pleura:  Resolved right pneumothorax. Decreased trace residual right pleural effusion.  Mediastinum and hilar regions: Significantly decreased mediastinal and hilar adenopathy, for example right lower paratracheal 1.1 x 0.8 cm, previously 4.4 x 2.9 cm. No significant change mildly prominent subcarinal node 1.2 cm short axis. Resolved right lower cervical adenopathy.  Heart and pericardium:  Heart size is normal. No pericardial effusion.  Vessels:  No aneurysm. Mild scattered calcific atherosclerosis.  Chest wall and lower neck:  Normal.  Upper abdomen: Unchanged few left renal cysts, largest 3.5 cm.  Bones: No suspicious lytic or blastic lesion. Mild degenerative changes thoracic spine with prominent degenerative Schmorl's node inferior endplate T9 vertebral body.    IMPRESSION:  1.  No pulmonary embolus.  2.  New few small scattered nodular opacities bilaterally with more confluent groundglass density and suspected interlobular septal thickening in posterior right upper lobe. Differential includes aspiration/infection versus lymphangitic carcinomatosis. Assessment limited by artifact from respiratory motion. Recommend clinical correlation and short-term follow-up would be helpful.  .  No measurable residual right upper lobe mass with scarring in this region.  4.  Significantly decreased thoracic adenopathy.  5.  Decreased trace residual right pleural effusion.  6.  Resolved right pneumothorax.            ASSESSMENT/PLAN    1)  Chlamydia pneumonia,  2)  Chronic obstructive pulmonary disease  3)  Bronchiectasis  4)  Lung cancer    Oxygen as needed for a satisfactory SpO2  Bronchodilators:  Atrovent/ albuterol q 4 – 6 hours as needed  ID/Antibiotics: Zithromax, cefepime  Cardiac/HTN: hemodynamically stable  GI: Rx/ prophylaxis c PPI/H2B  Heme: Rx/VT prophylaxis   Discussed with the medical team, Dr. Jones.

## 2021-02-08 NOTE — PROGRESS NOTE ADULT - SUBJECTIVE AND OBJECTIVE BOX
the patient apparently has chlamydia lobe pneumonia.  This explains the interstitial infiltrates throughout both lungs.  The patient is on appropriate antimicrobials for this but still remains febrile.    Physical Exam  Skin: Normal turgor. No rash noted. No lesions.   Eyes: Eyes with PERRLA. EOM's in­tact Sclera clear,no jaundice noted. Conjunctiva clear.   Ears, Nose,Mouth & Throat: Teeth normal, no missing teeth or dentures present. No ulcers. No thrush Auditory canals normal.  Description: %% Oropharynx without lesions. Mucositis None   Neck: No thyromegaly. No lymphadenopathy noted.   Cardiovascular: S1, S2 without murmurs. No Extrasystole. Tachycardia. No breast masses appreciated. No axillary lymphadenopathy.  No skin changes. No nipple discharge present. No dimpling noted.  Chest/Respiratory: No dyspnea. No rhonchi. No wheezing. No decreased breath sounds at bases. No rales.  The breath sounds are somewhat coarse.    The patient has difficulty getting secretions up. Unlabored breathing. No  accessory muscle use. Abdomen is soft and non­tender to touch. No hepatomegaly noted. No splenomegaly noted. No abdominal pain or  guarding noted. No abdominal mass(es) appreciated. No ascites noted.   Genito­Urinary: Deferred.  Hematologic/Lymphatic: No petechiae noted. No purpura noted. No lymphadenopathy noted.   Musculoskeletal: No Kyphosis. No weakness. No spinal tenderness on percussion. No pain on hip rotation reported by patient. Normal  range of motion in upper and lower extremities.

## 2021-02-08 NOTE — CHART NOTE - NSCHARTNOTEFT_GEN_A_CORE
Admitting Diagnosis:   Patient is a 86y old  Female who presents with a chief complaint of Anemia, GI Bleed (08 Feb 2021 12:51)    PAST MEDICAL & SURGICAL HISTORY:  Chronic obstructive pulmonary disease, unspecified COPD type    Hearing loss  left ear - sees Dr. Garces ENT    Basal cell carcinoma  x2 - upper lip and arm    Chronic obstructive pulmonary disease    Osteoporosis    H/O breast biopsy  &gt; 5 years ago, mass was benign and self-resolved    History of appendectomy    History of cataract surgery    Current Nutrition Order: Mechanical Soft Diet    PO Intake: Good (%) [   ]  Fair (50-75%) [   ] Poor (<25%) [ X  ]  Per RN, pt noted with poor appetite and not liking the food.     GI Issues:   Pt denies N/V/C; per RM, multiple BMs noted overnight; ordered for Dulcolax, senna, Miralax    Pain:  Pt denies pain at this time; noting she is tired/sleepy; ordered for  Tylenol, Motrin, lidocaine patch    Skin Integrity:  Skin noted intact  Dioni Score: 20    Labs:   02-08    131<L>  |  106  |  9   ----------------------------<  115<H>  3.7   |  16<L>  |  0.90    Ca    7.5<L>      08 Feb 2021 07:32  Phos  2.5     02-08  Mg     1.7     02-08    TPro  5.5<L>  /  Alb  1.9<L>  /  TBili  0.4  /  DBili  x   /  AST  19  /  ALT  17  /  AlkPhos  41  02-08      Medications:  MEDICATIONS  (STANDING):  azithromycin   Tablet 250 milliGRAM(s) Oral every 24 hours  cefepime   IVPB      cefepime   IVPB 1000 milliGRAM(s) IV Intermittent every 8 hours  dextrose 5% + sodium chloride 0.45%. 1000 milliLiter(s) (80 mL/Hr) IV Continuous <Continuous>  enoxaparin Injectable 30 milliGRAM(s) SubCutaneous every 24 hours  ferrous    sulfate 325 milliGRAM(s) Oral daily  folic acid 1 milliGRAM(s) Oral daily  influenza  Vaccine (HIGH DOSE) 0.7 milliLiter(s) IntraMuscular once  lidocaine   Patch 1 Patch Transdermal daily  megestrol 40 milliGRAM(s) Oral three times a day  multivitamin 1 Tablet(s) Oral daily  pantoprazole  Injectable 40 milliGRAM(s) IV Push every 12 hours  polyethylene glycol 3350 17 Gram(s) Oral every 12 hours  senna 2 Tablet(s) Oral at bedtime  sodium chloride 0.9%. 1000 milliLiter(s) (100 mL/Hr) IV Continuous <Continuous>    MEDICATIONS  (PRN):  acetaminophen   Tablet .. 650 milliGRAM(s) Oral every 8 hours PRN Temp greater or equal to 38C (100.4F), Mild Pain (1 - 3)  albuterol/ipratropium for Nebulization 3 milliLiter(s) Nebulizer every 6 hours PRN Shortness of Breath and/or Wheezing  bisacodyl Suppository 10 milliGRAM(s) Rectal daily PRN Constipation  ibuprofen  Tablet. 400 milliGRAM(s) Oral every 6 hours PRN Moderate Pain (4 - 6)      Admitting Anthropometrics:  Height:60" Weight: 40.8kg, BMI: 16.4kg/m2, IBW:45.3 kg+/-10%, %IBW:90 %   2/3 - 90.1lbs  Weight Change: No new wts noted. Please obtain new wts for further assessment.    Estimated energy needs:   ABW(40.8kg) used for calculations as pt between % of IBW (90%), adjusted for PCM, age.   kcal needs: 1224-1428kcals (30-35kcal/kg)  protein: 48-57gm (1.2-1.4 gm/kg)  Fluids: 1224-1428mL/day (30-35mL/kg)    Subjective:   86 year old female with PMHx of chronic obstructive pulmonary disease, lung cancer, poorly differentiated adenocarcinoma, status post radiation therapy, Dec 2020, admitted for severe anemia, upper GI bleed, RVP positive Chlamydia pneumonia. On 2/5: pt reported constipation X1 wk and generalized abd for 2 days; abd xray showing moderately distended loops of bowel, started on bowel regimen. Diet advanced to mechanical soft on 2/6. Overnight on 2/6, pt noted to be febrile to 102.5 rectal. UA, ucx, bc x2, CXR ordered. On 2/6: UA neg, CXR w/ Bilateral infiltrates worsen from 2/3. legionella negative.     Visited pt in room, sleeping but rousable to name. Pt is currently ordered for [facility] soft diet. Per RN, noting poor intake with pt indicating dislike for the food. Pt notes consuming only a banana this morning (<25% of breakfast). Pt stated that she had requested chicken noodle soup + avocado toast but did not receive it. Pt denies any difficulty chewing/swallowing/N/V at this time. Per RN, multiple BMs noted overnight with concern for impaction. Encouraged PO intake with pt. Pt appeared receptive. Offered ONS, pt declined. Notable labs: Na: 131 (L - trending down), Albumin: 1.7 (L trending down), Glucose: 115. Please see below for full nutritional recommendations- d/w team. RD to monitor and f/u per protocol.     Previous Nutrition Diagnosis:  Malnutrition Suspect severe PCM RT PO intake not meeting increased EER needs 2/2 chronic illness NFPE findings.   Active [ X ]  Resolved [   ]    Goal: Meet >75% EER, show no further s/s PCM.    Recommendations:  1. Recommend diet consistency recs per SLP eval.  2. Monitor %PO intake/GI distress  >> Appreciate assistance prn.  3. Continue with micronutrient supplementation (MVI, folic acid, Ferrous sulphate)  4. Monitor  lytes and replete prn. POCT q6hrs, BMP.  5. Trend weights weekly.    Education: Encouraged PO intake. Pt appeared receptive.    Risk Level: High [X ] Moderate [   ] Low [   ]

## 2021-02-08 NOTE — PROGRESS NOTE ADULT - ASSESSMENT
The patient has difficulty in getting some of her secretions up and probably would benefit from an lesion therapy.   I have alerted Pulmonary Medicine to this event.

## 2021-02-08 NOTE — PROGRESS NOTE ADULT - ASSESSMENT
antibiotics per ID  ? etio of persistent low abdominal pain despite catheter  CT did not show any pathology on admit (may need to repeat study)  Rxs per Dr Jones

## 2021-02-08 NOTE — PROGRESS NOTE ADULT - SUBJECTIVE AND OBJECTIVE BOX
Pt seen and examined   still with low abdominal pain  Tmax 101      REVIEW OF SYSTEMS:  Constitutional: + fever, + weight loss or fatigue  Cardiovascular: No chest pain, palpitations, dizziness or leg swelling  Gastrointestinal: low abdominal pain. No nausea, no vomiting ; No diarrhea   Skin: No itching, burning, rashes or lesions       MEDICATIONS:  MEDICATIONS  (STANDING):  azithromycin   Tablet 250 milliGRAM(s) Oral every 24 hours  cefepime   IVPB      cefepime   IVPB 1000 milliGRAM(s) IV Intermittent every 8 hours  dextrose 5% + sodium chloride 0.45%. 1000 milliLiter(s) (80 mL/Hr) IV Continuous <Continuous>  enoxaparin Injectable 30 milliGRAM(s) SubCutaneous every 24 hours  ferrous    sulfate 325 milliGRAM(s) Oral daily  folic acid 1 milliGRAM(s) Oral daily  influenza  Vaccine (HIGH DOSE) 0.7 milliLiter(s) IntraMuscular once  lidocaine   Patch 1 Patch Transdermal daily  megestrol 40 milliGRAM(s) Oral three times a day  multivitamin 1 Tablet(s) Oral daily  pantoprazole  Injectable 40 milliGRAM(s) IV Push every 12 hours  polyethylene glycol 3350 17 Gram(s) Oral every 12 hours  senna 2 Tablet(s) Oral at bedtime  sodium chloride 0.9%. 1000 milliLiter(s) (100 mL/Hr) IV Continuous <Continuous>    MEDICATIONS  (PRN):  acetaminophen   Tablet .. 650 milliGRAM(s) Oral every 8 hours PRN Temp greater or equal to 38C (100.4F), Mild Pain (1 - 3)  albuterol/ipratropium for Nebulization 3 milliLiter(s) Nebulizer every 6 hours PRN Shortness of Breath and/or Wheezing  bisacodyl Suppository 10 milliGRAM(s) Rectal daily PRN Constipation  ibuprofen  Tablet. 400 milliGRAM(s) Oral every 6 hours PRN Moderate Pain (4 - 6)      Allergies    No Known Allergies    Intolerances        Vital Signs Last 24 Hrs  T(C): 36.5 (08 Feb 2021 11:33), Max: 38.3 (08 Feb 2021 05:26)  T(F): 97.7 (08 Feb 2021 11:33), Max: 101 (08 Feb 2021 05:26)  HR: 95 (08 Feb 2021 11:33) (95 - 104)  BP: 111/66 (08 Feb 2021 11:33) (111/66 - 141/74)  BP(mean): --  RR: 18 (08 Feb 2021 11:33) (18 - 18)  SpO2: 96% (08 Feb 2021 11:33) (94% - 96%)    02-07 @ 07:01  -  02-08 @ 07:00  --------------------------------------------------------  IN: 0 mL / OUT: 700 mL / NET: -700 mL        PHYSICAL EXAM:    General: emaciated; in no acute distress  HEENT: MMM, conjunctiva and sclera clear  Lungs: + rhonchi  Heart: regular  Gastrointestinal: Soft non-tender non-distended; Normal bowel sounds;   Skin: Warm and dry. No obvious rash  Ext: no edema    LABS:      CBC Full  -  ( 08 Feb 2021 07:32 )  WBC Count : 8.65 K/uL  RBC Count : 2.97 M/uL  Hemoglobin : 7.6 g/dL  Hematocrit : 24.7 %  Platelet Count - Automated : 489 K/uL  Mean Cell Volume : 83.2 fl  Mean Cell Hemoglobin : 25.6 pg  Mean Cell Hemoglobin Concentration : 30.8 gm/dL  Auto Neutrophil # : 7.15 K/uL  Auto Lymphocyte # : 0.38 K/uL  Auto Monocyte # : 0.83 K/uL  Auto Eosinophil # : 0.09 K/uL  Auto Basophil # : 0.07 K/uL  Auto Neutrophil % : 82.7 %  Auto Lymphocyte % : 4.4 %  Auto Monocyte % : 9.6 %  Auto Eosinophil % : 1.0 %  Auto Basophil % : 0.8 %    02-08    131<L>  |  106  |  9   ----------------------------<  115<H>  3.7   |  16<L>  |  0.90    Ca    7.5<L>      08 Feb 2021 07:32  Phos  2.5     02-08  Mg     1.7     02-08    TPro  5.5<L>  /  Alb  1.9<L>  /  TBili  0.4  /  DBili  x   /  AST  19  /  ALT  17  /  AlkPhos  41  02-08                      RADIOLOGY & ADDITIONAL STUDIES (The following images were personally reviewed):

## 2021-02-08 NOTE — PROGRESS NOTE ADULT - ASSESSMENT
Chlamydia pneumoniae pneumonia  In the context of COPD and lung CA  Fever - resolved  Abdominal pain of unclear etiology        RECOMMEND  Check amylase and lipase and image the abdomen if etiology pf pain remains unclear and pain persists   Continue Azithromycin PO with meals  Continue Cefepime

## 2021-02-08 NOTE — PROGRESS NOTE ADULT - SUBJECTIVE AND OBJECTIVE BOX
INTERVAL HPI/OVERNIGHT EVENTS:    ANTIBIOTICS/RELEVANT:    MEDICATIONS  (STANDING):  azithromycin   Tablet 250 milliGRAM(s) Oral every 24 hours  cefepime   IVPB      cefepime   IVPB 1000 milliGRAM(s) IV Intermittent every 8 hours  dextrose 5% + sodium chloride 0.45%. 1000 milliLiter(s) (80 mL/Hr) IV Continuous <Continuous>  enoxaparin Injectable 30 milliGRAM(s) SubCutaneous every 24 hours  ferrous    sulfate 325 milliGRAM(s) Oral daily  folic acid 1 milliGRAM(s) Oral daily  influenza  Vaccine (HIGH DOSE) 0.7 milliLiter(s) IntraMuscular once  lidocaine   Patch 1 Patch Transdermal daily  megestrol 40 milliGRAM(s) Oral three times a day  multivitamin 1 Tablet(s) Oral daily  pantoprazole  Injectable 40 milliGRAM(s) IV Push every 12 hours  polyethylene glycol 3350 17 Gram(s) Oral every 12 hours  senna 2 Tablet(s) Oral at bedtime  sodium chloride 0.9%. 1000 milliLiter(s) (100 mL/Hr) IV Continuous <Continuous>    MEDICATIONS  (PRN):  acetaminophen   Tablet .. 650 milliGRAM(s) Oral every 8 hours PRN Temp greater or equal to 38C (100.4F), Mild Pain (1 - 3)  albuterol/ipratropium for Nebulization 3 milliLiter(s) Nebulizer every 6 hours PRN Shortness of Breath and/or Wheezing  bisacodyl Suppository 10 milliGRAM(s) Rectal daily PRN Constipation  ibuprofen  Tablet. 400 milliGRAM(s) Oral every 6 hours PRN Moderate Pain (4 - 6)      Allergies    No Known Allergies    Intolerances        Vital Signs Last 24 Hrs  T(C): 36.5 (08 Feb 2021 11:33), Max: 38.3 (08 Feb 2021 05:26)  T(F): 97.7 (08 Feb 2021 11:33), Max: 101 (08 Feb 2021 05:26)  HR: 95 (08 Feb 2021 11:33) (95 - 104)  BP: 111/66 (08 Feb 2021 11:33) (111/66 - 141/74)  BP(mean): --  RR: 18 (08 Feb 2021 11:33) (18 - 18)  SpO2: 96% (08 Feb 2021 11:33) (94% - 96%)    PHYSICAL EXAM:      Constitutional:    Eyes:    ENMT:    Neck:    Breasts:    Back:    Respiratory:    Cardiovascular:    Gastrointestinal:    Genitourinary:    Rectal:    Extremities:    Vascular:    Neurological:    Skin:    Lymph Nodes:    Musculoskeletal:    Psychiatric:        LABS:                        7.6    8.65  )-----------( 489      ( 08 Feb 2021 07:32 )             24.7     02-08    131<L>  |  106  |  9   ----------------------------<  115<H>  3.7   |  16<L>  |  0.90    Ca    7.5<L>      08 Feb 2021 07:32  Phos  2.5     02-08  Mg     1.7     02-08    TPro  5.5<L>  /  Alb  1.9<L>  /  TBili  0.4  /  DBili  x   /  AST  19  /  ALT  17  /  AlkPhos  41  02-08          MICROBIOLOGY:    RADIOLOGY & ADDITIONAL STUDIES: INTERVAL HPI/OVERNIGHT EVENTS:    Overall improved but now c/o abdominal pain without N/V or diarrhea    MEDICATIONS  (STANDING):  azithromycin   Tablet 250 milliGRAM(s) Oral every 24 hours  cefepime   IVPB      cefepime   IVPB 1000 milliGRAM(s) IV Intermittent every 8 hours  dextrose 5% + sodium chloride 0.45%. 1000 milliLiter(s) (80 mL/Hr) IV Continuous <Continuous>  enoxaparin Injectable 30 milliGRAM(s) SubCutaneous every 24 hours  ferrous    sulfate 325 milliGRAM(s) Oral daily  folic acid 1 milliGRAM(s) Oral daily  influenza  Vaccine (HIGH DOSE) 0.7 milliLiter(s) IntraMuscular once  lidocaine   Patch 1 Patch Transdermal daily  megestrol 40 milliGRAM(s) Oral three times a day  multivitamin 1 Tablet(s) Oral daily  pantoprazole  Injectable 40 milliGRAM(s) IV Push every 12 hours  polyethylene glycol 3350 17 Gram(s) Oral every 12 hours  senna 2 Tablet(s) Oral at bedtime  sodium chloride 0.9%. 1000 milliLiter(s) (100 mL/Hr) IV Continuous <Continuous>    MEDICATIONS  (PRN):  acetaminophen   Tablet .. 650 milliGRAM(s) Oral every 8 hours PRN Temp greater or equal to 38C (100.4F), Mild Pain (1 - 3)  albuterol/ipratropium for Nebulization 3 milliLiter(s) Nebulizer every 6 hours PRN Shortness of Breath and/or Wheezing  bisacodyl Suppository 10 milliGRAM(s) Rectal daily PRN Constipation  ibuprofen  Tablet. 400 milliGRAM(s) Oral every 6 hours PRN Moderate Pain (4 - 6)      Allergies    No Known Allergies      EXAM  Vital Signs Last 24 Hrs  T(C): 36.5 (08 Feb 2021 11:33), Max: 38.3 (08 Feb 2021 05:26)  T(F): 97.7 (08 Feb 2021 11:33), Max: 101 (08 Feb 2021 05:26)  HR: 95 (08 Feb 2021 11:33) (95 - 104)  BP: 111/66 (08 Feb 2021 11:33) (111/66 - 141/74)  BP(mean): --  RR: 18 (08 Feb 2021 11:33) (18 - 18)  SpO2: 96% (08 Feb 2021 11:33) (94% - 96%)  On NC O2  In bed, awakens and responding appropriately  No rash  RR  Chest clear ant and lat  Abd soft, tender to palpation central abdomen  LE no edema    LABS:                        7.6    8.65  )-----------( 489      ( 08 Feb 2021 07:32 )             24.7     02-08    131<L>  |  106  |  9   ----------------------------<  115<H>  3.7   |  16<L>  |  0.90    Ca    7.5<L>      08 Feb 2021 07:32  Phos  2.5     02-08  Mg     1.7     02-08    TPro  5.5<L>  /  Alb  1.9<L>  /  TBili  0.4  /  DBili  x   /  AST  19  /  ALT  17  /  AlkPhos  41  02-08          MICROBIOLOGY:    Respiratory Viral Panel with COVID-19 by JOSE (02.06.21 @ 13:43)    Rapid RVP Result: Detected    SARS-CoV-2: NotDetec: This Respiratory Panel uses polymerase chain reaction (PCR) to detect for  adenovirus; coronavirus (HKU1, NL63, 229E, OC43); human metapneumovirus  (hMPV); human enterovirus/rhinovirus (Entero/RV); influenza A; influenza  A/H1; influenza A/H3; influenza A/H1-2009; influenza B; parainfluenza  viruses 1, 2, 3, 4; respiratory syncytial virus; Mycoplasma pneumoniae;  Chlamydophila pneumoniae; and SARS-CoV-2.    Chlamydia pneumoniae (RapRVP): Detected    Culture - Urine (02.06.21 @ 08:56)    Specimen Source: .Urine Clean Catch (Midstream)    Culture Results:   No growth    Culture - Blood (02.06.21 @ 06:25)    Specimen Source: .Blood Blood-Venous    Culture Results:   No growth at 2 days.    Culture - Blood (02.06.21 @ 06:25)    Specimen Source: .Blood Blood-Venous    Culture Results:   No growth at 2 days.        RADIOLOGY & ADDITIONAL STUDIES:    Xray Chest 1 View- PORTABLE-Urgent (Xray Chest 1 View- PORTABLE-Urgent .) (02.06.21 @ 05:56) >    EXAM:  XR CHEST PORTABLE URGENT 1V                          PROCEDURE DATE:  02/06/2021          INTERPRETATION:  Clinical History: Fever    Frontal examination of the chest demonstrates the heart to be within normal limits in transverse diameter. Bilateral infiltrates. Left Port-A-Cath noted with tip overlying junction superior vena cava and right atrium.    Impression: Bilateral infiltrates with progression comparison to prior examination of the chest 2/3/2021

## 2021-02-09 LAB
ALBUMIN SERPL ELPH-MCNC: 1.9 G/DL — LOW (ref 3.3–5)
ALP SERPL-CCNC: 44 U/L — SIGNIFICANT CHANGE UP (ref 40–120)
ALT FLD-CCNC: 14 U/L — SIGNIFICANT CHANGE UP (ref 10–45)
AMYLASE P1 CFR SERPL: 103 U/L — SIGNIFICANT CHANGE UP (ref 25–125)
ANION GAP SERPL CALC-SCNC: 7 MMOL/L — SIGNIFICANT CHANGE UP (ref 5–17)
AST SERPL-CCNC: 18 U/L — SIGNIFICANT CHANGE UP (ref 10–40)
BASOPHILS # BLD AUTO: 0.1 K/UL — SIGNIFICANT CHANGE UP (ref 0–0.2)
BASOPHILS NFR BLD AUTO: 1.3 % — SIGNIFICANT CHANGE UP (ref 0–2)
BILIRUB SERPL-MCNC: 0.4 MG/DL — SIGNIFICANT CHANGE UP (ref 0.2–1.2)
BUN SERPL-MCNC: 12 MG/DL — SIGNIFICANT CHANGE UP (ref 7–23)
CALCIUM SERPL-MCNC: 7.5 MG/DL — LOW (ref 8.4–10.5)
CHLORIDE SERPL-SCNC: 102 MMOL/L — SIGNIFICANT CHANGE UP (ref 96–108)
CO2 SERPL-SCNC: 20 MMOL/L — LOW (ref 22–31)
CREAT SERPL-MCNC: 1.04 MG/DL — SIGNIFICANT CHANGE UP (ref 0.5–1.3)
EOSINOPHIL # BLD AUTO: 0.26 K/UL — SIGNIFICANT CHANGE UP (ref 0–0.5)
EOSINOPHIL NFR BLD AUTO: 3.4 % — SIGNIFICANT CHANGE UP (ref 0–6)
FUNGITELL: <31 PG/ML — SIGNIFICANT CHANGE UP
GLUCOSE SERPL-MCNC: 98 MG/DL — SIGNIFICANT CHANGE UP (ref 70–99)
HCT VFR BLD CALC: 25.7 % — LOW (ref 34.5–45)
HGB BLD-MCNC: 8 G/DL — LOW (ref 11.5–15.5)
IMM GRANULOCYTES NFR BLD AUTO: 1.6 % — HIGH (ref 0–1.5)
LIDOCAIN IGE QN: 20 U/L — SIGNIFICANT CHANGE UP (ref 7–60)
LYMPHOCYTES # BLD AUTO: 0.58 K/UL — LOW (ref 1–3.3)
LYMPHOCYTES # BLD AUTO: 7.7 % — LOW (ref 13–44)
MAGNESIUM SERPL-MCNC: 1.9 MG/DL — SIGNIFICANT CHANGE UP (ref 1.6–2.6)
MCHC RBC-ENTMCNC: 26.2 PG — LOW (ref 27–34)
MCHC RBC-ENTMCNC: 31.1 GM/DL — LOW (ref 32–36)
MCV RBC AUTO: 84.3 FL — SIGNIFICANT CHANGE UP (ref 80–100)
MONOCYTES # BLD AUTO: 1.15 K/UL — HIGH (ref 0–0.9)
MONOCYTES NFR BLD AUTO: 15.2 % — HIGH (ref 2–14)
NEUTROPHILS # BLD AUTO: 5.37 K/UL — SIGNIFICANT CHANGE UP (ref 1.8–7.4)
NEUTROPHILS NFR BLD AUTO: 70.8 % — SIGNIFICANT CHANGE UP (ref 43–77)
NRBC # BLD: 0 /100 WBCS — SIGNIFICANT CHANGE UP (ref 0–0)
PHOSPHATE SERPL-MCNC: 2.4 MG/DL — LOW (ref 2.5–4.5)
PLATELET # BLD AUTO: 533 K/UL — HIGH (ref 150–400)
POTASSIUM SERPL-MCNC: 4 MMOL/L — SIGNIFICANT CHANGE UP (ref 3.5–5.3)
POTASSIUM SERPL-SCNC: 4 MMOL/L — SIGNIFICANT CHANGE UP (ref 3.5–5.3)
PROT SERPL-MCNC: 5.9 G/DL — LOW (ref 6–8.3)
RBC # BLD: 3.05 M/UL — LOW (ref 3.8–5.2)
RBC # FLD: 16.6 % — HIGH (ref 10.3–14.5)
SODIUM SERPL-SCNC: 129 MMOL/L — LOW (ref 135–145)
WBC # BLD: 7.58 K/UL — SIGNIFICANT CHANGE UP (ref 3.8–10.5)
WBC # FLD AUTO: 7.58 K/UL — SIGNIFICANT CHANGE UP (ref 3.8–10.5)

## 2021-02-09 RX ADMIN — Medication 1 TABLET(S): at 11:45

## 2021-02-09 RX ADMIN — Medication 400 MILLIGRAM(S): at 21:56

## 2021-02-09 RX ADMIN — CEFEPIME 100 MILLIGRAM(S): 1 INJECTION, POWDER, FOR SOLUTION INTRAMUSCULAR; INTRAVENOUS at 14:23

## 2021-02-09 RX ADMIN — MEGESTROL ACETATE 40 MILLIGRAM(S): 40 SUSPENSION ORAL at 06:12

## 2021-02-09 RX ADMIN — Medication 325 MILLIGRAM(S): at 11:45

## 2021-02-09 RX ADMIN — CEFEPIME 100 MILLIGRAM(S): 1 INJECTION, POWDER, FOR SOLUTION INTRAMUSCULAR; INTRAVENOUS at 06:12

## 2021-02-09 RX ADMIN — LIDOCAINE 1 PATCH: 4 CREAM TOPICAL at 23:41

## 2021-02-09 RX ADMIN — SENNA PLUS 2 TABLET(S): 8.6 TABLET ORAL at 22:45

## 2021-02-09 RX ADMIN — Medication 1 MILLIGRAM(S): at 11:45

## 2021-02-09 RX ADMIN — PANTOPRAZOLE SODIUM 40 MILLIGRAM(S): 20 TABLET, DELAYED RELEASE ORAL at 17:38

## 2021-02-09 RX ADMIN — AZITHROMYCIN 250 MILLIGRAM(S): 500 TABLET, FILM COATED ORAL at 23:56

## 2021-02-09 RX ADMIN — CEFEPIME 100 MILLIGRAM(S): 1 INJECTION, POWDER, FOR SOLUTION INTRAMUSCULAR; INTRAVENOUS at 22:46

## 2021-02-09 RX ADMIN — MEGESTROL ACETATE 40 MILLIGRAM(S): 40 SUSPENSION ORAL at 14:23

## 2021-02-09 RX ADMIN — ENOXAPARIN SODIUM 30 MILLIGRAM(S): 100 INJECTION SUBCUTANEOUS at 22:45

## 2021-02-09 RX ADMIN — LIDOCAINE 1 PATCH: 4 CREAM TOPICAL at 11:46

## 2021-02-09 RX ADMIN — LIDOCAINE 1 PATCH: 4 CREAM TOPICAL at 18:47

## 2021-02-09 RX ADMIN — PANTOPRAZOLE SODIUM 40 MILLIGRAM(S): 20 TABLET, DELAYED RELEASE ORAL at 06:13

## 2021-02-09 RX ADMIN — MEGESTROL ACETATE 40 MILLIGRAM(S): 40 SUSPENSION ORAL at 22:46

## 2021-02-09 RX ADMIN — POLYETHYLENE GLYCOL 3350 17 GRAM(S): 17 POWDER, FOR SOLUTION ORAL at 17:38

## 2021-02-09 NOTE — PROGRESS NOTE ADULT - SUBJECTIVE AND OBJECTIVE BOX
Interventional, Pulmonary, Critical, Chest Special Procedures.    Pt was seen and fully examined by myself.     Time spent with patient in minutes:35    Patient is a 86y old  Female who presents with a chief complaint of Anemia, GI Bleed (09 Feb 2021 08:50) The patient refuses Alves removal, reporting overall malaise.    HPI:  86 F with PMHx COPD on no home O2, DNR/DNI, recently diagnosed lung adenocarcinoma November 2020 presented to ED with 2 weeks of black tarry stools, weakness, dyspnea on exertion, abdominal pain, poor PO tolerance, and dry cough. She has been getting chemotherapy every other week with last dose 1/25/21, she skipped Monday 2/1/21 because of the snow storm. She was recently seen on 1/29/21 by her PCP Dr. Alena Worthington who recommended that she come to the ED if her symptoms continued to worsen. She denies lightheadedness, syncope, visual changes, chest pain, hemoptysis, diarrhea, focal weakness/numbness, dysuria. She reports urinary incontinence over the last few weeks and constipation with no BM for 2-3 days.  She lives alone with a home health aide who was present on admission. Her aide works with her 8 hours/day 5 days/week and receives assistance primarily with IADLs but occasionally needs help with toileting.  Patient notably has active MOLST completed with PCP. Patient is DNR/DNI and does not want pressors, but would want noninvasive ventilation, blood transfusions, IV fluids, and IV antibiotics.    In the ED:  Vitals: T 98.3->99.3, , /80->97/57, RR 28->20, SpO2 97% RA  Labs sig for: WBC 10.52, Hgb 7.6, plt 581, INR 1.29, cr 1.41 (baseline 1), albumin 2.8, AST/ALT 61/51, Lactate 2.1, VBG Ph 7.46/PCO2 34  EKG: sinus tachy  Imaging:    CXR: no acute infiltrate or consolidation    CTA: No pulmonary embolus. New few small scattered nodular opacities bilaterally with more confluent groundglass density and suspected interlobular septal thickening in posterior right upper lobe.  Medication: 500cc NSx2 (03 Feb 2021 16:24)      REVIEW OF SYSTEMS:  Constitutional: No fever, +weight loss, profound  fatigue  Eyes: No eye pain, visual disturbances, or discharge  ENMT:  + difficulty hearing, tinnitus, vertigo; No sinus or throat pain. No epistaxis, +dysphagia, dysphonia, hoarseness no  odynophagia  Neck: No pain, stiffness or neck swelling.  No masses or deformities  Respiratory: +cough, no wheezing, hemoptysis  + COPD  - ILD   - PE   - ASTHMA     - PNEUMONIA  Cardiovascular: No chest pain, dysrhythmia, palpitations, dizziness or edema - CAD   - CHF   - HTN  Gastrointestinal: + abdominal or epigastric pain. No nausea, vomiting or hematemesis; No diarrhea or constipation. + melena no Icterus.          Genitourinary: No dysuria, frequency, hematuria + incontinence   - CKD/RIDDHI      - ESRD  Neurological: No headaches, memory loss, loss of strength, numbness or tremors      -DEMENTIA     - STROKE    - SEIZURE  Skin: No itching, burning, rashes or lesions   Lymph Nodes: No enlarged glands  Endocrine: No heat or cold intolerance; No hair loss       - DM     - THYROID DISORDER  Musculoskeletal: No joint pain or swelling; + muscle, +back or extremity pain, No edema  Psychiatric: No depression, anxiety, mood swings or difficulty sleeping  Heme/Lymph: No easy bruising or bleeding gums         + ANEMIA      + CANCER   -COAGULOPATHY  PAST MEDICAL & SURGICAL HISTORY:  Chronic obstructive pulmonary disease, unspecified COPD type    Hearing loss  left ear - sees Dr. Garces ENT    Basal cell carcinoma  x2 - upper lip and arm    Chronic obstructive pulmonary disease    Osteoporosis  Osteoporosis    H/O breast biopsy  &gt; 5 years ago, mass was benign and self-resolved    History of appendectomy    History of cataract surgery      FAMILY HISTORY:    SOCIAL HISTORY:      - Tobacco     - ETOH    Allergies    No Known Allergies    Intolerances      Vital Signs Last 24 Hrs  T(C): 36.6 (09 Feb 2021 11:49), Max: 36.9 (08 Feb 2021 16:52)  T(F): 97.8 (09 Feb 2021 11:49), Max: 98.5 (08 Feb 2021 16:52)  HR: 91 (09 Feb 2021 11:49) (91 - 104)  BP: 102/64 (09 Feb 2021 11:49) (102/64 - 123/64)  BP(mean): --  RR: 18 (09 Feb 2021 11:49) (17 - 18)  SpO2: 94% (09 Feb 2021 11:49) (93% - 96%)        MEDICATIONS:  MEDICATIONS  (STANDING):  azithromycin   Tablet 250 milliGRAM(s) Oral every 24 hours  cefepime   IVPB      cefepime   IVPB 1000 milliGRAM(s) IV Intermittent every 8 hours  enoxaparin Injectable 30 milliGRAM(s) SubCutaneous every 24 hours  ferrous    sulfate 325 milliGRAM(s) Oral daily  folic acid 1 milliGRAM(s) Oral daily  influenza  Vaccine (HIGH DOSE) 0.7 milliLiter(s) IntraMuscular once  lidocaine   Patch 1 Patch Transdermal daily  megestrol 40 milliGRAM(s) Oral three times a day  multivitamin 1 Tablet(s) Oral daily  pantoprazole  Injectable 40 milliGRAM(s) IV Push every 12 hours  polyethylene glycol 3350 17 Gram(s) Oral every 12 hours  senna 2 Tablet(s) Oral at bedtime    MEDICATIONS  (PRN):  acetaminophen   Tablet .. 650 milliGRAM(s) Oral every 8 hours PRN Temp greater or equal to 38C (100.4F), Mild Pain (1 - 3)  albuterol/ipratropium for Nebulization 3 milliLiter(s) Nebulizer every 6 hours PRN Shortness of Breath and/or Wheezing  bisacodyl Suppository 10 milliGRAM(s) Rectal daily PRN Constipation  ibuprofen  Tablet. 400 milliGRAM(s) Oral every 6 hours PRN Moderate Pain (4 - 6)      PHYSICAL EXAM:  Un Comfortable, no immediate distress  Eyes: PERRL, EOM intact; conjunctiva and sclera clear  Head: Normocephalic;  No Trauma  ENMT: No nasal discharge, +hoarseness, cough + hemoptysis  Neck: Supple; non tender; no masses or deformities.    No JVD  Respiratory:  - WHEEZING   + scattered  RHONCHI  - RALES  - CRACKLES.  Diminished breath sounds  BILATERAL  RIGHT  LEFT bases   Cardiovascular: Regular rate and rhythm. S1 and S2 Normal; No murmurs, gallops or rubs     - PPM/AICD  Gastrointestinal: Soft mildly tender, non-distended; Normal bowel sounds; No hepatosplenomegaly.     -PEG    -  GT   + ALVES  Genitourinary: No costovertebral angle tenderness. No dysuria  Extremities: AROM, No clubbing, cyanosis or edema    Vascular: Peripheral pulses palpable 2+ bilaterally  Neurological: Alert and responisve to stimuli   Skin: Warm and dry. No obvious rash  Lymph Nodes: No acute cervical or supraclavicular adenopathy  Psychiatric: Cooperative and appropriate mood    DEVICES:  - DENTURES   +IV R / L     - ETUBE   -TRACH   -CTUBE  R / L    LABS:                          8.0    7.58  )-----------( 533      ( 09 Feb 2021 07:38 )             25.7     02-09    129<L>  |  102  |  12  ----------------------------<  98  4.0   |  20<L>  |  1.04    Ca    7.5<L>      09 Feb 2021 07:38  Phos  2.4     02-09  Mg     1.9     02-09    TPro  5.9<L>  /  Alb  1.9<L>  /  TBili  0.4  /  DBili  x   /  AST  18  /  ALT  14  /  AlkPhos  44  02-09      RADIOLOGY & ADDITIONAL STUDIES (The following images were personally reviewed):

## 2021-02-09 NOTE — PROGRESS NOTE ADULT - PROBLEM SELECTOR PLAN 1
2 weeks black stools, abdominal pain, weakness, dyspnea found to have hemoglobin 7.6 from baseline 13.7. 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 s/p 1u pRBC in ED  - h/h stable (2/9)  - Protonix 40mg IV BID  - will advance diet to mechanical soft (2/5)

## 2021-02-09 NOTE — PROGRESS NOTE ADULT - SUBJECTIVE AND OBJECTIVE BOX
86 F with PMHx COPD on no home O2, DNR/DNI, recently diagnosed lung adenocarcinoma November 2020 presented to ED with 2 weeks of black tarry stools, weakness, dyspnea on exertion, abdominal pain, poor PO tolerance, and dry cough. She has been getting chemotherapy every other week with last dose 1/25/21, she skipped Monday 2/1/21 because of the snow storm. She was recently seen on 1/29/21 by her PCP Dr. Alena Worthington who recommended that she come to the ED if her symptoms continued to worsen. She denies lightheadedness, syncope, visual changes, chest pain, hemoptysis, diarrhea, focal weakness/numbness, dysuria. She reports urinary incontinence over the last few weeks and constipation with no BM for 2-3 days.  She lives alone with a home health aide who was present on admission. Her aide works with her 8 hours/day 5 days/week and receives assistance primarily with IADLs but occasionally needs help with toileting.  Patient notably has active MOLST completed with PCP. Patient is DNR/DNI and does not want pressors, but would want noninvasive ventilation, blood transfusions, IV fluids, and IV antibiotics.    In the ED:  Vitals: T 98.3->99.3, , /80->97/57, RR 28->20, SpO2 97% RA  Labs sig for: WBC 10.52, Hgb 7.6, plt 581, INR 1.29, cr 1.41 (baseline 1), albumin 2.8, AST/ALT 61/51, Lactate 2.1, VBG Ph 7.46/PCO2 34  EKG: sinus tachy  Imaging:    CXR: no acute infiltrate or consolidation    CTA: No pulmonary embolus. New few small scattered nodular opacities bilaterally with more confluent groundglass density and suspected interlobular septal thickening in posterior right upper lobe.  Medication: 500cc NSx2    Patient History:   Past Medical, Past Surgical, and Family History:  PAST MEDICAL HISTORY:  Basal cell carcinoma x2 - upper lip and arm    Chronic obstructive pulmonary disease     Chronic obstructive pulmonary disease, unspecified COPD type     Hearing loss left ear - sees Dr. Garces ENT    Osteoporosis Osteoporosis.     PAST SURGICAL HISTORY:  H/O breast biopsy > 5 years ago, mass was benign and self-resolved    History of appendectomy     History of cataract surgery.      	  MEDICATIONS:    azithromycin   Tablet 250 milliGRAM(s) Oral every 24 hours  cefepime   IVPB      cefepime   IVPB 1000 milliGRAM(s) IV Intermittent every 8 hours    albuterol/ipratropium for Nebulization 3 milliLiter(s) Nebulizer every 6 hours PRN    acetaminophen   Tablet .. 650 milliGRAM(s) Oral every 8 hours PRN  ibuprofen  Tablet. 400 milliGRAM(s) Oral every 6 hours PRN    bisacodyl Suppository 10 milliGRAM(s) Rectal daily PRN  pantoprazole  Injectable 40 milliGRAM(s) IV Push every 12 hours  polyethylene glycol 3350 17 Gram(s) Oral every 12 hours  senna 2 Tablet(s) Oral at bedtime    megestrol 40 milliGRAM(s) Oral three times a day    enoxaparin Injectable 30 milliGRAM(s) SubCutaneous every 24 hours  ferrous    sulfate 325 milliGRAM(s) Oral daily  folic acid 1 milliGRAM(s) Oral daily  influenza  Vaccine (HIGH DOSE) 0.7 milliLiter(s) IntraMuscular once  lidocaine   Patch 1 Patch Transdermal daily  multivitamin 1 Tablet(s) Oral daily      Complaint: Feels very cold    Otherwise 12 point review of systems is normal.    PHYSICAL EXAM:    Constitutional: NAD  Eyes: PERRL, EOMI, sclera non-icteric  Neck: supple, no masses, no JVD  Respiratory: Decresae BS,+ rhonchi,   Cardiovascular: RRR, normal S1S2, no M/R/G  Gastrointestinal: soft, NTND, no masses palpable, BS +   Extremities:  no c/c/e  Neurological: AAOx3      ICU Vital Signs Last 24 Hrs  T(C): 36.6 (09 Feb 2021 11:49), Max: 36.9 (08 Feb 2021 20:56)  T(F): 97.8 (09 Feb 2021 11:49), Max: 98.4 (08 Feb 2021 20:56)  HR: 91 (09 Feb 2021 11:49) (91 - 104)  BP: 102/64 (09 Feb 2021 11:49) (102/64 - 123/64)  BP(mean): --  ABP: --  ABP(mean): --  RR: 18 (09 Feb 2021 11:49) (17 - 18)  SpO2: 94% (09 Feb 2021 11:49) (93% - 96%)        LABS:	 	  CARDIAC MARKERS:                              8.0    7.58  )-----------( 533      ( 09 Feb 2021 07:38 )             25.7     02-09    129<L>  |  102  |  12  ----------------------------<  98  4.0   |  20<L>  |  1.04    Ca    7.5<L>      09 Feb 2021 07:38  Phos  2.4     02-09  Mg     1.9     02-09    TPro  5.9<L>  /  Alb  1.9<L>  /  TBili  0.4  /  DBili  x   /  AST  18  /  ALT  14  /  AlkPhos  44  02-09          ASSESSMENT/PLAN: 	    As per orders

## 2021-02-09 NOTE — PROGRESS NOTE ADULT - ASSESSMENT
The patient is improving.  The patient appears to be somewhat less depressed today although she is constantly cold.  She is covered with multiple blankets and it is the cold that keeps her from wanting to exit the bed.

## 2021-02-09 NOTE — PROGRESS NOTE ADULT - ASSESSMENT
ASSESSMENT/PLAN 86 y/o female pt c worsening SOB, RUL and Central mediastinal mass poorly differentiated carcinoma favored adenocarcinoma. admitted for severe anemia c/f upper GI bleed, possible aspiration pneumonia vs lymphangitic spread.    1. O2 2LNC humidified  2. Bronchodilators:  Atrovent/ albuterol q 4 – 6 hours as needed  3. Corticosteroids:  now  off  4. ID/Antibiotics:  continue ABX, Zithromax, cefepime follow sputum CX,  UCX, RVP, pt is COVID NEG. legionella NEG, request ID feedback  5. Cardiac/HTN: optimize BP  6. GI: Rx/ prophylaxis c PPI/H2B, GI   7. Heme: Rx/VT prophylaxis c SQH/SCD/ now on Enoxaparin follow on , PET CT  8. Aspiration precautions at all times  9. Mobilize, OOB  10. Use incentive spirometer  11. Nutritional Consult and support  12 Manage hypo Na  Discussed with managing team,

## 2021-02-09 NOTE — PROGRESS NOTE ADULT - ASSESSMENT
86 F with PMHx COPD, DNR/DNI, recently diagnosed adenocarcinoma s/p radiation ~12/2020 now receiving chemo every other week with Dr. Jones, admitted for anemia c/f upper GI bleed since resolved.

## 2021-02-09 NOTE — PROGRESS NOTE ADULT - SUBJECTIVE AND OBJECTIVE BOX
SUBJECTIVE/OVERNIGHT EVENTS: No acute overnight events. Pt seen in Am at bedside, resting in bed, and does not appear to be in acute distress. She reports persistent abdominal pain and poor appetite. She otherwise denies fever, chills, headache, changes in vision/hearing, sob, chest pain, extremity pain or swelling.    VITAL SIGNS:  Vital Signs Last 24 Hrs  T(C): 36.6 (09 Feb 2021 11:49), Max: 36.9 (08 Feb 2021 20:56)  T(F): 97.8 (09 Feb 2021 11:49), Max: 98.4 (08 Feb 2021 20:56)  HR: 91 (09 Feb 2021 11:49) (91 - 104)  BP: 102/64 (09 Feb 2021 11:49) (102/64 - 123/64)  BP(mean): --  RR: 18 (09 Feb 2021 11:49) (17 - 18)  SpO2: 94% (09 Feb 2021 11:49) (93% - 96%)    PHYSICAL EXAM:  Constitutional: WDWN resting in bed; in no acute distress but slightly uncomfortable appearing  HEENT: NCAT, EOMI, anicteric sclera, no nasal discharge; uvula midline, no oropharyngeal erythema or exudates; MMM  Respiratory: CTA B/L; no W/R/R, no retractions; breathing 24/min with abdominal recruitment on presentation  Cardiac: +S1/S2; RRR; no M/R/G  Gastrointestinal: abdomen soft, tender to light palpation evenly across epigastric, RUQ, RLQ, and suprapubic regions, no rebound  Extremities: WWP, no clubbing or cyanosis; no peripheral edema  Vascular: 2+ radial, femoral, DP/PT pulses B/L  Neurologic: AAOx3; CNII-XII grossly intact; moves extremities to command, 5/5 UE strength and can independently turn from back to side    MEDICATIONS:  MEDICATIONS  (STANDING):  azithromycin   Tablet 250 milliGRAM(s) Oral every 24 hours  cefepime   IVPB      cefepime   IVPB 1000 milliGRAM(s) IV Intermittent every 8 hours  enoxaparin Injectable 30 milliGRAM(s) SubCutaneous every 24 hours  ferrous    sulfate 325 milliGRAM(s) Oral daily  folic acid 1 milliGRAM(s) Oral daily  influenza  Vaccine (HIGH DOSE) 0.7 milliLiter(s) IntraMuscular once  lidocaine   Patch 1 Patch Transdermal daily  megestrol 40 milliGRAM(s) Oral three times a day  multivitamin 1 Tablet(s) Oral daily  pantoprazole  Injectable 40 milliGRAM(s) IV Push every 12 hours  polyethylene glycol 3350 17 Gram(s) Oral every 12 hours  senna 2 Tablet(s) Oral at bedtime    MEDICATIONS  (PRN):  acetaminophen   Tablet .. 650 milliGRAM(s) Oral every 8 hours PRN Temp greater or equal to 38C (100.4F), Mild Pain (1 - 3)  albuterol/ipratropium for Nebulization 3 milliLiter(s) Nebulizer every 6 hours PRN Shortness of Breath and/or Wheezing  bisacodyl Suppository 10 milliGRAM(s) Rectal daily PRN Constipation  ibuprofen  Tablet. 400 milliGRAM(s) Oral every 6 hours PRN Moderate Pain (4 - 6)      ALLERGIES:  Allergies    No Known Allergies    Intolerances        LABS:                        8.0    7.58  )-----------( 533      ( 09 Feb 2021 07:38 )             25.7     02-09    129<L>  |  102  |  12  ----------------------------<  98  4.0   |  20<L>  |  1.04    Ca    7.5<L>      09 Feb 2021 07:38  Phos  2.4     02-09  Mg     1.9     02-09    TPro  5.9<L>  /  Alb  1.9<L>  /  TBili  0.4  /  DBili  x   /  AST  18  /  ALT  14  /  AlkPhos  44  02-09        RADIOLOGY & ADDITIONAL TESTS: Reviewed.

## 2021-02-09 NOTE — PROGRESS NOTE ADULT - SUBJECTIVE AND OBJECTIVE BOX
the patient is more animated today and is actually anxious to get out of bed and to walk.  Appetite she states is returning.    Physical Exam  Skin: Normal turgor. No rash noted. No lesions.   Eyes: Eyes with PERRLA. EOM's in­tact Sclera clear,no jaundice noted. Conjunctiva clear.   Ears, Nose,Mouth & Throat: Teeth normal, no missing teeth or dentures present. No ulcers. No thrush Auditory canals normal.  Description: %% Oropharynx without lesions. Mucositis None   Neck: No thyromegaly. No lymphadenopathy noted.   Cardiovascular: S1, S2 without murmurs. No Extrasystole. Tachycardia. No breast masses appreciated. No axillary lymphadenopathy.  No skin changes. No nipple discharge present. No dimpling noted.  Chest/Respiratory: No dyspnea. No rhonchi. No wheezing. No decreased breath sounds at bases. No rales.  The breath sounds are somewhat coarse.    The patient has difficulty getting secretions up. Unlabored breathing. No  accessory muscle use. Abdomen is soft and non­tender to touch. No hepatomegaly noted. No splenomegaly noted. No abdominal pain or  guarding noted. No abdominal mass(es) appreciated. No ascites noted.   Genito­Urinary: Deferred.  Hematologic/Lymphatic: No petechiae noted. No purpura noted. No lymphadenopathy noted.   Musculoskeletal: No Kyphosis. No weakness. No spinal tenderness on percussion. No pain on hip rotation reported by patient. Normal  range of motion in upper and lower extremities.

## 2021-02-10 LAB
ALBUMIN SERPL ELPH-MCNC: 1.9 G/DL — LOW (ref 3.3–5)
ALP SERPL-CCNC: 45 U/L — SIGNIFICANT CHANGE UP (ref 40–120)
ALT FLD-CCNC: 26 U/L — SIGNIFICANT CHANGE UP (ref 10–45)
ANION GAP SERPL CALC-SCNC: 7 MMOL/L — SIGNIFICANT CHANGE UP (ref 5–17)
ANISOCYTOSIS BLD QL: SLIGHT — SIGNIFICANT CHANGE UP
AST SERPL-CCNC: 38 U/L — SIGNIFICANT CHANGE UP (ref 10–40)
BASOPHILS # BLD AUTO: 0.12 K/UL — SIGNIFICANT CHANGE UP (ref 0–0.2)
BASOPHILS NFR BLD AUTO: 1.7 % — SIGNIFICANT CHANGE UP (ref 0–2)
BILIRUB SERPL-MCNC: 0.4 MG/DL — SIGNIFICANT CHANGE UP (ref 0.2–1.2)
BUN SERPL-MCNC: 17 MG/DL — SIGNIFICANT CHANGE UP (ref 7–23)
BURR CELLS BLD QL SMEAR: PRESENT — SIGNIFICANT CHANGE UP
CALCIUM SERPL-MCNC: 8 MG/DL — LOW (ref 8.4–10.5)
CHLORIDE SERPL-SCNC: 109 MMOL/L — HIGH (ref 96–108)
CO2 SERPL-SCNC: 19 MMOL/L — LOW (ref 22–31)
CREAT SERPL-MCNC: 1.07 MG/DL — SIGNIFICANT CHANGE UP (ref 0.5–1.3)
DACRYOCYTES BLD QL SMEAR: SLIGHT — SIGNIFICANT CHANGE UP
EOSINOPHIL # BLD AUTO: 0.25 K/UL — SIGNIFICANT CHANGE UP (ref 0–0.5)
EOSINOPHIL NFR BLD AUTO: 3.5 % — SIGNIFICANT CHANGE UP (ref 0–6)
GIANT PLATELETS BLD QL SMEAR: PRESENT — SIGNIFICANT CHANGE UP
GLUCOSE SERPL-MCNC: 87 MG/DL — SIGNIFICANT CHANGE UP (ref 70–99)
HCT VFR BLD CALC: 26.3 % — LOW (ref 34.5–45)
HGB BLD-MCNC: 8.1 G/DL — LOW (ref 11.5–15.5)
HYPOCHROMIA BLD QL: SLIGHT — SIGNIFICANT CHANGE UP
LYMPHOCYTES # BLD AUTO: 0.31 K/UL — LOW (ref 1–3.3)
LYMPHOCYTES # BLD AUTO: 4.4 % — LOW (ref 13–44)
MACROCYTES BLD QL: SLIGHT — SIGNIFICANT CHANGE UP
MAGNESIUM SERPL-MCNC: 2.3 MG/DL — SIGNIFICANT CHANGE UP (ref 1.6–2.6)
MANUAL SMEAR VERIFICATION: SIGNIFICANT CHANGE UP
MCHC RBC-ENTMCNC: 26.8 PG — LOW (ref 27–34)
MCHC RBC-ENTMCNC: 30.8 GM/DL — LOW (ref 32–36)
MCV RBC AUTO: 87.1 FL — SIGNIFICANT CHANGE UP (ref 80–100)
METAMYELOCYTES # FLD: 0.9 % — HIGH (ref 0–0)
MONOCYTES # BLD AUTO: 0.93 K/UL — HIGH (ref 0–0.9)
MONOCYTES NFR BLD AUTO: 13.2 % — SIGNIFICANT CHANGE UP (ref 2–14)
NEUTROPHILS # BLD AUTO: 5.36 K/UL — SIGNIFICANT CHANGE UP (ref 1.8–7.4)
NEUTROPHILS NFR BLD AUTO: 76.3 % — SIGNIFICANT CHANGE UP (ref 43–77)
OVALOCYTES BLD QL SMEAR: SLIGHT — SIGNIFICANT CHANGE UP
PHOSPHATE SERPL-MCNC: 2.7 MG/DL — SIGNIFICANT CHANGE UP (ref 2.5–4.5)
PLAT MORPH BLD: NORMAL — SIGNIFICANT CHANGE UP
PLATELET # BLD AUTO: 478 K/UL — HIGH (ref 150–400)
POIKILOCYTOSIS BLD QL AUTO: SIGNIFICANT CHANGE UP
POLYCHROMASIA BLD QL SMEAR: SLIGHT — SIGNIFICANT CHANGE UP
POTASSIUM SERPL-MCNC: 4.7 MMOL/L — SIGNIFICANT CHANGE UP (ref 3.5–5.3)
POTASSIUM SERPL-SCNC: 4.7 MMOL/L — SIGNIFICANT CHANGE UP (ref 3.5–5.3)
PROCALCITONIN SERPL-MCNC: 0.3 NG/ML — HIGH (ref 0.02–0.1)
PROT SERPL-MCNC: 6.1 G/DL — SIGNIFICANT CHANGE UP (ref 6–8.3)
RBC # BLD: 3.02 M/UL — LOW (ref 3.8–5.2)
RBC # FLD: 16.4 % — HIGH (ref 10.3–14.5)
RBC BLD AUTO: ABNORMAL
SODIUM SERPL-SCNC: 135 MMOL/L — SIGNIFICANT CHANGE UP (ref 135–145)
SPHEROCYTES BLD QL SMEAR: SLIGHT — SIGNIFICANT CHANGE UP
WBC # BLD: 7.03 K/UL — SIGNIFICANT CHANGE UP (ref 3.8–10.5)
WBC # FLD AUTO: 7.03 K/UL — SIGNIFICANT CHANGE UP (ref 3.8–10.5)

## 2021-02-10 PROCEDURE — 74177 CT ABD & PELVIS W/CONTRAST: CPT | Mod: 26

## 2021-02-10 RX ORDER — LACTULOSE 10 G/15ML
10 SOLUTION ORAL ONCE
Refills: 0 | Status: COMPLETED | OUTPATIENT
Start: 2021-02-10 | End: 2021-02-10

## 2021-02-10 RX ADMIN — CEFEPIME 100 MILLIGRAM(S): 1 INJECTION, POWDER, FOR SOLUTION INTRAMUSCULAR; INTRAVENOUS at 13:11

## 2021-02-10 RX ADMIN — LIDOCAINE 1 PATCH: 4 CREAM TOPICAL at 18:47

## 2021-02-10 RX ADMIN — CEFEPIME 100 MILLIGRAM(S): 1 INJECTION, POWDER, FOR SOLUTION INTRAMUSCULAR; INTRAVENOUS at 06:08

## 2021-02-10 RX ADMIN — Medication 325 MILLIGRAM(S): at 11:37

## 2021-02-10 RX ADMIN — ENOXAPARIN SODIUM 30 MILLIGRAM(S): 100 INJECTION SUBCUTANEOUS at 22:40

## 2021-02-10 RX ADMIN — SENNA PLUS 2 TABLET(S): 8.6 TABLET ORAL at 22:40

## 2021-02-10 RX ADMIN — MEGESTROL ACETATE 40 MILLIGRAM(S): 40 SUSPENSION ORAL at 13:11

## 2021-02-10 RX ADMIN — LIDOCAINE 1 PATCH: 4 CREAM TOPICAL at 11:37

## 2021-02-10 RX ADMIN — PANTOPRAZOLE SODIUM 40 MILLIGRAM(S): 20 TABLET, DELAYED RELEASE ORAL at 06:07

## 2021-02-10 RX ADMIN — CEFEPIME 100 MILLIGRAM(S): 1 INJECTION, POWDER, FOR SOLUTION INTRAMUSCULAR; INTRAVENOUS at 22:45

## 2021-02-10 RX ADMIN — MEGESTROL ACETATE 40 MILLIGRAM(S): 40 SUSPENSION ORAL at 06:09

## 2021-02-10 RX ADMIN — Medication 1 MILLIGRAM(S): at 11:37

## 2021-02-10 RX ADMIN — Medication 1 TABLET(S): at 11:37

## 2021-02-10 RX ADMIN — PANTOPRAZOLE SODIUM 40 MILLIGRAM(S): 20 TABLET, DELAYED RELEASE ORAL at 18:47

## 2021-02-10 RX ADMIN — AZITHROMYCIN 250 MILLIGRAM(S): 500 TABLET, FILM COATED ORAL at 22:39

## 2021-02-10 RX ADMIN — POLYETHYLENE GLYCOL 3350 17 GRAM(S): 17 POWDER, FOR SOLUTION ORAL at 18:47

## 2021-02-10 RX ADMIN — MEGESTROL ACETATE 40 MILLIGRAM(S): 40 SUSPENSION ORAL at 22:45

## 2021-02-10 RX ADMIN — POLYETHYLENE GLYCOL 3350 17 GRAM(S): 17 POWDER, FOR SOLUTION ORAL at 06:08

## 2021-02-10 RX ADMIN — LACTULOSE 10 GRAM(S): 10 SOLUTION ORAL at 11:37

## 2021-02-10 NOTE — PROGRESS NOTE ADULT - ASSESSMENT
Pneumonitis  Positive for Chlamydia pneumoniae on RVP  Abdominal pain  patient immunocompromised - on chemotherapy for lung CA      RECOMMEND  Continue Cefepime 7-8 days  Continue Azithromycin for est. 10 days  Agree with plans for A/P CT

## 2021-02-10 NOTE — PROGRESS NOTE ADULT - SUBJECTIVE AND OBJECTIVE BOX
INTERVAL HPI/OVERNIGHT EVENTS:    ANTIBIOTICS/RELEVANT:    MEDICATIONS  (STANDING):  azithromycin   Tablet 250 milliGRAM(s) Oral every 24 hours  cefepime   IVPB      cefepime   IVPB 1000 milliGRAM(s) IV Intermittent every 8 hours  enoxaparin Injectable 30 milliGRAM(s) SubCutaneous every 24 hours  ferrous    sulfate 325 milliGRAM(s) Oral daily  folic acid 1 milliGRAM(s) Oral daily  influenza  Vaccine (HIGH DOSE) 0.7 milliLiter(s) IntraMuscular once  lidocaine   Patch 1 Patch Transdermal daily  megestrol 40 milliGRAM(s) Oral three times a day  multivitamin 1 Tablet(s) Oral daily  pantoprazole  Injectable 40 milliGRAM(s) IV Push every 12 hours  polyethylene glycol 3350 17 Gram(s) Oral every 12 hours  senna 2 Tablet(s) Oral at bedtime    MEDICATIONS  (PRN):  acetaminophen   Tablet .. 650 milliGRAM(s) Oral every 8 hours PRN Temp greater or equal to 38C (100.4F), Mild Pain (1 - 3)  albuterol/ipratropium for Nebulization 3 milliLiter(s) Nebulizer every 6 hours PRN Shortness of Breath and/or Wheezing  bisacodyl Suppository 10 milliGRAM(s) Rectal daily PRN Constipation  ibuprofen  Tablet. 400 milliGRAM(s) Oral every 6 hours PRN Moderate Pain (4 - 6)      Allergies    No Known Allergies    Intolerances        Vital Signs Last 24 Hrs  T(C): 36.8 (10 Feb 2021 12:02), Max: 36.9 (09 Feb 2021 21:15)  T(F): 98.3 (10 Feb 2021 12:02), Max: 98.4 (09 Feb 2021 21:15)  HR: 94 (10 Feb 2021 12:02) (74 - 94)  BP: 124/75 (10 Feb 2021 12:02) (106/52 - 124/75)  BP(mean): --  RR: 19 (10 Feb 2021 12:02) (19 - 20)  SpO2: 95% (10 Feb 2021 12:02) (94% - 95%)    PHYSICAL EXAM:      Constitutional:    Eyes:    ENMT:    Neck:    Breasts:    Back:    Respiratory:    Cardiovascular:    Gastrointestinal:    Genitourinary:    Rectal:    Extremities:    Vascular:    Neurological:    Skin:    Lymph Nodes:    Musculoskeletal:    Psychiatric:        LABS:                        8.1    7.03  )-----------( 478      ( 10 Feb 2021 06:45 )             26.3     02-10    135  |  109<H>  |  17  ----------------------------<  87  4.7   |  19<L>  |  1.07    Ca    8.0<L>      10 Feb 2021 06:45  Phos  2.7     02-10  Mg     2.3     02-10    TPro  6.1  /  Alb  1.9<L>  /  TBili  0.4  /  DBili  x   /  AST  38  /  ALT  26  /  AlkPhos  45  02-10          MICROBIOLOGY:    RADIOLOGY & ADDITIONAL STUDIES: INTERVAL HPI/OVERNIGHT EVENTS:    Again c/o abdominal pain  No diarrhea  No cough or respiratory difficulties    MEDICATIONS  (STANDING):  azithromycin   Tablet 250 milliGRAM(s) Oral every 24 hours  cefepime   IVPB      cefepime   IVPB 1000 milliGRAM(s) IV Intermittent every 8 hours  enoxaparin Injectable 30 milliGRAM(s) SubCutaneous every 24 hours  ferrous    sulfate 325 milliGRAM(s) Oral daily  folic acid 1 milliGRAM(s) Oral daily  influenza  Vaccine (HIGH DOSE) 0.7 milliLiter(s) IntraMuscular once  lidocaine   Patch 1 Patch Transdermal daily  megestrol 40 milliGRAM(s) Oral three times a day  multivitamin 1 Tablet(s) Oral daily  pantoprazole  Injectable 40 milliGRAM(s) IV Push every 12 hours  polyethylene glycol 3350 17 Gram(s) Oral every 12 hours  senna 2 Tablet(s) Oral at bedtime    MEDICATIONS  (PRN):  acetaminophen   Tablet .. 650 milliGRAM(s) Oral every 8 hours PRN Temp greater or equal to 38C (100.4F), Mild Pain (1 - 3)  albuterol/ipratropium for Nebulization 3 milliLiter(s) Nebulizer every 6 hours PRN Shortness of Breath and/or Wheezing  bisacodyl Suppository 10 milliGRAM(s) Rectal daily PRN Constipation  ibuprofen  Tablet. 400 milliGRAM(s) Oral every 6 hours PRN Moderate Pain (4 - 6)      Allergies    No Known Allergies    EXAM  Vital Signs Last 24 Hrs  T(C): 36.8 (10 Feb 2021 12:02), Max: 36.9 (09 Feb 2021 21:15)  T(F): 98.3 (10 Feb 2021 12:02), Max: 98.4 (09 Feb 2021 21:15)  HR: 94 (10 Feb 2021 12:02) (74 - 94)  BP: 124/75 (10 Feb 2021 12:02) (106/52 - 124/75)  BP(mean): --  RR: 19 (10 Feb 2021 12:02) (19 - 20)  SpO2: 95% (10 Feb 2021 12:02) (94% - 95%)  On RA without dyspnea  Awakens and responds to questions but irritable  Thin  No rash  RR  Chest clear  Abd soft but tender to palpation  LEs no edema    LABS:                        8.1    7.03  )-----------( 478      ( 10 Feb 2021 06:45 )             26.3     02-10    135  |  109<H>  |  17  ----------------------------<  87  4.7   |  19<L>  |  1.07    Ca    8.0<L>      10 Feb 2021 06:45  Phos  2.7     02-10  Mg     2.3     02-10    TPro  6.1  /  Alb  1.9<L>  /  TBili  0.4  /  DBili  x   /  AST  38  /  ALT  26  /  AlkPhos  45  02-10      MICROBIOLOGY:    Culture - Blood (02.06.21 @ 06:25)    Specimen Source: .Blood Blood-Venous    Culture Results:   No growth at 4 days.    Culture - Blood (02.06.21 @ 06:25)    Specimen Source: .Blood Blood-Venous    Culture Results:   No growth at 4 days.      RADIOLOGY & ADDITIONAL STUDIES:    Xray Abdomen 1 View PORTABLE -Urgent (Xray Abdomen 1 View PORTABLE -Urgent .) (02.08.21 @ 17:05) >    EXAM:  XR ABDOMEN PORTABLE URGENT 1V                          PROCEDURE DATE:  02/08/2021          INTERPRETATION:  Single supine abdominal radiograph February 8, 2021 4:40 PM    INDICATION: Abdominal pain    COMPARISON: Abdominal radiograph February 5, 2021 8:57 AM    FINDINGS/  IMPRESSION: No evidence of small or large obstruction. Mild gaseous distention of large bowel loops. Limited assessment for free air due to technique. Degenerative changes lumbar spine and bilateral hips.

## 2021-02-10 NOTE — PROGRESS NOTE ADULT - SUBJECTIVE AND OBJECTIVE BOX
CC/ HPI Patient is an 86 year old female with chronic obstructive pulmonary disease, lung cancer, poorly differentiated adenocarcinoma, status post radiation therapy, Dec 2020, admitted for severe anemia, upper GI bleed, RVP positive Chlamydia pneumonia, seen this morning, no acute respiratory complaint.      PAST MEDICAL & SURGICAL HISTORY:  Chronic obstructive pulmonary disease, unspecified COPD type  Hearing loss  left ear - sees Dr. Garces ENT  Basal cell carcinoma  x2 - upper lip and arm  Osteoporosis  H/O breast biopsy, 5 years ago, mass was benign and self-resolved  History of appendectomy  History of cataract surgery    SOCHX:   tobacco,  -  alcohol      FAMILY HISTORY: FA/MO  - contributory     ROS reviewed below with positive findings marked (+) :  GEN:  fever, chills ENT: tracheostomy,   epistaxis,  sinusitis COR: CAD, CHF,  HTN, dysrhythmia PUL: +COPD, ILD, asthma, pneumonia GI: PEG, dysphagia, hemorrhage, other HALLIE: kidney disease, electrolyte disorder HEM:  anemia, thrombus, coagulopathy, cancer ENDO:  thyroid disease, diabetes mellitus CNS:  dementia, stroke, seizure, PSY:  depression, anxiety, other      MEDICATIONS  (STANDING):  azithromycin   Tablet 250 milliGRAM(s) Oral every 24 hours  cefepime   IVPB      cefepime   IVPB 1000 milliGRAM(s) IV Intermittent every 8 hours  enoxaparin Injectable 30 milliGRAM(s) SubCutaneous every 24 hours  ferrous    sulfate 325 milliGRAM(s) Oral daily  folic acid 1 milliGRAM(s) Oral daily  influenza  Vaccine (HIGH DOSE) 0.7 milliLiter(s) IntraMuscular once  lidocaine   Patch 1 Patch Transdermal daily  megestrol 40 milliGRAM(s) Oral three times a day  multivitamin 1 Tablet(s) Oral daily  pantoprazole  Injectable 40 milliGRAM(s) IV Push every 12 hours  polyethylene glycol 3350 17 Gram(s) Oral every 12 hours  senna 2 Tablet(s) Oral at bedtime    MEDICATIONS  (PRN):  acetaminophen   Tablet .. 650 milliGRAM(s) Oral every 8 hours PRN Temp greater or equal to 38C (100.4F), Mild Pain (1 - 3)  albuterol/ipratropium for Nebulization 3 milliLiter(s) Nebulizer every 6 hours PRN Shortness of Breath and/or Wheezing  bisacodyl Suppository 10 milliGRAM(s) Rectal daily PRN Constipation  ibuprofen  Tablet. 400 milliGRAM(s) Oral every 6 hours PRN Moderate Pain (4 - 6)      Vital Signs Last 24 Hrs  T(C): 36.3 (10 Feb 2021 05:42), Max: 36.9 (09 Feb 2021 21:15)  T(F): 97.3 (10 Feb 2021 05:42), Max: 98.4 (09 Feb 2021 21:15)  HR: 74 (10 Feb 2021 05:42) (74 - 94)  BP: 114/64 (10 Feb 2021 05:42) (102/64 - 114/64)  RR: 20 (10 Feb 2021 05:42) (18 - 20)  SpO2: 94% (10 Feb 2021 05:42) (94% - 95%)    GENERAL:         comfortable,  - distress.  HEENT:            - trauma,  - icterus,  - injection,  - nasal discharge.  NECK:              - jugular venous distention, - thyromegaly.  LYMPH:           - lymphadenopathy, - masses.  RESP:               + clear,   - rales,   - rhonchi,   - wheezes.   COR:                S1S2   - gallops,  - rubs.  ABD:                bowel sounds,   soft, - tender, - distended.  EXT/MSC:         - cyanosis,  - clubbing, - edema.    NEURO:           + alert and oriented                             8.1    7.03  )-----------( 478      ( 10 Feb 2021 06:45 )             26.3     02-10    135  |  109<H>  |  17  ----------------------------<  87  4.7   |  19<L>  |  1.07    Ca    8.0<L>      10 Feb 2021 06:45  Phos  2.7     02-10  Mg     2.3     02-10        X-ray Chest (02.06.21)  Bilateral infiltrates with progression comparison to prior examination of the chest 2/3/2021    CT Angio Chest PE Protocol w/ IV Cont (02.03.21)  Pulmonary arteries: No pulmonary embolus seen.  Lungs and large airways: Respiratory motion limits assessment of fine pulmonary parenchymal detail. Trace dependent secretions distal trachea. No nodular and no tracheal or endobronchial lesion.  Bilateral apical pleural parenchymal scarring. Unchanged mild peribronchial wall thickening and cylindrical bronchiectasis bilaterally. Scattered nodular groundglass opacities bilaterally, more confluent in right upper lobe posterior segment with apparent interlobular septal thickening. Mild compressive atelectasis right lower lobe. Few nodular consolidative opacities left lower lobe, subsegmental atelectasis versus mild consolidation. No measurable residual masses in right upper lobe anterior segment with linear opacity in this region probably representing scarring. New few scattered bilateral pulmonary nodules, for example:  *  Right upper lobe subpleural 0.5 cm (series 6 image 160).  *  Right upper lobe anterior segment 0.5 cm (series 6 image 132)  *  Left lower lobe 0.5 cm (series 6 image 204)  Pleura:  Resolved right pneumothorax. Decreased trace residual right pleural effusion.  Mediastinum and hilar regions: Significantly decreased mediastinal and hilar adenopathy, for example right lower paratracheal 1.1 x 0.8 cm, previously 4.4 x 2.9 cm. No significant change mildly prominent subcarinal node 1.2 cm short axis. Resolved right lower cervical adenopathy.  Heart and pericardium:  Heart size is normal. No pericardial effusion.  Vessels:  No aneurysm. Mild scattered calcific atherosclerosis.  Chest wall and lower neck:  Normal.  Upper abdomen: Unchanged few left renal cysts, largest 3.5 cm.  Bones: No suspicious lytic or blastic lesion. Mild degenerative changes thoracic spine with prominent degenerative Schmorl's node inferior endplate T9 vertebral body.    IMPRESSION:  1.  No pulmonary embolus.  2.  New few small scattered nodular opacities bilaterally with more confluent groundglass density and suspected interlobular septal thickening in posterior right upper lobe. Differential includes aspiration/infection versus lymphangitic carcinomatosis. Assessment limited by artifact from respiratory motion. Recommend clinical correlation and short-term follow-up would be helpful.  .  No measurable residual right upper lobe mass with scarring in this region.  4.  Significantly decreased thoracic adenopathy.  5.  Decreased trace residual right pleural effusion.  6.  Resolved right pneumothorax.            ASSESSMENT/PLAN    1)  Chlamydia pneumonia,  2)  Chronic obstructive pulmonary disease  3)  Bronchiectasis  4)  Lung cancer    Oxygen as needed for a satisfactory SpO2  Bronchodilators:  Atrovent/ albuterol q 4 – 6 hours as needed  ID/Antibiotics: Zithromax, cefepime  Cardiac/HTN: hemodynamically stable  GI: Rx/ prophylaxis c PPI/H2B  Heme: Rx/VT prophylaxis   Discussed with Dr. Day who covered yesterday.

## 2021-02-10 NOTE — PROGRESS NOTE ADULT - SUBJECTIVE AND OBJECTIVE BOX
PT. seen and examined at bed side.    Case discussed with medical team.    Case discussed with Consultants.    Orders reviewed.        ICU Vital Signs Last 24 Hrs  T(C): 37.2 (10 Feb 2021 21:00), Max: 37.2 (10 Feb 2021 21:00)  T(F): 99 (10 Feb 2021 21:00), Max: 99 (10 Feb 2021 21:00)  HR: 106 (10 Feb 2021 21:00) (74 - 106)  BP: 126/74 (10 Feb 2021 21:00) (114/64 - 126/74)  BP(mean): --  ABP: --  ABP(mean): --  RR: 18 (10 Feb 2021 21:00) (18 - 20)  SpO2: 96% (10 Feb 2021 21:00) (94% - 96%)

## 2021-02-10 NOTE — CONSULT NOTE ADULT - SUBJECTIVE AND OBJECTIVE BOX
Patient is a 86y old  Female who presents with a chief complaint of Anemia, GI Bleed (09 Feb 2021 20:10)       HPI:  86 F with PMHx COPD on no home O2, DNR/DNI, recently diagnosed lung adenocarcinoma November 2020 presented to ED with 2 weeks of black tarry stools, weakness, dyspnea on exertion, abdominal pain, poor PO tolerance, and dry cough. She has been getting chemotherapy every other week with last dose 1/25/21, she skipped Monday 2/1/21 because of the snow storm. She was recently seen on 1/29/21 by her PCP Dr. Alena Worthington who recommended that she come to the ED if her symptoms continued to worsen. She denies lightheadedness, syncope, visual changes, chest pain, hemoptysis, diarrhea, focal weakness/numbness, dysuria. She reports urinary incontinence over the last few weeks and constipation with no BM for 2-3 days.  She lives alone with a home health aide who was present on admission. Her aide works with her 8 hours/day 5 days/week and receives assistance primarily with IADLs but occasionally needs help with toileting.  Patient notably has active MOLST completed with PCP. Patient is DNR/DNI and does not want pressors, but would want noninvasive ventilation, blood transfusions, IV fluids, and IV antibiotics.    In the ED:  Vitals: T 98.3->99.3, , /80->97/57, RR 28->20, SpO2 97% RA  Labs sig for: WBC 10.52, Hgb 7.6, plt 581, INR 1.29, cr 1.41 (baseline 1), albumin 2.8, AST/ALT 61/51, Lactate 2.1, VBG Ph 7.46/PCO2 34  EKG: sinus tachy  Imaging:    CXR: no acute infiltrate or consolidation    CTA: No pulmonary embolus. New few small scattered nodular opacities bilaterally with more confluent groundglass density and suspected interlobular septal thickening in posterior right upper lobe.  Medication: 500cc NSx2 (03 Feb 2021 16:24)      PAST MEDICAL & SURGICAL HISTORY:  Chronic obstructive pulmonary disease, unspecified COPD type    Hearing loss  left ear - sees Dr. Garces ENT    Basal cell carcinoma  x2 - upper lip and arm    Chronic obstructive pulmonary disease    Osteoporosis    H/O breast biopsy  &gt; 5 years ago, mass was benign and self-resolved    History of appendectomy    History of cataract surgery        MEDICATIONS  (STANDING):  azithromycin   Tablet 250 milliGRAM(s) Oral every 24 hours  cefepime   IVPB      cefepime   IVPB 1000 milliGRAM(s) IV Intermittent every 8 hours  enoxaparin Injectable 30 milliGRAM(s) SubCutaneous every 24 hours  ferrous    sulfate 325 milliGRAM(s) Oral daily  folic acid 1 milliGRAM(s) Oral daily  influenza  Vaccine (HIGH DOSE) 0.7 milliLiter(s) IntraMuscular once  lidocaine   Patch 1 Patch Transdermal daily  megestrol 40 milliGRAM(s) Oral three times a day  multivitamin 1 Tablet(s) Oral daily  pantoprazole  Injectable 40 milliGRAM(s) IV Push every 12 hours  polyethylene glycol 3350 17 Gram(s) Oral every 12 hours  senna 2 Tablet(s) Oral at bedtime    MEDICATIONS  (PRN):  acetaminophen   Tablet .. 650 milliGRAM(s) Oral every 8 hours PRN Temp greater or equal to 38C (100.4F), Mild Pain (1 - 3)  albuterol/ipratropium for Nebulization 3 milliLiter(s) Nebulizer every 6 hours PRN Shortness of Breath and/or Wheezing  bisacodyl Suppository 10 milliGRAM(s) Rectal daily PRN Constipation  ibuprofen  Tablet. 400 milliGRAM(s) Oral every 6 hours PRN Moderate Pain (4 - 6)        Social History:               -  Home Living Status:  lives alone in an elevator accessible apartment building           -  Prior Home Care Services:   Mondays thru Fridays x 8 hrs/day    Baseline Functional Level Prior to Admission:             - ADL's/ IADL's:    patient states she is independent           - ambulatory status PTA:  walked without assistive devices    FAMILY HISTORY:      CBC Full  -  ( 10 Feb 2021 06:45 )  WBC Count : 7.03 K/uL  RBC Count : 3.02 M/uL  Hemoglobin : 8.1 g/dL  Hematocrit : 26.3 %  Platelet Count - Automated : 478 K/uL  Mean Cell Volume : 87.1 fl  Mean Cell Hemoglobin : 26.8 pg  Mean Cell Hemoglobin Concentration : 30.8 gm/dL  Auto Neutrophil # : 5.36 K/uL  Auto Lymphocyte # : 0.31 K/uL  Auto Monocyte # : 0.93 K/uL  Auto Eosinophil # : 0.25 K/uL  Auto Basophil # : 0.12 K/uL  Auto Neutrophil % : 76.3 %  Auto Lymphocyte % : 4.4 %  Auto Monocyte % : 13.2 %  Auto Eosinophil % : 3.5 %  Auto Basophil % : 1.7 %      02-10    135  |  109<H>  |  17  ----------------------------<  87  4.7   |  19<L>  |  1.07    Ca    8.0<L>      10 Feb 2021 06:45  Phos  2.7     02-10  Mg     2.3     02-10    TPro  6.1  /  Alb  1.9<L>  /  TBili  0.4  /  DBili  x   /  AST  38  /  ALT  26  /  AlkPhos  45  02-10            Radiology:    < from: Xray Abdomen 1 View PORTABLE -Urgent (Xray Abdomen 1 View PORTABLE -Urgent .) (02.08.21 @ 17:05) >    EXAM:  XR ABDOMEN PORTABLE URGENT 1V                          PROCEDURE DATE:  02/08/2021          INTERPRETATION:  Single supine abdominal radiograph February 8, 2021 4:40 PM    INDICATION: Abdominal pain    COMPARISON: Abdominal radiograph February 5, 2021 8:57 AM    FINDINGS/  IMPRESSION: No evidence of small or large obstruction. Mild gaseous distention of large bowel loops. Limited assessment for free air due to technique. Degenerative changes lumbar spine and bilateral hips.      < from: CT Angio Chest PE Protocol w/ IV Cont (02.03.21 @ 13:32) >  EXAM:  CT ANGIO CHEST PE PROTOCOL IC                          *** ADDENDUM 02/03/2021  ***    ADDENDUM: Patient reported to have underwent interval radiotherapy. Differential for nodular opacities in the right upper lung includes aspiration/infection, lymphangitic carcinomatosis, versus post radiotherapy changes. Assessment limited by artifact from respiratory motion. Recommend clinical correlation and short-term follow-up would be helpful. END OF ADDENDUM.    *** END OF ADDENDUM 02/03/2021  ***      PROCEDURE DATE:  02/03/2021          INTERPRETATION:  CTA (CT angiography) of the CHEST dated 2/3/2021 1:32 PM    INDICATION: Shortness of breath. Metastatic lung cancer on chemotherapy. Assess for pulmonary embolism.    TECHNIQUE: CT angiography of the chest was performed during bolus injection of intravenous contrast.  Post-processing including the production of axial, coronal and sagittal multiplanar reformatted images and axial and coronal maximum intensity projections (MIPs) was performed.    PRIOR STUDY: CT chest 11/30/2020, 11/21/2020, and 2/9/2017.    FINDINGS:    Pulmonary arteries: No pulmonary embolus seen.    Lungs and large airways: Respiratory motion limits assessment of fine pulmonary parenchymal detail..Trace dependent secretions distal trachea. No nodular and no tracheal or endobronchial lesion.  Bilateral apical pleural parenchymal scarring. Unchanged mild peribronchial wall thickening and cylindrical bronchiectasis bilaterally. Scattered nodular groundglass opacities bilaterally, more confluent in right upper lobe posterior segment with apparent interlobular septal thickening. Mild compressive atelectasis right lower lobe. Few nodular consolidative opacities left lower lobe, subsegmental atelectasis versus mild consolidation. No measurable residual masses in right upper lobe anterior segment with linear opacity in this region probably representing scarring. New few scattered bilateral pulmonary nodules, for example:  *  Right upper lobe subpleural 0.5 cm (series 6 image 160).  *  Right upper lobe anterior segment 0.5 cm (series 6 image 132)  *  Left lower lobe 0.5 cm (series 6 image 204)    Pleura:  Resolved right pneumothorax. Decreased trace residual right pleural effusion.    Mediastinum and hilar regions: Significantly decreased mediastinal and hilar adenopathy, for example right lower paratracheal 1.1 x 0.8 cm, previously 4.4 x 2.9 cm. No significant change mildly prominent subcarinal node 1.2 cm short axis. Resolved right lower cervical adenopathy.    Heart and pericardium:  Heart size is normal. No pericardial effusion.    Vessels:  No aneurysm. Mild scattered calcific atherosclerosis.    Chest wall and lower neck:  Normal.    Upper abdomen: Unchanged few left renal cysts, largest 3.5 cm.    Bones: No suspicious lytic or blastic lesion. Mild degenerative changes thoracic spine with prominent degenerative Schmorl's node inferior endplate T9 vertebral body.      IMPRESSION:  1.  No pulmonary embolus.  2.  New few small scattered nodular opacities bilaterally with more confluent groundglass density and suspected interlobular septal thickening in posterior right upper lobe. Differential includes aspiration/infection versus lymphangitic carcinomatosis. Assessment limited by artifact from respiratory motion. Recommend clinical correlation and short-term follow-up would be helpful.  3.  No measurable residual right upper lobe mass with scarring in this region.  4.  Significantly decreased thoracic adenopathy.  5.  Decreased trace residual right pleural effusion.  6.  Resolved right pneumothorax.        < from: NM PET/CT Onc FDG Skull to Thigh, Subsq (02.05.21 @ 16:38) >  EXAM:  PETCT MALDONADO ONC FDG SUBS                          PROCEDURE DATE:  02/05/2021          INTERPRETATION:  PET/CT TUMOR IMAGING    Indication: Right upper lobe lung cancer. Since the prior study dated 11/30/2020 the patient has undergone radiation therapy.  Restaging to help determine subsequent treatment strategy.    Procedure: Intravenous access was established and the patient was injected with 5.4 mCi of [id number]. After approximately 1 hour in a quiet room, the patient was positioned on the imaging table with the arms as high above the head as possible.  PET/CT imaging was then performed with the standard protocol from the base of the skull to the mid thighs.  The CT scan is a low dose protocol with images used for attenuation correction and localization only.    PET images were reconstructed in axial, sagittal and coronal planes using CT based attenuation correction.  Images were displayed as PET, CT and fused data sets as well as maximum intensity pixel projections.    Findings: There is intense uptake in the upper lobes of both lungs posteriorly, much more extensive on the right than on the left. Even allowing for the difference in technique, the abnormality on both sides appears to be somewhat more extensive than on the CT performed one day before. The rapid progression of this activity suggests an inflammatory rather than malignant etiology and it is possible that this pattern is related to recent radiation.    As noted on the CT angiogram, there has been a substantial reduction in the size of the patient's lung tumor, and it is not clearly discernible on the current study. There is an area of activity seen adjacent to the patient's pleural effusion in the right paraspinal region. The pleura in this area appears somewhat more thickened than on the prior exam. The exact significance of this uptake is unclear.    Similarly, an area of increased activity is seen along the right side of the mediastinum, somewhat irregular and possibly also related to inflammatory or postradiation changes.    Impression: No definite evidence of recurrent malignancy. Irregular activity in both upper lobes is associated with nodularity on the accompanying CT which is most likely inflammatory or related to recent radiation therapy.    One area of pleural uptake is seen medially adjacent to the patient's pleural effusion. While malignancy cannot be excluded in this area, an inflammatory etiology may also be the cause of this uptake.                Vital Signs Last 24 Hrs  T(C): 36.3 (10 Feb 2021 05:42), Max: 36.9 (09 Feb 2021 21:15)  T(F): 97.3 (10 Feb 2021 05:42), Max: 98.4 (09 Feb 2021 21:15)  HR: 74 (10 Feb 2021 05:42) (74 - 94)  BP: 114/64 (10 Feb 2021 05:42) (102/64 - 114/64)  BP(mean): --  RR: 20 (10 Feb 2021 05:42) (18 - 20)  SpO2: 94% (10 Feb 2021 05:42) (94% - 95%)    REVIEW OF SYSTEMS: as per HPI    CONSTITUTIONAL: No fever, weight loss, or fatigue  EYES: No eye pain, visual disturbances, or discharge  ENMT:  No difficulty hearing, tinnitus, vertigo; No sinus or throat pain  NECK: No pain or stiffness  BREASTS: No pain, masses, or nipple discharge  RESPIRATORY: No cough, wheezing, chills or hemoptysis; No shortness of breath  CARDIOVASCULAR: No chest pain, palpitations, dizziness, or leg swelling  GASTROINTESTINAL: No abdominal or epigastric pain. No nausea, vomiting, or hematemesis; No diarrhea or constipation. No melena or hematochezia.  GENITOURINARY: No dysuria, frequency, hematuria, or incontinence  NEUROLOGICAL: No headaches, memory loss, loss of strength, numbness, or tremors  SKIN: No itching, burning, rashes, or lesions   LYMPH NODES: No enlarged glands  ENDOCRINE: No heat or cold intolerance; No hair loss  MUSCULOSKELETAL: No joint pain or swelling; No muscle, back, or extremity pain  PSYCHIATRIC: No depression, anxiety, mood swings, or difficulty sleeping  HEME/LYMPH: No easy bruising, or bleeding gums  ALLERGY AND IMMUNOLOGIC: No hives or eczema  VASCULAR: no swelling, erythema,   : no dysuria, hematuria, frequency        Physical Exam: frail 87 yo  woman lying in bed, c/o fatigue    Head: normocephalic, atraumatic    Eyes: PERRLA, EOMI, no nystagmus, sclera anicteric    ENT: nasal discharge, uvula midline, no oropharyngeal erythema/exudate    Neck: supple, negative JVD, negative carotid bruits, no thyromegaly    Chest: CTA bilaterally, neg wheeze/ rhonchi/ rales/ crackles/ egophany    Cardiovascular: regular rate and rhythm, neg murmurs/rubs/gallops    Abdomen: soft, non distended, non tender to palpation in all 4 quadrants, negative rebound/guarding, normal bowel sounds    Extremities: WWP, neg cyanosis/clubbing/edema, negative calf tenderness to palpation, negative Jyothi's sign    Musculoskeletal:    Skin:      :     Neurologic Exam:      Motor Exam:    Right UE:    > 4/5    Left UE:     > 4/5    Right LE:  > 4/5    Left LE:    > 4/5               Sensation: Intact to light touch x 4 extremities                     DTR:                        biceps/brachioradialis: equal bilaterally                  patella/ankle: equal bilaterally                    neg clonus          neg Babinski                          Gait:  not tested        PM&R Impression:    1) deconditioned  2) no focal weakness    Plan: cleared to begin rehab    1) Physical therapy focusing on therapeutic exercises, bed mobility/transfer out of bed evaluation, progressive ambulation with assistive devices prn.    2) Anticipated Disposition Plan/Recs:    d/c home , home physical therapy, resume private hire [facility]

## 2021-02-10 NOTE — PROGRESS NOTE ADULT - SUBJECTIVE AND OBJECTIVE BOX
SUBJECTIVE/OVERNIGHT EVENTS: No acute overnight events. Pt seen in Am at bedside, resting in bed, and does not appear to be in acute distress. She reports persistent abdominal pain and poor appetite. She otherwise denies fever, chills, headache, changes in vision/hearing, sob, chest pain, extremity pain or swelling    VITAL SIGNS:  Vital Signs Last 24 Hrs  T(C): 37.2 (10 Feb 2021 21:00), Max: 37.2 (10 Feb 2021 21:00)  T(F): 99 (10 Feb 2021 21:00), Max: 99 (10 Feb 2021 21:00)  HR: 106 (10 Feb 2021 21:00) (74 - 106)  BP: 126/74 (10 Feb 2021 21:00) (114/64 - 126/74)  BP(mean): --  RR: 18 (10 Feb 2021 21:00) (18 - 20)  SpO2: 96% (10 Feb 2021 21:00) (94% - 96%)    PHYSICAL EXAM:  Constitutional: WDWN resting in bed; in no acute distress but slightly uncomfortable appearing  HEENT: NCAT, EOMI, anicteric sclera, no nasal discharge; uvula midline, no oropharyngeal erythema or exudates; MMM  Respiratory: CTA B/L; no W/R/R, no retractions; breathing 24/min with abdominal recruitment on presentation  Cardiac: +S1/S2; RRR; no M/R/G  Gastrointestinal: abdomen soft, tender to light palpation evenly across epigastric, RUQ, RLQ, and suprapubic regions, no rebound  Extremities: WWP, no clubbing or cyanosis; no peripheral edema  Vascular: 2+ radial, femoral, DP/PT pulses B/L  Neurologic: AAOx3; CNII-XII grossly intact; moves extremities to command, 5/5 UE strength and can independently turn from back to side    MEDICATIONS:  MEDICATIONS  (STANDING):  cefepime   IVPB      cefepime   IVPB 1000 milliGRAM(s) IV Intermittent every 8 hours  enoxaparin Injectable 30 milliGRAM(s) SubCutaneous every 24 hours  ferrous    sulfate 325 milliGRAM(s) Oral daily  folic acid 1 milliGRAM(s) Oral daily  influenza  Vaccine (HIGH DOSE) 0.7 milliLiter(s) IntraMuscular once  lidocaine   Patch 1 Patch Transdermal daily  megestrol 40 milliGRAM(s) Oral three times a day  multivitamin 1 Tablet(s) Oral daily  pantoprazole  Injectable 40 milliGRAM(s) IV Push every 12 hours  polyethylene glycol 3350 17 Gram(s) Oral every 12 hours  senna 2 Tablet(s) Oral at bedtime    MEDICATIONS  (PRN):  acetaminophen   Tablet .. 650 milliGRAM(s) Oral every 8 hours PRN Temp greater or equal to 38C (100.4F), Mild Pain (1 - 3)  albuterol/ipratropium for Nebulization 3 milliLiter(s) Nebulizer every 6 hours PRN Shortness of Breath and/or Wheezing  bisacodyl Suppository 10 milliGRAM(s) Rectal daily PRN Constipation  ibuprofen  Tablet. 400 milliGRAM(s) Oral every 6 hours PRN Moderate Pain (4 - 6)      ALLERGIES:  Allergies    No Known Allergies    Intolerances        LABS:                        8.1    7.03  )-----------( 478      ( 10 Feb 2021 06:45 )             26.3     02-10    135  |  109<H>  |  17  ----------------------------<  87  4.7   |  19<L>  |  1.07    Ca    8.0<L>      10 Feb 2021 06:45  Phos  2.7     02-10  Mg     2.3     02-10    TPro  6.1  /  Alb  1.9<L>  /  TBili  0.4  /  DBili  x   /  AST  38  /  ALT  26  /  AlkPhos  45  02-10        RADIOLOGY & ADDITIONAL TESTS: Reviewed.

## 2021-02-10 NOTE — PROGRESS NOTE ADULT - ASSESSMENT
the patient appears to be somewhat agitated and likely depressed.  The patient might be considered a candidate for low doses of Lexapro.  The patient is constantly cold and a an evaluation for adrenal insufficiency would be appropriate.

## 2021-02-10 NOTE — PROGRESS NOTE ADULT - SUBJECTIVE AND OBJECTIVE BOX
the patient is very upset.  The patient wants to go home.  She did not get much sleep.    Physical Exam  Skin: Normal turgor. No rash noted. No lesions.   Eyes: Eyes with PERRLA. EOM's in­tact Sclera clear,no jaundice noted. Conjunctiva clear.   Ears, Nose,Mouth & Throat: Teeth normal, no missing teeth or dentures present. No ulcers. No thrush Auditory canals normal.  Description: %% Oropharynx without lesions. Mucositis None   Neck: No thyromegaly. No lymphadenopathy noted.   Cardiovascular: S1, S2 without murmurs. No Extrasystole. Tachycardia. No breast masses appreciated. No axillary lymphadenopathy.  No skin changes. No nipple discharge present. No dimpling noted.  Chest/Respiratory: No dyspnea. No rhonchi. No wheezing. No decreased breath sounds at bases. No rales.  The breath sounds are somewhat coarse.    The patient has difficulty getting secretions up. Unlabored breathing. No  accessory muscle use. Abdomen is soft and non­tender to touch. No hepatomegaly noted. No splenomegaly noted. No abdominal pain or  guarding noted. No abdominal mass(es) appreciated. No ascites noted.   Genito­Urinary: Deferred.  Hematologic/Lymphatic: No petechiae noted. No purpura noted. No lymphadenopathy noted.   Musculoskeletal: No Kyphosis. No weakness. No spinal tenderness on percussion. No pain on hip rotation reported by patient. Normal  range of motion in upper and lower extremities.

## 2021-02-10 NOTE — CHART NOTE - NSCHARTNOTEFT_GEN_A_CORE
Admitting Diagnosis:   Patient is a 86y old  Female who presents with a chief complaint of Anemia, GI Bleed (10 Feb 2021 12:22)      PAST MEDICAL & SURGICAL HISTORY:  Chronic obstructive pulmonary disease, unspecified COPD type    Hearing loss  left ear - sees Dr. Garces ENT    Basal cell carcinoma  x2 - upper lip and arm    Chronic obstructive pulmonary disease    Osteoporosis  Osteoporosis    H/O breast biopsy  &gt; 5 years ago, mass was benign and self-resolved    History of appendectomy    History of cataract surgery      Current Nutrition Order: Regular Diet    PO Intake: Good (%) [   ]  Fair (50-75%) [ X  ] Poor (<25%) [   ]  Per RN, pt consumed ~50% of breakfast and > 75% of lunch today (2/10).    GI Issues:   No N/V/D; pt c/p abdominal pain and cramping; ordered for Dulcolx, Miralax, senna  Per flowsheets, +BM on 2/9/21    Pain:  Pt denies pain at this time; ordered for Tylenol, motrin, lidocaine patch    Skin Integrity:  Dioni Score: 20  Intact; NO edema/pu    Labs:   02-10    135  |  109<H>  |  17  ----------------------------<  87  4.7   |  19<L>  |  1.07    Ca    8.0<L>      10 Feb 2021 06:45  Phos  2.7     02-10  Mg     2.3     02-10    TPro  6.1  /  Alb  1.9<L>  /  TBili  0.4  /  DBili  x   /  AST  38  /  ALT  26  /  AlkPhos  45  02-10    Medications:  MEDICATIONS  (STANDING):  azithromycin   Tablet 250 milliGRAM(s) Oral every 24 hours  cefepime   IVPB      cefepime   IVPB 1000 milliGRAM(s) IV Intermittent every 8 hours  enoxaparin Injectable 30 milliGRAM(s) SubCutaneous every 24 hours  ferrous    sulfate 325 milliGRAM(s) Oral daily  folic acid 1 milliGRAM(s) Oral daily  influenza  Vaccine (HIGH DOSE) 0.7 milliLiter(s) IntraMuscular once  lidocaine   Patch 1 Patch Transdermal daily  megestrol 40 milliGRAM(s) Oral three times a day  multivitamin 1 Tablet(s) Oral daily  pantoprazole  Injectable 40 milliGRAM(s) IV Push every 12 hours  polyethylene glycol 3350 17 Gram(s) Oral every 12 hours  senna 2 Tablet(s) Oral at bedtime    MEDICATIONS  (PRN):  acetaminophen   Tablet .. 650 milliGRAM(s) Oral every 8 hours PRN Temp greater or equal to 38C (100.4F), Mild Pain (1 - 3)  albuterol/ipratropium for Nebulization 3 milliLiter(s) Nebulizer every 6 hours PRN Shortness of Breath and/or Wheezing  bisacodyl Suppository 10 milliGRAM(s) Rectal daily PRN Constipation  ibuprofen  Tablet. 400 milliGRAM(s) Oral every 6 hours PRN Moderate Pain (4 - 6)    Admitting Anthropometrics:  Height:60" Weight: 40.8kg, BMI: 16.4kg/m2, IBW:45.3 kg+/-10%, %IBW:90 %   2/3 - 90.1lbs  Weight Change: No new wts noted. Please obtain new wts for further assessment.    Estimated energy needs:   ABW(40.8kg) used for calculations as pt between % of IBW (90%), adjusted for PCM, age.   kcal needs: 1224-1428kcals (30-35kcal/kg)  protein: 48-57gm (1.2-1.4 gm/kg)  Fluids: 1224-1428mL/day (30-35mL/kg)     Subjective:   86 year old female with PMHx of chronic obstructive pulmonary disease, lung cancer, poorly differentiated adenocarcinoma, status post radiation therapy, Dec 2020, admitted for severe anemia, upper GI bleed, RVP positive Chlamydia pneumonia. On 2/5: pt reported constipation X1 wk and generalized abd for 2 days; abd xray showing moderately distended loops of bowel, started on bowel regimen. Diet advanced to mechanical soft on 2/6. Overnight on 2/6, pt noted to be febrile to 102.5 rectal. UA, ucx, bc x2, CXR ordered. On 2/6: UA neg, CXR w/ Bilateral infiltrates worsen from 2/3. legionella negative. Pt pending dispo planning at this time.    Visited pt in room, resting in bed; rousable to name. Previously ordered for [facility] soft diet, advanced to Regular diet on 2/8. Per RN, pt has good appetite consuming ~50% of breakfast and ~75 of lunch (chicken noodle soup/avocado toast -which the pt had previously requested). Pt denies N/V/D, but became very agitated about bowel movement, noting that she is cramped with pain in the lower abdomen and not able to go; team aware. Education deferred d/t pt agitated, will reassess at f/u; placed room service assistance for meal requests. Notable Labs: Na:135 WNL (previously low), Glucose:87. Please see below for full nutritional recommendations. RD to monitor and f/u per protocol.     Previous Nutrition Diagnosis:  Malnutrition Suspect severe PCM RT PO intake not meeting increased EER needs 2/2 chronic illness NFPE findings.   Active [ X ]  Resolved [   ]    Goal: Meet >75% EER, show no further s/sx of PCM.    Recommendations:  1. Recommend diet consistency recs per SLP eval.  2. Monitor %PO intake/GI distress  >> Appreciate assistance prn.  3. Continue with micronutrient supplementation (MVI, folic acid, Ferrous sulphate)  4. Monitor  lytes and replete prn. POCT q6hrs, BMP.  5. Trend weights weekly.    Education: Deferred d/t pt's agitated state; will reassess at f/u.    Risk Level: High [ ] Moderate [ X  ] Low [   ]. Admitting Diagnosis:   Patient is a 86y old  Female who presents with a chief complaint of Anemia, GI Bleed (10 Feb 2021 12:22)      PAST MEDICAL & SURGICAL HISTORY:  Chronic obstructive pulmonary disease, unspecified COPD type    Hearing loss  left ear - sees Dr. Garces ENT    Basal cell carcinoma  x2 - upper lip and arm    Chronic obstructive pulmonary disease    Osteoporosis  Osteoporosis    H/O breast biopsy  &gt; 5 years ago, mass was benign and self-resolved    History of appendectomy    History of cataract surgery      Current Nutrition Order: Regular Diet    PO Intake: Good (%) [   ]  Fair (50-75%) [ X  ] Poor (<25%) [   ]  Per RN, pt consumed ~50% of breakfast and > 75% of lunch today (2/10).    GI Issues:   No N/V/D; pt c/p abdominal pain and cramping; ordered for Dulcolx, Miralax, senna  Per flowsheets, +BM on 2/9/21    Pain:  Pt denies pain at this time; ordered for Tylenol, motrin, lidocaine patch    Skin Integrity:  Dioni Score: 20  Intact; NO edema/pu    Labs:   02-10    135  |  109<H>  |  17  ----------------------------<  87  4.7   |  19<L>  |  1.07    Ca    8.0<L>      10 Feb 2021 06:45  Phos  2.7     02-10  Mg     2.3     02-10    TPro  6.1  /  Alb  1.9<L>  /  TBili  0.4  /  DBili  x   /  AST  38  /  ALT  26  /  AlkPhos  45  02-10    Medications:  MEDICATIONS  (STANDING):  azithromycin   Tablet 250 milliGRAM(s) Oral every 24 hours  cefepime   IVPB      cefepime   IVPB 1000 milliGRAM(s) IV Intermittent every 8 hours  enoxaparin Injectable 30 milliGRAM(s) SubCutaneous every 24 hours  ferrous    sulfate 325 milliGRAM(s) Oral daily  folic acid 1 milliGRAM(s) Oral daily  influenza  Vaccine (HIGH DOSE) 0.7 milliLiter(s) IntraMuscular once  lidocaine   Patch 1 Patch Transdermal daily  megestrol 40 milliGRAM(s) Oral three times a day  multivitamin 1 Tablet(s) Oral daily  pantoprazole  Injectable 40 milliGRAM(s) IV Push every 12 hours  polyethylene glycol 3350 17 Gram(s) Oral every 12 hours  senna 2 Tablet(s) Oral at bedtime    MEDICATIONS  (PRN):  acetaminophen   Tablet .. 650 milliGRAM(s) Oral every 8 hours PRN Temp greater or equal to 38C (100.4F), Mild Pain (1 - 3)  albuterol/ipratropium for Nebulization 3 milliLiter(s) Nebulizer every 6 hours PRN Shortness of Breath and/or Wheezing  bisacodyl Suppository 10 milliGRAM(s) Rectal daily PRN Constipation  ibuprofen  Tablet. 400 milliGRAM(s) Oral every 6 hours PRN Moderate Pain (4 - 6)    Admitting Anthropometrics:  Height:60" Weight: 40.8kg, BMI: 16.4kg/m2, IBW:45.3 kg+/-10%, %IBW:90 %   2/3 - 90.1lbs  Weight Change: No new wts noted. Please obtain new wts for further assessment.    Estimated energy needs:   ABW(40.8kg) used for calculations as pt between % of IBW (90%), adjusted for PCM, age.   kcal needs: 1224-1428kcals (30-35kcal/kg)  protein: 48-57gm (1.2-1.4 gm/kg)  Fluids: 1224-1428mL/day (30-35mL/kg)     Subjective:   86 year old female with PMHx of chronic obstructive pulmonary disease, lung cancer, poorly differentiated adenocarcinoma, status post radiation therapy, Dec 2020, admitted for severe anemia, upper GI bleed, RVP positive Chlamydia pneumonia. On 2/5: pt reported constipation X1 wk and generalized abd for 2 days; abd xray showing moderately distended loops of bowel, started on bowel regimen. Diet advanced to mechanical soft on 2/6. Overnight on 2/6, pt noted to be febrile to 102.5 rectal. UA, ucx, bc x2, CXR ordered. On 2/6: UA neg, CXR w/ Bilateral infiltrates worsen from 2/3. legionella negative. Pt pending dispo planning at this time.    Visited pt in room, resting in bed; rousable to name. Previously ordered for [facility] soft diet, advanced to Regular diet on 2/8. Per RN, pt has good appetite consuming ~50% of breakfast and ~75 of lunch (chicken noodle soup/avocado toast -which the pt had previously requested). Pt denies N/V/D, but became very agitated about bowel movement, noting that she is cramped with pain in the lower abdomen and not able to go; team aware. Education deferred d/t pt agitated, will reassess at f/u; placed room service assistance for meal requests. Notable Labs: Na:135 WNL (previously low), Glucose:87. Please see below for full nutritional recommendations. RD to monitor and f/u per protocol.     Previous Nutrition Diagnosis:  Malnutrition Suspect severe PCM RT PO intake not meeting increased EER needs 2/2 chronic illness NFPE findings.   Active [ X ]  Resolved [   ]    Goal: Meet >75% EER, show no further s/sx of PCM.    Recommendations:  1. Recommend c/w Regular diet.   2. Monitor %PO intake/GI distress  >> Appreciate assistance prn.  3. Continue with micronutrient supplementation (MVI, folic acid, Ferrous sulphate)  4. Monitor  lytes and replete prn. POCT q6hrs, BMP.  5. Trend weights weekly. Obtain updated weights for further assessment.    Education: Deferred d/t pt's agitated state; will reassess at f/u.    Risk Level: High [ ] Moderate [ X  ] Low [   ].

## 2021-02-10 NOTE — CONSULT NOTE ADULT - ASSESSMENT
per Internal Medicine    86 F with PMHx COPD, DNR/DNI, recently diagnosed adenocarcinoma s/p radiation ~12/2020 now receiving chemo every other week with Dr. Jones, admitted for anemia c/f upper GI bleed since resolved.      Problem/Plan - 1:  ·  Problem: Upper GI bleed.  Plan: 2 weeks black stools, abdominal pain, weakness, dyspnea found to have hemoglobin 7.6 from baseline 13.7. 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 s/p 1u pRBC in ED  - h/h stable (2/9)  - Protonix 40mg IV BID  - will advance diet to mechanical soft (2/5).     Problem/Plan - 2:  ·  Problem: Anemia in neoplastic disease.  Plan: Likely combination of hypoproliferation due to chemotherapy with subacute GI blood loss from 2 weeks of black stools. No BM 2-3 days prior to admission and rectal exam normal so bleed may have spontaneously resolved.  - UGIB management as above  - transfuse for Hgb < 7.0  - recheck CBC after IVF resuscitation, anticipate transfusion.     Problem/Plan - 3:  ·  Problem: Constipation.  Plan: Pt reporting constipation for past 1 week and generalized abdominal pain for past couple days.   - abdominal xray showing moderately distended loops of bowel  - will increase bowel regimen to senna 2 tabs qd, miralax 17g q12hrs, and dulcolax 10mg PRN  - if pt continues to be constipated, will consider adding on lactulose.     Problem/Plan - 4:  ·  Problem: Malignant neoplasm of right lung, unspecified part of lung.  Plan: On gefitinib and tamoxifen outpatient with Dr. Jones. Received radiation at one point in November/December 2020 per chart review. Last dose 1/25 per aide, missed Monday's planned dose due to snowstorm.  - Dr. Jones following, recs appreciated  - c/w prior to admission megestrol.     Problem/Plan - 5:  ·  Problem: Goals of care, counseling/discussion.  Plan: MOLST completed outpatient by PCP Alena Worthington. DNR, DNI, no pressors. On admission went over form with patient and confirmed wishes, copy of MOLST in chart. Patient interested in further discussions on goals of care with stated goal remaining functional at home.  - consider palliative care consultation in AM.     Problem/Plan - 6:  Problem: Chronic obstructive pulmonary disease, unspecified COPD type. Plan: Per chart has history of COPD but patient adamantly denies history of COPD. Former smoker quit in 1999 unable to recall when she started to quantify pack-years. Not wheezing on admission exam.  - consider duonebs if wheezing or hypoxic  - allow desaturations to 88% if patient is asymptomatic.    Problem/Plan - 7:  ·  Problem: Nutrition, metabolism, and development symptoms.  Plan: F: none  E: replete K<4.0, Mg<2.0, Phos<2.5  N: mechanical soft  DVT prophylaxis: Lovenox  Access: PIV L arm  Code Status: DNR/DNI no pressors, MOLST in chart.

## 2021-02-11 DIAGNOSIS — R33.9 RETENTION OF URINE, UNSPECIFIED: ICD-10-CM

## 2021-02-11 LAB
ANION GAP SERPL CALC-SCNC: 8 MMOL/L — SIGNIFICANT CHANGE UP (ref 5–17)
BUN SERPL-MCNC: 15 MG/DL — SIGNIFICANT CHANGE UP (ref 7–23)
CALCIUM SERPL-MCNC: 7.8 MG/DL — LOW (ref 8.4–10.5)
CHLORIDE SERPL-SCNC: 106 MMOL/L — SIGNIFICANT CHANGE UP (ref 96–108)
CO2 SERPL-SCNC: 19 MMOL/L — LOW (ref 22–31)
CREAT SERPL-MCNC: 1.07 MG/DL — SIGNIFICANT CHANGE UP (ref 0.5–1.3)
CULTURE RESULTS: SIGNIFICANT CHANGE UP
CULTURE RESULTS: SIGNIFICANT CHANGE UP
GLUCOSE SERPL-MCNC: 87 MG/DL — SIGNIFICANT CHANGE UP (ref 70–99)
HCT VFR BLD CALC: 24.7 % — LOW (ref 34.5–45)
HGB BLD-MCNC: 7.8 G/DL — LOW (ref 11.5–15.5)
MAGNESIUM SERPL-MCNC: 1.8 MG/DL — SIGNIFICANT CHANGE UP (ref 1.6–2.6)
MCHC RBC-ENTMCNC: 26.6 PG — LOW (ref 27–34)
MCHC RBC-ENTMCNC: 31.6 GM/DL — LOW (ref 32–36)
MCV RBC AUTO: 84.3 FL — SIGNIFICANT CHANGE UP (ref 80–100)
NRBC # BLD: 0 /100 WBCS — SIGNIFICANT CHANGE UP (ref 0–0)
PHOSPHATE SERPL-MCNC: 3 MG/DL — SIGNIFICANT CHANGE UP (ref 2.5–4.5)
PLATELET # BLD AUTO: 498 K/UL — HIGH (ref 150–400)
POTASSIUM SERPL-MCNC: 4.3 MMOL/L — SIGNIFICANT CHANGE UP (ref 3.5–5.3)
POTASSIUM SERPL-SCNC: 4.3 MMOL/L — SIGNIFICANT CHANGE UP (ref 3.5–5.3)
RBC # BLD: 2.93 M/UL — LOW (ref 3.8–5.2)
RBC # FLD: 16.5 % — HIGH (ref 10.3–14.5)
SODIUM SERPL-SCNC: 133 MMOL/L — LOW (ref 135–145)
SPECIMEN SOURCE: SIGNIFICANT CHANGE UP
SPECIMEN SOURCE: SIGNIFICANT CHANGE UP
WBC # BLD: 6.74 K/UL — SIGNIFICANT CHANGE UP (ref 3.8–10.5)
WBC # FLD AUTO: 6.74 K/UL — SIGNIFICANT CHANGE UP (ref 3.8–10.5)

## 2021-02-11 PROCEDURE — 99232 SBSQ HOSP IP/OBS MODERATE 35: CPT

## 2021-02-11 RX ORDER — MAGNESIUM SULFATE 500 MG/ML
2 VIAL (ML) INJECTION ONCE
Refills: 0 | Status: COMPLETED | OUTPATIENT
Start: 2021-02-11 | End: 2021-02-11

## 2021-02-11 RX ORDER — AZITHROMYCIN 500 MG/1
250 TABLET, FILM COATED ORAL DAILY
Refills: 0 | Status: COMPLETED | OUTPATIENT
Start: 2021-02-12 | End: 2021-02-16

## 2021-02-11 RX ADMIN — Medication 1 MILLIGRAM(S): at 11:26

## 2021-02-11 RX ADMIN — Medication 325 MILLIGRAM(S): at 11:26

## 2021-02-11 RX ADMIN — Medication 50 GRAM(S): at 11:26

## 2021-02-11 RX ADMIN — ENOXAPARIN SODIUM 30 MILLIGRAM(S): 100 INJECTION SUBCUTANEOUS at 22:40

## 2021-02-11 RX ADMIN — Medication 650 MILLIGRAM(S): at 22:31

## 2021-02-11 RX ADMIN — POLYETHYLENE GLYCOL 3350 17 GRAM(S): 17 POWDER, FOR SOLUTION ORAL at 07:46

## 2021-02-11 RX ADMIN — CEFEPIME 100 MILLIGRAM(S): 1 INJECTION, POWDER, FOR SOLUTION INTRAMUSCULAR; INTRAVENOUS at 22:31

## 2021-02-11 RX ADMIN — POLYETHYLENE GLYCOL 3350 17 GRAM(S): 17 POWDER, FOR SOLUTION ORAL at 17:43

## 2021-02-11 RX ADMIN — PANTOPRAZOLE SODIUM 40 MILLIGRAM(S): 20 TABLET, DELAYED RELEASE ORAL at 07:46

## 2021-02-11 RX ADMIN — CEFEPIME 100 MILLIGRAM(S): 1 INJECTION, POWDER, FOR SOLUTION INTRAMUSCULAR; INTRAVENOUS at 13:25

## 2021-02-11 RX ADMIN — Medication 1 TABLET(S): at 11:26

## 2021-02-11 RX ADMIN — PANTOPRAZOLE SODIUM 40 MILLIGRAM(S): 20 TABLET, DELAYED RELEASE ORAL at 17:42

## 2021-02-11 RX ADMIN — MEGESTROL ACETATE 40 MILLIGRAM(S): 40 SUSPENSION ORAL at 13:25

## 2021-02-11 RX ADMIN — MEGESTROL ACETATE 40 MILLIGRAM(S): 40 SUSPENSION ORAL at 07:46

## 2021-02-11 RX ADMIN — LIDOCAINE 1 PATCH: 4 CREAM TOPICAL at 01:34

## 2021-02-11 RX ADMIN — MEGESTROL ACETATE 40 MILLIGRAM(S): 40 SUSPENSION ORAL at 22:32

## 2021-02-11 RX ADMIN — SENNA PLUS 2 TABLET(S): 8.6 TABLET ORAL at 22:33

## 2021-02-11 RX ADMIN — CEFEPIME 100 MILLIGRAM(S): 1 INJECTION, POWDER, FOR SOLUTION INTRAMUSCULAR; INTRAVENOUS at 07:46

## 2021-02-11 NOTE — CONSULT NOTE ADULT - SUBJECTIVE AND OBJECTIVE BOX
Patient is a 86y old  Female who presents with a chief complaint of Anemia, GI Bleed (11 Feb 2021 11:21)    HPI:  86 F with PMHx COPD on no home O2, DNR/DNI, recently diagnosed lung adenocarcinoma November 2020 presented to ED with 2 weeks of black tarry stools, weakness, dyspnea on exertion, abdominal pain, poor PO tolerance, and dry cough. She has been getting chemotherapy every other week with last dose 1/25/21, she skipped Monday 2/1/21 because of the snow storm. She was recently seen on 1/29/21 by her PCP Dr. Alena Worthington who recommended that she come to the ED if her symptoms continued to worsen. She denies lightheadedness, syncope, visual changes, chest pain, hemoptysis, diarrhea, focal weakness/numbness, dysuria. She reports urinary incontinence over the last few weeks and constipation with no BM for 2-3 days.  She lives alone with a home health aide who was present on admission. Her aide works with her 8 hours/day 5 days/week and receives assistance primarily with IADLs but occasionally needs help with toileting.  Patient notably has active MOLST completed with PCP. Patient is DNR/DNI and does not want pressors, but would want noninvasive ventilation, blood transfusions, IV fluids, and IV antibiotics.     Note:  team consulted for failed TOV x2 and resulting difficult redmond catheter placement. Patient examined at bedside. States she has never seen a urologist and typically urinates without issues at home. Patient reports to no prior history of retention. At the time of failed TOV she experienced severe suprapubic pressure and urgency. Today she reports that she is happy with the catheter and does not want to remove the catheter in fear of another difficult catheterization. She denies any other  complaints at this time. Of note, she reports to severe constipation and poor ambulation. She can ambulate however states she is "too lazy" to do so.     Vital Signs Last 24 Hrs  T(C): 36.7 (11 Feb 2021 16:27), Max: 37.2 (10 Feb 2021 21:00)  T(F): 98.1 (11 Feb 2021 16:27), Max: 99 (10 Feb 2021 21:00)  HR: 76 (11 Feb 2021 16:27) (76 - 106)  BP: 101/60 (11 Feb 2021 16:27) (101/60 - 126/74)  BP(mean): --  RR: 20 (11 Feb 2021 16:27) (18 - 20)  SpO2: 95% (11 Feb 2021 16:27) (94% - 96%)  I&O's Summary    10 Feb 2021 07:01  -  11 Feb 2021 07:00  --------------------------------------------------------  IN: 0 mL / OUT: 450 mL / NET: -450 mL        PE:  Gen: NAD  Abd: soft, nontender. non distended  : 16fr catheter in draining yellow clear urine    LABS:                        7.8    6.74  )-----------( 498      ( 11 Feb 2021 10:34 )             24.7     02-11    133<L>  |  106  |  15  ----------------------------<  87  4.3   |  19<L>  |  1.07    Ca    7.8<L>      11 Feb 2021 10:34  Phos  3.0     02-11  Mg     1.8     02-11    TPro  6.1  /  Alb  1.9<L>  /  TBili  0.4  /  DBili  x   /  AST  38  /  ALT  26  /  AlkPhos  45  02-10      Cultures      A/P

## 2021-02-11 NOTE — CONSULT NOTE ADULT - PROBLEM SELECTOR RECOMMENDATION 9
-no emergent  intervention indicated at this time  -patient has not been ambulating and suffers from constipation increasing the likelihood of failing TOV  -patient should be discharged home with redmond catheter to leg bag with follow up in 1-2 weeks with Dr. Rogers of  [facility] for TOV and further care  -once ambulation improves and regular BM patient is more likely to have successful TOV  -further  workup outpatient  -rest of care as per primary team  -discussed with  team

## 2021-02-11 NOTE — PROGRESS NOTE ADULT - SUBJECTIVE AND OBJECTIVE BOX
SUBJECTIVE/OVERNIGHT EVENTS:  No acute overnight events. Pt seen in Am at bedside, resting in bed, and does not appear to be in acute distress. She reports persistent abdominal pain and poor appetite. She otherwise denies fever, chills, headache, changes in vision/hearing, sob, chest pain, extremity pain or swelling    VITAL SIGNS:  Vital Signs Last 24 Hrs  T(C): 36.7 (11 Feb 2021 16:27), Max: 37.2 (10 Feb 2021 21:00)  T(F): 98.1 (11 Feb 2021 16:27), Max: 99 (10 Feb 2021 21:00)  HR: 76 (11 Feb 2021 16:27) (76 - 106)  BP: 101/60 (11 Feb 2021 16:27) (101/60 - 126/74)  BP(mean): --  RR: 20 (11 Feb 2021 16:27) (18 - 20)  SpO2: 95% (11 Feb 2021 16:27) (94% - 96%)    PHYSICAL EXAM:  Constitutional: WDWN resting in bed; in no acute distress but slightly uncomfortable appearing  HEENT: NCAT, EOMI, anicteric sclera, no nasal discharge; uvula midline, no oropharyngeal erythema or exudates; MMM  Respiratory: CTA B/L; no W/R/R, no retractions; breathing 24/min with abdominal recruitment on presentation  Cardiac: +S1/S2; RRR; no M/R/G  Gastrointestinal: abdomen soft, tender to light palpation evenly across epigastric, RUQ, RLQ, and suprapubic regions, no rebound  Extremities: WWP, no clubbing or cyanosis; no peripheral edema  Vascular: 2+ radial, femoral, DP/PT pulses B/L  Neurologic: AAOx3; CNII-XII grossly intact; moves extremities to command, 5/5 UE strength and can independently turn from back to side    MEDICATIONS:  MEDICATIONS  (STANDING):  cefepime   IVPB      cefepime   IVPB 1000 milliGRAM(s) IV Intermittent every 8 hours  enoxaparin Injectable 30 milliGRAM(s) SubCutaneous every 24 hours  ferrous    sulfate 325 milliGRAM(s) Oral daily  folic acid 1 milliGRAM(s) Oral daily  influenza  Vaccine (HIGH DOSE) 0.7 milliLiter(s) IntraMuscular once  lidocaine   Patch 1 Patch Transdermal daily  megestrol 40 milliGRAM(s) Oral three times a day  multivitamin 1 Tablet(s) Oral daily  pantoprazole  Injectable 40 milliGRAM(s) IV Push every 12 hours  polyethylene glycol 3350 17 Gram(s) Oral every 12 hours  senna 2 Tablet(s) Oral at bedtime    MEDICATIONS  (PRN):  acetaminophen   Tablet .. 650 milliGRAM(s) Oral every 8 hours PRN Temp greater or equal to 38C (100.4F), Mild Pain (1 - 3)  albuterol/ipratropium for Nebulization 3 milliLiter(s) Nebulizer every 6 hours PRN Shortness of Breath and/or Wheezing  bisacodyl Suppository 10 milliGRAM(s) Rectal daily PRN Constipation  ibuprofen  Tablet. 400 milliGRAM(s) Oral every 6 hours PRN Moderate Pain (4 - 6)      ALLERGIES:  Allergies    No Known Allergies    Intolerances        LABS:                        7.8    6.74  )-----------( 498      ( 11 Feb 2021 10:34 )             24.7     02-11    133<L>  |  106  |  15  ----------------------------<  87  4.3   |  19<L>  |  1.07    Ca    7.8<L>      11 Feb 2021 10:34  Phos  3.0     02-11  Mg     1.8     02-11    TPro  6.1  /  Alb  1.9<L>  /  TBili  0.4  /  DBili  x   /  AST  38  /  ALT  26  /  AlkPhos  45  02-10        RADIOLOGY & ADDITIONAL TESTS: Reviewed.

## 2021-02-11 NOTE — CONSULT NOTE ADULT - ASSESSMENT
86 F with PMHx COPD, DNR/DNI, recently diagnosed adenocarcinoma s/p radiation ~12/2020 now receiving chemo every other week with Dr. Jones, admitted for anemia c/f upper GI bleed since resolved. Failed TOV x 2, 16 fr redmond inserted by primary team.

## 2021-02-11 NOTE — PROGRESS NOTE ADULT - ASSESSMENT
The patient will need to go to a physical therapy rehabilitative Center in order to get control of her bladder and bowel.  The patient must be maintained on 5000 units of vitamin-D daily with any zinc tablet to protect her from  corona virus.

## 2021-02-11 NOTE — PROGRESS NOTE ADULT - ASSESSMENT
ASSESSMENT/PLAN 84 y/o female pt c worsening SOB, RUL and Central mediastinal mass poorly differentiated carcinoma favored adenocarcinoma. admitted for severe anemia c/f upper GI bleed, possible aspiration pneumonia vs lymphangitic spread.    1. O2 2LNC humidified  2. Bronchodilators:  Atrovent/ albuterol q 4 – 6 hours as needed  3. Corticosteroids:  now  off  4. ID/Antibiotics:  continue ABX, Zithromax, cefepime follow sputum CX,  UCX, RVP, pt is COVID NEG. legionella NEG, request ID feedback  5. Cardiac/HTN: optimize BP  6. GI: Rx/ prophylaxis c PPI/H2B, GI   7. Heme: Rx/VT prophylaxis c SQH/SCD/ now on Enoxaparin follow on , PET CT  8. Aspiration precautions at all times  9. Mobilize, OOB  10. Use incentive spirometer  11. Nutritional Consult and support  12 Manage hypo Na  Discussed with managing team,

## 2021-02-11 NOTE — PROGRESS NOTE ADULT - SUBJECTIVE AND OBJECTIVE BOX
the patient is more animated today and seemingly less agitated.  She does not want the Schumacher catheter to be removed but I have informed her that her ability to control her urine will depend on her ability to walk and that she needs to walk to get control of her bladder.  She has had a number of episodes where the Schumacher catheter was removed and she had urinary retention.    Skin: Normal turgor. No rash noted. No lesions.   Eyes: Eyes with PERRLA. EOM's in­tact Sclera clear,no jaundice noted. Conjunctiva clear.   Ears, Nose,Mouth & Throat: Teeth normal, no missing teeth or dentures present. No ulcers. No thrush Auditory canals normal.  Description: %% Oropharynx without lesions. Mucositis None   Neck: No thyromegaly. No lymphadenopathy noted.   Cardiovascular: S1, S2 without murmurs. No Extrasystole. Tachycardia. No breast masses appreciated. No axillary lymphadenopathy.  No skin changes. No nipple discharge present. No dimpling noted.  Chest/Respiratory: No dyspnea. No rhonchi. No wheezing. No decreased breath sounds at bases. No rales.  The breath sounds are somewhat coarse.    The patient has difficulty getting secretions up. Unlabored breathing. No  accessory muscle use. Abdomen is soft and non­tender to touch. No hepatomegaly noted. No splenomegaly noted. No abdominal pain or  guarding noted. No abdominal mass(es) appreciated. No ascites noted.   Genito­Urinary: Deferred.   The patient has a Schumacher catheter to a gravity drainage bag.  Hematologic/Lymphatic: No petechiae noted. No purpura noted. No lymphadenopathy noted.   Musculoskeletal: No Kyphosis. No weakness. No spinal tenderness on percussion. No pain on hip rotation reported by patient. Normal  range of motion in upper and lower extremities.

## 2021-02-11 NOTE — PROGRESS NOTE ADULT - SUBJECTIVE AND OBJECTIVE BOX
INTERVAL HPI/OVERNIGHT EVENTS:    ANTIBIOTICS/RELEVANT:    MEDICATIONS  (STANDING):  cefepime   IVPB      cefepime   IVPB 1000 milliGRAM(s) IV Intermittent every 8 hours  enoxaparin Injectable 30 milliGRAM(s) SubCutaneous every 24 hours  ferrous    sulfate 325 milliGRAM(s) Oral daily  folic acid 1 milliGRAM(s) Oral daily  influenza  Vaccine (HIGH DOSE) 0.7 milliLiter(s) IntraMuscular once  lidocaine   Patch 1 Patch Transdermal daily  magnesium sulfate  IVPB 2 Gram(s) IV Intermittent once  megestrol 40 milliGRAM(s) Oral three times a day  multivitamin 1 Tablet(s) Oral daily  pantoprazole  Injectable 40 milliGRAM(s) IV Push every 12 hours  polyethylene glycol 3350 17 Gram(s) Oral every 12 hours  senna 2 Tablet(s) Oral at bedtime    MEDICATIONS  (PRN):  acetaminophen   Tablet .. 650 milliGRAM(s) Oral every 8 hours PRN Temp greater or equal to 38C (100.4F), Mild Pain (1 - 3)  albuterol/ipratropium for Nebulization 3 milliLiter(s) Nebulizer every 6 hours PRN Shortness of Breath and/or Wheezing  bisacodyl Suppository 10 milliGRAM(s) Rectal daily PRN Constipation  ibuprofen  Tablet. 400 milliGRAM(s) Oral every 6 hours PRN Moderate Pain (4 - 6)      Allergies    No Known Allergies    Intolerances        Vital Signs Last 24 Hrs  T(C): 37.1 (11 Feb 2021 10:21), Max: 37.2 (10 Feb 2021 21:00)  T(F): 98.7 (11 Feb 2021 10:21), Max: 99 (10 Feb 2021 21:00)  HR: 84 (11 Feb 2021 10:21) (84 - 106)  BP: 105/57 (11 Feb 2021 10:21) (105/57 - 126/74)  BP(mean): --  RR: 20 (11 Feb 2021 10:21) (18 - 20)  SpO2: 94% (11 Feb 2021 10:21) (94% - 96%)        LABS:                        7.8    6.74  )-----------( 498      ( 11 Feb 2021 10:34 )             24.7     02-11    133<L>  |  106  |  15  ----------------------------<  87  4.3   |  19<L>  |  1.07    Ca    7.8<L>      11 Feb 2021 10:34  Phos  3.0     02-11  Mg     1.8     02-11    TPro  6.1  /  Alb  1.9<L>  /  TBili  0.4  /  DBili  x   /  AST  38  /  ALT  26  /  AlkPhos  45  02-10          MICROBIOLOGY:    RADIOLOGY & ADDITIONAL STUDIES:    CT Abdomen and Pelvis w/ IV Cont (02.10.21 @ 18:20) >  EXAM:  CT ABDOMEN AND PELVIS IC                          PROCEDURE DATE:  02/10/2021          INTERPRETATION:  CLINICAL INFORMATION: Persistent abdominal pain, lung adenocarcinoma, admitted for symptomatic anemia    COMPARISON: PET/CT to 521, chestCT to 3/20/2021    PROCEDURE:  CT of the Abdomen and Pelvis was performed with intravenous contrast.  Intravenous contrast: 90 ml Omnipaque 350. 10 ml discarded.  Oral contrast: None.  Sagittal and coronal reformats were performed.    FINDINGS:    Lower chest: Bilateral small pleural effusions with passive atelectases.    Liver: Normal in size and morphology. No focal abnormality. The main portal vein is patent.  Gallbladder and biliary ducts: No gallstones. No intra/extrahepatic biliary ductal dilatation.  Spleen: Within normal limits.  Pancreas: Within normal limits.  Adrenals: Within normal limits.  Kidneys/ureters: No hydronephrosis. No urinary calculi. Left renal cysts.    Bladder: Within normal limits.  Reproductive organs: Uterus and adnexa are within normal limits.    Bowel: Moderately distended rectum measuring up to 9 cm in caliber. The entire colon is fluid-filled demonstrating air-fluid level, without significant distention or bowel wall thickening. Multiple fluid-filled smallbowel loops without mural thickening. Hyperdensity in the second portion of duodenum may be ingested material versus surgical material. Focal dependent hyperdensity in the ascending colon (3:61-64).  Peritoneum/retroperitoneum: No ascites. No free air. Mild presacral fat stranding.  Vasculature:  The aorta and its branches are normal in caliber. Moderate to severe aortic calcification in the abdominal aorta and iliac arteries. Severe ostial stenosis of celiac artery.  Lymph nodes: Lymph nodes arenot enlarged.  Bones and soft tissue: Degenerative changes in the spine.      IMPRESSION:  No evidence of hemoperitoneum or retroperitoneal hemorrhage. Limited evaluation of gastrointestinal bleeding without precontrast images.  1.  Distended rectum with mild presacral fat stranding, without bowel wall thickening, which may reflect early stercoral proctitis.  2.  Fluid-filled colon and small bowel, which can be seen in patients with diarrhea. Possible mild enteritis. clinical correlation is recommended.  3.  Focal dependent hyperdensity in the ascending colon (3:61-64), of unclear clinical significance.

## 2021-02-11 NOTE — PROGRESS NOTE ADULT - SUBJECTIVE AND OBJECTIVE BOX
Interventional, Pulmonary, Critical, Chest Special Procedures.    Pt was seen and fully examined by myself.     Time spent with patient in minutes:35    Patient is a 86y old  Female who presents with a chief complaint of Anemia, GI Bleed (11 Feb 2021 17:22) The patient anhedonic and ill appearing, answering questions, not really engaging.    HPI:  86 F with PMHx COPD on no home O2, DNR/DNI, recently diagnosed lung adenocarcinoma November 2020 presented to ED with 2 weeks of black tarry stools, weakness, dyspnea on exertion, abdominal pain, poor PO tolerance, and dry cough. She has been getting chemotherapy every other week with last dose 1/25/21, she skipped Monday 2/1/21 because of the snow storm. She was recently seen on 1/29/21 by her PCP Dr. Alena Worthington who recommended that she come to the ED if her symptoms continued to worsen. She denies lightheadedness, syncope, visual changes, chest pain, hemoptysis, diarrhea, focal weakness/numbness, dysuria. She reports urinary incontinence over the last few weeks and constipation with no BM for 2-3 days.  She lives alone with a home health aide who was present on admission. Her aide works with her 8 hours/day 5 days/week and receives assistance primarily with IADLs but occasionally needs help with toileting.  Patient notably has active MOLST completed with PCP. Patient is DNR/DNI and does not want pressors, but would want noninvasive ventilation, blood transfusions, IV fluids, and IV antibiotics.    In the ED:  Vitals: T 98.3->99.3, , /80->97/57, RR 28->20, SpO2 97% RA  Labs sig for: WBC 10.52, Hgb 7.6, plt 581, INR 1.29, cr 1.41 (baseline 1), albumin 2.8, AST/ALT 61/51, Lactate 2.1, VBG Ph 7.46/PCO2 34  EKG: sinus tachy  Imaging:    CXR: no acute infiltrate or consolidation    CTA: No pulmonary embolus. New few small scattered nodular opacities bilaterally with more confluent groundglass density and suspected interlobular septal thickening in posterior right upper lobe.  Medication: 500cc NSx2 (03 Feb 2021 16:24)      REVIEW OF SYSTEMS:  Constitutional: No fever, +weight loss, profound  fatigue  Eyes: No eye pain, visual disturbances, or discharge  ENMT:  + difficulty hearing, tinnitus, vertigo; No sinus or throat pain. No epistaxis, +dysphagia, dysphonia, hoarseness no  odynophagia  Neck: No pain, stiffness or neck swelling.  No masses or deformities  Respiratory: +cough, no wheezing, hemoptysis  + COPD  - ILD   - PE   - ASTHMA     - PNEUMONIA  Cardiovascular: No chest pain, dysrhythmia, palpitations, dizziness or edema - CAD   - CHF   - HTN  Gastrointestinal: + abdominal or epigastric pain. No nausea, vomiting or hematemesis; No diarrhea or constipation. + melena no Icterus.          Genitourinary: No dysuria, frequency, hematuria + incontinence   - CKD/RIDDHI      - ESRD  Neurological: No headaches, memory loss, loss of strength, numbness or tremors      -DEMENTIA     - STROKE    - SEIZURE  Skin: No itching, burning, rashes or lesions   Lymph Nodes: No enlarged glands  Endocrine: No heat or cold intolerance; No hair loss       - DM     - THYROID DISORDER  Musculoskeletal: No joint pain or swelling; + muscle, +back or extremity pain, No edema  Psychiatric: No depression, anxiety, mood swings or difficulty sleeping  Heme/Lymph: No easy bruising or bleeding gums         + ANEMIA      + CANCER   -COAGULOPATHY    PAST MEDICAL & SURGICAL HISTORY:  Chronic obstructive pulmonary disease, unspecified COPD type    Hearing loss  left ear - sees Dr. Garces ENT    Basal cell carcinoma  x2 - upper lip and arm    Chronic obstructive pulmonary disease    Osteoporosis  Osteoporosis    H/O breast biopsy  &gt; 5 years ago, mass was benign and self-resolved    History of appendectomy    History of cataract surgery      FAMILY HISTORY:    SOCIAL HISTORY:      - Tobacco     - ETOH    Allergies    No Known Allergies    Intolerances      Vital Signs Last 24 Hrs  T(C): 36.7 (11 Feb 2021 16:27), Max: 37.2 (10 Feb 2021 21:00)  T(F): 98.1 (11 Feb 2021 16:27), Max: 99 (10 Feb 2021 21:00)  HR: 76 (11 Feb 2021 16:27) (76 - 106)  BP: 101/60 (11 Feb 2021 16:27) (101/60 - 126/74)  BP(mean): --  RR: 20 (11 Feb 2021 16:27) (18 - 20)  SpO2: 95% (11 Feb 2021 16:27) (94% - 96%)    02-10 @ 07:01  -  02-11 @ 07:00  --------------------------------------------------------  IN: 0 mL / OUT: 450 mL / NET: -450 mL          MEDICATIONS:  MEDICATIONS  (STANDING):  cefepime   IVPB      cefepime   IVPB 1000 milliGRAM(s) IV Intermittent every 8 hours  enoxaparin Injectable 30 milliGRAM(s) SubCutaneous every 24 hours  ferrous    sulfate 325 milliGRAM(s) Oral daily  folic acid 1 milliGRAM(s) Oral daily  influenza  Vaccine (HIGH DOSE) 0.7 milliLiter(s) IntraMuscular once  lidocaine   Patch 1 Patch Transdermal daily  megestrol 40 milliGRAM(s) Oral three times a day  multivitamin 1 Tablet(s) Oral daily  pantoprazole  Injectable 40 milliGRAM(s) IV Push every 12 hours  polyethylene glycol 3350 17 Gram(s) Oral every 12 hours  senna 2 Tablet(s) Oral at bedtime    MEDICATIONS  (PRN):  acetaminophen   Tablet .. 650 milliGRAM(s) Oral every 8 hours PRN Temp greater or equal to 38C (100.4F), Mild Pain (1 - 3)  albuterol/ipratropium for Nebulization 3 milliLiter(s) Nebulizer every 6 hours PRN Shortness of Breath and/or Wheezing  bisacodyl Suppository 10 milliGRAM(s) Rectal daily PRN Constipation  ibuprofen  Tablet. 400 milliGRAM(s) Oral every 6 hours PRN Moderate Pain (4 - 6)      PHYSICAL EXAM:  Un Comfortable, no immediate distress  Eyes: PERRL, EOM intact; conjunctiva and sclera clear  Head: Normocephalic;  No Trauma  ENMT: No nasal discharge, +hoarseness, cough + hemoptysis  Neck: Supple; non tender; no masses or deformities.    No JVD  Respiratory:  - WHEEZING   + scattered  RHONCHI  - RALES  - CRACKLES.  Diminished breath sounds  BILATERAL  RIGHT  LEFT bases   Cardiovascular: Regular rate and rhythm. S1 and S2 Normal; No murmurs, gallops or rubs     - PPM/AICD  Gastrointestinal: Soft mildly tender, non-distended; Normal bowel sounds; No hepatosplenomegaly.     -PEG    -  GT   + ALVES  Genitourinary: No costovertebral angle tenderness. No dysuria  Extremities: AROM, No clubbing, cyanosis or edema    Vascular: Peripheral pulses palpable 2+ bilaterally  Neurological: Alert and responisve to stimuli   Skin: Warm and dry. No obvious rash  Lymph Nodes: No acute cervical or supraclavicular adenopathy  Psychiatric: Cooperative and appropriate mood    DEVICES:  - DENTURES   +IV R / L     - ETUBE   -TRACH   -CTUBE  R / L    LABS:                          7.8    6.74  )-----------( 498      ( 11 Feb 2021 10:34 )             24.7     02-11    133<L>  |  106  |  15  ----------------------------<  87  4.3   |  19<L>  |  1.07    Ca    7.8<L>      11 Feb 2021 10:34  Phos  3.0     02-11  Mg     1.8     02-11    TPro  6.1  /  Alb  1.9<L>  /  TBili  0.4  /  DBili  x   /  AST  38  /  ALT  26  /  AlkPhos  45  02-10      RADIOLOGY & ADDITIONAL STUDIES (The following images were personally reviewed):

## 2021-02-11 NOTE — CONSULT NOTE ADULT - CONSULT REASON
Failed TOV
Rehab evaluation
Fever
the patient was seen and examined in the emergency room on February 3rd and was seen again yesterday but for technical reasons my history and physical did not appear into the [facility] medical record

## 2021-02-12 LAB
ANION GAP SERPL CALC-SCNC: 9 MMOL/L — SIGNIFICANT CHANGE UP (ref 5–17)
BUN SERPL-MCNC: 19 MG/DL — SIGNIFICANT CHANGE UP (ref 7–23)
CALCIUM SERPL-MCNC: 8.3 MG/DL — LOW (ref 8.4–10.5)
CHLORIDE SERPL-SCNC: 104 MMOL/L — SIGNIFICANT CHANGE UP (ref 96–108)
CO2 SERPL-SCNC: 19 MMOL/L — LOW (ref 22–31)
CREAT SERPL-MCNC: 1.1 MG/DL — SIGNIFICANT CHANGE UP (ref 0.5–1.3)
GLUCOSE SERPL-MCNC: 92 MG/DL — SIGNIFICANT CHANGE UP (ref 70–99)
HCT VFR BLD CALC: 25.6 % — LOW (ref 34.5–45)
HGB BLD-MCNC: 7.8 G/DL — LOW (ref 11.5–15.5)
MAGNESIUM SERPL-MCNC: 2.3 MG/DL — SIGNIFICANT CHANGE UP (ref 1.6–2.6)
MCHC RBC-ENTMCNC: 26.1 PG — LOW (ref 27–34)
MCHC RBC-ENTMCNC: 30.5 GM/DL — LOW (ref 32–36)
MCV RBC AUTO: 85.6 FL — SIGNIFICANT CHANGE UP (ref 80–100)
NRBC # BLD: 0 /100 WBCS — SIGNIFICANT CHANGE UP (ref 0–0)
PHOSPHATE SERPL-MCNC: 2.7 MG/DL — SIGNIFICANT CHANGE UP (ref 2.5–4.5)
PLATELET # BLD AUTO: 483 K/UL — HIGH (ref 150–400)
POTASSIUM SERPL-MCNC: 4.6 MMOL/L — SIGNIFICANT CHANGE UP (ref 3.5–5.3)
POTASSIUM SERPL-SCNC: 4.6 MMOL/L — SIGNIFICANT CHANGE UP (ref 3.5–5.3)
RBC # BLD: 2.99 M/UL — LOW (ref 3.8–5.2)
RBC # FLD: 16.6 % — HIGH (ref 10.3–14.5)
SODIUM SERPL-SCNC: 132 MMOL/L — LOW (ref 135–145)
WBC # BLD: 7.7 K/UL — SIGNIFICANT CHANGE UP (ref 3.8–10.5)
WBC # FLD AUTO: 7.7 K/UL — SIGNIFICANT CHANGE UP (ref 3.8–10.5)

## 2021-02-12 RX ORDER — SODIUM CHLORIDE 9 MG/ML
500 INJECTION INTRAMUSCULAR; INTRAVENOUS; SUBCUTANEOUS ONCE
Refills: 0 | Status: COMPLETED | OUTPATIENT
Start: 2021-02-12 | End: 2021-02-12

## 2021-02-12 RX ORDER — PSYLLIUM SEED (WITH DEXTROSE)
1 POWDER (GRAM) ORAL
Refills: 0 | Status: DISCONTINUED | OUTPATIENT
Start: 2021-02-12 | End: 2021-02-17

## 2021-02-12 RX ADMIN — Medication 1 PACKET(S): at 18:30

## 2021-02-12 RX ADMIN — PANTOPRAZOLE SODIUM 40 MILLIGRAM(S): 20 TABLET, DELAYED RELEASE ORAL at 18:30

## 2021-02-12 RX ADMIN — Medication 1 PACKET(S): at 14:14

## 2021-02-12 RX ADMIN — AZITHROMYCIN 250 MILLIGRAM(S): 500 TABLET, FILM COATED ORAL at 14:12

## 2021-02-12 RX ADMIN — Medication 1 MILLIGRAM(S): at 14:12

## 2021-02-12 RX ADMIN — PANTOPRAZOLE SODIUM 40 MILLIGRAM(S): 20 TABLET, DELAYED RELEASE ORAL at 06:25

## 2021-02-12 RX ADMIN — MEGESTROL ACETATE 40 MILLIGRAM(S): 40 SUSPENSION ORAL at 06:26

## 2021-02-12 RX ADMIN — ENOXAPARIN SODIUM 30 MILLIGRAM(S): 100 INJECTION SUBCUTANEOUS at 22:13

## 2021-02-12 RX ADMIN — Medication 400 MILLIGRAM(S): at 05:05

## 2021-02-12 RX ADMIN — POLYETHYLENE GLYCOL 3350 17 GRAM(S): 17 POWDER, FOR SOLUTION ORAL at 06:25

## 2021-02-12 RX ADMIN — Medication 325 MILLIGRAM(S): at 14:12

## 2021-02-12 RX ADMIN — MEGESTROL ACETATE 40 MILLIGRAM(S): 40 SUSPENSION ORAL at 14:13

## 2021-02-12 RX ADMIN — POLYETHYLENE GLYCOL 3350 17 GRAM(S): 17 POWDER, FOR SOLUTION ORAL at 18:30

## 2021-02-12 RX ADMIN — LIDOCAINE 1 PATCH: 4 CREAM TOPICAL at 14:13

## 2021-02-12 RX ADMIN — SENNA PLUS 2 TABLET(S): 8.6 TABLET ORAL at 22:12

## 2021-02-12 RX ADMIN — Medication 1 TABLET(S): at 14:12

## 2021-02-12 RX ADMIN — LIDOCAINE 1 PATCH: 4 CREAM TOPICAL at 20:06

## 2021-02-12 RX ADMIN — Medication 400 MILLIGRAM(S): at 22:12

## 2021-02-12 RX ADMIN — CEFEPIME 100 MILLIGRAM(S): 1 INJECTION, POWDER, FOR SOLUTION INTRAMUSCULAR; INTRAVENOUS at 06:25

## 2021-02-12 RX ADMIN — MEGESTROL ACETATE 40 MILLIGRAM(S): 40 SUSPENSION ORAL at 22:12

## 2021-02-12 RX ADMIN — SODIUM CHLORIDE 2000 MILLILITER(S): 9 INJECTION INTRAMUSCULAR; INTRAVENOUS; SUBCUTANEOUS at 09:11

## 2021-02-12 NOTE — PROGRESS NOTE ADULT - SUBJECTIVE AND OBJECTIVE BOX
coverage for Dr Angel    Pt seen and examined   no new complaints  appetite better  cough less  still with low abdominal pain    REVIEW OF SYSTEMS:  Constitutional: No fever,   Cardiovascular: No chest pain, palpitations, dizziness   Gastrointestinal: same pain. No nausea, ; No diarrhea   Skin: No itching, burning, rashes or lesions       MEDICATIONS:  MEDICATIONS  (STANDING):  azithromycin   Tablet 250 milliGRAM(s) Oral daily  enoxaparin Injectable 30 milliGRAM(s) SubCutaneous every 24 hours  ferrous    sulfate 325 milliGRAM(s) Oral daily  folic acid 1 milliGRAM(s) Oral daily  influenza  Vaccine (HIGH DOSE) 0.7 milliLiter(s) IntraMuscular once  lidocaine   Patch 1 Patch Transdermal daily  megestrol 40 milliGRAM(s) Oral three times a day  multivitamin 1 Tablet(s) Oral daily  pantoprazole  Injectable 40 milliGRAM(s) IV Push every 12 hours  polyethylene glycol 3350 17 Gram(s) Oral every 12 hours  psyllium Powder 1 Packet(s) Oral two times a day  senna 2 Tablet(s) Oral at bedtime    MEDICATIONS  (PRN):  acetaminophen   Tablet .. 650 milliGRAM(s) Oral every 8 hours PRN Temp greater or equal to 38C (100.4F), Mild Pain (1 - 3)  albuterol/ipratropium for Nebulization 3 milliLiter(s) Nebulizer every 6 hours PRN Shortness of Breath and/or Wheezing  bisacodyl Suppository 10 milliGRAM(s) Rectal daily PRN Constipation  ibuprofen  Tablet. 400 milliGRAM(s) Oral every 6 hours PRN Moderate Pain (4 - 6)      Allergies    No Known Allergies    Intolerances        Vital Signs Last 24 Hrs  T(C): 36.6 (12 Feb 2021 14:00), Max: 36.7 (11 Feb 2021 16:27)  T(F): 97.9 (12 Feb 2021 14:00), Max: 98.1 (11 Feb 2021 16:27)  HR: 75 (12 Feb 2021 14:00) (75 - 94)  BP: 103/52 (12 Feb 2021 14:00) (101/60 - 125/69)  BP(mean): --  RR: 18 (12 Feb 2021 14:00) (18 - 20)  SpO2: 98% (12 Feb 2021 14:00) (93% - 98%)    02-11 @ 07:01  -  02-12 @ 07:00  --------------------------------------------------------  IN: 100 mL / OUT: 0 mL / NET: 100 mL        PHYSICAL EXAM:    General: thin; in no acute distress  HEENT: MMM, conjunctiva and sclera clear  Lungs: clear  Heart: regular  Gastrointestinal: Soft non-tender non-distended; Normal bowel sounds;   Skin: Warm and dry. No obvious rash    LABS:      CBC Full  -  ( 12 Feb 2021 07:50 )  WBC Count : 7.70 K/uL  RBC Count : 2.99 M/uL  Hemoglobin : 7.8 g/dL  Hematocrit : 25.6 %  Platelet Count - Automated : 483 K/uL  Mean Cell Volume : 85.6 fl  Mean Cell Hemoglobin : 26.1 pg  Mean Cell Hemoglobin Concentration : 30.5 gm/dL  Auto Neutrophil # : x  Auto Lymphocyte # : x  Auto Monocyte # : x  Auto Eosinophil # : x  Auto Basophil # : x  Auto Neutrophil % : x  Auto Lymphocyte % : x  Auto Monocyte % : x  Auto Eosinophil % : x  Auto Basophil % : x    02-12    132<L>  |  104  |  19  ----------------------------<  92  4.6   |  19<L>  |  1.10    Ca    8.3<L>      12 Feb 2021 07:50  Phos  2.7     02-12  Mg     2.3     02-12                        RADIOLOGY & ADDITIONAL STUDIES (The following images were personally reviewed):

## 2021-02-12 NOTE — PROGRESS NOTE ADULT - ASSESSMENT
RECOMMEND  D/C Cefepime  Stool for Cdiff and GI PCR if diarrhea confirmed  GI input / consult  Continue Azithromycin for 2-3 more days Pneumonia possibly due to Chlamydia pneumoniae  Abdominal pain  Dairrhea      RECOMMEND  D/C Cefepime  Stool for Cdiff and GI PCR if diarrhea confirmed  GI input / consult  Continue Azithromycin for 2-3 more days

## 2021-02-12 NOTE — PROGRESS NOTE ADULT - ASSESSMENT
The patient needs additional help in getting rehabilitative for a home discharge.  The patient should have extra night clothes so that she is warm out of bed.  She spends a large amount of time in bed covered with blankets.  The patient needs to have that a.m. cortisol to be sure she does not have adrenal insufficiency.  The patient should have a chair so that she can get up and out of the chair easily to go to the bathroom without nursing assistance if her station and gait allows for this liberty.

## 2021-02-12 NOTE — PROGRESS NOTE ADULT - SUBJECTIVE AND OBJECTIVE BOX
the patient has more complaints today and that probably is a good sign that she is getting better.  She spends a lot of time in bed under a large number of blankets and would like extra close to ambulate in the hallway.  She also would like to have a chair so she could sit in the chair and go to the bathroom on her own.    the patient is more animated today and seemingly less agitated.  She does not want the Schumacher catheter to be removed but I have informed her that her ability to control her urine will depend on her ability to walk and that she needs to walk to get control of her bladder.  She has had a number of episodes where the Schumacher catheter was removed and she had urinary retention.    Skin: Normal turgor. No rash noted. No lesions.   Eyes: Eyes with PERRLA. EOM's in­tact Sclera clear,no jaundice noted. Conjunctiva clear.   Ears, Nose,Mouth & Throat: Teeth normal, no missing teeth or dentures present. No ulcers. No thrush Auditory canals normal.  Description: %% Oropharynx without lesions. Mucositis None   Neck: No thyromegaly. No lymphadenopathy noted.   Cardiovascular: S1, S2 without murmurs. No Extrasystole. Tachycardia. No breast masses appreciated. No axillary lymphadenopathy.  No skin changes. No nipple discharge present. No dimpling noted.  Chest/Respiratory: No dyspnea. No rhonchi. No wheezing. No decreased breath sounds at bases. No rales.  The breath sounds are somewhat coarse.    The patient has difficulty getting secretions up. Unlabored breathing. No  accessory muscle use. Abdomen is soft and non­tender to touch. No hepatomegaly noted. No splenomegaly noted. No abdominal pain or  guarding noted. No abdominal mass(es) appreciated. No ascites noted.   Genito­Urinary: Deferred.   The patient has a Schumacher catheter to a gravity drainage bag.  Hematologic/Lymphatic: No petechiae noted. No purpura noted. No lymphadenopathy noted.   Musculoskeletal: No Kyphosis. No weakness. No spinal tenderness on percussion. No pain on hip rotation reported by patient. Normal  range of motion in upper and lower extremities.

## 2021-02-12 NOTE — PROGRESS NOTE ADULT - SUBJECTIVE AND OBJECTIVE BOX
OVERNIGHT EVENTS:    SUBJECTIVE / INTERVAL HPI: Patient seen and examined at bedside.     VITAL SIGNS:  Vital Signs Last 24 Hrs  T(C): 36.4 (12 Feb 2021 05:31), Max: 37.1 (11 Feb 2021 10:21)  T(F): 97.6 (12 Feb 2021 05:31), Max: 98.7 (11 Feb 2021 10:21)  HR: 94 (12 Feb 2021 05:31) (76 - 94)  BP: 125/69 (12 Feb 2021 05:31) (101/60 - 125/69)  BP(mean): --  RR: 20 (12 Feb 2021 05:31) (20 - 20)  SpO2: 94% (12 Feb 2021 05:31) (93% - 95%)    PHYSICAL EXAM:    General: Well developed, well nourished, no acute distress  HEENT: NC/AT; PERRL, anicteric sclera; MMM  Neck: supple  Cardiovascular: +S1/S2, RRR, no murmurs, rubs, gallops  Respiratory: CTA B/L; no W/R/R  Gastrointestinal: soft, NT/ND; +BSx4  Extremities: WWP; no edema, clubbing or cyanosis  Vascular: 2+ radial, DP/PT pulses B/L  Neurological: AAOx3; no focal deficits    MEDICATIONS:  MEDICATIONS  (STANDING):  azithromycin   Tablet 250 milliGRAM(s) Oral daily  cefepime   IVPB      cefepime   IVPB 1000 milliGRAM(s) IV Intermittent every 8 hours  enoxaparin Injectable 30 milliGRAM(s) SubCutaneous every 24 hours  ferrous    sulfate 325 milliGRAM(s) Oral daily  folic acid 1 milliGRAM(s) Oral daily  influenza  Vaccine (HIGH DOSE) 0.7 milliLiter(s) IntraMuscular once  lidocaine   Patch 1 Patch Transdermal daily  megestrol 40 milliGRAM(s) Oral three times a day  multivitamin 1 Tablet(s) Oral daily  pantoprazole  Injectable 40 milliGRAM(s) IV Push every 12 hours  polyethylene glycol 3350 17 Gram(s) Oral every 12 hours  psyllium Powder 1 Packet(s) Oral two times a day  senna 2 Tablet(s) Oral at bedtime    MEDICATIONS  (PRN):  acetaminophen   Tablet .. 650 milliGRAM(s) Oral every 8 hours PRN Temp greater or equal to 38C (100.4F), Mild Pain (1 - 3)  albuterol/ipratropium for Nebulization 3 milliLiter(s) Nebulizer every 6 hours PRN Shortness of Breath and/or Wheezing  bisacodyl Suppository 10 milliGRAM(s) Rectal daily PRN Constipation  ibuprofen  Tablet. 400 milliGRAM(s) Oral every 6 hours PRN Moderate Pain (4 - 6)      ALLERGIES:  Allergies    No Known Allergies    Intolerances        LABS:                        7.8    7.70  )-----------( 483      ( 12 Feb 2021 07:50 )             25.6     02-12    132<L>  |  104  |  19  ----------------------------<  92  4.6   |  19<L>  |  1.10    Ca    8.3<L>      12 Feb 2021 07:50  Phos  2.7     02-12  Mg     2.3     02-12          CAPILLARY BLOOD GLUCOSE          RADIOLOGY & ADDITIONAL TESTS: Reviewed.    PLAN:  OVERNIGHT EVENTS: EZE    SUBJECTIVE / INTERVAL HPI: Patient seen and examined at bedside. She was sleeping in bed complaining of abdominal pain. She denies fever, chills, headache, changes in vision/hearing, sob, chest pain, extremity pain or swelling       VITAL SIGNS:  Vital Signs Last 24 Hrs  T(C): 36.4 (12 Feb 2021 05:31), Max: 37.1 (11 Feb 2021 10:21)  T(F): 97.6 (12 Feb 2021 05:31), Max: 98.7 (11 Feb 2021 10:21)  HR: 94 (12 Feb 2021 05:31) (76 - 94)  BP: 125/69 (12 Feb 2021 05:31) (101/60 - 125/69)  BP(mean): --  RR: 20 (12 Feb 2021 05:31) (20 - 20)  SpO2: 94% (12 Feb 2021 05:31) (93% - 95%)    PHYSICAL EXAM:    General: Well developed, well nourished, no acute distress  HEENT: NC/AT; PERRL, anicteric sclera; MMM  Neck: supple  Cardiovascular: +S1/S2, RRR, no murmurs, rubs, gallops  Respiratory: CTA B/L; no W/R/R  Gastrointestinal: soft, NT/ND; +BSx4  Extremities: WWP; no edema, clubbing or cyanosis  Vascular: 2+ radial, DP/PT pulses B/L  Neurological: AAOx3; no focal deficits    MEDICATIONS:  MEDICATIONS  (STANDING):  azithromycin   Tablet 250 milliGRAM(s) Oral daily  cefepime   IVPB      cefepime   IVPB 1000 milliGRAM(s) IV Intermittent every 8 hours  enoxaparin Injectable 30 milliGRAM(s) SubCutaneous every 24 hours  ferrous    sulfate 325 milliGRAM(s) Oral daily  folic acid 1 milliGRAM(s) Oral daily  influenza  Vaccine (HIGH DOSE) 0.7 milliLiter(s) IntraMuscular once  lidocaine   Patch 1 Patch Transdermal daily  megestrol 40 milliGRAM(s) Oral three times a day  multivitamin 1 Tablet(s) Oral daily  pantoprazole  Injectable 40 milliGRAM(s) IV Push every 12 hours  polyethylene glycol 3350 17 Gram(s) Oral every 12 hours  psyllium Powder 1 Packet(s) Oral two times a day  senna 2 Tablet(s) Oral at bedtime    MEDICATIONS  (PRN):  acetaminophen   Tablet .. 650 milliGRAM(s) Oral every 8 hours PRN Temp greater or equal to 38C (100.4F), Mild Pain (1 - 3)  albuterol/ipratropium for Nebulization 3 milliLiter(s) Nebulizer every 6 hours PRN Shortness of Breath and/or Wheezing  bisacodyl Suppository 10 milliGRAM(s) Rectal daily PRN Constipation  ibuprofen  Tablet. 400 milliGRAM(s) Oral every 6 hours PRN Moderate Pain (4 - 6)      ALLERGIES:  Allergies    No Known Allergies    Intolerances        LABS:                        7.8    7.70  )-----------( 483      ( 12 Feb 2021 07:50 )             25.6     02-12    132<L>  |  104  |  19  ----------------------------<  92  4.6   |  19<L>  |  1.10    Ca    8.3<L>      12 Feb 2021 07:50  Phos  2.7     02-12  Mg     2.3     02-12          CAPILLARY BLOOD GLUCOSE          RADIOLOGY & ADDITIONAL TESTS: Reviewed.    PLAN:      Patient seen and examined at bedside. She was sleeping in bed complaining of abdominal pain. She denies fever, chills, headache, changes in vision/hearing, sob, chest pain, extremity pain or swelling     ICU Vital Signs Last 24 Hrs  T(C): 36.6 (12 Feb 2021 14:00), Max: 36.6 (12 Feb 2021 14:00)  T(F): 97.9 (12 Feb 2021 14:00), Max: 97.9 (12 Feb 2021 14:00)  HR: 75 (12 Feb 2021 14:00) (75 - 94)  BP: 103/52 (12 Feb 2021 14:00) (103/52 - 125/69)  BP(mean): --  ABP: --  ABP(mean): --  RR: 18 (12 Feb 2021 14:00) (18 - 20)  SpO2: 98% (12 Feb 2021 14:00) (93% - 98%)    PHYSICAL EXAM:    General: , no acute distress  HEENT: NC/AT; PERRL, anicteric sclera; MMM  Neck: supple  Cardiovascular: +S1/S2, RRR, no murmurs, rubs, gallops  Respiratory: CTA B/L; no W/R/R  Gastrointestinal: soft, NT/ND; +BSx4  Extremities: WWP; no edema, clubbing or cyanosis  Vascular: 2+ radial, DP/PT pulses B/L  Neurological: AAOx3;     MEDICATIONS:  MEDICATIONS  (STANDING):  azithromycin   Tablet 250 milliGRAM(s) Oral daily  cefepime   IVPB      cefepime   IVPB 1000 milliGRAM(s) IV Intermittent every 8 hours  enoxaparin Injectable 30 milliGRAM(s) SubCutaneous every 24 hours  ferrous    sulfate 325 milliGRAM(s) Oral daily  folic acid 1 milliGRAM(s) Oral daily  influenza  Vaccine (HIGH DOSE) 0.7 milliLiter(s) IntraMuscular once  lidocaine   Patch 1 Patch Transdermal daily  megestrol 40 milliGRAM(s) Oral three times a day  multivitamin 1 Tablet(s) Oral daily  pantoprazole  Injectable 40 milliGRAM(s) IV Push every 12 hours  polyethylene glycol 3350 17 Gram(s) Oral every 12 hours  psyllium Powder 1 Packet(s) Oral two times a day  senna 2 Tablet(s) Oral at bedtime    MEDICATIONS  (PRN):  acetaminophen   Tablet .. 650 milliGRAM(s) Oral every 8 hours PRN Temp greater or equal to 38C (100.4F), Mild Pain (1 - 3)  albuterol/ipratropium for Nebulization 3 milliLiter(s) Nebulizer every 6 hours PRN Shortness of Breath and/or Wheezing  bisacodyl Suppository 10 milliGRAM(s) Rectal daily PRN Constipation  ibuprofen  Tablet. 400 milliGRAM(s) Oral every 6 hours PRN Moderate Pain (4 - 6)      ALLERGIES:  Allergies    No Known Allergies    Intolerances        LABS:                        7.8    7.70  )-----------( 483      ( 12 Feb 2021 07:50 )             25.6     02-12    132<L>  |  104  |  19  ----------------------------<  92  4.6   |  19<L>  |  1.10    Ca    8.3<L>      12 Feb 2021 07:50  Phos  2.7     02-12  Mg     2.3     02-12          CAPILLARY BLOOD GLUCOSE

## 2021-02-12 NOTE — PROGRESS NOTE ADULT - SUBJECTIVE AND OBJECTIVE BOX
INTERVAL HPI/OVERNIGHT EVENTS:    ANTIBIOTICS/RELEVANT:    MEDICATIONS  (STANDING):  azithromycin   Tablet 250 milliGRAM(s) Oral daily  enoxaparin Injectable 30 milliGRAM(s) SubCutaneous every 24 hours  ferrous    sulfate 325 milliGRAM(s) Oral daily  folic acid 1 milliGRAM(s) Oral daily  influenza  Vaccine (HIGH DOSE) 0.7 milliLiter(s) IntraMuscular once  lidocaine   Patch 1 Patch Transdermal daily  megestrol 40 milliGRAM(s) Oral three times a day  multivitamin 1 Tablet(s) Oral daily  pantoprazole  Injectable 40 milliGRAM(s) IV Push every 12 hours  polyethylene glycol 3350 17 Gram(s) Oral every 12 hours  psyllium Powder 1 Packet(s) Oral two times a day  senna 2 Tablet(s) Oral at bedtime    MEDICATIONS  (PRN):  acetaminophen   Tablet .. 650 milliGRAM(s) Oral every 8 hours PRN Temp greater or equal to 38C (100.4F), Mild Pain (1 - 3)  albuterol/ipratropium for Nebulization 3 milliLiter(s) Nebulizer every 6 hours PRN Shortness of Breath and/or Wheezing  bisacodyl Suppository 10 milliGRAM(s) Rectal daily PRN Constipation  ibuprofen  Tablet. 400 milliGRAM(s) Oral every 6 hours PRN Moderate Pain (4 - 6)      Allergies    No Known Allergies    Intolerances        Vital Signs Last 24 Hrs  T(C): 36.4 (12 Feb 2021 05:31), Max: 36.7 (11 Feb 2021 16:27)  T(F): 97.6 (12 Feb 2021 05:31), Max: 98.1 (11 Feb 2021 16:27)  HR: 94 (12 Feb 2021 05:31) (76 - 94)  BP: 125/69 (12 Feb 2021 05:31) (101/60 - 125/69)  BP(mean): --  RR: 20 (12 Feb 2021 05:31) (20 - 20)  SpO2: 94% (12 Feb 2021 05:31) (93% - 95%)          LABS:                        7.8    7.70  )-----------( 483      ( 12 Feb 2021 07:50 )             25.6     02-12    132<L>  |  104  |  19  ----------------------------<  92  4.6   |  19<L>  |  1.10    Ca    8.3<L>      12 Feb 2021 07:50  Phos  2.7     02-12  Mg     2.3     02-12            MICROBIOLOGY:    RADIOLOGY & ADDITIONAL STUDIES: INTERVAL HPI/OVERNIGHT EVENTS:    c/o abdominal pain and diarrhea    MEDICATIONS  (STANDING):  azithromycin   Tablet 250 milliGRAM(s) Oral daily  enoxaparin Injectable 30 milliGRAM(s) SubCutaneous every 24 hours  ferrous    sulfate 325 milliGRAM(s) Oral daily  folic acid 1 milliGRAM(s) Oral daily  influenza  Vaccine (HIGH DOSE) 0.7 milliLiter(s) IntraMuscular once  lidocaine   Patch 1 Patch Transdermal daily  megestrol 40 milliGRAM(s) Oral three times a day  multivitamin 1 Tablet(s) Oral daily  pantoprazole  Injectable 40 milliGRAM(s) IV Push every 12 hours  polyethylene glycol 3350 17 Gram(s) Oral every 12 hours  psyllium Powder 1 Packet(s) Oral two times a day  senna 2 Tablet(s) Oral at bedtime    MEDICATIONS  (PRN):  acetaminophen   Tablet .. 650 milliGRAM(s) Oral every 8 hours PRN Temp greater or equal to 38C (100.4F), Mild Pain (1 - 3)  albuterol/ipratropium for Nebulization 3 milliLiter(s) Nebulizer every 6 hours PRN Shortness of Breath and/or Wheezing  bisacodyl Suppository 10 milliGRAM(s) Rectal daily PRN Constipation  ibuprofen  Tablet. 400 milliGRAM(s) Oral every 6 hours PRN Moderate Pain (4 - 6)      Allergies    No Known Allergies    EXAM  Vital Signs Last 24 Hrs  T(C): 36.4 (12 Feb 2021 05:31), Max: 36.7 (11 Feb 2021 16:27)  T(F): 97.6 (12 Feb 2021 05:31), Max: 98.1 (11 Feb 2021 16:27)  HR: 94 (12 Feb 2021 05:31) (76 - 94)  BP: 125/69 (12 Feb 2021 05:31) (101/60 - 125/69)  BP(mean): --  RR: 20 (12 Feb 2021 05:31) (20 - 20)  SpO2: 94% (12 Feb 2021 05:31) (93% - 95%)  In bed  Calm but grimacing and uncomfortable when woken up  no rash  RR  Abd soft, tender to palpation in lower abdomen  LEs no edema      LABS:                        7.8    7.70  )-----------( 483      ( 12 Feb 2021 07:50 )             25.6     02-12    132<L>  |  104  |  19  ----------------------------<  92  4.6   |  19<L>  |  1.10    Ca    8.3<L>      12 Feb 2021 07:50  Phos  2.7     02-12  Mg     2.3     02-12

## 2021-02-12 NOTE — PROGRESS NOTE ADULT - SUBJECTIVE AND OBJECTIVE BOX
CC/ HPI Patient is an 86 year old female with chronic obstructive pulmonary disease, lung cancer, poorly differentiated adenocarcinoma, status post radiation therapy, Dec 2020, admitted for severe anemia, upper GI bleed, RVP positive Chlamydia pneumoniae, seen this morning, no acute respiratory complaint.      PAST MEDICAL & SURGICAL HISTORY:  Chronic obstructive pulmonary disease, unspecified COPD type  Hearing loss  left ear - sees Dr. Garces ENT  Basal cell carcinoma  x2 - upper lip and arm  Osteoporosis  H/O breast biopsy, 5 years ago, mass was benign and self-resolved  History of appendectomy  History of cataract surgery    SOCHX:   tobacco,  -  alcohol      FAMILY HISTORY: FA/MO  - contributory     ROS reviewed below with positive findings marked (+) :  GEN:  fever, chills ENT: tracheostomy,   epistaxis,  sinusitis COR: CAD, CHF,  HTN, dysrhythmia PUL: +COPD, ILD, asthma, pneumonia GI: PEG, dysphagia, hemorrhage, other HALLIE: kidney disease, electrolyte disorder HEM:  anemia, thrombus, coagulopathy, cancer ENDO:  thyroid disease, diabetes mellitus CNS:  dementia, stroke, seizure, PSY:  depression, anxiety, other      MEDICATIONS  (STANDING):  azithromycin   Tablet 250 milliGRAM(s) Oral daily  cefepime   IVPB      cefepime   IVPB 1000 milliGRAM(s) IV Intermittent every 8 hours  enoxaparin Injectable 30 milliGRAM(s) SubCutaneous every 24 hours  ferrous    sulfate 325 milliGRAM(s) Oral daily  folic acid 1 milliGRAM(s) Oral daily  influenza  Vaccine (HIGH DOSE) 0.7 milliLiter(s) IntraMuscular once  lidocaine   Patch 1 Patch Transdermal daily  megestrol 40 milliGRAM(s) Oral three times a day  multivitamin 1 Tablet(s) Oral daily  pantoprazole  Injectable 40 milliGRAM(s) IV Push every 12 hours  polyethylene glycol 3350 17 Gram(s) Oral every 12 hours  psyllium Powder 1 Packet(s) Oral two times a day  senna 2 Tablet(s) Oral at bedtime    MEDICATIONS  (PRN):  acetaminophen   Tablet .. 650 milliGRAM(s) Oral every 8 hours PRN Temp greater or equal to 38C (100.4F), Mild Pain (1 - 3)  albuterol/ipratropium for Nebulization 3 milliLiter(s) Nebulizer every 6 hours PRN Shortness of Breath and/or Wheezing  bisacodyl Suppository 10 milliGRAM(s) Rectal daily PRN Constipation  ibuprofen  Tablet. 400 milliGRAM(s) Oral every 6 hours PRN Moderate Pain (4 - 6)      Vital Signs Last 24 Hrs  T(C): 36.4 (12 Feb 2021 05:31), Max: 37.1 (11 Feb 2021 10:21)  T(F): 97.6 (12 Feb 2021 05:31), Max: 98.7 (11 Feb 2021 10:21)  HR: 94 (12 Feb 2021 05:31) (76 - 94)  BP: 125/69 (12 Feb 2021 05:31) (101/60 - 125/69)  RR: 20 (12 Feb 2021 05:31) (20 - 20)  SpO2: 94% (12 Feb 2021 05:31) (93% - 95%)    GENERAL:         comfortable,  - distress.  HEENT:            - trauma,  - icterus,  - injection,  - nasal discharge.  NECK:              - jugular venous distention, - thyromegaly.  LYMPH:           - lymphadenopathy, - masses.  RESP:              + clear,   - rales,   - rhonchi,   - wheezes.   COR:                S1S2   - gallops,  - rubs.  ABD:                bowel sounds,   soft, - tender, - distended.  EXT/MSC:         - cyanosis,  - clubbing, - edema.    NEURO:           + alert and oriented                             7.8    7.70  )-----------( 483      ( 12 Feb 2021 07:50 )             25.6     02-12    132<L>  |  104  |  19  ----------------------------<  92  4.6   |  19<L>  |  1.10    Ca    8.3<L>      12 Feb 2021 07:50  Phos  2.7     02-12  Mg     2.3     02-12              X-ray Chest (02.06.21)  Bilateral infiltrates with progression comparison to prior examination of the chest 2/3/2021    CT Angio Chest PE Protocol w/ IV Cont (02.03.21)  Pulmonary arteries: No pulmonary embolus seen.  Lungs and large airways: Respiratory motion limits assessment of fine pulmonary parenchymal detail. Trace dependent secretions distal trachea. No nodular and no tracheal or endobronchial lesion.  Bilateral apical pleural parenchymal scarring. Unchanged mild peribronchial wall thickening and cylindrical bronchiectasis bilaterally. Scattered nodular groundglass opacities bilaterally, more confluent in right upper lobe posterior segment with apparent interlobular septal thickening. Mild compressive atelectasis right lower lobe. Few nodular consolidative opacities left lower lobe, subsegmental atelectasis versus mild consolidation. No measurable residual masses in right upper lobe anterior segment with linear opacity in this region probably representing scarring. New few scattered bilateral pulmonary nodules, for example:  *  Right upper lobe subpleural 0.5 cm (series 6 image 160).  *  Right upper lobe anterior segment 0.5 cm (series 6 image 132)  *  Left lower lobe 0.5 cm (series 6 image 204)  Pleura:  Resolved right pneumothorax. Decreased trace residual right pleural effusion.  Mediastinum and hilar regions: Significantly decreased mediastinal and hilar adenopathy, for example right lower paratracheal 1.1 x 0.8 cm, previously 4.4 x 2.9 cm. No significant change mildly prominent subcarinal node 1.2 cm short axis. Resolved right lower cervical adenopathy.  Heart and pericardium:  Heart size is normal. No pericardial effusion.  Vessels:  No aneurysm. Mild scattered calcific atherosclerosis.  Chest wall and lower neck:  Normal.  Upper abdomen: Unchanged few left renal cysts, largest 3.5 cm.  Bones: No suspicious lytic or blastic lesion. Mild degenerative changes thoracic spine with prominent degenerative Schmorl's node inferior endplate T9 vertebral body.    IMPRESSION:  1.  No pulmonary embolus.  2.  New few small scattered nodular opacities bilaterally with more confluent groundglass density and suspected interlobular septal thickening in posterior right upper lobe. Differential includes aspiration/infection versus lymphangitic carcinomatosis. Assessment limited by artifact from respiratory motion. Recommend clinical correlation and short-term follow-up would be helpful.  .  No measurable residual right upper lobe mass with scarring in this region.  4.  Significantly decreased thoracic adenopathy.  5.  Decreased trace residual right pleural effusion.  6.  Resolved right pneumothorax.            ASSESSMENT/PLAN    1)  Chronic obstructive pulmonary disease  2)  Chlamydia pneumoniae RVP+ (2.06.21)  3)  Bronchiectasis  4)  Lung cancer    Oxygen as needed for a satisfactory SpO2  Bronchodilators:  Atrovent/ albuterol q 4 – 6 hours as needed  ID/Antibiotics: Zithromax, Cefepime  Follow up ID recommendations  Cardiac/HTN: hemodynamically stable  GI: Rx/ prophylaxis c PPI/H2B  Heme: Rx/VT prophylaxis   Discussed with Dr. Roberson who will cover Feb 13-14.

## 2021-02-13 LAB
ALBUMIN SERPL ELPH-MCNC: 2.7 G/DL — LOW (ref 3.3–5)
ALP SERPL-CCNC: 48 U/L — SIGNIFICANT CHANGE UP (ref 40–120)
ALT FLD-CCNC: 27 U/L — SIGNIFICANT CHANGE UP (ref 10–45)
ANION GAP SERPL CALC-SCNC: 11 MMOL/L — SIGNIFICANT CHANGE UP (ref 5–17)
AST SERPL-CCNC: 30 U/L — SIGNIFICANT CHANGE UP (ref 10–40)
BILIRUB SERPL-MCNC: 0.3 MG/DL — SIGNIFICANT CHANGE UP (ref 0.2–1.2)
BUN SERPL-MCNC: 22 MG/DL — SIGNIFICANT CHANGE UP (ref 7–23)
CALCIUM SERPL-MCNC: 9.1 MG/DL — SIGNIFICANT CHANGE UP (ref 8.4–10.5)
CHLORIDE SERPL-SCNC: 104 MMOL/L — SIGNIFICANT CHANGE UP (ref 96–108)
CO2 SERPL-SCNC: 19 MMOL/L — LOW (ref 22–31)
CREAT SERPL-MCNC: 1.06 MG/DL — SIGNIFICANT CHANGE UP (ref 0.5–1.3)
GLUCOSE SERPL-MCNC: 116 MG/DL — HIGH (ref 70–99)
HCT VFR BLD CALC: 29.1 % — LOW (ref 34.5–45)
HGB BLD-MCNC: 8.8 G/DL — LOW (ref 11.5–15.5)
MAGNESIUM SERPL-MCNC: 2 MG/DL — SIGNIFICANT CHANGE UP (ref 1.6–2.6)
MCHC RBC-ENTMCNC: 26.1 PG — LOW (ref 27–34)
MCHC RBC-ENTMCNC: 30.2 GM/DL — LOW (ref 32–36)
MCV RBC AUTO: 86.4 FL — SIGNIFICANT CHANGE UP (ref 80–100)
NRBC # BLD: 0 /100 WBCS — SIGNIFICANT CHANGE UP (ref 0–0)
PHOSPHATE SERPL-MCNC: 2.2 MG/DL — LOW (ref 2.5–4.5)
PLATELET # BLD AUTO: 541 K/UL — HIGH (ref 150–400)
POTASSIUM SERPL-MCNC: 4.3 MMOL/L — SIGNIFICANT CHANGE UP (ref 3.5–5.3)
POTASSIUM SERPL-SCNC: 4.3 MMOL/L — SIGNIFICANT CHANGE UP (ref 3.5–5.3)
PROT SERPL-MCNC: 7.1 G/DL — SIGNIFICANT CHANGE UP (ref 6–8.3)
RBC # BLD: 3.37 M/UL — LOW (ref 3.8–5.2)
RBC # FLD: 16.8 % — HIGH (ref 10.3–14.5)
SODIUM SERPL-SCNC: 134 MMOL/L — LOW (ref 135–145)
WBC # BLD: 10.8 K/UL — HIGH (ref 3.8–10.5)
WBC # FLD AUTO: 10.8 K/UL — HIGH (ref 3.8–10.5)

## 2021-02-13 RX ORDER — SODIUM CHLORIDE 9 MG/ML
1000 INJECTION, SOLUTION INTRAVENOUS
Refills: 0 | Status: DISCONTINUED | OUTPATIENT
Start: 2021-02-13 | End: 2021-02-16

## 2021-02-13 RX ADMIN — PANTOPRAZOLE SODIUM 40 MILLIGRAM(S): 20 TABLET, DELAYED RELEASE ORAL at 07:02

## 2021-02-13 RX ADMIN — MEGESTROL ACETATE 40 MILLIGRAM(S): 40 SUSPENSION ORAL at 21:13

## 2021-02-13 RX ADMIN — Medication 400 MILLIGRAM(S): at 12:25

## 2021-02-13 RX ADMIN — Medication 650 MILLIGRAM(S): at 22:46

## 2021-02-13 RX ADMIN — Medication 400 MILLIGRAM(S): at 18:32

## 2021-02-13 RX ADMIN — LIDOCAINE 1 PATCH: 4 CREAM TOPICAL at 19:44

## 2021-02-13 RX ADMIN — MEGESTROL ACETATE 40 MILLIGRAM(S): 40 SUSPENSION ORAL at 07:02

## 2021-02-13 RX ADMIN — SODIUM CHLORIDE 60 MILLILITER(S): 9 INJECTION, SOLUTION INTRAVENOUS at 19:44

## 2021-02-13 RX ADMIN — Medication 1 PACKET(S): at 17:11

## 2021-02-13 RX ADMIN — AZITHROMYCIN 250 MILLIGRAM(S): 500 TABLET, FILM COATED ORAL at 12:19

## 2021-02-13 RX ADMIN — LIDOCAINE 1 PATCH: 4 CREAM TOPICAL at 01:56

## 2021-02-13 RX ADMIN — PANTOPRAZOLE SODIUM 40 MILLIGRAM(S): 20 TABLET, DELAYED RELEASE ORAL at 17:11

## 2021-02-13 RX ADMIN — ENOXAPARIN SODIUM 30 MILLIGRAM(S): 100 INJECTION SUBCUTANEOUS at 20:52

## 2021-02-13 RX ADMIN — Medication 1 MILLIGRAM(S): at 12:19

## 2021-02-13 RX ADMIN — Medication 1 TABLET(S): at 12:19

## 2021-02-13 RX ADMIN — Medication 325 MILLIGRAM(S): at 12:19

## 2021-02-13 RX ADMIN — LIDOCAINE 1 PATCH: 4 CREAM TOPICAL at 21:00

## 2021-02-13 NOTE — PROGRESS NOTE ADULT - SUBJECTIVE AND OBJECTIVE BOX
Pt seen and examined   c/o abdominal pain  feels little better    REVIEW OF SYSTEMS:  Constitutional: No fever, weight loss or fatigue  Cardiovascular: No chest pain, palpitations, dizziness or leg swelling  Gastrointestinal: +pain. No nausea, vomiting or hematemesis; No diarrhea   Skin: No itching, burning, rashes or lesions       MEDICATIONS:  MEDICATIONS  (STANDING):  azithromycin   Tablet 250 milliGRAM(s) Oral daily  enoxaparin Injectable 30 milliGRAM(s) SubCutaneous every 24 hours  ferrous    sulfate 325 milliGRAM(s) Oral daily  folic acid 1 milliGRAM(s) Oral daily  influenza  Vaccine (HIGH DOSE) 0.7 milliLiter(s) IntraMuscular once  lidocaine   Patch 1 Patch Transdermal daily  megestrol 40 milliGRAM(s) Oral three times a day  multivitamin 1 Tablet(s) Oral daily  pantoprazole  Injectable 40 milliGRAM(s) IV Push every 12 hours  polyethylene glycol 3350 17 Gram(s) Oral every 12 hours  psyllium Powder 1 Packet(s) Oral two times a day  senna 2 Tablet(s) Oral at bedtime    MEDICATIONS  (PRN):  acetaminophen   Tablet .. 650 milliGRAM(s) Oral every 8 hours PRN Temp greater or equal to 38C (100.4F), Mild Pain (1 - 3)  albuterol/ipratropium for Nebulization 3 milliLiter(s) Nebulizer every 6 hours PRN Shortness of Breath and/or Wheezing  bisacodyl Suppository 10 milliGRAM(s) Rectal daily PRN Constipation  ibuprofen  Tablet. 400 milliGRAM(s) Oral every 6 hours PRN Moderate Pain (4 - 6)      Allergies    No Known Allergies    Intolerances        Vital Signs Last 24 Hrs  T(C): 36.7 (13 Feb 2021 11:22), Max: 36.7 (12 Feb 2021 22:06)  T(F): 98 (13 Feb 2021 11:22), Max: 98.1 (13 Feb 2021 05:58)  HR: 92 (13 Feb 2021 11:22) (78 - 94)  BP: 106/63 (13 Feb 2021 11:22) (106/63 - 139/91)  BP(mean): --  RR: 15 (13 Feb 2021 11:22) (15 - 18)  SpO2: 96% (13 Feb 2021 11:22) (96% - 96%)    02-12 @ 07:01 - 02-13 @ 07:00  --------------------------------------------------------  IN: 0 mL / OUT: 450 mL / NET: -450 mL    02-13 @ 07:01  - 02-13 @ 14:36  --------------------------------------------------------  IN: 0 mL / OUT: 400 mL / NET: -400 mL        PHYSICAL EXAM:    General:  in no acute distress  HEENT: MMM, conjunctiva and sclera clear  Heart: regular  Lungs: clear  Gastrointestinal: Soft non-tender non-distended; Normal bowel sounds; [place] in  Skin: Warm and dry. No obvious rash    LABS:      CBC Full  -  ( 13 Feb 2021 08:27 )  WBC Count : 10.80 K/uL  RBC Count : 3.37 M/uL  Hemoglobin : 8.8 g/dL  Hematocrit : 29.1 %  Platelet Count - Automated : 541 K/uL  Mean Cell Volume : 86.4 fl  Mean Cell Hemoglobin : 26.1 pg  Mean Cell Hemoglobin Concentration : 30.2 gm/dL  Auto Neutrophil # : x  Auto Lymphocyte # : x  Auto Monocyte # : x  Auto Eosinophil # : x  Auto Basophil # : x  Auto Neutrophil % : x  Auto Lymphocyte % : x  Auto Monocyte % : x  Auto Eosinophil % : x  Auto Basophil % : x    02-13    134<L>  |  104  |  22  ----------------------------<  116<H>  4.3   |  19<L>  |  1.06    Ca    9.1      13 Feb 2021 08:27  Phos  2.2     02-13  Mg     2.0     02-13    TPro  7.1  /  Alb  2.7<L>  /  TBili  0.3  /  DBili  x   /  AST  30  /  ALT  27  /  AlkPhos  48  02-13                      RADIOLOGY & ADDITIONAL STUDIES (The following images were personally reviewed):

## 2021-02-13 NOTE — PROGRESS NOTE ADULT - SUBJECTIVE AND OBJECTIVE BOX
CC/ HPI Patient is an 86 year old female with chronic obstructive pulmonary disease, lung cancer, poorly differentiated adenocarcinoma, status post radiation therapy, Dec 2020, admitted for severe anemia, upper GI bleed, RVP positive Chlamydia pneumoniae.  Today she has right sided abdominal cramps.    All new data reviewed, including VS, lab, imaging, Rx and documentation    PAST MEDICAL & SURGICAL HISTORY:  Chronic obstructive pulmonary disease, unspecified COPD type  Hearing loss  left ear - sees Dr. Garces ENT  Basal cell carcinoma  x2 - upper lip and arm  Osteoporosis  H/O breast biopsy, 5 years ago, mass was benign and self-resolved  History of appendectomy  History of cataract surgery    SOCHX:   tobacco,  -  alcohol      FAMILY HISTORY: FA/MO  - contributory     ROS reviewed below with positive findings marked (+) :  GEN:  fever, chills ENT: tracheostomy,   epistaxis,  sinusitis COR: CAD, CHF,  HTN, dysrhythmia PUL: +COPD, ILD, asthma, pneumonia GI: PEG, dysphagia, hemorrhage, other HALLIE: kidney disease, electrolyte disorder HEM:  anemia, thrombus, coagulopathy, cancer ENDO:  thyroid disease, diabetes mellitus CNS:  dementia, stroke, seizure, PSY:  depression, anxiety, other      MEDICATIONS  (STANDING):  azithromycin   Tablet 250 milliGRAM(s) Oral daily  cefepime   IVPB 1000 milliGRAM(s) IV Intermittent every 8 hours completed  enoxaparin Injectable 30 milliGRAM(s) SubCutaneous every 24 hours  ferrous    sulfate 325 milliGRAM(s) Oral daily  folic acid 1 milliGRAM(s) Oral daily  influenza  Vaccine (HIGH DOSE) 0.7 milliLiter(s) IntraMuscular once  lidocaine   Patch 1 Patch Transdermal daily  megestrol 40 milliGRAM(s) Oral three times a day  multivitamin 1 Tablet(s) Oral daily  pantoprazole  Injectable 40 milliGRAM(s) IV Push every 12 hours  polyethylene glycol 3350 17 Gram(s) Oral every 12 hours  psyllium Powder 1 Packet(s) Oral two times a day  senna 2 Tablet(s) Oral at bedtime    MEDICATIONS  (PRN):  acetaminophen   Tablet .. 650 milliGRAM(s) Oral every 8 hours PRN Temp greater or equal to 38C (100.4F), Mild Pain (1 - 3)  albuterol/ipratropium for Nebulization 3 milliLiter(s) Nebulizer every 6 hours PRN Shortness of Breath and/or Wheezing  bisacodyl Suppository 10 milliGRAM(s) Rectal daily PRN Constipation  ibuprofen  Tablet. 400 milliGRAM(s) Oral every 6 hours PRN Moderate Pain (4 - 6)  Morphine as by patient    Vital Signs Last 24 Hrs  T(C): 36.4 (12 Feb 2021 05:31), Max: 37.1 (11 Feb 2021 10:21)  T(F): 97.6 (12 Feb 2021 05:31), Max: 98.7 (11 Feb 2021 10:21)  HR: 94 (12 Feb 2021 05:31) (76 - 94)  BP: 125/69 (12 Feb 2021 05:31) (101/60 - 125/69)  RR: 20 (12 Feb 2021 05:31) (20 - 20)  SpO2: 94% (12 Feb 2021 05:31) (93% - 95%)    - CP, + cough, + SOB, + cramps  GENERAL:         uncomfortable,  + distress.  HEENT:            - trauma,  - icterus,  - injection,  - nasal discharge.  NECK:              - jugular venous distention, - thyromegaly.  LYMPH:           - lymphadenopathy, - masses.  RESP:              + clear,   - rales,   - rhonchi,   - wheezes.   COR:                S1S2   - gallops,  - rubs.  ABD:                bowel sounds +,   soft, - tender, - distended.  EXT/MSC:         - cyanosis,  - clubbing, - edema.    NEURO:           + alert and oriented                         7.8    7.70  )-----------( 483      ( 12 Feb 2021 07:50 ) hb increased to 8.5             25.6     02-12    132<L>  |  104  |  19  ----------------------------<  92  4.6   |  19<L>  |  1.10    Ca    8.3<L>      12 Feb 2021 07:50  Phos  2.7     decreased to 2.2  Mg     2.3     02-12    X-ray Chest (02.06.21)  Bilateral infiltrates with progression comparison to prior examination of the chest 2/3/2021    CT Angio Chest PE Protocol w/ IV Cont (02.03.21)  Pulmonary arteries: No pulmonary embolus seen.  Lungs and large airways: Respiratory motion limits assessment of fine pulmonary parenchymal detail. Trace dependent secretions distal trachea. No nodular and no tracheal or endobronchial lesion.  Bilateral apical pleural parenchymal scarring. Unchanged mild peribronchial wall thickening and cylindrical bronchiectasis bilaterally. Scattered nodular groundglass opacities bilaterally, more confluent in right upper lobe posterior segment with apparent interlobular septal thickening. Mild compressive atelectasis right lower lobe. Few nodular consolidative opacities left lower lobe, subsegmental atelectasis versus mild consolidation. No measurable residual masses in right upper lobe anterior segment with linear opacity in this region probably representing scarring. New few scattered bilateral pulmonary nodules, for example:  *  Right upper lobe subpleural 0.5 cm (series 6 image 160).  *  Right upper lobe anterior segment 0.5 cm (series 6 image 132)  *  Left lower lobe 0.5 cm (series 6 image 204)  Pleura:  Resolved right pneumothorax. Decreased trace residual right pleural effusion.  Mediastinum and hilar regions: Significantly decreased mediastinal and hilar adenopathy, for example right lower paratracheal 1.1 x 0.8 cm, previously 4.4 x 2.9 cm. No significant change mildly prominent subcarinal node 1.2 cm short axis. Resolved right lower cervical adenopathy.  Heart and pericardium:  Heart size is normal. No pericardial effusion.  Vessels:  No aneurysm. Mild scattered calcific atherosclerosis.  Chest wall and lower neck:  Normal.  Upper abdomen: Unchanged few left renal cysts, largest 3.5 cm.  Bones: No suspicious lytic or blastic lesion. Mild degenerative changes thoracic spine with prominent degenerative Schmorl's node inferior endplate T9 vertebral body.    IMPRESSION:  1.  No pulmonary embolus.  2.  New few small scattered nodular opacities bilaterally with more confluent groundglass density and suspected interlobular septal thickening in posterior right upper lobe. Differential includes aspiration/infection versus lymphangitic carcinomatosis. Assessment limited by artifact from respiratory motion. Recommend clinical correlation and short-term follow-up would be helpful.  .  No measurable residual right upper lobe mass with scarring in this region.  4.  Significantly decreased thoracic adenopathy.  5.  Decreased trace residual right pleural effusion.  6.  Resolved right pneumothorax.    EXAM:  CT ABDOMEN AND PELVIS IC                          PROCEDURE DATE:  02/10/2021      INTERPRETATION:  CLINICAL INFORMATION: Persistent abdominal pain, lung adenocarcinoma, admitted for symptomatic anemia    COMPARISON: PET/CT to 521, chestCT to 3/20/2021    PROCEDURE:  CT of the Abdomen and Pelvis was performed with intravenous contrast.  Intravenous contrast: 90 ml Omnipaque 350. 10 ml discarded.  Oral contrast: None.  Sagittal and coronal reformats were performed.    FINDINGS:    Lower chest: Bilateral small pleural effusions with passive atelectases.    Liver: Normal in size and morphology. No focal abnormality. The main portal vein is patent.  Gallbladder and biliary ducts: No gallstones. No intra/extrahepatic biliary ductal dilatation.  Spleen: Within normal limits.  Pancreas: Within normal limits.  Adrenals: Within normal limits.  Kidneys/ureters: No hydronephrosis. No urinary calculi. Left renal cysts.    Bladder: Within normal limits.  Reproductive organs: Uterus and adnexa are within normal limits.    Bowel: Moderately distended rectum measuring up to 9 cm in caliber. The entire colon is fluid-filled demonstrating air-fluid level, without significant distention or bowel wall thickening. Multiple fluid-filled smallbowel loops without mural thickening. Hyperdensity in the second portion of duodenum may be ingested material versus surgical material. Focal dependent hyperdensity in the ascending colon (3:61-64).  Peritoneum/retroperitoneum: No ascites. No free air. Mild presacral fat stranding.  Vasculature:  The aorta and its branches are normal in caliber. Moderate to severe aortic calcification in the abdominal aorta and iliac arteries. Severe ostial stenosis of celiac artery.  Lymph nodes: Lymph nodes arenot enlarged.  Bones and soft tissue: Degenerative changes in the spine.      IMPRESSION:  No evidence of hemoperitoneum or retroperitoneal hemorrhage. Limited evaluation of gastrointestinal bleeding without precontrast images.  1.  Distended rectum with mild presacral fat stranding, without bowel wall thickening, which may reflect early stercoral proctitis.  2.  Fluid-filled colon and small bowel, which can be seen in patients with diarrhea. Possible mild enteritis. clinical correlation is recommended.  3.  Focal dependent hyperdensity in the ascending colon (3:61-64), of unclear clinical significance.    ASSESSMENT/PLAN    1)  Chronic obstructive pulmonary disease  2)  Chlamydia pneumoniae RVP+ (2.06.21)  3)  Bronchiectasis  4)  Lung cancer  5)  Abdominal cramps  6)  Hyperdensity ascending colon  7)  Hypophosphatemia    Oxygen as needed for a satisfactory SpO2  Bronchodilators:  Atrovent/ albuterol q 4 – 6 hours as needed  ID/Antibiotics: Zithromax; cefepime completed  Follow up ID recommendations  Cardiac/HTN: hemodynamically stable  GI: Rx/ prophylaxis c PPI/H2B  Would administer 15 mmol phosphate iv  Would try lidocaine patch as her BS are positive and the abdomen is soft without rebound  Heme: Rx/VT prophylaxis   Discussed with Dr. Artis

## 2021-02-13 NOTE — PROGRESS NOTE ADULT - SUBJECTIVE AND OBJECTIVE BOX
SUBJECTIVE/OVERNIGHT EVENTS: .    VITAL SIGNS:  Vital Signs Last 24 Hrs  T(C): 36.7 (13 Feb 2021 11:22), Max: 36.7 (12 Feb 2021 22:06)  T(F): 98 (13 Feb 2021 11:22), Max: 98.1 (13 Feb 2021 05:58)  HR: 92 (13 Feb 2021 11:22) (78 - 94)  BP: 106/63 (13 Feb 2021 11:22) (106/63 - 139/91)  BP(mean): --  RR: 15 (13 Feb 2021 11:22) (15 - 18)  SpO2: 96% (13 Feb 2021 11:22) (96% - 96%)    PHYSICAL EXAM:  General: , no acute distress  HEENT: NC/AT; PERRL, anicteric sclera; MMM  Neck: supple  Cardiovascular: +S1/S2, RRR, no murmurs, rubs, gallops  Respiratory: CTA B/L; no W/R/R  Gastrointestinal: soft, NT/ND; +BSx4  Extremities: WWP; no edema, clubbing or cyanosis  Vascular: 2+ radial, DP/PT pulses B/L  Neurological: AAOx3;     MEDICATIONS:  MEDICATIONS  (STANDING):  azithromycin   Tablet 250 milliGRAM(s) Oral daily  enoxaparin Injectable 30 milliGRAM(s) SubCutaneous every 24 hours  ferrous    sulfate 325 milliGRAM(s) Oral daily  folic acid 1 milliGRAM(s) Oral daily  influenza  Vaccine (HIGH DOSE) 0.7 milliLiter(s) IntraMuscular once  lidocaine   Patch 1 Patch Transdermal daily  megestrol 40 milliGRAM(s) Oral three times a day  multivitamin 1 Tablet(s) Oral daily  pantoprazole  Injectable 40 milliGRAM(s) IV Push every 12 hours  polyethylene glycol 3350 17 Gram(s) Oral every 12 hours  psyllium Powder 1 Packet(s) Oral two times a day  senna 2 Tablet(s) Oral at bedtime    MEDICATIONS  (PRN):  acetaminophen   Tablet .. 650 milliGRAM(s) Oral every 8 hours PRN Temp greater or equal to 38C (100.4F), Mild Pain (1 - 3)  albuterol/ipratropium for Nebulization 3 milliLiter(s) Nebulizer every 6 hours PRN Shortness of Breath and/or Wheezing  bisacodyl Suppository 10 milliGRAM(s) Rectal daily PRN Constipation  ibuprofen  Tablet. 400 milliGRAM(s) Oral every 6 hours PRN Moderate Pain (4 - 6)      ALLERGIES:  Allergies    No Known Allergies    Intolerances        LABS:                        8.8    10.80 )-----------( 541      ( 13 Feb 2021 08:27 )             29.1     02-13    134<L>  |  104  |  22  ----------------------------<  116<H>  4.3   |  19<L>  |  1.06    Ca    9.1      13 Feb 2021 08:27  Phos  2.2     02-13  Mg     2.0     02-13    TPro  7.1  /  Alb  2.7<L>  /  TBili  0.3  /  DBili  x   /  AST  30  /  ALT  27  /  AlkPhos  48  02-13        RADIOLOGY & ADDITIONAL TESTS: Reviewed. SUBJECTIVE/OVERNIGHT EVENTS: No acute events overnight. Pt seen in AM at bedside, resting in bed, laying supine. She continues to reports persistent abdominal pain unchanged in quality or severity. She denies fever, chills, headache, changes in vision/hearing, sob, chest pain, extremity pain or swelling      VITAL SIGNS:  Vital Signs Last 24 Hrs  T(C): 36.7 (13 Feb 2021 11:22), Max: 36.7 (12 Feb 2021 22:06)  T(F): 98 (13 Feb 2021 11:22), Max: 98.1 (13 Feb 2021 05:58)  HR: 92 (13 Feb 2021 11:22) (78 - 94)  BP: 106/63 (13 Feb 2021 11:22) (106/63 - 139/91)  BP(mean): --  RR: 15 (13 Feb 2021 11:22) (15 - 18)  SpO2: 96% (13 Feb 2021 11:22) (96% - 96%)    PHYSICAL EXAM:  General: , no acute distress  HEENT: NC/AT; PERRL, anicteric sclera; MMM  Neck: supple  Cardiovascular: +S1/S2, RRR, no murmurs, rubs, gallops  Respiratory: CTA B/L; no W/R/R  Gastrointestinal: soft, NT/ND; +BSx4  Extremities: WWP; no edema, clubbing or cyanosis  Vascular: 2+ radial, DP/PT pulses B/L  Neurological: AAOx3;     MEDICATIONS:  MEDICATIONS  (STANDING):  azithromycin   Tablet 250 milliGRAM(s) Oral daily  enoxaparin Injectable 30 milliGRAM(s) SubCutaneous every 24 hours  ferrous    sulfate 325 milliGRAM(s) Oral daily  folic acid 1 milliGRAM(s) Oral daily  influenza  Vaccine (HIGH DOSE) 0.7 milliLiter(s) IntraMuscular once  lidocaine   Patch 1 Patch Transdermal daily  megestrol 40 milliGRAM(s) Oral three times a day  multivitamin 1 Tablet(s) Oral daily  pantoprazole  Injectable 40 milliGRAM(s) IV Push every 12 hours  polyethylene glycol 3350 17 Gram(s) Oral every 12 hours  psyllium Powder 1 Packet(s) Oral two times a day  senna 2 Tablet(s) Oral at bedtime    MEDICATIONS  (PRN):  acetaminophen   Tablet .. 650 milliGRAM(s) Oral every 8 hours PRN Temp greater or equal to 38C (100.4F), Mild Pain (1 - 3)  albuterol/ipratropium for Nebulization 3 milliLiter(s) Nebulizer every 6 hours PRN Shortness of Breath and/or Wheezing  bisacodyl Suppository 10 milliGRAM(s) Rectal daily PRN Constipation  ibuprofen  Tablet. 400 milliGRAM(s) Oral every 6 hours PRN Moderate Pain (4 - 6)      ALLERGIES:  Allergies    No Known Allergies    Intolerances        LABS:                        8.8    10.80 )-----------( 541      ( 13 Feb 2021 08:27 )             29.1     02-13    134<L>  |  104  |  22  ----------------------------<  116<H>  4.3   |  19<L>  |  1.06    Ca    9.1      13 Feb 2021 08:27  Phos  2.2     02-13  Mg     2.0     02-13    TPro  7.1  /  Alb  2.7<L>  /  TBili  0.3  /  DBili  x   /  AST  30  /  ALT  27  /  AlkPhos  48  02-13        RADIOLOGY & ADDITIONAL TESTS: Reviewed.

## 2021-02-14 LAB
ALBUMIN SERPL ELPH-MCNC: 2.3 G/DL — LOW (ref 3.3–5)
ALP SERPL-CCNC: 44 U/L — SIGNIFICANT CHANGE UP (ref 40–120)
ALT FLD-CCNC: 38 U/L — SIGNIFICANT CHANGE UP (ref 10–45)
ANION GAP SERPL CALC-SCNC: 8 MMOL/L — SIGNIFICANT CHANGE UP (ref 5–17)
AST SERPL-CCNC: 44 U/L — HIGH (ref 10–40)
BASOPHILS # BLD AUTO: 0.09 K/UL — SIGNIFICANT CHANGE UP (ref 0–0.2)
BASOPHILS NFR BLD AUTO: 1 % — SIGNIFICANT CHANGE UP (ref 0–2)
BILIRUB SERPL-MCNC: 0.2 MG/DL — SIGNIFICANT CHANGE UP (ref 0.2–1.2)
BUN SERPL-MCNC: 16 MG/DL — SIGNIFICANT CHANGE UP (ref 7–23)
CALCIUM SERPL-MCNC: 8.3 MG/DL — LOW (ref 8.4–10.5)
CHLORIDE SERPL-SCNC: 106 MMOL/L — SIGNIFICANT CHANGE UP (ref 96–108)
CO2 SERPL-SCNC: 19 MMOL/L — LOW (ref 22–31)
CREAT SERPL-MCNC: 1.02 MG/DL — SIGNIFICANT CHANGE UP (ref 0.5–1.3)
EOSINOPHIL # BLD AUTO: 1.38 K/UL — HIGH (ref 0–0.5)
EOSINOPHIL NFR BLD AUTO: 15.4 % — HIGH (ref 0–6)
GLUCOSE SERPL-MCNC: 88 MG/DL — SIGNIFICANT CHANGE UP (ref 70–99)
HCT VFR BLD CALC: 27.3 % — LOW (ref 34.5–45)
HGB BLD-MCNC: 8.3 G/DL — LOW (ref 11.5–15.5)
IMM GRANULOCYTES NFR BLD AUTO: 2 % — HIGH (ref 0–1.5)
LYMPHOCYTES # BLD AUTO: 0.71 K/UL — LOW (ref 1–3.3)
LYMPHOCYTES # BLD AUTO: 7.9 % — LOW (ref 13–44)
MAGNESIUM SERPL-MCNC: 2 MG/DL — SIGNIFICANT CHANGE UP (ref 1.6–2.6)
MCHC RBC-ENTMCNC: 26.6 PG — LOW (ref 27–34)
MCHC RBC-ENTMCNC: 30.4 GM/DL — LOW (ref 32–36)
MCV RBC AUTO: 87.5 FL — SIGNIFICANT CHANGE UP (ref 80–100)
MONOCYTES # BLD AUTO: 0.95 K/UL — HIGH (ref 0–0.9)
MONOCYTES NFR BLD AUTO: 10.6 % — SIGNIFICANT CHANGE UP (ref 2–14)
NEUTROPHILS # BLD AUTO: 5.68 K/UL — SIGNIFICANT CHANGE UP (ref 1.8–7.4)
NEUTROPHILS NFR BLD AUTO: 63.1 % — SIGNIFICANT CHANGE UP (ref 43–77)
NRBC # BLD: 0 /100 WBCS — SIGNIFICANT CHANGE UP (ref 0–0)
PHOSPHATE SERPL-MCNC: 2.6 MG/DL — SIGNIFICANT CHANGE UP (ref 2.5–4.5)
PLATELET # BLD AUTO: 457 K/UL — HIGH (ref 150–400)
POTASSIUM SERPL-MCNC: 4.6 MMOL/L — SIGNIFICANT CHANGE UP (ref 3.5–5.3)
POTASSIUM SERPL-SCNC: 4.6 MMOL/L — SIGNIFICANT CHANGE UP (ref 3.5–5.3)
PROT SERPL-MCNC: 6.6 G/DL — SIGNIFICANT CHANGE UP (ref 6–8.3)
RBC # BLD: 3.12 M/UL — LOW (ref 3.8–5.2)
RBC # FLD: 17.1 % — HIGH (ref 10.3–14.5)
SODIUM SERPL-SCNC: 133 MMOL/L — LOW (ref 135–145)
WBC # BLD: 8.99 K/UL — SIGNIFICANT CHANGE UP (ref 3.8–10.5)
WBC # FLD AUTO: 8.99 K/UL — SIGNIFICANT CHANGE UP (ref 3.8–10.5)

## 2021-02-14 RX ORDER — LOPERAMIDE HCL 2 MG
2 TABLET ORAL ONCE
Refills: 0 | Status: COMPLETED | OUTPATIENT
Start: 2021-02-14 | End: 2021-02-15

## 2021-02-14 RX ADMIN — LIDOCAINE 1 PATCH: 4 CREAM TOPICAL at 09:42

## 2021-02-14 RX ADMIN — Medication 650 MILLIGRAM(S): at 17:16

## 2021-02-14 RX ADMIN — Medication 400 MILLIGRAM(S): at 22:11

## 2021-02-14 RX ADMIN — ENOXAPARIN SODIUM 30 MILLIGRAM(S): 100 INJECTION SUBCUTANEOUS at 22:11

## 2021-02-14 RX ADMIN — Medication 1 PACKET(S): at 07:14

## 2021-02-14 RX ADMIN — PANTOPRAZOLE SODIUM 40 MILLIGRAM(S): 20 TABLET, DELAYED RELEASE ORAL at 07:15

## 2021-02-14 RX ADMIN — MEGESTROL ACETATE 40 MILLIGRAM(S): 40 SUSPENSION ORAL at 07:14

## 2021-02-14 RX ADMIN — SODIUM CHLORIDE 60 MILLILITER(S): 9 INJECTION, SOLUTION INTRAVENOUS at 14:43

## 2021-02-14 RX ADMIN — MEGESTROL ACETATE 40 MILLIGRAM(S): 40 SUSPENSION ORAL at 22:11

## 2021-02-14 RX ADMIN — LIDOCAINE 1 PATCH: 4 CREAM TOPICAL at 06:28

## 2021-02-14 NOTE — PROGRESS NOTE ADULT - SUBJECTIVE AND OBJECTIVE BOX
Pt seen and examined   diarrhea x several days  no new complaints  same low abdominal pain    REVIEW OF SYSTEMS:  Constitutional: No fever,   Cardiovascular: No chest pain, palpitations, dizziness or leg swelling  Gastrointestinal: same pain. No nausea, vomiting + diarrhea   Skin: No itching, burning, rashes or lesions       MEDICATIONS:  MEDICATIONS  (STANDING):  azithromycin   Tablet 250 milliGRAM(s) Oral daily  dextrose 5% + sodium chloride 0.45%. 1000 milliLiter(s) (60 mL/Hr) IV Continuous <Continuous>  enoxaparin Injectable 30 milliGRAM(s) SubCutaneous every 24 hours  ferrous    sulfate 325 milliGRAM(s) Oral daily  folic acid 1 milliGRAM(s) Oral daily  influenza  Vaccine (HIGH DOSE) 0.7 milliLiter(s) IntraMuscular once  lidocaine   Patch 1 Patch Transdermal daily  megestrol 40 milliGRAM(s) Oral three times a day  multivitamin 1 Tablet(s) Oral daily  pantoprazole  Injectable 40 milliGRAM(s) IV Push every 12 hours  polyethylene glycol 3350 17 Gram(s) Oral every 12 hours  psyllium Powder 1 Packet(s) Oral two times a day  senna 2 Tablet(s) Oral at bedtime    MEDICATIONS  (PRN):  acetaminophen   Tablet .. 650 milliGRAM(s) Oral every 8 hours PRN Temp greater or equal to 38C (100.4F), Mild Pain (1 - 3)  albuterol/ipratropium for Nebulization 3 milliLiter(s) Nebulizer every 6 hours PRN Shortness of Breath and/or Wheezing  bisacodyl Suppository 10 milliGRAM(s) Rectal daily PRN Constipation  ibuprofen  Tablet. 400 milliGRAM(s) Oral every 6 hours PRN Moderate Pain (4 - 6)      Allergies    No Known Allergies    Intolerances        Vital Signs Last 24 Hrs  T(C): 36.2 (14 Feb 2021 05:27), Max: 36.7 (13 Feb 2021 11:22)  T(F): 97.2 (14 Feb 2021 05:27), Max: 98 (13 Feb 2021 11:22)  HR: 70 (14 Feb 2021 05:27) (70 - 95)  BP: 112/67 (14 Feb 2021 05:27) (105/58 - 112/67)  BP(mean): --  RR: 17 (14 Feb 2021 05:27) (15 - 17)  SpO2: 95% (14 Feb 2021 05:27) (95% - 96%)    02-13 @ 07:01  -  02-14 @ 07:00  --------------------------------------------------------  IN: 420 mL / OUT: 800 mL / NET: -380 mL        PHYSICAL EXAM:    General: thin; in no acute distress  HEENT: MMM, conjunctiva and sclera clear  Lungs: clear  Heart: regular  Gastrointestinal: Soft non-tender non-distended; Normal bowel sounds;  Skin: Warm and dry. No obvious rash  Ext: no edema    LABS:      CBC Full  -  ( 14 Feb 2021 08:02 )  WBC Count : 8.99 K/uL  RBC Count : 3.12 M/uL  Hemoglobin : 8.3 g/dL  Hematocrit : 27.3 %  Platelet Count - Automated : 457 K/uL  Mean Cell Volume : 87.5 fl  Mean Cell Hemoglobin : 26.6 pg  Mean Cell Hemoglobin Concentration : 30.4 gm/dL  Auto Neutrophil # : 5.68 K/uL  Auto Lymphocyte # : 0.71 K/uL  Auto Monocyte # : 0.95 K/uL  Auto Eosinophil # : 1.38 K/uL  Auto Basophil # : 0.09 K/uL  Auto Neutrophil % : 63.1 %  Auto Lymphocyte % : 7.9 %  Auto Monocyte % : 10.6 %  Auto Eosinophil % : 15.4 %  Auto Basophil % : 1.0 %    02-14    133<L>  |  106  |  16  ----------------------------<  88  4.6   |  19<L>  |  1.02    Ca    8.3<L>      14 Feb 2021 08:02  Phos  2.6     02-14  Mg     2.0     02-14    TPro  6.6  /  Alb  2.3<L>  /  TBili  0.2  /  DBili  x   /  AST  44<H>  /  ALT  38  /  AlkPhos  44  02-14                      RADIOLOGY & ADDITIONAL STUDIES (The following images were personally reviewed):

## 2021-02-14 NOTE — PROGRESS NOTE ADULT - SUBJECTIVE AND OBJECTIVE BOX
INTERVAL HPI/OVERNIGHT EVENTS:    ANTIBIOTICS/RELEVANT:    MEDICATIONS  (STANDING):  azithromycin   Tablet 250 milliGRAM(s) Oral daily  dextrose 5% + sodium chloride 0.45%. 1000 milliLiter(s) (60 mL/Hr) IV Continuous <Continuous>  enoxaparin Injectable 30 milliGRAM(s) SubCutaneous every 24 hours  ferrous    sulfate 325 milliGRAM(s) Oral daily  folic acid 1 milliGRAM(s) Oral daily  influenza  Vaccine (HIGH DOSE) 0.7 milliLiter(s) IntraMuscular once  lidocaine   Patch 1 Patch Transdermal daily  megestrol 40 milliGRAM(s) Oral three times a day  multivitamin 1 Tablet(s) Oral daily  pantoprazole  Injectable 40 milliGRAM(s) IV Push every 12 hours  polyethylene glycol 3350 17 Gram(s) Oral every 12 hours  psyllium Powder 1 Packet(s) Oral two times a day  senna 2 Tablet(s) Oral at bedtime    MEDICATIONS  (PRN):  acetaminophen   Tablet .. 650 milliGRAM(s) Oral every 8 hours PRN Temp greater or equal to 38C (100.4F), Mild Pain (1 - 3)  albuterol/ipratropium for Nebulization 3 milliLiter(s) Nebulizer every 6 hours PRN Shortness of Breath and/or Wheezing  bisacodyl Suppository 10 milliGRAM(s) Rectal daily PRN Constipation  ibuprofen  Tablet. 400 milliGRAM(s) Oral every 6 hours PRN Moderate Pain (4 - 6)      Allergies    No Known Allergies    Intolerances        Vital Signs Last 24 Hrs  T(C): 36.2 (14 Feb 2021 05:27), Max: 36.4 (13 Feb 2021 22:05)  T(F): 97.2 (14 Feb 2021 05:27), Max: 97.6 (13 Feb 2021 22:05)  HR: 70 (14 Feb 2021 05:27) (70 - 95)  BP: 112/67 (14 Feb 2021 05:27) (105/58 - 112/67)  BP(mean): --  RR: 17 (14 Feb 2021 05:27) (17 - 17)  SpO2: 95% (14 Feb 2021 05:27) (95% - 95%)          LABS:                        8.3    8.99  )-----------( 457      ( 14 Feb 2021 08:02 )             27.3     02-14    133<L>  |  106  |  16  ----------------------------<  88  4.6   |  19<L>  |  1.02    Ca    8.3<L>      14 Feb 2021 08:02  Phos  2.6     02-14  Mg     2.0     02-14    TPro  6.6  /  Alb  2.3<L>  /  TBili  0.2  /  DBili  x   /  AST  44<H>  /  ALT  38  /  AlkPhos  44  02-14          MICROBIOLOGY:    Culture - Urine (02.06.21 @ 08:56)    Specimen Source: .Urine Clean Catch (Midstream)    Culture Results:   No growth    Culture - Blood (02.06.21 @ 06:25)    Specimen Source: .Blood Blood-Venous    Culture Results:   No growth at 5 days.        RADIOLOGY & ADDITIONAL STUDIES: INTERVAL HPI/OVERNIGHT EVENTS:    Unchanged  In bed and sleeping most of the time.  Upset when woken up      MEDICATIONS  (STANDING):  azithromycin   Tablet 250 milliGRAM(s) Oral daily  dextrose 5% + sodium chloride 0.45%. 1000 milliLiter(s) (60 mL/Hr) IV Continuous <Continuous>  enoxaparin Injectable 30 milliGRAM(s) SubCutaneous every 24 hours  ferrous    sulfate 325 milliGRAM(s) Oral daily  folic acid 1 milliGRAM(s) Oral daily  influenza  Vaccine (HIGH DOSE) 0.7 milliLiter(s) IntraMuscular once  lidocaine   Patch 1 Patch Transdermal daily  megestrol 40 milliGRAM(s) Oral three times a day  multivitamin 1 Tablet(s) Oral daily  pantoprazole  Injectable 40 milliGRAM(s) IV Push every 12 hours  polyethylene glycol 3350 17 Gram(s) Oral every 12 hours  psyllium Powder 1 Packet(s) Oral two times a day  senna 2 Tablet(s) Oral at bedtime    MEDICATIONS  (PRN):  acetaminophen   Tablet .. 650 milliGRAM(s) Oral every 8 hours PRN Temp greater or equal to 38C (100.4F), Mild Pain (1 - 3)  albuterol/ipratropium for Nebulization 3 milliLiter(s) Nebulizer every 6 hours PRN Shortness of Breath and/or Wheezing  bisacodyl Suppository 10 milliGRAM(s) Rectal daily PRN Constipation  ibuprofen  Tablet. 400 milliGRAM(s) Oral every 6 hours PRN Moderate Pain (4 - 6)      Allergies    No Known Allergies    EXAM  Vital Signs Last 24 Hrs  T(C): 36.2 (14 Feb 2021 05:27), Max: 36.4 (13 Feb 2021 22:05)  T(F): 97.2 (14 Feb 2021 05:27), Max: 97.6 (13 Feb 2021 22:05)  HR: 70 (14 Feb 2021 05:27) (70 - 95)  BP: 112/67 (14 Feb 2021 05:27) (105/58 - 112/67)  BP(mean): --  RR: 17 (14 Feb 2021 05:27) (17 - 17)  SpO2: 95% (14 Feb 2021 05:27) (95% - 95%)  Calm   No distress  On RA  No rash  RR  Chest clear  Abd soft without over tenderness  LEs no edema  Schumacher in place draining light verna urine       LABS:                        8.3    8.99  )-----------( 457      ( 14 Feb 2021 08:02 )             27.3     02-14    133<L>  |  106  |  16  ----------------------------<  88  4.6   |  19<L>  |  1.02    Ca    8.3<L>      14 Feb 2021 08:02  Phos  2.6     02-14  Mg     2.0     02-14    TPro  6.6  /  Alb  2.3<L>  /  TBili  0.2  /  DBili  x   /  AST  44<H>  /  ALT  38  /  AlkPhos  44  02-14          MICROBIOLOGY:    Culture - Urine (02.06.21 @ 08:56)    Specimen Source: .Urine Clean Catch (Midstream)    Culture Results:   No growth    Culture - Blood (02.06.21 @ 06:25)    Specimen Source: .Blood Blood-Venous    Culture Results:   No growth at 5 days.

## 2021-02-14 NOTE — PROGRESS NOTE ADULT - ASSESSMENT
though azithromycin may be cause of diarrhea agree with ID that she should be checked for C diff given she has been on antibiotics

## 2021-02-14 NOTE — PROGRESS NOTE ADULT - ASSESSMENT
Lung CA   S/P chemotherapy   COPD  Pneumonitis and chlamydia pneumoniae identified in RVP   Abdominal pain - seems improved  Excessive sleeping     RECOMMEND  Check thyroid function tests  Continue Azithromycin 10 days  Psychiatric consult if TFTs normal

## 2021-02-14 NOTE — PROGRESS NOTE ADULT - SUBJECTIVE AND OBJECTIVE BOX
C/ HPI Patient is an 86 year old female with chronic obstructive pulmonary disease, lung cancer, poorly differentiated adenocarcinoma, status post radiation therapy, Dec 2020, admitted for severe anemia, upper GI bleed, RVP positive Chlamydia pneumoniae. Her right sided abdominal cramps have improved. Today she feels very tired.    All new data reviewed, including VS, lab, imaging, Rx and documentation.    Note written at bedside.    PAST MEDICAL & SURGICAL HISTORY:  Chronic obstructive pulmonary disease, unspecified COPD type  Hearing loss  left ear - sees Dr. Garces ENT  Basal cell carcinoma  x2 - upper lip and arm  Osteoporosis  H/O breast biopsy, 5 years ago, mass was benign and self-resolved  History of appendectomy  History of cataract surgery    SOCHX:   tobacco,  -  alcohol      FAMILY HISTORY: FA/MO  - contributory     ROS reviewed below with positive findings marked (+) :  GEN:  fever, chills ENT: tracheostomy,   epistaxis,  sinusitis COR: CAD, CHF,  HTN, dysrhythmia PUL: +COPD, ILD, asthma, pneumonia GI: PEG, dysphagia, hemorrhage, other HALLIE: kidney disease, electrolyte disorder HEM:  anemia, thrombus, coagulopathy, cancer ENDO:  thyroid disease, diabetes mellitus CNS:  dementia, stroke, seizure, PSY:  depression, anxiety, other      MEDICATIONS  (STANDING):  azithromycin   Tablet 250 milliGRAM(s) Oral daily  cefepime   IVPB 1000 milliGRAM(s) IV Intermittent every 8 hours completed  enoxaparin Injectable 30 milliGRAM(s) SubCutaneous every 24 hours  ferrous    sulfate 325 milliGRAM(s) Oral daily  folic acid 1 milliGRAM(s) Oral daily  influenza  Vaccine (HIGH DOSE) 0.7 milliLiter(s) IntraMuscular once  lidocaine   Patch 1 Patch Transdermal daily  megestrol 40 milliGRAM(s) Oral three times a day  multivitamin 1 Tablet(s) Oral daily  pantoprazole  Injectable 40 milliGRAM(s) IV Push every 12 hours  polyethylene glycol 3350 17 Gram(s) Oral every 12 hours  psyllium Powder 1 Packet(s) Oral two times a day  senna 2 Tablet(s) Oral at bedtime    MEDICATIONS  (PRN):  acetaminophen   Tablet .. 650 milliGRAM(s) Oral every 8 hours PRN Temp greater or equal to 38C (100.4F), Mild Pain (1 - 3)  albuterol/ipratropium for Nebulization 3 milliLiter(s) Nebulizer every 6 hours PRN Shortness of Breath and/or Wheezing  bisacodyl Suppository 10 milliGRAM(s) Rectal daily PRN Constipation  ibuprofen  Tablet. 400 milliGRAM(s) Oral every 6 hours PRN Moderate Pain (4 - 6)  Morphine as by patient    Vital Signs Last 24 Hrs  T(C): 36.4 (12 Feb 2021 05:31), Max: 37.1 (11 Feb 2021 10:21)  T(F): 97.6 (12 Feb 2021 05:31), Max: 98.7 (11 Feb 2021 10:21)  HR: 94 (12 Feb 2021 05:31) (76 - 94)  BP: 125/69 (12 Feb 2021 05:31) (101/60 - 125/69)  RR: 20 (12 Feb 2021 05:31) (20 - 20)  SpO2: 94% (12 Feb 2021 05:31) (93% - 95%)    - CP, + cough, + SOB, + cramps  GENERAL:         uncomfortable,  + distress.  HEENT:            - trauma,  - icterus,  - injection,  - nasal discharge.  NECK:              - jugular venous distention, - thyromegaly.  LYMPH:           - lymphadenopathy, - masses.  RESP:              + clear,   - rales,   - rhonchi,   - wheezes.   COR:                S1S2   - gallops,  - rubs.  ABD:                bowel sounds +,   soft, - tender, - distended.  EXT/MSC:         - cyanosis,  - clubbing, - edema.    NEURO:           + alert and oriented                         7.8    7.70  )-----------( 483      ( 12 Feb 2021 07:50 ) hb increased to 8.5             25.6     02-12    132<L>  |  104  |  19  ----------------------------<  92  4.6   |  19<L>  |  1.10    Ca    8.3<L>      12 Feb 2021 07:50  Phos  2.7     decreased to 2.2  Mg     2.3     02-12    X-ray Chest (02.06.21)  Bilateral infiltrates with progression comparison to prior examination of the chest 2/3/2021    CT Angio Chest PE Protocol w/ IV Cont (02.03.21)  Pulmonary arteries: No pulmonary embolus seen.  Lungs and large airways: Respiratory motion limits assessment of fine pulmonary parenchymal detail. Trace dependent secretions distal trachea. No nodular and no tracheal or endobronchial lesion.  Bilateral apical pleural parenchymal scarring. Unchanged mild peribronchial wall thickening and cylindrical bronchiectasis bilaterally. Scattered nodular groundglass opacities bilaterally, more confluent in right upper lobe posterior segment with apparent interlobular septal thickening. Mild compressive atelectasis right lower lobe. Few nodular consolidative opacities left lower lobe, subsegmental atelectasis versus mild consolidation. No measurable residual masses in right upper lobe anterior segment with linear opacity in this region probably representing scarring. New few scattered bilateral pulmonary nodules, for example:  *  Right upper lobe subpleural 0.5 cm (series 6 image 160).  *  Right upper lobe anterior segment 0.5 cm (series 6 image 132)  *  Left lower lobe 0.5 cm (series 6 image 204)  Pleura:  Resolved right pneumothorax. Decreased trace residual right pleural effusion.  Mediastinum and hilar regions: Significantly decreased mediastinal and hilar adenopathy, for example right lower paratracheal 1.1 x 0.8 cm, previously 4.4 x 2.9 cm. No significant change mildly prominent subcarinal node 1.2 cm short axis. Resolved right lower cervical adenopathy.  Heart and pericardium:  Heart size is normal. No pericardial effusion.  Vessels:  No aneurysm. Mild scattered calcific atherosclerosis.  Chest wall and lower neck:  Normal.  Upper abdomen: Unchanged few left renal cysts, largest 3.5 cm.  Bones: No suspicious lytic or blastic lesion. Mild degenerative changes thoracic spine with prominent degenerative Schmorl's node inferior endplate T9 vertebral body.    IMPRESSION:  1.  No pulmonary embolus.  2.  New few small scattered nodular opacities bilaterally with more confluent groundglass density and suspected interlobular septal thickening in posterior right upper lobe. Differential includes aspiration/infection versus lymphangitic carcinomatosis. Assessment limited by artifact from respiratory motion. Recommend clinical correlation and short-term follow-up would be helpful.  .  No measurable residual right upper lobe mass with scarring in this region.  4.  Significantly decreased thoracic adenopathy.  5.  Decreased trace residual right pleural effusion.  6.  Resolved right pneumothorax.    EXAM:  CT ABDOMEN AND PELVIS IC                          PROCEDURE DATE:  02/10/2021      INTERPRETATION:  CLINICAL INFORMATION: Persistent abdominal pain, lung adenocarcinoma, admitted for symptomatic anemia    COMPARISON: PET/CT to 521, chestCT to 3/20/2021    PROCEDURE:  CT of the Abdomen and Pelvis was performed with intravenous contrast.  Intravenous contrast: 90 ml Omnipaque 350. 10 ml discarded.  Oral contrast: None.  Sagittal and coronal reformats were performed.    FINDINGS:    Lower chest: Bilateral small pleural effusions with passive atelectases.    Liver: Normal in size and morphology. No focal abnormality. The main portal vein is patent.  Gallbladder and biliary ducts: No gallstones. No intra/extrahepatic biliary ductal dilatation.  Spleen: Within normal limits.  Pancreas: Within normal limits.  Adrenals: Within normal limits.  Kidneys/ureters: No hydronephrosis. No urinary calculi. Left renal cysts.    Bladder: Within normal limits.  Reproductive organs: Uterus and adnexa are within normal limits.    Bowel: Moderately distended rectum measuring up to 9 cm in caliber. The entire colon is fluid-filled demonstrating air-fluid level, without significant distention or bowel wall thickening. Multiple fluid-filled smallbowel loops without mural thickening. Hyperdensity in the second portion of duodenum may be ingested material versus surgical material. Focal dependent hyperdensity in the ascending colon (3:61-64).  Peritoneum/retroperitoneum: No ascites. No free air. Mild presacral fat stranding.  Vasculature:  The aorta and its branches are normal in caliber. Moderate to severe aortic calcification in the abdominal aorta and iliac arteries. Severe ostial stenosis of celiac artery.  Lymph nodes: Lymph nodes arenot enlarged.  Bones and soft tissue: Degenerative changes in the spine.      IMPRESSION:  No evidence of hemoperitoneum or retroperitoneal hemorrhage. Limited evaluation of gastrointestinal bleeding without precontrast images.  1.  Distended rectum with mild presacral fat stranding, without bowel wall thickening, which may reflect early stercoral proctitis.  2.  Fluid-filled colon and small bowel, which can be seen in patients with diarrhea. Possible mild enteritis. clinical correlation is recommended.  3.  Focal dependent hyperdensity in the ascending colon (3:61-64), of unclear clinical significance.    ASSESSMENT/PLAN    1)  Chronic obstructive pulmonary disease  2)  Chlamydia pneumoniae RVP+ (2.06.21)  3)  Bronchiectasis  4)  Lung cancer  5)  Abdominal cramps  6)  Hyperdensity ascending colon  7)  Hypophosphatemia  8)  Cough  9)  Hyponatremia    Oxygen as needed for a satisfactory SpO2  Bronchodilators:  Atrovent/ albuterol q 4 – 6 hours as needed  ID/Antibiotics: Zithromax; cefepime completed  ID recommendations appreciated  Cardiac/HTN: hemodynamically stable  GI: Rx/ prophylaxis c PPI/H2B  Would try lidocaine patch as her BS are positive and the abdomen is soft without rebound  Heme: Rx/VT prophylaxis   Will discuss with Dr. Artis today

## 2021-02-15 LAB
ANION GAP SERPL CALC-SCNC: 8 MMOL/L — SIGNIFICANT CHANGE UP (ref 5–17)
ANISOCYTOSIS BLD QL: SLIGHT — SIGNIFICANT CHANGE UP
BASOPHILS # BLD AUTO: 0.19 K/UL — SIGNIFICANT CHANGE UP (ref 0–0.2)
BASOPHILS NFR BLD AUTO: 2.6 % — HIGH (ref 0–2)
BUN SERPL-MCNC: 14 MG/DL — SIGNIFICANT CHANGE UP (ref 7–23)
CALCIUM SERPL-MCNC: 8.1 MG/DL — LOW (ref 8.4–10.5)
CHLORIDE SERPL-SCNC: 104 MMOL/L — SIGNIFICANT CHANGE UP (ref 96–108)
CO2 SERPL-SCNC: 19 MMOL/L — LOW (ref 22–31)
CREAT SERPL-MCNC: 0.9 MG/DL — SIGNIFICANT CHANGE UP (ref 0.5–1.3)
DACRYOCYTES BLD QL SMEAR: SLIGHT — SIGNIFICANT CHANGE UP
EOSINOPHIL # BLD AUTO: 2.03 K/UL — HIGH (ref 0–0.5)
EOSINOPHIL NFR BLD AUTO: 28.1 % — HIGH (ref 0–6)
GIANT PLATELETS BLD QL SMEAR: PRESENT — SIGNIFICANT CHANGE UP
GLUCOSE SERPL-MCNC: 104 MG/DL — HIGH (ref 70–99)
HCT VFR BLD CALC: 24.4 % — LOW (ref 34.5–45)
HGB BLD-MCNC: 7.5 G/DL — LOW (ref 11.5–15.5)
HYPOCHROMIA BLD QL: SLIGHT — SIGNIFICANT CHANGE UP
LYMPHOCYTES # BLD AUTO: 0.25 K/UL — LOW (ref 1–3.3)
LYMPHOCYTES # BLD AUTO: 3.5 % — LOW (ref 13–44)
MACROCYTES BLD QL: SLIGHT — SIGNIFICANT CHANGE UP
MAGNESIUM SERPL-MCNC: 1.8 MG/DL — SIGNIFICANT CHANGE UP (ref 1.6–2.6)
MANUAL SMEAR VERIFICATION: SIGNIFICANT CHANGE UP
MCHC RBC-ENTMCNC: 26.2 PG — LOW (ref 27–34)
MCHC RBC-ENTMCNC: 30.7 GM/DL — LOW (ref 32–36)
MCV RBC AUTO: 85.3 FL — SIGNIFICANT CHANGE UP (ref 80–100)
MICROCYTES BLD QL: SLIGHT — SIGNIFICANT CHANGE UP
MONOCYTES # BLD AUTO: 0.51 K/UL — SIGNIFICANT CHANGE UP (ref 0–0.9)
MONOCYTES NFR BLD AUTO: 7 % — SIGNIFICANT CHANGE UP (ref 2–14)
NEUTROPHILS # BLD AUTO: 4.12 K/UL — SIGNIFICANT CHANGE UP (ref 1.8–7.4)
NEUTROPHILS NFR BLD AUTO: 57 % — SIGNIFICANT CHANGE UP (ref 43–77)
OVALOCYTES BLD QL SMEAR: SLIGHT — SIGNIFICANT CHANGE UP
PHOSPHATE SERPL-MCNC: 3.2 MG/DL — SIGNIFICANT CHANGE UP (ref 2.5–4.5)
PLAT MORPH BLD: ABNORMAL
PLATELET # BLD AUTO: 422 K/UL — HIGH (ref 150–400)
POLYCHROMASIA BLD QL SMEAR: SLIGHT — SIGNIFICANT CHANGE UP
POTASSIUM SERPL-MCNC: 4.1 MMOL/L — SIGNIFICANT CHANGE UP (ref 3.5–5.3)
POTASSIUM SERPL-SCNC: 4.1 MMOL/L — SIGNIFICANT CHANGE UP (ref 3.5–5.3)
RBC # BLD: 2.86 M/UL — LOW (ref 3.8–5.2)
RBC # FLD: 17.1 % — HIGH (ref 10.3–14.5)
RBC BLD AUTO: ABNORMAL
SODIUM SERPL-SCNC: 131 MMOL/L — LOW (ref 135–145)
SPHEROCYTES BLD QL SMEAR: SLIGHT — SIGNIFICANT CHANGE UP
T4 FREE SERPL-MCNC: 0.83 NG/DL — SIGNIFICANT CHANGE UP (ref 0.7–1.48)
TSH SERPL-MCNC: 6.39 UIU/ML — HIGH (ref 0.35–4.94)
VARIANT LYMPHS # BLD: 1.8 % — SIGNIFICANT CHANGE UP (ref 0–6)
WBC # BLD: 7.23 K/UL — SIGNIFICANT CHANGE UP (ref 3.8–10.5)
WBC # FLD AUTO: 7.23 K/UL — SIGNIFICANT CHANGE UP (ref 3.8–10.5)

## 2021-02-15 RX ORDER — SIMETHICONE 80 MG/1
80 TABLET, CHEWABLE ORAL ONCE
Refills: 0 | Status: COMPLETED | OUTPATIENT
Start: 2021-02-15 | End: 2021-02-15

## 2021-02-15 RX ORDER — LIDOCAINE 4 G/100G
3 CREAM TOPICAL DAILY
Refills: 0 | Status: DISCONTINUED | OUTPATIENT
Start: 2021-02-15 | End: 2021-02-17

## 2021-02-15 RX ADMIN — Medication 650 MILLIGRAM(S): at 06:37

## 2021-02-15 RX ADMIN — SENNA PLUS 2 TABLET(S): 8.6 TABLET ORAL at 22:48

## 2021-02-15 RX ADMIN — ENOXAPARIN SODIUM 30 MILLIGRAM(S): 100 INJECTION SUBCUTANEOUS at 22:49

## 2021-02-15 RX ADMIN — Medication 1 TABLET(S): at 12:05

## 2021-02-15 RX ADMIN — LIDOCAINE 1 PATCH: 4 CREAM TOPICAL at 18:00

## 2021-02-15 RX ADMIN — LIDOCAINE 1 PATCH: 4 CREAM TOPICAL at 12:04

## 2021-02-15 RX ADMIN — Medication 400 MILLIGRAM(S): at 22:48

## 2021-02-15 RX ADMIN — MEGESTROL ACETATE 40 MILLIGRAM(S): 40 SUSPENSION ORAL at 12:05

## 2021-02-15 RX ADMIN — Medication 1 MILLIGRAM(S): at 12:05

## 2021-02-15 RX ADMIN — MEGESTROL ACETATE 40 MILLIGRAM(S): 40 SUSPENSION ORAL at 06:35

## 2021-02-15 RX ADMIN — PANTOPRAZOLE SODIUM 40 MILLIGRAM(S): 20 TABLET, DELAYED RELEASE ORAL at 18:20

## 2021-02-15 RX ADMIN — SODIUM CHLORIDE 60 MILLILITER(S): 9 INJECTION, SOLUTION INTRAVENOUS at 23:38

## 2021-02-15 RX ADMIN — AZITHROMYCIN 250 MILLIGRAM(S): 500 TABLET, FILM COATED ORAL at 12:05

## 2021-02-15 RX ADMIN — MEGESTROL ACETATE 40 MILLIGRAM(S): 40 SUSPENSION ORAL at 22:49

## 2021-02-15 RX ADMIN — LIDOCAINE 3 PATCH: 4 CREAM TOPICAL at 18:26

## 2021-02-15 RX ADMIN — LIDOCAINE 3 PATCH: 4 CREAM TOPICAL at 18:20

## 2021-02-15 RX ADMIN — PANTOPRAZOLE SODIUM 40 MILLIGRAM(S): 20 TABLET, DELAYED RELEASE ORAL at 06:35

## 2021-02-15 RX ADMIN — Medication 2 MILLIGRAM(S): at 00:01

## 2021-02-15 RX ADMIN — Medication 325 MILLIGRAM(S): at 12:05

## 2021-02-15 RX ADMIN — SIMETHICONE 80 MILLIGRAM(S): 80 TABLET, CHEWABLE ORAL at 14:07

## 2021-02-15 NOTE — PROGRESS NOTE ADULT - SUBJECTIVE AND OBJECTIVE BOX
----- Message from Concetta Little sent at 11/8/2017  8:47 AM CST -----  Contact: pt [phone]  Patient is returning a phone call.  Who left a message for the patient: Aljonna  Does patient know what this is regarding:  A message was left  Comments:    SUBJECTIVE/OVERNIGHT EVENTS: No acute overnight events. Pt seen in AM at bedside, resting comfortably in bed, and does not appear to be in any acute distress. She says she had a bowel movement last night. She continues to experience mild abdominal cramps, which she says is somewhat improved over the past few days. She otherwise denies active fever, chills, nausea, vomiting, headache, acute sob, chest pain, abdominal pain, genitourinary sx, extremity pain or swelling.    VITAL SIGNS:  Vital Signs Last 24 Hrs  T(C): 36.5 (15 Feb 2021 09:00), Max: 37.1 (14 Feb 2021 22:00)  T(F): 97.7 (15 Feb 2021 09:00), Max: 98.7 (14 Feb 2021 22:00)  HR: 94 (15 Feb 2021 14:00) (70 - 94)  BP: 121/66 (15 Feb 2021 14:00) (95/62 - 121/66)  BP(mean): --  RR: 18 (15 Feb 2021 09:00) (18 - 18)  SpO2: 97% (15 Feb 2021 14:00) (95% - 97%)    PHYSICAL EXAM:  Constitutional: WDWN resting in bed; in no acute distress but slightly uncomfortable appearing  HEENT: NCAT, EOMI, anicteric sclera, no nasal discharge; uvula midline, no oropharyngeal erythema or exudates; MMM  Respiratory: CTA B/L; no W/R/R, no retractions; breathing 24/min with abdominal recruitment on presentation  Cardiac: +S1/S2; RRR; no M/R/G  Gastrointestinal: abdomen soft, tender to light palpation evenly across epigastric, RUQ, RLQ, and suprapubic regions, no rebound  Extremities: WWP, no clubbing or cyanosis; no peripheral edema  Vascular: 2+ radial, femoral, DP/PT pulses B/L  Neurologic: AAOx3; CNII-XII grossly intact; moves extremities to command, 5/5 UE strength and can independently turn from back to side    MEDICATIONS:  MEDICATIONS  (STANDING):  azithromycin   Tablet 250 milliGRAM(s) Oral daily  dextrose 5% + sodium chloride 0.45%. 1000 milliLiter(s) (60 mL/Hr) IV Continuous <Continuous>  enoxaparin Injectable 30 milliGRAM(s) SubCutaneous every 24 hours  ferrous    sulfate 325 milliGRAM(s) Oral daily  folic acid 1 milliGRAM(s) Oral daily  influenza  Vaccine (HIGH DOSE) 0.7 milliLiter(s) IntraMuscular once  lidocaine   Patch 3 Patch Transdermal daily  megestrol 40 milliGRAM(s) Oral three times a day  multivitamin 1 Tablet(s) Oral daily  pantoprazole  Injectable 40 milliGRAM(s) IV Push every 12 hours  polyethylene glycol 3350 17 Gram(s) Oral every 12 hours  psyllium Powder 1 Packet(s) Oral two times a day  senna 2 Tablet(s) Oral at bedtime    MEDICATIONS  (PRN):  acetaminophen   Tablet .. 650 milliGRAM(s) Oral every 8 hours PRN Temp greater or equal to 38C (100.4F), Mild Pain (1 - 3)  albuterol/ipratropium for Nebulization 3 milliLiter(s) Nebulizer every 6 hours PRN Shortness of Breath and/or Wheezing  bisacodyl Suppository 10 milliGRAM(s) Rectal daily PRN Constipation  ibuprofen  Tablet. 400 milliGRAM(s) Oral every 6 hours PRN Moderate Pain (4 - 6)      ALLERGIES:  Allergies    No Known Allergies    Intolerances        LABS:                        7.5    7.23  )-----------( 422      ( 15 Feb 2021 07:15 )             24.4     02-15    131<L>  |  104  |  14  ----------------------------<  104<H>  4.1   |  19<L>  |  0.90    Ca    8.1<L>      15 Feb 2021 07:15  Phos  3.2     02-15  Mg     1.8     02-15    TPro  6.6  /  Alb  2.3<L>  /  TBili  0.2  /  DBili  x   /  AST  44<H>  /  ALT  38  /  AlkPhos  44  02-14        RADIOLOGY & ADDITIONAL TESTS: Reviewed.

## 2021-02-15 NOTE — PROGRESS NOTE ADULT - ASSESSMENT
the patient should be allowed to have more out of bed experience.  The patient's station and gait should be evaluated and she should be allowed to walk with a walker if necessary or with assistance if necessary or alone if she is stable.  She should be allowed to sit in a chair for her meals.

## 2021-02-15 NOTE — PROGRESS NOTE ADULT - SUBJECTIVE AND OBJECTIVE BOX
the patient feels that she is not allowed out of bed.  She is not even allowed to de angle her legs.  "How my going to go home and they do not let me walk like I will have to do at home question-maggie"    Skin: Normal turgor. No rash noted. No lesions.   Eyes: Eyes with PERRLA. EOM's in­tact Sclera clear,no jaundice noted. Conjunctiva clear.   Ears, Nose,Mouth & Throat: Teeth normal, no missing teeth or dentures present. No ulcers. No thrush Auditory canals normal.  Description: %% Oropharynx without lesions. Mucositis None   Neck: No thyromegaly. No lymphadenopathy noted.   Cardiovascular: S1, S2 without murmurs. No Extrasystole. Tachycardia. No breast masses appreciated. No axillary lymphadenopathy.  No skin changes. No nipple discharge present. No dimpling noted.  Chest/Respiratory: No dyspnea. No rhonchi. No wheezing. No decreased breath sounds at bases. No rales.  The breath sounds are somewhat coarse.    The patient has difficulty getting secretions up. Unlabored breathing. No  accessory muscle use. Abdomen is soft and non­tender to touch. No hepatomegaly noted. No splenomegaly noted. No abdominal pain or  guarding noted. No abdominal mass(es) appreciated. No ascites noted.   Genito­Urinary: Deferred.   The patient has a Schumacher catheter to a gravity drainage bag.  Hematologic/Lymphatic: No petechiae noted. No purpura noted. No lymphadenopathy noted.   Musculoskeletal: No Kyphosis. No weakness. No spinal tenderness on percussion. No pain on hip rotation reported by patient. Normal  range of motion in upper and lower extremities.

## 2021-02-15 NOTE — PROGRESS NOTE ADULT - SUBJECTIVE AND OBJECTIVE BOX
Pt seen and examined   same abdominal pain  no sob    REVIEW OF SYSTEMS:  Constitutional: No fever, weight loss or fatigue  Cardiovascular: No chest pain, palpitations, dizziness or leg swelling  Gastrointestinal: No abdominal or epigastric pain. No nausea, no vomiting   Skin: No itching, burning, rashes or lesions       MEDICATIONS:  MEDICATIONS  (STANDING):  azithromycin   Tablet 250 milliGRAM(s) Oral daily  dextrose 5% + sodium chloride 0.45%. 1000 milliLiter(s) (60 mL/Hr) IV Continuous <Continuous>  enoxaparin Injectable 30 milliGRAM(s) SubCutaneous every 24 hours  ferrous    sulfate 325 milliGRAM(s) Oral daily  folic acid 1 milliGRAM(s) Oral daily  influenza  Vaccine (HIGH DOSE) 0.7 milliLiter(s) IntraMuscular once  lidocaine   Patch 3 Patch Transdermal daily  megestrol 40 milliGRAM(s) Oral three times a day  multivitamin 1 Tablet(s) Oral daily  pantoprazole  Injectable 40 milliGRAM(s) IV Push every 12 hours  polyethylene glycol 3350 17 Gram(s) Oral every 12 hours  psyllium Powder 1 Packet(s) Oral two times a day  senna 2 Tablet(s) Oral at bedtime    MEDICATIONS  (PRN):  acetaminophen   Tablet .. 650 milliGRAM(s) Oral every 8 hours PRN Temp greater or equal to 38C (100.4F), Mild Pain (1 - 3)  albuterol/ipratropium for Nebulization 3 milliLiter(s) Nebulizer every 6 hours PRN Shortness of Breath and/or Wheezing  bisacodyl Suppository 10 milliGRAM(s) Rectal daily PRN Constipation  ibuprofen  Tablet. 400 milliGRAM(s) Oral every 6 hours PRN Moderate Pain (4 - 6)      Allergies    No Known Allergies    Intolerances        Vital Signs Last 24 Hrs  T(C): 36.5 (15 Feb 2021 09:00), Max: 37.1 (14 Feb 2021 22:00)  T(F): 97.7 (15 Feb 2021 09:00), Max: 98.7 (14 Feb 2021 22:00)  HR: 94 (15 Feb 2021 14:00) (70 - 94)  BP: 121/66 (15 Feb 2021 14:00) (95/62 - 121/66)  BP(mean): --  RR: 18 (15 Feb 2021 09:00) (18 - 18)  SpO2: 97% (15 Feb 2021 14:00) (95% - 97%)    02-14 @ 07:01  -  02-15 @ 07:00  --------------------------------------------------------  IN: 720 mL / OUT: 0 mL / NET: 720 mL    02-15 @ 07:01  -  02-15 @ 15:17  --------------------------------------------------------  IN: 480 mL / OUT: 0 mL / NET: 480 mL        PHYSICAL EXAM:    General: thin in no acute distress  HEENT: MMM, conjunctiva and sclera clear  Lungs: clear  Heart: regular  Gastrointestinal: Soft non-tender non-distended; Normal bowel sounds;   Skin: Warm and dry. No obvious rash    LABS:      CBC Full  -  ( 15 Feb 2021 07:15 )  WBC Count : 7.23 K/uL  RBC Count : 2.86 M/uL  Hemoglobin : 7.5 g/dL  Hematocrit : 24.4 %  Platelet Count - Automated : 422 K/uL  Mean Cell Volume : 85.3 fl  Mean Cell Hemoglobin : 26.2 pg  Mean Cell Hemoglobin Concentration : 30.7 gm/dL  Auto Neutrophil # : 4.12 K/uL  Auto Lymphocyte # : 0.25 K/uL  Auto Monocyte # : 0.51 K/uL  Auto Eosinophil # : 2.03 K/uL  Auto Basophil # : 0.19 K/uL  Auto Neutrophil % : 57.0 %  Auto Lymphocyte % : 3.5 %  Auto Monocyte % : 7.0 %  Auto Eosinophil % : 28.1 %  Auto Basophil % : 2.6 %    02-15    131<L>  |  104  |  14  ----------------------------<  104<H>  4.1   |  19<L>  |  0.90    Ca    8.1<L>      15 Feb 2021 07:15  Phos  3.2     02-15  Mg     1.8     02-15    TPro  6.6  /  Alb  2.3<L>  /  TBili  0.2  /  DBili  x   /  AST  44<H>  /  ALT  38  /  AlkPhos  44  02-14                      RADIOLOGY & ADDITIONAL STUDIES (The following images were personally reviewed):

## 2021-02-15 NOTE — PROGRESS NOTE ADULT - SUBJECTIVE AND OBJECTIVE BOX
C/ HPI Patient is an 86 year old female with chronic obstructive pulmonary disease, lung cancer, poorly differentiated adenocarcinoma, status post radiation therapy, Dec 2020, admitted for severe anemia, upper GI bleed, RVP positive Chlamydia pneumoniae. Her right sided abdominal cramps have improved. Today she feels very tired.    All new data reviewed, including VS, lab, imaging, Rx and documentation.    Note written at bedside.    PAST MEDICAL & SURGICAL HISTORY:  Chronic obstructive pulmonary disease, unspecified COPD type  Hearing loss  left ear - sees Dr. Garces ENT  Basal cell carcinoma  x2 - upper lip and arm  Osteoporosis  H/O breast biopsy, 5 years ago, mass was benign and self-resolved  History of appendectomy  History of cataract surgery    SOCHX:   tobacco,  -  alcohol      FAMILY HISTORY: FA/MO  - contributory     ROS reviewed below with positive findings marked (+) :  GEN:  fever, chills ENT: tracheostomy,   epistaxis,  sinusitis COR: CAD, CHF,  HTN, dysrhythmia PUL: +COPD, ILD, asthma, pneumonia GI: PEG, dysphagia, hemorrhage, other HALLIE: kidney disease, electrolyte disorder HEM:  anemia, thrombus, coagulopathy, cancer ENDO:  thyroid disease, diabetes mellitus CNS:  dementia, stroke, seizure, PSY:  depression, anxiety, other      MEDICATIONS  (STANDING):  azithromycin   Tablet 250 milliGRAM(s) Oral daily  cefepime   IVPB 1000 milliGRAM(s) IV Intermittent every 8 hours completed  enoxaparin Injectable 30 milliGRAM(s) SubCutaneous every 24 hours  ferrous    sulfate 325 milliGRAM(s) Oral daily  folic acid 1 milliGRAM(s) Oral daily  influenza  Vaccine (HIGH DOSE) 0.7 milliLiter(s) IntraMuscular once  lidocaine   Patch 1 Patch Transdermal daily  megestrol 40 milliGRAM(s) Oral three times a day  multivitamin 1 Tablet(s) Oral daily  pantoprazole  Injectable 40 milliGRAM(s) IV Push every 12 hours  polyethylene glycol 3350 17 Gram(s) Oral every 12 hours  psyllium Powder 1 Packet(s) Oral two times a day  senna 2 Tablet(s) Oral at bedtime    MEDICATIONS  (PRN):  acetaminophen   Tablet .. 650 milliGRAM(s) Oral every 8 hours PRN Temp greater or equal to 38C (100.4F), Mild Pain (1 - 3)  albuterol/ipratropium for Nebulization 3 milliLiter(s) Nebulizer every 6 hours PRN Shortness of Breath and/or Wheezing  bisacodyl Suppository 10 milliGRAM(s) Rectal daily PRN Constipation  ibuprofen  Tablet. 400 milliGRAM(s) Oral every 6 hours PRN Moderate Pain (4 - 6)  Morphine as by patient    Vital Signs Last 24 Hrs  T(C): 36.4 (12 Feb 2021 05:31), Max: 37.1 (11 Feb 2021 10:21)  T(F): 97.6 (12 Feb 2021 05:31), Max: 98.7 (11 Feb 2021 10:21)  HR: 94 (12 Feb 2021 05:31) (76 - 94)  BP: 125/69 (12 Feb 2021 05:31) (101/60 - 125/69)  RR: 20 (12 Feb 2021 05:31) (20 - 20)  SpO2: 94% (12 Feb 2021 05:31) (93% - 95%)    - CP, + cough, + SOB, + cramps  GENERAL:         uncomfortable,  + distress.  HEENT:            - trauma,  - icterus,  - injection,  - nasal discharge.  NECK:              - jugular venous distention, - thyromegaly.  LYMPH:           - lymphadenopathy, - masses.  RESP:              + clear,   - rales,   - rhonchi,   - wheezes.   COR:                S1S2   - gallops,  - rubs.  ABD:                bowel sounds +,   soft, - tender, - distended.  EXT/MSC:         - cyanosis,  - clubbing, - edema.    NEURO:           + alert and oriented                         7.8    7.70  )-----------( 483      ( 12 Feb 2021 07:50 ) hb increased to 8.5             25.6     02-12    132<L>  |  104  |  19  ----------------------------<  92  4.6   |  19<L>  |  1.10    Ca    8.3<L>      12 Feb 2021 07:50  Phos  2.7     decreased to 2.2  Mg     2.3     02-12    X-ray Chest (02.06.21)  Bilateral infiltrates with progression comparison to prior examination of the chest 2/3/2021    CT Angio Chest PE Protocol w/ IV Cont (02.03.21)  Pulmonary arteries: No pulmonary embolus seen.  Lungs and large airways: Respiratory motion limits assessment of fine pulmonary parenchymal detail. Trace dependent secretions distal trachea. No nodular and no tracheal or endobronchial lesion.  Bilateral apical pleural parenchymal scarring. Unchanged mild peribronchial wall thickening and cylindrical bronchiectasis bilaterally. Scattered nodular groundglass opacities bilaterally, more confluent in right upper lobe posterior segment with apparent interlobular septal thickening. Mild compressive atelectasis right lower lobe. Few nodular consolidative opacities left lower lobe, subsegmental atelectasis versus mild consolidation. No measurable residual masses in right upper lobe anterior segment with linear opacity in this region probably representing scarring. New few scattered bilateral pulmonary nodules, for example:  *  Right upper lobe subpleural 0.5 cm (series 6 image 160).  *  Right upper lobe anterior segment 0.5 cm (series 6 image 132)  *  Left lower lobe 0.5 cm (series 6 image 204)  Pleura:  Resolved right pneumothorax. Decreased trace residual right pleural effusion.  Mediastinum and hilar regions: Significantly decreased mediastinal and hilar adenopathy, for example right lower paratracheal 1.1 x 0.8 cm, previously 4.4 x 2.9 cm. No significant change mildly prominent subcarinal node 1.2 cm short axis. Resolved right lower cervical adenopathy.  Heart and pericardium:  Heart size is normal. No pericardial effusion.  Vessels:  No aneurysm. Mild scattered calcific atherosclerosis.  Chest wall and lower neck:  Normal.  Upper abdomen: Unchanged few left renal cysts, largest 3.5 cm.  Bones: No suspicious lytic or blastic lesion. Mild degenerative changes thoracic spine with prominent degenerative Schmorl's node inferior endplate T9 vertebral body.    IMPRESSION:  1.  No pulmonary embolus.  2.  New few small scattered nodular opacities bilaterally with more confluent groundglass density and suspected interlobular septal thickening in posterior right upper lobe. Differential includes aspiration/infection versus lymphangitic carcinomatosis. Assessment limited by artifact from respiratory motion. Recommend clinical correlation and short-term follow-up would be helpful.  .  No measurable residual right upper lobe mass with scarring in this region.  4.  Significantly decreased thoracic adenopathy.  5.  Decreased trace residual right pleural effusion.  6.  Resolved right pneumothorax.    EXAM:  CT ABDOMEN AND PELVIS IC                          PROCEDURE DATE:  02/10/2021      INTERPRETATION:  CLINICAL INFORMATION: Persistent abdominal pain, lung adenocarcinoma, admitted for symptomatic anemia    COMPARISON: PET/CT to 521, chestCT to 3/20/2021    PROCEDURE:  CT of the Abdomen and Pelvis was performed with intravenous contrast.  Intravenous contrast: 90 ml Omnipaque 350. 10 ml discarded.  Oral contrast: None.  Sagittal and coronal reformats were performed.    FINDINGS:    Lower chest: Bilateral small pleural effusions with passive atelectases.    Liver: Normal in size and morphology. No focal abnormality. The main portal vein is patent.  Gallbladder and biliary ducts: No gallstones. No intra/extrahepatic biliary ductal dilatation.  Spleen: Within normal limits.  Pancreas: Within normal limits.  Adrenals: Within normal limits.  Kidneys/ureters: No hydronephrosis. No urinary calculi. Left renal cysts.    Bladder: Within normal limits.  Reproductive organs: Uterus and adnexa are within normal limits.    Bowel: Moderately distended rectum measuring up to 9 cm in caliber. The entire colon is fluid-filled demonstrating air-fluid level, without significant distention or bowel wall thickening. Multiple fluid-filled smallbowel loops without mural thickening. Hyperdensity in the second portion of duodenum may be ingested material versus surgical material. Focal dependent hyperdensity in the ascending colon (3:61-64).  Peritoneum/retroperitoneum: No ascites. No free air. Mild presacral fat stranding.  Vasculature:  The aorta and its branches are normal in caliber. Moderate to severe aortic calcification in the abdominal aorta and iliac arteries. Severe ostial stenosis of celiac artery.  Lymph nodes: Lymph nodes arenot enlarged.  Bones and soft tissue: Degenerative changes in the spine.      IMPRESSION:  No evidence of hemoperitoneum or retroperitoneal hemorrhage. Limited evaluation of gastrointestinal bleeding without precontrast images.  1.  Distended rectum with mild presacral fat stranding, without bowel wall thickening, which may reflect early stercoral proctitis.  2.  Fluid-filled colon and small bowel, which can be seen in patients with diarrhea. Possible mild enteritis. clinical correlation is recommended.  3.  Focal dependent hyperdensity in the ascending colon (3:61-64), of unclear clinical significance.    ASSESSMENT/PLAN    1)  Chronic obstructive pulmonary disease  2)  Chlamydia pneumoniae RVP+ (2.06.21)  3)  Bronchiectasis  4)  Lung cancer  5)  Abdominal cramps  6)  Hyperdensity ascending colon  7)  Hypophosphatemia  8)  Cough  9)  Hyponatremia  10) Dizziness    Oxygen as needed for a satisfactory SpO2  Bronchodilators:  Atrovent/ albuterol q 4 – 6 hours as needed  ID/Antibiotics: Zithromax; cefepime completed  ID recommendations appreciated  Cardiac/HTN: hemodynamically stable but unstable gait  GI: Rx/ prophylaxis c PPI/H2B  Would try lidocaine patch as her BS are positive and the abdomen is soft without rebound  Heme: Rx/VT prophylaxis   Discussed with physical therapy, home health aid  Will discuss with Dr. Artis today

## 2021-02-16 ENCOUNTER — TRANSCRIPTION ENCOUNTER (OUTPATIENT)
Age: 86
End: 2021-02-16

## 2021-02-16 LAB
ANION GAP SERPL CALC-SCNC: 10 MMOL/L — SIGNIFICANT CHANGE UP (ref 5–17)
BUN SERPL-MCNC: 13 MG/DL — SIGNIFICANT CHANGE UP (ref 7–23)
CALCIUM SERPL-MCNC: 8.3 MG/DL — LOW (ref 8.4–10.5)
CHLORIDE SERPL-SCNC: 106 MMOL/L — SIGNIFICANT CHANGE UP (ref 96–108)
CO2 SERPL-SCNC: 18 MMOL/L — LOW (ref 22–31)
CREAT SERPL-MCNC: 0.92 MG/DL — SIGNIFICANT CHANGE UP (ref 0.5–1.3)
GLUCOSE SERPL-MCNC: 90 MG/DL — SIGNIFICANT CHANGE UP (ref 70–99)
HCT VFR BLD CALC: 26.7 % — LOW (ref 34.5–45)
HGB BLD-MCNC: 7.8 G/DL — LOW (ref 11.5–15.5)
MAGNESIUM SERPL-MCNC: 2 MG/DL — SIGNIFICANT CHANGE UP (ref 1.6–2.6)
MCHC RBC-ENTMCNC: 26.1 PG — LOW (ref 27–34)
MCHC RBC-ENTMCNC: 29.2 GM/DL — LOW (ref 32–36)
MCV RBC AUTO: 89.3 FL — SIGNIFICANT CHANGE UP (ref 80–100)
NRBC # BLD: 0 /100 WBCS — SIGNIFICANT CHANGE UP (ref 0–0)
PHOSPHATE SERPL-MCNC: 3 MG/DL — SIGNIFICANT CHANGE UP (ref 2.5–4.5)
PLATELET # BLD AUTO: 398 K/UL — SIGNIFICANT CHANGE UP (ref 150–400)
POTASSIUM SERPL-MCNC: 4.4 MMOL/L — SIGNIFICANT CHANGE UP (ref 3.5–5.3)
POTASSIUM SERPL-SCNC: 4.4 MMOL/L — SIGNIFICANT CHANGE UP (ref 3.5–5.3)
RBC # BLD: 2.99 M/UL — LOW (ref 3.8–5.2)
RBC # FLD: 17.2 % — HIGH (ref 10.3–14.5)
SODIUM SERPL-SCNC: 134 MMOL/L — LOW (ref 135–145)
WBC # BLD: 7.44 K/UL — SIGNIFICANT CHANGE UP (ref 3.8–10.5)
WBC # FLD AUTO: 7.44 K/UL — SIGNIFICANT CHANGE UP (ref 3.8–10.5)

## 2021-02-16 RX ADMIN — Medication 1 TABLET(S): at 11:54

## 2021-02-16 RX ADMIN — ENOXAPARIN SODIUM 30 MILLIGRAM(S): 100 INJECTION SUBCUTANEOUS at 22:40

## 2021-02-16 RX ADMIN — PANTOPRAZOLE SODIUM 40 MILLIGRAM(S): 20 TABLET, DELAYED RELEASE ORAL at 06:33

## 2021-02-16 RX ADMIN — LIDOCAINE 3 PATCH: 4 CREAM TOPICAL at 11:51

## 2021-02-16 RX ADMIN — MEGESTROL ACETATE 40 MILLIGRAM(S): 40 SUSPENSION ORAL at 22:40

## 2021-02-16 RX ADMIN — Medication 325 MILLIGRAM(S): at 11:50

## 2021-02-16 RX ADMIN — Medication 400 MILLIGRAM(S): at 18:18

## 2021-02-16 RX ADMIN — Medication 1 PACKET(S): at 06:34

## 2021-02-16 RX ADMIN — Medication 1 MILLIGRAM(S): at 11:50

## 2021-02-16 RX ADMIN — PANTOPRAZOLE SODIUM 40 MILLIGRAM(S): 20 TABLET, DELAYED RELEASE ORAL at 18:06

## 2021-02-16 RX ADMIN — POLYETHYLENE GLYCOL 3350 17 GRAM(S): 17 POWDER, FOR SOLUTION ORAL at 06:33

## 2021-02-16 RX ADMIN — MEGESTROL ACETATE 40 MILLIGRAM(S): 40 SUSPENSION ORAL at 06:34

## 2021-02-16 RX ADMIN — LIDOCAINE 3 PATCH: 4 CREAM TOPICAL at 23:15

## 2021-02-16 RX ADMIN — LIDOCAINE 3 PATCH: 4 CREAM TOPICAL at 06:33

## 2021-02-16 RX ADMIN — LIDOCAINE 3 PATCH: 4 CREAM TOPICAL at 18:08

## 2021-02-16 RX ADMIN — MEGESTROL ACETATE 40 MILLIGRAM(S): 40 SUSPENSION ORAL at 13:56

## 2021-02-16 RX ADMIN — SENNA PLUS 2 TABLET(S): 8.6 TABLET ORAL at 22:40

## 2021-02-16 RX ADMIN — AZITHROMYCIN 250 MILLIGRAM(S): 500 TABLET, FILM COATED ORAL at 11:49

## 2021-02-16 RX ADMIN — LIDOCAINE 1 PATCH: 4 CREAM TOPICAL at 00:14

## 2021-02-16 NOTE — PROGRESS NOTE ADULT - NUTRITIONAL ASSESSMENT
This patient has been assessed with a concern for Malnutrition and has been determined to have a diagnosis/diagnoses of Severe protein-calorie malnutrition and Underweight (BMI < 19).    This patient is being managed with:   Diet Regular-  Entered: Feb 8 2021  5:08PM    
This patient has been assessed with a concern for Malnutrition and has been determined to have a diagnosis/diagnoses of Severe protein-calorie malnutrition and Underweight (BMI < 19).    This patient is being managed with:   Diet Mechanical Soft-  Entered: Feb 5 2021  5:48PM    
This patient has been assessed with a concern for Malnutrition and has been determined to have a diagnosis/diagnoses of Severe protein-calorie malnutrition and Underweight (BMI < 19).    This patient is being managed with:   Diet Regular-  Entered: Feb 8 2021  5:08PM    
This patient has been assessed with a concern for Malnutrition and has been determined to have a diagnosis/diagnoses of Severe protein-calorie malnutrition and Underweight (BMI < 19).    This patient is being managed with:   Diet Regular-  Entered: Feb 8 2021  5:08PM    
This patient has been assessed with a concern for Malnutrition and has been determined to have a diagnosis/diagnoses of Severe protein-calorie malnutrition and Underweight (BMI < 19).    This patient is being managed with:   Diet Full Liquid-  Entered: Feb  3 2021  4:28PM    
This patient has been assessed with a concern for Malnutrition and has been determined to have a diagnosis/diagnoses of Severe protein-calorie malnutrition and Underweight (BMI < 19).    This patient is being managed with:   Diet Mechanical Soft-  Entered: Feb 5 2021  5:48PM    
This patient has been assessed with a concern for Malnutrition and has been determined to have a diagnosis/diagnoses of Severe protein-calorie malnutrition and Underweight (BMI < 19).    This patient is being managed with:   Diet Mechanical Soft-  Entered: Feb 5 2021  5:48PM    
This patient has been assessed with a concern for Malnutrition and has been determined to have a diagnosis/diagnoses of Severe protein-calorie malnutrition and Underweight (BMI < 19).    This patient is being managed with:   Diet Regular-  Entered: Feb 8 2021  5:08PM    
This patient has been assessed with a concern for Malnutrition and has been determined to have a diagnosis/diagnoses of Severe protein-calorie malnutrition and Underweight (BMI < 19).    This patient is being managed with:   Diet Mechanical Soft-  Entered: Feb 5 2021  5:48PM      This patient has been assessed with a concern for Malnutrition and has been determined to have a diagnosis/diagnoses of Severe protein-calorie malnutrition and Underweight (BMI < 19).    This patient is being managed with:   Diet Mechanical Soft-  Entered: Feb 5 2021  5:48PM    
This patient has been assessed with a concern for Malnutrition and has been determined to have a diagnosis/diagnoses of Severe protein-calorie malnutrition and Underweight (BMI < 19).    This patient is being managed with:   Diet Regular-  Entered: Feb 8 2021  5:08PM    
This patient has been assessed with a concern for Malnutrition and has been determined to have a diagnosis/diagnoses of Severe protein-calorie malnutrition and Underweight (BMI < 19).    This patient is being managed with:   Diet Mechanical Soft-  Entered: Feb 5 2021  5:48PM    
This patient has been assessed with a concern for Malnutrition and has been determined to have a diagnosis/diagnoses of Severe protein-calorie malnutrition and Underweight (BMI < 19).    This patient is being managed with:   Diet Regular-  Entered: Feb 8 2021  5:08PM    

## 2021-02-16 NOTE — PROGRESS NOTE ADULT - PROBLEM SELECTOR PLAN 5
MOLST completed outpatient by PCP Alena Worthington. DNR, DNI, no pressors. On admission went over form with patient and confirmed wishes, copy of MOLST in chart. Patient interested in further discussions on goals of care with stated goal remaining functional at home.  - consider palliative care consultation in AM MOLST completed outpatient by PCP Alena Worthington. DNR, DNI, no pressors. On admission went over form with patient and confirmed wishes, copy of MOLST in chart.

## 2021-02-16 NOTE — PROGRESS NOTE ADULT - PROBLEM SELECTOR PLAN 7
F: none  E: replete K<4.0, Mg<2.0, Phos<2.5  N: mechanical soft  DVT prophylaxis: Lovenox  Access: PIV L arm  Code Status: DNR/DNI no pressors, MOLST in chart F: none  E: replete K<4.0, Mg<2.0, Phos<2.5  N: regular diet  DVT prophylaxis: Lovenox  Access: PIV L arm  Code Status: DNR/DNI no pressors, MOLST in chart

## 2021-02-16 NOTE — DISCHARGE NOTE PROVIDER - CARE PROVIDERS DIRECT ADDRESSES
[email or web address],DirectAddress_Unknown,DirectAddress_Unknown

## 2021-02-16 NOTE — DISCHARGE NOTE PROVIDER - DETAILS OF MALNUTRITION DIAGNOSIS/DIAGNOSES
This patient has been assessed with a concern for Malnutrition and was treated during this hospitalization for the following Nutrition diagnosis/diagnoses:     -  02/04/2021: Severe protein-calorie malnutrition   -  02/04/2021: Underweight (BMI < 19)   This patient has been assessed with a concern for Malnutrition and was treated during this hospitalization for the following Nutrition diagnosis/diagnoses:     -  02/04/2021: Severe protein-calorie malnutrition   -  02/04/2021: Underweight (BMI < 19)    This patient has been assessed with a concern for Malnutrition and was treated during this hospitalization for the following Nutrition diagnosis/diagnoses:     -  02/04/2021: Severe protein-calorie malnutrition   -  02/04/2021: Underweight (BMI < 19)   This patient has been assessed with a concern for Malnutrition and was treated during this hospitalization for the following Nutrition diagnosis/diagnoses:     -  02/04/2021: Severe protein-calorie malnutrition   -  02/04/2021: Underweight (BMI < 19)    This patient has been assessed with a concern for Malnutrition and was treated during this hospitalization for the following Nutrition diagnosis/diagnoses:     -  02/04/2021: Severe protein-calorie malnutrition   -  02/04/2021: Underweight (BMI < 19)    This patient has been assessed with a concern for Malnutrition and was treated during this hospitalization for the following Nutrition diagnosis/diagnoses:     -  02/04/2021: Severe protein-calorie malnutrition   -  02/04/2021: Underweight (BMI < 19)   This patient has been assessed with a concern for Malnutrition and was treated during this hospitalization for the following Nutrition diagnosis/diagnoses:     -  02/04/2021: Severe protein-calorie malnutrition   -  02/04/2021: Underweight (BMI < 19)    This patient has been assessed with a concern for Malnutrition and was treated during this hospitalization for the following Nutrition diagnosis/diagnoses:     -  02/04/2021: Severe protein-calorie malnutrition   -  02/04/2021: Underweight (BMI < 19)    This patient has been assessed with a concern for Malnutrition and was treated during this hospitalization for the following Nutrition diagnosis/diagnoses:     -  02/04/2021: Severe protein-calorie malnutrition   -  02/04/2021: Underweight (BMI < 19)    This patient has been assessed with a concern for Malnutrition and was treated during this hospitalization for the following Nutrition diagnosis/diagnoses:     -  02/04/2021: Severe protein-calorie malnutrition   -  02/04/2021: Underweight (BMI < 19)

## 2021-02-16 NOTE — DISCHARGE NOTE PROVIDER - PROVIDER TOKENS
PROVIDER:[TOKEN:[[id number]],FOLLOWUP:[2 weeks],ESTABLISHEDPATIENT:[T]] PROVIDER:[TOKEN:[[id number]],FOLLOWUP:[2 weeks],ESTABLISHEDPATIENT:[T]],PROVIDER:[TOKEN:[[id number]],FOLLOWUP:[2 weeks]],PROVIDER:[TOKEN:[[id number]],FOLLOWUP:[1 week],ESTABLISHEDPATIENT:[T]] PROVIDER:[TOKEN:[[id number]],FOLLOWUP:[2 weeks],ESTABLISHEDPATIENT:[T]],PROVIDER:[TOKEN:[[id number]],SCHEDULEDAPPT:[03/03/2021],SCHEDULEDAPPTTIME:[02:30 PM]],PROVIDER:[TOKEN:[[id number]],FOLLOWUP:[1 week],ESTABLISHEDPATIENT:[T]]

## 2021-02-16 NOTE — DISCHARGE NOTE PROVIDER - NSDCCPCAREPLAN_GEN_ALL_CORE_FT
PRINCIPAL DISCHARGE DIAGNOSIS  Diagnosis: Symptomatic anemia  Assessment and Plan of Treatment: You came to the hospital after being noted for low hemoglobin levels in your blood. Having low hemoglobin can often cause symptoms of severe weakness and fatigue. You received replacement blood to help boost your hemoglobin levels back to a normal level. After you leave the hospital, please follow-up with your Primary Care Physician for further monitoring and management.      SECONDARY DISCHARGE DIAGNOSES  Diagnosis: Urinary retention  Assessment and Plan of Treatment: You are being discharged with a redmond catheter due your difficulty urinating while you were in the hospital. You were evalauted by the hospital's Urology team who recommended that you please follow up in 1-2 weeks with Dr. Rogers at [facility]. The address and contact information are detailed in the attached follow-up instructions.    Diagnosis: Pneumonia  Assessment and Plan of Treatment: While you were in the hospital, you were found to have a lung infection caused by a bacteria called Chlamydia. You were treated with antibiotics, and you no longer displayed any signs of fever or shortness of breath.  After you leave the hospital, please follow-up with your Primary Care Physician for further monitoring and management.    Diagnosis: Lung cancer  Assessment and Plan of Treatment: Please follow-up with your outpatient Radiation specialist, as well with Dr. Jones for further recommendations and management.

## 2021-02-16 NOTE — PROGRESS NOTE ADULT - PROBLEM SELECTOR PROBLEM 2
Anemia in neoplastic disease

## 2021-02-16 NOTE — PROGRESS NOTE ADULT - SUBJECTIVE AND OBJECTIVE BOX
INTERVAL HPI/OVERNIGHT EVENTS:    ANTIBIOTICS/RELEVANT:    MEDICATIONS  (STANDING):  azithromycin   Tablet 250 milliGRAM(s) Oral daily  dextrose 5% + sodium chloride 0.45%. 1000 milliLiter(s) (60 mL/Hr) IV Continuous <Continuous>  enoxaparin Injectable 30 milliGRAM(s) SubCutaneous every 24 hours  ferrous    sulfate 325 milliGRAM(s) Oral daily  folic acid 1 milliGRAM(s) Oral daily  influenza  Vaccine (HIGH DOSE) 0.7 milliLiter(s) IntraMuscular once  lidocaine   Patch 3 Patch Transdermal daily  megestrol 40 milliGRAM(s) Oral three times a day  multivitamin 1 Tablet(s) Oral daily  pantoprazole  Injectable 40 milliGRAM(s) IV Push every 12 hours  polyethylene glycol 3350 17 Gram(s) Oral every 12 hours  psyllium Powder 1 Packet(s) Oral two times a day  senna 2 Tablet(s) Oral at bedtime    MEDICATIONS  (PRN):  acetaminophen   Tablet .. 650 milliGRAM(s) Oral every 8 hours PRN Temp greater or equal to 38C (100.4F), Mild Pain (1 - 3)  albuterol/ipratropium for Nebulization 3 milliLiter(s) Nebulizer every 6 hours PRN Shortness of Breath and/or Wheezing  bisacodyl Suppository 10 milliGRAM(s) Rectal daily PRN Constipation  ibuprofen  Tablet. 400 milliGRAM(s) Oral every 6 hours PRN Moderate Pain (4 - 6)      Allergies    No Known Allergies    Intolerances        Vital Signs Last 24 Hrs  T(C): 36.7 (16 Feb 2021 05:05), Max: 37.4 (15 Feb 2021 22:00)  T(F): 98 (16 Feb 2021 05:05), Max: 99.4 (15 Feb 2021 22:00)  HR: 79 (16 Feb 2021 05:05) (79 - 94)  BP: 100/59 (16 Feb 2021 05:05) (100/59 - 121/66)  BP(mean): --  RR: 18 (16 Feb 2021 05:05) (18 - 20)  SpO2: 95% (16 Feb 2021 05:05) (95% - 97%)    PHYSICAL EXAM:      Constitutional:    Eyes:    ENMT:    Neck:    Breasts:    Back:    Respiratory:    Cardiovascular:    Gastrointestinal:    Genitourinary:    Rectal:    Extremities:    Vascular:    Neurological:    Skin:    Lymph Nodes:    Musculoskeletal:    Psychiatric:        LABS:                        7.8    7.44  )-----------( 398      ( 16 Feb 2021 07:28 )             26.7     02-16    134<L>  |  106  |  13  ----------------------------<  90  4.4   |  18<L>  |  0.92    Ca    8.3<L>      16 Feb 2021 07:28  Phos  3.0     02-16  Mg     2.0     02-16            MICROBIOLOGY:    RADIOLOGY & ADDITIONAL STUDIES: INTERVAL HPI/OVERNIGHT EVENTS:    Remains clinically stable    MEDICATIONS  (STANDING):  azithromycin   Tablet 250 milliGRAM(s) Oral daily  dextrose 5% + sodium chloride 0.45%. 1000 milliLiter(s) (60 mL/Hr) IV Continuous <Continuous>  enoxaparin Injectable 30 milliGRAM(s) SubCutaneous every 24 hours  ferrous    sulfate 325 milliGRAM(s) Oral daily  folic acid 1 milliGRAM(s) Oral daily  influenza  Vaccine (HIGH DOSE) 0.7 milliLiter(s) IntraMuscular once  lidocaine   Patch 3 Patch Transdermal daily  megestrol 40 milliGRAM(s) Oral three times a day  multivitamin 1 Tablet(s) Oral daily  pantoprazole  Injectable 40 milliGRAM(s) IV Push every 12 hours  polyethylene glycol 3350 17 Gram(s) Oral every 12 hours  psyllium Powder 1 Packet(s) Oral two times a day  senna 2 Tablet(s) Oral at bedtime    MEDICATIONS  (PRN):  acetaminophen   Tablet .. 650 milliGRAM(s) Oral every 8 hours PRN Temp greater or equal to 38C (100.4F), Mild Pain (1 - 3)  albuterol/ipratropium for Nebulization 3 milliLiter(s) Nebulizer every 6 hours PRN Shortness of Breath and/or Wheezing  bisacodyl Suppository 10 milliGRAM(s) Rectal daily PRN Constipation  ibuprofen  Tablet. 400 milliGRAM(s) Oral every 6 hours PRN Moderate Pain (4 - 6)      Allergies    No Known Allergies      EXAM  Vital Signs Last 24 Hrs  T(C): 36.7 (16 Feb 2021 05:05), Max: 37.4 (15 Feb 2021 22:00)  T(F): 98 (16 Feb 2021 05:05), Max: 99.4 (15 Feb 2021 22:00)  HR: 79 (16 Feb 2021 05:05) (79 - 94)  BP: 100/59 (16 Feb 2021 05:05) (100/59 - 121/66)  BP(mean): --  RR: 18 (16 Feb 2021 05:05) (18 - 20)  SpO2: 95% (16 Feb 2021 05:05) (95% - 97%)  On RA without distress  No rash  No jaundice   RR  Abdomen soft NT  LEs no edema      LABS:                        7.8    7.44  )-----------( 398      ( 16 Feb 2021 07:28 )             26.7     02-16    134<L>  |  106  |  13  ----------------------------<  90  4.4   |  18<L>  |  0.92    Ca    8.3<L>      16 Feb 2021 07:28  Phos  3.0     02-16  Mg     2.0     02-16

## 2021-02-16 NOTE — PROGRESS NOTE ADULT - PROBLEM SELECTOR PROBLEM 3
Constipation

## 2021-02-16 NOTE — PROGRESS NOTE ADULT - ASSESSMENT
Arrangements should be made to discharge the patient with some assistance at home.  The patient will need to see me next week.  The patient will need to see her radiation therapist next week to reinitiate treatment of her non-small cell lung cancer.

## 2021-02-16 NOTE — PROGRESS NOTE ADULT - SUBJECTIVE AND OBJECTIVE BOX
SUBJECTIVE/OVERNIGHT EVENTS:     VITAL SIGNS:  Vital Signs Last 24 Hrs  T(C): 36.7 (16 Feb 2021 13:59), Max: 37.4 (15 Feb 2021 22:00)  T(F): 98.1 (16 Feb 2021 13:59), Max: 99.4 (15 Feb 2021 22:00)  HR: 99 (16 Feb 2021 13:59) (79 - 99)  BP: 109/65 (16 Feb 2021 13:59) (100/59 - 120/64)  BP(mean): --  RR: 18 (16 Feb 2021 13:59) (18 - 20)  SpO2: 96% (16 Feb 2021 13:59) (95% - 96%)    PHYSICAL EXAM:  General: NAD; speaking in full sentences  HEENT: NC/AT; PERRL; EOMI; MMM  Neck: supple; no JVD  Cardiac: RRR; +S1/S2  Pulm: CTA B/L; no W/R/R  GI: soft, NT/ND, +BS  Extremities: WWP; no edema, clubbing or cyanosis  Vasc: 2+ radial, DP pulses B/L  Neuro: AAOx3; no focal deficits    MEDICATIONS:  MEDICATIONS  (STANDING):  enoxaparin Injectable 30 milliGRAM(s) SubCutaneous every 24 hours  ferrous    sulfate 325 milliGRAM(s) Oral daily  folic acid 1 milliGRAM(s) Oral daily  influenza  Vaccine (HIGH DOSE) 0.7 milliLiter(s) IntraMuscular once  lidocaine   Patch 3 Patch Transdermal daily  megestrol 40 milliGRAM(s) Oral three times a day  multivitamin 1 Tablet(s) Oral daily  pantoprazole  Injectable 40 milliGRAM(s) IV Push every 12 hours  polyethylene glycol 3350 17 Gram(s) Oral every 12 hours  psyllium Powder 1 Packet(s) Oral two times a day  senna 2 Tablet(s) Oral at bedtime    MEDICATIONS  (PRN):  acetaminophen   Tablet .. 650 milliGRAM(s) Oral every 8 hours PRN Temp greater or equal to 38C (100.4F), Mild Pain (1 - 3)  albuterol/ipratropium for Nebulization 3 milliLiter(s) Nebulizer every 6 hours PRN Shortness of Breath and/or Wheezing  bisacodyl Suppository 10 milliGRAM(s) Rectal daily PRN Constipation  ibuprofen  Tablet. 400 milliGRAM(s) Oral every 6 hours PRN Moderate Pain (4 - 6)      ALLERGIES:  Allergies    No Known Allergies    Intolerances        LABS:                        7.8    7.44  )-----------( 398      ( 16 Feb 2021 07:28 )             26.7     02-16    134<L>  |  106  |  13  ----------------------------<  90  4.4   |  18<L>  |  0.92    Ca    8.3<L>      16 Feb 2021 07:28  Phos  3.0     02-16  Mg     2.0     02-16          RADIOLOGY & ADDITIONAL TESTS: Reviewed. SUBJECTIVE/OVERNIGHT EVENTS:     VITAL SIGNS:  Vital Signs Last 24 Hrs  T(C): 36.7 (16 Feb 2021 13:59), Max: 37.4 (15 Feb 2021 22:00)  T(F): 98.1 (16 Feb 2021 13:59), Max: 99.4 (15 Feb 2021 22:00)  HR: 99 (16 Feb 2021 13:59) (79 - 99)  BP: 109/65 (16 Feb 2021 13:59) (100/59 - 120/64)  BP(mean): --  RR: 18 (16 Feb 2021 13:59) (18 - 20)  SpO2: 96% (16 Feb 2021 13:59) (95% - 96%)    PHYSICAL EXAM:  Constitutional: WDWN resting in bed; in no acute distress, elderly, frail-appearing  HEENT: NCAT, EOMI, anicteric sclera, no nasal discharge; uvula midline, no oropharyngeal erythema or exudates; MMM  Respiratory: CTA B/L; no W/R/R, no retractions; non-labored breathing  Cardiac: +S1/S2; RRR; no M/R/G  Gastrointestinal: soft, NTND, no rebound, +BS  Extremities: WWP, no clubbing or cyanosis; no peripheral edema  Vascular: 2+ radial, femoral, DP/PT pulses B/L  Neurologic: AAOx3; CNII-XII grossly intact; moves extremities to command, 5/5 UE strength and can independently turn from back to side    MEDICATIONS:  MEDICATIONS  (STANDING):  enoxaparin Injectable 30 milliGRAM(s) SubCutaneous every 24 hours  ferrous    sulfate 325 milliGRAM(s) Oral daily  folic acid 1 milliGRAM(s) Oral daily  influenza  Vaccine (HIGH DOSE) 0.7 milliLiter(s) IntraMuscular once  lidocaine   Patch 3 Patch Transdermal daily  megestrol 40 milliGRAM(s) Oral three times a day  multivitamin 1 Tablet(s) Oral daily  pantoprazole  Injectable 40 milliGRAM(s) IV Push every 12 hours  polyethylene glycol 3350 17 Gram(s) Oral every 12 hours  psyllium Powder 1 Packet(s) Oral two times a day  senna 2 Tablet(s) Oral at bedtime    MEDICATIONS  (PRN):  acetaminophen   Tablet .. 650 milliGRAM(s) Oral every 8 hours PRN Temp greater or equal to 38C (100.4F), Mild Pain (1 - 3)  albuterol/ipratropium for Nebulization 3 milliLiter(s) Nebulizer every 6 hours PRN Shortness of Breath and/or Wheezing  bisacodyl Suppository 10 milliGRAM(s) Rectal daily PRN Constipation  ibuprofen  Tablet. 400 milliGRAM(s) Oral every 6 hours PRN Moderate Pain (4 - 6)      ALLERGIES:  Allergies    No Known Allergies    Intolerances        LABS:                        7.8    7.44  )-----------( 398      ( 16 Feb 2021 07:28 )             26.7     02-16    134<L>  |  106  |  13  ----------------------------<  90  4.4   |  18<L>  |  0.92    Ca    8.3<L>      16 Feb 2021 07:28  Phos  3.0     02-16  Mg     2.0     02-16          RADIOLOGY & ADDITIONAL TESTS: Reviewed. SUBJECTIVE/OVERNIGHT EVENTS:  No acute overnight events. Pt seen in AM at bedside, resting comfortably in bed, and does not appear to be in any acute distress. She says she had a bowel movement last night. She continues to experience mild abdominal cramps, which she says is somewhat improved over the past few days. She otherwise denies active fever, chills, nausea, vomiting, headache, acute sob, chest pain, abdominal pain, genitourinary sx, extremity pain or swelling.      VITAL SIGNS:  Vital Signs Last 24 Hrs  T(C): 36.7 (16 Feb 2021 13:59), Max: 37.4 (15 Feb 2021 22:00)  T(F): 98.1 (16 Feb 2021 13:59), Max: 99.4 (15 Feb 2021 22:00)  HR: 99 (16 Feb 2021 13:59) (79 - 99)  BP: 109/65 (16 Feb 2021 13:59) (100/59 - 120/64)  BP(mean): --  RR: 18 (16 Feb 2021 13:59) (18 - 20)  SpO2: 96% (16 Feb 2021 13:59) (95% - 96%)    PHYSICAL EXAM:  Constitutional: WDWN resting in bed; in no acute distress, elderly, frail-appearing  HEENT: NCAT, EOMI, anicteric sclera, no nasal discharge; uvula midline, no oropharyngeal erythema or exudates; MMM  Respiratory: CTA B/L; no W/R/R, no retractions; non-labored breathing  Cardiac: +S1/S2; RRR; no M/R/G  Gastrointestinal: soft, NTND, no rebound, +BS  Extremities: WWP, no clubbing or cyanosis; no peripheral edema  Vascular: 2+ radial, femoral, DP/PT pulses B/L  Neurologic: AAOx3; CNII-XII grossly intact; moves extremities to command, 5/5 UE strength and can independently turn from back to side    MEDICATIONS:  MEDICATIONS  (STANDING):  enoxaparin Injectable 30 milliGRAM(s) SubCutaneous every 24 hours  ferrous    sulfate 325 milliGRAM(s) Oral daily  folic acid 1 milliGRAM(s) Oral daily  influenza  Vaccine (HIGH DOSE) 0.7 milliLiter(s) IntraMuscular once  lidocaine   Patch 3 Patch Transdermal daily  megestrol 40 milliGRAM(s) Oral three times a day  multivitamin 1 Tablet(s) Oral daily  pantoprazole  Injectable 40 milliGRAM(s) IV Push every 12 hours  polyethylene glycol 3350 17 Gram(s) Oral every 12 hours  psyllium Powder 1 Packet(s) Oral two times a day  senna 2 Tablet(s) Oral at bedtime    MEDICATIONS  (PRN):  acetaminophen   Tablet .. 650 milliGRAM(s) Oral every 8 hours PRN Temp greater or equal to 38C (100.4F), Mild Pain (1 - 3)  albuterol/ipratropium for Nebulization 3 milliLiter(s) Nebulizer every 6 hours PRN Shortness of Breath and/or Wheezing  bisacodyl Suppository 10 milliGRAM(s) Rectal daily PRN Constipation  ibuprofen  Tablet. 400 milliGRAM(s) Oral every 6 hours PRN Moderate Pain (4 - 6)      ALLERGIES:  Allergies    No Known Allergies    Intolerances        LABS:                        7.8    7.44  )-----------( 398      ( 16 Feb 2021 07:28 )             26.7     02-16    134<L>  |  106  |  13  ----------------------------<  90  4.4   |  18<L>  |  0.92    Ca    8.3<L>      16 Feb 2021 07:28  Phos  3.0     02-16  Mg     2.0     02-16          RADIOLOGY & ADDITIONAL TESTS: Reviewed.

## 2021-02-16 NOTE — PROGRESS NOTE ADULT - PROBLEM SELECTOR PLAN 1
2 weeks black stools, abdominal pain, weakness, dyspnea found to have hemoglobin 7.6 from baseline 13.7. 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 s/p 1u pRBC in ED  - h/h stable (2/9)  - Protonix 40mg IV BID  - will advance diet to mechanical soft (2/5) 2 weeks black stools, abdominal pain, weakness, dyspnea found to have hemoglobin 7.6 from baseline 13.7. 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 s/p 1u pRBC in ED  - h/h stable  - Protonix 40mg IV BID

## 2021-02-16 NOTE — DISCHARGE NOTE PROVIDER - NSDCMRMEDTOKEN_GEN_ALL_CORE_FT
megestrol 40 mg oral tablet: 1 tab(s) orally 4 times a day  omeprazole 40 mg oral delayed release capsule: 1 cap(s) orally once a day   ferrous sulfate 325 mg (65 mg elemental iron) oral tablet: 1 tab(s) orally once a day  folic acid 1 mg oral tablet: 1 tab(s) orally once a day  megestrol 40 mg oral tablet: 1 tab(s) orally 4 times a day  omeprazole 40 mg oral delayed release capsule: 1 cap(s) orally once a day

## 2021-02-16 NOTE — PROGRESS NOTE ADULT - ASSESSMENT
D/C Azithromycin Multifocal / Multilobar pneumonitis  Lung CA  S/P Chemotherapy - patient immunocompromised  C. pneumoniae identified in respiratory secretions  S/P adequate antibiotic course    RECOMMEND  D/C Azithromycin  No need for any other antimicrobials

## 2021-02-16 NOTE — PROGRESS NOTE ADULT - SUBJECTIVE AND OBJECTIVE BOX
CC/ HPI Patient is an 86 year old female with chronic obstructive pulmonary disease, lung cancer, poorly differentiated adenocarcinoma, status post radiation therapy, Dec 2020, admitted for severe anemia, upper GI bleed, RVP positive Chlamydia pneumonia, seen this morning, the patient denies respiratory complaint.      PAST MEDICAL & SURGICAL HISTORY:  Chronic obstructive pulmonary disease, unspecified COPD type  Hearing loss  left ear - sees Dr. Garces ENT  Basal cell carcinoma  x2 - upper lip and arm  Osteoporosis  H/O breast biopsy, 5 years ago, mass was benign and self-resolved  History of appendectomy  History of cataract surgery    SOCHX:   tobacco,  -  alcohol      FAMILY HISTORY: FA/MO  - contributory     ROS reviewed below with positive findings marked (+) :  GEN:  fever, chills ENT: tracheostomy,   epistaxis,  sinusitis COR: CAD, CHF,  HTN, dysrhythmia PUL: +COPD, ILD, asthma, pneumonia GI: PEG, dysphagia, hemorrhage, other HALLIE: kidney disease, electrolyte disorder HEM:  anemia, thrombus, coagulopathy, cancer ENDO:  thyroid disease, diabetes mellitus CNS:  dementia, stroke, seizure, PSY:  depression, anxiety, other      MEDICATIONS  (STANDING):  dextrose 5% + sodium chloride 0.45%. 1000 milliLiter(s) (60 mL/Hr) IV Continuous <Continuous>  enoxaparin Injectable 30 milliGRAM(s) SubCutaneous every 24 hours  ferrous    sulfate 325 milliGRAM(s) Oral daily  folic acid 1 milliGRAM(s) Oral daily  influenza  Vaccine (HIGH DOSE) 0.7 milliLiter(s) IntraMuscular once  lidocaine   Patch 3 Patch Transdermal daily  megestrol 40 milliGRAM(s) Oral three times a day  multivitamin 1 Tablet(s) Oral daily  pantoprazole  Injectable 40 milliGRAM(s) IV Push every 12 hours  polyethylene glycol 3350 17 Gram(s) Oral every 12 hours  psyllium Powder 1 Packet(s) Oral two times a day  senna 2 Tablet(s) Oral at bedtime    MEDICATIONS  (PRN):  acetaminophen   Tablet .. 650 milliGRAM(s) Oral every 8 hours PRN Temp greater or equal to 38C (100.4F), Mild Pain (1 - 3)  albuterol/ipratropium for Nebulization 3 milliLiter(s) Nebulizer every 6 hours PRN Shortness of Breath and/or Wheezing  bisacodyl Suppository 10 milliGRAM(s) Rectal daily PRN Constipation  ibuprofen  Tablet. 400 milliGRAM(s) Oral every 6 hours PRN Moderate Pain (4 - 6)      Vital Signs Last 24 Hrs  T(C): 36.7 (16 Feb 2021 05:05), Max: 37.4 (15 Feb 2021 22:00)  T(F): 98 (16 Feb 2021 05:05), Max: 99.4 (15 Feb 2021 22:00)  HR: 79 (16 Feb 2021 05:05) (79 - 94)  BP: 100/59 (16 Feb 2021 05:05) (100/59 - 121/66)  RR: 18 (16 Feb 2021 05:05) (18 - 20)  SpO2: 95% (16 Feb 2021 05:05) (95% - 97%)    GENERAL:         comfortable,  - distress.  HEENT:            - trauma,  - icterus,  - injection,  - nasal discharge.  NECK:              - jugular venous distention, - thyromegaly.  LYMPH:           - lymphadenopathy, - masses.  RESP:              + clear,   - rales,   - rhonchi,   - wheezes.   COR:                S1S2   - gallops,  - rubs.  ABD:                bowel sounds,   soft, - tender, - distended.  EXT/MSC:         - cyanosis,  - clubbing,-  edema.    NEURO:           + alert and oriented                             7.8    7.44  )-----------( 398      ( 16 Feb 2021 07:28 )             26.7     02-16    134<L>  |  106  |  13  ----------------------------<  90  4.4   |  18<L>  |  0.92        X-ray Chest (02.06.21)  Bilateral infiltrates with progression comparison to prior examination of the chest 2/3/2021    CT Angio Chest PE Protocol w/ IV Cont (02.03.21)  Pulmonary arteries: No pulmonary embolus seen.  Lungs and large airways: Respiratory motion limits assessment of fine pulmonary parenchymal detail. Trace dependent secretions distal trachea. No nodular and no tracheal or endobronchial lesion.  Bilateral apical pleural parenchymal scarring. Unchanged mild peribronchial wall thickening and cylindrical bronchiectasis bilaterally. Scattered nodular groundglass opacities bilaterally, more confluent in right upper lobe posterior segment with apparent interlobular septal thickening. Mild compressive atelectasis right lower lobe. Few nodular consolidative opacities left lower lobe, subsegmental atelectasis versus mild consolidation. No measurable residual masses in right upper lobe anterior segment with linear opacity in this region probably representing scarring. New few scattered bilateral pulmonary nodules, for example:  *  Right upper lobe subpleural 0.5 cm (series 6 image 160).  *  Right upper lobe anterior segment 0.5 cm (series 6 image 132)  *  Left lower lobe 0.5 cm (series 6 image 204)  Pleura:  Resolved right pneumothorax. Decreased trace residual right pleural effusion.  Mediastinum and hilar regions: Significantly decreased mediastinal and hilar adenopathy, for example right lower paratracheal 1.1 x 0.8 cm, previously 4.4 x 2.9 cm. No significant change mildly prominent subcarinal node 1.2 cm short axis. Resolved right lower cervical adenopathy.  Heart and pericardium:  Heart size is normal. No pericardial effusion.  Vessels:  No aneurysm. Mild scattered calcific atherosclerosis.  Chest wall and lower neck:  Normal.  Upper abdomen: Unchanged few left renal cysts, largest 3.5 cm.  Bones: No suspicious lytic or blastic lesion. Mild degenerative changes thoracic spine with prominent degenerative Schmorl's node inferior endplate T9 vertebral body.    IMPRESSION:  1.  No pulmonary embolus.  2.  New few small scattered nodular opacities bilaterally with more confluent groundglass density and suspected interlobular septal thickening in posterior right upper lobe. Differential includes aspiration/infection versus lymphangitic carcinomatosis. Assessment limited by artifact from respiratory motion. Recommend clinical correlation and short-term follow-up would be helpful.  .  No measurable residual right upper lobe mass with scarring in this region.  4.  Significantly decreased thoracic adenopathy.  5.  Decreased trace residual right pleural effusion.  6.  Resolved right pneumothorax.            ASSESSMENT/PLAN    1)  Chronic obstructive pulmonary disease  2)  Chlamydia pneumoniae RVP+ (2.06.21)  3)  Bronchiectasis  4)  Lung cancer    Oxygen as needed for a satisfactory SpO2  Bronchodilators:  Atrovent/ albuterol q 4 – 6 hours as needed  ID/Antibiotics: status post Zithromax, cefepime  Cardiac/HTN: hemodynamically stable  GI: Rx/ prophylaxis c PPI/H2B  Heme: Rx/VT prophylaxis   Discussed with Dr. Roberson who covered Feb 13-15.

## 2021-02-16 NOTE — PROGRESS NOTE ADULT - SUBJECTIVE AND OBJECTIVE BOX
the patient is angry enough to go home and she states that she is walking in the hallway unassisted and has been sitting in a chair and although cold is very happy about her increased mobility.  She even did stairs.    Skin: Normal turgor. No rash noted. No lesions.   Eyes: Eyes with PERRLA. EOM's in­tact Sclera clear,no jaundice noted. Conjunctiva clear.   Ears, Nose,Mouth & Throat: Teeth normal, no missing teeth or dentures present. No ulcers. No thrush Auditory canals normal.  Description: %% Oropharynx without lesions. Mucositis None   Neck: No thyromegaly. No lymphadenopathy noted.   Cardiovascular: S1, S2 without murmurs. No Extrasystole. Tachycardia. No breast masses appreciated. No axillary lymphadenopathy.  No skin changes. No nipple discharge present. No dimpling noted.  Chest/Respiratory: No dyspnea. No rhonchi. No wheezing. No decreased breath sounds at bases. No rales.  The breath sounds are somewhat coarse.    The patient has difficulty getting secretions up. Unlabored breathing. No  accessory muscle use. Abdomen is soft and non­tender to touch. No hepatomegaly noted. No splenomegaly noted. No abdominal pain or  guarding noted. No abdominal mass(es) appreciated. No ascites noted.   Genito­Urinary: Deferred.   The patient has a Schumacher catheter to a gravity drainage bag.  Hematologic/Lymphatic: No petechiae noted. No purpura noted. No lymphadenopathy noted.   Musculoskeletal: No Kyphosis. No weakness. No spinal tenderness on percussion. No pain on hip rotation reported by patient. Normal  range of motion in upper and lower extremities.

## 2021-02-16 NOTE — DISCHARGE NOTE PROVIDER - HOSPITAL COURSE
#Discharge: do not delete    Patient is __ yo M/F with past medical history of _____, presented with _____, found to have _____    Inpatient treatment course:     Problem List/Main Diagnoses:     New medications/therapies:   New lines/hardware:  Labs to be followed outpatient:   Exam to be followed outpatient:     Discharge plan: discharge to ______     #Discharge: do not delete    86 F with PMHx COPD, DNR/DNI, recently diagnosed adenocarcinoma s/p radiation ~12/2020, now receiving chemo every other week with Dr. Jones, admitted for anemia c/f upper GI bleed since resolved.    Problem List/Main Diagnoses:   #Urinary retention   team consulted for failed TOV x2 and resulting difficult redmond catheter placement. Patient examined at bedside. States she has never seen a urologist and typically urinates without issues at home. Patient reports to no prior history of retention. At the time of failed TOV she experienced severe suprapubic pressure and urgency. Today she reports that she is happy with the catheter and does not want to remove the catheter in fear of another difficult catheterization. She denies any other  complaints at this time. Of note, she reports to severe constipation and poor ambulation. She can ambulate however states she is "too lazy" to do so.   -patient has not been ambulating and suffers from constipation increasing the likelihood of failing TOV  -patient should be discharged home with redmond catheter to leg bag with follow up in 1-2 weeks with Dr. [facility] of [facility] for TOV and further care  -once ambulation improves and regular BM patient is more likely to have successful TOV    #Goals of care   MOLST completed outpatient by PCP Alena Worthington. DNR, DNI, no pressors    #Chlamydia pneumonia - RESOLVED  Positive for Chlamydia pneumoniae on RVP. Initially started on IV cefepime since switched to IV Azithromycin for 10 days. Pt otherwise w/o respiratory sx.    #Upper GI bleed - RESOLVED  2 weeks black stools, abdominal pain, weakness, dyspnea found to have hemoglobin 7.6 from baseline 13.7. 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 s/p 1u pRBC in ED  - h/H stable  - Protonix 40mg IV BID, will switch to PO in discharged    #Anemia  Likely combination of hypoproliferation due to chemotherapy with subacute GI blood loss from 2 weeks of black stools. No BM 2-3 days prior to admission and rectal exam normal so bleed may have spontaneously resolved.    #Constipation - RESOLVED  Pt reporting constipation for past 1 week and generalized abdominal pain for past couple days.   - abdominal xray showing moderately distended loops of bowel  - c/w bowel regimen: senna 2 tabs qd, miralax 17g q12hrs, and dulcolax 10mg PRN    #Malignant lung neoplasm  On gefitinib and tamoxifen outpatient with Dr. Jones. Received radiation at one point in November/December 2020 per chart review. Last dose 1/25 per aide, missed Monday's planned dose due to snowstorm.  - The patient will need to see her radiation therapist next week following discharge to reinitiate treatment of her non-small cell lung cancer.  - Pt to follow-up outpatient w/ Dr. Jones    #COPD  Per chart has history of COPD but patient adamantly denies history of COPD. Former smoker quit in 1999 unable to recall when she started to quantify pack-years. Not wheezing on admission exam.    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

## 2021-02-16 NOTE — PROGRESS NOTE ADULT - PROBLEM SELECTOR PROBLEM 4
Malignant neoplasm of right lung, unspecified part of lung

## 2021-02-16 NOTE — DISCHARGE NOTE PROVIDER - CARE PROVIDER_API CALL
Alena Worthington  INTERNAL MEDICINE  [address] [place] [address]  Phone: [phone]  Fax: [phone]  Established Patient  Follow Up Time: 2 weeks   Alena Worthington  INTERNAL MEDICINE  [address] [address]  Phone: [phone]  Fax: [phone]  Established Patient  Follow Up Time: 2 weeks    Rupali Rogers)  Urology  [address], [address]  [place] [address]  Phone: [phone]  Fax: [phone]  Follow Up Time: 2 weeks    Leonel Jones)  Internal Medicine; Medical Oncology  [address] [place] [address]  Phone: [phone]  Fax: [phone]  Established Patient  Follow Up Time: 1 week   Alena Worthington  INTERNAL MEDICINE  [address] [place] [address]  Phone: [phone]  Fax: [phone]  Established Patient  Follow Up Time: 2 weeks    Rupali Rogers)  Urology  [address], [facility] [address]  [place] [address]  Phone: [phone]  Fax: [phone]  Scheduled Appointment: 03/03/2021 02:30 PM    Leonel Jones)  Internal Medicine; Medical Oncology  [address] [place] [address]  Phone: [phone]  Fax: [phone]  Established Patient  Follow Up Time: 1 week

## 2021-02-16 NOTE — PROGRESS NOTE ADULT - PROBLEM SELECTOR PLAN 2
Likely combination of hypoproliferation due to chemotherapy with subacute GI blood loss from 2 weeks of black stools. No BM 2-3 days prior to admission and rectal exam normal so bleed may have spontaneously resolved.  - UGIB management as above  - transfuse for Hgb < 7.0  - recheck CBC after IVF resuscitation, anticipate transfusion Likely combination of hypoproliferation due to chemotherapy with subacute GI blood loss from 2 weeks of black stools. No BM 2-3 days prior to admission and rectal exam normal so bleed may have spontaneously resolved.  - UGIB management as above  - transfuse for Hgb < 7.0

## 2021-02-16 NOTE — PROGRESS NOTE ADULT - PROBLEM SELECTOR PLAN 3
Pt reporting constipation for past 1 week and generalized abdominal pain for past couple days.   - abdominal xray showing moderately distended loops of bowel  - will increase bowel regimen to senna 2 tabs qd, miralax 17g q12hrs, and dulcolax 10mg PRN  - if pt continues to be constipated, will consider adding on lactulose Pt reporting constipation for past 1 week and generalized abdominal pain for past couple days.   - abdominal xray showing moderately distended loops of bowel  - c/w bowel regimen: senna 2 tabs qd, miralax 17g q12hrs, and dulcolax 10mg PRN  - if pt continues to be constipated, will consider adding on lactulose

## 2021-02-17 ENCOUNTER — TRANSCRIPTION ENCOUNTER (OUTPATIENT)
Age: 86
End: 2021-02-17

## 2021-02-17 VITALS
TEMPERATURE: 98 F | HEART RATE: 79 BPM | DIASTOLIC BLOOD PRESSURE: 54 MMHG | OXYGEN SATURATION: 95 % | SYSTOLIC BLOOD PRESSURE: 93 MMHG | RESPIRATION RATE: 16 BRPM

## 2021-02-17 RX ORDER — FOLIC ACID 0.8 MG
1 TABLET ORAL
Qty: 30 | Refills: 0
Start: 2021-02-17

## 2021-02-17 RX ORDER — FERROUS SULFATE 325(65) MG
1 TABLET ORAL
Qty: 39 | Refills: 0
Start: 2021-02-17

## 2021-02-17 RX ADMIN — Medication 400 MILLIGRAM(S): at 06:14

## 2021-02-17 RX ADMIN — Medication 1 MILLIGRAM(S): at 11:38

## 2021-02-17 RX ADMIN — MEGESTROL ACETATE 40 MILLIGRAM(S): 40 SUSPENSION ORAL at 06:37

## 2021-02-17 RX ADMIN — LIDOCAINE 3 PATCH: 4 CREAM TOPICAL at 11:39

## 2021-02-17 RX ADMIN — Medication 325 MILLIGRAM(S): at 11:38

## 2021-02-17 RX ADMIN — PANTOPRAZOLE SODIUM 40 MILLIGRAM(S): 20 TABLET, DELAYED RELEASE ORAL at 06:37

## 2021-02-17 RX ADMIN — Medication 1 TABLET(S): at 11:38

## 2021-02-17 NOTE — PROGRESS NOTE ADULT - SUBJECTIVE AND OBJECTIVE BOX
the patient feels well and is anxious to go home.  The patient wants to go home with a Schumacher catheter in and after she has been ambulating a few week she will come to my office to see if it can be  Removed.  The    Skin: Normal turgor. No rash noted. No lesions.   Eyes: Eyes with PERRLA. EOM's in­tact Sclera clear,no jaundice noted. Conjunctiva clear.   Ears, Nose,Mouth & Throat: Teeth normal, no missing teeth or dentures present. No ulcers. No thrush Auditory canals normal.  Description: %% Oropharynx without lesions. Mucositis None   Neck: No thyromegaly. No lymphadenopathy noted.   Cardiovascular: S1, S2 without murmurs. No Extrasystole. Tachycardia. No breast masses appreciated. No axillary lymphadenopathy.  No skin changes. No nipple discharge present. No dimpling noted.  Chest/Respiratory: No dyspnea. No rhonchi. No wheezing. No decreased breath sounds at bases. No rales.  The breath sounds are somewhat coarse.    The patient has difficulty getting secretions up. Unlabored breathing. No  accessory muscle use. Abdomen is soft and non­tender to touch. No hepatomegaly noted. No splenomegaly noted. No abdominal pain or  guarding noted. No abdominal mass(es) appreciated. No ascites noted.   Genito­Urinary: Deferred.   The patient has a Schumacher catheter to a gravity drainage bag.  Hematologic/Lymphatic: No petechiae noted. No purpura noted. No lymphadenopathy noted.   Musculoskeletal: No Kyphosis. No weakness. No spinal tenderness on percussion. No pain on hip rotation reported by patient. Normal  range of motion in upper and lower extremities.

## 2021-02-17 NOTE — PROGRESS NOTE ADULT - PROVIDER SPECIALTY LIST ADULT
Heme/Onc
Infectious Disease
Internal Medicine
Pulmonology
Heme/Onc
Infectious Disease
Internal Medicine
Internal Medicine
Pulmonology
Infectious Disease
Internal Medicine
Pulmonology
Infectious Disease
Internal Medicine
Internal Medicine
Pulmonology
Infectious Disease
Internal Medicine
Internal Medicine
Heme/Onc
Internal Medicine

## 2021-02-17 NOTE — PROGRESS NOTE ADULT - ATTENDING COMMENTS
Consider the use of a warming blanket.    Evaluate the patient for station and gait to see if she needs a walker in order to ambulate because at some point in time she is going to go home and will need to be independent and able to shop for her food and care for her apartment.  In addition she will need to complete her radiation and chemotherapy for her lung cancer.
Patient should be allowed to be out of bed and sitting in a chair after evaluation of station and gait to see if the patient can be allowed to be out of bed in a chair and is stable walking.  If not the patient should be walk with a walker and assistance if necessary.  If the patient is able to be stable the patient should have a wish of being out of bed prior to going home and having to be on her own as she lives alone.
The patient will need in allay shin therapy and incentive spirometer to be followed on a regular basis to be certain that the patient is making progress.    Megace 200 mg p.o. daily ( 5 mL).    Oxandrolone 10 mg p.o. b.i.d. to increase the patient's strength and reduce the recovery time.
See if the patient's "Guardian Dirk" can bring clothes from home so that she can be warm as she does not like to get out of bed because she gets cold.  Extra amounts of hospital garb might be appropriate so that she can be out of bed.  Perhaps Lexapro 10 mg p.o. q.a.m. might be helpful to deal with her obviously reactive depression.  The patient has a past history of alcoholism and this makes her at risk for depressive episodes.    A.m. cortisol levels.
Patient seen and examined and evaluation completed by me in person
The patient should be set up for a rehabilitation discharge with 5000 IU of vitamin-D daily, 1 zinc tablet daily to protect her from corona virus.  The patient's rehabilitation may be necessary in order that she regain control of her bladder.  She presently has control over her bowels.  The patient will also need to continue with radiation therapy to her lung cancer and with the low risk chemotherapy she is receiving in my office.
Discharge the patient today.    The patient should see me in the office next week.    The patient will be able to continue her chemotherapy and radiation therapy with curative intent.
Patient seen and examined and evaluation completed by me in person
Patient seen and examined and evaluation completed by me in person
Extra night clothes for the patient so that she may be warm out of bed.    Walker to the bedside   a chair for the patient to sit in during the day and a bedside commode if it is felt she is not comfortable enough or stable enough to go to the bathroom on her own.  Remember, that she is going to have to do this at home.

## 2021-02-17 NOTE — PROGRESS NOTE ADULT - ASSESSMENT
the patient is ready to go home with a Schumacher catheter in her bladder.  She should be fitted with a leg bag.  Visiting nurse should check on the patient to be sure that she is converting the leg bag to a gravity drainage at night.  The patient will come to my office next week and hopefully within 2 weeks of ambulating will be able to have the catheter removed with reasonable urinary function.

## 2021-02-17 NOTE — DISCHARGE NOTE NURSING/CASE MANAGEMENT/SOCIAL WORK - PATIENT PORTAL LINK FT
You can access the FollowMyHealth Patient Portal offered by [facility] by registering at the following website: [email or web address]. By joining [facility]’s FollowMyHealth portal, you will also be able to view your health information using other applications (apps) compatible with our system.

## 2021-02-17 NOTE — PROGRESS NOTE ADULT - SUBJECTIVE AND OBJECTIVE BOX
CC/ HPI Patient is an 86 year old female with chronic obstructive pulmonary disease, lung cancer, poorly differentiated adenocarcinoma, status post radiation therapy, Dec 2020, admitted for severe anemia, upper GI bleed, RVP positive Chlamydia pneumonia, seen this morning, the patient denies respiratory complaint.      PAST MEDICAL & SURGICAL HISTORY:  Chronic obstructive pulmonary disease, unspecified COPD type  Hearing loss  left ear - sees Dr. Garces ENT  Basal cell carcinoma  x2 - upper lip and arm  Osteoporosis  H/O breast biopsy, 5 years ago, mass was benign and self-resolved  History of appendectomy  History of cataract surgery    SOCHX:   tobacco,  -  alcohol      FAMILY HISTORY: FA/MO  - contributory     ROS reviewed below with positive findings marked (+) :  GEN:  fever, chills ENT: tracheostomy,   epistaxis,  sinusitis COR: CAD, CHF,  HTN, dysrhythmia PUL: +COPD, ILD, asthma, pneumonia GI: PEG, dysphagia, hemorrhage, other HALLIE: kidney disease, electrolyte disorder HEM:  anemia, thrombus, coagulopathy, cancer ENDO:  thyroid disease, diabetes mellitus CNS:  dementia, stroke, seizure, PSY:  depression, anxiety, other      MEDICATIONS  (STANDING):  enoxaparin Injectable 30 milliGRAM(s) SubCutaneous every 24 hours  ferrous    sulfate 325 milliGRAM(s) Oral daily  folic acid 1 milliGRAM(s) Oral daily  influenza  Vaccine (HIGH DOSE) 0.7 milliLiter(s) IntraMuscular once  lidocaine   Patch 3 Patch Transdermal daily  megestrol 40 milliGRAM(s) Oral three times a day  multivitamin 1 Tablet(s) Oral daily  pantoprazole  Injectable 40 milliGRAM(s) IV Push every 12 hours  polyethylene glycol 3350 17 Gram(s) Oral every 12 hours  psyllium Powder 1 Packet(s) Oral two times a day  senna 2 Tablet(s) Oral at bedtime    MEDICATIONS  (PRN):  acetaminophen   Tablet .. 650 milliGRAM(s) Oral every 8 hours PRN Temp greater or equal to 38C (100.4F), Mild Pain (1 - 3)  albuterol/ipratropium for Nebulization 3 milliLiter(s) Nebulizer every 6 hours PRN Shortness of Breath and/or Wheezing  bisacodyl Suppository 10 milliGRAM(s) Rectal daily PRN Constipation  ibuprofen  Tablet. 400 milliGRAM(s) Oral every 6 hours PRN Moderate Pain (4 - 6)      Vital Signs Last 24 Hrs  T(C): 36.7 (17 Feb 2021 10:49), Max: 36.7 (17 Feb 2021 06:45)  T(F): 98.1 (17 Feb 2021 10:49), Max: 98.1 (17 Feb 2021 10:49)  HR: 79 (17 Feb 2021 10:49) (79 - 82)  BP: 93/54 (17 Feb 2021 10:49) (93/54 - 108/53)  RR: 16 (17 Feb 2021 10:49) (16 - 16)  SpO2: 95% (17 Feb 2021 10:49) (95% - 97%)    GENERAL:         comfortable,  - distress.  HEENT:            - trauma,  - icterus,  - injection,  - nasal discharge.  NECK:              - jugular venous distention, - thyromegaly.  LYMPH:           - lymphadenopathy, - masses.  RESP:              + clear,   - rales,   - rhonchi,   - wheezes.   COR:                S1S2   - gallops,  - rubs.  ABD:                bowel sounds,   soft, - tender, - distended.  EXT/MSC:         - cyanosis,  - clubbing, - edema.    NEURO:           + alert and oriented                             7.8    7.44  )-----------( 398      ( 16 Feb 2021 07:28 )             26.7     02-16    134<L>  |  106  |  13  ----------------------------<  90  4.4   |  18<L>  |  0.92    Ca    8.3<L>      16 Feb 2021 07:28  Phos  3.0     02-16  Mg     2.0     02-16        X-ray Chest (02.06.21)  Bilateral infiltrates with progression comparison to prior examination of the chest 2/3/2021    CT Angio Chest PE Protocol w/ IV Cont (02.03.21)  Pulmonary arteries: No pulmonary embolus seen.  Lungs and large airways: Respiratory motion limits assessment of fine pulmonary parenchymal detail. Trace dependent secretions distal trachea. No nodular and no tracheal or endobronchial lesion.  Bilateral apical pleural parenchymal scarring. Unchanged mild peribronchial wall thickening and cylindrical bronchiectasis bilaterally. Scattered nodular groundglass opacities bilaterally, more confluent in right upper lobe posterior segment with apparent interlobular septal thickening. Mild compressive atelectasis right lower lobe. Few nodular consolidative opacities left lower lobe, subsegmental atelectasis versus mild consolidation. No measurable residual masses in right upper lobe anterior segment with linear opacity in this region probably representing scarring. New few scattered bilateral pulmonary nodules, for example:  *  Right upper lobe subpleural 0.5 cm (series 6 image 160).  *  Right upper lobe anterior segment 0.5 cm (series 6 image 132)  *  Left lower lobe 0.5 cm (series 6 image 204)  Pleura:  Resolved right pneumothorax. Decreased trace residual right pleural effusion.  Mediastinum and hilar regions: Significantly decreased mediastinal and hilar adenopathy, for example right lower paratracheal 1.1 x 0.8 cm, previously 4.4 x 2.9 cm. No significant change mildly prominent subcarinal node 1.2 cm short axis. Resolved right lower cervical adenopathy.  Heart and pericardium:  Heart size is normal. No pericardial effusion.  Vessels:  No aneurysm. Mild scattered calcific atherosclerosis.  Chest wall and lower neck:  Normal.  Upper abdomen: Unchanged few left renal cysts, largest 3.5 cm.  Bones: No suspicious lytic or blastic lesion. Mild degenerative changes thoracic spine with prominent degenerative Schmorl's node inferior endplate T9 vertebral body.    IMPRESSION:  1.  No pulmonary embolus.  2.  New few small scattered nodular opacities bilaterally with more confluent groundglass density and suspected interlobular septal thickening in posterior right upper lobe. Differential includes aspiration/infection versus lymphangitic carcinomatosis. Assessment limited by artifact from respiratory motion. Recommend clinical correlation and short-term follow-up would be helpful.  .  No measurable residual right upper lobe mass with scarring in this region.  4.  Significantly decreased thoracic adenopathy.  5.  Decreased trace residual right pleural effusion.  6.  Resolved right pneumothorax.            ASSESSMENT/PLAN    1)  Chronic obstructive pulmonary disease  2)  Chlamydia pneumoniae RVP+ (2.06.21)  3)  Bronchiectasis  4)  Lung cancer    Oxygen as needed for a satisfactory SpO2  Bronchodilators:  Atrovent/ albuterol q 4 – 6 hours as needed  ID/Antibiotics: status post Zithromax, cefepime  Cardiac/HTN: hemodynamically stable  GI: Rx/ prophylaxis c PPI/H2B  Heme: Rx/VT prophylaxis   Discussed with Dr. Angel.

## 2021-02-17 NOTE — PROGRESS NOTE ADULT - NSHPATTENDINGPLANDISCUSS_GEN_ALL_CORE
Bita/Candace/Barb    S
FITO STAFF AND DR. SCOTT
the Resident
the patient and the house staff in person.
Bita/Librado/Candace/Yohana     S
The patient and the house staff in person.
the Intern and the Resident
House staff
The patient and the house staff in person.
The patient in person and the house staff in person.
the INtern
the Resident
the Resident
The patient and the house staff in person.
the patient and Dr. Judah Gregorio in person and the house staff in person.
Bita/Isak/Candace CISNEROSS

## 2021-02-17 NOTE — PROGRESS NOTE ADULT - REASON FOR ADMISSION
Anemia, GI Bleed

## 2021-02-25 NOTE — ED PROVIDER NOTE - OBJECTIVE STATEMENT
87 y/o F PMHx COPD, DNR/DNI, recently diagnosed adenocarcinoma (regularly receiving chemotherapy), s/p Schumacher catheter placement, here today c/o fever, productive cough, and chills. Pt was returning edie from a visit to oncology for saline hydration when she felt ill. Upon arrival to ED, her temperature was 102, and she was tachycardic and tachypneic. Denies n/v, sob, and chest pain, and urinary and bowel dysfunction. Experiencing right-sided abdominal tenderness, but she claims this started many years ago. Pt was previously admitted to a hospital and discharged 2 weeks ago. Upon discharge, she tested - for COVID-19, but has not had another covid test since then.  Pt had a Schumacher catheter placed 3 weeks ago on the right leg and is due to be changed next week.

## 2021-02-25 NOTE — ED PROVIDER NOTE - PSH
H/O breast biopsy  > 5 years ago, mass was benign and self-resolved  History of appendectomy    History of cataract surgery

## 2021-02-25 NOTE — ED ADULT NURSE REASSESSMENT NOTE - NS ED NURSE REASSESS COMMENT FT1
Patient a/ox 3, no c/o of SOB or any other pain, redmond catheter draining dark yellow urine.  NSR on cardiac monitor, vital signs stable, fever resolved.  Ceftiraxone IVPB completed, no adverse rxn.  For admit.  For Transfer to ED holding care pending room assignment.  Repositioned for comfort.  Fall precautions and isolation precautions observed pending Covid result.

## 2021-02-25 NOTE — H&P ADULT - HISTORY OF PRESENT ILLNESS
86F c COPD, lung adenocarcinoma (s/p radiation-currently on chemo-gefitinib and tamoxifen outpatient with Dr. Jones), recent admission [place] [facility] and with recent diagnosis of chlamydia PNA s/p x10 course of azithromycin presents today for fever and cough.    ED course:  Vitals: tmax 102.8, HR , //59, RR 32-20 Spo2 95-98% 3L  Labs: Hgb 7.6, Elevated eosinophils. Coags: PTT 52.8, PT 17, INR 1.5. Cl 111, Bicarb 16. Ca 7.8, Albumin 2.4, AST/ALT 56/86. UA WBC 5-10.   Imaging: CXR with progression of bilateral infiltrates   EKG: Qtc 414  Interventions: tylenol 650mg, 1g ctx, protonix 40. Patient started on zosyn 3.375mg, Given vancomycin 750mg, Azithromycin 500mg.    86F c COPD, lung adenocarcinoma (s/p radiation-currently on chemo-gefitinib and tamoxifen outpatient with Dr. Jones), recent admission [place] [facility] and with recent diagnosis of chlamydia PNA s/p x10 course of azithromycin presents today for fever and cough. Patient c/o x1 day of subjective fevers, dry cough, SOB and chills, no CP, no new LE edema. Said she never really felt better from discharge. Went to outpatient oncologist where she got IVF today and he sent her here due to fact patient was septic. Currently patient with no new acute complaints. denies n/v, rashes, diarrhea, no melena or hematochezia,. Has pain chemoport site but no drainage that she noted.    ED course:  Vitals: tmax 102.8, HR , //59, RR 32-20 Spo2 95-98% 3L  Labs: Hgb 7.6, Elevated eosinophils. Coags: PTT 52.8, PT 17, INR 1.5. Cl 111, Bicarb 16. Ca 7.8, Albumin 2.4, AST/ALT 56/86. UA WBC 5-10.   Imaging: CXR with progression of bilateral infiltrates   EKG: Qtc 414  Interventions: tylenol 650mg, 1g ctx, protonix 40. Patient started on zosyn 3.375mg, Given vancomycin 750mg, Azithromycin 500mg.

## 2021-02-25 NOTE — ED PROVIDER NOTE - CLINICAL SUMMARY MEDICAL DECISION MAKING FREE TEXT BOX
87 y/o F PMHx COPD, DNR/DNI, recently diagnosed adenocarcinoma (regularly receiving chemotherapy), Schumacher catheter placement (right side), here today c/o fever, cough, and chills. on exam, Pt was tachycardic and tachypneic. Given fluids and antibiotics. Will monitor oxygen levels. Will contact Dr. Jones and reassess. 85 y/o F PMHx COPD, DNR/DNI, recently diagnosed adenocarcinoma (regularly receiving chemotherapy), Schumacher catheter placement (right side), here today c/o fever, cough, and chills. on exam, Pt was tachycardic and tachypneic. Given fluids and antibiotics. Will monitor oxygen levels. Will contact Dr. Jones and reassess. Patient's condition improved with IVF and antibiotics. Patient mental status is clear, BP stable, HR . Pending COVID (high risk, pt recently admitted). Case discussed with Dr. Mehta. 87 y/o F PMHx COPD, DNR/DNI, recently diagnosed adenocarcinoma (regularly receiving chemotherapy), Schumacher catheter placement (right side), here today c/o fever, cough, and chills. on exam, Pt was tachycardic and tachypneic. Given fluids and antibiotics. Will monitor oxygen levels. Will contact Dr. Jones and reassess. Patient's condition improved with IVF and antibiotics. Patient mental status is clear, BP stable, HR . Pending COVID (high risk, pt recently admitted). Case discussed with Dr. Angel

## 2021-02-25 NOTE — H&P ADULT - PROBLEM SELECTOR PLAN 7
Redmond placed on prior admission. Now patient septic again-redmond not replaced in ED.  - will consult urology for redmond replacement.

## 2021-02-25 NOTE — ED PROVIDER NOTE - PMH
Basal cell carcinoma  x2 - upper lip and arm  Chronic obstructive pulmonary disease    Chronic obstructive pulmonary disease, unspecified COPD type    Hearing loss  left ear - sees Dr. Garces ENT  Osteoporosis  Osteoporosis

## 2021-02-25 NOTE — H&P ADULT - PROBLEM SELECTOR PLAN 3
hx of UGIB, no recent melena or hemtochezia. Hgb unchanged from discharge. Likely in setting of chemo vs. hypo-proliferative.  - c/w fe supplementation  - maintain active type and screen  - heme following apprecaite recs.

## 2021-02-25 NOTE — ED ADULT TRIAGE NOTE - ARRIVAL INFO ADDITIONAL COMMENTS
pt c/o fever and chills for a few days. febrile to 102 at chemo infusion center today - received 2.5L of NS there today. pt c/o sob and cough. r/o covid-19

## 2021-02-25 NOTE — H&P ADULT - NSHPLABSRESULTS_GEN_ALL_CORE
.  LABS:                         7.6    7.32  )-----------( 355      ( 2021 17:13 )             26.1         137  |  111<H>  |  9   ----------------------------<  100<H>  4.1   |  16<L>  |  0.69    Ca    7.8<L>      2021 17:13  Mg     1.8         TPro  6.1  /  Alb  2.4<L>  /  TBili  0.3  /  DBili  x   /  AST  56<H>  /  ALT  86<H>  /  AlkPhos  47      PT/INR - ( 2021 17:13 )   PT: 17.6 sec;   INR: 1.50          PTT - ( 2021 17:13 )  PTT:52.8 sec  Urinalysis Basic - ( 2021 17:13 )    Color: Yellow / Appearance: Clear / S.025 / pH: x  Gluc: x / Ketone: NEGATIVE  / Bili: Negative / Urobili: 0.2 E.U./dL   Blood: x / Protein: NEGATIVE mg/dL / Nitrite: NEGATIVE   Leuk Esterase: NEGATIVE / RBC: < 5 /HPF / WBC 5-10 /HPF   Sq Epi: x / Non Sq Epi: 0-5 /HPF / Bacteria: Many /HPF                RADIOLOGY, EKG & ADDITIONAL TESTS: Reviewed.

## 2021-02-25 NOTE — H&P ADULT - PROBLEM SELECTOR PLAN 6
Elevated Lfts possibly in setting of sepsis, althuogh given slight uptrending coags concern for early stages of liver failure  - repeat CMP and Coags in am- if still elevated can pursue further abdominal imaging.

## 2021-02-25 NOTE — H&P ADULT - PROBLEM SELECTOR PLAN 2
Concern for post-obstructive PNA vs. HAP vs. atypical pna  - see plan above Concern for post-obstructive PNA vs. HAP vs. atypical pna. 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   - see plan above

## 2021-02-25 NOTE — ED PROVIDER NOTE - CONSTITUTIONAL, MLM
normal... Appears malnourished, awake, alert, oriented to person, place, time/situation and in severe distress

## 2021-02-25 NOTE — H&P ADULT - ASSESSMENT
86F c COPD, lung adenocarcinoma s/p radiation, now on chemo recently admitted for UGIB and Chlamydia PNA. Now presents with severe sepsis likely due to PNA.

## 2021-02-25 NOTE — ED ADULT NURSE NOTE - OBJECTIVE STATEMENT
Patient w/ Hx of Lung CA, dizziness episodes, chemotherapy ( last 2 wks ago ) Gemzar and Navelbine, was at the infusion center for IV hydration as dx as 10% dehydrated when noted to be tacchypneic, c/o of SOB, w/ fever, sent to ED.  Immediate upgrade and Sepsis protocol activation.

## 2021-02-25 NOTE — H&P ADULT - PROBLEM SELECTOR PLAN 1
2/4 SIRS criteria POA. For Fever and tachycardia. Patient with weakly positive UA weakly positive (redmond not replaced in ED), CXR with progression of b/l infiltrates and pain at chemoport site. No new rashes or diarrhea. Concering given patient recently teated for atypical PNA. ddx for source includes; HAP vs. Atypical vs. chemo vs. tumor burden fever.  - c/w vanc 750mg q8hr  - c/w zosyn 3.375mg q8hr  - azithromycin 500mg for now  - f/u RVP, sputum culture  - f/u blood cultures  - f/u urine cultures

## 2021-02-25 NOTE — H&P ADULT - PROBLEM SELECTOR PLAN 9
Confirmed prior GOC discussion with patient. DNR/DNI, no pressors.   -see prior documentation  - can consider palliative care consult

## 2021-02-25 NOTE — H&P ADULT - NSHPPHYSICALEXAM_GEN_ALL_CORE
Vital Signs (24 Hrs):  T(C): 36.8 (02-25-21 @ 19:56), Max: 39.3 (02-25-21 @ 16:48)  HR: 77 (02-25-21 @ 19:56) (77 - 105)  BP: 118/58 (02-25-21 @ 19:56) (106/59 - 130/58)  RR: 18 (02-25-21 @ 19:56) (18 - 32)  SpO2: 99% (02-25-21 @ 19:56) (95% - 99%)  Wt(kg): -- Vital Signs (24 Hrs):  T(C): 36.8 (02-25-21 @ 19:56), Max: 39.3 (02-25-21 @ 16:48)  HR: 77 (02-25-21 @ 19:56) (77 - 105)  BP: 118/58 (02-25-21 @ 19:56) (106/59 - 130/58)  RR: 18 (02-25-21 @ 19:56) (18 - 32)  SpO2: 99% (02-25-21 @ 19:56) (95% - 99%)  Wt(kg): --    PHYSICAL EXAM:  GENERAL: NAD. Speaking in full sentences although intermittently tachypneic.  HEENT: MMM  CHEST/LUNG: Clear to auscultation bilaterally; No wheeze, rhonchi, or rales. Chemoport site c/d/i,   HEART: RRR. S1S2.   ABDOMEN: NTND BS+4  EXTREMITIES:  2+ Peripheral Pulses, No clubbing, cyanosis, or edema  PSYCH: AAOx3, cooperative, appropriate  NEUROLOGY: AAOx3, CN II-XII intact. Strength 5/5 throughout. Sensation intact. Appropriate cerebellar functions.   SKIN: No rashes or lesions

## 2021-02-25 NOTE — H&P ADULT - PROBLEM SELECTOR PLAN 8
On chemotx with Dr. Jones. On previous admission continued patient on megestrol.  - can continue in the am

## 2021-02-25 NOTE — ED ADULT NURSE REASSESSMENT NOTE - NS ED NURSE REASSESS COMMENT FT1
Patient a/oX 3, anxious, c/o of body aches, no chest pain or SOB, w/ fever and chills.  NSR on EKG and cardiac monitor, febrile, other vital signs stable.  Right AC PIV #18 in place from EMS, patent.  Left Chest port single lumen noted, intact.  All labs, blood cultures, sepsis protocol labs sent.  Schumacher catheter Chinese #16 in place on arrival to ED, draining dark yellow urine, good urine output.  Tylenol suppository 650mg IL,  Ceftiraxone 1gm IVPB adminsitered as ordered.  Isolation protocol observed.  Fall precaution observed.  Will continue to monitor.

## 2021-02-26 NOTE — DIETITIAN NUTRITION RISK NOTIFICATION - ADDITIONAL COMMENTS/DIETITIAN RECOMMENDATIONS
1. Recommend pending dysphagia screen, initiate PO diet with consistency per SLP   2. Add Ensure Clear BID (each 240kcal/8gpro)   3. Trend wts biweekly   4. Monitor lytes and replete   5. Keep nutrition aligned with GOC

## 2021-02-26 NOTE — PROGRESS NOTE ADULT - PROBLEM SELECTOR PLAN 3
hx of UGIB, no recent melena or hemtochezia. Hgb unchanged from recent discharge. Fe studies c/w anemia of chronic disease  - c/w fe supplementation  - maintain active type and screen  - pt refusing transfusions  - heme following apprecaite recs.

## 2021-02-26 NOTE — GOALS OF CARE CONVERSATION - ADVANCED CARE PLANNING - CONVERSATION DETAILS
Confirmed with patient previous documented GOC: DNR/DNI and no pressors. Patient stated that if it is her time it is her time.

## 2021-02-26 NOTE — DIETITIAN INITIAL EVALUATION ADULT. - PROBLEM SELECTOR PLAN 2
Concern for post-obstructive PNA vs. HAP vs. atypical pna. 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   - see plan above

## 2021-02-26 NOTE — PROGRESS NOTE ADULT - PROBLEM SELECTOR PLAN 1
2/4 SIRS criteria POA with Fever and tachycardia. Patient with weakly positive UA(redmond not replaced in ED), CXR with progression of b/l infiltrates and pain at chemoport site. No new rashes or diarrhea. Concerning given patient recently teated for atypical PNA. ddx for source includes; HAP vs. Atypical vs. chemo vs. tumor burden fever.  - s/p vanc d/c'ed after MRSA swab negative  - s/p azithromycin after urine legionella negative  - RVP negative  - COVID negative  - c/w zosyn 3.375mg q8hr  - obtain sputum culture if cough becomes productive  - f/u blood cultures  - f/u urine cultures

## 2021-02-26 NOTE — PROGRESS NOTE ADULT - NSHPATTENDINGPLANDISCUSS_GEN_ALL_CORE
The patient and Dr. Judah Gregorio in person and Dr. Dereje Artis and Infectious Disease by text message

## 2021-02-26 NOTE — PROGRESS NOTE ADULT - ASSESSMENT
86F PMH COPD, lung adenocarcinoma s/p radiation, now on chemo recently admitted for UGIB and Chlamydia PNA. Now presents with severe sepsis likely due to PNA.

## 2021-02-26 NOTE — PROGRESS NOTE ADULT - SUBJECTIVE AND OBJECTIVE BOX
CC: Patient is a 86y old  Female who presents with a chief complaint of Sepsis (2021 09:39)    SUBJECTIVE / INTERVAL HPI: Patient seen and examined at bedside. Pt reports improvement in cough and shortness of breath. Otherwise denies fevers, chills, chest pain, abdominal pain, nausea, vomiting, diarrhea, constipation, leg pain/swelling.     ROS: negative unless otherwise stated above.    VITAL SIGNS:  Vital Signs Last 24 Hrs  T(C): 36.9 (2021 10:21), Max: 39.3 (2021 16:48)  T(F): 98.5 (2021 10:21), Max: 102.8 (2021 16:48)  HR: 82 (2021 10:) (77 - 105)  BP: 109/55 (2021 10:21) (106/59 - 147/63)  BP(mean): --  RR: 20 (2021 10:21) (18 - 32)  SpO2: 94% (2021 10:21) (94% - 99%)      21 @ 07:01  -  21 @ 07:00  --------------------------------------------------------  IN: 0 mL / OUT: 450 mL / NET: -450 mL        PHYSICAL EXAM:    General: elderly female on 3L NC  Neck: supple  Cardiovascular: +S1/S2; RRR  Respiratory: CTA B/L; no W/R/R  Gastrointestinal: soft, NT/ND; +BSx4  Extremities: WWP; no edema, clubbing or cyanosis  Vascular: 2+ radial, DP/PT pulses B/L  Neurological: AAOx3; no focal deficits    MEDICATIONS:  MEDICATIONS  (STANDING):  enoxaparin Injectable 30 milliGRAM(s) SubCutaneous every 24 hours  folic acid 1 milliGRAM(s) Oral daily  pantoprazole  Injectable 40 milliGRAM(s) IV Push every 12 hours  piperacillin/tazobactam IVPB.. 3.375 Gram(s) IV Intermittent every 6 hours  psyllium Powder 1 Packet(s) Oral daily  senna 2 Tablet(s) Oral at bedtime    MEDICATIONS  (PRN):  acetaminophen    Suspension .. 650 milliGRAM(s) Oral every 6 hours PRN Moderate Pain (4 - 6)      ALLERGIES:  Allergies    No Known Allergies    Intolerances        LABS:                        6.9    7.87  )-----------( 291      ( 2021 10:31 )             22.7         136  |  110<H>  |  8   ----------------------------<  91  3.9   |  19<L>  |  0.88    Ca    7.3<L>      2021 10:31  Mg     1.6         TPro  5.5<L>  /  Alb  2.0<L>  /  TBili  0.4  /  DBili  x   /  AST  36  /  ALT  59<H>  /  AlkPhos  44      PT/INR - ( 2021 08:46 )   PT: 18.0 sec;   INR: 1.53          PTT - ( 2021 08:46 )  PTT:30.9 sec  Urinalysis Basic - ( 2021 17:13 )    Color: Yellow / Appearance: Clear / S.025 / pH: x  Gluc: x / Ketone: NEGATIVE  / Bili: Negative / Urobili: 0.2 E.U./dL   Blood: x / Protein: NEGATIVE mg/dL / Nitrite: NEGATIVE   Leuk Esterase: NEGATIVE / RBC: < 5 /HPF / WBC 5-10 /HPF   Sq Epi: x / Non Sq Epi: 0-5 /HPF / Bacteria: Many /HPF      CAPILLARY BLOOD GLUCOSE          RADIOLOGY & ADDITIONAL TESTS: Reviewed.

## 2021-02-26 NOTE — PROGRESS NOTE ADULT - ASSESSMENT
The patient has just recovered from chlamydia pneumonia and now presents with a sepsis like picture with coarse breath sounds and some sort of bowel dysfunction that demonstrates no pathology on CT scans.    The patient appears to be stable today with no signs of sepsis.    Close attention to the patient's bowel function must be made.

## 2021-02-26 NOTE — PROGRESS NOTE ADULT - SUBJECTIVE AND OBJECTIVE BOX
the patient is much more comfortable today than she was at the time of her admission yesterday through the emergency.  The patient is on nasal oxygen and has noted discomfort or air hunger.  Yesterday the patient was clearly septic with long, rapid deep respirations that are associated with sepsis.  Malignant neoplasm of unspecified part of unspecified bronchus or lung  Other Medical Problems:  Other Medical Problems:  Reason for Today's Visit:  2021:The patient returns today for hydration.  History of Present Illness  The patient got dizzy on the street and dizzy at home which started in October.  He occasionally this would be associated with nausea.   The patient went to an urgent care center where she had a cardiogram which showed some irregularities which had them refer her to [facility] where she had a chest x­ray your and then an eventual CT scan which showed a lung mass and mediastinal  lymphadenopathy.  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  The cancer had no definitive markers for lung cancer and had a high Ki­67.  The patient was started on chemotherapy  with gefitinib, tamoxifen in high doses and  Navelbine.  The patient has not had any return of these initial symptoms since her  hospitalization.  The patient was recommended to receive radiation therapy and chemotherapy in an attempt to cure her.  As of 2020: Coverage for MD [employer]; PATIENT EXAMINED AND CHART REVIEWED. This is a female with stage pT2N3 Mx  large cell lung cancer on chemotherapy, with Gemzar and Navelbine . Previous treatment was 10 days ago. ANC today is 1.7 and likely to  decrease after today's treatment. Patient complains only of last week diarrhea bur denies cough, shortness of breath or palpitations.  Remains afebrile. Plan is to administer today chemotherapy but have patient return tomorrow for Neupogen administration.aS OF  2020 PATIENT STATES SHE FEELS BETTER  As of 2020: While she remains asymptomatic, her ANC keeps decreasing despite Zarxio 480 mcg given sq over last 2 days. Given  today third dose of Zarxio and prescribed prophylactic oral Ciprofoxacin  Past medical History  The patient had appendicitis at age 16.  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  Social History  Patient is single. Patient lives alone. Former Smoker. Not applicable. Denies any prior alcohol use. Denies any illicit drug use. 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  Family History  The patient's father  age 66 from complications in the hospital. The patient's mother  age 87 of a coronary artery thrombosis. The  patient's brother  of complications from coronary bypass surgery at age 64  Personal History  Patient denies having ever received blood products in the past.  GYN History  Menarche: Age of Menarche was:16 Menopause occurred in :62 [place]  Allergies  No Known Allergies  Medications  Aranesp 200 mcg/0.4 mL (in polysorbate) injection syringe, gefitinib 250 mg tablet, lactulose 10 gram/15 mL (15 mL) oral solution,  megestrol 400 mg/10 mL (10 mL) oral suspension, no med at this time, omeprazole 40 mg capsule,delayed release, tamoxifen 20 mg  tablet, Tarceva 150 mg tablet, Zarxio 480 mcg/0.8 mL injection syringe  Altagracia Decker : [phone] ([id number]) Page 1 of 3  Review Of Systems    the patient does not feel well.  She can hardly catch her breath.  She feels as she was prior to her admission to the hospital with  chlamydia  Pneumonia. Patient reports a recent weight loss of %% lbs. Pt reports feeling fatigued at times. Patient reports experiencing  night sweats. Pt reports loss of appetite.   Eyes: Pt reports no blurred vision. Pt reports no double vision. Patient denies any changes in vision.   Ears, Nose, Mouth, Throat: Pt reports no hearing loss. Pt reports no ringing in ears. Pt reports no sinus trouble. Pt reports no trouble  swallowing. Pt reports no sore throat. Pt reports no recent episodes of epistaxis. Pt reports no hoarseness.   Cardiac: Pt reports no chest pains. Pt reports no light headedness. Pt reports no swelling in legs. Pt reports no episodes of passing out.  Pt reports heart palpitations.   Respiratory: Pt reports no cough. Pt reports no sputum production. Pt reports no hemoptysis. Pt reports no orthopnea. Pt reports no  nocturnal paroxysmal dyspnea. Pt reports shortness of breath.  The patient has shortness of breath at rest.  The patient has extreme cold  and is currently in a rigors.   Gastrointestinal: Pt reports no nausea. Pt reports no vomiting. Pt reports no heartburn. Pt reports no diarrhea. Pt reports no rectal  bleeding. Pt reports no bowel incontinence. Pt reports constipation.   Genito­Urinary: No vaginal bleeding. Pt reports no burning on urination. Pt reports no blood in urine. Pt reports no frequent urination. Pt  reports no urinary incontinence.   MusculoSkeletal: Pt reports no muscle pain. Pt reports no stiffness. Pt reports no joint pains. Patient denies any joint swelling. Pt  reports no back pains.   Skin:Denies any skin rashes.   Neurological: Pt reports no headaches. Pt reports no seizures. Pt reports no loss of sensation. Pt reports no tingling sensations. Pt  reports no memory loss. Pt reports no thinking difficulty. Pt reports loss of balance. Pt reports weakness of limbs. Pt reports dizziness.   Psychiatric: Pt reports no nervousness. Pt reports no depression Patient denies restlessness. Patient denies any difficulty sleeping.   Hematologic/Lymphatic/Immunologic: Patient denies bruising Patient denies any bleeding. Pt reports no lumps in arm­pits. Pt reports  no lumps in neck. Pt reports no lumps in groin.  Vitals  Vitals on 2021 11:31:00 AM: Height=60in, Weight=76lb, Temp=97.3f, Pulse=91, GfnxzsouKQ=977, DiastolicBP=54, O2 Sat=87%  Physical Exam  Appears malnourished.  Skin: No rash noted. No lesions. Poor turgor.  Eyes: Eyes with PERRLA. EOM's in­tact Sclera clear,no jaundice noted. Conjunctiva clear.   Ears, Nose,Mouth & Throat: Teeth normal, no missing teeth or dentures present. No ulcers. No thrush Auditory canals normal.  Description: %% Oropharynx without lesions. Mucositis None.    The patient has dry pasty mucous membranes.  Neck: No thyromegaly. No lymphadenopathy noted.   Cardiovascular: Normal sinus rhythm S1, S2 without murmurs. No Extrasystole.   Breast: Normal. No breast masses appreciated. No axillary lymphadenopathy. No skin changes. No nipple discharge present. No dimpling  noted.  Chest/Respiratory: No dyspnea. No rhonchi. No wheezing. No decreased breath sounds at bases. No rales. Unlabored breathing. No  accessory muscle use.   Gastrointestinal: Abdomen is soft and non­tender to touch. No hepatomegaly noted. No splenomegaly noted. No abdominal pain or  guarding noted. No abdominal mass(es) appreciated. No ascites noted.  There is soft stool in the rectal vault with no evidence of fecal  impaction.  Genito­Urinary: Deferred.  Hematologic/Lymphatic: No petechiae noted. No purpura noted. No lymphadenopathy noted.   Musculoskeletal: No Kyphosis. No weakness. No spinal tenderness on percussion. No pain on hip rotation reported by patient. Normal  range of motion in upper and lower extremities.   Neurologic: Affect normal. No tremors observed. Biceps, brachioradialis, patellar and plantar reflexes symmetrical. Normal  proprioception/position sense Cranial nerves intact.   Psychiatry: Oriented to person/place/time. No anxiety observed during exam. No signs of depression noted during exam. Intact short­term  and long­term memory.   Extremities: No clubbing noted to fingers or toes. No cyanosis noted. Capillary refill less than two seconds. No edema. No calf  tenderness reported by patient.

## 2021-02-26 NOTE — PROGRESS NOTE ADULT - SUBJECTIVE AND OBJECTIVE BOX
CC/ HPI Patient is an 86 year old female with chronic obstructive pulmonary disease, lung cancer, poorly differentiated adenocarcinoma, status post radiation therapy, Dec 2020, discharged after hospital stay for severe anemia, upper GI bleed, RVP positive Chlamydia pneumonia, (2.06.21) status post Zithromax, cefepime, now admitted with progressive pulmonary infiltrates, sepsis, seen this morning, the patient denies acute respiratory complaint.      PAST MEDICAL & SURGICAL HISTORY:  Chronic obstructive pulmonary disease, unspecified COPD type  Hearing loss  left ear - sees Dr. Garces ENT  Basal cell carcinoma  x2 - upper lip and arm  Osteoporosis  H/O breast biopsy, 5 years ago, mass was benign and self-resolved  History of appendectomy  History of cataract surgery    SOCHX:   tobacco,  -  alcohol      FAMILY HISTORY: FA/MO  - contributory     ROS reviewed below with positive findings marked (+) :  GEN:  fever, chills ENT: tracheostomy,   epistaxis,  sinusitis COR: CAD, CHF,  HTN, dysrhythmia PUL: +COPD, ILD, asthma, pneumonia GI: PEG, dysphagia, hemorrhage, other HALLIE: kidney disease, electrolyte disorder HEM:  anemia, thrombus, coagulopathy, cancer ENDO:  thyroid disease, diabetes mellitus CNS:  dementia, stroke, seizure, PSY:  depression, anxiety, other      MEDICATIONS  (STANDING):  enoxaparin Injectable 30 milliGRAM(s) SubCutaneous every 24 hours  folic acid 1 milliGRAM(s) Oral daily  pantoprazole  Injectable 40 milliGRAM(s) IV Push every 12 hours  piperacillin/tazobactam IVPB.. 3.375 Gram(s) IV Intermittent every 6 hours  psyllium Powder 1 Packet(s) Oral daily  senna 2 Tablet(s) Oral at bedtime    MEDICATIONS  (PRN):  acetaminophen    Suspension .. 650 milliGRAM(s) Oral every 6 hours PRN Moderate Pain (4 - 6)      Vital Signs Last 24 Hrs  T(C): 36.9 (26 Feb 2021 10:21), Max: 37.8 (26 Feb 2021 05:58)  T(F): 98.5 (26 Feb 2021 10:21), Max: 100 (26 Feb 2021 05:58)  HR: 82 (26 Feb 2021 10:21) (77 - 100)  BP: 109/55 (26 Feb 2021 10:21) (106/59 - 147/63)  RR: 20 (26 Feb 2021 10:21) (18 - 22)  SpO2: 94% (26 Feb 2021 10:21) (94% - 99%)    GENERAL:         comfortable,  - distress.  HEENT:            - trauma,  - icterus,  - injection,  - nasal discharge.  NECK:              - jugular venous distention, - thyromegaly.  LYMPH:           - lymphadenopathy, - masses.  RESP:              + crackles,   - rhonchi,   - wheezes.   COR:                S1S2   - gallops,  - rubs.  ABD:                bowel sounds,   soft, - tender, - distended.  EXT/MSC:         - cyanosis,  - clubbing, - edema.    NEURO:           + alert and oriented                             6.9    7.87  )-----------( 291      ( 26 Feb 2021 10:31 )             22.7     02-26    136  |  110<H>  |  8   ----------------------------<  91  3.9   |  19<L>  |  0.88        CT Chest No Cont (02.26.21)  Tip of left chest port within the cavoatrial junction. Normal heart size. No pericardial effusion. No thoracic aortic aneurysm. No pathologically enlarged mediastinal lymph nodes and limited evaluation of the everett without intravenous contrast. No axillary lymphadenopathy.  Small to moderate right lateral pleural effusions, enlarged since prior study. There is pulmonary emphysema. Increased consolidation in the right upper lobe at the right lung apex. Increased groundglass and interstitial infiltrates throughout both lungs since the studies of 2/5/2021 and 2/10/2021. Thickening of the interlobular septae in both lungs.  Limited evaluation of solid abdominal visceral without intravenous contrast and due to respiratory motion. No focal hepatic abnormality. Gallbladder obscured by respiratory motion. Unremarkable spleen and pancreas.  Impression: Limited study due to respiratory motion.  1.  Worsening bilateral pulmonary opacities. Differential diagnosis includes pneumonia with possible superimposed congestive heart failure, pneumonitis, and lymphangitic carcinomatosis. Pulmonary emphysema.  2.  Small to moderate-sized pleural effusions, increased since prior study.  3. Distended urinary bladder despite presence of Schumacher catheter.      CT Angio Chest PE Protocol w/ IV Cont (02.03.21)  Pulmonary arteries: No pulmonary embolus seen.  Lungs and large airways: Respiratory motion limits assessment of fine pulmonary parenchymal detail. Trace dependent secretions distal trachea. No nodular and no tracheal or endobronchial lesion.  Bilateral apical pleural parenchymal scarring. Unchanged mild peribronchial wall thickening and cylindrical bronchiectasis bilaterally. Scattered nodular groundglass opacities bilaterally, more confluent in right upper lobe posterior segment with apparent interlobular septal thickening. Mild compressive atelectasis right lower lobe. Few nodular consolidative opacities left lower lobe, subsegmental atelectasis versus mild consolidation. No measurable residual masses in right upper lobe anterior segment with linear opacity in this region probably representing scarring. New few scattered bilateral pulmonary nodules, for example:  *  Right upper lobe subpleural 0.5 cm (series 6 image 160).  *  Right upper lobe anterior segment 0.5 cm (series 6 image 132)  *  Left lower lobe 0.5 cm (series 6 image 204)  Pleura:  Resolved right pneumothorax. Decreased trace residual right pleural effusion.  Mediastinum and hilar regions: Significantly decreased mediastinal and hilar adenopathy, for example right lower paratracheal 1.1 x 0.8 cm, previously 4.4 x 2.9 cm. No significant change mildly prominent subcarinal node 1.2 cm short axis. Resolved right lower cervical adenopathy.  Heart and pericardium:  Heart size is normal. No pericardial effusion.  Vessels:  No aneurysm. Mild scattered calcific atherosclerosis.  Chest wall and lower neck:  Normal.  Upper abdomen: Unchanged few left renal cysts, largest 3.5 cm.  Bones: No suspicious lytic or blastic lesion. Mild degenerative changes thoracic spine with prominent degenerative Schmorl's node inferior endplate T9 vertebral body.    IMPRESSION:  1.  No pulmonary embolus.  2.  New few small scattered nodular opacities bilaterally with more confluent groundglass density and suspected interlobular septal thickening in posterior right upper lobe. Differential includes aspiration/infection versus lymphangitic carcinomatosis. Assessment limited by artifact from respiratory motion. Recommend clinical correlation and short-term follow-up would be helpful.  .  No measurable residual right upper lobe mass with scarring in this region.  4.  Significantly decreased thoracic adenopathy.  5.  Decreased trace residual right pleural effusion.  6.  Resolved right pneumothorax.          ASSESSMENT/PLAN    1)  Pneumonia/pneumonitis  2)  Chronic obstructive pulmonary disease  3)  Bronchiectasis  4)  Lung cancer    Oxygen as needed for a satisfactory SpO2  Bronchodilators:  Atrovent/ albuterol q 4 – 6 hours as needed  ID/Antibiotics:  piperacillin/tazobactam  Cardiac/HTN: echocardiogram  GI: Rx/ prophylaxis c PPI/H2B  Heme: Rx/VT prophylaxis   Discussed with Dr. Roberson who will cover Feb 27-28.

## 2021-02-26 NOTE — DIETITIAN INITIAL EVALUATION ADULT. - OTHER INFO
86F c COPD, lung adenocarcinoma s/p radiation, now on chemo recently admitted for UGIB and Chlamydia PNA. Now presents with severe sepsis likely due to PNA.     Pt seen in room, resting in bed. Pt with no current diet order in place, pending dysphagia screen. Pt reporting she is very hungry this AM and would like to eat something. Paged team. Per chart review, pt initially put on mechanical soft diet 2/6  and then was upgrade to regular 2/8. Pt reports fair appetite since previous admission, reports usually eating ~2meals per day. 86F w/PMH COPD, lung adenocarcinoma s/p radiation, now on chemo recently admitted for UGIB and Chlamydia PNA. Now presents with severe sepsis likely due to PNA.     Pt seen in room, resting in bed. Pt with no current diet order in place, pending dysphagia screen. Pt reporting she is very hungry this AM and would like to eat something. Paged team. Per chart review, pt initially put on mechanical soft diet 2/6  and then was upgrade to regular 2/8. Pt reports fair appetite since previous admission, reports usually eating ~2meals per day. Per chart review, pt was 91lbs November 2020, 2/3 83lbs/90lbs?-conflicting wts. ABW 90lbs. Recommend trend biweekly wts. Per ASPEN guidelines, pt with severe PCM 2/2 NFPE findings. Pt reporting abdominal cramping during visit, constipation noted in flowsheet. No edema noted. Please see recs below. Will continue to follow per RD protocol.

## 2021-02-26 NOTE — PROGRESS NOTE ADULT - PROBLEM SELECTOR PLAN 6
Elevated Lfts possibly in setting of sepsis, although given slight uptrending coags concern for early stages of liver failure  - now downtrending    #Elevated coags  -Only INR elevated, likely in the setting of malnutrition  -f/u nutrition recs

## 2021-02-27 NOTE — PROGRESS NOTE ADULT - SUBJECTIVE AND OBJECTIVE BOX
coverage for Dr Angel    Pt seen and examined   no new complaints, no fever    REVIEW OF SYSTEMS:  Constitutional: No fever,   Cardiovascular: No chest pain  Gastrointestinal: No abdominal or epigastric pain. No nausea, vomiting  No diarrhea   Skin: No itching, burning, rashes or lesions       MEDICATIONS:  MEDICATIONS  (STANDING):  enoxaparin Injectable 30 milliGRAM(s) SubCutaneous every 24 hours  folic acid 1 milliGRAM(s) Oral daily  pantoprazole  Injectable 40 milliGRAM(s) IV Push every 12 hours  piperacillin/tazobactam IVPB.. 3.375 Gram(s) IV Intermittent every 6 hours  psyllium Powder 1 Packet(s) Oral daily  senna 2 Tablet(s) Oral at bedtime    MEDICATIONS  (PRN):  acetaminophen    Suspension .. 650 milliGRAM(s) Oral every 6 hours PRN Moderate Pain (4 - 6)      Allergies    No Known Allergies    Intolerances        Vital Signs Last 24 Hrs  T(C): 37.1 (2021 10:44), Max: 37.2 (2021 21:16)  T(F): 98.7 (2021 10:44), Max: 99 (2021 21:16)  HR: 89 (2021 10:44) (88 - 97)  BP: 105/61 (2021 10:44) (105/61 - 124/69)  BP(mean): --  RR: 20 (2021 10:44) (18 - 20)  SpO2: 94% (2021 10:44) (94% - 96%)     @ 07:01  -   @ 07:00  --------------------------------------------------------  IN: 100 mL / OUT: 350 mL / NET: -250 mL        PHYSICAL EXAM:    General: thin, elderly; in no acute distress  HEENT: MMM, conjunctiva and sclera clear  Lungs: clear apices dec bases  Heart: regular  Gastrointestinal: Soft non-tender non-distended; Normal bowel sounds; [place]  Skin: Warm and dry. No obvious rash    LABS:      CBC Full  -  ( 2021 06:33 )  WBC Count : 9.32 K/uL  RBC Count : 2.93 M/uL  Hemoglobin : 7.8 g/dL  Hematocrit : 25.9 %  Platelet Count - Automated : 381 K/uL  Mean Cell Volume : 88.4 fl  Mean Cell Hemoglobin : 26.6 pg  Mean Cell Hemoglobin Concentration : 30.1 gm/dL  Auto Neutrophil # : 6.26 K/uL  Auto Lymphocyte # : 0.61 K/uL  Auto Monocyte # : 0.60 K/uL  Auto Eosinophil # : 1.74 K/uL  Auto Basophil # : 0.05 K/uL  Auto Neutrophil % : 67.3 %  Auto Lymphocyte % : 6.5 %  Auto Monocyte % : 6.4 %  Auto Eosinophil % : 18.7 %  Auto Basophil % : 0.5 %        138  |  111<H>  |  8   ----------------------------<  90  4.0   |  18<L>  |  0.91    Ca    7.8<L>      2021 06:33  Phos  2.2       Mg     2.1         TPro  6.1  /  Alb  2.0<L>  /  TBili  0.4  /  DBili  x   /  AST  32  /  ALT  47<H>  /  AlkPhos  51      PT/INR - ( 2021 08:46 )   PT: 18.0 sec;   INR: 1.53          PTT - ( 2021 08:46 )  PTT:30.9 sec      Urinalysis Basic - ( 2021 17:13 )    Color: Yellow / Appearance: Clear / S.025 / pH: x  Gluc: x / Ketone: NEGATIVE  / Bili: Negative / Urobili: 0.2 E.U./dL   Blood: x / Protein: NEGATIVE mg/dL / Nitrite: NEGATIVE   Leuk Esterase: NEGATIVE / RBC: < 5 /HPF / WBC 5-10 /HPF   Sq Epi: x / Non Sq Epi: 0-5 /HPF / Bacteria: Many /HPF      Culture - Urine (21 @ 19:11)    Specimen Source: .Urine Clean Catch (Midstream)    Culture Results:   50,000 CFU/ml Enterococcus faecalis  Susceptibility to follow.              RADIOLOGY & ADDITIONAL STUDIES (The following images were personally reviewed):

## 2021-02-27 NOTE — PROGRESS NOTE ADULT - ASSESSMENT
Lung CA  COPD  Pneumonitis in the context of recent admission and treatment with antibiotics including Azithromycin for chlamydiae pneumonia   Now with worsening pneumonitis and need for supplemental O2  Suspect infection, including opportunistic infection, and/or progression of cancer/cancer spread  Will review cancer treatments with Dr Jones to assess patient's risk for Pneumocystis infection  Enterococcus bacteriuria in the setting of Schumacher cath    RECOMMEND  Continue Pip-Tazo  Check procalcitonin level  Check LDH and Fungitell (Beta-D-Glucan)  Check nasopharyngeal swab for respiratory virus panel (RVP)  Consider Gallium scan   Change Schumacher cath

## 2021-02-27 NOTE — PROGRESS NOTE ADULT - SUBJECTIVE AND OBJECTIVE BOX
CC/ HPI Patient is an 86 year old female with chronic obstructive pulmonary disease, lung cancer, poorly differentiated adenocarcinoma, status post radiation therapy, Dec 2020, discharged after hospital stay for severe anemia, upper GI bleed, RVP positive Chlamydia pneumonia, (2.06.21) status post Zithromax, cefepime, now admitted with progressive pulmonary infiltrates, sepsis. Today with severe abdominal pain.    All new data reviewed, including VS, lab, imaging, Rx and documentation. Note written at bedside.    PAST MEDICAL & SURGICAL HISTORY:  Chronic obstructive pulmonary disease, unspecified COPD type  Hearing loss  left ear - sees Dr. Garces ENT  Basal cell carcinoma  x2 - upper lip and arm  Osteoporosis  H/O breast biopsy, 5 years ago, mass was benign and self-resolved  History of appendectomy  History of cataract surgery    SOCHX:   tobacco,  -  alcohol      FAMILY HISTORY: FA/MO  - contributory     ROS reviewed below with positive findings marked (+) :  GEN:  fever, chills ENT: tracheostomy,   epistaxis,  sinusitis COR: CAD, CHF,  HTN, dysrhythmia PUL: +COPD, ILD, asthma, pneumonia GI: PEG, dysphagia, hemorrhage, other HALLIE: kidney disease, electrolyte disorder HEM:  anemia, thrombus, coagulopathy, cancer ENDO:  thyroid disease, diabetes mellitus CNS:  dementia, stroke, seizure, PSY:  depression, anxiety, other      MEDICATIONS  (STANDING):  enoxaparin Injectable 30 milliGRAM(s) SubCutaneous every 24 hours  folic acid 1 milliGRAM(s) Oral daily  pantoprazole  Injectable 40 milliGRAM(s) IV Push every 12 hours  piperacillin/tazobactam IVPB.. 3.375 Gram(s) IV Intermittent every 6 hours  psyllium Powder 1 Packet(s) Oral daily  senna 2 Tablet(s) Oral at bedtime    MEDICATIONS  (PRN):  acetaminophen    Suspension .. 650 milliGRAM(s) Oral every 6 hours PRN Moderate Pain (4 - 6)      Vital Signs Last 24 Hrs  T(C): 36.9 (26 Feb 2021 10:21), Max: 37.8 (26 Feb 2021 05:58)  T(F): 98.5 (26 Feb 2021 10:21), Max: 100 (26 Feb 2021 05:58)  HR: 82 (26 Feb 2021 10:21) (77 - 100)  BP: 109/55 (26 Feb 2021 10:21) (106/59 - 147/63)  RR: 20 (26 Feb 2021 10:21) (18 - 22)  SpO2: 94% (26 Feb 2021 10:21) (94% - 99%)    GENERAL:         comfortable,  - distress.  HEENT:            - trauma,  - icterus,  - injection,  - nasal discharge.  NECK:              - jugular venous distention, - thyromegaly.  LYMPH:           - lymphadenopathy, - masses.  RESP:              + crackles,   - rhonchi,   - wheezes.   COR:                S1S2   - gallops,  - rubs.  ABD:                bowel sounds,   soft, - tender, - distended.  EXT/MSC:         - cyanosis,  - clubbing, - edema.    NEURO:           + alert and oriented                             6.9    7.87  )-----------( 291      ( 26 Feb 2021 10:31 )             22.7     02-26    136  |  110<H>  |  8   ----------------------------<  91  3.9   |  19<L>  |  0.88        CT Chest No Cont (02.26.21)  Tip of left chest port within the cavoatrial junction. Normal heart size. No pericardial effusion. No thoracic aortic aneurysm. No pathologically enlarged mediastinal lymph nodes and limited evaluation of the everett without intravenous contrast. No axillary lymphadenopathy.  Small to moderate right lateral pleural effusions, enlarged since prior study. There is pulmonary emphysema. Increased consolidation in the right upper lobe at the right lung apex. Increased groundglass and interstitial infiltrates throughout both lungs since the studies of 2/5/2021 and 2/10/2021. Thickening of the interlobular septae in both lungs.  Limited evaluation of solid abdominal visceral without intravenous contrast and due to respiratory motion. No focal hepatic abnormality. Gallbladder obscured by respiratory motion. Unremarkable spleen and pancreas.  Impression: Limited study due to respiratory motion.  1.  Worsening bilateral pulmonary opacities. Differential diagnosis includes pneumonia with possible superimposed congestive heart failure, pneumonitis, and lymphangitic carcinomatosis. Pulmonary emphysema.  2.  Small to moderate-sized pleural effusions, increased since prior study.  3. Distended urinary bladder despite presence of Schumacher catheter.      CT Angio Chest PE Protocol w/ IV Cont (02.03.21)  Pulmonary arteries: No pulmonary embolus seen.  Lungs and large airways: Respiratory motion limits assessment of fine pulmonary parenchymal detail. Trace dependent secretions distal trachea. No nodular and no tracheal or endobronchial lesion.  Bilateral apical pleural parenchymal scarring. Unchanged mild peribronchial wall thickening and cylindrical bronchiectasis bilaterally. Scattered nodular groundglass opacities bilaterally, more confluent in right upper lobe posterior segment with apparent interlobular septal thickening. Mild compressive atelectasis right lower lobe. Few nodular consolidative opacities left lower lobe, subsegmental atelectasis versus mild consolidation. No measurable residual masses in right upper lobe anterior segment with linear opacity in this region probably representing scarring. New few scattered bilateral pulmonary nodules, for example:  *  Right upper lobe subpleural 0.5 cm (series 6 image 160).  *  Right upper lobe anterior segment 0.5 cm (series 6 image 132)  *  Left lower lobe 0.5 cm (series 6 image 204)  Pleura:  Resolved right pneumothorax. Decreased trace residual right pleural effusion.  Mediastinum and hilar regions: Significantly decreased mediastinal and hilar adenopathy, for example right lower paratracheal 1.1 x 0.8 cm, previously 4.4 x 2.9 cm. No significant change mildly prominent subcarinal node 1.2 cm short axis. Resolved right lower cervical adenopathy.  Heart and pericardium:  Heart size is normal. No pericardial effusion.  Vessels:  No aneurysm. Mild scattered calcific atherosclerosis.  Chest wall and lower neck:  Normal.  Upper abdomen: Unchanged few left renal cysts, largest 3.5 cm.  Bones: No suspicious lytic or blastic lesion. Mild degenerative changes thoracic spine with prominent degenerative Schmorl's node inferior endplate T9 vertebral body.    IMPRESSION:  1.  No pulmonary embolus.  2.  New few small scattered nodular opacities bilaterally with more confluent groundglass density and suspected interlobular septal thickening in posterior right upper lobe. Differential includes aspiration/infection versus lymphangitic carcinomatosis. Assessment limited by artifact from respiratory motion. Recommend clinical correlation and short-term follow-up would be helpful.  .  No measurable residual right upper lobe mass with scarring in this region.  4.  Significantly decreased thoracic adenopathy.  5.  Decreased trace residual right pleural effusion.  6.  Resolved right pneumothorax.    ASSESSMENT/PLAN    1)  Pneumonia/pneumonitis  2)  Chronic obstructive pulmonary disease  3)  Bronchiectasis  4)  Lung cancer  5)  Abdominal cramps and pain    Oxygen as needed for a satisfactory SpO2  Bronchodilators:  Atrovent/ albuterol q 4 – 6 hours as needed  ID/Antibiotics:  piperacillin/tazobactam  Cardiac/HTN: echocardiogram  GI: Rx/ prophylaxis c PPI/H2B  Heme: Rx/VT prophylaxis   Discussed with Dr. Artis, aid, RN

## 2021-02-27 NOTE — PROGRESS NOTE ADULT - SUBJECTIVE AND OBJECTIVE BOX
INTERVAL HPI/OVERNIGHT EVENTS:    ANTIBIOTICS/RELEVANT:    MEDICATIONS  (STANDING):  enoxaparin Injectable 30 milliGRAM(s) SubCutaneous every 24 hours  folic acid 1 milliGRAM(s) Oral daily  pantoprazole  Injectable 40 milliGRAM(s) IV Push every 12 hours  piperacillin/tazobactam IVPB.. 3.375 Gram(s) IV Intermittent every 6 hours  psyllium Powder 1 Packet(s) Oral daily  senna 2 Tablet(s) Oral at bedtime    MEDICATIONS  (PRN):  acetaminophen    Suspension .. 650 milliGRAM(s) Oral every 6 hours PRN Moderate Pain (4 - 6)      Allergies    No Known Allergies      Vital Signs Last 24 Hrs  T(C): 37.1 (2021 10:44), Max: 37.2 (2021 21:16)  T(F): 98.7 (2021 10:44), Max: 99 (2021 21:16)  HR: 89 (2021 10:44) (88 - 97)  BP: 105/61 (2021 10:44) (105/61 - 124/69)  BP(mean): --  RR: 20 (2021 10:44) (18 - 20)  SpO2: 94% (2021 10:44) (94% - 96%)          LABS:                        7.8    9.32  )-----------( 381      ( 2021 06:33 )             25.9     02-27    138  |  111<H>  |  8   ----------------------------<  90  4.0   |  18<L>  |  0.91    Ca    7.8<L>      2021 06:33  Phos  2.2       Mg     2.1         TPro  6.1  /  Alb  2.0<L>  /  TBili  0.4  /  DBili  x   /  AST  32  /  ALT  47<H>  /  AlkPhos  51  0227    PT/INR - ( 2021 08:46 )   PT: 18.0 sec;   INR: 1.53          PTT - ( 2021 08:46 )  PTT:30.9 sec  Urinalysis Basic - ( 2021 17:13 )    Color: Yellow / Appearance: Clear / S.025 / pH: x  Gluc: x / Ketone: NEGATIVE  / Bili: Negative / Urobili: 0.2 E.U./dL   Blood: x / Protein: NEGATIVE mg/dL / Nitrite: NEGATIVE   Leuk Esterase: NEGATIVE / RBC: < 5 /HPF / WBC 5-10 /HPF   Sq Epi: x / Non Sq Epi: 0-5 /HPF / Bacteria: Many /HPF        MICROBIOLOGY:    Culture - Urine (21 @ 19:11)    -  Ampicillin: S <=2 Predicts results to ampicillin/sulbactam, amoxacillin-clavulanate and  piperacillin-tazobactam.    -  Ciprofloxacin: S <=1    -  Levofloxacin: S 1    -  Nitrofurantoin: S <=32 Should not be used to treat pyelonephritis.    -  Tetra/Doxy: R >8    -  Vancomycin: S 2    Specimen Source: .Urine Clean Catch (Midstream)    Culture Results:   50,000 CFU/ml Enterococcus faecalis    Organism Identification: Enterococcus faecalis    Organism: Enterococcus faecalis    Method Type: [facility] [place]        RADIOLOGY & ADDITIONAL STUDIES:    CT Chest No Cont (21 @ 02:37) >    Comparison: CT abdomen/pelvis 2/10/2021, CT chest 2/3/2021.    Findings: Evaluation is limited secondary to noncontrast technique and motion artifact.    Tip of left chest port within the cavoatrial junction. Normal heart size. No pericardial effusion. No thoracic aortic aneurysm. No pathologically enlarged mediastinal lymph nodes and limited evaluation of the everett without intravenous contrast. No axillary lymphadenopathy.    Small to moderate right lateral pleural effusions, enlarged since prior study. There is pulmonary emphysema. Increased consolidation in the right upper lobe at the right lung apex. Increased groundglass and interstitial infiltrates throughoutboth lungs since the studies of 2021 and 2/10/2021. Thickening of the interlobular septae in both lungs.    Limited evaluation of solid abdominal visceral without intravenous contrast and due to respiratory motion. No focal hepatic abnormality. Gallbladder obscured by respiratory motion. Unremarkable spleen and pancreas.    No adrenal nodules. Kidneys are obscured by respiratory motion. No hydronephrosis or nephrolithiasis. Left renal cyst poorly delineated. Severe calcification of the abdominal aorta without aneurysm.    Limited evaluation of gastroesophageal sac due to artifact. Calcifications are again noted along the duodenum. No bowel obstruction or free air. No definite bowel wall thickening. No ascites or free air. Mild mesenteric edema.    Bladder distended despite the presence of Schumacher catheter. Small amount of gas within the bladder likely due to catheterization. There is again enlargement of the right ovary measuring 5 x 3 cm containing coarse calcifications. Left ovary anduterus are unremarkable.    Moderate chronic compression deformity of T9 vertebral body. Degenerative changes of the spine.      Impression: Limited study due to respiratory motion.  1.  Worsening bilateral pulmonary opacities. Differential diagnosisincludes pneumonia with possible superimposed congestive heart failure, pneumonitis, and lymphangitic carcinomatosis. Pulmonary emphysema.  2.  Small to moderate-sized pleural effusions, increased since prior study.  3.  Distended urinary bladder despite presence of Schumacher catheter.       INTERVAL HPI/OVERNIGHT EVENTS:    Patient known to me from prior admission  Events leading to this admission reviewed    86F c COPD, lung adenocarcinoma (s/p radiation-currently on chemo-gefitinib and tamoxifen outpatient with Dr. Jones), recent [facility] and with recent diagnosis of chlamydia PNA s/p x10 course of azithromycin presents today for fever and cough. Patient c/o x1 day of subjective fevers, dry cough, SOB and chills, no CP, no new LE edema. Said she never really felt better from discharge. Went to outpatient oncologist where she got IVF today and he sent her here due to fact patient was septic. Currently patient with no new acute complaints. denies n/v, rashes, diarrhea, no melena or hematochezia,. Has pain chemoport site but no drainage that she noted.      Placed on Azithromycin and Pip-Tazo since admission.    C/O abdominal cramping earlier (not now) and diarrhea.  RN states that patient had 3 formed BMs after laxatives given for constipation        MEDICATIONS  (STANDING):  enoxaparin Injectable 30 milliGRAM(s) SubCutaneous every 24 hours  folic acid 1 milliGRAM(s) Oral daily  pantoprazole  Injectable 40 milliGRAM(s) IV Push every 12 hours  piperacillin/tazobactam IVPB.. 3.375 Gram(s) IV Intermittent every 6 hours  psyllium Powder 1 Packet(s) Oral daily  senna 2 Tablet(s) Oral at bedtime    MEDICATIONS  (PRN):  acetaminophen    Suspension .. 650 milliGRAM(s) Oral every 6 hours PRN Moderate Pain (4 - 6)      Allergies    No Known Allergies      EXAM  Vital Signs Last 24 Hrs  T(C): 37.1 (2021 10:44), Max: 37.2 (2021 21:16)  T(F): 98.7 (2021 10:44), Max: 99 (2021 21:16)  HR: 89 (2021 10:44) (88 - 97)  BP: 105/61 (2021 10:44) (105/61 - 124/69)  BP(mean): --  RR: 20 (2021 10:44) (18 - 20)  SpO2: 94% (2021 10:44) (94% - 96%)  On NC O2  No distress  Calm and sleeping but awakens and responds to questions appropriately  Thin and malnourished appearing  No rash  No jaundice  No oral lesions  RR  Chest with decreased BSs at dependent lungs  Abd scaphoid, soft and NT  LEs no edema  Schumacher in place        LABS:                        7.8    9.32  )-----------( 381      ( 2021 06:33 )             25.9     02-    138  |  111<H>  |  8   ----------------------------<  90  4.0   |  18<L>  |  0.91    Ca    7.8<L>      2021 06:33  Phos  2.2       Mg     2.1         TPro  6.1  /  Alb  2.0<L>  /  TBili  0.4  /  DBili  x   /  AST  32  /  ALT  47<H>  /  AlkPhos  51      PT/INR - ( 2021 08:46 )   PT: 18.0 sec;   INR: 1.53          PTT - ( 2021 08:46 )  PTT:30.9 sec  Urinalysis Basic - ( 2021 17:13 )    Color: Yellow / Appearance: Clear / S.025 / pH: x  Gluc: x / Ketone: NEGATIVE  / Bili: Negative / Urobili: 0.2 E.U./dL   Blood: x / Protein: NEGATIVE mg/dL / Nitrite: NEGATIVE   Leuk Esterase: NEGATIVE / RBC: < 5 /HPF / WBC 5-10 /HPF   Sq Epi: x / Non Sq Epi: 0-5 /HPF / Bacteria: Many /HPF        MICROBIOLOGY:    Culture - Urine (21 @ 19:11)    -  Ampicillin: S <=2 Predicts results to ampicillin/sulbactam, amoxacillin-clavulanate and  piperacillin-tazobactam.    -  Ciprofloxacin: S <=1    -  Levofloxacin: S 1    -  Nitrofurantoin: S <=32 Should not be used to treat pyelonephritis.    -  Tetra/Doxy: R >8    -  Vancomycin: S 2    Specimen Source: .Urine Clean Catch (Midstream)    Culture Results:   50,000 CFU/ml Enterococcus faecalis    Organism Identification: Enterococcus faecalis    Organism: Enterococcus faecalis    Method Type: [facility]        RADIOLOGY & ADDITIONAL STUDIES:    CT Chest No Cont (02.26.21 @ 02:37) >    Comparison: CT abdomen/pelvis 2/10/2021, CT chest 2/3/2021.    Findings: Evaluation is limited secondary to noncontrast technique and motion artifact.    Tip of left chest port within the cavoatrial junction. Normal heart size. No pericardial effusion. No thoracic aortic aneurysm. No pathologically enlarged mediastinal lymph nodes and limited evaluation of the everett without intravenous contrast. No axillary lymphadenopathy.    Small to moderate right lateral pleural effusions, enlarged since prior study. There is pulmonary emphysema. Increased consolidation in the right upper lobe at the right lung apex. Increased groundglass and interstitial infiltrates throughoutboth lungs since the studies of 2021 and 2/10/2021. Thickening of the interlobular septae in both lungs.    Limited evaluation of solid abdominal visceral without intravenous contrast and due to respiratory motion. No focal hepatic abnormality. Gallbladder obscured by respiratory motion. Unremarkable spleen and pancreas.    No adrenal nodules. Kidneys are obscured by respiratory motion. No hydronephrosis or nephrolithiasis. Left renal cyst poorly delineated. Severe calcification of the abdominal aorta without aneurysm.    Limited evaluation of gastroesophageal sac due to artifact. Calcifications are again noted along the duodenum. No bowel obstruction or free air. No definite bowel wall thickening. No ascites or free air. Mild mesenteric edema.    Bladder distended despite the presence of Schumacher catheter. Small amount of gas within the bladder likely due to catheterization. There is again enlargement of the right ovary measuring 5 x 3 cm containing coarse calcifications. Left ovary anduterus are unremarkable.    Moderate chronic compression deformity of T9 vertebral body. Degenerative changes of the spine.      Impression: Limited study due to respiratory motion.  1.  Worsening bilateral pulmonary opacities. Differential diagnosisincludes pneumonia with possible superimposed congestive heart failure, pneumonitis, and lymphangitic carcinomatosis. Pulmonary emphysema.  2.  Small to moderate-sized pleural effusions, increased since prior study.  3.  Distended urinary bladder despite presence of Schumacher catheter.

## 2021-02-28 NOTE — PROGRESS NOTE ADULT - PROBLEM SELECTOR PLAN 1
2/4 SIRS criteria POA with Fever and tachycardia. Patient with weakly positive UA(redmond not replaced in ED), CXR with progression of b/l infiltrates, abdominal pain, and pain at chemoport site. No new rashes or diarrhea. Concerning given patient recently teated for atypical PNA. ddx for source includes; HAP vs. Atypical vs. chemo vs. tumor burden fever vs abdominal source vs infection of chemoport  - s/p vanc d/c'ed after MRSA swab negative  - s/p azithromycin after urine legionella negative  - RVP negative  - COVID negative  - c/w zosyn 3.375mg q8hr  - obtain sputum culture if cough becomes productive  - f/u blood cultures  - f/u urine cultures  - consider blood culture of chemoport site as extremely tender  - UCx only growing 50,000 CFU of enterococcus - unlikely UTI  - pt clinically improving and says abdominal pain is better with bowel movements, but if spikes another fever, would consider CTAP with IV contrast to r/o abcess

## 2021-02-28 NOTE — PROGRESS NOTE ADULT - SUBJECTIVE AND OBJECTIVE BOX
CC: Patient is a 86y old  Female who presents with a chief complaint of Sepsis (28 Feb 2021 13:12)      INTERVAL EVENTS: EZE    SUBJECTIVE / INTERVAL HPI: Patient seen and examined at bedside. Pt reports pain at chemoport site that has been worsening over the past couple of days and abdominal pain presents since admission. Otherwise says her breathing has improved.     ROS: negative unless otherwise stated above.    VITAL SIGNS:  Vital Signs Last 24 Hrs  T(C): 36.9 (28 Feb 2021 11:12), Max: 37.2 (27 Feb 2021 17:34)  T(F): 98.4 (28 Feb 2021 11:12), Max: 99 (27 Feb 2021 17:34)  HR: 104 (28 Feb 2021 11:12) (84 - 104)  BP: 111/64 (28 Feb 2021 11:12) (111/64 - 126/66)  BP(mean): --  RR: 22 (28 Feb 2021 11:12) (18 - 22)  SpO2: 94% (28 Feb 2021 11:12) (93% - 94%)      02-27-21 @ 07:01  -  02-28-21 @ 07:00  --------------------------------------------------------  IN: 0 mL / OUT: 900 mL / NET: -900 mL        PHYSICAL EXAM:    General: elderly female on 3L NC in NAD  HEENT: MMM  Neck: supple  Cardiovascular: +S1/S2; RRR  Respiratory: CTA B/L; no W/R/R  Gastrointestinal: soft, diffuse abdominal pain without rebound or guarding  Extremities: WWP; no edema, clubbing or cyanosis  Vascular: 2+ radial, DP/PT pulses B/L  Neurological: AAOx3; no focal deficits  Skin: L chemoport site tender but non-erythematous     MEDICATIONS:  MEDICATIONS  (STANDING):  enoxaparin Injectable 30 milliGRAM(s) SubCutaneous every 24 hours  folic acid 1 milliGRAM(s) Oral daily  pantoprazole  Injectable 40 milliGRAM(s) IV Push every 12 hours  piperacillin/tazobactam IVPB.. 3.375 Gram(s) IV Intermittent every 6 hours  psyllium Powder 1 Packet(s) Oral daily  senna 2 Tablet(s) Oral at bedtime    MEDICATIONS  (PRN):  acetaminophen   Tablet .. 650 milliGRAM(s) Oral every 6 hours PRN Mild Pain (1 - 3)      ALLERGIES:  Allergies    No Known Allergies    Intolerances        LABS:                        8.3    11.90 )-----------( 380      ( 28 Feb 2021 06:55 )             27.2     02-28    136  |  109<H>  |  8   ----------------------------<  99  4.0   |  18<L>  |  0.88    Ca    8.4      28 Feb 2021 06:55  Phos  2.0     02-28  Mg     2.0     02-28    TPro  6.1  /  Alb  2.0<L>  /  TBili  0.4  /  DBili  x   /  AST  32  /  ALT  47<H>  /  AlkPhos  51  02-27        CAPILLARY BLOOD GLUCOSE          RADIOLOGY & ADDITIONAL TESTS: Reviewed.

## 2021-02-28 NOTE — PROGRESS NOTE ADULT - SUBJECTIVE AND OBJECTIVE BOX
PUD FOR DR BUSH    CC/ HPI Patient is an 86 year old female with chronic obstructive pulmonary disease, lung cancer, poorly differentiated adenocarcinoma, status post radiation therapy, Dec 2020, discharged after hospital stay for severe anemia, upper GI bleed, RVP positive Chlamydia pneumonia, (2.06.21) status post Zithromax, cefepime, now admitted with progressive pulmonary infiltrates, sepsis. Today with severe abdominal pain.    All new data reviewed, including VS, lab, imaging, Rx and documentation. Note written at bedside.    PAST MEDICAL & SURGICAL HISTORY:  Chronic obstructive pulmonary disease, unspecified COPD type  Hearing loss  left ear - sees Dr. Garces ENT  Basal cell carcinoma  x2 - upper lip and arm  Osteoporosis  H/O breast biopsy, 5 years ago, mass was benign and self-resolved  History of appendectomy  History of cataract surgery    SOCHX:   tobacco,  -  alcohol      FAMILY HISTORY: FA/MO  - contributory     ROS reviewed below with positive findings marked (+) :  GEN:  fever, chills ENT: tracheostomy,   epistaxis,  sinusitis COR: CAD, CHF,  HTN, dysrhythmia PUL: +COPD, ILD, asthma, pneumonia GI: PEG, dysphagia, hemorrhage, other HALLIE: kidney disease, electrolyte disorder HEM:  anemia, thrombus, coagulopathy, cancer ENDO:  thyroid disease, diabetes mellitus CNS:  dementia, stroke, seizure, PSY:  depression, anxiety, other      MEDICATIONS  (STANDING):  enoxaparin Injectable 30 milliGRAM(s) SubCutaneous every 24 hours  folic acid 1 milliGRAM(s) Oral daily  pantoprazole  Injectable 40 milliGRAM(s) IV Push every 12 hours  piperacillin/tazobactam IVPB.. 3.375 Gram(s) IV Intermittent every 6 hours  psyllium Powder 1 Packet(s) Oral daily  senna 2 Tablet(s) Oral at bedtime    MEDICATIONS  (PRN):  acetaminophen    Suspension .. 650 milliGRAM(s) Oral every 6 hours PRN Moderate Pain (4 - 6)      Vital Signs Last 24 Hrs  T(C): 36.9 (26 Feb 2021 10:21), Max: 37.8 (26 Feb 2021 05:58)  T(F): 98.5 (26 Feb 2021 10:21), Max: 100 (26 Feb 2021 05:58)  HR: 82 (26 Feb 2021 10:21) (77 - 100)  BP: 109/55 (26 Feb 2021 10:21) (106/59 - 147/63)  RR: 20 (26 Feb 2021 10:21) (18 - 22)  SpO2: 94% (26 Feb 2021 10:21) (94% - 99%)    GENERAL:         uncomfortable with RLQ pain,  - distress.  HEENT:            - trauma,  - icterus,  - injection,  - nasal discharge.  NECK:              - jugular venous distention, - thyromegaly.  LYMPH:           - lymphadenopathy, - masses.  RESP:              + crackles,   - rhonchi,   - wheezes.   COR:                S1S2   - gallops,  - rubs.  ABD:                bowel sounds,   soft, - tender, - distended.  EXT/MSC:         - cyanosis,  - clubbing, - edema.    NEURO:           + alert and oriented                           6.9    7.87  )-----------( 291      ( 26 Feb 2021 10:31 )             22.7     02-26    136  |  110<H>  |  8   ----------------------------<  91  3.9   |  19<L>  |  0.88    CT Chest No Cont (02.26.21)  Tip of left chest port within the cavoatrial junction. Normal heart size. No pericardial effusion. No thoracic aortic aneurysm. No pathologically enlarged mediastinal lymph nodes and limited evaluation of the [place]rett without intravenous contrast. No axillary lymphadenopathy.  Small to moderate right lateral pleural effusions, enlarged since prior study. There is pulmonary emphysema. Increased consolidation in the right upper lobe at the right lung apex. Increased groundglass and interstitial infiltrates throughout both lungs since the studies of 2/5/2021 and 2/10/2021. Thickening of the interlobular septae in both lungs.  Limited evaluation of solid abdominal visceral without intravenous contrast and due to respiratory motion. No focal hepatic abnormality. Gallbladder obscured by respiratory motion. Unremarkable spleen and pancreas.  Impression: Limited study due to respiratory motion.  1.  Worsening bilateral pulmonary opacities. Differential diagnosis includes pneumonia with possible superimposed congestive heart failure, pneumonitis, and lymphangitic carcinomatosis. Pulmonary emphysema.  2.  Small to moderate-sized pleural effusions, increased since prior study.  3. Distended urinary bladder despite presence of Schumacher catheter.    CT Angio Chest PE Protocol w/ IV Cont (02.03.21)  Pulmonary arteries: No pulmonary embolus seen.  Lungs and large airways: Respiratory motion limits assessment of fine pulmonary parenchymal detail. Trace dependent secretions distal trachea. No nodular and no tracheal or endobronchial lesion.  Bilateral apical pleural parenchymal scarring. Unchanged mild peribronchial wall thickening and cylindrical bronchiectasis bilaterally. Scattered nodular groundglass opacities bilaterally, more confluent in right upper lobe posterior segment with apparent interlobular septal thickening. Mild compressive atelectasis right lower lobe. Few nodular consolidative opacities left lower lobe, subsegmental atelectasis versus mild consolidation. No measurable residual masses in right upper lobe anterior segment with linear opacity in this region probably representing scarring. New few scattered bilateral pulmonary nodules, for example:  *  Right upper lobe subpleural 0.5 cm (series 6 image 160).  *  Right upper lobe anterior segment 0.5 cm (series 6 image 132)  *  Left lower lobe 0.5 cm (series 6 image 204)  Pleura:  Resolved right pneumothorax. Decreased trace residual right pleural effusion.  Mediastinum and hilar regions: Significantly decreased mediastinal and hilar adenopathy, for example right lower paratracheal 1.1 x 0.8 cm, previously 4.4 x 2.9 cm. No significant change mildly prominent subcarinal node 1.2 cm short axis. Resolved right lower cervical adenopathy.  Heart and pericardium:  Heart size is normal. No pericardial effusion.  Vessels:  No aneurysm. Mild scattered calcific atherosclerosis.  Chest wall and lower neck:  Normal.  Upper abdomen: Unchanged few left renal cysts, largest 3.5 cm.  Bones: No suspicious lytic or blastic lesion. Mild degenerative changes thoracic spine with prominent degenerative Schmorl's node inferior endplate T9 vertebral body.    IMPRESSION:  1.  No pulmonary embolus.  2.  New few small scattered nodular opacities bilaterally with more confluent groundglass density and suspected interlobular septal thickening in posterior right upper lobe. Differential includes aspiration/infection versus lymphangitic carcinomatosis. Assessment limited by artifact from respiratory motion. Recommend clinical correlation and short-term follow-up would be helpful.  .  No measurable residual right upper lobe mass with scarring in this region.  4.  Significantly decreased thoracic adenopathy.  5.  Decreased trace residual right pleural effusion.  6.  Resolved right pneumothorax.    ASSESSMENT/PLAN    1)  Pneumonia/pneumonitis  2)  Chronic obstructive pulmonary disease  3)  Bronchiectasis  4)  Lung cancer  5)  Abdominal cramps and pain  6)  Eosinophilia noted    Oxygen as needed for a satisfactory SpO2  Bronchodilators:  Atrovent/ albuterol q 4 – 6 hours as needed, LAMA/LABA/steroid for emphysema  ID/Antibiotics:  piperacillin/tazobactam  Cardiac/HTN: echocardiogram  GI: Rx/ prophylaxis c PPI/H2B, ova and parasites for eosinophils and abdominal pain  Metoclopramide test dose, empirically  Simethicone  Avoid all opioids  Heme: Rx/VT prophylaxis   Will discuss with drake Browning, RN, PGY

## 2021-02-28 NOTE — PROGRESS NOTE ADULT - SUBJECTIVE AND OBJECTIVE BOX
coverage Dr Angel    says  she has normal formed BM but "my stool stinks"  Pt seen and examined     REVIEW OF SYSTEMS:  Constitutional: No fever,   Cardiovascular: No chest pain, palpitations, dizziness or leg swelling  Gastrointestinal: No abdominal or epigastric pain. No nausea, vomiting No diarrhea.  Skin: No itching, burning, rashes or lesions       MEDICATIONS:  MEDICATIONS  (STANDING):  enoxaparin Injectable 30 milliGRAM(s) SubCutaneous every 24 hours  folic acid 1 milliGRAM(s) Oral daily  pantoprazole  Injectable 40 milliGRAM(s) IV Push every 12 hours  piperacillin/tazobactam IVPB.. 3.375 Gram(s) IV Intermittent every 6 hours  psyllium Powder 1 Packet(s) Oral daily  senna 2 Tablet(s) Oral at bedtime    MEDICATIONS  (PRN):  acetaminophen   Tablet .. 650 milliGRAM(s) Oral every 6 hours PRN Mild Pain (1 - 3)      Allergies    No Known Allergies    Intolerances        Vital Signs Last 24 Hrs  T(C): 36.9 (28 Feb 2021 11:12), Max: 37.2 (27 Feb 2021 17:34)  T(F): 98.4 (28 Feb 2021 11:12), Max: 99 (27 Feb 2021 17:34)  HR: 104 (28 Feb 2021 11:12) (84 - 104)  BP: 111/64 (28 Feb 2021 11:12) (111/64 - 126/66)  BP(mean): --  RR: 22 (28 Feb 2021 11:12) (18 - 22)  SpO2: 94% (28 Feb 2021 11:12) (93% - 94%)    02-27 @ 07:01  -  02-28 @ 07:00  --------------------------------------------------------  IN: 0 mL / OUT: 900 mL / NET: -900 mL        PHYSICAL EXAM:    General: thin, elderly; in no acute distress  HEENT: MMM, conjunctiva and sclera clear  Lungs: clear  Heart: regular  Gastrointestinal: Soft non-tender non-distended; Normal bowel sounds;   Skin: Warm and dry. No obvious rash    LABS:      CBC Full  -  ( 28 Feb 2021 06:55 )  WBC Count : 11.90 K/uL  RBC Count : 3.05 M/uL  Hemoglobin : 8.3 g/dL  Hematocrit : 27.2 %  Platelet Count - Automated : 380 K/uL  Mean Cell Volume : 89.2 fl  Mean Cell Hemoglobin : 27.2 pg  Mean Cell Hemoglobin Concentration : 30.5 gm/dL  Auto Neutrophil # : 8.23 K/uL  Auto Lymphocyte # : 0.31 K/uL  Auto Monocyte # : 0.51 K/uL  Auto Eosinophil # : 2.84 K/uL  Auto Basophil # : 0.00 K/uL  Auto Neutrophil % : 69.2 %  Auto Lymphocyte % : 2.6 %  Auto Monocyte % : 4.3 %  Auto Eosinophil % : 23.9 %  Auto Basophil % : 0.0 %    02-28    136  |  109<H>  |  8   ----------------------------<  99  4.0   |  18<L>  |  0.88    Ca    8.4      28 Feb 2021 06:55  Phos  2.0     02-28  Mg     2.0     02-28    TPro  6.1  /  Alb  2.0<L>  /  TBili  0.4  /  DBili  x   /  AST  32  /  ALT  47<H>  /  AlkPhos  51  02-27                      RADIOLOGY & ADDITIONAL STUDIES (The following images were personally reviewed):

## 2021-03-01 NOTE — CONSULT NOTE ADULT - ASSESSMENT
Assessment:   86F DNR/DNI with PMH of COPD, lung adenocarcinoma (diagnosed Nov 2020, s/p radiation, currently on chemo-gefitinib and tamoxifen with Dr. Jones), recent admission (Feb 3-17) for UGIB and anemia (s/p 1U pRBCS, never scoped) also found to have chlamydia PNA (s/p treatment with azithro x10 days), who re-presented on 02/25 with fever and cough. Now with increased O2 requirements, so ICU consulted for acute hypoxic respiratory failure.       Recommendations:     #Acute hypoxic respiratory failure:   Consider 2/2 to HAP vs. PE vs. PCP PNA vs. fluid overload.   - CT chest on 02/26 showed worsening bilateral pulmonary opacities and pulmonary emphysema (read as: differential diagnosis includes pneumonia with possible superimposed congestive heart failure, pneumonitis, and lymphangitic carcinomatosis).      Assessment:   86F DNR/DNI with PMH of COPD, lung adenocarcinoma (diagnosed Nov 2020, s/p radiation, currently on chemo-gefitinib and tamoxifen with Dr. Jones), recent admission (Feb 3-17) for UGIB and anemia (s/p 1U pRBCS, never scoped) also found to have chlamydia PNA (s/p treatment with azithro x10 days), who re-presented on 02/25 with fever and cough. Now with increased O2 requirements, so ICU consulted for acute hypoxic respiratory failure.     Recommendations:   #Acute hypoxic respiratory failure:   Consider 2/2 to fluid overload (most likely) vs. HAP vs. PE vs. PCP PNA (less likely).   - CT chest on 02/26 showed worsening bilateral pulmonary opacities and pulmonary emphysema (read as: differential diagnosis includes pneumonia with possible superimposed congestive heart failure, pneumonitis, and lymphangitic carcinomatosis).   - chest x-ray showing worsening congestion on right-side   - continue with HFNC as needed (currently saturating >96% on 40 L/min and 40% FiO2   - obtain CTPE given Cr fine and tachycardic/hypoxic   - management of fluid overload as stated below  - management of HAP as stated below   - f/u repeat COVID swab     #Fluid overload:   Received possibly 4L of IV fluids at out-patient clinic prior to admission  - chest x-ray showing worsening congestion on right-side   - given Lasix 20 mg IV x1 and produced significant UOP in [place]   - give an additional Lasix 20 mg IV x1 in PM tonight   - strict Is and Os and daily weights     #HAP:   Previously treated for Chlamydia PNA diagnosed by RVP. Completed 10-day course of azithromycin.   - MRSA swab negative, so no need for vanc at this time  - continue with Zosyn 3.375 IV q6 for a total of 7 days   - obtain sputum culture if able to     #Rule-out PE:   Tachycardic intermittently throughout the day. Tachypneic and uncomfortable on 15L NRB. Patient at increased risk of PE with active malignancy.   - obtain CTPE (patient can wear BiPAP for duration of study)     #Rule-out PCP/fungal PNA:   Unlikely PCP PNA but currently receiving chemo and immunocompromised.   - f/u fungitell    - f/u galactomannan     #Symptomatic anemia:   Hb today 6.8. Patient counseled extensively on the need for 1U pRBCS but declined. Recently admitted (Feb 3-17) for UGIB and anemia (s/p 1U pRBCS, never scoped).  - patient denies any melena, hematochezia, or hematemesis   - continue to trend CBC   - maintain active type & screen  - transfuse if Hb <7    #Nutrition and prophylaxis:   F: none   E: keep K>4, Mg >2  N: regular diet with Ensure   DVT ppx: Lovenox q24   GI ppx: Pantoprazole BID   Code status: DNR/DNI                      Assessment:   86F DNR/DNI with PMH of COPD, lung adenocarcinoma (diagnosed Nov 2020, s/p radiation, currently on chemo-gefitinib and tamoxifen with Dr. Jones), recent admission (Feb 3-17) for UGIB and anemia (s/p 1U pRBCS, never scoped) also found to have chlamydia PNA (s/p treatment with azithro x10 days), who re-presented on 02/25 with fever and cough. Now with increased O2 requirements, so ICU consulted for acute hypoxic respiratory failure.     Recommendations:   #Acute hypoxic respiratory failure:   Consider 2/2 to fluid overload (most likely) vs. HAP vs. PE vs. PCP PNA (less likely).   - CT chest on 02/26 showed worsening bilateral pulmonary opacities and pulmonary emphysema (read as: differential diagnosis includes pneumonia with possible superimposed congestive heart failure, pneumonitis, and lymphangitic carcinomatosis).   - chest x-ray showing worsening congestion on right-side   - continue with HFNC as needed (currently saturating >96% on 40 L/min and 40% FiO2   - obtain CTPE given Cr fine and tachycardic/hypoxic   - management of fluid overload as stated below  - management of HAP as stated below   - f/u repeat COVID swab   - patient is currently stable to remain on regional medical floor     #Fluid overload:   Received possibly 4L of IV fluids at out-patient clinic prior to admission  - chest x-ray showing worsening congestion on right-side   - given Lasix 20 mg IV x1 and produced significant UOP in [place]   - give an additional Lasix 20 mg IV x1 in PM tonight   - strict Is and Os and daily weights     #HAP:   Previously treated for Chlamydia PNA diagnosed by RVP. Completed 10-day course of azithromycin.   - MRSA swab negative, so no need for vanc at this time  - continue with Zosyn 3.375 IV q6 for a total of 7 days   - obtain sputum culture if able to     #Rule-out PE:   Tachycardic intermittently throughout the day. Tachypneic and uncomfortable on 15L NRB. Patient at increased risk of PE with active malignancy.   - obtain CTPE (patient can wear BiPAP for duration of study)     #Rule-out PCP/fungal PNA:   Unlikely PCP PNA but currently receiving chemo and immunocompromised.   - f/u fungitell    - f/u galactomannan     #Symptomatic anemia:   Hb today 6.8. Patient counseled extensively on the need for 1U pRBCS but declined. Recently admitted (Feb 3-17) for UGIB and anemia (s/p 1U pRBCS, never scoped).  - patient denies any melena, hematochezia, or hematemesis   - continue to trend CBC   - maintain active type & screen  - transfuse if Hb <7    #Nutrition and prophylaxis:   F: none   E: keep K>4, Mg >2  N: regular diet with Ensure   DVT ppx: Lovenox q24   Dispo: [address] [place] [address] ([facility] medical floor)   GI ppx: Pantoprazole BID   Code status: DNR/DNI                      Assessment:   86F DNR/DNI with PMH of COPD, lung adenocarcinoma (diagnosed Nov 2020, s/p radiation, currently on chemo-gefitinib and tamoxifen with Dr. Jones), recent admission (Feb 3-17) for UGIB and anemia (s/p 1U pRBCS, never scoped) also found to have chlamydia PNA (s/p treatment with azithro x10 days), who re-presented on 02/25 with fever and cough. Now with increased O2 requirements, so ICU consulted for acute hypoxic respiratory failure.     Recommendations:   #Acute hypoxic respiratory failure:   Consider 2/2 to fluid overload (most likely) vs. HAP vs. PE vs. PCP PNA (less likely).   - CT chest on 02/26 showed worsening bilateral pulmonary opacities and pulmonary emphysema (read as: differential diagnosis includes pneumonia with possible superimposed congestive heart failure, pneumonitis, and lymphangitic carcinomatosis).   - chest x-ray showing worsening congestion on right-side   - continue with HFNC as needed (currently saturating >96% on 40 L/min and 40% FiO2   - obtain CTPE given Cr fine and tachycardic/hypoxic   - management of fluid overload as stated below  - management of HAP as stated below   - f/u repeat COVID swab   - patient is currently stable to remain on regional medical floor     #Fluid overload:   Received possibly 4L of IV fluids at out-patient clinic prior to admission  - chest x-ray showing worsening congestion on right-side   - given Lasix 20 mg IV x1 and produced significant UOP in [place]   - give an additional Lasix 20 mg IV x1 in PM tonight   - strict Is and Os and daily weights     #HAP:   Previously treated for Chlamydia PNA diagnosed by RVP. Completed 10-day course of azithromycin.   - MRSA swab negative, so no need for vanc at this time  - continue with Zosyn 3.375 IV q6 for a total of 7 days   - obtain sputum culture if able to     #Rule-out PE:   Tachycardic intermittently throughout the day. Tachypneic and uncomfortable on 15L NRB. Patient at increased risk of PE with active malignancy.   - obtain CTPE (patient can wear BiPAP for duration of study)     #Rule-out PCP/fungal PNA:   Unlikely PCP PNA but currently receiving chemo and immunocompromised.   - f/u fungitell    - f/u galactomannan     #Symptomatic anemia:   Hb today 6.8. Patient counseled extensively on the need for 1U pRBCS but declined. Recently admitted (Feb 3-17) for UGIB and anemia (s/p 1U pRBCS, never scoped).  - patient denies any melena, hematochezia, or hematemesis   - Hb today 6.8 but patient refused any blood products this AM, will continue to revisit   - continue to trend CBC   - maintain active type & screen  - transfuse if Hb <7    #Nutrition and prophylaxis:   F: none   E: keep K>4, Mg >2  N: regular diet with Ensure   DVT ppx: Lovenox q24   Dispo: [address] ([facility] medical floor)   GI ppx: Pantoprazole BID   Code status: DNR/DNI

## 2021-03-01 NOTE — CONSULT NOTE ADULT - SUBJECTIVE AND OBJECTIVE BOX
HPI:  COPD, lung adenocarcinoma (diagnosed Nov 2020), feb 3-17 (UGIB and anemia, s/p 1U pRBCS, never scoped), chlamydia PNA (s/p treatment with azithro), given IV fluids at Dr. Jones's office (?4L), sent in to ED for sepsis. Has a chemoport in. Febrile to 102.8, tachycardic (130s-140s), on 3L NC when she came in. Treated with vanc and Zosyn, dced because MRSA swab negative, now off Zosyn as concern for it contributing to fluid overload. On 3L NC over the weekend, overnight desatted to 87% on 3L (tachycardic, hypotensive), then again de-satted when using the bathroom. Started on HFNC today, still febrile to 100.8. Took blood cultures from the port, blood cultures NGTD. Repeat X-ray today shoiwng pulmonary congestion, POCUS b/l b-lines. Given 20 mg IV Lasix x1. New concern for PCP PNA. CT chest today pending. DNR/DNI.    HPI:  86F DNR/DNI with PMH of COPD, lung adenocarcinoma (diagnosed Nov 2020, s/p radiation, currently on chemo-gefitinib and tamoxifen with Dr. Jones), recent admission (Feb 3-17) for UGIB and anemia (s/p 1U pRBCS, never scoped) also found to have chlamydia PNA (s/p treatment with azithro x 10 days), who re-presented on 02/25 with fever and cough. In ED, patient complained of subjective fevers, dry cough, SOB, and chills x 1 day. Denied CP or LE edema but stated that she never really improved after discharge. Went to Dr. Jones's a day before admission and received ?4L of IV fluids due to concern for sepsis and was advised to come to ED. Has a chemoport in place and this admission was febrile to 102.8, tachycardic (130s-140s), on 3L NC when she came in, saturating >92%. Treated for HAP with vanc and Zosyn, vanc now discontinued after negative MRSA swab and temporarily taken off Zosyn today due to concern for fluid overload. Over the weekend, on 3L NC but overnight de-satted to 87% on 3L, becoming tachycardic and hypotensive. Sats improved but again de-satted when using the bathroom today. Still febrile to 100.8 and with increased O2 requirements, so transitioned from 6L NC (saturating 87%) to HFNC (saturating >91%). Repeat chest x-ay showing pulmonary congestion, POCUS showing b-lines b/l, so given 20 mg IV Lasix x1. Blood cultures sent from chemoport, and patient unable to produce sputum. ICU consulted for worsening acute hypoxic respiratory failure and repeat COVID swab sent.     Subjective:   Patient seen and examined at bedside. States that she is tired of being asked questions and feels cold. Feels her breathing is worse than yesterday but does not have any other acute complaints at this time. Denies nausea, vomiting, diarrhea, constipation, night sweats, cough, wheezing, chest pain, dysuria, or increased urinary frequency. Says she has some pain in her right lower quadrant but no discomfort anywhere else. Says her chemoport is not painful at this time and she would prefer to be left alone.       Allergies  No Known Allergies  Intolerances    Home Medications:  megestrol 40 mg oral tablet: 1 tab(s) orally 4 times a day (03 Feb 2021 16:36)  omeprazole 40 mg oral delayed release capsule: 1 cap(s) orally once a day (03 Feb 2021 16:36)    MEDICATIONS:  MEDICATIONS  (STANDING):  albuterol/ipratropium for Nebulization 3 milliLiter(s) Nebulizer every 6 hours  budesonide  80 MICROgram(s)/formoterol 4.5 MICROgram(s) Inhaler 2 Puff(s) Inhalation two times a day  enoxaparin Injectable 30 milliGRAM(s) SubCutaneous every 24 hours  folic acid 1 milliGRAM(s) Oral daily  pantoprazole  Injectable 40 milliGRAM(s) IV Push every 12 hours  piperacillin/tazobactam IVPB. 3.375 Gram(s) IV Intermittent once  piperacillin/tazobactam IVPB.. 3.375 Gram(s) IV Intermittent every 6 hours  psyllium Powder 1 Packet(s) Oral daily  senna 2 Tablet(s) Oral at bedtime    MEDICATIONS  (PRN):  acetaminophen   Tablet .. 650 milliGRAM(s) Oral every 6 hours PRN Mild Pain (1 - 3)    PAST MEDICAL & SURGICAL HISTORY:  Chronic obstructive pulmonary disease, unspecified COPD type    Hearing loss  left ear - sees Dr. Garces ENT    Basal cell carcinoma  x2 - upper lip and arm    Chronic obstructive pulmonary disease    Osteoporosis  Osteoporosis    H/O breast biopsy  &gt; 5 years ago, mass was benign and self-resolved    History of appendectomy    History of cataract surgery      FAMILY HISTORY:    SOCIAL HISTORY:  [place]: [ ] Current, [ ] Former, [ ] Never; Pack Years:  Alcohol:  Illicit Drugs:    REVIEW OF SYSTEMS:  All other 10 review of systems is negative unless indicated above.    Vital Signs Last 24 Hrs  T(C): 36.7 (01 Mar 2021 13:29), Max: 38.2 (01 Mar 2021 08:54)  T(F): 98 (01 Mar 2021 13:29), Max: 100.8 (01 Mar 2021 08:54)  HR: 103 (01 Mar 2021 15:13) (100 - 135)  BP: 147/80 (01 Mar 2021 13:29) (90/62 - 150/73)  BP(mean): --  RR: 22 (01 Mar 2021 15:13) (18 - 39)  SpO2: 96% (01 Mar 2021 15:13) (87% - 96%)    02-28 @ 07:01  -  03-01 @ 07:00  --------------------------------------------------------  IN: 200 mL / OUT: 400 mL / NET: -200 mL      PHYSICAL EXAM:  General: thin, frail, elderly female, in mild distress, tachypneic   Eyes: Anicteric sclerae, moist conjunctivae  HENT: dry mucous membranes  Neck: Trachea midline, supple  Lungs: Coarse breath sounds, crackles (R>L), mild wheezing, no rhonchi, rales, increased respiratory effort, tachypneic, with intercostal retractions  Cardiovascular: RRR, +S1,+S2, no murmurs, gallops, rubs   Abdomen: Soft, mild tenderness in RLQ, non-distended; Normal bowel sounds; No rebound or guarding  Extremities: Normal range of motion, No clubbing, cyanosis or edema  Neurological: Alert and oriented x3, no focal deficits, moves all extremities spontaneously, sensation intact   Skin: Warm and dry. No obvious rash      LABS:                      6.8    8.81  )-----------( 374      ( 01 Mar 2021 06:12 )             22.5     03-01    138  |  110<H>  |  10  ----------------------------<  100<H>  3.5   |  16<L>  |  0.88    Ca    7.8<L>      01 Mar 2021 06:12  Phos  3.2     03-01  Mg     1.8     03-01    TPro  5.5<L>  /  Alb  1.9<L>  /  TBili  0.3  /  DBili  x   /  AST  18  /  ALT  23  /  AlkPhos  56  03-01      Culture Results:   Testing in progress (03-01 @ 04:10)        Culture - Blood (collected 28 Feb 2021 13:10)  Source: .Blood Blood-Catheter  Preliminary Report (01 Mar 2021 14:01):    No growth at 1 day.      RADIOLOGY & ADDITIONAL STUDIES:     Reviewed    < from: Xray Chest 1 View-PORTABLE IMMEDIATE (Xray Chest 1 View-PORTABLE IMMEDIATE .) (03.01.21 @ 15:42) >    INTERPRETATION:  Clinical history/reason for exam: Hypoxia.    Frontal chest.    COMPARISON: March 1, 2021 time 9:44 AM.    Findings/  impression: Stable positioning left Port-A-Cath. Heart size within normal limits, thoracic aortic calcification. Bilateral opacities/pleural effusions, stable. Stable bony structures.    < end of copied text >         HPI:  86F DNR/DNI with PMH of COPD, lung adenocarcinoma (diagnosed Nov 2020, s/p radiation, currently on chemo-gefitinib and tamoxifen with Dr. Jones), recent admission (Feb 3-17) for UGIB and anemia (s/p 1U pRBCS, never scoped) also found to have chlamydia PNA (s/p treatment with azithro x 10 days), who re-presented on 02/25 with fever and cough. In ED, complained of subjective fevers, dry cough, SOB, and chills x 1 day. Denied CP or LE edema but stated that she never really improved after discharge. Went to Dr. Jones's a day before admission and received ?4L of IV fluids due to concern for sepsis and was advised to come to ED. Has a chemoport in place and this admission was febrile to 102.8, tachycardic (130s-140s), on 3L NC when she came in, saturating >92%. Treated for HAP with vanc and Zosyn, vanc now discontinued after negative MRSA swab and temporarily taken off Zosyn today due to concern for fluid overload. Over the weekend, on 3L NC but overnight de-satted to 87% on 3L, becoming tachycardic and hypotensive. Sats improved but again de-satted when using the bathroom today. Still febrile to 100.8 and with increased O2 requirements, so transitioned from 6L NC (saturating 87%) to HFNC (saturating >91%). Repeat chest x-ay showing pulmonary congestion, POCUS showing b-lines b/l, so given 20 mg IV Lasix x1. Blood cultures sent from chemoport, and patient unable to produce sputum. ICU consulted for worsening acute hypoxic respiratory failure.     Subjective:   Patient seen and examined at bedside. States that she is tired of being asked questions and feels cold. Feels her breathing is worse than yesterday but does not have any other acute complaints at this time. Denies nausea, vomiting, diarrhea, constipation, night sweats, cough, wheezing, chest pain, dysuria, or increased urinary frequency. Says she has some pain in her right lower quadrant but no discomfort anywhere else. Says her chemoport is not painful at this time and she would prefer to be left alone.       Allergies  No Known Allergies  Intolerances    Home Medications:  megestrol 40 mg oral tablet: 1 tab(s) orally 4 times a day (03 Feb 2021 16:36)  omeprazole 40 mg oral delayed release capsule: 1 cap(s) orally once a day (03 Feb 2021 16:36)    MEDICATIONS:  MEDICATIONS  (STANDING):  albuterol/ipratropium for Nebulization 3 milliLiter(s) Nebulizer every 6 hours  budesonide  80 MICROgram(s)/formoterol 4.5 MICROgram(s) Inhaler 2 Puff(s) Inhalation two times a day  enoxaparin Injectable 30 milliGRAM(s) SubCutaneous every 24 hours  folic acid 1 milliGRAM(s) Oral daily  pantoprazole  Injectable 40 milliGRAM(s) IV Push every 12 hours  piperacillin/tazobactam IVPB. 3.375 Gram(s) IV Intermittent once  piperacillin/tazobactam IVPB.. 3.375 Gram(s) IV Intermittent every 6 hours  psyllium Powder 1 Packet(s) Oral daily  senna 2 Tablet(s) Oral at bedtime    MEDICATIONS  (PRN):  acetaminophen   Tablet .. 650 milliGRAM(s) Oral every 6 hours PRN Mild Pain (1 - 3)    PAST MEDICAL & SURGICAL HISTORY:  Chronic obstructive pulmonary disease, unspecified COPD type    Hearing loss  left ear - sees Dr. Garces ENT    Basal cell carcinoma  x2 - upper lip and arm    Chronic obstructive pulmonary disease    Osteoporosis  Osteoporosis    H/O breast biopsy  &gt; 5 years ago, mass was benign and self-resolved    History of appendectomy    History of cataract surgery      FAMILY HISTORY:    SOCIAL HISTORY:  [place]: [ ] Current, [ ] Former, [ ] Never; Pack Years:  Alcohol:  Illicit Drugs:    REVIEW OF SYSTEMS:  All other 10 review of systems is negative unless indicated above.    Vital Signs Last 24 Hrs  T(C): 36.7 (01 Mar 2021 13:29), Max: 38.2 (01 Mar 2021 08:54)  T(F): 98 (01 Mar 2021 13:29), Max: 100.8 (01 Mar 2021 08:54)  HR: 103 (01 Mar 2021 15:13) (100 - 135)  BP: 147/80 (01 Mar 2021 13:29) (90/62 - 150/73)  BP(mean): --  RR: 22 (01 Mar 2021 15:13) (18 - 39)  SpO2: 96% (01 Mar 2021 15:13) (87% - 96%)    02-28 @ 07:01  -  03-01 @ 07:00  --------------------------------------------------------  IN: 200 mL / OUT: 400 mL / NET: -200 mL      PHYSICAL EXAM:  General: thin, frail, elderly female, in mild distress, tachypneic   Eyes: Anicteric sclerae, moist conjunctivae  HENT: dry mucous membranes  Neck: Trachea midline, supple  Lungs: Coarse breath sounds, crackles (R>L), mild wheezing, no rhonchi, rales, increased respiratory effort, tachypneic, with intercostal retractions  Cardiovascular: RRR, +S1,+S2, no murmurs, gallops, rubs   Abdomen: Soft, mild tenderness in RLQ, non-distended; Normal bowel sounds; No rebound or guarding  Extremities: Normal range of motion, No clubbing, cyanosis or edema  Neurological: Alert and oriented x3, no focal deficits, moves all extremities spontaneously, sensation intact   Skin: Warm and dry. No obvious rash      LABS:                      6.8    8.81  )-----------( 374      ( 01 Mar 2021 06:12 )             22.5     03-01    138  |  110<H>  |  10  ----------------------------<  100<H>  3.5   |  16<L>  |  0.88    Ca    7.8<L>      01 Mar 2021 06:12  Phos  3.2     03-01  Mg     1.8     03-01    TPro  5.5<L>  /  Alb  1.9<L>  /  TBili  0.3  /  DBili  x   /  AST  18  /  ALT  23  /  AlkPhos  56  03-01      Culture Results:   Testing in progress (03-01 @ 04:10)        Culture - Blood (collected 28 Feb 2021 13:10)  Source: .Blood Blood-Catheter  Preliminary Report (01 Mar 2021 14:01):    No growth at 1 day.      RADIOLOGY & ADDITIONAL STUDIES:     Reviewed    < from: Xray Chest 1 View-PORTABLE IMMEDIATE (Xray Chest 1 View-PORTABLE IMMEDIATE .) (03.01.21 @ 15:42) >    INTERPRETATION:  Clinical history/reason for exam: Hypoxia.    Frontal chest.    COMPARISON: March 1, 2021 time 9:44 AM.    Findings/  impression: Stable positioning left Port-A-Cath. Heart size within normal limits, thoracic aortic calcification. Bilateral opacities/pleural effusions, stable. Stable bony structures.    < end of copied text >

## 2021-03-01 NOTE — PROGRESS NOTE ADULT - SUBJECTIVE AND OBJECTIVE BOX
the patient feels much better on the antibiotics.  She still feels very cold and is reluctant to get out of bed because she feels cold.  Her bowels she states are moving normally but her major complaint is that of a thrombosed  And painful hemorrhoid.    Physical Exam  Appears malnourished.  Skin: No rash noted. No lesions. Poor turgor.  Eyes: Eyes with PERRLA. EOM's in­tact Sclera clear,no jaundice noted. Conjunctiva clear.   Ears, Nose,Mouth & Throat: Teeth normal, no missing teeth or dentures present. No ulcers. No thrush Auditory canals normal.  Description: %% Oropharynx without lesions. Mucositis None.    The patient has dry pasty mucous membranes.  Neck: No thyromegaly. No lymphadenopathy noted.   Cardiovascular: Normal sinus rhythm S1, S2 without murmurs. No Extrasystole.   Breast: Normal. No breast masses appreciated. No axillary lymphadenopathy. No skin changes. No nipple discharge present. No dimpling  noted.  Chest/Respiratory: No dyspnea. No rhonchi. No wheezing. No decreased breath sounds at bases. No rales. Unlabored breathing. No  accessory muscle use.   Gastrointestinal: Abdomen is soft and non­tender to touch. No hepatomegaly noted. No splenomegaly noted. No abdominal pain or  guarding noted. No abdominal mass(es) appreciated. No ascites noted.  There is soft stool in the rectal vault with no evidence of fecal  impaction.  Genito­Urinary: Deferred.  Hematologic/Lymphatic: No petechiae noted. No purpura noted. No lymphadenopathy noted.   Musculoskeletal: No Kyphosis. No weakness. No spinal tenderness on percussion. No pain on hip rotation reported by patient. Normal  range of motion in upper and lower extremities.   Neurologic: Affect normal. No tremors observed. Biceps, brachioradialis, patellar and plantar reflexes symmetrical. Normal  proprioception/position sense Cranial nerves intact.   Psychiatry: Oriented to person/place/time. No anxiety observed during exam. No signs of depression noted during exam. Intact short­term  and long­term memory.   Extremities: No clubbing noted to fingers or toes. No cyanosis noted. Capillary refill less than two seconds. No edema. No calf  tenderness reported by patient.     the patient has a very tender and thrombosed and erythematous hemorrhoid at the anus which is external.

## 2021-03-01 NOTE — PROGRESS NOTE ADULT - ASSESSMENT
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  It appears ineffective and presents a significant salt and fluid load  Start IV Bactrim 200 mg q 12 hours   Steroids  Induce sputum for PCP  If stabilized and able to tolerate the study, Gallium scan of the lungs  Fungitell is pending  Try diuresis  NP swab for full RVP  Check strongyloides serology    If typical bacterial coverage considered by Critical Care, would not continue Pip-Tazo but rather try Meropenem plus a dose of IV Vancomycin. Patient was already treated with Cefepime.

## 2021-03-01 NOTE — PROGRESS NOTE ADULT - NSHPATTENDINGPLANDISCUSS_GEN_ALL_CORE
Dr Artis and medical team Dr Artis and medical house staff (Intern and Resident) Dr Artis and medical house staff (Intern and Resident). Discussed with Dr Jones who agrees with the plan

## 2021-03-01 NOTE — PROGRESS NOTE ADULT - PROBLEM SELECTOR PLAN 1
2/4 SIRS criteria POA with Fever and tachycardia. Patient with weakly positive UA(redmond not replaced in ED), CXR with progression of b/l infiltrates, abdominal pain, and pain at chemoport site. No new rashes or diarrhea. Concerning given patient recently teated for atypical PNA. ddx for source includes; HAP vs. Atypical vs. chemo vs. tumor burden fever vs abdominal source vs infection of chemoport  - s/p vanc d/c'ed after MRSA swab negative  - s/p azithromycin after urine legionella negative  - RVP negative  - COVID negative  - UCx only growing 50,000 CFU of enterococcus - unlikely UTI  - pt clinically improving, but now having worsening dyspnea and hypoxia while having BMs that self-resolve  -abdominal pain is better with bowel movements  - c/w zosyn 3.375mg q8hr  - obtain sputum culture if cough becomes productive  - f/u blood cultures  - f/u cultures from chemoport  - f/u urine cultures  - f/u stool ova and parasites and strongyloides antibody in the setting of abdominal pain with eosinophilia  -if spikes another fever, would consider CTAP with IV contrast to r/o abscess  -f/u CT chest 2/4 SIRS criteria POA with Fever and tachycardia. Patient with weakly positive UA(redmond not replaced in ED), CXR with progression of b/l infiltrates, abdominal pain, and pain at chemoport site. No new rashes or diarrhea. Concerning given patient recently teated for atypical PNA. ddx for source includes; HAP vs. Atypical vs. chemo vs. tumor burden fever vs abdominal source vs infection of chemoport  - s/p vanc d/c'ed after MRSA swab negative  - s/p azithromycin after urine legionella negative  - RVP negative  - COVID negative  - UCx only growing 50,000 CFU of enterococcus - unlikely UTI  - pt clinically improving, but now having worsening dyspnea and hypoxia while having BMs that self-resolve with worsening opacifications in CXR  -abdominal pain is better with bowel movements  - c/w zosyn 3.375mg q8hr  - obtain sputum culture if cough becomes productive  - f/u blood cultures  - f/u cultures from chemoport  - f/u urine cultures  - f/u stool ova and parasites and strongyloides antibody in the setting of abdominal pain with eosinophilia  -if spikes another fever, would consider CTAP with IV contrast to r/o abscess  -f/u CT chest

## 2021-03-01 NOTE — CONSULT NOTE ADULT - SUBJECTIVE AND OBJECTIVE BOX
Attending:  MD Marek    HPI:  86F c COPD, lung adenocarcinoma (s/p radiation-currently on chemo-gefitinib and tamoxifen outpatient with Dr. Jones), recent [place] [facility] and with recent diagnosis of chlamydia PNA s/p x10 course of azithromycin presents today for fever and cough. Patient c/o x1 day of subjective fevers, dry cough, SOB and chills, no CP, no new LE edema. Said she never really felt better from discharge. Went to outpatient oncologist where she got IVF today and he sent her here due to fact patient was septic. Currently patient with no new acute complaints. denies n/v, rashes, diarrhea, no melena or hematochezia,. Has pain chemoport site but no drainage that she noted.    Surgery Addendum:    Above H&P noted, surgery team was consulted since the patient is complaining of painful defecation that has been going on for weeks, to r/o anal fissures or thrombosed hemorrhoids.  The patient denies any blood in stools or mucous. The pain is dull aching and more when is passing the BM. Didn't mention any history of chronic constipation.    PAST MEDICAL & SURGICAL HISTORY:  Chronic obstructive pulmonary disease, unspecified COPD type    Hearing loss  left ear - sees Dr. Garces ENT    Basal cell carcinoma  x2 - upper lip and arm    Chronic obstructive pulmonary disease    Osteoporosis  Osteoporosis    H/O breast biopsy  &gt; 5 years ago, mass was benign and self-resolved    History of appendectomy    History of cataract surgery        MEDICATIONS  (STANDING):  albuterol/ipratropium for Nebulization 3 milliLiter(s) Nebulizer every 6 hours  budesonide  80 MICROgram(s)/formoterol 4.5 MICROgram(s) Inhaler 2 Puff(s) Inhalation two times a day  enoxaparin Injectable 30 milliGRAM(s) SubCutaneous every 24 hours  folic acid 1 milliGRAM(s) Oral daily  pantoprazole  Injectable 40 milliGRAM(s) IV Push every 12 hours  piperacillin/tazobactam IVPB.. 3.375 Gram(s) IV Intermittent every 6 hours  psyllium Powder 1 Packet(s) Oral daily  senna 2 Tablet(s) Oral at bedtime    MEDICATIONS  (PRN):  acetaminophen   Tablet .. 650 milliGRAM(s) Oral every 6 hours PRN Mild Pain (1 - 3)      Vital Signs Last 24 Hrs  T(C): 36.6 (01 Mar 2021 05:54), Max: 37.3 (28 Feb 2021 23:46)  T(F): 97.9 (01 Mar 2021 05:54), Max: 99.2 (28 Feb 2021 23:46)  HR: 114 (01 Mar 2021 05:54) (89 - 116)  BP: 113/61 (01 Mar 2021 05:54) (107/63 - 144/75)  BP(mean): --  RR: 20 (01 Mar 2021 05:54) (18 - 22)  SpO2: 94% (01 Mar 2021 05:54) (89% - 95%)    I&O's Summary    28 Feb 2021 07:01  -  01 Mar 2021 07:00  --------------------------------------------------------  IN: 200 mL / OUT: 400 mL / NET: -200 mL        Physical Exam:  General: NAD, resting comfortably Cachectic  HEENT: NC/AT, EOMI, normal hearing, no oral lesions, no LAD, neck supple  Pulmonary: slightly tachypneic with increased respiratory effort, CTA-B, SQ Port a Cath is seen at the left side of upper chest wall.  Cardiovascular: NSR, no murmurs  Abdominal: soft, ND/NT, no organomegaly  Rectal exam: external skin tags, no external thrombosed hemorrhoids or anal fissure was seen, DUSTIN no hemorrhoids could be appreciated,   Extremities: WWP, normal strength, no clubbing/cyanosis/edema  Neuro: A/O x 3, CNs II-XII grossly intact, normal sensation, no focal deficits  Pulses: palpable distal pulses    Lines/drains/tubes:    LABS:                        6.8    8.81  )-----------( 374      ( 01 Mar 2021 06:12 )             22.5     03-01    138  |  110<H>  |  10  ----------------------------<  100<H>  3.5   |  16<L>  |  0.88    Ca    7.8<L>      01 Mar 2021 06:12  Phos  3.2     03-01  Mg     1.8     03-01    TPro  5.5<L>  /  Alb  1.9<L>  /  TBili  0.3  /  DBili  x   /  AST  18  /  ALT  23  /  AlkPhos  56  03-01        CAPILLARY BLOOD GLUCOSE        LIVER FUNCTIONS - ( 01 Mar 2021 06:12 )  Alb: 1.9 g/dL / Pro: 5.5 g/dL / ALK PHOS: 56 U/L / ALT: 23 U/L / AST: 18 U/L / GGT: x             Cultures:  Culture Results:   Testing in progress (03-01 @ 04:10)  Culture Results:   No growth at 12 hours (02-28 @ 13:10)      RADIOLOGY & ADDITIONAL STUDIES:

## 2021-03-01 NOTE — PROGRESS NOTE ADULT - SUBJECTIVE AND OBJECTIVE BOX
CC/ HPI Patient is an 86 year old female with chronic obstructive pulmonary disease, lung cancer, poorly differentiated adenocarcinoma, status post radiation therapy, Dec 2020, discharged after hospital stay for severe anemia, upper GI bleed, RVP positive Chlamydia pneumonia, (2.06.21) status post Zithromax, cefepime, now admitted with progressive pulmonary infiltrates, sepsis, seen this morning the patient denies acute respiratory complaint.      PAST MEDICAL & SURGICAL HISTORY:  Chronic obstructive pulmonary disease, unspecified COPD type  Hearing loss  left ear - sees Dr. Garces ENT  Basal cell carcinoma  x2 - upper lip and arm  Osteoporosis  H/O breast biopsy, 5 years ago, mass was benign and self-resolved  History of appendectomy  History of cataract surgery    SOCHX:   tobacco,  -  alcohol      FAMILY HISTORY: FA/MO  - contributory     ROS reviewed below with positive findings marked (+) :  GEN:  fever, chills ENT: tracheostomy,   epistaxis,  sinusitis COR: CAD, CHF,  HTN, dysrhythmia PUL: +COPD, ILD, asthma, pneumonia GI: PEG, dysphagia, hemorrhage, other HALLIE: kidney disease, electrolyte disorder HEM:  anemia, thrombus, coagulopathy, cancer ENDO:  thyroid disease, diabetes mellitus CNS:  dementia, stroke, seizure, PSY:  depression, anxiety, other      MEDICATIONS  (STANDING):  albuterol/ipratropium for Nebulization 3 milliLiter(s) Nebulizer every 6 hours  budesonide  80 MICROgram(s)/formoterol 4.5 MICROgram(s) Inhaler 2 Puff(s) Inhalation two times a day  enoxaparin Injectable 30 milliGRAM(s) SubCutaneous every 24 hours  folic acid 1 milliGRAM(s) Oral daily  pantoprazole  Injectable 40 milliGRAM(s) IV Push every 12 hours  piperacillin/tazobactam IVPB.. 3.375 Gram(s) IV Intermittent every 6 hours  psyllium Powder 1 Packet(s) Oral daily  senna 2 Tablet(s) Oral at bedtime    MEDICATIONS  (PRN):  acetaminophen   Tablet .. 650 milliGRAM(s) Oral every 6 hours PRN Mild Pain (1 - 3)      Vital Signs Last 24 Hrs  T(C): 36.6 (01 Mar 2021 05:54), Max: 37.3 (28 Feb 2021 23:46)  T(F): 97.9 (01 Mar 2021 05:54), Max: 99.2 (28 Feb 2021 23:46)  HR: 114 (01 Mar 2021 05:54) (89 - 116)  BP: 113/61 (01 Mar 2021 05:54) (107/63 - 144/75)  RR: 20 (01 Mar 2021 05:54) (18 - 22)  SpO2: 94% (01 Mar 2021 05:54) (89% - 95%)    GENERAL:         comfortable,  - distress.  HEENT:            - trauma,  - icterus,  - injection,  - nasal discharge.  NECK:              - jugular venous distention, - thyromegaly.  LYMPH:           - lymphadenopathy, - masses.  RESP:              + crackles,   - rhonchi,   - wheezes.   COR:                S1S2   - gallops,  - rubs.  ABD:                bowel sounds,   soft, - tender, - distended.  EXT/MSC:         - cyanosis,  - clubbing, - edema.    NEURO:           + alert and oriented                             6.8    8.81  )-----------( 374      ( 01 Mar 2021 06:12 )             22.5     03-01    138  |  110<H>  |  10  ----------------------------<  100<H>  3.5   |  16<L>  |  0.88    Ca    7.8<L>      01 Mar 2021 06:12  Phos  3.2     03-01  Mg     1.8     03-01          CT Chest No Cont (02.26.21)  Tip of left chest port within the cavoatrial junction. Normal heart size. No pericardial effusion. No thoracic aortic aneurysm. No pathologically enlarged mediastinal lymph nodes and limited evaluation of the everett without intravenous contrast. No axillary lymphadenopathy.  Small to moderate right lateral pleural effusions, enlarged since prior study. There is pulmonary emphysema. Increased consolidation in the right upper lobe at the right lung apex. Increased groundglass and interstitial infiltrates throughout both lungs since the studies of 2/5/2021 and 2/10/2021. Thickening of the interlobular septae in both lungs.  Limited evaluation of solid abdominal visceral without intravenous contrast and due to respiratory motion. No focal hepatic abnormality. Gallbladder obscured by respiratory motion. Unremarkable spleen and pancreas.  Impression: Limited study due to respiratory motion.  1.  Worsening bilateral pulmonary opacities. Differential diagnosis includes pneumonia with possible superimposed congestive heart failure, pneumonitis, and lymphangitic carcinomatosis. Pulmonary emphysema.  2.  Small to moderate-sized pleural effusions, increased since prior study.  3. Distended urinary bladder despite presence of Schumacher catheter.      CT Angio Chest PE Protocol w/ IV Cont (02.03.21)  Pulmonary arteries: No pulmonary embolus seen.  Lungs and large airways: Respiratory motion limits assessment of fine pulmonary parenchymal detail. Trace dependent secretions distal trachea. No nodular and no tracheal or endobronchial lesion.  Bilateral apical pleural parenchymal scarring. Unchanged mild peribronchial wall thickening and cylindrical bronchiectasis bilaterally. Scattered nodular groundglass opacities bilaterally, more confluent in right upper lobe posterior segment with apparent interlobular septal thickening. Mild compressive atelectasis right lower lobe. Few nodular consolidative opacities left lower lobe, subsegmental atelectasis versus mild consolidation. No measurable residual masses in right upper lobe anterior segment with linear opacity in this region probably representing scarring. New few scattered bilateral pulmonary nodules, for example:  *  Right upper lobe subpleural 0.5 cm (series 6 image 160).  *  Right upper lobe anterior segment 0.5 cm (series 6 image 132)  *  Left lower lobe 0.5 cm (series 6 image 204)  Pleura:  Resolved right pneumothorax. Decreased trace residual right pleural effusion.  Mediastinum and hilar regions: Significantly decreased mediastinal and hilar adenopathy, for example right lower paratracheal 1.1 x 0.8 cm, previously 4.4 x 2.9 cm. No significant change mildly prominent subcarinal node 1.2 cm short axis. Resolved right lower cervical adenopathy.  Heart and pericardium:  Heart size is normal. No pericardial effusion.  Vessels:  No aneurysm. Mild scattered calcific atherosclerosis.  Chest wall and lower neck:  Normal.  Upper abdomen: Unchanged few left renal cysts, largest 3.5 cm.  Bones: No suspicious lytic or blastic lesion. Mild degenerative changes thoracic spine with prominent degenerative Schmorl's node inferior endplate T9 vertebral body.    IMPRESSION:  1.  No pulmonary embolus.  2.  New few small scattered nodular opacities bilaterally with more confluent groundglass density and suspected interlobular septal thickening in posterior right upper lobe. Differential includes aspiration/infection versus lymphangitic carcinomatosis. Assessment limited by artifact from respiratory motion. Recommend clinical correlation and short-term follow-up would be helpful.  .  No measurable residual right upper lobe mass with scarring in this region.  4.  Significantly decreased thoracic adenopathy.  5.  Decreased trace residual right pleural effusion.  6.  Resolved right pneumothorax.      ASSESSMENT/PLAN    1)  Pneumonia/pneumonitis  2)  Chronic obstructive pulmonary disease  3)  Bronchiectasis  4)  Lung cancer    Oxygen as needed for a satisfactory SpO2  Bronchodilators:  Atrovent/ albuterol q 4 – 6 hours as needed  ID/Antibiotics:  piperacillin/tazobactam  Cardiac/HTN: echocardiogram  GI: Rx/ prophylaxis c PPI/H2B  Heme: Rx/VT prophylaxis   Discussed with Dr. Roberson who covered Feb 27-28.

## 2021-03-01 NOTE — PROVIDER CONTACT NOTE (CHANGE IN STATUS NOTIFICATION) - BACKGROUND
Pt is an 86F with a history of COPD, lung adenocarcinoma s/p radiation and admitted for UTI, sepsis, and PNA.

## 2021-03-01 NOTE — PROGRESS NOTE ADULT - SUBJECTIVE AND OBJECTIVE BOX
CC: Patient is a 86y old  Female who presents with a chief complaint of Sepsis (01 Mar 2021 13:56)      INTERVAL EVENTS: Pt became hypoxic to 80s x2 during BMs last night and this AM. Pt put on NRB and then able to be weaned back down to 3L NC afterwards.     SUBJECTIVE / INTERVAL HPI: Patient seen and examined at bedside. Pt reports improvement in abdominal pain. Pt reports that her shortness of breath is improving, but still has dry cough.    ROS: negative unless otherwise stated above.    VITAL SIGNS:  Vital Signs Last 24 Hrs  T(C): 36.6 (01 Mar 2021 05:54), Max: 37.3 (28 Feb 2021 23:46)  T(F): 97.9 (01 Mar 2021 05:54), Max: 99.2 (28 Feb 2021 23:46)  HR: 114 (01 Mar 2021 05:54) (89 - 116)  BP: 113/61 (01 Mar 2021 05:54) (107/63 - 144/75)  BP(mean): --  RR: 20 (01 Mar 2021 05:54) (18 - 22)  SpO2: 94% (01 Mar 2021 05:54) (89% - 95%)      02-28-21 @ 07:01  -  03-01-21 @ 07:00  --------------------------------------------------------  IN: 200 mL / OUT: 400 mL / NET: -200 mL        PHYSICAL EXAM:    General: elderly female in NAD on 3L NC  Neck: supple  Cardiovascular: +S1/S2; RRR  Respiratory: CTA B/L; no W/R/R  Gastrointestinal: soft, NT/ND; +BSx4  Extremities: WWP; no edema, clubbing or cyanosis  Vascular: 2+ radial, DP/PT pulses B/L  Neurological: AAOx3; no focal deficits    MEDICATIONS:  MEDICATIONS  (STANDING):  albuterol/ipratropium for Nebulization 3 milliLiter(s) Nebulizer every 6 hours  budesonide  80 MICROgram(s)/formoterol 4.5 MICROgram(s) Inhaler 2 Puff(s) Inhalation two times a day  enoxaparin Injectable 30 milliGRAM(s) SubCutaneous every 24 hours  folic acid 1 milliGRAM(s) Oral daily  pantoprazole  Injectable 40 milliGRAM(s) IV Push every 12 hours  piperacillin/tazobactam IVPB.. 3.375 Gram(s) IV Intermittent every 6 hours  psyllium Powder 1 Packet(s) Oral daily  senna 2 Tablet(s) Oral at bedtime    MEDICATIONS  (PRN):  acetaminophen   Tablet .. 650 milliGRAM(s) Oral every 6 hours PRN Mild Pain (1 - 3)      ALLERGIES:  Allergies    No Known Allergies    Intolerances        LABS:                        6.8    8.81  )-----------( 374      ( 01 Mar 2021 06:12 )             22.5     03-01    138  |  110<H>  |  10  ----------------------------<  100<H>  3.5   |  16<L>  |  0.88    Ca    7.8<L>      01 Mar 2021 06:12  Phos  3.2     03-01  Mg     1.8     03-01    TPro  5.5<L>  /  Alb  1.9<L>  /  TBili  0.3  /  DBili  x   /  AST  18  /  ALT  23  /  AlkPhos  56  03-01        CAPILLARY BLOOD GLUCOSE          RADIOLOGY & ADDITIONAL TESTS: Reviewed. CC: Patient is a 86y old  Female who presents with a chief complaint of Sepsis (01 Mar 2021 13:56)      INTERVAL EVENTS: Pt became hypoxic to 80s x2 during BMs last night and this AM. Pt put on NRB and then able to be weaned back down to 3L NC afterwards.     SUBJECTIVE / INTERVAL HPI: Patient seen and examined at bedside. Pt reports improvement in abdominal pain. Pt reports that her shortness of breath is improving, but still has dry cough.    ROS: negative unless otherwise stated above.    VITAL SIGNS:  Vital Signs Last 24 Hrs  T(C): 36.6 (01 Mar 2021 05:54), Max: 37.3 (28 Feb 2021 23:46)  T(F): 97.9 (01 Mar 2021 05:54), Max: 99.2 (28 Feb 2021 23:46)  HR: 114 (01 Mar 2021 05:54) (89 - 116)  BP: 113/61 (01 Mar 2021 05:54) (107/63 - 144/75)  BP(mean): --  RR: 20 (01 Mar 2021 05:54) (18 - 22)  SpO2: 94% (01 Mar 2021 05:54) (89% - 95%)      02-28-21 @ 07:01  -  03-01-21 @ 07:00  --------------------------------------------------------  IN: 200 mL / OUT: 400 mL / NET: -200 mL        PHYSICAL EXAM:    General: elderly female in NAD on 3L NC  Neck: supple  Cardiovascular: +S1/S2; RRR  Respiratory: diffuse crackles b/l R>L  Gastrointestinal: soft, NT/ND; +BSx4  Extremities: WWP; no edema, clubbing or cyanosis  Vascular: 2+ radial, DP/PT pulses B/L  Neurological: AAOx3; no focal deficits    MEDICATIONS:  MEDICATIONS  (STANDING):  albuterol/ipratropium for Nebulization 3 milliLiter(s) Nebulizer every 6 hours  budesonide  80 MICROgram(s)/formoterol 4.5 MICROgram(s) Inhaler 2 Puff(s) Inhalation two times a day  enoxaparin Injectable 30 milliGRAM(s) SubCutaneous every 24 hours  folic acid 1 milliGRAM(s) Oral daily  pantoprazole  Injectable 40 milliGRAM(s) IV Push every 12 hours  piperacillin/tazobactam IVPB.. 3.375 Gram(s) IV Intermittent every 6 hours  psyllium Powder 1 Packet(s) Oral daily  senna 2 Tablet(s) Oral at bedtime    MEDICATIONS  (PRN):  acetaminophen   Tablet .. 650 milliGRAM(s) Oral every 6 hours PRN Mild Pain (1 - 3)      ALLERGIES:  Allergies    No Known Allergies    Intolerances        LABS:                        6.8    8.81  )-----------( 374      ( 01 Mar 2021 06:12 )             22.5     03-01    138  |  110<H>  |  10  ----------------------------<  100<H>  3.5   |  16<L>  |  0.88    Ca    7.8<L>      01 Mar 2021 06:12  Phos  3.2     03-01  Mg     1.8     03-01    TPro  5.5<L>  /  Alb  1.9<L>  /  TBili  0.3  /  DBili  x   /  AST  18  /  ALT  23  /  AlkPhos  56  03-01        CAPILLARY BLOOD GLUCOSE          RADIOLOGY & ADDITIONAL TESTS: Reviewed.

## 2021-03-01 NOTE — CONSULT NOTE ADULT - ASSESSMENT
86F PMH COPD, lung adenocarcinoma s/p radiation, now on chemo recently admitted for UGIB and Chlamydia PNA. Now presents with severe sepsis likely due to PNA.   Surgery team was consulted for painful defecation.    No blood ins stools according to the patient, no anal fissure or external hemorrhoids could be appreciated.  No internal hemorrhoids were felt on DUSTIN  Hb 6.8 no signs of bleeding, could be related to her sepsis and chemotherapy.    Seen and discussed with Dr. Jara:  - No acute surgical intervention is warranted as of now.  - High fiber diet is recommended.  - FU with Dr. Jara as an outpatient  - Surgery team 5C will sign off, please call us with any questions or concerns

## 2021-03-01 NOTE — PROGRESS NOTE ADULT - SUBJECTIVE AND OBJECTIVE BOX
INTERVAL HPI/OVERNIGHT EVENTS:    ANTIBIOTICS/RELEVANT:    MEDICATIONS  (STANDING):  albuterol/ipratropium for Nebulization 3 milliLiter(s) Nebulizer every 6 hours  budesonide  80 MICROgram(s)/formoterol 4.5 MICROgram(s) Inhaler 2 Puff(s) Inhalation two times a day  enoxaparin Injectable 30 milliGRAM(s) SubCutaneous every 24 hours  folic acid 1 milliGRAM(s) Oral daily  pantoprazole  Injectable 40 milliGRAM(s) IV Push every 12 hours  piperacillin/tazobactam IVPB.. 3.375 Gram(s) IV Intermittent every 6 hours  psyllium Powder 1 Packet(s) Oral daily  senna 2 Tablet(s) Oral at bedtime    MEDICATIONS  (PRN):  acetaminophen   Tablet .. 650 milliGRAM(s) Oral every 6 hours PRN Mild Pain (1 - 3)      Allergies    No Known Allergies    Intolerances        Vital Signs Last 24 Hrs  T(C): 36.6 (01 Mar 2021 05:54), Max: 37.3 (28 Feb 2021 23:46)  T(F): 97.9 (01 Mar 2021 05:54), Max: 99.2 (28 Feb 2021 23:46)  HR: 114 (01 Mar 2021 05:54) (89 - 116)  BP: 113/61 (01 Mar 2021 05:54) (107/63 - 144/75)  BP(mean): --  RR: 20 (01 Mar 2021 05:54) (18 - 22)  SpO2: 94% (01 Mar 2021 05:54) (89% - 95%)    PHYSICAL EXAM:      Constitutional:    Eyes:    ENMT:    Neck:    Breasts:    Back:    Respiratory:    Cardiovascular:    Gastrointestinal:    Genitourinary:    Rectal:    Extremities:    Vascular:    Neurological:    Skin:    Lymph Nodes:    Musculoskeletal:    Psychiatric:        LABS:                        6.8    8.81  )-----------( 374      ( 01 Mar 2021 06:12 )             22.5     03-01    138  |  110<H>  |  10  ----------------------------<  100<H>  3.5   |  16<L>  |  0.88    Ca    7.8<L>      01 Mar 2021 06:12  Phos  3.2     03-01  Mg     1.8     03-01    TPro  5.5<L>  /  Alb  1.9<L>  /  TBili  0.3  /  DBili  x   /  AST  18  /  ALT  23  /  AlkPhos  56  03-01          MICROBIOLOGY:    RADIOLOGY & ADDITIONAL STUDIES: INTERVAL HPI/OVERNIGHT EVENTS:      MEDICATIONS  (STANDING):  albuterol/ipratropium for Nebulization 3 milliLiter(s) Nebulizer every 6 hours  budesonide  80 MICROgram(s)/formoterol 4.5 MICROgram(s) Inhaler 2 Puff(s) Inhalation two times a day  enoxaparin Injectable 30 milliGRAM(s) SubCutaneous every 24 hours  folic acid 1 milliGRAM(s) Oral daily  pantoprazole  Injectable 40 milliGRAM(s) IV Push every 12 hours  piperacillin/tazobactam IVPB.. 3.375 Gram(s) IV Intermittent every 6 hours  psyllium Powder 1 Packet(s) Oral daily  senna 2 Tablet(s) Oral at bedtime    MEDICATIONS  (PRN):  acetaminophen   Tablet .. 650 milliGRAM(s) Oral every 6 hours PRN Mild Pain (1 - 3)      Allergies    No Known Allergies        Vital Signs Last 24 Hrs  T(C): 36.6 (01 Mar 2021 05:54), Max: 37.3 (28 Feb 2021 23:46)  T(F): 97.9 (01 Mar 2021 05:54), Max: 99.2 (28 Feb 2021 23:46)  HR: 114 (01 Mar 2021 05:54) (89 - 116)  BP: 113/61 (01 Mar 2021 05:54) (107/63 - 144/75)  BP(mean): --  RR: 20 (01 Mar 2021 05:54) (18 - 22)  SpO2: 94% (01 Mar 2021 05:54) (89% - 95%)            LABS:                        6.8    8.81  )-----------( 374      ( 01 Mar 2021 06:12 )             22.5     03-01    138  |  110<H>  |  10  ----------------------------<  100<H>  3.5   |  16<L>  |  0.88    Ca    7.8<L>      01 Mar 2021 06:12  Phos  3.2     03-01  Mg     1.8     03-01    TPro  5.5<L>  /  Alb  1.9<L>  /  TBili  0.3  /  DBili  x   /  AST  18  /  ALT  23  /  AlkPhos  56  03-01          MICROBIOLOGY:    RADIOLOGY & ADDITIONAL STUDIES: INTERVAL HPI/OVERNIGHT EVENTS:    Events reviewed  Worsening SOB and hypoxia       MEDICATIONS  (STANDING):  albuterol/ipratropium for Nebulization 3 milliLiter(s) Nebulizer every 6 hours  budesonide  80 MICROgram(s)/formoterol 4.5 MICROgram(s) Inhaler 2 Puff(s) Inhalation two times a day  enoxaparin Injectable 30 milliGRAM(s) SubCutaneous every 24 hours  folic acid 1 milliGRAM(s) Oral daily  pantoprazole  Injectable 40 milliGRAM(s) IV Push every 12 hours  piperacillin/tazobactam IVPB.. 3.375 Gram(s) IV Intermittent every 6 hours  psyllium Powder 1 Packet(s) Oral daily  senna 2 Tablet(s) Oral at bedtime    MEDICATIONS  (PRN):  acetaminophen   Tablet .. 650 milliGRAM(s) Oral every 6 hours PRN Mild Pain (1 - 3)      Allergies    No Known Allergies      EXAM  Vital Signs Last 24 Hrs  T(C): 36.6 (01 Mar 2021 05:54), Max: 37.3 (28 Feb 2021 23:46)  T(F): 97.9 (01 Mar 2021 05:54), Max: 99.2 (28 Feb 2021 23:46)  HR: 114 (01 Mar 2021 05:54) (89 - 116)  BP: 113/61 (01 Mar 2021 05:54) (107/63 - 144/75)  BP(mean): --  RR: 20 (01 Mar 2021 05:54) (18 - 22)  SpO2: 94% (01 Mar 2021 05:54) (89% - 95%)  On FMO2 with some respiratory distress  Awake and alert though and responding appropriately to questions  Thin and frail appearing  No rash or jaundice  Regular tachycardia  Chest with decrease BSs at bases  Abd soft NT  LEs thin and no edema  Schumacher in place        LABS:                        6.8    8.81  )-----------( 374      ( 01 Mar 2021 06:12 )             22.5       Manual Differential (02.28.21 @ 06:55)    Manual Smear Verification: Performed    Platelet Morphology: Normal    Red Cell Morphology: Abnormal    Macrocytosis: Slight    Christian Cell: Present    Anisocytosis: Slight    Hypochromia: Slight    Ovalocytes: Slight    Schistocytes: Slight: Schistocytes frequency based on the number of cells seen in an oil  immersion field of 150-200 RBCs.  <1 Within normal limits  1-3 Slight  4-8 Moderate  >8 Marked    Poikilocytosis: Slight    Polychromasia: Moderate    Differential (02.28.21 @ 06:55)    Auto Basophil #: 0.00 K/uL    Auto Basophil %: 0.0 %    Auto Eosinophil #: 2.84 K/uL    Auto Eosinophil %: 23.9 %    Auto Lymphocyte #: 0.31 K/uL    Auto Lymphocyte %: 2.6 %    Auto Neutrophil #: 8.23 K/uL    Auto Neutrophil %: 69.2: Differential percentages must be correlated with absolute numbers for  clinical significance. %    Auto Monocyte #: 0.51 K/uL    Auto Monocyte %: 4.3 %          03-01    138  |  110<H>  |  10  ----------------------------<  100<H>  3.5   |  16<L>  |  0.88    Ca    7.8<L>      01 Mar 2021 06:12  Phos  3.2     03-01  Mg     1.8     03-01    TPro  5.5<L>  /  Alb  1.9<L>  /  TBili  0.3  /  DBili  x   /  AST  18  /  ALT  23  /  AlkPhos  56  03-01          MICROBIOLOGY:    Ova and Parasites (03.01.21 @ 04:10)    Culture Results:   Testing in progress    Culture - Blood (02.28.21 @ 13:10)    Specimen Source: .Blood Blood-Catheter    Culture Results:   No growth at 1 day.        RADIOLOGY & ADDITIONAL STUDIES:    Xray Chest 1 View-PORTABLE IMMEDIATE (Xray Chest 1 View-PORTABLE IMMEDIATE .) (03.01.21 @ 15:42) >    EXAM:  XR CHEST PORTABLE IMMED 1V                          PROCEDURE DATE:  03/01/2021        INTERPRETATION:  Clinical history/reason for exam: Hypoxia.    Frontal chest.    COMPARISON: March 1, 2021 time 9:44 AM.    Findings/  impression: Stable positioning left Port-A-Cath. Heart size within normal limits, thoracic aortic calcification. Bilateral opacities/pleural effusions, stable. Stable bony structures.

## 2021-03-01 NOTE — PROGRESS NOTE ADULT - ASSESSMENT
The patient needs therapy for painful external hemorrhoid.  The patient needs to be out of bed and walking in the hallway.  The patient needs a diagnosis for why she returned to the hospital septic after a long series of antibiotics and hospitalization.  Be

## 2021-03-02 NOTE — PROGRESS NOTE ADULT - ASSESSMENT
86F PMH COPD, lung adenocarcinoma s/p radiation, now on chemo recently admitted for UGIB and Chlamydia PNA a/f sepsis 2/2 suspected HAP c/b fluid overload.

## 2021-03-02 NOTE — CONSULT NOTE ADULT - SUBJECTIVE AND OBJECTIVE BOX
Patient is a 86y old  Female who presents with a chief complaint of Sepsis (01 Mar 2021 15:55)      HPI:  86F w COPD, lung adenocarcinoma (s/p radiation-currently on chemo-gefitinib and tamoxifen outpatient with Dr. Jones), recent admission for UGIB and with recent diagnosis of chlamydia PNA s/p x10 course of azithromycin presents today for fever and cough. Patient c/o x1 day of subjective fevers, dry cough, SOB and chills, no CP, no new LE edema. Said she never really felt better from discharge. Went to outpatient oncologist where she got IVF today and he sent her here due to fact patient was septic. Currently patient with no new acute complaints. denies n/v, rashes, diarrhea, no melena or hematochezia,. Has pain chemoport site but no drainage that she noted.    ED course:  Vitals: tmax 102.8, HR , //59, RR 32-20 Spo2 95-98% 3L  Labs: Hgb 7.6, Elevated eosinophils. Coags: PTT 52.8, PT 17, INR 1.5. Cl 111, Bicarb 16. Ca 7.8, Albumin 2.4, AST/ALT 56/86. UA WBC 5-10.   Imaging: CXR with progression of bilateral infiltrates   EKG: Qtc 414  Interventions: tylenol 650mg, 1g ctx, protonix 40. Patient started on zosyn 3.375mg, Given vancomycin 750mg, Azithromycin 500mg.    (25 Feb 2021 21:12)      PAST MEDICAL & SURGICAL HISTORY:  Chronic obstructive pulmonary disease, unspecified COPD type    Hearing loss  left ear - sees Dr. Garces ENT    Basal cell carcinoma  x2 - upper lip and arm    Chronic obstructive pulmonary disease    Osteoporosis  Osteoporosis    H/O breast biopsy  &gt; 5 years ago, mass was benign and self-resolved    History of appendectomy    History of cataract surgery        PREVIOUS DIAGNOSTIC TESTING:      ECHO  FINDINGS:    STRESS  FINDINGS:    CATHETERIZATION  FINDINGS:    MEDICATIONS  (STANDING):  albuterol/ipratropium for Nebulization 3 milliLiter(s) Nebulizer every 6 hours  budesonide  80 MICROgram(s)/formoterol 4.5 MICROgram(s) Inhaler 2 Puff(s) Inhalation two times a day  enoxaparin Injectable 30 milliGRAM(s) SubCutaneous every 24 hours  folic acid 1 milliGRAM(s) Oral daily  meropenem  IVPB 1000 milliGRAM(s) IV Intermittent every 12 hours  pantoprazole  Injectable 40 milliGRAM(s) IV Push every 12 hours  predniSONE   Tablet 40 milliGRAM(s) Oral two times a day  psyllium Powder 1 Packet(s) Oral daily  senna 2 Tablet(s) Oral at bedtime  trimethoprim / sulfamethoxazole IVPB 200 milliGRAM(s) IV Intermittent every 12 hours    MEDICATIONS  (PRN):  acetaminophen   Tablet .. 650 milliGRAM(s) Oral every 6 hours PRN Mild Pain (1 - 3)      FAMILY HISTORY:      SOCIAL HISTORY:    CIGARETTES: no    ALCOHOL: no    REVIEW OF SYSTEMS:  CONSTITUTIONAL: + fever, weight loss, and fatigue  EYES: No eye pain, visual disturbances, or discharge  ENMT:  No difficulty hearing, tinnitus, vertigo; No sinus or throat pain  NECK: No pain or stiffness  RESPIRATORY:+ cough, wheezing, no chills or hemoptysis;+/- Shortness of Breath  CARDIOVASCULAR: No chest pain, palpitations, passing out, dizziness, or leg swelling  GASTROINTESTINAL: No abdominal or epigastric pain. No nausea, vomiting, or hematemesis; No diarrhea or constipation. No melena or hematochezia.  GENITOURINARY: No dysuria, frequency, hematuria, or incontinence  NEUROLOGICAL: No headaches, memory loss, loss of strength, numbness, or tremors  SKIN: No itching, burning, rashes, or lesions   LYMPH Nodes: No enlarged glands  ENDOCRINE: No heat or cold intolerance; No hair loss  MUSCULOSKELETAL: No joint pain or swelling; No muscle, back, or extremity pain  PSYCHIATRIC: No depression, anxiety, mood swings, or difficulty sleeping  HEME/LYMPH: No easy bruising, or bleeding gums  ALLERY AND IMMUNOLOGIC: No hives or eczema  	  Vital Signs Last 24 Hrs  T(C): 36.9 (02 Mar 2021 05:35), Max: 38.2 (01 Mar 2021 08:54)  T(F): 98.4 (02 Mar 2021 05:35), Max: 100.8 (01 Mar 2021 08:54)  HR: 95 (02 Mar 2021 05:35) (95 - 135)  BP: 102/61 (02 Mar 2021 05:35) (90/62 - 150/73)  BP(mean): --  RR: 20 (02 Mar 2021 05:35) (20 - 39)  SpO2: 91% (02 Mar 2021 05:35) (87% - 100%)    PHYSICAL EXAM:  Appearance: Frail	  Cardiovascular: Normal S1 S2, No JVD, No murmurs, No edema  Respiratory: Lungs clear to auscultation	  Psychiatry: A & O x 3, Mood & affect appropriate  Gastrointestinal:  Soft, Non-tender, + BS	  Skin: No rashes, No ecchymoses, No cyanosis  Neurologic: Non-focal  Extremities: No clubbing, cyanosis or edema  Vascular: Peripheral pulses palpable 2+ bilaterally      INTERPRETATION OF TELEMETRY:    ECG:    I&O's Detail    01 Mar 2021 07:01  -  02 Mar 2021 07:00  --------------------------------------------------------  IN:  Total IN: 0 mL    OUT:    Indwelling Catheter - Urethral (mL): 2000 mL  Total OUT: 2000 mL    Total NET: -2000 mL          LABS:                        8.7    12.32 )-----------( 438      ( 02 Mar 2021 06:57 )             28.4     03-02    137  |  104  |  12  ----------------------------<  131<H>  4.5   |  22  |  0.86    Ca    9.0      02 Mar 2021 06:57  Phos  3.8     03-02  Mg     2.1     03-02    TPro  7.0  /  Alb  2.5<L>  /  TBili  0.3  /  DBili  x   /  AST  22  /  ALT  25  /  AlkPhos  74  03-02    CARDIAC MARKERS ( 01 Mar 2021 06:12 )  x     / <0.01 ng/mL / 17 U/L / x     / <1.0 ng/mL          I&O's Summary    01 Mar 2021 07:01  -  02 Mar 2021 07:00  --------------------------------------------------------  IN: 0 mL / OUT: 2000 mL / NET: -2000 mL      BNP  RADIOLOGY & ADDITIONAL STUDIES:

## 2021-03-02 NOTE — PROGRESS NOTE ADULT - PROBLEM SELECTOR PLAN 2
Concern for post-obstructive PNA vs. HAP vs. atypical pna. 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   - pt with worsening O2 status on 3/1, now requiring HFNC  - zosyn 3.375mg q8hr switched to meropenem 1000mg BID  - ID concerned for PCP pna, started on bactrim 200mg BID and prednisone 40mg BID  Plan:  - cont bactrim   - cont zosyn  - cont prednisone 40mg BID 3/1-3/6, then switch to OD for 5 days, then 20mg OD for 10 days  - obtain sputum culture if cough becomes productive  - f/u fungitell, galactomannan  - consider gallium scan if needed

## 2021-03-02 NOTE — PROGRESS NOTE ADULT - SUBJECTIVE AND OBJECTIVE BOX
the patient continues to have respiratory problems.  The patient is on high-flow nasal oxygen with nebulizer.  The patient is currently comfortable other than the dyspnea on exertion and she is always cold.    Appears malnourished.  The patient is on high-flow nasal oxygen with nebulizer.  Skin: No rash noted. No lesions. Poor turgor.  Eyes: Eyes with PERRLA. EOM's in­tact Sclera clear,no jaundice noted. Conjunctiva clear.   Ears, Nose,Mouth & Throat: Teeth normal, no missing teeth or dentures present. No ulcers. No thrush Auditory canals normal.  Description: %% Oropharynx without lesions. Mucositis None.    The patient has dry pasty mucous membranes.  Neck: No thyromegaly. No lymphadenopathy noted.   Cardiovascular: Normal sinus rhythm S1, S2 without murmurs. No Extrasystole.   Breast: Normal. No breast masses appreciated. No axillary lymphadenopathy. No skin changes. No nipple discharge present. No dimpling  noted.  Chest/Respiratory: No dyspnea. No rhonchi. No wheezing. No decreased breath sounds at bases. No rales. Unlabored breathing. No  accessory muscle use.   Gastrointestinal: Abdomen is soft and non­tender to touch. No hepatomegaly noted. No splenomegaly noted. No abdominal pain or  guarding noted. No abdominal mass(es) appreciated. No ascites noted.  There is soft stool in the rectal vault with no evidence of fecal  impaction.  Genito­Urinary: Deferred.  Hematologic/Lymphatic: No petechiae noted. No purpura noted. No lymphadenopathy noted.   Musculoskeletal: No Kyphosis. No weakness. No spinal tenderness on percussion. No pain on hip rotation reported by patient. Normal  range of motion in upper and lower extremities.   Neurologic: Affect normal. No tremors observed. Biceps, brachioradialis, patellar and plantar reflexes symmetrical. Normal  proprioception/position sense Cranial nerves intact.   Psychiatry: Oriented to person/place/time. No anxiety observed during exam. No signs of depression noted during exam. Intact short­term  and long­term memory.   Extremities: No clubbing noted to fingers or toes. No cyanosis noted. Capillary refill less than two seconds. No edema. No calf  tenderness reported by patient.

## 2021-03-02 NOTE — PROGRESS NOTE ADULT - PROBLEM SELECTOR PLAN 3
hx of UGIB, no recent melena or hemtochezia. Hgb unchanged from recent discharge. Fe studies c/w anemia of chronic disease  - c/w fe supplementation  - maintain active type and screen  - pt refusing transfusions  - heme following apprecaite recs. hx of UGIB, no recent melena or hemtochezia. Hgb unchanged from recent discharge. Fe studies c/w anemia of chronic disease  - maintain active type and screen  - pt refusing transfusions  - heme following apprecaite recs.

## 2021-03-02 NOTE — CONSULT NOTE ADULT - PROBLEM SELECTOR RECOMMENDATION 9
Interrmittent, elevated BNP level and B/L pleural effusions noted.  Could be due to very low albumin.  Will check echo

## 2021-03-02 NOTE — PROGRESS NOTE ADULT - SUBJECTIVE AND OBJECTIVE BOX
INTERVAL HPI/OVERNIGHT EVENTS:    Clinically stabilized and improved   Was able to eat today    MEDICATIONS  (STANDING):  albuterol/ipratropium for Nebulization 3 milliLiter(s) Nebulizer every 6 hours  budesonide  80 MICROgram(s)/formoterol 4.5 MICROgram(s) Inhaler 2 Puff(s) Inhalation two times a day  enoxaparin Injectable 30 milliGRAM(s) SubCutaneous every 24 hours  folic acid 1 milliGRAM(s) Oral daily  meropenem  IVPB 1000 milliGRAM(s) IV Intermittent every 12 hours  pantoprazole  Injectable 40 milliGRAM(s) IV Push every 12 hours  predniSONE   Tablet 40 milliGRAM(s) Oral two times a day  psyllium Powder 1 Packet(s) Oral daily  senna 2 Tablet(s) Oral at bedtime  tamsulosin 0.4 milliGRAM(s) Oral at bedtime  trimethoprim / sulfamethoxazole IVPB 200 milliGRAM(s) IV Intermittent every 12 hours    MEDICATIONS  (PRN):  acetaminophen   Tablet .. 650 milliGRAM(s) Oral every 6 hours PRN Mild Pain (1 - 3)      Allergies    No Known Allergies      EXAM  Vital Signs Last 24 Hrs  T(C): 36.7 (02 Mar 2021 11:01), Max: 36.9 (02 Mar 2021 05:35)  T(F): 98.1 (02 Mar 2021 11:01), Max: 98.4 (02 Mar 2021 05:35)  HR: 96 (02 Mar 2021 11:01) (95 - 115)  BP: 108/72 (02 Mar 2021 11:01) (102/61 - 124/65)  BP(mean): --  RR: 20 (02 Mar 2021 11:01) (20 - 22)  SpO2: 93% (02 Mar 2021 11:01) (90% - 100%)  Awakens and responds appropriately  On high flow O2  Thin and frail but appears improved from yesterday  No rash  RR  Chest with decreased BSs at bases   Abd soft NT  LEs no edema  Schumacher with urine output      LABS:                        8.7    12.32 )-----------( 438      ( 02 Mar 2021 06:57 )             28.4     03-02    137  |  104  |  12  ----------------------------<  131<H>  4.5   |  22  |  0.86    Ca    9.0      02 Mar 2021 06:57  Phos  3.8     03-02  Mg     2.1     03-02    TPro  7.0  /  Alb  2.5<L>  /  TBili  0.3  /  DBili  x   /  AST  22  /  ALT  25  /  AlkPhos  74  03-02        MICROBIOLOGY:    Respiratory Viral Panel with COVID-19 by JOSE (02.25.21 @ 20:09)    Rapid RVP Result: [facility]    SARS-CoV-2: [facility]: This Respiratory Panel uses polymerase chain reaction (PCR) to detect for  adenovirus; coronavirus (HKU1, NL63, 229E, OC43); human metapneumovirus  (hMPV); human enterovirus/rhinovirus (Entero/RV); influenza A; influenza  A/H1; influenza A/H3; influenza A/H1-2009; influenza B; parainfluenza  viruses 1, 2, 3, 4; respiratory syncytial virus; Mycoplasma pneumoniae;  Chlamydophila pneumoniae; and SARS-CoV-2.    Fungitell (02.28.21 @ 19:36)    Fungitell: <31: Interpretation: The Fungitell assay does not detect certain fungal  species such as the genus Cryptococcus (Akosua et al. 1991) which  produces very low levels of (1-3)-Beta-D-Glucan. The assay also does  not detect the Zygomycetes such as Absidia, Mucor and Rhizopus  (Amadeo et al. 1994) which are not known to produce  (1-3)-Beta-D-Glucan. In addition, the yeast phase of Blastomyces  dermatitidis produces little (1-3)-Beta-D-Glucan and may not be  detected by the assay (Paola et al. 2007).  Reference Range:  Less than 60 pg/mL. Glucan values of less than 60 pg/mL are  interpreted as negative.  Glucan values of 60 to 79 pg/mL are interpreted as indeterminate,  and suggest a possible fungal infection. Additional sampling and  testing of sera is required to interpret the results.  Glucan values of greater than or equal to 80 pg/mL are interpreted  as positive.  Due to the potential for environmental contamination when  transferred to pour-off tubes, which can lead to false positive  results, interpret positive results from samples provided in  pour-off tubes with caution.  Results should be used in conjunction  with clinical findings, and should not form the sole basis for a  diagnosis or treatment decision. The Fungitell test is approved or  cleared for in vitro diagnostic use by the U.[place] Food and Drug  Administration. Modifications to the approved package insert have  been made and the performance characteristics for these  modifications were determined by [facility].  Ifsample result is greater than 500 pg/mL, physician may order a  titer of the sample. Please contact [facility] if you would  like to order a retest of this sample to obtain an actual value.  Samples are held for 1 week after initial testing date.  ____________________________________________________________  Performed at:  [facility]  [address] [place] [id number]  : Rober Gupta Ph.D., BCLD (ABB)  CLIA#: [id number]  Phone: [phone] pg/mL        RADIOLOGY & ADDITIONAL STUDIES:    CT Angio Chest PE Protocol w/ IV Cont (03.01.21 @ 19:46) >  EXAM:  CT ANGIO CHEST PE PROTOCOL IC                          PROCEDURE DATE:  03/01/2021        INTERPRETATION:  ATTENDING FINAL OVER-READ: Agree with the most of the below preliminary report, with addendum in the impression as below:    1.  No central or large pulmonary embolism. Limited evaluation of segmental/subsegmental pulmonary arteries by motion artifact.  2.  Bilateral moderate right effusion, increased in extent compared to prior study. Interval increase in extent of bilateral groundglass opacity and interlobular septal thickening. Persistent right upper lobe consolidation. This may reflect pulmonary edema, pneumonia, and other inflammatory process. Underlying moderate emphysema.    ===============================================================================================================================================================================================================================================================================================================================================  PROCEDURE INFORMATION:  Exam: CT Angiography Chest With Contrast  Exam date and time: 3/1/2021 7:17 PM  Age: 86 years old  Clinical indication: Dyspnea; Patient HX: Lung cancer with hap  TECHNIQUE:  Imaging protocol: Computed tomographic angiography of the chest with contrast.  3D rendering (Not supervised by radiologist): MIP and/or 3D reconstructed images were created  by the technologist.    COMPARISON:  No relevant prior studies available.    FINDINGS:  Pulmonary arteries: No evidence of pulmonary embolus to the segmental level.  Aorta: No aneurysm of the aorta. No dissection of the aorta.  Veins: MediPort terminates in the SVC  Lungs: Opacities in the right upper lobe may represent atelectasis/pneumonia versus neoplasm..  Diffuse ground-glass opacities throughout the lung fields may represent pulmonary edema or diffuse  pneumonia  Pleural spaces: Moderate to large bilateral pleural effusions..  Heart: Unremarkable. No cardiomegaly. No pericardial effusion.  Lymph nodes: Unremarkable. No enlarged lymph nodes.  Kidneys and ureters: Simple cysts in the left kidney  Bones/joints: Compression fracture T9 of unknown age .  Soft tissues: Unremarkable.  Other findings: Motion artifact degrades images    IMPRESSION:  1. No evidence of pulmonary embolus to the segmental level.  2. No aneurysm of the aorta.  3. No dissection of the aorta.  4. Moderate to large bilateral pleural effusions..  5. Opacities in the right upper lobe may represent atelectasis/pneumonia versus neoplasm..

## 2021-03-02 NOTE — PROGRESS NOTE ADULT - SUBJECTIVE AND OBJECTIVE BOX
CC/ HPI Patient is an 86 year old female with chronic obstructive pulmonary disease, lung cancer, poorly differentiated adenocarcinoma, status post radiation therapy, Dec 2020, discharged after hospital stay for severe anemia, upper GI bleed, RVP positive Chlamydia pneumonia, (2.06.21) status post Zithromax, cefepime, now admitted with progressive pulmonary infiltrates, sepsis, pulmonary edema, seen this morning the patient is resting comfortably on HFNC.      PAST MEDICAL & SURGICAL HISTORY:  Chronic obstructive pulmonary disease, unspecified COPD type  Hearing loss  left ear - sees Dr. Garces ENT  Basal cell carcinoma  x2 - upper lip and arm  Osteoporosis  H/O breast biopsy, 5 years ago, mass was benign and self-resolved  History of appendectomy  History of cataract surgery    SOCHX:   tobacco,  -  alcohol      FAMILY HISTORY: FA/MO  - contributory     ROS reviewed below with positive findings marked (+) :  GEN:  fever, chills ENT: tracheostomy,   epistaxis,  sinusitis COR: CAD, CHF,  HTN, dysrhythmia PUL: +COPD, ILD, asthma, pneumonia GI: PEG, dysphagia, hemorrhage, other HALLIE: kidney disease, electrolyte disorder HEM:  anemia, thrombus, coagulopathy, cancer ENDO:  thyroid disease, diabetes mellitus CNS:  dementia, stroke, seizure, PSY:  depression, anxiety, other      MEDICATIONS  (STANDING):  albuterol/ipratropium for Nebulization 3 milliLiter(s) Nebulizer every 6 hours  budesonide  80 MICROgram(s)/formoterol 4.5 MICROgram(s) Inhaler 2 Puff(s) Inhalation two times a day  enoxaparin Injectable 30 milliGRAM(s) SubCutaneous every 24 hours  folic acid 1 milliGRAM(s) Oral daily  meropenem  IVPB 1000 milliGRAM(s) IV Intermittent every 12 hours  pantoprazole  Injectable 40 milliGRAM(s) IV Push every 12 hours  predniSONE   Tablet 40 milliGRAM(s) Oral two times a day  psyllium Powder 1 Packet(s) Oral daily  senna 2 Tablet(s) Oral at bedtime  tamsulosin 0.4 milliGRAM(s) Oral at bedtime  trimethoprim / sulfamethoxazole IVPB 200 milliGRAM(s) IV Intermittent every 12 hours    MEDICATIONS  (PRN):  acetaminophen   Tablet .. 650 milliGRAM(s) Oral every 6 hours PRN Mild Pain (1 - 3)      Vital Signs Last 24 Hrs  T(C): 36.7 (02 Mar 2021 11:01), Max: 36.9 (02 Mar 2021 05:35)  T(F): 98.1 (02 Mar 2021 11:01), Max: 98.4 (02 Mar 2021 05:35)  HR: 96 (02 Mar 2021 11:01) (95 - 115)  BP: 108/72 (02 Mar 2021 11:01) (102/61 - 124/65)  RR: 20 (02 Mar 2021 11:01) (20 - 22)  SpO2: 93% (02 Mar 2021 11:01) (90% - 100%)    GENERAL:         comfortable,  - distress.  HEENT:            - trauma,  - icterus,  - injection,  - nasal discharge.  NECK:              - jugular venous distention, - thyromegaly.  LYMPH:           - lymphadenopathy, - masses.  RESP:               - clear,   - rales,   - rhonchi,   - wheezes.   COR:                S1S2   - gallops,  - rubs.  ABD:                bowel sounds,   soft, - tender, - distended.  EXT/MSC:         - cyanosis,  - clubbing, - edema.    NEURO:           + alert and oriented                             8.7    12.32 )-----------( 438      ( 02 Mar 2021 06:57 )             28.4     03-02    137  |  104  |  12  ----------------------------<  131<H>  4.5   |  22  |  0.86    Ca    9.0      02 Mar 2021 06:57  Phos  3.8     03-02  Mg     2.1     03-02    TPro  7.0  /  Alb  2.5<L>  /  TBili  0.3  /  DBili  x   /  AST  22  /  ALT  25  /  AlkPhos  74  03-02            CT Chest No Cont (02.26.21)  Tip of left chest port within the cavoatrial junction. Normal heart size. No pericardial effusion. No thoracic aortic aneurysm. No pathologically enlarged mediastinal lymph nodes and limited evaluation of the everett without intravenous contrast. No axillary lymphadenopathy.  Small to moderate right lateral pleural effusions, enlarged since prior study. There is pulmonary emphysema. Increased consolidation in the right upper lobe at the right lung apex. Increased groundglass and interstitial infiltrates throughout both lungs since the studies of 2/5/2021 and 2/10/2021. Thickening of the interlobular septae in both lungs.  Limited evaluation of solid abdominal visceral without intravenous contrast and due to respiratory motion. No focal hepatic abnormality. Gallbladder obscured by respiratory motion. Unremarkable spleen and pancreas.  Impression: Limited study due to respiratory motion.  1.  Worsening bilateral pulmonary opacities. Differential diagnosis includes pneumonia with possible superimposed congestive heart failure, pneumonitis, and lymphangitic carcinomatosis. Pulmonary emphysema.  2.  Small to moderate-sized pleural effusions, increased since prior study.  3. Distended urinary bladder despite presence of Schumacher catheter.      CT Angio Chest PE Protocol w/ IV Cont (02.03.21)  Pulmonary arteries: No pulmonary embolus seen.  Lungs and large airways: Respiratory motion limits assessment of fine pulmonary parenchymal detail. Trace dependent secretions distal trachea. No nodular and no tracheal or endobronchial lesion.  Bilateral apical pleural parenchymal scarring. Unchanged mild peribronchial wall thickening and cylindrical bronchiectasis bilaterally. Scattered nodular groundglass opacities bilaterally, more confluent in right upper lobe posterior segment with apparent interlobular septal thickening. Mild compressive atelectasis right lower lobe. Few nodular consolidative opacities left lower lobe, subsegmental atelectasis versus mild consolidation. No measurable residual masses in right upper lobe anterior segment with linear opacity in this region probably representing scarring. New few scattered bilateral pulmonary nodules, for example:  *  Right upper lobe subpleural 0.5 cm (series 6 image 160).  *  Right upper lobe anterior segment 0.5 cm (series 6 image 132)  *  Left lower lobe 0.5 cm (series 6 image 204)  Pleura:  Resolved right pneumothorax. Decreased trace residual right pleural effusion.  Mediastinum and hilar regions: Significantly decreased mediastinal and hilar adenopathy, for example right lower paratracheal 1.1 x 0.8 cm, previously 4.4 x 2.9 cm. No significant change mildly prominent subcarinal node 1.2 cm short axis. Resolved right lower cervical adenopathy.  Heart and pericardium:  Heart size is normal. No pericardial effusion.  Vessels:  No aneurysm. Mild scattered calcific atherosclerosis.  Chest wall and lower neck:  Normal.  Upper abdomen: Unchanged few left renal cysts, largest 3.5 cm.  Bones: No suspicious lytic or blastic lesion. Mild degenerative changes thoracic spine with prominent degenerative Schmorl's node inferior endplate T9 vertebral body.    IMPRESSION:  1.  No pulmonary embolus.  2.  New few small scattered nodular opacities bilaterally with more confluent groundglass density and suspected interlobular septal thickening in posterior right upper lobe. Differential includes aspiration/infection versus lymphangitic carcinomatosis. Assessment limited by artifact from respiratory motion. Recommend clinical correlation and short-term follow-up would be helpful.  .  No measurable residual right upper lobe mass with scarring in this region.  4.  Significantly decreased thoracic adenopathy.  5.  Decreased trace residual right pleural effusion.  6.  Resolved right pneumothorax.      ASSESSMENT/PLAN    1)  Pneumonia/pneumonitis  2)  Pulmonary edema  3)  Chronic obstructive pulmonary disease  4)  Bronchiectasis  5)  Lung cancer    Oxygen as needed for a satisfactory SpO2  Bronchodilators:  Atrovent/ albuterol q 4 – 6 hours as needed  ID/Antibiotics:  trimethoprim / sulfamethoxazole, meropenem  Prednisone  Cardiac/HTN: echocardiogram, diuresis as needed  GI: Rx/ prophylaxis c PPI/H2B  Heme: Rx/VT prophylaxis   Discussed with the medical team.

## 2021-03-02 NOTE — PROGRESS NOTE ADULT - SUBJECTIVE AND OBJECTIVE BOX
Hospital Course:  86F DNR/DNI with PMH of COPD, lung adenocarcinoma (diagnosed Nov 2020, s/p radiation, currently on chemo-gefitinib and tamoxifen with Dr. Jones), recent admission (Feb 3-17) for UGIB and anemia (s/p 1U pRBCS, never scoped) also found to have chlamydia PNA (s/p treatment with azithro x 10 days), who re-presented on 02/25 with fever and cough. In ED, complained of subjective fevers, dry cough, SOB, and chills x 1 day. Denied CP or LE edema but stated that she never really improved after discharge. Went to Dr. Jones's a day before admission and received ?4L of IV fluids due to concern for sepsis and was advised to come to ED. Has a chemoport in place and this admission was febrile to 102.8, tachycardic (130s-140s), on 3L NC when she came in, saturating >92%. Treated for HAP with vanc and Zosyn, vanc now discontinued after negative MRSA swab. Patient unable to produce sputum. Was c/o increased tenderness of chemoport site, so blood cultures sent from chemoport. Pt also c/o persistent abdominal pain with eosinophilia, so stool Ova/Parasites and strongyloides antibody sent. Was tolerating 3L NC but overnight 2/28 de-satted to 87% on 3L, becoming tachycardic and hypotensive. Sats improved but again de-satted when using the bathroom 3/1. Was febrile to 100.8 and with increased O2 requirements, so transitioned from 6L NC (saturating 87%) to HFNC (saturating >91%). ICU consulted. Repeat chest x-ay showing pulmonary congestion, POCUS showing b-lines b/l, so given 20 mg IV Lasix x2 with good UOP. Blood cultures sent from [facility], and patient unable to produce sputum. ICU consulted for worsening acute hypoxic respiratory failure. CT PE showing large b/l pleural effusions and opacifications in RUL. ID with high concern for PCP, so pt started on bactrim and steroids. Zosyn switched to meropenem.     SUBJECTIVE / INTERVAL HPI: Patient seen and examined at bedside. Pt reports that she feels unwell but is comfortable on HFNC. Improving abdominal pain. Otherwise pt denies chest pain, nausea, vomiting, diarrhea, constipation, leg pain/swelling.     ROS: negative unless otherwise stated above.    VITAL SIGNS:  Vital Signs Last 24 Hrs  T(C): 36.9 (02 Mar 2021 05:35), Max: 36.9 (02 Mar 2021 05:35)  T(F): 98.4 (02 Mar 2021 05:35), Max: 98.4 (02 Mar 2021 05:35)  HR: 95 (02 Mar 2021 05:35) (95 - 115)  BP: 102/61 (02 Mar 2021 05:35) (102/61 - 147/80)  BP(mean): --  RR: 20 (02 Mar 2021 05:35) (20 - 24)  SpO2: 91% (02 Mar 2021 05:35) (90% - 100%)      03-01-21 @ 07:01  -  03-02-21 @ 07:00  --------------------------------------------------------  IN: 0 mL / OUT: 2000 mL / NET: -2000 mL        PHYSICAL EXAM:    General: WDWN  HEENT: NC/AT; PERRL, anicteric sclera; MMM  Neck: supple  Cardiovascular: +S1/S2; RRR  Respiratory: CTA B/L; no W/R/R  Gastrointestinal: soft, NT/ND; +BSx4  Extremities: WWP; no edema, clubbing or cyanosis  Vascular: 2+ radial, DP/PT pulses B/L  Neurological: AAOx3; no focal deficits    MEDICATIONS:  MEDICATIONS  (STANDING):  albuterol/ipratropium for Nebulization 3 milliLiter(s) Nebulizer every 6 hours  budesonide  80 MICROgram(s)/formoterol 4.5 MICROgram(s) Inhaler 2 Puff(s) Inhalation two times a day  enoxaparin Injectable 30 milliGRAM(s) SubCutaneous every 24 hours  folic acid 1 milliGRAM(s) Oral daily  meropenem  IVPB 1000 milliGRAM(s) IV Intermittent every 12 hours  pantoprazole  Injectable 40 milliGRAM(s) IV Push every 12 hours  predniSONE   Tablet 40 milliGRAM(s) Oral two times a day  psyllium Powder 1 Packet(s) Oral daily  senna 2 Tablet(s) Oral at bedtime  trimethoprim / sulfamethoxazole IVPB 200 milliGRAM(s) IV Intermittent every 12 hours    MEDICATIONS  (PRN):  acetaminophen   Tablet .. 650 milliGRAM(s) Oral every 6 hours PRN Mild Pain (1 - 3)      ALLERGIES:  Allergies    No Known Allergies    Intolerances        LABS:                        8.7    12.32 )-----------( 438      ( 02 Mar 2021 06:57 )             28.4     03-02    137  |  104  |  12  ----------------------------<  131<H>  4.5   |  22  |  0.86    Ca    9.0      02 Mar 2021 06:57  Phos  3.8     03-02  Mg     2.1     03-02    TPro  7.0  /  Alb  2.5<L>  /  TBili  0.3  /  DBili  x   /  AST  22  /  ALT  25  /  AlkPhos  74  03-02        CAPILLARY BLOOD GLUCOSE          RADIOLOGY & ADDITIONAL TESTS: Reviewed. Hospital Course:  86F DNR/DNI with PMH of COPD, lung adenocarcinoma (diagnosed Nov 2020, s/p radiation, currently on chemo-gefitinib and tamoxifen with Dr. Jones), recent admission (Feb 3-17) for UGIB and anemia (s/p 1U pRBCS, never scoped) also found to have chlamydia PNA (s/p treatment with azithro x 10 days), who re-presented on 02/25 with fever and cough. In ED, complained of subjective fevers, dry cough, SOB, and chills x 1 day. Denied CP or LE edema but stated that she never really improved after discharge. Went to Dr. Jonse's a day before admission and received ?4L of IV fluids due to concern for sepsis and was advised to come to ED. Has a chemoport in place and this admission was febrile to 102.8, tachycardic (130s-140s), on 3L NC when she came in, saturating >92%. Treated for HAP with vanc and Zosyn, vanc now discontinued after negative MRSA swab. Patient unable to produce sputum. Was c/o increased tenderness of chemoport site, so blood cultures sent from chemoport. Pt also c/o persistent abdominal pain with eosinophilia, so stool Ova/Parasites and strongyloides antibody sent. Was tolerating 3L NC but overnight 2/28 de-satted to 87% on 3L, becoming tachycardic and hypotensive. Sats improved but again de-satted when using the bathroom 3/1. Was febrile to 100.8 and with increased O2 requirements, so transitioned from 6L NC (saturating 87%) to HFNC (saturating >91%). ICU consulted. Repeat chest x-ay showing pulmonary congestion, POCUS showing b-lines b/l, so given 20 mg IV Lasix x2 with good UOP. Blood cultures sent from [facility], and patient unable to produce sputum. ICU consulted for worsening acute hypoxic respiratory failure. CT PE showing large b/l pleural effusions and opacifications in RUL. ID with high concern for PCP, so pt started on bactrim and steroids. Zosyn switched to meropenem.     SUBJECTIVE / INTERVAL HPI: Patient seen and examined at bedside. Pt reports that she feels unwell but is comfortable on HFNC. Improving abdominal pain. Otherwise pt denies chest pain, nausea, vomiting, diarrhea, constipation, leg pain/swelling.     ROS: negative unless otherwise stated above.    VITAL SIGNS:  Vital Signs Last 24 Hrs  T(C): 36.9 (02 Mar 2021 05:35), Max: 36.9 (02 Mar 2021 05:35)  T(F): 98.4 (02 Mar 2021 05:35), Max: 98.4 (02 Mar 2021 05:35)  HR: 95 (02 Mar 2021 05:35) (95 - 115)  BP: 102/61 (02 Mar 2021 05:35) (102/61 - 147/80)  BP(mean): --  RR: 20 (02 Mar 2021 05:35) (20 - 24)  SpO2: 91% (02 Mar 2021 05:35) (90% - 100%)      03-01-21 @ 07:01  -  03-02-21 @ 07:00  --------------------------------------------------------  IN: 0 mL / OUT: 2000 mL / NET: -2000 mL        PHYSICAL EXAM:    General: no respiratory distress or accessory muscle use, on HFNC  HEENT: MMM  Neck: supple  Cardiovascular: +S1/S2; RRR  Respiratory: crackles on R anterior lung field, L lung clear to auscultation  Gastrointestinal: soft, RLQ tenderness  Extremities: WWP; no edema, clubbing or cyanosis  Vascular: 2+ radial, DP/PT pulses B/L  Neurological: AAOx3; no focal deficits    MEDICATIONS:  MEDICATIONS  (STANDING):  albuterol/ipratropium for Nebulization 3 milliLiter(s) Nebulizer every 6 hours  budesonide  80 MICROgram(s)/formoterol 4.5 MICROgram(s) Inhaler 2 Puff(s) Inhalation two times a day  enoxaparin Injectable 30 milliGRAM(s) SubCutaneous every 24 hours  folic acid 1 milliGRAM(s) Oral daily  meropenem  IVPB 1000 milliGRAM(s) IV Intermittent every 12 hours  pantoprazole  Injectable 40 milliGRAM(s) IV Push every 12 hours  predniSONE   Tablet 40 milliGRAM(s) Oral two times a day  psyllium Powder 1 Packet(s) Oral daily  senna 2 Tablet(s) Oral at bedtime  trimethoprim / sulfamethoxazole IVPB 200 milliGRAM(s) IV Intermittent every 12 hours    MEDICATIONS  (PRN):  acetaminophen   Tablet .. 650 milliGRAM(s) Oral every 6 hours PRN Mild Pain (1 - 3)      ALLERGIES:  Allergies    No Known Allergies    Intolerances        LABS:                        8.7    12.32 )-----------( 438      ( 02 Mar 2021 06:57 )             28.4     03-02    137  |  104  |  12  ----------------------------<  131<H>  4.5   |  22  |  0.86    Ca    9.0      02 Mar 2021 06:57  Phos  3.8     03-02  Mg     2.1     03-02    TPro  7.0  /  Alb  2.5<L>  /  TBili  0.3  /  DBili  x   /  AST  22  /  ALT  25  /  AlkPhos  74  03-02        CAPILLARY BLOOD GLUCOSE          RADIOLOGY & ADDITIONAL TESTS: Reviewed.

## 2021-03-02 NOTE — PROGRESS NOTE ADULT - ASSESSMENT
Lung CA  S/P Chemo  Pneumonitis and hypoxic acute respiratory failure regardless of antimicrobial coverage  On empiric treatment for PCP - patient not a candidate for bronchoscopy or even sputum induction       RECOMMEND  Continue Meropenem  Continue PCP treatment  Try to obtain Gallium scan of the lungs.  If negative uptake characteristic of PCP, discontinue Bactrim

## 2021-03-02 NOTE — PROGRESS NOTE ADULT - PROBLEM SELECTOR PLAN 1
2/4 SIRS criteria POA with Fever and tachycardia. Patient with weakly positive UA(redmond not replaced in ED), CXR with progression of b/l infiltrates, abdominal pain, and pain at chemoport site. No new rashes or diarrhea. Concerning given patient recently teated for atypical PNA. ddx for source includes; HAP vs. Atypical vs. chemo vs. tumor burden fever vs abdominal source vs infection of chemoport  - s/p vanc d/c'ed after MRSA swab negative  - s/p azithromycin after urine legionella negative  - RVP negative  - COVID negative  - UCx only growing 50,000 CFU of enterococcus - unlikely UTI  -abdominal pain is better with bowel movements  - f/u blood cultures  - f/u cultures from chemoport  - f/u urine cultures  - f/u stool ova and parasites and strongyloides antibody in the setting of abdominal pain with eosinophilia  - tx PNA as below    #Acute Hypoxic Respiratory Failure  -hx of COPD, but not on home O2  -likely in the setting of HAP c/b fluid overload (got 4L of fluid outpatient)  -CT PE showing b/l pleural effusions and RUL consolidation  -tx PNA as below  -diurese as needed 2/4 SIRS criteria POA with Fever and tachycardia. Patient with weakly positive UA(redmond not replaced in ED), CXR with progression of b/l infiltrates, abdominal pain, and pain at chemoport site. No new rashes or diarrhea. Concerning given patient recently teated for atypical PNA. ddx for source includes; HAP vs. Atypical vs. chemo vs. tumor burden fever vs abdominal source vs infection of chemoport  - s/p vanc d/c'ed after MRSA swab negative  - s/p azithromycin after urine legionella negative  - RVP negative  - COVID negative  - UCx only growing 50,000 CFU of enterococcus - unlikely UTI  -abdominal pain is better with bowel movements  -increasing WBC with neutrophil predominance 3/2 likely in the setting of initiation of steroids on 3/1  - f/u blood cultures  - f/u cultures from chemoport  - f/u urine cultures  - f/u stool ova and parasites and strongyloides antibody in the setting of abdominal pain with eosinophilia  - tx PNA as below    #Acute Hypoxic Respiratory Failure  -hx of COPD, but not on home O2  -likely in the setting of HAP c/b fluid overload (got 4L of fluid outpatient)  -CT PE showing b/l pleural effusions and RUL consolidation  -tx PNA as below  -diurese as needed

## 2021-03-02 NOTE — PROGRESS NOTE ADULT - ASSESSMENT
The patient has a respiratory issue.  The patient had a history of chlamydia limb only on her last admission and now is being treated for Pneumocystis pneumonia.  A bronchoscopy would otherwise be indicated but the patient is a DNR DNI and unfortunately if during bronchoscopy she had to be intubated which commonly occurs in people the sick she would have had a difficult time getting off the respirator.  The therapeutic options are minimal.

## 2021-03-03 NOTE — PROGRESS NOTE ADULT - SUBJECTIVE AND OBJECTIVE BOX
INCOMPLETE    O/N Events:    Subjective/ROS: Patient seen and examined at bedside.     Denies Fever/Chills, HA, CP, SOB, n/v, changes in bowel/urinary habits.  12pt ROS otherwise negative.    VITALS  Vital Signs Last 24 Hrs  T(C): 36.2 (02 Mar 2021 20:40), Max: 36.7 (02 Mar 2021 11:01)  T(F): 97.2 (02 Mar 2021 20:40), Max: 98.1 (02 Mar 2021 11:01)  HR: 98 (02 Mar 2021 22:39) (88 - 103)  BP: 131/64 (02 Mar 2021 20:40) (108/72 - 131/64)  BP(mean): --  RR: 23 (02 Mar 2021 22:39) (20 - 24)  SpO2: 93% (02 Mar 2021 22:39) (92% - 96%)    CAPILLARY BLOOD GLUCOSE    PHYSICAL EXAM  General: no respiratory distress or accessory muscle use, on HFNC  HEENT: MMM  Neck: supple  Cardiovascular: +S1/S2; RRR  Respiratory: crackles on R anterior lung field, L lung clear to auscultation  Gastrointestinal: soft, RLQ tenderness  Extremities: WWP; no edema, clubbing or cyanosis  Vascular: 2+ radial, DP/PT pulses B/L  Neurological: AAOx3; no focal deficits    MEDICATIONS  (STANDING):  albuterol/ipratropium for Nebulization 3 milliLiter(s) Nebulizer every 6 hours  budesonide  80 MICROgram(s)/formoterol 4.5 MICROgram(s) Inhaler 2 Puff(s) Inhalation two times a day  enoxaparin Injectable 30 milliGRAM(s) SubCutaneous every 24 hours  folic acid 1 milliGRAM(s) Oral daily  meropenem  IVPB 1000 milliGRAM(s) IV Intermittent every 12 hours  pantoprazole  Injectable 40 milliGRAM(s) IV Push every 12 hours  predniSONE   Tablet 40 milliGRAM(s) Oral two times a day  psyllium Powder 1 Packet(s) Oral daily  senna 2 Tablet(s) Oral at bedtime  tamsulosin 0.4 milliGRAM(s) Oral at bedtime  trimethoprim / sulfamethoxazole IVPB 200 milliGRAM(s) IV Intermittent every 12 hours    MEDICATIONS  (PRN):  acetaminophen   Tablet .. 650 milliGRAM(s) Oral every 6 hours PRN Mild Pain (1 - 3)  benzocaine 20% Spray 1 Spray(s) Topical four times a day PRN throat pain      No Known Allergies      LABS                        8.7    12.32 )-----------( 438      ( 02 Mar 2021 06:57 )             28.4     03-02    137  |  104  |  12  ----------------------------<  131<H>  4.5   |  22  |  0.86    Ca    9.0      02 Mar 2021 06:57  Phos  3.8     03-02  Mg     2.1     03-02    TPro  7.0  /  Alb  2.5<L>  /  TBili  0.3  /  DBili  x   /  AST  22  /  ALT  25  /  AlkPhos  74  03-02        CARDIAC MARKERS ( 01 Mar 2021 06:12 )  x     / <0.01 ng/mL / 17 U/L / x     / <1.0 ng/mL          IMAGING/EKG/ETC   INCOMPLETE    O/N Events: benzocaine for throat pain.  O2 91-92% on 50L, 60% O2.    Subjective/ROS: Patient seen and examined at bedside.     Denies Fever/Chills, HA, CP, SOB, n/v, changes in bowel/urinary habits.  12pt ROS otherwise negative.    VITALS  Vital Signs Last 24 Hrs  T(C): 36.2 (02 Mar 2021 20:40), Max: 36.7 (02 Mar 2021 11:01)  T(F): 97.2 (02 Mar 2021 20:40), Max: 98.1 (02 Mar 2021 11:01)  HR: 98 (02 Mar 2021 22:39) (88 - 103)  BP: 131/64 (02 Mar 2021 20:40) (108/72 - 131/64)  BP(mean): --  RR: 23 (02 Mar 2021 22:39) (20 - 24)  SpO2: 93% (02 Mar 2021 22:39) (92% - 96%)    CAPILLARY BLOOD GLUCOSE    PHYSICAL EXAM  General: no respiratory distress or accessory muscle use, on HFNC  HEENT: MMM  Neck: supple  Cardiovascular: +S1/S2; RRR  Respiratory: crackles on R anterior lung field, L lung clear to auscultation  Gastrointestinal: soft, RLQ tenderness  Extremities: WWP; no edema, clubbing or cyanosis  Vascular: 2+ radial, DP/PT pulses B/L  Neurological: AAOx3; no focal deficits    MEDICATIONS  (STANDING):  albuterol/ipratropium for Nebulization 3 milliLiter(s) Nebulizer every 6 hours  budesonide  80 MICROgram(s)/formoterol 4.5 MICROgram(s) Inhaler 2 Puff(s) Inhalation two times a day  enoxaparin Injectable 30 milliGRAM(s) SubCutaneous every 24 hours  folic acid 1 milliGRAM(s) Oral daily  meropenem  IVPB 1000 milliGRAM(s) IV Intermittent every 12 hours  pantoprazole  Injectable 40 milliGRAM(s) IV Push every 12 hours  predniSONE   Tablet 40 milliGRAM(s) Oral two times a day  psyllium Powder 1 Packet(s) Oral daily  senna 2 Tablet(s) Oral at bedtime  tamsulosin 0.4 milliGRAM(s) Oral at bedtime  trimethoprim / sulfamethoxazole IVPB 200 milliGRAM(s) IV Intermittent every 12 hours    MEDICATIONS  (PRN):  acetaminophen   Tablet .. 650 milliGRAM(s) Oral every 6 hours PRN Mild Pain (1 - 3)  benzocaine 20% Spray 1 Spray(s) Topical four times a day PRN throat pain      No Known Allergies      LABS                        8.7    12.32 )-----------( 438      ( 02 Mar 2021 06:57 )             28.4     03-02    137  |  104  |  12  ----------------------------<  131<H>  4.5   |  22  |  0.86    Ca    9.0      02 Mar 2021 06:57  Phos  3.8     03-02  Mg     2.1     03-02    TPro  7.0  /  Alb  2.5<L>  /  TBili  0.3  /  DBili  x   /  AST  22  /  ALT  25  /  AlkPhos  74  03-02        CARDIAC MARKERS ( 01 Mar 2021 06:12 )  x     / <0.01 ng/mL / 17 U/L / x     / <1.0 ng/mL          IMAGING/EKG/ETC   INCOMPLETE    O/N Events: benzocaine for throat pain.  O2 91-92% on 50L, 60% O2.    Subjective/ROS: Patient seen and examined at bedside. Says that she is not having SOB, wants the HFNC off. Still having some throat pain. No abdominal pain.     Denies Fever/Chills, HA, CP, SOB, n/v, changes in bowel/urinary habits.  12pt ROS otherwise negative.    VITALS  Vital Signs Last 24 Hrs  T(C): 36.2 (02 Mar 2021 20:40), Max: 36.7 (02 Mar 2021 11:01)  T(F): 97.2 (02 Mar 2021 20:40), Max: 98.1 (02 Mar 2021 11:01)  HR: 98 (02 Mar 2021 22:39) (88 - 103)  BP: 131/64 (02 Mar 2021 20:40) (108/72 - 131/64)  BP(mean): --  RR: 23 (02 Mar 2021 22:39) (20 - 24)  SpO2: 93% (02 Mar 2021 22:39) (92% - 96%)    CAPILLARY BLOOD GLUCOSE    PHYSICAL EXAM  General: no respiratory distress or accessory muscle use, on HFNC  HEENT: MMM  Neck: supple  Cardiovascular: +S1/S2; RRR  Respiratory: crackles on R anterior lung field, L lung clear to auscultation  Gastrointestinal: soft, RLQ tenderness  Extremities: WWP; no edema, clubbing or cyanosis  Vascular: 2+ radial, DP/PT pulses B/L  Neurological: AAOx3; no focal deficits    MEDICATIONS  (STANDING):  albuterol/ipratropium for Nebulization 3 milliLiter(s) Nebulizer every 6 hours  budesonide  80 MICROgram(s)/formoterol 4.5 MICROgram(s) Inhaler 2 Puff(s) Inhalation two times a day  enoxaparin Injectable 30 milliGRAM(s) SubCutaneous every 24 hours  folic acid 1 milliGRAM(s) Oral daily  meropenem  IVPB 1000 milliGRAM(s) IV Intermittent every 12 hours  pantoprazole  Injectable 40 milliGRAM(s) IV Push every 12 hours  predniSONE   Tablet 40 milliGRAM(s) Oral two times a day  psyllium Powder 1 Packet(s) Oral daily  senna 2 Tablet(s) Oral at bedtime  tamsulosin 0.4 milliGRAM(s) Oral at bedtime  trimethoprim / sulfamethoxazole IVPB 200 milliGRAM(s) IV Intermittent every 12 hours    MEDICATIONS  (PRN):  acetaminophen   Tablet .. 650 milliGRAM(s) Oral every 6 hours PRN Mild Pain (1 - 3)  benzocaine 20% Spray 1 Spray(s) Topical four times a day PRN throat pain      No Known Allergies      LABS                        8.7    12.32 )-----------( 438      ( 02 Mar 2021 06:57 )             28.4     03-02    137  |  104  |  12  ----------------------------<  131<H>  4.5   |  22  |  0.86    Ca    9.0      02 Mar 2021 06:57  Phos  3.8     03-02  Mg     2.1     03-02    TPro  7.0  /  Alb  2.5<L>  /  TBili  0.3  /  DBili  x   /  AST  22  /  ALT  25  /  AlkPhos  74  03-02        CARDIAC MARKERS ( 01 Mar 2021 06:12 )  x     / <0.01 ng/mL / 17 U/L / x     / <1.0 ng/mL          IMAGING/EKG/ETC   O/N Events: benzocaine for throat pain.  O2 91-92% on 50L, 60% O2.    Subjective/ROS: Patient seen and examined at bedside. Says that she is not having SOB, wants the HFNC off. Still having some throat pain. No abdominal pain.     Denies Fever/Chills, HA, CP, SOB, n/v, changes in bowel/urinary habits.  12pt ROS otherwise negative.    VITALS  Vital Signs Last 24 Hrs  T(C): 36.2 (02 Mar 2021 20:40), Max: 36.7 (02 Mar 2021 11:01)  T(F): 97.2 (02 Mar 2021 20:40), Max: 98.1 (02 Mar 2021 11:01)  HR: 98 (02 Mar 2021 22:39) (88 - 103)  BP: 131/64 (02 Mar 2021 20:40) (108/72 - 131/64)  BP(mean): --  RR: 23 (02 Mar 2021 22:39) (20 - 24)  SpO2: 93% (02 Mar 2021 22:39) (92% - 96%)    CAPILLARY BLOOD GLUCOSE    PHYSICAL EXAM  General: no respiratory distress or accessory muscle use, on HFNC  HEENT: MMM  Neck: supple  Cardiovascular: +S1/S2; RRR  Respiratory: crackles on R anterior lung field, L lung clear to auscultation  Gastrointestinal: soft, RLQ tenderness  Extremities: WWP; no edema, clubbing or cyanosis  LABS                        7.4    12.60 )-----------( 478      ( 03 Mar 2021 07:00 )             24.1     03-03    139  |  106  |  23  ----------------------------<  128<H>  3.8   |  21<L>  |  0.99    Ca    8.8      03 Mar 2021 07:00  Phos  2.6     03-03  Mg     2.0     03-03    TPro  6.5  /  Alb  2.6<L>  /  TBili  <0.2  /  DBili  x   /  AST  18  /  ALT  21  /  AlkPhos  65  03-03              Vascular: 2+ radial, DP/PT pulses B/L  Neurological: AAOx3; no focal deficits    MEDICATIONS  (STANDING):  albuterol/ipratropium for Nebulization 3 milliLiter(s) Nebulizer every 6 hours  budesonide  80 MICROgram(s)/formoterol 4.5 MICROgram(s) Inhaler 2 Puff(s) Inhalation two times a day  enoxaparin Injectable 30 milliGRAM(s) SubCutaneous every 24 hours  folic acid 1 milliGRAM(s) Oral daily  meropenem  IVPB 1000 milliGRAM(s) IV Intermittent every 12 hours  pantoprazole  Injectable 40 milliGRAM(s) IV Push every 12 hours  predniSONE   Tablet 40 milliGRAM(s) Oral two times a day  psyllium Powder 1 Packet(s) Oral daily  senna 2 Tablet(s) Oral at bedtime  tamsulosin 0.4 milliGRAM(s) Oral at bedtime  trimethoprim / sulfamethoxazole IVPB 200 milliGRAM(s) IV Intermittent every 12 hours    MEDICATIONS  (PRN):  acetaminophen   Tablet .. 650 milliGRAM(s) Oral every 6 hours PRN Mild Pain (1 - 3)  benzocaine 20% Spray 1 Spray(s) Topical four times a day PRN throat pain      No Known Allergies       O/N Events: benzocaine for throat pain.  O2 91-92% on 50L, 60% O2.    Subjective/ROS: Patient seen and examined at bedside. Says that she is not having SOB, wants the HFNC off. Still having some throat pain. No abdominal pain.     Denies Fever/Chills, HA, CP, SOB, n/v, changes in bowel/urinary habits.  12pt ROS otherwise negative.    UPDATE: Patient seen at bedside 1:30pm. Weaned Oxygen from 60/50 -> 40/40 satting 96%, patient states no subjective SOB    VITALS  Vital Signs Last 24 Hrs  T(C): 36.2 (02 Mar 2021 20:40), Max: 36.7 (02 Mar 2021 11:01)  T(F): 97.2 (02 Mar 2021 20:40), Max: 98.1 (02 Mar 2021 11:01)  HR: 98 (02 Mar 2021 22:39) (88 - 103)  BP: 131/64 (02 Mar 2021 20:40) (108/72 - 131/64)  BP(mean): --  RR: 23 (02 Mar 2021 22:39) (20 - 24)  SpO2: 93% (02 Mar 2021 22:39) (92% - 96%)    CAPILLARY BLOOD GLUCOSE    PHYSICAL EXAM  General: no respiratory distress or accessory muscle use, on HFNC  HEENT: MMM  Neck: supple  Cardiovascular: +S1/S2; RRR  Respiratory: crackles on R anterior lung field, L lung clear to auscultation  Gastrointestinal: soft, RLQ tenderness  Extremities: WWP; no edema, clubbing or cyanosis  LABS                        7.4    12.60 )-----------( 478      ( 03 Mar 2021 07:00 )             24.1     03-03    139  |  106  |  23  ----------------------------<  128<H>  3.8   |  21<L>  |  0.99    Ca    8.8      03 Mar 2021 07:00  Phos  2.6     03-03  Mg     2.0     03-03    TPro  6.5  /  Alb  2.6<L>  /  TBili  <0.2  /  DBili  x   /  AST  18  /  ALT  21  /  AlkPhos  65  03-03              Vascular: 2+ radial, DP/PT pulses B/L  Neurological: AAOx3; no focal deficits    MEDICATIONS  (STANDING):  albuterol/ipratropium for Nebulization 3 milliLiter(s) Nebulizer every 6 hours  budesonide  80 MICROgram(s)/formoterol 4.5 MICROgram(s) Inhaler 2 Puff(s) Inhalation two times a day  enoxaparin Injectable 30 milliGRAM(s) SubCutaneous every 24 hours  folic acid 1 milliGRAM(s) Oral daily  meropenem  IVPB 1000 milliGRAM(s) IV Intermittent every 12 hours  pantoprazole  Injectable 40 milliGRAM(s) IV Push every 12 hours  predniSONE   Tablet 40 milliGRAM(s) Oral two times a day  psyllium Powder 1 Packet(s) Oral daily  senna 2 Tablet(s) Oral at bedtime  tamsulosin 0.4 milliGRAM(s) Oral at bedtime  trimethoprim / sulfamethoxazole IVPB 200 milliGRAM(s) IV Intermittent every 12 hours    MEDICATIONS  (PRN):  acetaminophen   Tablet .. 650 milliGRAM(s) Oral every 6 hours PRN Mild Pain (1 - 3)  benzocaine 20% Spray 1 Spray(s) Topical four times a day PRN throat pain      No Known Allergies

## 2021-03-03 NOTE — PROGRESS NOTE ADULT - SUBJECTIVE AND OBJECTIVE BOX
the patient continues to have some respiratory distress and is angry enough to express her frustration on not getting better.  The patient obviously has lost a great deal of weight and she is unable with her respiratory efforts to get enough calories in.  Her major complaint today is sore throat.    Appears malnourished.  The patient is on high-flow nasal oxygen with nebulizer.  Skin: No rash noted. No lesions. Poor turgor.  Eyes: Eyes with PERRLA. EOM's in­tact Sclera clear,no jaundice noted. Conjunctiva clear.   Ears, Nose,Mouth & Throat: Teeth normal, no missing teeth or dentures present. No ulcers. No thrush Auditory canals normal.  Description: %% Oropharynx without lesions. Mucositis None.    The patient has  Evidence for thrush over the upper aerodigestive tract.  Neck: No thyromegaly. No lymphadenopathy noted.   Cardiovascular: Normal sinus rhythm S1, S2 without murmurs. No Extrasystole.   Breast: Normal. No breast masses appreciated. No axillary lymphadenopathy. No skin changes. No nipple discharge present. No dimpling  noted.  Chest/Respiratory: No dyspnea. No rhonchi. No wheezing. No decreased breath sounds at bases. No rales. Unlabored breathing. No  accessory muscle use.   Gastrointestinal: Abdomen is soft and non­tender to touch. No hepatomegaly noted. No splenomegaly noted. No abdominal pain or  guarding noted. No abdominal mass(es) appreciated. No ascites noted.  There is soft stool in the rectal vault with no evidence of fecal  impaction.  Genito­Urinary: Deferred.  Hematologic/Lymphatic: No petechiae noted. No purpura noted. No lymphadenopathy noted.   Musculoskeletal: No Kyphosis. No weakness. No spinal tenderness on percussion. No pain on hip rotation reported by patient. Normal  range of motion in upper and lower extremities.   Neurologic: Affect normal. No tremors observed. Biceps, brachioradialis, patellar and plantar reflexes symmetrical. Normal  proprioception/position sense Cranial nerves intact.   Psychiatry: Oriented to person/place/time. No anxiety observed during exam. No signs of depression noted during exam. Intact short­term  and long­term memory.   Extremities: No clubbing noted to fingers or toes. No cyanosis noted. Capillary refill less than two seconds. No edema. No calf  tenderness reported by patient.

## 2021-03-03 NOTE — PROGRESS NOTE ADULT - PROBLEM SELECTOR PLAN 2
I suspect effusions are 2/T her very low albumin.  Suggest nutrition consult.  Lasix PRN but will be of limited valve unless protein addressed and improved.  No further w/u planned.

## 2021-03-03 NOTE — PROGRESS NOTE ADULT - PROBLEM SELECTOR PLAN 3
hx of UGIB, no recent melena or hemtochezia. Hgb unchanged from recent discharge. Fe studies c/w anemia of chronic disease  - maintain active type and screen  - pt refusing transfusions  - heme following apprecaite recs.

## 2021-03-03 NOTE — PROGRESS NOTE ADULT - SUBJECTIVE AND OBJECTIVE BOX
INTERVAL HPI/OVERNIGHT EVENTS:    Continues to improve    MEDICATIONS  (STANDING):  albuterol/ipratropium for Nebulization 3 milliLiter(s) Nebulizer every 6 hours  budesonide  80 MICROgram(s)/formoterol 4.5 MICROgram(s) Inhaler 2 Puff(s) Inhalation two times a day  enoxaparin Injectable 30 milliGRAM(s) SubCutaneous every 24 hours  folic acid 1 milliGRAM(s) Oral daily  meropenem  IVPB 1000 milliGRAM(s) IV Intermittent every 12 hours  pantoprazole  Injectable 40 milliGRAM(s) IV Push every 12 hours  predniSONE   Tablet 40 milliGRAM(s) Oral two times a day  psyllium Powder 1 Packet(s) Oral daily  senna 2 Tablet(s) Oral at bedtime  tamsulosin 0.4 milliGRAM(s) Oral at bedtime  trimethoprim / sulfamethoxazole IVPB 200 milliGRAM(s) IV Intermittent every 12 hours    MEDICATIONS  (PRN):  acetaminophen   Tablet .. 650 milliGRAM(s) Oral every 6 hours PRN Mild Pain (1 - 3)  benzocaine 20% Spray 1 Spray(s) Topical four times a day PRN throat pain      Allergies    No Known Allergies    EXAM  Vital Signs Last 24 Hrs  T(C): 36.6 (03 Mar 2021 11:01), Max: 36.6 (03 Mar 2021 11:01)  T(F): 97.8 (03 Mar 2021 11:01), Max: 97.8 (03 Mar 2021 11:01)  HR: 94 (03 Mar 2021 16:32) (88 - 98)  BP: 129/63 (03 Mar 2021 11:01) (123/55 - 131/64)  BP(mean): --  RR: 20 (03 Mar 2021 16:32) (18 - 23)  SpO2: 95% (03 Mar 2021 16:32) (93% - 97%)  Still on high flow O2  More awake and alert  No rash  No thrush  RR  Chest with better air movement but decreases BSs at bases  Abd soft NT  No diarrhea        LABS:                        7.4    12.60 )-----------( 478      ( 03 Mar 2021 07:00 )             24.1     03-03    139  |  106  |  23  ----------------------------<  128<H>  3.8   |  21<L>  |  0.99    Ca    8.8      03 Mar 2021 07:00  Phos  2.6     03-03  Mg     2.0     03-03    TPro  6.5  /  Alb  2.6<L>  /  TBili  <0.2  /  DBili  x   /  AST  18  /  ALT  21  /  AlkPhos  65  03-03        MICROBIOLOGY:    Culture - Blood (02.28.21 @ 13:10)    Specimen Source: .Blood Blood-Catheter    Culture Results:   No growth at 3 days.

## 2021-03-03 NOTE — PROGRESS NOTE ADULT - PROBLEM SELECTOR PLAN 1
2/4 SIRS criteria POA with Fever and tachycardia. Patient with weakly positive UA(redmond not replaced in ED), CXR with progression of b/l infiltrates, abdominal pain, and pain at chemoport site. No new rashes or diarrhea. Concerning given patient recently teated for atypical PNA. ddx for source includes; HAP vs. Atypical vs. chemo vs. tumor burden fever vs abdominal source vs infection of chemoport  - s/p vanc d/c'ed after MRSA swab negative, s/p azithromycin after urine legionella negative, RVP negative, COVID negative  - UCx only growing 50,000 CFU of enterococcus - unlikely UTI  - abdominal pain is better with bowel movements  -increasing WBC with neutrophil predominance 3/2 likely in the setting of initiation of steroids on 3/1  - f/u blood cultures  - f/u cultures from chemoport  - f/u urine cultures  - f/u stool ova and parasites and strongyloides antibody in the setting of abdominal pain with eosinophilia  - tx PNA as below    #Acute Hypoxic Respiratory Failure  -hx of COPD, but not on home O2  -likely in the setting of HAP c/b fluid overload (got 4L of fluid outpatient)  -CT PE showing b/l pleural effusions and RUL consolidation  -tx PNA as below  -diurese as needed 2/4 SIRS criteria POA with Fever and tachycardia. Patient with weakly positive UA(redmond not replaced in ED), CXR with progression of b/l infiltrates, abdominal pain, and pain at chemoport site. No new rashes or diarrhea. Concerning given patient recently teated for atypical PNA. ddx for source includes; HAP vs. Atypical vs. chemo vs. tumor burden fever vs abdominal source vs infection of chemoport  - s/p vanc d/c'ed after MRSA swab negative, s/p azithromycin after urine legionella negative, RVP negative, COVID negative  - UCx only growing 50,000 CFU of enterococcus - unlikely UTI  - abdominal pain is better with bowel movements  -increasing WBC with neutrophil predominance 3/2 likely in the setting of initiation of steroids on 3/1  - f/u blood cultures  - f/u cultures from chemoport  - f/u urine cultures  - f/u strongyloides antibody in the setting of abdominal pain with eosinophilia  - tx PNA as below    #Acute Hypoxic Respiratory Failure  -hx of COPD, but not on home O2  -likely in the setting of HAP c/b fluid overload (got 4L of fluid outpatient)  -CT PE showing b/l pleural effusions and RUL consolidation  -tx PNA as below  -diurese as needed

## 2021-03-03 NOTE — CHART NOTE - NSCHARTNOTEFT_GEN_A_CORE
Admitting Diagnosis:   Patient is a 86y old  Female who presents with a chief complaint of Sepsis (03 Mar 2021 09:43)    PAST MEDICAL & SURGICAL HISTORY:  Chronic obstructive pulmonary disease, unspecified COPD type    Hearing loss  left ear - sees Dr. Garces ENT    Basal cell carcinoma  x2 - upper lip and arm    Chronic obstructive pulmonary disease    Osteoporosis  Osteoporosis    H/O breast biopsy  &gt; 5 years ago, mass was benign and self-resolved    History of appendectomy    History of cataract surgery    Current Nutrition Order:  regular+Ensure Clear BID (each 240kcal/8gpro)    PO Intake: Good (%) [   ]  Fair (50-75%) [  ] Poor (<25%) [  x ]    GI Issues:   Pt denies N/V/D/C  Last BM -on senna, metamucil     Pain:  Pt reporting sore throat at present, pain with swallowing     Skin Integrity:  No edema noted    Labs:       139  |  106  |  23  ----------------------------<  128<H>  3.8   |  21<L>  |  0.99    Ca    8.8      03 Mar 2021 07:00  Phos  2.6     03-03  Mg     2.0     03-03    TPro  6.5  /  Alb  2.6<L>  /  TBili  <0.2  /  DBili  x   /  AST  18  /  ALT  21  /  AlkPhos  65  03-03    CAPILLARY BLOOD GLUCOSE    Medications:  MEDICATIONS  (STANDING):  albuterol/ipratropium for Nebulization 3 milliLiter(s) Nebulizer every 6 hours  budesonide  80 MICROgram(s)/formoterol 4.5 MICROgram(s) Inhaler 2 Puff(s) Inhalation two times a day  enoxaparin Injectable 30 milliGRAM(s) SubCutaneous every 24 hours  folic acid 1 milliGRAM(s) Oral daily  meropenem  IVPB 1000 milliGRAM(s) IV Intermittent every 12 hours  pantoprazole  Injectable 40 milliGRAM(s) IV Push every 12 hours  predniSONE   Tablet 40 milliGRAM(s) Oral two times a day  psyllium Powder 1 Packet(s) Oral daily  senna 2 Tablet(s) Oral at bedtime  tamsulosin 0.4 milliGRAM(s) Oral at bedtime  trimethoprim / sulfamethoxazole IVPB 200 milliGRAM(s) IV Intermittent every 12 hours    MEDICATIONS  (PRN):  acetaminophen   Tablet .. 650 milliGRAM(s) Oral every 6 hours PRN Mild Pain (1 - 3)  benzocaine 20% Spray 1 Spray(s) Topical four times a day PRN throat pain    Anthropometric Measurements:    Weight Assessment:  · Height for BMI (INCHES)	60 Inch(s)  · Weight for BMI (lbs)	90 lb  · Weight for BMI (kg)	40.8 kg  · Body Mass Index	17.6   · Ideal Body Weight (lbs)	100   · Ideal Body Weight (kg)	45.3     Weight Change: Per chart review, pt was 91lbs 2020, 2/3 83lbs/90lbs?-conflicting wts. ABW 90lbs. Recommend trend biweekly wts.    Estimated energy needs:   ABW used for calculations as pt between % of IBW (90%), adjusted for PCM, CA, age  30-35kcal/k-1428kcal  1.4-1.6g/k-65gprotein  30-35kcal/k-1428ml fluid    Subjective:   86F PMH COPD, lung adenocarcinoma s/p radiation, now on chemo recently admitted for UGIB and Chlamydia PNA a/f sepsis 2/2 suspected HAP c/b fluid overload.    Pt seen in room, sitting up in bed. Pt currently on regular diet +Ensure Clear BID (each 240kcal/8gpro). Pt reports poor PO intake at present 2/2 sore throat. Ordered for hurricaine spray. Pt reports softer things are easier to get down, observed pt eating 2 yogurts for breakfast this AM. Pt reports not liking Ensure, not receptive to ONS at present. Will continue to encourage PO intake. Please see recs below. Will continue to follow per RD protocol.    Previous Nutrition Diagnosis: Suspect severe PCM RT chronic illness AEB NFPE findings     Active [  x ]  Resolved [   ]    If resolved, new PES:     Goal: Meet >75% EER, show no further s/s PCM    Recommendations:  1. Continue regular diet, encourage PO intake and monitor receptiveness to ONS   2. Greek yogurt and ice cream with meals   3. Trend biweekly wts  4. Pain and bowel regimens per team  5. RD to remain available prn    Education: Encouraged PO intake     Risk Level: High [  x ] Moderate [   ] Low [   ]

## 2021-03-03 NOTE — PROGRESS NOTE ADULT - ASSESSMENT
Acute respiratory failure with hypoxia and pneumonitis of unclear etiology  Lung CA, s/p chemotherapy  Patient immunocompromised  Component of pulmonary edema / fluid overload    RECOMMEND  Continue Meropenem and IV TMP-SMX for now  Gallium scan of the lungs  If uptake not characteristic of PCP, d/c IV Bactrim  F/U chest imaging to assess whether pleural effusion should be drained vs improving with diuretics

## 2021-03-03 NOTE — PROGRESS NOTE ADULT - ASSESSMENT
The patient has trouble getting enough calories to help her with the respiratory efforts which are extreme.  The patient has oral thrush which is interfering with her caloric intake and giving her a very sore throat.

## 2021-03-03 NOTE — PROGRESS NOTE ADULT - PROBLEM SELECTOR PLAN 6
Elevated Lfts possibly in setting of sepsis, although given slight uptrending coags concern for early stages of liver failure  - now downtrending    #Elevated coags  -Only INR elevated, likely in the setting of malnutrition  -f/u nutrition recs Elevated Lfts possibly in setting of sepsis, although given slight uptrending coags concern for early stages of liver failure  RESOLVED  - now downtrending    #Elevated coags  -Only INR elevated, likely in the setting of malnutrition  -f/u nutrition recs

## 2021-03-03 NOTE — PROGRESS NOTE ADULT - SUBJECTIVE AND OBJECTIVE BOX
INTERVAL HISTORY:  Echo completed  	  MEDICATIONS:  tamsulosin 0.4 milliGRAM(s) Oral at bedtime    meropenem  IVPB 1000 milliGRAM(s) IV Intermittent every 12 hours  trimethoprim / sulfamethoxazole IVPB 200 milliGRAM(s) IV Intermittent every 12 hours    albuterol/ipratropium for Nebulization 3 milliLiter(s) Nebulizer every 6 hours  budesonide  80 MICROgram(s)/formoterol 4.5 MICROgram(s) Inhaler 2 Puff(s) Inhalation two times a day    acetaminophen   Tablet .. 650 milliGRAM(s) Oral every 6 hours PRN    pantoprazole  Injectable 40 milliGRAM(s) IV Push every 12 hours  psyllium Powder 1 Packet(s) Oral daily  senna 2 Tablet(s) Oral at bedtime    predniSONE   Tablet 40 milliGRAM(s) Oral two times a day    benzocaine 20% Spray 1 Spray(s) Topical four times a day PRN  enoxaparin Injectable 30 milliGRAM(s) SubCutaneous every 24 hours  folic acid 1 milliGRAM(s) Oral daily        PHYSICAL EXAM:  T(C): 36.4 (03-03-21 @ 06:11), Max: 36.7 (03-02-21 @ 11:01)  HR: 97 (03-03-21 @ 06:11) (88 - 103)  BP: 123/55 (03-03-21 @ 06:11) (108/72 - 131/64)  RR: 20 (03-03-21 @ 06:11) (20 - 24)  SpO2: 94% (03-03-21 @ 06:11) (92% - 96%)  Wt(kg): --  I&O's Summary    02 Mar 2021 07:01  -  03 Mar 2021 07:00  --------------------------------------------------------  IN: 0 mL / OUT: 1300 mL / NET: -1300 mL          Appearance: Normal	  Cardiovascular: Normal S1 S2, No JVD, No murmurs, No edema  Respiratory: Lungs clear to auscultation	Reduced BS at bases  Psychiatry: A & O x 3, Mood & affect angry today  Gastrointestinal:  Soft, Non-tender, + BS	  Skin: No rashes, No ecchymoses, No cyanosis  Neurologic: Non-focal  Extremities:   No clubbing, cyanosis or edema  Vascular: Peripheral pulses palpable 2+ bilaterally    TELEMETRY: 	    ECG:  	  RADIOLOGY:   DIAGNOSTIC TESTING:  [ ] Echocardiogram:  [ ]  Catheterization:  [ ] Stress Test:    OTHER: 	    LABS:	 	    CARDIAC MARKERS:                                  7.4    12.60 )-----------( 478      ( 03 Mar 2021 07:00 )             24.1     03-02    137  |  104  |  12  ----------------------------<  131<H>  4.5   |  22  |  0.86    Ca    9.0      02 Mar 2021 06:57  Phos  3.8     03-02  Mg     2.1     03-02    TPro  7.0  /  Alb  2.5<L>  /  TBili  0.3  /  DBili  x   /  AST  22  /  ALT  25  /  AlkPhos  74  03-02    proBNP:   Lipid Profile:   HgA1c:   TSH:     ASSESSMENT/PLAN:

## 2021-03-03 NOTE — PROGRESS NOTE ADULT - PROBLEM SELECTOR PLAN 4
[facility]. likely due to IVF  - trend BMPs

## 2021-03-03 NOTE — PROGRESS NOTE ADULT - PROBLEM SELECTOR PLAN 2
Concern for post-obstructive PNA vs. HAP vs. atypical pna. 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   - pt with worsening O2 status on 3/1, now requiring HFNC  - zosyn 3.375mg q8hr switched to meropenem 1000mg BID  - ID concerned for PCP pna, started on bactrim 200mg BID and prednisone 40mg BID  - obtain sputum culture if cough becomes productive  - f/u fungitell, galactomannan  - consider gallium scan if needed Concern for post-obstructive PNA vs. HAP vs. atypical pna. 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   - pt with worsening O2 status on 3/1, now requiring HFNC, will wean as tolerated  - zosyn 3.375mg q8hr switched to meropenem 1000mg BID  - ID concerned for PCP pna, started on bactrim 200mg BID and prednisone 40mg BID  - obtain sputum culture if cough becomes productive  - f/u fungitell, galactomannan  - consider gallium scan if needed

## 2021-03-03 NOTE — PROGRESS NOTE ADULT - SUBJECTIVE AND OBJECTIVE BOX
CC/ HPI Patient is an 86 year old female with chronic obstructive pulmonary disease, lung cancer, poorly differentiated adenocarcinoma, status post radiation therapy, Dec 2020, discharged after hospital stay for severe anemia, upper GI bleed, RVP positive Chlamydia pneumonia, (2.06.21) status post Zithromax, cefepime, now admitted with progressive pulmonary infiltrates, sepsis, pulmonary edema, seen this morning the patient is resting comfortably on HFNC, denies acute respiratory complaint.      PAST MEDICAL & SURGICAL HISTORY:  Chronic obstructive pulmonary disease, unspecified COPD type  Hearing loss  left ear - sees Dr. Garces ENT  Basal cell carcinoma  x2 - upper lip and arm  Osteoporosis  H/O breast biopsy, 5 years ago, mass was benign and self-resolved  History of appendectomy  History of cataract surgery    SOCHX:   tobacco,  -  alcohol      FAMILY HISTORY: FA/MO  - contributory     ROS reviewed below with positive findings marked (+) :  GEN:  fever, chills ENT: tracheostomy,   epistaxis,  sinusitis COR: CAD, CHF,  HTN, dysrhythmia PUL: +COPD, ILD, asthma, pneumonia GI: PEG, dysphagia, hemorrhage, other HALLIE: kidney disease, electrolyte disorder HEM:  anemia, thrombus, coagulopathy, cancer ENDO:  thyroid disease, diabetes mellitus CNS:  dementia, stroke, seizure, PSY:  depression, anxiety, other      MEDICATIONS  (STANDING):  albuterol/ipratropium for Nebulization 3 milliLiter(s) Nebulizer every 6 hours  budesonide  80 MICROgram(s)/formoterol 4.5 MICROgram(s) Inhaler 2 Puff(s) Inhalation two times a day  enoxaparin Injectable 30 milliGRAM(s) SubCutaneous every 24 hours  folic acid 1 milliGRAM(s) Oral daily  meropenem  IVPB 1000 milliGRAM(s) IV Intermittent every 12 hours  pantoprazole  Injectable 40 milliGRAM(s) IV Push every 12 hours  predniSONE   Tablet 40 milliGRAM(s) Oral two times a day  psyllium Powder 1 Packet(s) Oral daily  senna 2 Tablet(s) Oral at bedtime  tamsulosin 0.4 milliGRAM(s) Oral at bedtime  trimethoprim / sulfamethoxazole IVPB 200 milliGRAM(s) IV Intermittent every 12 hours    MEDICATIONS  (PRN):  acetaminophen   Tablet .. 650 milliGRAM(s) Oral every 6 hours PRN Mild Pain (1 - 3)      Vital Signs Last 24 Hrs  T(C): 36.7 (02 Mar 2021 11:01), Max: 36.9 (02 Mar 2021 05:35)  T(F): 98.1 (02 Mar 2021 11:01), Max: 98.4 (02 Mar 2021 05:35)  HR: 96 (02 Mar 2021 11:01) (95 - 115)  BP: 108/72 (02 Mar 2021 11:01) (102/61 - 124/65)  RR: 20 (02 Mar 2021 11:01) (20 - 22)  SpO2: 93% (02 Mar 2021 11:01) (90% - 100%)    GENERAL:         comfortable,  - distress.  HEENT:            - trauma,  - icterus,  - injection,  - nasal discharge.  NECK:              - jugular venous distention  LYMPH:           - lymphadenopathy, - masses.  RESP:             + crackles,   - rhonchi,   - wheezes.   COR:                S1S2   - gallops,  - rubs.  ABD:                bowel sounds,   soft, - tender, - distended.  EXT/MSC:         - cyanosis,  - clubbing, - edema.    NEURO:           + alert and oriented                             8.7    12.32 )-----------( 438      ( 02 Mar 2021 06:57 )             28.4     03-02      137  |  104  |  12  ----------------------------<  131<H>  4.5   |  22  |  0.86    Ca    9.0      02 Mar 2021 06:57  Phos  3.8     03-02  Mg     2.1     03-02    TPro  7.0  /  Alb  2.5<L>  /  TBili  0.3  /  DBili  x   /  AST  22  /  ALT  25  /  AlkPhos  74  03-02      Echo Complete w/o Contrast w/ Doppler (03.02.21)  1.  Normal left and right ventricular size and systolic function.   2. Grade I left ventricular diastolic dysfunction.   3. Mild tricuspid regurgitation.   4. No evidence of pulmonary hypertension, pulmonary artery systolic pressure is 27 mmHg.   5. No pericardial effusion.      CT Angio Chest PE Protocol w/ IV Cont (03.01.21) Pulmonary arteries: No evidence of pulmonary embolus to the segmental level.  Aorta: No aneurysm of the aorta. No dissection of the aorta.  Veins: MediPort terminates in the SVC.  Lungs: Opacities in the right upper lobe may represent atelectasis/pneumonia versus neoplasm.  Diffuse ground-glass opacities throughout the lung fields may represent pulmonary edema or diffuse pneumonia.  Pleural spaces: Moderate to large bilateral pleural effusions..  Heart: Unremarkable. No cardiomegaly. No pericardial effusion.  Lymph nodes: Unremarkable. No enlarged lymph nodes.  Kidneys and ureters: Simple cysts in the left kidney.  Bones/joints: Compression fracture T9 of unknown age.  Soft tissues: Unremarkable.   Other findings: Motion artifact degrades images.    IMPRESSION:  1. No evidence of pulmonary embolus to the segmental level.  2. No aneurysm of the aorta.  3. No dissection of the aorta.  4. Moderate to large bilateral pleural effusions.  5. Opacities in the right upper lobe may represent atelectasis/pneumonia versus neoplasm.    CT Angio Chest PE Protocol w/ IV Cont (02.03.21)  Pulmonary arteries: No pulmonary embolus seen.  Lungs and large airways: Respiratory motion limits assessment of fine pulmonary parenchymal detail. Trace dependent secretions distal trachea. No nodular and no tracheal or endobronchial lesion.  Bilateral apical pleural parenchymal scarring. Unchanged mild peribronchial wall thickening and cylindrical bronchiectasis bilaterally. Scattered nodular groundglass opacities bilaterally, more confluent in right upper lobe posterior segment with apparent interlobular septal thickening. Mild compressive atelectasis right lower lobe. Few nodular consolidative opacities left lower lobe, subsegmental atelectasis versus mild consolidation. No measurable residual masses in right upper lobe anterior segment with linear opacity in this region probably representing scarring. New few scattered bilateral pulmonary nodules, for example:  *  Right upper lobe subpleural 0.5 cm (series 6 image 160).  *  Right upper lobe anterior segment 0.5 cm (series 6 image 132)  *  Left lower lobe 0.5 cm (series 6 image 204)  Pleura:  Resolved right pneumothorax. Decreased trace residual right pleural effusion.  Mediastinum and hilar regions: Significantly decreased mediastinal and hilar adenopathy, for example right lower paratracheal 1.1 x 0.8 cm, previously 4.4 x 2.9 cm. No significant change mildly prominent subcarinal node 1.2 cm short axis. Resolved right lower cervical adenopathy.  Heart and pericardium:  Heart size is normal. No pericardial effusion.  Vessels:  No aneurysm. Mild scattered calcific atherosclerosis.  Chest wall and lower neck:  Normal.  Upper abdomen: Unchanged few left renal cysts, largest 3.5 cm.  Bones: No suspicious lytic or blastic lesion. Mild degenerative changes thoracic spine with prominent degenerative Schmorl's node inferior endplate T9 vertebral body.    IMPRESSION:  1.  No pulmonary embolus.  2.  New few small scattered nodular opacities bilaterally with more confluent groundglass density and suspected interlobular septal thickening in posterior right upper lobe. Differential includes aspiration/infection versus lymphangitic carcinomatosis. Assessment limited by artifact from respiratory motion. Recommend clinical correlation and short-term follow-up would be helpful.  .  No measurable residual right upper lobe mass with scarring in this region.  4.  Significantly decreased thoracic adenopathy.  5.  Decreased trace residual right pleural effusion.  6.  Resolved right pneumothorax.      ASSESSMENT/PLAN    1)  Pneumonia/pneumonitis  2)  Pleural effusions  3)  Chronic obstructive pulmonary disease  4)  Bronchiectasis  5)  Lung cancer    Oxygen as needed for a satisfactory SpO2  If pleural effusions do not improve would consider thoracentesis.  Bronchodilators:  Atrovent/ albuterol q 4 – 6 hours as needed  ID/Antibiotics:  trimethoprim / sulfamethoxazole, meropenem  Prednisone  Cardiac/HTN: diuresis as needed  GI: Rx/ prophylaxis c PPI/H2B  Heme: Rx/VT prophylaxis   Discuss with the medical team.

## 2021-03-04 NOTE — CONSULT NOTE ADULT - SUBJECTIVE AND OBJECTIVE BOX
86 year old female with chronic obstructive pulmonary disease, lung cancer, poorly differentiated adenocarcinoma, status post radiation therapy, Dec 2020, discharged after hospital stay for severe anemia, upper GI bleed, RVP positive Chlamydia pneumonia, (2.06.21) status post Zithromax, cefepime, now admitted with progressive pulmonary infiltrates, sepsis, pulmonary edema. IR consulted for thoracentesis.       Clinical History: UTI    Yes    Handoff    MEWS Score    Chronic obstructive pulmonary disease, unspecified COPD type    Hearing loss    Basal cell carcinoma    Chronic obstructive pulmonary disease    Osteoporosis    Chronic obstructive pulmonary disease, unspecified COPD type    UTI (urinary tract infection) due to urinary indwelling catheter    Pleural effusion due to another disorder    SOB (shortness of breath)    Prophylactic measure    Goals of care, counseling/discussion    Lung cancer    Urinary retention    Transaminitis    Malnutrition    Metabolic acidosis    Anemia    Pneumonia    Severe sepsis    H/O breast biopsy    History of appendectomy    History of cataract surgery    History of cataract surgery    SEPSIS    8    [email or web address]        Meds:acetaminophen   Tablet .. 650 milliGRAM(s) Oral every 6 hours PRN  albuterol/ipratropium for Nebulization 3 milliLiter(s) Nebulizer every 6 hours  benzocaine 20% Spray 1 Spray(s) Topical four times a day PRN  budesonide  80 MICROgram(s)/formoterol 4.5 MICROgram(s) Inhaler 2 Puff(s) Inhalation two times a day  enoxaparin Injectable 30 milliGRAM(s) SubCutaneous every 24 hours  folic acid 1 milliGRAM(s) Oral daily  meropenem  IVPB 1000 milliGRAM(s) IV Intermittent every 12 hours  pantoprazole  Injectable 40 milliGRAM(s) IV Push every 12 hours  predniSONE   Tablet 40 milliGRAM(s) Oral two times a day  psyllium Powder 1 Packet(s) Oral daily  senna 2 Tablet(s) Oral at bedtime  tamsulosin 0.4 milliGRAM(s) Oral at bedtime  trimethoprim / sulfamethoxazole IVPB 200 milliGRAM(s) IV Intermittent every 12 hours      Allergies:No Known Allergies        Labs:                           7.5    10.49 )-----------( 479      ( 04 Mar 2021 07:45 )             25.2       03-04    135  |  101  |  26<H>  ----------------------------<  144<H>  4.0   |  22  |  1.10    Ca    9.2      04 Mar 2021 07:45  Phos  2.7     03-04  Mg     2.2     03-04    TPro  6.5  /  Alb  2.6<L>  /  TBili  0.2  /  DBili  x   /  AST  25  /  ALT  25  /  AlkPhos  58  03-04          Imaging Findings: reviewed, large bilateral effusions

## 2021-03-04 NOTE — PROGRESS NOTE ADULT - PROBLEM SELECTOR PLAN 4
[facility]. likely due to IVF  - trend BMPs

## 2021-03-04 NOTE — PROGRESS NOTE ADULT - SUBJECTIVE AND OBJECTIVE BOX
INTERVAL HPI/OVERNIGHT EVENTS:      MEDICATIONS  (STANDING):  albuterol/ipratropium for Nebulization 3 milliLiter(s) Nebulizer every 6 hours  budesonide  80 MICROgram(s)/formoterol 4.5 MICROgram(s) Inhaler 2 Puff(s) Inhalation two times a day  enoxaparin Injectable 30 milliGRAM(s) SubCutaneous every 24 hours  folic acid 1 milliGRAM(s) Oral daily  meropenem  IVPB 1000 milliGRAM(s) IV Intermittent every 12 hours  pantoprazole  Injectable 40 milliGRAM(s) IV Push every 12 hours  predniSONE   Tablet 40 milliGRAM(s) Oral two times a day  psyllium Powder 1 Packet(s) Oral daily  senna 2 Tablet(s) Oral at bedtime  tamsulosin 0.4 milliGRAM(s) Oral at bedtime  trimethoprim / sulfamethoxazole IVPB 200 milliGRAM(s) IV Intermittent every 12 hours    MEDICATIONS  (PRN):  acetaminophen   Tablet .. 650 milliGRAM(s) Oral every 6 hours PRN Mild Pain (1 - 3)  benzocaine 20% Spray 1 Spray(s) Topical four times a day PRN throat pain      Allergies    No Known Allergies          Vital Signs Last 24 Hrs  T(C): 36.7 (04 Mar 2021 13:41), Max: 36.8 (04 Mar 2021 05:26)  T(F): 98 (04 Mar 2021 13:41), Max: 98.3 (04 Mar 2021 05:26)  HR: 101 (04 Mar 2021 13:41) (88 - 112)  BP: 113/64 (04 Mar 2021 13:41) (96/50 - 113/66)  BP(mean): --  RR: 16 (04 Mar 2021 13:41) (16 - 20)  SpO2: 93% (04 Mar 2021 13:41) (92% - 97%)      LABS:                        7.5    10.49 )-----------( 479      ( 04 Mar 2021 07:45 )             25.2     03-04    135  |  101  |  26<H>  ----------------------------<  144<H>  4.0   |  22  |  1.10    Ca    9.2      04 Mar 2021 07:45  Phos  2.7     03-04  Mg     2.2     03-04    TPro  6.5  /  Alb  2.6<L>  /  TBili  0.2  /  DBili  x   /  AST  25  /  ALT  25  /  AlkPhos  58  03-04          MICROBIOLOGY:    RADIOLOGY & ADDITIONAL STUDIES:     INTERVAL HPI/OVERNIGHT EVENTS:    S/P pleural fluid drainage by IR  PO intake improved    MEDICATIONS  (STANDING):  albuterol/ipratropium for Nebulization 3 milliLiter(s) Nebulizer every 6 hours  budesonide  80 MICROgram(s)/formoterol 4.5 MICROgram(s) Inhaler 2 Puff(s) Inhalation two times a day  enoxaparin Injectable 30 milliGRAM(s) SubCutaneous every 24 hours  folic acid 1 milliGRAM(s) Oral daily  meropenem  IVPB 1000 milliGRAM(s) IV Intermittent every 12 hours  pantoprazole  Injectable 40 milliGRAM(s) IV Push every 12 hours  predniSONE   Tablet 40 milliGRAM(s) Oral two times a day  psyllium Powder 1 Packet(s) Oral daily  senna 2 Tablet(s) Oral at bedtime  tamsulosin 0.4 milliGRAM(s) Oral at bedtime  trimethoprim / sulfamethoxazole IVPB 200 milliGRAM(s) IV Intermittent every 12 hours    MEDICATIONS  (PRN):  acetaminophen   Tablet .. 650 milliGRAM(s) Oral every 6 hours PRN Mild Pain (1 - 3)  benzocaine 20% Spray 1 Spray(s) Topical four times a day PRN throat pain      Allergies    No Known Allergies      EXAM  Vital Signs Last 24 Hrs  T(C): 36.7 (04 Mar 2021 13:41), Max: 36.8 (04 Mar 2021 05:26)  T(F): 98 (04 Mar 2021 13:41), Max: 98.3 (04 Mar 2021 05:26)  HR: 101 (04 Mar 2021 13:41) (88 - 112)  BP: 113/64 (04 Mar 2021 13:41) (96/50 - 113/66)  BP(mean): --  RR: 16 (04 Mar 2021 13:41) (16 - 20)  SpO2: 93% (04 Mar 2021 13:41) (92% - 97%)  On high flow O2  Appears more awake and alert  No rash  No thrush  RR  Chest with fair air movement  Abd soft ND NT    LABS:                        7.5    10.49 )-----------( 479      ( 04 Mar 2021 07:45 )             25.2     03-04    135  |  101  |  26<H>  ----------------------------<  144<H>  4.0   |  22  |  1.10    Ca    9.2      04 Mar 2021 07:45  Phos  2.7     03-04  Mg     2.2     03-04    TPro  6.5  /  Alb  2.6<L>  /  TBili  0.2  /  DBili  x   /  AST  25  /  ALT  25  /  AlkPhos  58  03-04      MICROBIOLOGY:    Culture - Blood (02.28.21 @ 13:10)    Specimen Source: .Blood Blood-Catheter    Culture Results:   No growth at 4 days.        RADIOLOGY & ADDITIONAL STUDIES:    Xray Chest 1 View- PORTABLE-Routine (Xray Chest 1 View- PORTABLE-Routine .) (03.04.21 @ 17:47) >  EXAM:  XR CHEST PORTABLE ROUTINE 1V                          PROCEDURE DATE:  03/04/2021        INTERPRETATION:  Clinical History: Thoracentesis    Frontal examination of the chest demonstrates the heart to be within normal limits in transverse diameter. No interval change lung infiltrates in comparison to prior examination of the chest 3/1/2021. Right Port-A-Cath noted with tip overlying junction superior vena cava and right atrium. No evidence of pneumothorax    IMPRESSION: Bilateral infiltrates

## 2021-03-04 NOTE — PROGRESS NOTE ADULT - ASSESSMENT
Gallium scan of the lungs  Continue Meropenem  Continue Bactrim Pneumonitis of unclear etiology  Hypoxic respiratory failure  Immunocompromised following chemotherapy for lung CA  Treated empirically for PCP/PJP      RECOMMEND  Gallium scan of the lungs.  If no characteristic for PCP uptake, D/C Bactrim  Continue Meropenem  Continue Bactrim for now  Check CMV viral load

## 2021-03-04 NOTE — PROGRESS NOTE ADULT - SUBJECTIVE AND OBJECTIVE BOX
INCOMPLETE    O/N Events:    Subjective/ROS: Patient seen and examined at bedside.     Denies Fever/Chills, HA, CP, SOB, n/v, changes in bowel/urinary habits.  12pt ROS otherwise negative.    VITALS  Vital Signs Last 24 Hrs  T(C): 36.8 (04 Mar 2021 05:26), Max: 36.8 (04 Mar 2021 05:26)  T(F): 98.3 (04 Mar 2021 05:26), Max: 98.3 (04 Mar 2021 05:26)  HR: 89 (04 Mar 2021 05:43) (88 - 103)  BP: 96/50 (04 Mar 2021 05:26) (96/50 - 129/63)  BP(mean): --  RR: 20 (04 Mar 2021 05:43) (18 - 22)  SpO2: 95% (04 Mar 2021 05:43) (93% - 97%)    CAPILLARY BLOOD GLUCOSE          PHYSICAL EXAM  General: NAD  Head: NC/AT; MMM; PERRL; EOMI;  Neck: Supple; no JVD  Respiratory: CTAB; no wheezes/rales/rhonchi  Cardiovascular: Regular rhythm/rate; S1/S2+, no murmurs, rubs gallops   Gastrointestinal: Soft; NTND; bowel sounds normal and present  Extremities: WWP; no edema/cyanosis  Neurological: A&Ox3, CNII-XII grossly intact; no obvious focal deficits    MEDICATIONS  (STANDING):  albuterol/ipratropium for Nebulization 3 milliLiter(s) Nebulizer every 6 hours  budesonide  80 MICROgram(s)/formoterol 4.5 MICROgram(s) Inhaler 2 Puff(s) Inhalation two times a day  enoxaparin Injectable 30 milliGRAM(s) SubCutaneous every 24 hours  folic acid 1 milliGRAM(s) Oral daily  meropenem  IVPB 1000 milliGRAM(s) IV Intermittent every 12 hours  pantoprazole  Injectable 40 milliGRAM(s) IV Push every 12 hours  predniSONE   Tablet 40 milliGRAM(s) Oral two times a day  psyllium Powder 1 Packet(s) Oral daily  senna 2 Tablet(s) Oral at bedtime  tamsulosin 0.4 milliGRAM(s) Oral at bedtime  trimethoprim / sulfamethoxazole IVPB 200 milliGRAM(s) IV Intermittent every 12 hours    MEDICATIONS  (PRN):  acetaminophen   Tablet .. 650 milliGRAM(s) Oral every 6 hours PRN Mild Pain (1 - 3)  benzocaine 20% Spray 1 Spray(s) Topical four times a day PRN throat pain      No Known Allergies      LABS                        7.4    12.60 )-----------( 478      ( 03 Mar 2021 07:00 )             24.1     03-03    139  |  106  |  23  ----------------------------<  128<H>  3.8   |  21<L>  |  0.99    Ca    8.8      03 Mar 2021 07:00  Phos  2.6     03-03  Mg     2.0     03-03    TPro  6.5  /  Alb  2.6<L>  /  TBili  <0.2  /  DBili  x   /  AST  18  /  ALT  21  /  AlkPhos  65  03-03                IMAGING/EKG/ETC   INCOMPLETE    O/N Events:    Subjective/ROS: Patient seen and examined at bedside. Says that she is not having SOB, chronic cough. Having some nasal congestion.     Denies Fever/Chills, HA, CP, SOB, n/v, changes in bowel/urinary habits.  12pt ROS otherwise negative.    VITALS  Vital Signs Last 24 Hrs  T(C): 36.8 (04 Mar 2021 05:26), Max: 36.8 (04 Mar 2021 05:26)  T(F): 98.3 (04 Mar 2021 05:26), Max: 98.3 (04 Mar 2021 05:26)  HR: 89 (04 Mar 2021 05:43) (88 - 103)  BP: 96/50 (04 Mar 2021 05:26) (96/50 - 129/63)  BP(mean): --  RR: 20 (04 Mar 2021 05:43) (18 - 22)  SpO2: 95% (04 Mar 2021 05:43) (93% - 97%)    CAPILLARY BLOOD GLUCOSE    PHYSICAL EXAM  General: NAD, elderly woman  Head: NC/AT; MMM; PERRL; EOMI;  Neck: Supple; no JVD  Respiratory: Crackles on R anterior lung field, L lung clear to auscultation  Cardiovascular: Regular rhythm/rate; S1/S2+, no murmurs, rubs gallops   Gastrointestinal: Soft; NTND; bowel sounds normal and present  Extremities: WWP; no edema/cyanosis  Neurological: A&Ox3, CNII-XII grossly intact; no obvious focal deficits    MEDICATIONS  (STANDING):  albuterol/ipratropium for Nebulization 3 milliLiter(s) Nebulizer every 6 hours  budesonide  80 MICROgram(s)/formoterol 4.5 MICROgram(s) Inhaler 2 Puff(s) Inhalation two times a day  enoxaparin Injectable 30 milliGRAM(s) SubCutaneous every 24 hours  folic acid 1 milliGRAM(s) Oral daily  meropenem  IVPB 1000 milliGRAM(s) IV Intermittent every 12 hours  pantoprazole  Injectable 40 milliGRAM(s) IV Push every 12 hours  predniSONE   Tablet 40 milliGRAM(s) Oral two times a day  psyllium Powder 1 Packet(s) Oral daily  senna 2 Tablet(s) Oral at bedtime  tamsulosin 0.4 milliGRAM(s) Oral at bedtime  trimethoprim / sulfamethoxazole IVPB 200 milliGRAM(s) IV Intermittent every 12 hours    MEDICATIONS  (PRN):  acetaminophen   Tablet .. 650 milliGRAM(s) Oral every 6 hours PRN Mild Pain (1 - 3)  benzocaine 20% Spray 1 Spray(s) Topical four times a day PRN throat pain      No Known Allergies      LABS                        7.4    12.60 )-----------( 478      ( 03 Mar 2021 07:00 )             24.1     03-03    139  |  106  |  23  ----------------------------<  128<H>  3.8   |  21<L>  |  0.99    Ca    8.8      03 Mar 2021 07:00  Phos  2.6     03-03  Mg     2.0     03-03    TPro  6.5  /  Alb  2.6<L>  /  TBili  <0.2  /  DBili  x   /  AST  18  /  ALT  21  /  AlkPhos  65  03-03                IMAGING/EKG/ETC   O/N Events: Refused lovenox    Subjective/ROS: Patient seen and examined at bedside. Says that she is not having SOB, chronic cough. Having some nasal congestion. Throat pain has improved.     Denies Fever/Chills, HA, CP, SOB, n/v, changes in bowel/urinary habits.  12pt ROS otherwise negative.    VITALS  Vital Signs Last 24 Hrs  T(C): 36.8 (04 Mar 2021 05:26), Max: 36.8 (04 Mar 2021 05:26)  T(F): 98.3 (04 Mar 2021 05:26), Max: 98.3 (04 Mar 2021 05:26)  HR: 89 (04 Mar 2021 05:43) (88 - 103)  BP: 96/50 (04 Mar 2021 05:26) (96/50 - 129/63)  BP(mean): --  RR: 20 (04 Mar 2021 05:43) (18 - 22)  SpO2: 95% (04 Mar 2021 05:43) (93% - 97%)    CAPILLARY BLOOD GLUCOSE    PHYSICAL EXAM  General: NAD, elderly woman  Head: NC/AT; MMM; PERRL; EOMI;  Neck: Supple; no JVD  Respiratory: Crackles on R anterior lung field, L lung clear to auscultation  Cardiovascular: Regular rhythm/rate; S1/S2+, no murmurs, rubs gallops   Gastrointestinal: Soft; NTND; bowel sounds normal and present  Extremities: WWP; no edema/cyanosis  Neurological: A&Ox3, CNII-XII grossly intact; no obvious focal deficits    MEDICATIONS  (STANDING):  albuterol/ipratropium for Nebulization 3 milliLiter(s) Nebulizer every 6 hours  budesonide  80 MICROgram(s)/formoterol 4.5 MICROgram(s) Inhaler 2 Puff(s) Inhalation two times a day  enoxaparin Injectable 30 milliGRAM(s) SubCutaneous every 24 hours  folic acid 1 milliGRAM(s) Oral daily  meropenem  IVPB 1000 milliGRAM(s) IV Intermittent every 12 hours  pantoprazole  Injectable 40 milliGRAM(s) IV Push every 12 hours  predniSONE   Tablet 40 milliGRAM(s) Oral two times a day  psyllium Powder 1 Packet(s) Oral daily  senna 2 Tablet(s) Oral at bedtime  tamsulosin 0.4 milliGRAM(s) Oral at bedtime  trimethoprim / sulfamethoxazole IVPB 200 milliGRAM(s) IV Intermittent every 12 hours    MEDICATIONS  (PRN):  acetaminophen   Tablet .. 650 milliGRAM(s) Oral every 6 hours PRN Mild Pain (1 - 3)  benzocaine 20% Spray 1 Spray(s) Topical four times a day PRN throat pain      No Known Allergies      LABS                        7.5    10.49 )-----------( 479      ( 04 Mar 2021 07:45 )             25.2     03-04    135  |  101  |  26<H>  ----------------------------<  144<H>  4.0   |  22  |  1.10    Ca    9.2      04 Mar 2021 07:45  Phos  2.7     03-04  Mg     2.2     03-04    TPro  6.5  /  Alb  2.6<L>  /  TBili  0.2  /  DBili  x   /  AST  25  /  ALT  25  /  AlkPhos  58  03-04                   O/N Events: Refused lovenox    Subjective/ROS: Patient seen and examined at bedside. Says that she is not having SOB, chronic cough. Having some nasal congestion. Throat pain has improved.     UPDATE: Patient seen after Dr. Jones recommendations for thoracentesis of effusion. Patient says that she is willing to undergo thoracentesis if Dr. Jones believes it is necessary. Explained to patient the risk of bleeding, infection, erythema, and pneumothorax around site and she stated she understands the risks and would want it done if necessary.    Denies Fever/Chills, HA, CP, SOB, n/v, changes in bowel/urinary habits.  12pt ROS otherwise negative.    VITALS  Vital Signs Last 24 Hrs  T(C): 36.8 (04 Mar 2021 05:26), Max: 36.8 (04 Mar 2021 05:26)  T(F): 98.3 (04 Mar 2021 05:26), Max: 98.3 (04 Mar 2021 05:26)  HR: 89 (04 Mar 2021 05:43) (88 - 103)  BP: 96/50 (04 Mar 2021 05:26) (96/50 - 129/63)  BP(mean): --  RR: 20 (04 Mar 2021 05:43) (18 - 22)  SpO2: 95% (04 Mar 2021 05:43) (93% - 97%)    CAPILLARY BLOOD GLUCOSE    PHYSICAL EXAM  General: NAD, elderly woman  Head: NC/AT; MMM; PERRL; EOMI;  Neck: Supple; no JVD  Respiratory: Crackles on R anterior lung field, L lung clear to auscultation  Cardiovascular: Regular rhythm/rate; S1/S2+, no murmurs, rubs gallops   Gastrointestinal: Soft; NTND; bowel sounds normal and present  Extremities: WWP; no edema/cyanosis  Neurological: A&Ox3, CNII-XII grossly intact; no obvious focal deficits    MEDICATIONS  (STANDING):  albuterol/ipratropium for Nebulization 3 milliLiter(s) Nebulizer every 6 hours  budesonide  80 MICROgram(s)/formoterol 4.5 MICROgram(s) Inhaler 2 Puff(s) Inhalation two times a day  enoxaparin Injectable 30 milliGRAM(s) SubCutaneous every 24 hours  folic acid 1 milliGRAM(s) Oral daily  meropenem  IVPB 1000 milliGRAM(s) IV Intermittent every 12 hours  pantoprazole  Injectable 40 milliGRAM(s) IV Push every 12 hours  predniSONE   Tablet 40 milliGRAM(s) Oral two times a day  psyllium Powder 1 Packet(s) Oral daily  senna 2 Tablet(s) Oral at bedtime  tamsulosin 0.4 milliGRAM(s) Oral at bedtime  trimethoprim / sulfamethoxazole IVPB 200 milliGRAM(s) IV Intermittent every 12 hours    MEDICATIONS  (PRN):  acetaminophen   Tablet .. 650 milliGRAM(s) Oral every 6 hours PRN Mild Pain (1 - 3)  benzocaine 20% Spray 1 Spray(s) Topical four times a day PRN throat pain      No Known Allergies      LABS                        7.5    10.49 )-----------( 479      ( 04 Mar 2021 07:45 )             25.2     03-04    135  |  101  |  26<H>  ----------------------------<  144<H>  4.0   |  22  |  1.10    Ca    9.2      04 Mar 2021 07:45  Phos  2.7     03-04  Mg     2.2     03-04    TPro  6.5  /  Alb  2.6<L>  /  TBili  0.2  /  DBili  x   /  AST  25  /  ALT  25  /  AlkPhos  58  03-04

## 2021-03-04 NOTE — PROGRESS NOTE ADULT - SUBJECTIVE AND OBJECTIVE BOX
CC/ HPI Patient is an 86 year old female with chronic obstructive pulmonary disease, lung cancer, poorly differentiated adenocarcinoma, status post radiation therapy, Dec 2020, discharged after hospital stay for severe anemia, upper GI bleed, RVP positive Chlamydia pneumonia, (2.06.21) status post Zithromax, cefepime, now admitted with progressive pulmonary infiltrates, sepsis, pulmonary edema, seen this morning the patient is resting comfortably on HFNC, denies acute complaint.      PAST MEDICAL & SURGICAL HISTORY:  Chronic obstructive pulmonary disease, unspecified COPD type  Hearing loss  left ear - sees Dr. Garces ENT  Basal cell carcinoma  x2 - upper lip and arm  Osteoporosis  H/O breast biopsy, 5 years ago, mass was benign and self-resolved  History of appendectomy  History of cataract surgery    SOCHX:   tobacco,  -  alcohol      FAMILY HISTORY: FA/MO  - contributory     ROS reviewed below with positive findings marked (+) :  GEN:  fever, chills ENT: tracheostomy,   epistaxis,  sinusitis COR: CAD, CHF,  HTN, dysrhythmia PUL: +COPD, ILD, asthma, pneumonia GI: PEG, dysphagia, hemorrhage, other HALLIE: kidney disease, electrolyte disorder HEM:  anemia, thrombus, coagulopathy, cancer ENDO:  thyroid disease, diabetes mellitus CNS:  dementia, stroke, seizure, PSY:  depression, anxiety, other      MEDICATIONS  (STANDING):  albuterol/ipratropium for Nebulization 3 milliLiter(s) Nebulizer every 6 hours  budesonide  80 MICROgram(s)/formoterol 4.5 MICROgram(s) Inhaler 2 Puff(s) Inhalation two times a day  enoxaparin Injectable 30 milliGRAM(s) SubCutaneous every 24 hours  folic acid 1 milliGRAM(s) Oral daily  meropenem  IVPB 1000 milliGRAM(s) IV Intermittent every 12 hours  pantoprazole  Injectable 40 milliGRAM(s) IV Push every 12 hours  predniSONE   Tablet 40 milliGRAM(s) Oral two times a day  psyllium Powder 1 Packet(s) Oral daily  senna 2 Tablet(s) Oral at bedtime  tamsulosin 0.4 milliGRAM(s) Oral at bedtime  trimethoprim / sulfamethoxazole IVPB 200 milliGRAM(s) IV Intermittent every 12 hours    MEDICATIONS  (PRN):  acetaminophen   Tablet .. 650 milliGRAM(s) Oral every 6 hours PRN Mild Pain (1 - 3)  benzocaine 20% Spray 1 Spray(s) Topical four times a day PRN throat pain      Vital Signs Last 24 Hrs  T(C): 36.8 (04 Mar 2021 05:26), Max: 36.8 (04 Mar 2021 05:26)  T(F): 98.3 (04 Mar 2021 05:26), Max: 98.3 (04 Mar 2021 05:26)  HR: 112 (04 Mar 2021 09:14) (88 - 112)  BP: 96/50 (04 Mar 2021 05:26) (96/50 - 129/63)  RR: 18 (04 Mar 2021 09:14) (18 - 22)  SpO2: 92% (04 Mar 2021 09:14) (92% - 97%)    GENERAL:         comfortable,  - distress.  HEENT:            - trauma,  - icterus,  - injection,  - nasal discharge.  NECK:              - jugular venous distention, - thyromegaly.  LRESP:               Decreased breath sound bases, - rales/rhonchi/wheezes.   COR:                S1S2   - gallops,  - rubs.  ABD:                bowel sounds,   soft, - tender, - distended.  EXT/MSC:         - cyanosis,  - clubbing, - edema.    NEURO:           + alert and oriented                             7.5    10.49 )-----------( 479      ( 04 Mar 2021 07:45 )             25.2     03-04    135  |  101  |  26<H>  ----------------------------<  144<H>  4.0   |  22  |  1.10    Ca    9.2      04 Mar 2021 07:45  Phos  2.7     03-04  Mg     2.2     03-04        Echo Complete w/o Contrast w/ Doppler (03.02.21)  1.  Normal left and right ventricular size and systolic function.   2. Grade I left ventricular diastolic dysfunction.   3. Mild tricuspid regurgitation.   4. No evidence of pulmonary hypertension, pulmonary artery systolic pressure is 27 mmHg.   5. No pericardial effusion.      CT Angio Chest PE Protocol w/ IV Cont (03.01.21) Pulmonary arteries: No evidence of pulmonary embolus to the segmental level.  Aorta: No aneurysm of the aorta. No dissection of the aorta.  Veins: MediPort terminates in the SVC.  Lungs: Opacities in the right upper lobe may represent atelectasis/pneumonia versus neoplasm.  Diffuse ground-glass opacities throughout the lung fields may represent pulmonary edema or diffuse pneumonia.  Pleural spaces: Moderate to large bilateral pleural effusions..  Heart: Unremarkable. No cardiomegaly. No pericardial effusion.  Lymph nodes: Unremarkable. No enlarged lymph nodes.  Kidneys and ureters: Simple cysts in the left kidney.  Bones/joints: Compression fracture T9 of unknown age.  Soft tissues: Unremarkable.   Other findings: Motion artifact degrades images.    IMPRESSION:  1. No evidence of pulmonary embolus to the segmental level.  2. No aneurysm of the aorta.  3. No dissection of the aorta.  4. Moderate to large bilateral pleural effusions.  5. Opacities in the right upper lobe may represent atelectasis/pneumonia versus neoplasm.    CT Angio Chest PE Protocol w/ IV Cont (02.03.21)  Pulmonary arteries: No pulmonary embolus seen.  Lungs and large airways: Respiratory motion limits assessment of fine pulmonary parenchymal detail. Trace dependent secretions distal trachea. No nodular and no tracheal or endobronchial lesion.  Bilateral apical pleural parenchymal scarring. Unchanged mild peribronchial wall thickening and cylindrical bronchiectasis bilaterally. Scattered nodular groundglass opacities bilaterally, more confluent in right upper lobe posterior segment with apparent interlobular septal thickening. Mild compressive atelectasis right lower lobe. Few nodular consolidative opacities left lower lobe, subsegmental atelectasis versus mild consolidation. No measurable residual masses in right upper lobe anterior segment with linear opacity in this region probably representing scarring. New few scattered bilateral pulmonary nodules, for example:  *  Right upper lobe subpleural 0.5 cm (series 6 image 160).  *  Right upper lobe anterior segment 0.5 cm (series 6 image 132)  *  Left lower lobe 0.5 cm (series 6 image 204)  Pleura:  Resolved right pneumothorax. Decreased trace residual right pleural effusion.  Mediastinum and hilar regions: Significantly decreased mediastinal and hilar adenopathy, for example right lower paratracheal 1.1 x 0.8 cm, previously 4.4 x 2.9 cm. No significant change mildly prominent subcarinal node 1.2 cm short axis. Resolved right lower cervical adenopathy.  Heart and pericardium:  Heart size is normal. No pericardial effusion.  Vessels:  No aneurysm. Mild scattered calcific atherosclerosis.  Chest wall and lower neck:  Normal.  Upper abdomen: Unchanged few left renal cysts, largest 3.5 cm.  Bones: No suspicious lytic or blastic lesion. Mild degenerative changes thoracic spine with prominent degenerative Schmorl's node inferior endplate T9 vertebral body.    IMPRESSION:  1.  No pulmonary embolus.  2.  New few small scattered nodular opacities bilaterally with more confluent groundglass density and suspected interlobular septal thickening in posterior right upper lobe. Differential includes aspiration/infection versus lymphangitic carcinomatosis. Assessment limited by artifact from respiratory motion. Recommend clinical correlation and short-term follow-up would be helpful.  .  No measurable residual right upper lobe mass with scarring in this region.  4.  Significantly decreased thoracic adenopathy.  5.  Decreased trace residual right pleural effusion.  6.  Resolved right pneumothorax.      ASSESSMENT/PLAN    1)  Pneumonia/pneumonitis  2)  Pleural effusions  3)  Chronic obstructive pulmonary disease  4)  Bronchiectasis  5)  Lung cancer    Oxygen as needed for a satisfactory SpO2  IR evaluation for thoracentesis if within GOC  Bronchodilators:  Atrovent/ albuterol q 4 – 6 hours as needed  ID/Antibiotics:  trimethoprim / sulfamethoxazole, meropenem  Prednisone  Cardiac/HTN: diuresis as needed  GI: Rx/ prophylaxis c PPI/H2B  Heme: Rx/VT prophylaxis   Discussed with the medical team.

## 2021-03-04 NOTE — PROGRESS NOTE ADULT - PROBLEM SELECTOR PLAN 10
DVT: Lovenox  GI: not inidcated    F: s/p 2.5L  E: K > 4, Mg> 2  N: PO    Code status: DNR/DNI DVT: Lovenox  GI: not indicated    F: s/p 2.5L  E: K > 4, Mg> 2  N: PO    Code status: DNR/DNI

## 2021-03-04 NOTE — CONSULT NOTE ADULT - ASSESSMENT
Assessment: 86 year old female with chronic obstructive pulmonary disease, lung cancer, poorly differentiated adenocarcinoma, status post radiation therapy, Dec 2020, discharged after hospital stay for severe anemia, upper GI bleed, RVP positive Chlamydia pneumonia, (2.06.21) status post Zithromax, cefepime, now admitted with progressive pulmonary infiltrates, sepsis, pulmonary edema.  Now for diagnostic and therapeutic thoracentesis. Case reviewed with Dr. Tsang, will plan for procedure.         Recommendations:  Lovenox has been held prior to procedure.         Communicated with: primary team

## 2021-03-04 NOTE — PROGRESS NOTE ADULT - PROBLEM SELECTOR PLAN 1
2/4 SIRS criteria POA with Fever and tachycardia. Patient with weakly positive UA(redmond not replaced in ED), CXR with progression of b/l infiltrates, abdominal pain, and pain at chemoport site. No new rashes or diarrhea. Concerning given patient recently teated for atypical PNA. ddx for source includes; HAP vs. Atypical vs. chemo vs. tumor burden fever vs abdominal source vs infection of chemoport  - s/p vanc d/c'ed after MRSA swab negative, s/p azithromycin after urine legionella negative, RVP negative, COVID negative  - UCx only growing 50,000 CFU of enterococcus - unlikely UTI  - abdominal pain is better with bowel movements  -increasing WBC with neutrophil predominance 3/2 likely in the setting of initiation of steroids on 3/1  - f/u blood cultures  - f/u cultures from chemoport  - f/u urine cultures  - f/u strongyloides antibody in the setting of abdominal pain with eosinophilia  - tx PNA as below    #Acute Hypoxic Respiratory Failure  -hx of COPD, but not on home O2  -likely in the setting of HAP c/b fluid overload (got 4L of fluid outpatient)  -CT PE showing b/l pleural effusions and RUL consolidation  -tx PNA as below  -diurese as needed, s/p lasix 20 IV 3/3PM 2/4 SIRS criteria POA with Fever and tachycardia. Patient with weakly positive UA(redmond not replaced in ED), CXR with progression of b/l infiltrates, abdominal pain, and pain at chemoport site. No new rashes or diarrhea. Concerning given patient recently teated for atypical PNA. ddx for source includes; HAP vs. Atypical vs. chemo vs. tumor burden fever vs abdominal source vs infection of chemoport  - s/p vanc d/c'ed after MRSA swab negative, s/p azithromycin after urine legionella negative, RVP negative, COVID negative  - UCx only growing 50,000 CFU of enterococcus - unlikely UTI  - f/u blood cultures, f/u cultures from chemoport, f/u urine cultures  - f/u strongyloides antibody in the setting of abdominal pain with eosinophilia  - tx PNA as below    #Acute Hypoxic Respiratory Failure  -hx of COPD, but not on home O2  -likely in the setting of HAP c/b fluid overload (got 4L of fluid outpatient)  -CT PE showing b/l pleural effusions and RUL consolidation  -tx PNA as below  -diurese as needed, s/p lasix 20 IV 3/3PM

## 2021-03-04 NOTE — PROGRESS NOTE ADULT - SUBJECTIVE AND OBJECTIVE BOX
the patient feels much better today with no sore throat.  She is presently comfortable on high-flow nasal prongs.    Appears malnourished.  The patient is on high-flow nasal oxygen with nebulizer.  Skin: No rash noted. No lesions. Poor turgor.  Eyes: Eyes with PERRLA. EOM's in­tact Sclera clear,no jaundice noted. Conjunctiva clear.   Ears, Nose,Mouth & Throat: Teeth normal, no missing teeth or dentures present. No ulcers. No thrush Auditory canals normal.  Description: %% Oropharynx without lesions. Mucositis None.    The patient has  Evidence for thrush over the upper aerodigestive tract.  Neck: No thyromegaly. No lymphadenopathy noted.   Cardiovascular: Normal sinus rhythm S1, S2 without murmurs. No Extrasystole.   Breast: Normal. No breast masses appreciated. No axillary lymphadenopathy. No skin changes. No nipple discharge present. No dimpling  noted.  Chest/Respiratory: No dyspnea. No rhonchi. No wheezing. No decreased breath sounds at bases. No rales. Unlabored breathing. No  accessory muscle use.   Gastrointestinal: Abdomen is soft and non­tender to touch. No hepatomegaly noted. No splenomegaly noted. No abdominal pain or  guarding noted. No abdominal mass(es) appreciated. No ascites noted.  There is soft stool in the rectal vault with no evidence of fecal  impaction.  Genito­Urinary: Deferred.  Hematologic/Lymphatic: No petechiae noted. No purpura noted. No lymphadenopathy noted.   Musculoskeletal: No Kyphosis. No weakness. No spinal tenderness on percussion. No pain on hip rotation reported by patient. Normal  range of motion in upper and lower extremities.   Neurologic: Affect normal. No tremors observed. Biceps, brachioradialis, patellar and plantar reflexes symmetrical. Normal  proprioception/position sense Cranial nerves intact.   Psychiatry: Oriented to person/place/time. No anxiety observed during exam. No signs of depression noted during exam. Intact short­term  and long­term memory.   Extremities: No clubbing noted to fingers or toes. No cyanosis noted. Capillary refill less than two seconds. No edema. No calf  tenderness reported by patient.

## 2021-03-04 NOTE — PROGRESS NOTE ADULT - PROBLEM SELECTOR PLAN 2
Concern for post-obstructive PNA vs. HAP vs. atypical pna. 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   - pt with worsening O2 status on 3/1, now requiring HFNC, will wean as tolerated  - zosyn 3.375mg q8hr switched to meropenem 1000mg BID  - ID concerned for PCP pna, started on bactrim 200mg BID and prednisone 40mg BID  - obtain sputum culture if cough becomes productive  - f/u fungitell, galactomannan  - consider gallium scan if needed  - Will try to wean from HFNC to 6L NC

## 2021-03-04 NOTE — PROGRESS NOTE ADULT - PROBLEM SELECTOR PLAN 3
hx of UGIB, no recent melena or hemtochezia. Hgb unchanged from recent discharge. Fe studies c/w anemia of chronic disease  - maintain active type and screen  - pt refusing transfusions  - heme following apprecaite recs. hx of UGIB, no recent melena or hemtochezia. Hgb unchanged from recent discharge. Fe studies c/w anemia of chronic disease  - maintain active type and screen  - pt has refused transfusions   - heme following apprecaite recs.

## 2021-03-04 NOTE — PROGRESS NOTE ADULT - ASSESSMENT
The patient has extraordinarily high caloric burn with her respiratory efforts.  She has very little fat.  She has new pleural effusions of a moderate degree.  We have no good information to know exactly what we are treating at this time.

## 2021-03-04 NOTE — PROGRESS NOTE ADULT - PROBLEM SELECTOR PLAN 6
Elevated Lfts possibly in setting of sepsis, although given slight uptrending coags concern for early stages of liver failure  RESOLVED  - now downtrending    #Elevated coags  -Only INR elevated, likely in the setting of malnutrition  -f/u nutrition recs

## 2021-03-05 NOTE — DISCHARGE NOTE PROVIDER - PROVIDER TOKENS
PROVIDER:[TOKEN:[[id number]],FOLLOWUP:[2 weeks],ESTABLISHEDPATIENT:[T]],PROVIDER:[TOKEN:[[id number]],ESTABLISHEDPATIENT:[T]] PROVIDER:[TOKEN:[[id number]],SCHEDULEDAPPT:[03/15/2021],SCHEDULEDAPPTTIME:[02:00 PM],ESTABLISHEDPATIENT:[T]],PROVIDER:[TOKEN:[[id number]],FOLLOWUP:[2 weeks],ESTABLISHEDPATIENT:[T]] PROVIDER:[TOKEN:[[id number]],SCHEDULEDAPPT:[03/15/2021],SCHEDULEDAPPTTIME:[02:00 PM],ESTABLISHEDPATIENT:[T]],PROVIDER:[TOKEN:[[id number]],FOLLOWUP:[2 weeks],ESTABLISHEDPATIENT:[T]],PROVIDER:[TOKEN:[[id number]],FOLLOWUP:[2 weeks]]

## 2021-03-05 NOTE — PROGRESS NOTE ADULT - PROBLEM SELECTOR PLAN 2
Concern for post-obstructive PNA vs. HAP vs. atypical pna. 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   - pt with worsening O2 status on 3/1, now requiring HFNC, will wean as tolerated  - zosyn 3.375mg q8hr switched to meropenem 1000mg BID  - ID concerned for PCP pna, started on bactrim 200mg BID and prednisone 40mg BID  - obtain sputum culture if cough becomes productive  - fungitell neg, f/u galactomannan  - consider gallium scan   - Will try to wean from HFNC to 6L NC

## 2021-03-05 NOTE — CHART NOTE - NSCHARTNOTEFT_GEN_A_CORE
Spoke with patient 1pm regarding blood transfusions, patients hemoglobin today 6.8. Patient is refusing transfusions, stating that she "only wants her own blood." Explained the importance of transfusion to allow for adequate perfusion of tissues, risk of not getting transfusion includes reduced blood flow, organ damage, organ failure, shortness of breath, confusion, lightheaded/dizziness, shock, and possibly death. Patient stated that she understood these risks and did not want blood transfusion. Vital signs stable and no active signs/symptoms of bleeding. Will continue to monitor

## 2021-03-05 NOTE — PROGRESS NOTE ADULT - SUBJECTIVE AND OBJECTIVE BOX
CC/ HPI Patient is an 86 year old female with chronic obstructive pulmonary disease, lung cancer, poorly differentiated adenocarcinoma, status post radiation therapy, Dec 2020, discharged after hospital stay for severe anemia, upper GI bleed, RVP positive Chlamydia pneumonia, (2.06.21) status post Zithromax, cefepime, now admitted with progressive pulmonary infiltrates, sepsis, pulmonary edema, seen this morning the patient is resting comfortably on nasal cannula, denies acute complaint.      PAST MEDICAL & SURGICAL HISTORY:  Chronic obstructive pulmonary disease, unspecified COPD type  Hearing loss  left ear - sees Dr. Garces ENT  Basal cell carcinoma  x2 - upper lip and arm  Osteoporosis  H/O breast biopsy, 5 years ago, mass was benign and self-resolved  History of appendectomy  History of cataract surgery    SOCHX:   tobacco,  -  alcohol      FAMILY HISTORY: FA/MO  - contributory     ROS reviewed below with positive findings marked (+) :  GEN:  fever, chills ENT: tracheostomy,   epistaxis,  sinusitis COR: CAD, CHF,  HTN, dysrhythmia PUL: +COPD, ILD, asthma, pneumonia GI: PEG, dysphagia, hemorrhage, other HALLIE: kidney disease, electrolyte disorder HEM:  anemia, thrombus, coagulopathy, cancer ENDO:  thyroid disease, diabetes mellitus CNS:  dementia, stroke, seizure, PSY:  depression, anxiety, other        MEDICATIONS  (STANDING):  albuterol/ipratropium for Nebulization 3 milliLiter(s) Nebulizer every 6 hours  budesonide  80 MICROgram(s)/formoterol 4.5 MICROgram(s) Inhaler 2 Puff(s) Inhalation two times a day  enoxaparin Injectable 30 milliGRAM(s) SubCutaneous every 24 hours  folic acid 1 milliGRAM(s) Oral daily  meropenem  IVPB 1000 milliGRAM(s) IV Intermittent every 12 hours  pantoprazole  Injectable 40 milliGRAM(s) IV Push every 12 hours  predniSONE   Tablet 40 milliGRAM(s) Oral two times a day  psyllium Powder 1 Packet(s) Oral daily  senna 2 Tablet(s) Oral at bedtime  tamsulosin 0.4 milliGRAM(s) Oral at bedtime  trimethoprim / sulfamethoxazole IVPB 200 milliGRAM(s) IV Intermittent every 12 hours    MEDICATIONS  (PRN):  acetaminophen   Tablet .. 650 milliGRAM(s) Oral every 6 hours PRN Mild Pain (1 - 3)  benzocaine 20% Spray 1 Spray(s) Topical four times a day PRN throat pain      Vital Signs Last 24 Hrs  T(C): 36.6 (05 Mar 2021 05:52), Max: 36.7 (04 Mar 2021 13:41)  T(F): 97.8 (05 Mar 2021 05:52), Max: 98 (04 Mar 2021 13:41)  HR: 72 (05 Mar 2021 09:12) (72 - 108)  BP: 92/53 (05 Mar 2021 05:52) (92/53 - 113/64)  RR: 17 (05 Mar 2021 09:12) (16 - 20)  SpO2: 96% (05 Mar 2021 09:12) (93% - 97%)    GENERAL:         comfortable,  - distress.  HEENT:            - trauma,  - icterus,  - injection,  - nasal discharge.  NECK:              - jugular venous distention, - thyromegaly.  LYMPH:           - lymphadenopathy, - masses.  RESP:              + basilar crackles,   - rhonchi,   - wheezes.   COR:                S1S2   - gallops,  - rubs.  ABD:                bowel sounds,   soft, - tender, - distended.  EXT/MSC:         - cyanosis,  - clubbing, - edema.    NEURO:           + alert and oriented                             7.5    10.49 )-----------( 479      ( 04 Mar 2021 07:45 )             25.2     03-04    135  |  101  |  26<H>  ----------------------------<  144<H>  4.0   |  22  |  1.10    Ca    9.2      04 Mar 2021 07:45  Phos  2.7     03-04  Mg     2.2     03-04      X-ray Chest  (03.04.21)  Frontal examination of the chest demonstrates the heart to be within normal limits in transverse diameter. No interval change lung infiltrates in comparison to prior examination of the chest 3/1/2021. Right Port-A-Cath noted with tip overlying junction superior vena cava and right atrium. No evidence of pneumothorax.  IMPRESSION: Bilateral infiltrates.    Echo Complete w/o Contrast w/ Doppler (03.02.21)  1.  Normal left and right ventricular size and systolic function.   2. Grade I left ventricular diastolic dysfunction.   3. Mild tricuspid regurgitation.   4. No evidence of pulmonary hypertension, pulmonary artery systolic pressure is 27 mmHg.   5. No pericardial effusion.      CT Angio Chest PE Protocol w/ IV Cont (03.01.21) Pulmonary arteries: No evidence of pulmonary embolus to the segmental level.  Aorta: No aneurysm of the aorta. No dissection of the aorta.  Veins: MediPort terminates in the SVC.  Lungs: Opacities in the right upper lobe may represent atelectasis/pneumonia versus neoplasm.  Diffuse ground-glass opacities throughout the lung fields may represent pulmonary edema or diffuse pneumonia.  Pleural spaces: Moderate to large bilateral pleural effusions..  Heart: Unremarkable. No cardiomegaly. No pericardial effusion.  Lymph nodes: Unremarkable. No enlarged lymph nodes.  Kidneys and ureters: Simple cysts in the left kidney.  Bones/joints: Compression fracture T9 of unknown age.  Soft tissues: Unremarkable.   Other findings: Motion artifact degrades images.    IMPRESSION:  1. No evidence of pulmonary embolus to the segmental level.  2. No aneurysm of the aorta.  3. No dissection of the aorta.  4. Moderate to large bilateral pleural effusions.  5. Opacities in the right upper lobe may represent atelectasis/pneumonia versus neoplasm.    CT Angio Chest PE Protocol w/ IV Cont (02.03.21)  Pulmonary arteries: No pulmonary embolus seen.  Lungs and large airways: Respiratory motion limits assessment of fine pulmonary parenchymal detail. Trace dependent secretions distal trachea. No nodular and no tracheal or endobronchial lesion.  Bilateral apical pleural parenchymal scarring. Unchanged mild peribronchial wall thickening and cylindrical bronchiectasis bilaterally. Scattered nodular groundglass opacities bilaterally, more confluent in right upper lobe posterior segment with apparent interlobular septal thickening. Mild compressive atelectasis right lower lobe. Few nodular consolidative opacities left lower lobe, subsegmental atelectasis versus mild consolidation. No measurable residual masses in right upper lobe anterior segment with linear opacity in this region probably representing scarring. New few scattered bilateral pulmonary nodules, for example:  *  Right upper lobe subpleural 0.5 cm (series 6 image 160).  *  Right upper lobe anterior segment 0.5 cm (series 6 image 132)  *  Left lower lobe 0.5 cm (series 6 image 204)  Pleura:  Resolved right pneumothorax. Decreased trace residual right pleural effusion.  Mediastinum and hilar regions: Significantly decreased mediastinal and hilar adenopathy, for example right lower paratracheal 1.1 x 0.8 cm, previously 4.4 x 2.9 cm. No significant change mildly prominent subcarinal node 1.2 cm short axis. Resolved right lower cervical adenopathy.  Heart and pericardium:  Heart size is normal. No pericardial effusion.  Vessels:  No aneurysm. Mild scattered calcific atherosclerosis.  Chest wall and lower neck:  Normal.  Upper abdomen: Unchanged few left renal cysts, largest 3.5 cm.  Bones: No suspicious lytic or blastic lesion. Mild degenerative changes thoracic spine with prominent degenerative Schmorl's node inferior endplate T9 vertebral body.    IMPRESSION:  1.  No pulmonary embolus.  2.  New few small scattered nodular opacities bilaterally with more confluent groundglass density and suspected interlobular septal thickening in posterior right upper lobe. Differential includes aspiration/infection versus lymphangitic carcinomatosis. Assessment limited by artifact from respiratory motion. Recommend clinical correlation and short-term follow-up would be helpful.  .  No measurable residual right upper lobe mass with scarring in this region.  4.  Significantly decreased thoracic adenopathy.  5.  Decreased trace residual right pleural effusion.  6.  Resolved right pneumothorax.      ASSESSMENT/PLAN    1)  Pneumonia/pneumonitis  2)  Pleural effusions  3)  Chronic obstructive pulmonary disease  4)  Bronchiectasis  5)  Lung cancer    Oxygen as needed for a satisfactory SpO2  Pleural micro gram stain negative, f/u cyto, chem, micro  Bronchodilators:  Atrovent/ albuterol q 4 – 6 hours as needed  ID/Antibiotics:  trimethoprim / sulfamethoxazole, meropenem  Prednisone  Cardiac/HTN: diuresis as needed  GI: Rx/ prophylaxis c PPI/H2B  Heme: Rx/VT prophylaxis   Discussed with the medical team.

## 2021-03-05 NOTE — DISCHARGE NOTE PROVIDER - NSDCFUADDAPPT_GEN_ALL_CORE_FT
Please follow up with your primary care provider Dr. Worthington on Monday 3/15 at 2pm. The appointment has already been made for you.     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     Please call Dr. Jones's office to schedule a follow up appointment within 2 weeks of discharge Please follow up with your primary care provider Dr. Worthington on Monday 3/15 at 2pm. The appointment has already been made for you.     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     Please call Dr. Jones's office to schedule a follow up appointment within 2 weeks of discharge Please follow up with your primary care provider Dr. Worthington on Monday 3/15 at 2pm. The appointment has already been made for you.     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     Please call Dr. Rogers of Urology to set up a follow up appointment within 2 weeks regarding management of your red[place]    Please call Dr. Jones's office to schedule a follow up appointment within 2 weeks of discharge

## 2021-03-05 NOTE — DISCHARGE NOTE PROVIDER - NSDCCPCAREPLAN_GEN_ALL_CORE_FT
PRINCIPAL DISCHARGE DIAGNOSIS  Diagnosis: Pneumonia  Assessment and Plan of Treatment: Pneumonia      SECONDARY DISCHARGE DIAGNOSES  Diagnosis: Lung cancer  Assessment and Plan of Treatment: Lung cancer    Diagnosis: Anemia  Assessment and Plan of Treatment: Anemia    Diagnosis: Pleural effusion due to another disorder  Assessment and Plan of Treatment: Pleural effusion due to another disorder     PRINCIPAL DISCHARGE DIAGNOSIS  Diagnosis: Pneumonia  Assessment and Plan of Treatment: You were admitted to the hospital with shortness of breath and found to have most likely pneumonia. Pneumonia is an infection that inflames air sacs in one or both lungs, which may fill with fluid. The infection can be life-threatening to infants, children, and people over 65. Symptoms include cough with phlegm or pus, fever, chills, and difficulty breathing. Antibiotics can treat many forms of pneumonia. You were followed by an infectious disease doctor who recommended specific antibiotics to best treat your infection.      SECONDARY DISCHARGE DIAGNOSES  Diagnosis: Lung cancer  Assessment and Plan of Treatment: Lung cancer    Diagnosis: Anemia  Assessment and Plan of Treatment: Anemia is the medical term for when a person has too few red blood cells. Red blood cells are the cells in your blood that carry oxygen. If you have too few red blood cells, your body does not get all the oxygen it needs. Most people with anemia have no symptoms. They find out they have it after their doctor does blood tests for another reason. People who do have symptoms might feel tired or weak, especially if they try to exercise or have headaches. If you experience these symptoms you should see your primary care provider for further evaluation.    Diagnosis: Pleural effusion due to another disorder  Assessment and Plan of Treatment:      PRINCIPAL DISCHARGE DIAGNOSIS  Diagnosis: Pneumonia  Assessment and Plan of Treatment: You were admitted to the hospital with shortness of breath and found to have most likely pneumonia. Pneumonia is an infection that inflames air sacs in one or both lungs, which may fill with fluid. The infection can be life-threatening to infants, children, and people over 65. Symptoms include cough with phlegm or pus, fever, chills, and difficulty breathing. Antibiotics can treat many forms of pneumonia. You were followed by an infectious disease doctor who recommended specific antibiotics to best treat your infection.      SECONDARY DISCHARGE DIAGNOSES  Diagnosis: Anemia  Assessment and Plan of Treatment: Anemia is the medical term for when a person has too few red blood cells. Red blood cells are the cells in your blood that carry oxygen. If you have too few red blood cells, your body does not get all the oxygen it needs. Most people with anemia have no symptoms. They find out they have it after their doctor does blood tests for another reason. People who do have symptoms might feel tired or weak, especially if they try to exercise or have headaches. If you experience these symptoms you should see your primary care provider for further evaluation.    Diagnosis: Lung cancer  Assessment and Plan of Treatment: Please follow up with your outpatient cancer doctor, Dr. Jones    Diagnosis: Pleural effusion due to another disorder  Assessment and Plan of Treatment: You were found to have fluid in the space between your lung and the chest cavity known as a pleural effusion. This can be caused by intake of too much fluid, by infectious processes such as pneumonia, and sometimes by underlying cancer. The pulmonary doctors drained some of the fluid from your lung to make it easier for you to breath.     PRINCIPAL DISCHARGE DIAGNOSIS  Diagnosis: Pneumonia  Assessment and Plan of Treatment: You were admitted to the hospital with shortness of breath and found to have most likely pneumonia. Pneumonia is an infection that inflames air sacs in one or both lungs, which may fill with fluid. The infection can be life-threatening to infants, children, and people over 65. Symptoms include cough with phlegm or pus, fever, chills, and difficulty breathing. Antibiotics can treat many forms of pneumonia. You were followed by an infectious disease doctor who recommended specific antibiotics to best treat your infection.  Please take the ivermectin medication 9g: one dose on 3/27 and one dose on 3/28.   If you develop fever/chills, headache, chest pain, palpitations, difficulty breathing, severe cough, please seek medical attention      SECONDARY DISCHARGE DIAGNOSES  Diagnosis: Anemia  Assessment and Plan of Treatment: Anemia is the medical term for when a person has too few red blood cells. Red blood cells are the cells in your blood that carry oxygen. If you have too few red blood cells, your body does not get all the oxygen it needs. Most people with anemia have no symptoms. They find out they have it after their doctor does blood tests for another reason. People who do have symptoms might feel tired or weak, especially if they try to exercise or have headaches. If you experience these symptoms you should see your primary care provider for further evaluation.    Diagnosis: Lung cancer  Assessment and Plan of Treatment: Please follow up with your outpatient cancer doctor, Dr. Jones    Diagnosis: Pleural effusion due to another disorder  Assessment and Plan of Treatment: You were found to have fluid in the space between your lung and the chest cavity known as a pleural effusion. This can be caused by intake of too much fluid, by infectious processes such as pneumonia, and sometimes by underlying cancer. The pulmonary doctors drained some of the fluid from your lung to make it easier for you to breathe.   If you develop difficulty breathing, chest pain, confusion, palpitations, please seek medical attention     PRINCIPAL DISCHARGE DIAGNOSIS  Diagnosis: Pneumonia  Assessment and Plan of Treatment: You were admitted to the hospital with shortness of breath and found to have most likely pneumonia. Pneumonia is an infection that inflames air sacs in one or both lungs, which may fill with fluid. The infection can be life-threatening to infants, children, and people over 65. Symptoms include cough with phlegm or pus, fever, chills, and difficulty breathing. Antibiotics can treat many forms of pneumonia. You were followed by an infectious disease doctor who recommended specific antibiotics to best treat your infection.  If you develop fever/chills, headache, chest pain, palpitations, difficulty breathing, severe cough, please seek medical attention      SECONDARY DISCHARGE DIAGNOSES  Diagnosis: Anemia  Assessment and Plan of Treatment: Anemia is the medical term for when a person has too few red blood cells. Red blood cells are the cells in your blood that carry oxygen. If you have too few red blood cells, your body does not get all the oxygen it needs. Most people with anemia have no symptoms. They find out they have it after their doctor does blood tests for another reason. People who do have symptoms might feel tired or weak, especially if they try to exercise or have headaches. If you experience these symptoms you should see your primary care provider for further evaluation.    Diagnosis: Lung cancer  Assessment and Plan of Treatment: Please follow up with your outpatient cancer doctor, Dr. Jones    Diagnosis: Pleural effusion due to another disorder  Assessment and Plan of Treatment: You were found to have fluid in the space between your lung and the chest cavity known as a pleural effusion. This can be caused by intake of too much fluid, by infectious processes such as pneumonia, and sometimes by underlying cancer. The pulmonary doctors drained some of the fluid from your lung to make it easier for you to breathe.   If you develop difficulty breathing, chest pain, confusion, palpitations, please seek medical attention     PRINCIPAL DISCHARGE DIAGNOSIS  Diagnosis: Pneumonia  Assessment and Plan of Treatment: You were admitted to the hospital with shortness of breath and found to have most likely pneumonia. Pneumonia is an infection that inflames air sacs in one or both lungs, which may fill with fluid. The infection can be life-threatening to infants, children, and people over 65. Symptoms include cough with phlegm or pus, fever, chills, and difficulty breathing. Antibiotics can treat many forms of pneumonia. You were followed by an infectious disease doctor who recommended specific antibiotics to best treat your infection.  You were found to have a positive strongyloides antibiody, which is a parasitic organism.   Please follow up with your PCP Dr. Worthington on 3/15 at 2pm, the appointment is already made. Please discuss possibly taking another 2 doses of PO ivermectin per our ID doctors to treat your positive strongyloides test.   If you develop fever/chills, headache, chest pain, palpitations, difficulty breathing, severe cough, please seek medical attention      SECONDARY DISCHARGE DIAGNOSES  Diagnosis: Anemia  Assessment and Plan of Treatment: Anemia is the medical term for when a person has too few red blood cells. Red blood cells are the cells in your blood that carry oxygen. If you have too few red blood cells, your body does not get all the oxygen it needs. Most people with anemia have no symptoms. They find out they have it after their doctor does blood tests for another reason. People who do have symptoms might feel tired or weak, especially if they try to exercise or have headaches. If you experience these symptoms you should see your primary care provider for further evaluation.    Diagnosis: Lung cancer  Assessment and Plan of Treatment: Please follow up with your outpatient cancer doctor, Dr. Jones    Diagnosis: Pleural effusion due to another disorder  Assessment and Plan of Treatment: You were found to have fluid in the space between your lung and the chest cavity known as a pleural effusion. This can be caused by intake of too much fluid, by infectious processes such as pneumonia, and sometimes by underlying cancer. The pulmonary doctors drained some of the fluid from your lung to make it easier for you to breathe.   If you develop difficulty breathing, chest pain, confusion, palpitations, please seek medical attention     PRINCIPAL DISCHARGE DIAGNOSIS  Diagnosis: Pneumonia  Assessment and Plan of Treatment: You were admitted to the hospital with shortness of breath and found to have most likely pneumonia. Pneumonia is an infection that inflames air sacs in one or both lungs, which may fill with fluid. The infection can be life-threatening to infants, children, and people over 65. Symptoms include cough with phlegm or pus, fever, chills, and difficulty breathing. Antibiotics can treat many forms of pneumonia. You were followed by an infectious disease doctor who recommended specific antibiotics to best treat your infection.  You were found to have a positive strongyloides antibiody, which is a parasitic organism.   Please follow up with your PCP Dr. Worthington on 3/15 at 2pm, the appointment is already made. Please discuss possibly taking another 2 doses of PO ivermectin per our ID doctors 2-3 weeks after discharge to treat your positive strongyloides test.   If you develop fever/chills, headache, chest pain, palpitations, difficulty breathing, severe cough, please seek medical attention      SECONDARY DISCHARGE DIAGNOSES  Diagnosis: Anemia  Assessment and Plan of Treatment: Anemia is the medical term for when a person has too few red blood cells. Red blood cells are the cells in your blood that carry oxygen. If you have too few red blood cells, your body does not get all the oxygen it needs. Most people with anemia have no symptoms. They find out they have it after their doctor does blood tests for another reason. People who do have symptoms might feel tired or weak, especially if they try to exercise or have headaches. If you experience these symptoms you should see your primary care provider for further evaluation.    Diagnosis: Lung cancer  Assessment and Plan of Treatment: Please follow up with your outpatient cancer doctor, Dr. Jones    Diagnosis: Pleural effusion due to another disorder  Assessment and Plan of Treatment: You were found to have fluid in the space between your lung and the chest cavity known as a pleural effusion. This can be caused by intake of too much fluid, by infectious processes such as pneumonia, and sometimes by underlying cancer. The pulmonary doctors drained some of the fluid from your lung to make it easier for you to breathe.   If you develop difficulty breathing, chest pain, confusion, palpitations, please seek medical attention

## 2021-03-05 NOTE — PROGRESS NOTE ADULT - ASSESSMENT
the patient is being treated for a pulmonary infection of undetermined etiology but pneumocystis maeve 9 pneumonia was suspected.  This is a patient who also had chlamydia   Pneumonia just weeks ago.

## 2021-03-05 NOTE — PROGRESS NOTE ADULT - SUBJECTIVE AND OBJECTIVE BOX
the patient still feels better daily and tells me that there are times when she does not use oxygen and is very comfortable.  She produces no sputum.  She is rarely out of bed and is thankful for the Schumacher catheter and the bedpan for she does not at this time feel comfortable out of bed.    Appears malnourished.  The patient is on high-flow nasal oxygen with nebulizer.  Skin: No rash noted. No lesions. Poor turgor.  Eyes: Eyes with PERRLA. EOM's in­tact Sclera clear,no jaundice noted. Conjunctiva clear.   Ears, Nose,Mouth & Throat: Teeth normal, no missing teeth or dentures present. No ulcers. No thrush Auditory canals normal.  Description: %% Oropharynx without lesions. Mucositis None.    The patient has  Evidence for thrush over the upper aerodigestive tract.  Neck: No thyromegaly. No lymphadenopathy noted.   Cardiovascular: Normal sinus  Tachycardia rhythm S1, S2 without murmurs. No Extrasystole.   Breast: Normal. No breast masses appreciated. No axillary lymphadenopathy. No skin changes. No nipple discharge present. No dimpling  noted.  Chest/Respiratory: No dyspnea. No rhonchi. No wheezing. No decreased breath sounds at bases. No rales. Unlabored breathing. No  accessory muscle use.   Gastrointestinal: Abdomen is soft and non­tender to touch. No hepatomegaly noted. No splenomegaly noted. No abdominal pain or  guarding noted. No abdominal mass(es) appreciated. No ascites noted.  There is soft stool in the rectal vault with no evidence of fecal  impaction.  Genito­Urinary: Deferred.  Hematologic/Lymphatic: No petechiae noted. No purpura noted. No lymphadenopathy noted.   Musculoskeletal: No Kyphosis. No weakness. No spinal tenderness on percussion. No pain on hip rotation reported by patient. Normal  range of motion in upper and lower extremities.   Neurologic: Affect normal. No tremors observed. Biceps, brachioradialis, patellar and plantar reflexes symmetrical. Normal  proprioception/position sense Cranial nerves intact.   Psychiatry: Oriented to person/place/time. No anxiety observed during exam. No signs of depression noted during exam. Intact short­term  and long­term memory.   Extremities: No clubbing noted to fingers or toes. No cyanosis noted. Capillary refill less than two seconds. No edema. No calf  tenderness reported by patient.

## 2021-03-05 NOTE — PROGRESS NOTE ADULT - PROBLEM SELECTOR PLAN 1
2/4 SIRS criteria POA with Fever and tachycardia. Patient with weakly positive UA(redmond not replaced in ED), CXR with progression of b/l infiltrates, abdominal pain, and pain at chemoport site. No new rashes or diarrhea. Concerning given patient recently teated for atypical PNA. ddx for source includes; HAP vs. Atypical vs. chemo vs. tumor burden fever vs abdominal source vs infection of chemoport  - s/p vanc d/c'ed after MRSA swab negative, s/p azithromycin after urine legionella negative, RVP negative, COVID negative  - UCx only growing 50,000 CFU of enterococcus - unlikely UTI  - f/u blood cultures, f/u cultures from chemoport, f/u urine cultures  - f/u strongyloides antibody in the setting of abdominal pain with eosinophilia  - tx PNA as below    #Acute Hypoxic Respiratory Failure  -hx of COPD, but not on home O2  -likely in the setting of HAP c/b fluid overload (got 4L of fluid outpatient)  -CT PE showing b/l pleural effusions and RUL consolidation  -tx PNA as below  -diurese as needed, s/p lasix 20 IV 3/3PM  - s/p IR drainage of R pleural effusion 3/4PM, f/u fluid studies

## 2021-03-05 NOTE — PROGRESS NOTE ADULT - PROBLEM SELECTOR PLAN 3
hx of UGIB, no recent melena or hemtochezia. Hgb unchanged from recent discharge. Fe studies c/w anemia of chronic disease  - maintain active type and screen  - pt has refused transfusions   - heme following apprecaite recs.

## 2021-03-05 NOTE — PROGRESS NOTE ADULT - SUBJECTIVE AND OBJECTIVE BOX
INCOMPLETE    O/N Events: EZE    Subjective/ROS: Patient seen and examined at bedside.     Denies Fever/Chills, HA, CP, SOB, n/v, changes in bowel/urinary habits.  12pt ROS otherwise negative.    VITALS  Vital Signs Last 24 Hrs  T(C): 36.6 (04 Mar 2021 20:30), Max: 36.8 (04 Mar 2021 05:26)  T(F): 97.8 (04 Mar 2021 20:30), Max: 98.3 (04 Mar 2021 05:26)  HR: 96 (04 Mar 2021 20:54) (89 - 112)  BP: 103/61 (04 Mar 2021 20:30) (96/50 - 113/64)  BP(mean): --  RR: 20 (04 Mar 2021 20:54) (16 - 20)  SpO2: 94% (04 Mar 2021 20:54) (92% - 95%)    CAPILLARY BLOOD GLUCOSE    PHYSICAL EXAM  General: NAD  Head: NC/AT; MMM; PERRL; EOMI;  Neck: Supple; no JVD  Respiratory:   Cardiovascular: Regular rhythm/rate; S1/S2+, no murmurs, rubs gallops   Gastrointestinal: Soft; NTND; bowel sounds normal and present  Extremities: WWP; no edema/cyanosis  Neurological: A&Ox3, CNII-XII grossly intact; no obvious focal deficits    MEDICATIONS  (STANDING):  albuterol/ipratropium for Nebulization 3 milliLiter(s) Nebulizer every 6 hours  budesonide  80 MICROgram(s)/formoterol 4.5 MICROgram(s) Inhaler 2 Puff(s) Inhalation two times a day  enoxaparin Injectable 30 milliGRAM(s) SubCutaneous every 24 hours  folic acid 1 milliGRAM(s) Oral daily  meropenem  IVPB 1000 milliGRAM(s) IV Intermittent every 12 hours  pantoprazole  Injectable 40 milliGRAM(s) IV Push every 12 hours  predniSONE   Tablet 40 milliGRAM(s) Oral two times a day  psyllium Powder 1 Packet(s) Oral daily  senna 2 Tablet(s) Oral at bedtime  tamsulosin 0.4 milliGRAM(s) Oral at bedtime  trimethoprim / sulfamethoxazole IVPB 200 milliGRAM(s) IV Intermittent every 12 hours    MEDICATIONS  (PRN):  acetaminophen   Tablet .. 650 milliGRAM(s) Oral every 6 hours PRN Mild Pain (1 - 3)  benzocaine 20% Spray 1 Spray(s) Topical four times a day PRN throat pain      No Known Allergies      LABS                        7.5    10.49 )-----------( 479      ( 04 Mar 2021 07:45 )             25.2     03-04    135  |  101  |  26<H>  ----------------------------<  144<H>  4.0   |  22  |  1.10    Ca    9.2      04 Mar 2021 07:45  Phos  2.7     03-04  Mg     2.2     03-04    TPro  6.5  /  Alb  2.6<L>  /  TBili  0.2  /  DBili  x   /  AST  25  /  ALT  25  /  AlkPhos  58  03-04              Culture - Body Fluid with Gram Stain (collected 03-04-21 @ 18:59)  Source: Pleural Fl L Pleural  Gram Stain (03-04-21 @ 22:46):    No organisms seen    No WBC's seen.        IMAGING/EKG/ETC   INCOMPLETE    O/N Events: EZE    Subjective/ROS: Patient seen and examined at bedside. Says that she is not having difficulty breathing, tolerated the thoracentesis well yesterday. Reports some abdominal cramping, normal bowel movements last one yesterday.     Denies Fever/Chills, HA, CP, SOB, n/v, changes in bowel/urinary habits.  12pt ROS otherwise negative.    VITALS  Vital Signs Last 24 Hrs  T(C): 36.6 (04 Mar 2021 20:30), Max: 36.8 (04 Mar 2021 05:26)  T(F): 97.8 (04 Mar 2021 20:30), Max: 98.3 (04 Mar 2021 05:26)  HR: 96 (04 Mar 2021 20:54) (89 - 112)  BP: 103/61 (04 Mar 2021 20:30) (96/50 - 113/64)  BP(mean): --  RR: 20 (04 Mar 2021 20:54) (16 - 20)  SpO2: 94% (04 Mar 2021 20:54) (92% - 95%)    CAPILLARY BLOOD GLUCOSE    PHYSICAL EXAM  General: NAD  Head: NC/AT; MMM; PERRL; EOMI;  Neck: Supple; no JVD  Respiratory: Crackles on R anterior lung field, L lung clear to auscultation  Cardiovascular: Regular rhythm/rate; S1/S2+, no murmurs, rubs gallops   Gastrointestinal: Soft; NTND; bowel sounds normal and present  Extremities: WWP; no edema/cyanosis  Neurological: A&Ox3, CNII-XII grossly intact; no obvious focal deficits    MEDICATIONS  (STANDING):  albuterol/ipratropium for Nebulization 3 milliLiter(s) Nebulizer every 6 hours  budesonide  80 MICROgram(s)/formoterol 4.5 MICROgram(s) Inhaler 2 Puff(s) Inhalation two times a day  enoxaparin Injectable 30 milliGRAM(s) SubCutaneous every 24 hours  folic acid 1 milliGRAM(s) Oral daily  meropenem  IVPB 1000 milliGRAM(s) IV Intermittent every 12 hours  pantoprazole  Injectable 40 milliGRAM(s) IV Push every 12 hours  predniSONE   Tablet 40 milliGRAM(s) Oral two times a day  psyllium Powder 1 Packet(s) Oral daily  senna 2 Tablet(s) Oral at bedtime  tamsulosin 0.4 milliGRAM(s) Oral at bedtime  trimethoprim / sulfamethoxazole IVPB 200 milliGRAM(s) IV Intermittent every 12 hours    MEDICATIONS  (PRN):  acetaminophen   Tablet .. 650 milliGRAM(s) Oral every 6 hours PRN Mild Pain (1 - 3)  benzocaine 20% Spray 1 Spray(s) Topical four times a day PRN throat pain      No Known Allergies      LABS                        7.5    10.49 )-----------( 479      ( 04 Mar 2021 07:45 )             25.2     03-04    135  |  101  |  26<H>  ----------------------------<  144<H>  4.0   |  22  |  1.10    Ca    9.2      04 Mar 2021 07:45  Phos  2.7     03-04  Mg     2.2     03-04    TPro  6.5  /  Alb  2.6<L>  /  TBili  0.2  /  DBili  x   /  AST  25  /  ALT  25  /  AlkPhos  58  03-04              Culture - Body Fluid with Gram Stain (collected 03-04-21 @ 18:59)  Source: Pleural Fl L Pleural  Gram Stain (03-04-21 @ 22:46):    No organisms seen    No WBC's seen.        IMAGING/EKG/ETC   O/N Events: EZE    Subjective/ROS: Patient seen and examined at bedside. Says that she is not having difficulty breathing, tolerated the thoracentesis well yesterday. Reports some abdominal cramping, normal bowel movements last one yesterday.     Denies Fever/Chills, HA, CP, SOB, n/v, changes in bowel/urinary habits.  12pt ROS otherwise negative.    VITALS  Vital Signs Last 24 Hrs  T(C): 36.6 (04 Mar 2021 20:30), Max: 36.8 (04 Mar 2021 05:26)  T(F): 97.8 (04 Mar 2021 20:30), Max: 98.3 (04 Mar 2021 05:26)  HR: 96 (04 Mar 2021 20:54) (89 - 112)  BP: 103/61 (04 Mar 2021 20:30) (96/50 - 113/64)  BP(mean): --  RR: 20 (04 Mar 2021 20:54) (16 - 20)  SpO2: 94% (04 Mar 2021 20:54) (92% - 95%)    CAPILLARY BLOOD GLUCOSE    PHYSICAL EXAM  General: NAD  Head: NC/AT; MMM; PERRL; EOMI;  Neck: Supple; no JVD  Respiratory: Crackles on R anterior lung field, L lung clear to auscultation  Cardiovascular: Regular rhythm/rate; S1/S2+, no murmurs, rubs gallops   Gastrointestinal: Soft; NTND; bowel sounds normal and present  Extremities: WWP; no edema/cyanosis  Neurological: A&Ox3, CNII-XII grossly intact; no obvious focal deficits    MEDICATIONS  (STANDING):  albuterol/ipratropium for Nebulization 3 milliLiter(s) Nebulizer every 6 hours  budesonide  80 MICROgram(s)/formoterol 4.5 MICROgram(s) Inhaler 2 Puff(s) Inhalation two times a day  enoxaparin Injectable 30 milliGRAM(s) SubCutaneous every 24 hours  folic acid 1 milliGRAM(s) Oral daily  meropenem  IVPB 1000 milliGRAM(s) IV Intermittent every 12 hours  pantoprazole  Injectable 40 milliGRAM(s) IV Push every 12 hours  predniSONE   Tablet 40 milliGRAM(s) Oral two times a day  psyllium Powder 1 Packet(s) Oral daily  senna 2 Tablet(s) Oral at bedtime  tamsulosin 0.4 milliGRAM(s) Oral at bedtime  trimethoprim / sulfamethoxazole IVPB 200 milliGRAM(s) IV Intermittent every 12 hours    MEDICATIONS  (PRN):  acetaminophen   Tablet .. 650 milliGRAM(s) Oral every 6 hours PRN Mild Pain (1 - 3)  benzocaine 20% Spray 1 Spray(s) Topical four times a day PRN throat pain      No Known Allergies      LABS                        7.5    10.49 )-----------( 479      ( 04 Mar 2021 07:45 )             25.2     03-04    135  |  101  |  26<H>  ----------------------------<  144<H>  4.0   |  22  |  1.10    Ca    9.2      04 Mar 2021 07:45  Phos  2.7     03-04  Mg     2.2     03-04    TPro  6.5  /  Alb  2.6<L>  /  TBili  0.2  /  DBili  x   /  AST  25  /  ALT  25  /  AlkPhos  58  03-04              Culture - Body Fluid with Gram Stain (collected 03-04-21 @ 18:59)  Source: Pleural Fl L Pleural  Gram Stain (03-04-21 @ 22:46):    No organisms seen    No WBC's seen.        IMAGING/EKG/ETC

## 2021-03-05 NOTE — DISCHARGE NOTE PROVIDER - CARE PROVIDERS DIRECT ADDRESSES
,[email or web address],DirectAddress_Unknown ,[email or web address],DirectAddress_Unknown,[email or web address]

## 2021-03-05 NOTE — DISCHARGE NOTE PROVIDER - HOSPITAL COURSE
INCOMPLETE    #Discharge do not delete    Patient is 86F PMH COPD, lung adenocarcinoma s/p radiation, now on chemo recently admitted for UGIB and Chlamydia PNA a/f sepsis 2/2 suspected HAP c/b fluid overload.     Problem List/Main Diagnoses  #PNA  #Anemia  #Urinary Retention  #Lung Cancer    Inpatient treatment course: Patient was admitted for further monitoring and work-up.     New medications:   Labs to be followed outpatient:   Exam to be followed outpatient:    INCOMPLETE STARTED EARLY    #Discharge do not delete    Patient is 86F PMH COPD, lung adenocarcinoma s/p radiation, now on chemo recently admitted for UGIB and Chlamydia PNA a/f sepsis 2/2 suspected HAP c/b fluid overload.     Problem List/Main Diagnoses  #PNA  #Anemia  #Urinary Retention  #Lung Cancer    Inpatient treatment course: Patient was admitted for further monitoring and work-up.     New medications:   Labs to be followed outpatient:   Exam to be followed outpatient:    INCOMPLETE STARTED EARLY    #Discharge do not delete    Patient is 86F PMH COPD, lung adenocarcinoma s/p radiation, now on chemo recently admitted for UGIB and Chlamydia PNA a/f sepsis 2/2 suspected HAP c/b fluid overload.     Problem List/Main Diagnoses  #PNA  #Anemia  #Urinary Retention  #Lung Cancer    Inpatient treatment course: 86F DNR/DNI with PMH of COPD, lung adenocarcinoma (diagnosed Nov 2020, s/p radiation, currently on chemo-gefitinib and tamoxifen with Dr. Jones), recent admission (Feb 3-17) for UGIB and anemia (s/p 1U pRBCS, never scoped) also found to have chlamydia PNA (s/p treatment with azithro x 10 days), who re-presented on 02/25 with fever and cough. In ED, complained of subjective fevers, dry cough, SOB, and chills x 1 day. Denied CP or LE edema but stated that she never really improved after discharge. Went to Dr. Jones's a day before admission and received ?4L of IV fluids due to concern for sepsis and was advised to come to ED. Has a chemoport in place and this admission was febrile to 102.8, tachycardic (130s-140s), on 3L NC when she came in, saturating >92%. Treated for HAP with vanc and Zosyn, vanc now discontinued after negative MRSA swab. Patient unable to produce sputum. Was c/o increased tenderness of chemoport site, so blood cultures sent from chemoport. Pt also c/o persistent abdominal pain with eosinophilia, so stool Ova/Parasites and strongyloides antibody sent. Was tolerating 3L NC but overnight 2/28 de-satted to 87% on 3L, becoming tachycardic and hypotensive. Sats improved but again de-satted when using the bathroom 3/1. Was febrile to 100.8 and with increased O2 requirements, so transitioned from 6L NC (saturating 87%) to HFNC (saturating >91%). ICU consulted. Repeat chest x-ay showing pulmonary congestion, POCUS showing b-lines b/l, so given 20 mg IV Lasix x2 with good UOP. Blood cultures sent from chemoport, and patient unable to produce sputum. ICU consulted for worsening acute hypoxic respiratory failure. CT PE showing large b/l pleural effusions and opacifications in RUL. ID with high concern for PCP, so pt started on bactrim and steroids. Zosyn switched to meropenem. Patient weaned from HFNC to NC. IR consulted and thoracentesis performed. Patient underwent gallium scan which showed **    New medications:   Labs to be followed outpatient:   Exam to be followed outpatient:    INCOMPLETE STARTED EARLY    #Discharge do not delete    Patient is 86F PMH COPD, lung adenocarcinoma s/p radiation, now on chemo recently admitted for UGIB and Chlamydia PNA a/f sepsis 2/2 suspected HAP c/b fluid overload.     Problem List/Main Diagnoses  #PNA`    #Strongylodies   - Positive antibody test  - ID followed: s/p ivermectin 3/6-7    #Anemia  - Patient refused blood transfusions throughout admission    #Urinary Retention: presented with [facility]     #Lung Cancer    Inpatient treatment course: 86F DNR/DNI with PMH of COPD, lung adenocarcinoma (diagnosed Nov 2020, s/p radiation, currently on chemo-gefitinib and tamoxifen with Dr. Jones), recent admission (Feb 3-17) for UGIB and anemia (s/p 1U pRBCS, never scoped) also found to have chlamydia PNA (s/p treatment with azithro x 10 days), who re-presented on 02/25 with fever and cough. In ED, complained of subjective fevers, dry cough, SOB, and chills x 1 day. Denied CP or LE edema but stated that she never really improved after discharge. Went to Dr. Jones's a day before admission and received ?4L of IV fluids due to concern for sepsis and was advised to come to ED. Has a chemoport in place and this admission was febrile to 102.8, tachycardic (130s-140s), on 3L NC when she came in, saturating >92%. Treated for HAP with vanc and Zosyn, vanc now discontinued after negative MRSA swab. Patient unable to produce sputum. Was c/o increased tenderness of chemoport site, so blood cultures sent from [facility]. Pt also c/o persistent abdominal pain with eosinophilia, so stool Ova/Parasites and strongyloides antibody sent. Was tolerating 3L NC but overnight 2/28 de-satted to 87% on 3L, becoming tachycardic and hypotensive. Sats improved but again de-satted when using the bathroom 3/1. Was febrile to 100.8 and with increased O2 requirements, so transitioned from 6L NC (saturating 87%) to HFNC (saturating >91%). ICU consulted. Repeat chest x-ay showing pulmonary congestion, POCUS showing b-lines b/l, so given 20 mg IV Lasix x2 with good UOP. Blood cultures sent from [facility], and patient unable to produce sputum. ICU consulted for worsening acute hypoxic respiratory failure. CT PE showing large b/l pleural effusions and opacifications in RUL. ID with high concern for PCP, so pt started on bactrim and steroids. Zosyn switched to meropenem. Patient weaned from HFNC to NC. IR consulted and thoracentesis performed. Patient underwent gallium scan which showed **    New medications:   Labs to be followed outpatient:   Exam to be followed outpatient:    INCOMPLETE STARTED EARLY    #Discharge do not delete    Patient is 86F PMH COPD, lung adenocarcinoma s/p radiation, now on chemo recently admitted for UGIB and Chlamydia PNA a/f sepsis 2/2 suspected HAP c/b fluid overload.     Problem List/Main Diagnoses  #PNA: presented with sepsis, CXR with infiltrates, abdominal pain, also component of fluid overload as received 4L outpatient. Concern for pos-obstructive vs. HAP vs. atypical  - ID consulted: c/f PCP, given bactrim and meropenem  - Strongyloides positive: ivermectin 3/6-7, additional doses in 3 weeks as outpatient  - CT PE negative for PE  - Gallium scan normal    #Anemia: Hgb in 7s throughout admission, iron studies AOCD  - Patient refused blood transfusions throughout admission    #Urinary Retention: presented with [place] from prior admission. Low suspicion for UTI  - f/u outpatient    #Lung Cancer: follows with Dr. Jones  - f/u outpatient  - PET CT:     Inpatient treatment course: 86F DNR/DNI with PMH of COPD, lung adenocarcinoma (diagnosed Nov 2020, s/p radiation, currently on chemo-gefitinib and tamoxifen with Dr. Jones), recent admission (Feb 3-17) for UGIB and anemia (s/p 1U pRBCS, never scoped) also found to have chlamydia PNA (s/p treatment with azithro x 10 days), who re-presented on 02/25 with fever and cough. In ED, complained of subjective fevers, dry cough, SOB, and chills x 1 day. Denied CP or LE edema but stated that she never really improved after discharge. Went to Dr. Jones's a day before admission and received ?4L of IV fluids due to concern for sepsis and was advised to come to ED. Has a chemoport in place and this admission was febrile to 102.8, tachycardic (130s-140s), on 3L NC when she came in, saturating >92%. Treated for HAP with vanc and Zosyn, vanc now discontinued after negative MRSA swab. Patient unable to produce sputum. Was c/o increased tenderness of chemoport site, so blood cultures sent from chemoport. Pt also c/o persistent abdominal pain with eosinophilia, so stool Ova/Parasites and strongyloides antibody sent. Was tolerating 3L NC but overnight 2/28 de-satted to 87% on 3L, becoming tachycardic and hypotensive. Sats improved but again de-satted when using the bathroom 3/1. Was febrile to 100.8 and with increased O2 requirements, so transitioned from 6L NC (saturating 87%) to HFNC (saturating >91%). ICU consulted. Repeat chest x-ay showing pulmonary congestion, POCUS showing b-lines b/l, so given 20 mg IV Lasix x2 with good UOP. Blood cultures sent from [facility], and patient unable to produce sputum. ICU consulted for worsening acute hypoxic respiratory failure. CT PE showing large b/l pleural effusions and opacifications in RUL. ID with high concern for PCP, so pt started on bactrim and steroids. Zosyn switched to meropenem. Patient weaned from HFNC to NC. IR consulted and thoracentesis performed. Patient underwent gallium scan which was read as normal. Patient given ivermectin x2 doses for strongylodies positivity.     New medications:   Labs to be followed outpatient:   Exam to be followed outpatient:    INCOMPLETE STARTED EARLY    #Discharge do not delete    Patient is 86F PMH COPD, lung adenocarcinoma s/p radiation, now on chemo recently admitted for UGIB and Chlamydia PNA a/f sepsis 2/2 suspected HAP c/b fluid overload.     Problem List/Main Diagnoses  #PNA: presented with sepsis, CXR with infiltrates, abdominal pain, also component of fluid overload as received 4L outpatient. Concern for pos-obstructive vs. HAP vs. atypical  - ID consulted: c/f PCP, given bactrim and meropenem  - Strongyloides positive: Ivermectin 3/6-7, additional doses in 3 weeks as outpatient  - CT PE negative for PE  - Gallium scan normal    #Anemia: Hgb in 7s throughout admission, iron studies AOCD  - Patient refused blood transfusions throughout admission    #Urinary Retention: presented with red[place] from prior admission. Low suspicion for UTI  - f/u outpatient    #Lung Cancer: follows with Dr. Jones  - f/u outpatient  - PET CT:     Inpatient treatment course: 86F DNR/DNI with PMH of COPD, lung adenocarcinoma (diagnosed Nov 2020, s/p radiation, currently on chemo-gefitinib and tamoxifen with Dr. Jones), recent admission (Feb 3-17) for UGIB and anemia (s/p 1U pRBCS, never scoped) also found to have chlamydia PNA (s/p treatment with azithro x 10 days), who re-presented on 02/25 with fever and cough. In ED, complained of subjective fevers, dry cough, SOB, and chills x 1 day. Denied CP or LE edema but stated that she never really improved after discharge. Went to Dr. Jones's a day before admission and received ?4L of IV fluids due to concern for sepsis and was advised to come to ED. Has a chemoport in place and this admission was febrile to 102.8, tachycardic (130s-140s), on 3L NC when she came in, saturating >92%. Treated for HAP with vanc and Zosyn, vanc now discontinued after negative MRSA swab. Patient unable to produce sputum. Was c/o increased tenderness of chemoport site, so blood cultures sent from chemoport. Pt also c/o persistent abdominal pain with eosinophilia, so stool Ova/Parasites and strongyloides antibody sent. Was tolerating 3L NC but overnight 2/28 de-satted to 87% on 3L, becoming tachycardic and hypotensive. Sats improved but again de-satted when using the bathroom 3/1. Was febrile to 100.8 and with increased O2 requirements, so transitioned from 6L NC (saturating 87%) to HFNC (saturating >91%). ICU consulted. Repeat chest x-ay showing pulmonary congestion, POCUS showing b-lines b/l, so given 20 mg IV Lasix x2 with good UOP. Blood cultures sent from [facility], and patient unable to produce sputum. ICU consulted for worsening acute hypoxic respiratory failure. CT PE showing large b/l pleural effusions and opacifications in RUL. ID with high concern for PCP, so pt started on bactrim and steroids. Zosyn switched to meropenem. Patient weaned from HFNC to NC. IR consulted and thoracentesis performed. Patient underwent gallium scan which was read as normal. Patient given ivermectin x2 doses for strongylodies positivity.     New medications:   Labs to be followed outpatient:   Exam to be followed outpatient:    #Discharge do not delete    Patient is 86F PMH COPD, lung adenocarcinoma s/p radiation, now on chemo recently admitted for UGIB and Chlamydia PNA a/f sepsis 2/2 suspected HAP c/b fluid overload.     Problem List/Main Diagnoses  #PNA: presented with sepsis, CXR with infiltrates, abdominal pain, also component of fluid overload as received 4L outpatient. Concern for pos-obstructive vs. HAP vs. atypical  - ID consulted: c/f PCP, given bactrim and meropenem  - Strongyloides positive: Ivermectin 3/6-7, additional doses in 3 weeks as outpatient  - CT PE negative for PE  - Gallium scan normal    #Anemia: Hgb in 7s throughout admission, iron studies AOCD  - Patient refused blood transfusions throughout admission    #Urinary Retention: presented with red[place] from prior admission. Low suspicion for UTI  - f/u outpatient    #Lung Cancer: follows with Dr. Jones  - f/u outpatient    Inpatient treatment course: 86F DNR/DNI with PMH of COPD, lung adenocarcinoma (diagnosed Nov 2020, s/p radiation, currently on chemo-gefitinib and tamoxifen with Dr. Jones), recent admission (Feb 3-17) for UGIB and anemia (s/p 1U pRBCS, never scoped) also found to have chlamydia PNA (s/p treatment with azithro x 10 days), who re-presented on 02/25 with fever and cough. In ED, complained of subjective fevers, dry cough, SOB, and chills x 1 day. Denied CP or LE edema but stated that she never really improved after discharge. Went to Dr. Jones's a day before admission and received ?4L of IV fluids due to concern for sepsis and was advised to come to ED. Has a chemoport in place and this admission was febrile to 102.8, tachycardic (130s-140s), on 3L NC when she came in, saturating >92%. Treated for HAP with vanc and Zosyn, vanc now discontinued after negative MRSA swab. Patient unable to produce sputum. Was c/o increased tenderness of chemoport site, so blood cultures sent from chemoport. Pt also c/o persistent abdominal pain with eosinophilia, so stool Ova/Parasites and strongyloides antibody sent. Was tolerating 3L NC but overnight 2/28 de-satted to 87% on 3L, becoming tachycardic and hypotensive. Sats improved but again de-satted when using the bathroom 3/1. Was febrile to 100.8 and with increased O2 requirements, so transitioned from 6L NC (saturating 87%) to HFNC (saturating >91%). ICU consulted. Repeat chest x-ay showing pulmonary congestion, POCUS showing b-lines b/l, so given 20 mg IV Lasix x2 with good UOP. Blood cultures sent from [facility], and patient unable to produce sputum. ICU consulted for worsening acute hypoxic respiratory failure. CT PE showing large b/l pleural effusions and opacifications in RUL. ID with high concern for PCP, so pt started on bactrim and steroids. Zosyn switched to meropenem. Patient weaned from HFNC to NC. IR consulted and thoracentesis performed. Patient underwent gallium scan which was read as normal. Patient given ivermectin x2 doses for strongylodies positivity.     New medications:   Labs to be followed outpatient:   Exam to be followed outpatient:    #Discharge do not delete    Patient is 86F PMH COPD, lung adenocarcinoma s/p radiation, now on chemo recently admitted for UGIB and Chlamydia PNA a/f sepsis 2/2 suspected HAP c/b fluid overload.     Problem List/Main Diagnoses  #PNA: presented with sepsis, CXR with infiltrates, abdominal pain, also component of fluid overload as received 4L outpatient. Concern for pos-obstructive vs. HAP vs. atypical  - ID consulted: c/f PCP, given bactrim and meropenem  - Strongyloides positive: Ivermectin 3/6-7, additional doses in 3 weeks as outpatient  - CT PE negative for PE  - Gallium scan normal    #Anemia: Hgb in 7s throughout admission, iron studies AOCD  - Patient refused blood transfusions throughout admission    #Urinary Retention: presented with [place] from prior admission. Low suspicion for UTI  - f/u outpatient    #Lung Cancer: follows with Dr. Jones  - f/u outpatient    Inpatient treatment course: 86F DNR/DNI with PMH of COPD, lung adenocarcinoma (diagnosed Nov 2020, s/p radiation, currently on chemo-gefitinib and tamoxifen with Dr. Jones), recent admission (Feb 3-17) for UGIB and anemia (s/p 1U pRBCS, never scoped) also found to have chlamydia PNA (s/p treatment with azithro x 10 days), who re-presented on 02/25 with fever and cough. In ED, complained of subjective fevers, dry cough, SOB, and chills x 1 day. Denied CP or LE edema but stated that she never really improved after discharge. Went to Dr. Jones's a day before admission and received ?4L of IV fluids due to concern for sepsis and was advised to come to ED. Has a chemoport in place and this admission was febrile to 102.8, tachycardic (130s-140s), on 3L NC when she came in, saturating >92%. Treated for HAP with vanc and Zosyn, vanc now discontinued after negative MRSA swab. Patient unable to produce sputum. Was c/o increased tenderness of chemoport site, so blood cultures sent from chemoport. Pt also c/o persistent abdominal pain with eosinophilia, so stool Ova/Parasites and strongyloides antibody sent. Was tolerating 3L NC but overnight 2/28 de-satted to 87% on 3L, becoming tachycardic and hypotensive. Sats improved but again de-satted when using the bathroom 3/1. Was febrile to 100.8 and with increased O2 requirements, so transitioned from 6L NC (saturating 87%) to HFNC (saturating >91%). ICU consulted. Repeat chest x-ay showing pulmonary congestion, POCUS showing b-lines b/l, so given 20 mg IV Lasix x2 with good UOP. Blood cultures sent from [facility], and patient unable to produce sputum. ICU consulted for worsening acute hypoxic respiratory failure. CT PE showing large b/l pleural effusions and opacifications in RUL. ID with high concern for PCP, so pt started on bactrim and steroids. Zosyn switched to meropenem. Patient weaned from HFNC to NC. IR consulted and thoracentesis performed. Patient underwent gallium scan which was read as normal. Patient given ivermectin x2 doses for strongylodies positivity. Patient discharged with plans to follow up outpatient.     New medications:   Labs to be followed outpatient:   Exam to be followed outpatient:    #Discharge do not delete    Patient is 86F PMH COPD, lung adenocarcinoma s/p radiation, now on chemo recently admitted for UGIB and Chlamydia PNA a/f sepsis 2/2 suspected HAP c/b fluid overload.     Problem List/Main Diagnoses  #PNA: presented with sepsis, CXR with infiltrates, abdominal pain, also component of fluid overload as received 4L outpatient. Concern for pos-obstructive vs. HAP vs. atypical  - ID consulted: c/f PCP, given bactrim and meropenem  - Strongyloides positive: Ivermectin 3/6-7, additional doses in 3 weeks as outpatient  - CT PE negative for PE  - Gallium scan normal    #Anemia: Hgb in 7s throughout admission, iron studies AOCD  - Patient refused blood transfusions throughout admission    #Urinary Retention: presented with [place] from prior admission. Low suspicion for UTI  - f/u outpatient urology    #Lung Cancer: follows with Dr. Jones  - f/u outpatient    Inpatient treatment course: 86F DNR/DNI with PMH of COPD, lung adenocarcinoma (diagnosed Nov 2020, s/p radiation, currently on chemo-gefitinib and tamoxifen with Dr. Jones), recent admission (Feb 3-17) for UGIB and anemia (s/p 1U pRBCS, never scoped) also found to have chlamydia PNA (s/p treatment with azithro x 10 days), who re-presented on 02/25 with fever and cough. In ED, complained of subjective fevers, dry cough, SOB, and chills x 1 day. Denied CP or LE edema but stated that she never really improved after discharge. Went to Dr. Jones's a day before admission and received ?4L of IV fluids due to concern for sepsis and was advised to come to ED. Has a chemoport in place and this admission was febrile to 102.8, tachycardic (130s-140s), on 3L NC when she came in, saturating >92%. Treated for HAP with vanc and Zosyn, vanc now discontinued after negative MRSA swab. Patient unable to produce sputum. Was c/o increased tenderness of chemoport site, so blood cultures sent from chemoport. Pt also c/o persistent abdominal pain with eosinophilia, so stool Ova/Parasites and strongyloides antibody sent. Was tolerating 3L NC but overnight 2/28 de-satted to 87% on 3L, becoming tachycardic and hypotensive. Sats improved but again de-satted when using the bathroom 3/1. Was febrile to 100.8 and with increased O2 requirements, so transitioned from 6L NC (saturating 87%) to HFNC (saturating >91%). ICU consulted. Repeat chest x-ay showing pulmonary congestion, POCUS showing b-lines b/l, so given 20 mg IV Lasix x2 with good UOP. Blood cultures sent from [facility], and patient unable to produce sputum. ICU consulted for worsening acute hypoxic respiratory failure. CT PE showing large b/l pleural effusions and opacifications in RUL. ID with high concern for PCP, so pt started on bactrim and steroids. Zosyn switched to meropenem. Patient weaned from HFNC to NC. IR consulted and thoracentesis performed. Patient underwent gallium scan which was read as normal. Patient given ivermectin x2 doses for strongylodies positivity. Patient discharged with plans to follow up outpatient.     New medications:   Labs to be followed outpatient:   Exam to be followed outpatient:

## 2021-03-05 NOTE — DISCHARGE NOTE PROVIDER - NSDCMRMEDTOKEN_GEN_ALL_CORE_FT
ferrous sulfate 325 mg (65 mg elemental iron) oral tablet: 1 tab(s) orally once a day  folic acid 1 mg oral tablet: 1 tab(s) orally once a day  megestrol 40 mg oral tablet: 1 tab(s) orally 4 times a day  omeprazole 40 mg oral delayed release capsule: 1 cap(s) orally once a day   acetaminophen 325 mg oral tablet: 2 tab(s) orally every 6 hours, As needed, Mild Pain (1 - 3)  ferrous sulfate 325 mg (65 mg elemental iron) oral tablet: 1 tab(s) orally once a day  folic acid 1 mg oral tablet: 1 tab(s) orally once a day  megestrol 40 mg oral tablet: 1 tab(s) orally 4 times a day  omeprazole 40 mg oral delayed release capsule: 1 cap(s) orally once a day

## 2021-03-05 NOTE — DISCHARGE NOTE PROVIDER - DETAILS OF MALNUTRITION DIAGNOSIS/DIAGNOSES
This patient has been assessed with a concern for Malnutrition and was treated during this hospitalization for the following Nutrition diagnosis/diagnoses:     -  02/26/2021: Severe protein-calorie malnutrition   -  02/26/2021: Underweight (BMI < 19)   This patient has been assessed with a concern for Malnutrition and was treated during this hospitalization for the following Nutrition diagnosis/diagnoses:     -  02/26/2021: Severe protein-calorie malnutrition   -  02/26/2021: Underweight (BMI < 19)    This patient has been assessed with a concern for Malnutrition and was treated during this hospitalization for the following Nutrition diagnosis/diagnoses:     -  02/26/2021: Severe protein-calorie malnutrition   -  02/26/2021: Underweight (BMI < 19)   This patient has been assessed with a concern for Malnutrition and was treated during this hospitalization for the following Nutrition diagnosis/diagnoses:     -  02/26/2021: Severe protein-calorie malnutrition   -  02/26/2021: Underweight (BMI < 19)    This patient has been assessed with a concern for Malnutrition and was treated during this hospitalization for the following Nutrition diagnosis/diagnoses:     -  02/26/2021: Severe protein-calorie malnutrition   -  02/26/2021: Underweight (BMI < 19)    This patient has been assessed with a concern for Malnutrition and was treated during this hospitalization for the following Nutrition diagnosis/diagnoses:     -  02/26/2021: Severe protein-calorie malnutrition   -  02/26/2021: Underweight (BMI < 19)   This patient has been assessed with a concern for Malnutrition and was treated during this hospitalization for the following Nutrition diagnosis/diagnoses:     -  02/26/2021: Severe protein-calorie malnutrition   -  02/26/2021: Underweight (BMI < 19)    This patient has been assessed with a concern for Malnutrition and was treated during this hospitalization for the following Nutrition diagnosis/diagnoses:     -  02/26/2021: Severe protein-calorie malnutrition   -  02/26/2021: Underweight (BMI < 19)    This patient has been assessed with a concern for Malnutrition and was treated during this hospitalization for the following Nutrition diagnosis/diagnoses:     -  02/26/2021: Severe protein-calorie malnutrition   -  02/26/2021: Underweight (BMI < 19)    This patient has been assessed with a concern for Malnutrition and was treated during this hospitalization for the following Nutrition diagnosis/diagnoses:     -  02/26/2021: Severe protein-calorie malnutrition   -  02/26/2021: Underweight (BMI < 19)   This patient has been assessed with a concern for Malnutrition and was treated during this hospitalization for the following Nutrition diagnosis/diagnoses:     -  02/26/2021: Severe protein-calorie malnutrition   -  02/26/2021: Underweight (BMI < 19)    This patient has been assessed with a concern for Malnutrition and was treated during this hospitalization for the following Nutrition diagnosis/diagnoses:     -  02/26/2021: Severe protein-calorie malnutrition   -  02/26/2021: Underweight (BMI < 19)    This patient has been assessed with a concern for Malnutrition and was treated during this hospitalization for the following Nutrition diagnosis/diagnoses:     -  02/26/2021: Severe protein-calorie malnutrition   -  02/26/2021: Underweight (BMI < 19)    This patient has been assessed with a concern for Malnutrition and was treated during this hospitalization for the following Nutrition diagnosis/diagnoses:     -  02/26/2021: Severe protein-calorie malnutrition   -  02/26/2021: Underweight (BMI < 19)    This patient has been assessed with a concern for Malnutrition and was treated during this hospitalization for the following Nutrition diagnosis/diagnoses:     -  02/26/2021: Severe protein-calorie malnutrition   -  02/26/2021: Underweight (BMI < 19)   This patient has been assessed with a concern for Malnutrition and was treated during this hospitalization for the following Nutrition diagnosis/diagnoses:     -  02/26/2021: Severe protein-calorie malnutrition   -  02/26/2021: Underweight (BMI < 19)    This patient has been assessed with a concern for Malnutrition and was treated during this hospitalization for the following Nutrition diagnosis/diagnoses:     -  02/26/2021: Severe protein-calorie malnutrition   -  02/26/2021: Underweight (BMI < 19)    This patient has been assessed with a concern for Malnutrition and was treated during this hospitalization for the following Nutrition diagnosis/diagnoses:     -  02/26/2021: Severe protein-calorie malnutrition   -  02/26/2021: Underweight (BMI < 19)    This patient has been assessed with a concern for Malnutrition and was treated during this hospitalization for the following Nutrition diagnosis/diagnoses:     -  02/26/2021: Severe protein-calorie malnutrition   -  02/26/2021: Underweight (BMI < 19)    This patient has been assessed with a concern for Malnutrition and was treated during this hospitalization for the following Nutrition diagnosis/diagnoses:     -  02/26/2021: Severe protein-calorie malnutrition   -  02/26/2021: Underweight (BMI < 19)    This patient has been assessed with a concern for Malnutrition and was treated during this hospitalization for the following Nutrition diagnosis/diagnoses:     -  02/26/2021: Severe protein-calorie malnutrition   -  02/26/2021: Underweight (BMI < 19)   This patient has been assessed with a concern for Malnutrition and was treated during this hospitalization for the following Nutrition diagnosis/diagnoses:     -  02/26/2021: Severe protein-calorie malnutrition   -  02/26/2021: Underweight (BMI < 19)    This patient has been assessed with a concern for Malnutrition and was treated during this hospitalization for the following Nutrition diagnosis/diagnoses:     -  02/26/2021: Severe protein-calorie malnutrition   -  02/26/2021: Underweight (BMI < 19)    This patient has been assessed with a concern for Malnutrition and was treated during this hospitalization for the following Nutrition diagnosis/diagnoses:     -  02/26/2021: Severe protein-calorie malnutrition   -  02/26/2021: Underweight (BMI < 19)    This patient has been assessed with a concern for Malnutrition and was treated during this hospitalization for the following Nutrition diagnosis/diagnoses:     -  02/26/2021: Severe protein-calorie malnutrition   -  02/26/2021: Underweight (BMI < 19)    This patient has been assessed with a concern for Malnutrition and was treated during this hospitalization for the following Nutrition diagnosis/diagnoses:     -  02/26/2021: Severe protein-calorie malnutrition   -  02/26/2021: Underweight (BMI < 19)    This patient has been assessed with a concern for Malnutrition and was treated during this hospitalization for the following Nutrition diagnosis/diagnoses:     -  02/26/2021: Severe protein-calorie malnutrition   -  02/26/2021: Underweight (BMI < 19)    This patient has been assessed with a concern for Malnutrition and was treated during this hospitalization for the following Nutrition diagnosis/diagnoses:     -  02/26/2021: Severe protein-calorie malnutrition   -  02/26/2021: Underweight (BMI < 19)   This patient has been assessed with a concern for Malnutrition and was treated during this hospitalization for the following Nutrition diagnosis/diagnoses:     -  02/26/2021: Severe protein-calorie malnutrition   -  02/26/2021: Underweight (BMI < 19)    This patient has been assessed with a concern for Malnutrition and was treated during this hospitalization for the following Nutrition diagnosis/diagnoses:     -  02/26/2021: Severe protein-calorie malnutrition   -  02/26/2021: Underweight (BMI < 19)    This patient has been assessed with a concern for Malnutrition and was treated during this hospitalization for the following Nutrition diagnosis/diagnoses:     -  02/26/2021: Severe protein-calorie malnutrition   -  02/26/2021: Underweight (BMI < 19)    This patient has been assessed with a concern for Malnutrition and was treated during this hospitalization for the following Nutrition diagnosis/diagnoses:     -  02/26/2021: Severe protein-calorie malnutrition   -  02/26/2021: Underweight (BMI < 19)    This patient has been assessed with a concern for Malnutrition and was treated during this hospitalization for the following Nutrition diagnosis/diagnoses:     -  02/26/2021: Severe protein-calorie malnutrition   -  02/26/2021: Underweight (BMI < 19)    This patient has been assessed with a concern for Malnutrition and was treated during this hospitalization for the following Nutrition diagnosis/diagnoses:     -  02/26/2021: Severe protein-calorie malnutrition   -  02/26/2021: Underweight (BMI < 19)    This patient has been assessed with a concern for Malnutrition and was treated during this hospitalization for the following Nutrition diagnosis/diagnoses:     -  02/26/2021: Severe protein-calorie malnutrition   -  02/26/2021: Underweight (BMI < 19)    This patient has been assessed with a concern for Malnutrition and was treated during this hospitalization for the following Nutrition diagnosis/diagnoses:     -  02/26/2021: Severe protein-calorie malnutrition   -  02/26/2021: Underweight (BMI < 19)   This patient has been assessed with a concern for Malnutrition and was treated during this hospitalization for the following Nutrition diagnosis/diagnoses:     -  02/26/2021: Severe protein-calorie malnutrition   -  02/26/2021: Underweight (BMI < 19)    This patient has been assessed with a concern for Malnutrition and was treated during this hospitalization for the following Nutrition diagnosis/diagnoses:     -  02/26/2021: Severe protein-calorie malnutrition   -  02/26/2021: Underweight (BMI < 19)    This patient has been assessed with a concern for Malnutrition and was treated during this hospitalization for the following Nutrition diagnosis/diagnoses:     -  02/26/2021: Severe protein-calorie malnutrition   -  02/26/2021: Underweight (BMI < 19)    This patient has been assessed with a concern for Malnutrition and was treated during this hospitalization for the following Nutrition diagnosis/diagnoses:     -  02/26/2021: Severe protein-calorie malnutrition   -  02/26/2021: Underweight (BMI < 19)    This patient has been assessed with a concern for Malnutrition and was treated during this hospitalization for the following Nutrition diagnosis/diagnoses:     -  02/26/2021: Severe protein-calorie malnutrition   -  02/26/2021: Underweight (BMI < 19)    This patient has been assessed with a concern for Malnutrition and was treated during this hospitalization for the following Nutrition diagnosis/diagnoses:     -  02/26/2021: Severe protein-calorie malnutrition   -  02/26/2021: Underweight (BMI < 19)    This patient has been assessed with a concern for Malnutrition and was treated during this hospitalization for the following Nutrition diagnosis/diagnoses:     -  02/26/2021: Severe protein-calorie malnutrition   -  02/26/2021: Underweight (BMI < 19)    This patient has been assessed with a concern for Malnutrition and was treated during this hospitalization for the following Nutrition diagnosis/diagnoses:     -  02/26/2021: Severe protein-calorie malnutrition   -  02/26/2021: Underweight (BMI < 19)    This patient has been assessed with a concern for Malnutrition and was treated during this hospitalization for the following Nutrition diagnosis/diagnoses:     -  02/26/2021: Severe protein-calorie malnutrition   -  02/26/2021: Underweight (BMI < 19)   This patient has been assessed with a concern for Malnutrition and was treated during this hospitalization for the following Nutrition diagnosis/diagnoses:     -  02/26/2021: Severe protein-calorie malnutrition   -  02/26/2021: Underweight (BMI < 19)    This patient has been assessed with a concern for Malnutrition and was treated during this hospitalization for the following Nutrition diagnosis/diagnoses:     -  02/26/2021: Severe protein-calorie malnutrition   -  02/26/2021: Underweight (BMI < 19)    This patient has been assessed with a concern for Malnutrition and was treated during this hospitalization for the following Nutrition diagnosis/diagnoses:     -  02/26/2021: Severe protein-calorie malnutrition   -  02/26/2021: Underweight (BMI < 19)    This patient has been assessed with a concern for Malnutrition and was treated during this hospitalization for the following Nutrition diagnosis/diagnoses:     -  02/26/2021: Severe protein-calorie malnutrition   -  02/26/2021: Underweight (BMI < 19)    This patient has been assessed with a concern for Malnutrition and was treated during this hospitalization for the following Nutrition diagnosis/diagnoses:     -  02/26/2021: Severe protein-calorie malnutrition   -  02/26/2021: Underweight (BMI < 19)    This patient has been assessed with a concern for Malnutrition and was treated during this hospitalization for the following Nutrition diagnosis/diagnoses:     -  02/26/2021: Severe protein-calorie malnutrition   -  02/26/2021: Underweight (BMI < 19)    This patient has been assessed with a concern for Malnutrition and was treated during this hospitalization for the following Nutrition diagnosis/diagnoses:     -  02/26/2021: Severe protein-calorie malnutrition   -  02/26/2021: Underweight (BMI < 19)    This patient has been assessed with a concern for Malnutrition and was treated during this hospitalization for the following Nutrition diagnosis/diagnoses:     -  02/26/2021: Severe protein-calorie malnutrition   -  02/26/2021: Underweight (BMI < 19)    This patient has been assessed with a concern for Malnutrition and was treated during this hospitalization for the following Nutrition diagnosis/diagnoses:     -  02/26/2021: Severe protein-calorie malnutrition   -  02/26/2021: Underweight (BMI < 19)    This patient has been assessed with a concern for Malnutrition and was treated during this hospitalization for the following Nutrition diagnosis/diagnoses:     -  02/26/2021: Severe protein-calorie malnutrition   -  02/26/2021: Underweight (BMI < 19)   This patient has been assessed with a concern for Malnutrition and was treated during this hospitalization for the following Nutrition diagnosis/diagnoses:     -  02/26/2021: Severe protein-calorie malnutrition   -  02/26/2021: Underweight (BMI < 19)    This patient has been assessed with a concern for Malnutrition and was treated during this hospitalization for the following Nutrition diagnosis/diagnoses:     -  02/26/2021: Severe protein-calorie malnutrition   -  02/26/2021: Underweight (BMI < 19)    This patient has been assessed with a concern for Malnutrition and was treated during this hospitalization for the following Nutrition diagnosis/diagnoses:     -  02/26/2021: Severe protein-calorie malnutrition   -  02/26/2021: Underweight (BMI < 19)    This patient has been assessed with a concern for Malnutrition and was treated during this hospitalization for the following Nutrition diagnosis/diagnoses:     -  02/26/2021: Severe protein-calorie malnutrition   -  02/26/2021: Underweight (BMI < 19)    This patient has been assessed with a concern for Malnutrition and was treated during this hospitalization for the following Nutrition diagnosis/diagnoses:     -  02/26/2021: Severe protein-calorie malnutrition   -  02/26/2021: Underweight (BMI < 19)    This patient has been assessed with a concern for Malnutrition and was treated during this hospitalization for the following Nutrition diagnosis/diagnoses:     -  02/26/2021: Severe protein-calorie malnutrition   -  02/26/2021: Underweight (BMI < 19)    This patient has been assessed with a concern for Malnutrition and was treated during this hospitalization for the following Nutrition diagnosis/diagnoses:     -  02/26/2021: Severe protein-calorie malnutrition   -  02/26/2021: Underweight (BMI < 19)    This patient has been assessed with a concern for Malnutrition and was treated during this hospitalization for the following Nutrition diagnosis/diagnoses:     -  02/26/2021: Severe protein-calorie malnutrition   -  02/26/2021: Underweight (BMI < 19)    This patient has been assessed with a concern for Malnutrition and was treated during this hospitalization for the following Nutrition diagnosis/diagnoses:     -  02/26/2021: Severe protein-calorie malnutrition   -  02/26/2021: Underweight (BMI < 19)    This patient has been assessed with a concern for Malnutrition and was treated during this hospitalization for the following Nutrition diagnosis/diagnoses:     -  02/26/2021: Severe protein-calorie malnutrition   -  02/26/2021: Underweight (BMI < 19)    This patient has been assessed with a concern for Malnutrition and was treated during this hospitalization for the following Nutrition diagnosis/diagnoses:     -  02/26/2021: Severe protein-calorie malnutrition   -  02/26/2021: Underweight (BMI < 19)   This patient has been assessed with a concern for Malnutrition and was treated during this hospitalization for the following Nutrition diagnosis/diagnoses:     -  02/26/2021: Severe protein-calorie malnutrition   -  02/26/2021: Underweight (BMI < 19)    This patient has been assessed with a concern for Malnutrition and was treated during this hospitalization for the following Nutrition diagnosis/diagnoses:     -  02/26/2021: Severe protein-calorie malnutrition   -  02/26/2021: Underweight (BMI < 19)    This patient has been assessed with a concern for Malnutrition and was treated during this hospitalization for the following Nutrition diagnosis/diagnoses:     -  02/26/2021: Severe protein-calorie malnutrition   -  02/26/2021: Underweight (BMI < 19)    This patient has been assessed with a concern for Malnutrition and was treated during this hospitalization for the following Nutrition diagnosis/diagnoses:     -  02/26/2021: Severe protein-calorie malnutrition   -  02/26/2021: Underweight (BMI < 19)    This patient has been assessed with a concern for Malnutrition and was treated during this hospitalization for the following Nutrition diagnosis/diagnoses:     -  02/26/2021: Severe protein-calorie malnutrition   -  02/26/2021: Underweight (BMI < 19)    This patient has been assessed with a concern for Malnutrition and was treated during this hospitalization for the following Nutrition diagnosis/diagnoses:     -  02/26/2021: Severe protein-calorie malnutrition   -  02/26/2021: Underweight (BMI < 19)    This patient has been assessed with a concern for Malnutrition and was treated during this hospitalization for the following Nutrition diagnosis/diagnoses:     -  02/26/2021: Severe protein-calorie malnutrition   -  02/26/2021: Underweight (BMI < 19)    This patient has been assessed with a concern for Malnutrition and was treated during this hospitalization for the following Nutrition diagnosis/diagnoses:     -  02/26/2021: Severe protein-calorie malnutrition   -  02/26/2021: Underweight (BMI < 19)    This patient has been assessed with a concern for Malnutrition and was treated during this hospitalization for the following Nutrition diagnosis/diagnoses:     -  02/26/2021: Severe protein-calorie malnutrition   -  02/26/2021: Underweight (BMI < 19)    This patient has been assessed with a concern for Malnutrition and was treated during this hospitalization for the following Nutrition diagnosis/diagnoses:     -  02/26/2021: Severe protein-calorie malnutrition   -  02/26/2021: Underweight (BMI < 19)    This patient has been assessed with a concern for Malnutrition and was treated during this hospitalization for the following Nutrition diagnosis/diagnoses:     -  02/26/2021: Severe protein-calorie malnutrition   -  02/26/2021: Underweight (BMI < 19)    This patient has been assessed with a concern for Malnutrition and was treated during this hospitalization for the following Nutrition diagnosis/diagnoses:     -  02/26/2021: Severe protein-calorie malnutrition   -  02/26/2021: Underweight (BMI < 19)   This patient has been assessed with a concern for Malnutrition and was treated during this hospitalization for the following Nutrition diagnosis/diagnoses:     -  02/26/2021: Severe protein-calorie malnutrition   -  02/26/2021: Underweight (BMI < 19)    This patient has been assessed with a concern for Malnutrition and was treated during this hospitalization for the following Nutrition diagnosis/diagnoses:     -  02/26/2021: Severe protein-calorie malnutrition   -  02/26/2021: Underweight (BMI < 19)    This patient has been assessed with a concern for Malnutrition and was treated during this hospitalization for the following Nutrition diagnosis/diagnoses:     -  02/26/2021: Severe protein-calorie malnutrition   -  02/26/2021: Underweight (BMI < 19)    This patient has been assessed with a concern for Malnutrition and was treated during this hospitalization for the following Nutrition diagnosis/diagnoses:     -  02/26/2021: Severe protein-calorie malnutrition   -  02/26/2021: Underweight (BMI < 19)    This patient has been assessed with a concern for Malnutrition and was treated during this hospitalization for the following Nutrition diagnosis/diagnoses:     -  02/26/2021: Severe protein-calorie malnutrition   -  02/26/2021: Underweight (BMI < 19)    This patient has been assessed with a concern for Malnutrition and was treated during this hospitalization for the following Nutrition diagnosis/diagnoses:     -  02/26/2021: Severe protein-calorie malnutrition   -  02/26/2021: Underweight (BMI < 19)    This patient has been assessed with a concern for Malnutrition and was treated during this hospitalization for the following Nutrition diagnosis/diagnoses:     -  02/26/2021: Severe protein-calorie malnutrition   -  02/26/2021: Underweight (BMI < 19)    This patient has been assessed with a concern for Malnutrition and was treated during this hospitalization for the following Nutrition diagnosis/diagnoses:     -  02/26/2021: Severe protein-calorie malnutrition   -  02/26/2021: Underweight (BMI < 19)    This patient has been assessed with a concern for Malnutrition and was treated during this hospitalization for the following Nutrition diagnosis/diagnoses:     -  02/26/2021: Severe protein-calorie malnutrition   -  02/26/2021: Underweight (BMI < 19)    This patient has been assessed with a concern for Malnutrition and was treated during this hospitalization for the following Nutrition diagnosis/diagnoses:     -  02/26/2021: Severe protein-calorie malnutrition   -  02/26/2021: Underweight (BMI < 19)    This patient has been assessed with a concern for Malnutrition and was treated during this hospitalization for the following Nutrition diagnosis/diagnoses:     -  02/26/2021: Severe protein-calorie malnutrition   -  02/26/2021: Underweight (BMI < 19)    This patient has been assessed with a concern for Malnutrition and was treated during this hospitalization for the following Nutrition diagnosis/diagnoses:     -  02/26/2021: Severe protein-calorie malnutrition   -  02/26/2021: Underweight (BMI < 19)    This patient has been assessed with a concern for Malnutrition and was treated during this hospitalization for the following Nutrition diagnosis/diagnoses:     -  02/26/2021: Severe protein-calorie malnutrition   -  02/26/2021: Underweight (BMI < 19)   This patient has been assessed with a concern for Malnutrition and was treated during this hospitalization for the following Nutrition diagnosis/diagnoses:     -  02/26/2021: Severe protein-calorie malnutrition   -  02/26/2021: Underweight (BMI < 19)    This patient has been assessed with a concern for Malnutrition and was treated during this hospitalization for the following Nutrition diagnosis/diagnoses:     -  02/26/2021: Severe protein-calorie malnutrition   -  02/26/2021: Underweight (BMI < 19)    This patient has been assessed with a concern for Malnutrition and was treated during this hospitalization for the following Nutrition diagnosis/diagnoses:     -  02/26/2021: Severe protein-calorie malnutrition   -  02/26/2021: Underweight (BMI < 19)    This patient has been assessed with a concern for Malnutrition and was treated during this hospitalization for the following Nutrition diagnosis/diagnoses:     -  02/26/2021: Severe protein-calorie malnutrition   -  02/26/2021: Underweight (BMI < 19)    This patient has been assessed with a concern for Malnutrition and was treated during this hospitalization for the following Nutrition diagnosis/diagnoses:     -  02/26/2021: Severe protein-calorie malnutrition   -  02/26/2021: Underweight (BMI < 19)    This patient has been assessed with a concern for Malnutrition and was treated during this hospitalization for the following Nutrition diagnosis/diagnoses:     -  02/26/2021: Severe protein-calorie malnutrition   -  02/26/2021: Underweight (BMI < 19)    This patient has been assessed with a concern for Malnutrition and was treated during this hospitalization for the following Nutrition diagnosis/diagnoses:     -  02/26/2021: Severe protein-calorie malnutrition   -  02/26/2021: Underweight (BMI < 19)    This patient has been assessed with a concern for Malnutrition and was treated during this hospitalization for the following Nutrition diagnosis/diagnoses:     -  02/26/2021: Severe protein-calorie malnutrition   -  02/26/2021: Underweight (BMI < 19)    This patient has been assessed with a concern for Malnutrition and was treated during this hospitalization for the following Nutrition diagnosis/diagnoses:     -  02/26/2021: Severe protein-calorie malnutrition   -  02/26/2021: Underweight (BMI < 19)    This patient has been assessed with a concern for Malnutrition and was treated during this hospitalization for the following Nutrition diagnosis/diagnoses:     -  02/26/2021: Severe protein-calorie malnutrition   -  02/26/2021: Underweight (BMI < 19)    This patient has been assessed with a concern for Malnutrition and was treated during this hospitalization for the following Nutrition diagnosis/diagnoses:     -  02/26/2021: Severe protein-calorie malnutrition   -  02/26/2021: Underweight (BMI < 19)    This patient has been assessed with a concern for Malnutrition and was treated during this hospitalization for the following Nutrition diagnosis/diagnoses:     -  02/26/2021: Severe protein-calorie malnutrition   -  02/26/2021: Underweight (BMI < 19)    This patient has been assessed with a concern for Malnutrition and was treated during this hospitalization for the following Nutrition diagnosis/diagnoses:     -  02/26/2021: Severe protein-calorie malnutrition   -  02/26/2021: Underweight (BMI < 19)    This patient has been assessed with a concern for Malnutrition and was treated during this hospitalization for the following Nutrition diagnosis/diagnoses:     -  02/26/2021: Severe protein-calorie malnutrition   -  02/26/2021: Underweight (BMI < 19)   This patient has been assessed with a concern for Malnutrition and was treated during this hospitalization for the following Nutrition diagnosis/diagnoses:     -  02/26/2021: Severe protein-calorie malnutrition   -  02/26/2021: Underweight (BMI < 19)    This patient has been assessed with a concern for Malnutrition and was treated during this hospitalization for the following Nutrition diagnosis/diagnoses:     -  02/26/2021: Severe protein-calorie malnutrition   -  02/26/2021: Underweight (BMI < 19)    This patient has been assessed with a concern for Malnutrition and was treated during this hospitalization for the following Nutrition diagnosis/diagnoses:     -  02/26/2021: Severe protein-calorie malnutrition   -  02/26/2021: Underweight (BMI < 19)    This patient has been assessed with a concern for Malnutrition and was treated during this hospitalization for the following Nutrition diagnosis/diagnoses:     -  02/26/2021: Severe protein-calorie malnutrition   -  02/26/2021: Underweight (BMI < 19)    This patient has been assessed with a concern for Malnutrition and was treated during this hospitalization for the following Nutrition diagnosis/diagnoses:     -  02/26/2021: Severe protein-calorie malnutrition   -  02/26/2021: Underweight (BMI < 19)    This patient has been assessed with a concern for Malnutrition and was treated during this hospitalization for the following Nutrition diagnosis/diagnoses:     -  02/26/2021: Severe protein-calorie malnutrition   -  02/26/2021: Underweight (BMI < 19)    This patient has been assessed with a concern for Malnutrition and was treated during this hospitalization for the following Nutrition diagnosis/diagnoses:     -  02/26/2021: Severe protein-calorie malnutrition   -  02/26/2021: Underweight (BMI < 19)    This patient has been assessed with a concern for Malnutrition and was treated during this hospitalization for the following Nutrition diagnosis/diagnoses:     -  02/26/2021: Severe protein-calorie malnutrition   -  02/26/2021: Underweight (BMI < 19)    This patient has been assessed with a concern for Malnutrition and was treated during this hospitalization for the following Nutrition diagnosis/diagnoses:     -  02/26/2021: Severe protein-calorie malnutrition   -  02/26/2021: Underweight (BMI < 19)    This patient has been assessed with a concern for Malnutrition and was treated during this hospitalization for the following Nutrition diagnosis/diagnoses:     -  02/26/2021: Severe protein-calorie malnutrition   -  02/26/2021: Underweight (BMI < 19)    This patient has been assessed with a concern for Malnutrition and was treated during this hospitalization for the following Nutrition diagnosis/diagnoses:     -  02/26/2021: Severe protein-calorie malnutrition   -  02/26/2021: Underweight (BMI < 19)    This patient has been assessed with a concern for Malnutrition and was treated during this hospitalization for the following Nutrition diagnosis/diagnoses:     -  02/26/2021: Severe protein-calorie malnutrition   -  02/26/2021: Underweight (BMI < 19)    This patient has been assessed with a concern for Malnutrition and was treated during this hospitalization for the following Nutrition diagnosis/diagnoses:     -  02/26/2021: Severe protein-calorie malnutrition   -  02/26/2021: Underweight (BMI < 19)    This patient has been assessed with a concern for Malnutrition and was treated during this hospitalization for the following Nutrition diagnosis/diagnoses:     -  02/26/2021: Severe protein-calorie malnutrition   -  02/26/2021: Underweight (BMI < 19)    This patient has been assessed with a concern for Malnutrition and was treated during this hospitalization for the following Nutrition diagnosis/diagnoses:     -  02/26/2021: Severe protein-calorie malnutrition   -  02/26/2021: Underweight (BMI < 19)

## 2021-03-05 NOTE — DISCHARGE NOTE PROVIDER - CARE PROVIDER_API CALL
Alena Worthington  INTERNAL MEDICINE  [address] [place] [address]  Phone: [phone]  Fax: [phone]  Established Patient  Follow Up Time: 2 weeks    Leonel Jones)  Internal Medicine; Medical Oncology  [address] [place] [address]  Phone: [phone]  Fax: [phone]  Established Patient  Follow Up Time:    Alena Worthington  INTERNAL MEDICINE  [address] [place] [address]  Phone: [phone]  Fax: [phone]  Established Patient  Scheduled Appointment: 03/15/2021 02:00 PM    Leonel Jones)  Internal Medicine; Medical Oncology  [address] [place] [address]  Phone: [phone]  Fax: [phone]  Established Patient  Follow Up Time: 2 weeks   Alena Worthington  INTERNAL MEDICINE  [address] [place] [address]  Phone: [phone]  Fax: [phone]  Established Patient  Scheduled Appointment: 03/15/2021 02:00 PM    Leonel Jones)  Internal Medicine; Medical Oncology  [address] [place] [address]  Phone: [phone]  Fax: [phone]  Established Patient  Follow Up Time: 2 weeks    Rupali Rogers)  Urology  [address] [place] [address]  Phone: [phone]  Fax: [phone]  Follow Up Time: 2 weeks

## 2021-03-06 NOTE — PROGRESS NOTE ADULT - ASSESSMENT
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

## 2021-03-06 NOTE — PROGRESS NOTE ADULT - SUBJECTIVE AND OBJECTIVE BOX
coverage for Dr Angel    Pt seen and examined   no complaints  Hgb 6.8    REVIEW OF SYSTEMS:  Constitutional: No fever,   Cardiovascular: No chest pain, palpitations, dizziness or leg swelling  Gastrointestinal: No abdominal or epigastric pain. No nausea, vomiting or hematemesis;. No melena or hematochezia.  Skin: No itching, burning, rashes or lesions       MEDICATIONS:  MEDICATIONS  (STANDING):  albuterol/ipratropium for Nebulization 3 milliLiter(s) Nebulizer every 6 hours  budesonide  80 MICROgram(s)/formoterol 4.5 MICROgram(s) Inhaler 2 Puff(s) Inhalation two times a day  enoxaparin Injectable 30 milliGRAM(s) SubCutaneous every 24 hours  folic acid 1 milliGRAM(s) Oral daily  meropenem  IVPB 1000 milliGRAM(s) IV Intermittent every 12 hours  pantoprazole  Injectable 40 milliGRAM(s) IV Push every 12 hours  psyllium Powder 1 Packet(s) Oral daily  senna 2 Tablet(s) Oral at bedtime  tamsulosin 0.4 milliGRAM(s) Oral at bedtime  trimethoprim / sulfamethoxazole IVPB 200 milliGRAM(s) IV Intermittent every 12 hours    MEDICATIONS  (PRN):  acetaminophen   Tablet .. 650 milliGRAM(s) Oral every 6 hours PRN Mild Pain (1 - 3)  benzocaine 20% Spray 1 Spray(s) Topical four times a day PRN throat pain      Allergies    No Known Allergies    Intolerances        Vital Signs Last 24 Hrs  T(C): 36.5 (06 Mar 2021 05:50), Max: 36.5 (06 Mar 2021 05:50)  T(F): 97.7 (06 Mar 2021 05:50), Max: 97.7 (06 Mar 2021 05:50)  HR: 104 (06 Mar 2021 05:50) (83 - 104)  BP: 99/51 (06 Mar 2021 05:50) (99/51 - 110/54)  BP(mean): --  RR: 18 (06 Mar 2021 05:50) (18 - 94)  SpO2: 95% (06 Mar 2021 05:50) (92% - 95%)    03-05 @ 07:01  -  03-06 @ 07:00  --------------------------------------------------------  IN: 0 mL / OUT: 1000 mL / NET: -1000 mL        PHYSICAL EXAM:    General: Well developed; well nourished; in no acute distress  HEENT: MMM, conjunctiva and sclera clear  Lungs: poor inspiratory effort but sounded clear  Heart: regular  Gastrointestinal: Soft non-tender non-distended; Normal bowel sounds  Skin: Warm and dry. No obvious rash    LABS:      CBC Full  -  ( 06 Mar 2021 06:25 )  WBC Count : 10.30 K/uL  RBC Count : 2.57 M/uL  Hemoglobin : 6.9 g/dL  Hematocrit : 23.4 %  Platelet Count - Automated : 403 K/uL  Mean Cell Volume : 91.1 fl  Mean Cell Hemoglobin : 26.8 pg  Mean Cell Hemoglobin Concentration : 29.5 gm/dL  Auto Neutrophil # : 9.94 K/uL  Auto Lymphocyte # : 0.00 K/uL  Auto Monocyte # : 0.27 K/uL  Auto Eosinophil # : 0.09 K/uL  Auto Basophil # : 0.00 K/uL  Auto Neutrophil % : 96.5 %  Auto Lymphocyte % : 0.0 %  Auto Monocyte % : 2.6 %  Auto Eosinophil % : 0.9 %  Auto Basophil % : 0.0 %    03-06    131<L>  |  98  |  22  ----------------------------<  100<H>  5.1   |  21<L>  |  0.96    Ca    8.4      06 Mar 2021 06:25  Phos  2.8     03-06  Mg     2.1     03-06    TPro  5.5<L>  /  Alb  2.3<L>  /  TBili  <0.2  /  DBili  x   /  AST  34  /  ALT  50<H>  /  AlkPhos  65  03-06                      RADIOLOGY & ADDITIONAL STUDIES (The following images were personally reviewed):

## 2021-03-06 NOTE — PROGRESS NOTE ADULT - SUBJECTIVE AND OBJECTIVE BOX
CC/ HPI Patient is an 86 year old female with chronic obstructive pulmonary disease, lung cancer, poorly differentiated adenocarcinoma, status post radiation therapy, Dec 2020, discharged after hospital stay for severe anemia, upper GI bleed, RVP positive Chlamydia pneumonia, (2.06.21) status post Zithromax, cefepime, now admitted with progressive pulmonary infiltrates, sepsis, pulmonary edema, seen this morning the patient is resting comfortably on nasal cannula, she denies respiratory complaint.      PAST MEDICAL & SURGICAL HISTORY:  Chronic obstructive pulmonary disease, unspecified COPD type  Hearing loss  left ear - sees Dr. Garces ENT  Basal cell carcinoma  x2 - upper lip and arm  Osteoporosis  H/O breast biopsy, 5 years ago, mass was benign and self-resolved  History of appendectomy  History of cataract surgery    SOCHX:   tobacco,  -  alcohol      FAMILY HISTORY: FA/MO  - contributory     ROS reviewed below with positive findings marked (+) :  GEN:  fever, chills ENT: tracheostomy,   epistaxis,  sinusitis COR: CAD, CHF,  HTN, dysrhythmia PUL: +COPD, ILD, asthma, pneumonia GI: PEG, dysphagia, hemorrhage, other HALLIE: kidney disease, electrolyte disorder HEM:  anemia, thrombus, coagulopathy, cancer ENDO:  thyroid disease, diabetes mellitus CNS:  dementia, stroke, seizure, PSY:  depression, anxiety, other        MEDICATIONS  (STANDING):  albuterol/ipratropium for Nebulization 3 milliLiter(s) Nebulizer every 6 hours  budesonide  80 MICROgram(s)/formoterol 4.5 MICROgram(s) Inhaler 2 Puff(s) Inhalation two times a day  enoxaparin Injectable 30 milliGRAM(s) SubCutaneous every 24 hours  folic acid 1 milliGRAM(s) Oral daily  meropenem  IVPB 1000 milliGRAM(s) IV Intermittent every 12 hours  pantoprazole  Injectable 40 milliGRAM(s) IV Push every 12 hours  psyllium Powder 1 Packet(s) Oral daily  senna 2 Tablet(s) Oral at bedtime  tamsulosin 0.4 milliGRAM(s) Oral at bedtime  trimethoprim / sulfamethoxazole IVPB 200 milliGRAM(s) IV Intermittent every 12 hours    MEDICATIONS  (PRN):  acetaminophen   Tablet .. 650 milliGRAM(s) Oral every 6 hours PRN Mild Pain (1 - 3)  benzocaine 20% Spray 1 Spray(s) Topical four times a day PRN throat pain      Vital Signs Last 24 Hrs  T(C): 36.5 (06 Mar 2021 05:50), Max: 36.5 (06 Mar 2021 05:50)  T(F): 97.7 (06 Mar 2021 05:50), Max: 97.7 (06 Mar 2021 05:50)  HR: 104 (06 Mar 2021 05:50) (83 - 104)  BP: 99/51 (06 Mar 2021 05:50) (99/51 - 110/54)  RR: 18 (06 Mar 2021 05:50) (18 - 94)  SpO2: 95% (06 Mar 2021 05:50) (92% - 95%)    GENERAL:         comfortable,  - distress.  HEENT:            - trauma,  - icterus,  - injection,  - nasal discharge.  NECK:              - jugular venous distention, - thyromegaly.  LYMPH:           - lymphadenopathy, - masses.  RESP:               + crackles,   - rhonchi,   - wheezes.   COR:                S1S2   - gallops,  - rubs.  ABD:                bowel sounds,   soft, - tender, - distended.  EXT/MSC:         - cyanosis,  - clubbing,  -edema.    NEURO:           + alert and oriented                           6.9    10.30 )-----------( 403      ( 06 Mar 2021 06:25 )             23.4     03-06    131<L>  |  98  |  22  ----------------------------<  100<H>  5.1   |  21<L>  |  0.96        X-ray Chest  (03.04.21)  Frontal examination of the chest demonstrates the heart to be within normal limits in transverse diameter. No interval change lung infiltrates in comparison to prior examination of the chest 3/1/2021. Right Port-A-Cath noted with tip overlying junction superior vena cava and right atrium. No evidence of pneumothorax.  IMPRESSION: Bilateral infiltrates.    Echo Complete w/o Contrast w/ Doppler (03.02.21)  1.  Normal left and right ventricular size and systolic function.   2. Grade I left ventricular diastolic dysfunction.   3. Mild tricuspid regurgitation.   4. No evidence of pulmonary hypertension, pulmonary artery systolic pressure is 27 mmHg.   5. No pericardial effusion.      CT Angio Chest PE Protocol w/ IV Cont (03.01.21) Pulmonary arteries: No evidence of pulmonary embolus to the segmental level.  Aorta: No aneurysm of the aorta. No dissection of the aorta.  Veins: MediPort terminates in the SVC.  Lungs: Opacities in the right upper lobe may represent atelectasis/pneumonia versus neoplasm.  Diffuse ground-glass opacities throughout the lung fields may represent pulmonary edema or diffuse pneumonia.  Pleural spaces: Moderate to large bilateral pleural effusions..  Heart: Unremarkable. No cardiomegaly. No pericardial effusion.  Lymph nodes: Unremarkable. No enlarged lymph nodes.  Kidneys and ureters: Simple cysts in the left kidney.  Bones/joints: Compression fracture T9 of unknown age.  Soft tissues: Unremarkable.   Other findings: Motion artifact degrades images.    IMPRESSION:  1. No evidence of pulmonary embolus to the segmental level.  2. No aneurysm of the aorta.  3. No dissection of the aorta.  4. Moderate to large bilateral pleural effusions.  5. Opacities in the right upper lobe may represent atelectasis/pneumonia versus neoplasm.    CT Angio Chest PE Protocol w/ IV Cont (02.03.21)  Pulmonary arteries: No pulmonary embolus seen.  Lungs and large airways: Respiratory motion limits assessment of fine pulmonary parenchymal detail. Trace dependent secretions distal trachea. No nodular and no tracheal or endobronchial lesion.  Bilateral apical pleural parenchymal scarring. Unchanged mild peribronchial wall thickening and cylindrical bronchiectasis bilaterally. Scattered nodular groundglass opacities bilaterally, more confluent in right upper lobe posterior segment with apparent interlobular septal thickening. Mild compressive atelectasis right lower lobe. Few nodular consolidative opacities left lower lobe, subsegmental atelectasis versus mild consolidation. No measurable residual masses in right upper lobe anterior segment with linear opacity in this region probably representing scarring. New few scattered bilateral pulmonary nodules, for example:  *  Right upper lobe subpleural 0.5 cm (series 6 image 160).  *  Right upper lobe anterior segment 0.5 cm (series 6 image 132)  *  Left lower lobe 0.5 cm (series 6 image 204)  Pleura:  Resolved right pneumothorax. Decreased trace residual right pleural effusion.  Mediastinum and hilar regions: Significantly decreased mediastinal and hilar adenopathy, for example right lower paratracheal 1.1 x 0.8 cm, previously 4.4 x 2.9 cm. No significant change mildly prominent subcarinal node 1.2 cm short axis. Resolved right lower cervical adenopathy.  Heart and pericardium:  Heart size is normal. No pericardial effusion.  Vessels:  No aneurysm. Mild scattered calcific atherosclerosis.  Chest wall and lower neck:  Normal.  Upper abdomen: Unchanged few left renal cysts, largest 3.5 cm.  Bones: No suspicious lytic or blastic lesion. Mild degenerative changes thoracic spine with prominent degenerative Schmorl's node inferior endplate T9 vertebral body.    IMPRESSION:  1.  No pulmonary embolus.  2.  New few small scattered nodular opacities bilaterally with more confluent groundglass density and suspected interlobular septal thickening in posterior right upper lobe. Differential includes aspiration/infection versus lymphangitic carcinomatosis. Assessment limited by artifact from respiratory motion. Recommend clinical correlation and short-term follow-up would be helpful.  .  No measurable residual right upper lobe mass with scarring in this region.  4.  Significantly decreased thoracic adenopathy.  5.  Decreased trace residual right pleural effusion.  6.  Resolved right pneumothorax.      ASSESSMENT/PLAN    1)  Pneumonia/pneumonitis  2)  Pleural effusions  3)  Chronic obstructive pulmonary disease  4)  Bronchiectasis  5)  Lung cancer    Oxygen as needed for a satisfactory SpO2  Pleural gram stain and cultures negative to date.  Bronchodilators:  Atrovent/ albuterol q 4 – 6 hours as needed  ID/Antibiotics:  trimethoprim / sulfamethoxazole, meropenem  Prednisone  Cardiac/HTN: diuresis as needed  GI: Rx/ prophylaxis c PPI/H2B  Heme: Rx/VT prophylaxis   Discussed with the medical team.

## 2021-03-06 NOTE — PROGRESS NOTE ADULT - PROBLEM SELECTOR PLAN 2
Concern for post-obstructive PNA vs. HAP vs. atypical pna. 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   - pt with worsening O2 status on 3/1, now requiring HFNC, will wean as tolerated  - zosyn 3.375mg q8hr switched to meropenem 1000mg BID  - ID concerned for PCP pna, started on bactrim 200mg BID and prednisone 40mg BID  - obtain sputum culture if cough becomes productive  - fungitell neg, f/u galactomannan  - consider gallium scan   - Will try to wean from HFNC to 6L NC Concern for post-obstructive PNA vs. HAP vs. atypical pna. 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   - pt with worsening O2 status on 3/1, now requiring HFNC, will wean as tolerated  - zosyn 3.375mg q8hr switched to meropenem 1000mg BID  - ID concerned for PCP pna, started on bactrim 200mg BID and prednisone 40mg BID  - obtain sputum culture if cough becomes productive  - fungitell neg, f/u galactomannan  - Gallium scan ordered

## 2021-03-06 NOTE — PROGRESS NOTE ADULT - SUBJECTIVE AND OBJECTIVE BOX
INTERVAL HPI/OVERNIGHT EVENTS:    ANTIBIOTICS/RELEVANT:    MEDICATIONS  (STANDING):  albuterol/ipratropium for Nebulization 3 milliLiter(s) Nebulizer every 6 hours  budesonide  80 MICROgram(s)/formoterol 4.5 MICROgram(s) Inhaler 2 Puff(s) Inhalation two times a day  enoxaparin Injectable 30 milliGRAM(s) SubCutaneous every 24 hours  folic acid 1 milliGRAM(s) Oral daily  meropenem  IVPB 1000 milliGRAM(s) IV Intermittent every 12 hours  pantoprazole  Injectable 40 milliGRAM(s) IV Push every 12 hours  psyllium Powder 1 Packet(s) Oral daily  senna 2 Tablet(s) Oral at bedtime  tamsulosin 0.4 milliGRAM(s) Oral at bedtime  trimethoprim / sulfamethoxazole IVPB 200 milliGRAM(s) IV Intermittent every 12 hours    MEDICATIONS  (PRN):  acetaminophen   Tablet .. 650 milliGRAM(s) Oral every 6 hours PRN Mild Pain (1 - 3)  benzocaine 20% Spray 1 Spray(s) Topical four times a day PRN throat pain      Allergies    No Known Allergies    Intolerances        Vital Signs Last 24 Hrs  T(C): 36.5 (06 Mar 2021 05:50), Max: 36.5 (06 Mar 2021 05:50)  T(F): 97.7 (06 Mar 2021 05:50), Max: 97.7 (06 Mar 2021 05:50)  HR: 104 (06 Mar 2021 05:50) (83 - 104)  BP: 99/51 (06 Mar 2021 05:50) (99/39 - 110/54)  BP(mean): --  RR: 18 (06 Mar 2021 05:50) (18 - 94)  SpO2: 95% (06 Mar 2021 05:50) (92% - 95%)    PHYSICAL EXAM:      Constitutional:    Eyes:    ENMT:    Neck:    Breasts:    Back:    Respiratory:    Cardiovascular:    Gastrointestinal:    Genitourinary:    Rectal:    Extremities:    Vascular:    Neurological:        LABS:                        6.9    10.30 )-----------( 403      ( 06 Mar 2021 06:25 )             23.4     03-06    131<L>  |  98  |  22  ----------------------------<  100<H>  5.1   |  21<L>  |  0.96    Ca    8.4      06 Mar 2021 06:25  Phos  2.8     03-06  Mg     2.1     03-06    TPro  5.5<L>  /  Alb  2.3<L>  /  TBili  <0.2  /  DBili  x   /  AST  34  /  ALT  50<H>  /  AlkPhos  65  03-06          MICROBIOLOGY:    RADIOLOGY & ADDITIONAL STUDIES:     INTERVAL HPI/OVERNIGHT EVENTS:    Still does not feel at baseline but much better than prior days  Awaiting gallium scan today    MEDICATIONS  (STANDING):  albuterol/ipratropium for Nebulization 3 milliLiter(s) Nebulizer every 6 hours  budesonide  80 MICROgram(s)/formoterol 4.5 MICROgram(s) Inhaler 2 Puff(s) Inhalation two times a day  enoxaparin Injectable 30 milliGRAM(s) SubCutaneous every 24 hours  folic acid 1 milliGRAM(s) Oral daily  meropenem  IVPB 1000 milliGRAM(s) IV Intermittent every 12 hours  pantoprazole  Injectable 40 milliGRAM(s) IV Push every 12 hours  psyllium Powder 1 Packet(s) Oral daily  senna 2 Tablet(s) Oral at bedtime  tamsulosin 0.4 milliGRAM(s) Oral at bedtime  trimethoprim / sulfamethoxazole IVPB 200 milliGRAM(s) IV Intermittent every 12 hours    MEDICATIONS  (PRN):  acetaminophen   Tablet .. 650 milliGRAM(s) Oral every 6 hours PRN Mild Pain (1 - 3)  benzocaine 20% Spray 1 Spray(s) Topical four times a day PRN throat pain      Allergies    No Known Allergies    EXAM  Vital Signs Last 24 Hrs  T(C): 36.5 (06 Mar 2021 05:50), Max: 36.5 (06 Mar 2021 05:50)  T(F): 97.7 (06 Mar 2021 05:50), Max: 97.7 (06 Mar 2021 05:50)  HR: 104 (06 Mar 2021 05:50) (83 - 104)  BP: 99/51 (06 Mar 2021 05:50) (99/39 - 110/54)  BP(mean): --  RR: 18 (06 Mar 2021 05:50) (18 - 94)  SpO2: 95% (06 Mar 2021 05:50) (92% - 95%)  On NC O2 at present  Dyspnea appears much improved  No thrush  No rash  Borderline tachy  Chest with improved air movement  LEs no edema      Complete Blood Count + Automated Diff in AM (03.06.21 @ 06:25)    WBC Count: 10.30 K/uL    RBC Count: 2.57 M/uL    Hemoglobin: 6.9: TYPE:(C=Critical, N=Notification, A=Abnormal) c  TESTS: _HGB  DATE/TIME CALLED: _03/06/2021 06:50:33 EST  CALLED TO: MADELAINE Armstrong RN  READ BACK (2 Patient Identifiers)(Y/N): _y  READ BACK VALUES (Y/N): _y  CALLED BY: _dy g/dL    Hematocrit: 23.4 %    Mean Cell Volume: 91.1 fl    Mean Cell Hemoglobin: 26.8 pg    Mean Cell Hemoglobin Conc: 29.5 gm/dL    Red Cell Distrib Width: 19.9 %    Platelet Count - Automated: 403 K/uL    Auto Neutrophil #: 9.94 K/uL    Auto Lymphocyte #: 0.00 K/uL    Auto Monocyte #: 0.27 K/uL    Auto Eosinophil #: 0.09 K/uL    Auto Basophil #: 0.00 K/uL    Auto Neutrophil %: 96.5: Differential percentages must be correlated with absolute numbers for  clinical significance. %    Auto Lymphocyte %: 0.0 %    Auto Monocyte %: 2.6 %    Auto Eosinophil %: 0.9 %    Auto Basophil %: 0.0 %          131<L>  |  98  |  22  ----------------------------<  100<H>  5.1   |  21<L>  |  0.96    Ca    8.4      06 Mar 2021 06:25  Phos  2.8     03-06  Mg     2.1     03-06    TPro  5.5<L>  /  Alb  2.3<L>  /  TBili  <0.2  /  DBili  x   /  AST  34  /  ALT  50<H>  /  AlkPhos  65  03-06          MICROBIOLOGY:    Culture - Body Fluid with Gram Stain (03.04.21 @ 18:59)    Gram Stain:   No organisms seen  No WBC's seen.    Specimen Source: Pleural Fl L Pleural    Culture Results:   No growth to date    Strongyloides Antibodies (03.01.21 @ 19:56)    Strongyloides Antibodies: Positive: Performed At: [address] [id number]  Jan Tuttle MD Ph:[phone]

## 2021-03-06 NOTE — PROGRESS NOTE ADULT - SUBJECTIVE AND OBJECTIVE BOX
INCOMPLETE    O/N Events:    Subjective/ROS: Patient seen and examined at bedside.     Denies Fever/Chills, HA, CP, SOB, n/v, changes in bowel/urinary habits.  12pt ROS otherwise negative.    VITALS  Vital Signs Last 24 Hrs  T(C): 36.2 (05 Mar 2021 20:44), Max: 36.6 (05 Mar 2021 05:52)  T(F): 97.1 (05 Mar 2021 20:44), Max: 97.8 (05 Mar 2021 05:52)  HR: 90 (05 Mar 2021 22:08) (72 - 108)  BP: 110/54 (05 Mar 2021 20:44) (92/53 - 110/54)  BP(mean): --  RR: 94 (05 Mar 2021 20:44) (17 - 94)  SpO2: 95% (05 Mar 2021 22:08) (92% - 97%)    CAPILLARY BLOOD GLUCOSE    PHYSICAL EXAM  General: NAD  Head: NC/AT; MMM;   Neck: Supple; no JVD  Respiratory:   Cardiovascular: Regular rhythm/rate; S1/S2+, no murmurs, rubs gallops   Gastrointestinal: Soft; NTND; bowel sounds normal and present  Extremities: WWP; no edema/cyanosis  Neurological: A&Ox3, CNII-XII grossly intact; no obvious focal deficits    MEDICATIONS  (STANDING):  albuterol/ipratropium for Nebulization 3 milliLiter(s) Nebulizer every 6 hours  budesonide  80 MICROgram(s)/formoterol 4.5 MICROgram(s) Inhaler 2 Puff(s) Inhalation two times a day  enoxaparin Injectable 30 milliGRAM(s) SubCutaneous every 24 hours  folic acid 1 milliGRAM(s) Oral daily  meropenem  IVPB 1000 milliGRAM(s) IV Intermittent every 12 hours  pantoprazole  Injectable 40 milliGRAM(s) IV Push every 12 hours  predniSONE   Tablet 40 milliGRAM(s) Oral two times a day  psyllium Powder 1 Packet(s) Oral daily  senna 2 Tablet(s) Oral at bedtime  tamsulosin 0.4 milliGRAM(s) Oral at bedtime  trimethoprim / sulfamethoxazole IVPB 200 milliGRAM(s) IV Intermittent every 12 hours    MEDICATIONS  (PRN):  acetaminophen   Tablet .. 650 milliGRAM(s) Oral every 6 hours PRN Mild Pain (1 - 3)  benzocaine 20% Spray 1 Spray(s) Topical four times a day PRN throat pain      No Known Allergies      LABS                        6.8    9.73  )-----------( 420      ( 05 Mar 2021 12:31 )             22.6     03-05    135  |  102  |  29<H>  ----------------------------<  167<H>  4.2   |  20<L>  |  0.96    Ca    9.0      05 Mar 2021 12:31  Phos  2.5     03-05  Mg     2.2     03-05    TPro  5.9<L>  /  Alb  2.5<L>  /  TBili  <0.2  /  DBili  x   /  AST  49<H>  /  ALT  54<H>  /  AlkPhos  59  03-05                IMAGING/EKG/ETC   INCOMPLETE    O/N Events:    Subjective/ROS: Patient seen and examined at bedside. Says that she is breathing well on the 6L NC. No complaints     Denies Fever/Chills, HA, CP, SOB, n/v, changes in bowel/urinary habits.  12pt ROS otherwise negative.    VITALS  Vital Signs Last 24 Hrs  T(C): 36.2 (05 Mar 2021 20:44), Max: 36.6 (05 Mar 2021 05:52)  T(F): 97.1 (05 Mar 2021 20:44), Max: 97.8 (05 Mar 2021 05:52)  HR: 90 (05 Mar 2021 22:08) (72 - 108)  BP: 110/54 (05 Mar 2021 20:44) (92/53 - 110/54)  BP(mean): --  RR: 94 (05 Mar 2021 20:44) (17 - 94)  SpO2: 95% (05 Mar 2021 22:08) (92% - 97%)    CAPILLARY BLOOD GLUCOSE    PHYSICAL EXAM  General: NAD  Head: NC/AT; MMM;   Neck: Supple; no JVD  Respiratory: R anterior crackles, L anterior clear  Cardiovascular: Regular rhythm/rate; S1/S2+, no murmurs, rubs gallops   Gastrointestinal: Soft; NTND; bowel sounds normal and present  Extremities: WWP; no edema/cyanosis  Neurological: A&Ox3, CNII-XII grossly intact; no obvious focal deficits    MEDICATIONS  (STANDING):  albuterol/ipratropium for Nebulization 3 milliLiter(s) Nebulizer every 6 hours  budesonide  80 MICROgram(s)/formoterol 4.5 MICROgram(s) Inhaler 2 Puff(s) Inhalation two times a day  enoxaparin Injectable 30 milliGRAM(s) SubCutaneous every 24 hours  folic acid 1 milliGRAM(s) Oral daily  meropenem  IVPB 1000 milliGRAM(s) IV Intermittent every 12 hours  pantoprazole  Injectable 40 milliGRAM(s) IV Push every 12 hours  predniSONE   Tablet 40 milliGRAM(s) Oral two times a day  psyllium Powder 1 Packet(s) Oral daily  senna 2 Tablet(s) Oral at bedtime  tamsulosin 0.4 milliGRAM(s) Oral at bedtime  trimethoprim / sulfamethoxazole IVPB 200 milliGRAM(s) IV Intermittent every 12 hours    MEDICATIONS  (PRN):  acetaminophen   Tablet .. 650 milliGRAM(s) Oral every 6 hours PRN Mild Pain (1 - 3)  benzocaine 20% Spray 1 Spray(s) Topical four times a day PRN throat pain      No Known Allergies      LABS                        6.8    9.73  )-----------( 420      ( 05 Mar 2021 12:31 )             22.6     03-05    135  |  102  |  29<H>  ----------------------------<  167<H>  4.2   |  20<L>  |  0.96    Ca    9.0      05 Mar 2021 12:31  Phos  2.5     03-05  Mg     2.2     03-05    TPro  5.9<L>  /  Alb  2.5<L>  /  TBili  <0.2  /  DBili  x   /  AST  49<H>  /  ALT  54<H>  /  AlkPhos  59  03-05                IMAGING/EKG/ETC   O/N Events: Patient refused transfusion    Subjective/ROS: Patient seen and examined at bedside. Says that she is breathing well on the 6L NC. No complaints     Denies Fever/Chills, HA, CP, SOB, n/v, changes in bowel/urinary habits.  12pt ROS otherwise negative.    VITALS  Vital Signs Last 24 Hrs  T(C): 36.2 (05 Mar 2021 20:44), Max: 36.6 (05 Mar 2021 05:52)  T(F): 97.1 (05 Mar 2021 20:44), Max: 97.8 (05 Mar 2021 05:52)  HR: 90 (05 Mar 2021 22:08) (72 - 108)  BP: 110/54 (05 Mar 2021 20:44) (92/53 - 110/54)  BP(mean): --  RR: 94 (05 Mar 2021 20:44) (17 - 94)  SpO2: 95% (05 Mar 2021 22:08) (92% - 97%)    CAPILLARY BLOOD GLUCOSE    PHYSICAL EXAM  General: NAD  Head: NC/AT; MMM;   Neck: Supple; no JVD  Respiratory: R anterior crackles, L anterior clear  Cardiovascular: Regular rhythm/rate; S1/S2+, no murmurs, rubs gallops   Gastrointestinal: Soft; NTND; bowel sounds normal and present  Extremities: WWP; no edema/cyanosis  Neurological: A&Ox3, CNII-XII grossly intact; no obvious focal deficits    MEDICATIONS  (STANDING):  albuterol/ipratropium for Nebulization 3 milliLiter(s) Nebulizer every 6 hours  budesonide  80 MICROgram(s)/formoterol 4.5 MICROgram(s) Inhaler 2 Puff(s) Inhalation two times a day  enoxaparin Injectable 30 milliGRAM(s) SubCutaneous every 24 hours  folic acid 1 milliGRAM(s) Oral daily  meropenem  IVPB 1000 milliGRAM(s) IV Intermittent every 12 hours  pantoprazole  Injectable 40 milliGRAM(s) IV Push every 12 hours  predniSONE   Tablet 40 milliGRAM(s) Oral two times a day  psyllium Powder 1 Packet(s) Oral daily  senna 2 Tablet(s) Oral at bedtime  tamsulosin 0.4 milliGRAM(s) Oral at bedtime  trimethoprim / sulfamethoxazole IVPB 200 milliGRAM(s) IV Intermittent every 12 hours    MEDICATIONS  (PRN):  acetaminophen   Tablet .. 650 milliGRAM(s) Oral every 6 hours PRN Mild Pain (1 - 3)  benzocaine 20% Spray 1 Spray(s) Topical four times a day PRN throat pain      No Known Allergies      LABS                        6.8    9.73  )-----------( 420      ( 05 Mar 2021 12:31 )             22.6     03-05    135  |  102  |  29<H>  ----------------------------<  167<H>  4.2   |  20<L>  |  0.96    Ca    9.0      05 Mar 2021 12:31  Phos  2.5     03-05  Mg     2.2     03-05    TPro  5.9<L>  /  Alb  2.5<L>  /  TBili  <0.2  /  DBili  x   /  AST  49<H>  /  ALT  54<H>  /  AlkPhos  59  03-05                IMAGING/EKG/ETC

## 2021-03-07 NOTE — PROGRESS NOTE ADULT - SUBJECTIVE AND OBJECTIVE BOX
INTERVAL HPI/OVERNIGHT EVENTS:    ANTIBIOTICS/RELEVANT:    MEDICATIONS  (STANDING):  albuterol/ipratropium for Nebulization 3 milliLiter(s) Nebulizer every 6 hours  budesonide  80 MICROgram(s)/formoterol 4.5 MICROgram(s) Inhaler 2 Puff(s) Inhalation two times a day  enoxaparin Injectable 30 milliGRAM(s) SubCutaneous every 24 hours  folic acid 1 milliGRAM(s) Oral daily  ivermectin 9 milliGRAM(s) Oral every 24 hours  meropenem  IVPB 1000 milliGRAM(s) IV Intermittent every 12 hours  pantoprazole  Injectable 40 milliGRAM(s) IV Push every 12 hours  psyllium Powder 1 Packet(s) Oral daily  senna 2 Tablet(s) Oral at bedtime  tamsulosin 0.4 milliGRAM(s) Oral at bedtime  trimethoprim / sulfamethoxazole IVPB 200 milliGRAM(s) IV Intermittent every 12 hours    MEDICATIONS  (PRN):  acetaminophen   Tablet .. 650 milliGRAM(s) Oral every 6 hours PRN Mild Pain (1 - 3)  benzocaine 20% Spray 1 Spray(s) Topical four times a day PRN throat pain      Allergies    No Known Allergies    Intolerances        Vital Signs Last 24 Hrs  T(C): 36.3 (07 Mar 2021 14:11), Max: 36.8 (07 Mar 2021 05:14)  T(F): 97.4 (07 Mar 2021 14:11), Max: 98.3 (07 Mar 2021 05:14)  HR: 94 (07 Mar 2021 14:11) (84 - 97)  BP: 94/55 (07 Mar 2021 14:11) (91/53 - 110/57)  BP(mean): --  RR: 18 (07 Mar 2021 14:11) (17 - 20)  SpO2: 94% (07 Mar 2021 14:11) (94% - 98%)    PHYSICAL EXAM:      Constitutional:    Eyes:    ENMT:    Neck:    Breasts:    Back:    Respiratory:    Cardiovascular:    Gastrointestinal:    Genitourinary:    Rectal:    Extremities:    Vascular:    Neurological:        LABS:                        7.0    9.47  )-----------( 373      ( 07 Mar 2021 08:04 )             22.8     03-07    132<L>  |  102  |  22  ----------------------------<  98  4.7   |  21<L>  |  1.07    Ca    8.5      07 Mar 2021 08:03  Phos  2.9     03-07  Mg     2.1     03-07    TPro  5.1<L>  /  Alb  2.3<L>  /  TBili  0.2  /  DBili  x   /  AST  22  /  ALT  35  /  AlkPhos  54  03-07          MICROBIOLOGY:    RADIOLOGY & ADDITIONAL STUDIES: INTERVAL HPI/OVERNIGHT EVENTS:    Continues to clinically improve  Good PO intake    MEDICATIONS  (STANDING):  albuterol/ipratropium for Nebulization 3 milliLiter(s) Nebulizer every 6 hours  budesonide  80 MICROgram(s)/formoterol 4.5 MICROgram(s) Inhaler 2 Puff(s) Inhalation two times a day  enoxaparin Injectable 30 milliGRAM(s) SubCutaneous every 24 hours  folic acid 1 milliGRAM(s) Oral daily  ivermectin 9 milliGRAM(s) Oral every 24 hours  meropenem  IVPB 1000 milliGRAM(s) IV Intermittent every 12 hours  pantoprazole  Injectable 40 milliGRAM(s) IV Push every 12 hours  psyllium Powder 1 Packet(s) Oral daily  senna 2 Tablet(s) Oral at bedtime  tamsulosin 0.4 milliGRAM(s) Oral at bedtime  trimethoprim / sulfamethoxazole IVPB 200 milliGRAM(s) IV Intermittent every 12 hours    MEDICATIONS  (PRN):  acetaminophen   Tablet .. 650 milliGRAM(s) Oral every 6 hours PRN Mild Pain (1 - 3)  benzocaine 20% Spray 1 Spray(s) Topical four times a day PRN throat pain      Allergies    No Known Allergies    Intolerances    EXAM  Vital Signs Last 24 Hrs  T(C): 36.3 (07 Mar 2021 14:11), Max: 36.8 (07 Mar 2021 05:14)  T(F): 97.4 (07 Mar 2021 14:11), Max: 98.3 (07 Mar 2021 05:14)  HR: 94 (07 Mar 2021 14:11) (84 - 97)  BP: 94/55 (07 Mar 2021 14:11) (91/53 - 110/57)  BP(mean): --  RR: 18 (07 Mar 2021 14:11) (17 - 20)  SpO2: 94% (07 Mar 2021 14:11) (94% - 98%)  wake and alert  Appears stronger  No rash   RR  Chest more clear  Abd soft NT  LEs no edema      LABS:                        7.0    9.47  )-----------( 373      ( 07 Mar 2021 08:04 )             22.8     03-07    132<L>  |  102  |  22  ----------------------------<  98  4.7   |  21<L>  |  1.07    Ca    8.5      07 Mar 2021 08:03  Phos  2.9     03-07  Mg     2.1     03-07    TPro  5.1<L>  /  Alb  2.3<L>  /  TBili  0.2  /  DBili  x   /  AST  22  /  ALT  35  /  AlkPhos  54  03-07      MICROBIOLOGY:    Culture - Body Fluid with Gram Stain (03.04.21 @ 18:59)    Gram Stain:   No organisms seen  No WBC's seen.    Specimen Source: Pleural Fl L Pleural    Culture Results:   No growth to date      RADIOLOGY & ADDITIONAL STUDIES:    Gallium scan results pending

## 2021-03-07 NOTE — PROGRESS NOTE ADULT - ASSESSMENT
Pneumonitis in the setting of lung CA  Treated empirically for PCP  Positive strongyloides serology and eosinophilia   S/P Ivermectin      RECOMMEND  Continue Meropenem  If Gallium scan not typical of PCP/PJP, D/C Bactrim and taper off of steroids  Repeat Ivermectin in 2-3 weeks   Follow manual diff to assure eosinophilia resolved

## 2021-03-07 NOTE — PROGRESS NOTE ADULT - SUBJECTIVE AND OBJECTIVE BOX
Pt seen and examined   no complaints  wants to go home  Hgb 7.0 refused transfusion    REVIEW OF SYSTEMS:  Constitutional: No fever,   Cardiovascular: No chest pain, palpitations, dizziness or leg swelling  Gastrointestinal: No abdominal or epigastric pain. No nausea, vomiting or hematemesis; No diarrhea   Skin: No itching, burning, rashes or lesions       MEDICATIONS:  MEDICATIONS  (STANDING):  albuterol/ipratropium for Nebulization 3 milliLiter(s) Nebulizer every 6 hours  budesonide  80 MICROgram(s)/formoterol 4.5 MICROgram(s) Inhaler 2 Puff(s) Inhalation two times a day  enoxaparin Injectable 30 milliGRAM(s) SubCutaneous every 24 hours  folic acid 1 milliGRAM(s) Oral daily  ivermectin 9 milliGRAM(s) Oral every 24 hours  meropenem  IVPB 1000 milliGRAM(s) IV Intermittent every 12 hours  pantoprazole  Injectable 40 milliGRAM(s) IV Push every 12 hours  psyllium Powder 1 Packet(s) Oral daily  senna 2 Tablet(s) Oral at bedtime  tamsulosin 0.4 milliGRAM(s) Oral at bedtime  trimethoprim / sulfamethoxazole IVPB 200 milliGRAM(s) IV Intermittent every 12 hours    MEDICATIONS  (PRN):  acetaminophen   Tablet .. 650 milliGRAM(s) Oral every 6 hours PRN Mild Pain (1 - 3)  benzocaine 20% Spray 1 Spray(s) Topical four times a day PRN throat pain      Allergies    No Known Allergies    Intolerances        Vital Signs Last 24 Hrs  T(C): 36.4 (07 Mar 2021 11:21), Max: 36.8 (07 Mar 2021 05:14)  T(F): 97.6 (07 Mar 2021 11:21), Max: 98.3 (07 Mar 2021 05:14)  HR: 92 (07 Mar 2021 11:21) (84 - 97)  BP: 91/53 (07 Mar 2021 11:21) (91/53 - 110/57)  BP(mean): --  RR: 17 (07 Mar 2021 11:21) (17 - 20)  SpO2: 98% (07 Mar 2021 11:21) (96% - 98%)    03-06 @ 07:01  -  03-07 @ 07:00  --------------------------------------------------------  IN: 0 mL / OUT: 1000 mL / NET: -1000 mL    03-07 @ 07:01  -  03-07 @ 13:46  --------------------------------------------------------  IN: 300 mL / OUT: 0 mL / NET: 300 mL        PHYSICAL EXAM:    General: thin; in no acute distress  HEENT: MMM, conjunctiva and sclera clear  Lungs: dec BS but clear  Heart: regular  Gastrointestinal: Soft non-tender non-distended; Normal bowel sounds;  Skin: Warm and dry. No obvious rash  Ext: no edema    LABS:      CBC Full  -  ( 07 Mar 2021 08:04 )  WBC Count : 9.47 K/uL  RBC Count : 2.59 M/uL  Hemoglobin : 7.0 g/dL  Hematocrit : 22.8 %  Platelet Count - Automated : 373 K/uL  Mean Cell Volume : 88.0 fl  Mean Cell Hemoglobin : 27.0 pg  Mean Cell Hemoglobin Concentration : 30.7 gm/dL  Auto Neutrophil # : 9.04 K/uL  Auto Lymphocyte # : 0.34 K/uL  Auto Monocyte # : 0.09 K/uL  Auto Eosinophil # : 0.00 K/uL  Auto Basophil # : 0.00 K/uL  Auto Neutrophil % : 95.5 %  Auto Lymphocyte % : 3.6 %  Auto Monocyte % : 0.9 %  Auto Eosinophil % : 0.0 %  Auto Basophil % : 0.0 %    03-07    132<L>  |  102  |  22  ----------------------------<  98  4.7   |  21<L>  |  1.07    Ca    8.5      07 Mar 2021 08:03  Phos  2.9     03-07  Mg     2.1     03-07    TPro  5.1<L>  /  Alb  2.3<L>  /  TBili  0.2  /  DBili  x   /  AST  22  /  ALT  35  /  AlkPhos  54  03-07                      RADIOLOGY & ADDITIONAL STUDIES (The following images were personally reviewed):

## 2021-03-08 NOTE — PROGRESS NOTE ADULT - PROBLEM SELECTOR PLAN 2
Concern for post-obstructive PNA vs. HAP vs. atypical pna. 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   - pt with worsening O2 status on 3/1 requiring HFNC, weaned to NC  - zosyn 3.375mg q8hr switched to meropenem 1000mg BID  - ID concerned for PCP pna, started on bactrim 200mg BID and prednisone 40mg BID  - obtain sputum culture if cough becomes productive  - fungitell neg, f/u galactomannan  - f/u Gallium scan

## 2021-03-08 NOTE — PROGRESS NOTE ADULT - PROBLEM SELECTOR PLAN 4
New NAGMA. s/p 2.5L IVF of NS at PCP. likely due to IVF  RESOLVED  - trend BMPs New NAGMA. s/p 2.5L IVF of NS at PCP. likely due to IVF  RESOLVED  - trend BMPs    #Hyponatremia: 131 this AM, asymptomatic, most likely 2/2 decreased intake   - Continue to monitor   - Soft pressures, will consider small IVF New GAYLE. s/p 2.5L IVF of NS at PCP. likely due to IVF  RESOLVED  - trend BMPs    #Hyponatremia: 131 this AM, asymptomatic, most likely 2/2 decreased intake   - Continue to monitor   - Encourage PO intake

## 2021-03-08 NOTE — PROGRESS NOTE ADULT - ASSESSMENT
The patient will be able to go home she completes a course of antibiotic therapy and is clearly oxygen  independent and able to go to a  subacute rehab center or to her home.  The

## 2021-03-08 NOTE — PROGRESS NOTE ADULT - SUBJECTIVE AND OBJECTIVE BOX
the patient feels generally well.  She is demanding to go home.  She states that she is comfortable without oxygen presently she is mildly dyspneic in her anger on 6 L of nasal O2.    Appears malnourished.  The patient is on high-flow nasal oxygen with nebulizer.  Skin: No rash noted. No lesions. Poor turgor.  Eyes: Eyes with PERRLA. EOM's in­tact Sclera clear,no jaundice noted. Conjunctiva clear.   Ears, Nose,Mouth & Throat: Teeth normal, no missing teeth or dentures present. No ulcers. No thrush Auditory canals normal.  Description: %% Oropharynx without lesions. Mucositis None.    The patient has  Evidence for thrush over the upper aerodigestive tract.  Neck: No thyromegaly. No lymphadenopathy noted.   Cardiovascular: Normal sinus  Tachycardia rhythm S1, S2 without murmurs. No Extrasystole.   Breast: Normal. No breast masses appreciated. No axillary lymphadenopathy. No skin changes. No nipple discharge present. No dimpling  noted.  Chest/Respiratory: No dyspnea. No rhonchi. No wheezing. No decreased breath sounds at bases. No rales. Unlabored breathing. No  accessory muscle use.   Gastrointestinal: Abdomen is soft and non­tender to touch. No hepatomegaly noted. No splenomegaly noted. No abdominal pain or  guarding noted. No abdominal mass(es) appreciated. No ascites noted.  There is soft stool in the rectal vault with no evidence of fecal  impaction.  Genito­Urinary: Deferred.  Hematologic/Lymphatic: No petechiae noted. No purpura noted. No lymphadenopathy noted.   Musculoskeletal: No Kyphosis. No weakness. No spinal tenderness on percussion. No pain on hip rotation reported by patient. Normal  range of motion in upper and lower extremities.   Neurologic: Affect normal. No tremors observed. Biceps, brachioradialis, patellar and plantar reflexes symmetrical. Normal  proprioception/position sense Cranial nerves intact.   Psychiatry: Oriented to person/place/time. No anxiety observed during exam. No signs of depression noted during exam. Intact short­term  and long­term memory.   Extremities: No clubbing noted to fingers or toes. No cyanosis noted. Capillary refill less than two seconds. No edema. No calf  tenderness reported by patient.

## 2021-03-08 NOTE — PROGRESS NOTE ADULT - PROBLEM SELECTOR PLAN 6
Elevated Lfts possibly in setting of sepsis, although given slight uptrending coags concern for early stages of liver failure  RESOLVED  - now downtrending    #Elevated coags  -Only INR elevated, likely in the setting of malnutrition  -f/u nutrition recs Elevated Lfts possibly in setting of sepsis, although given slight uptrending coags concern for early stages of liver failure  RESOLVED  - now downtrending

## 2021-03-08 NOTE — PROGRESS NOTE ADULT - SUBJECTIVE AND OBJECTIVE BOX
CC/ HPI Patient is an 86 year old female with chronic obstructive pulmonary disease, lung cancer, poorly differentiated adenocarcinoma, status post radiation therapy, Dec 2020, discharged after hospital stay for severe anemia, upper GI bleed, RVP positive Chlamydia pneumonia, (2.06.21) status post Zithromax, cefepime, now admitted with progressive pulmonary infiltrates, sepsis, pulmonary edema, seen this morning the patient is resting comfortably on nasal cannula, she denies respiratory complaint.      PAST MEDICAL & SURGICAL HISTORY:  Chronic obstructive pulmonary disease, unspecified COPD type  Hearing loss  left ear - sees Dr. Garces ENT  Basal cell carcinoma  x2 - upper lip and arm  Osteoporosis  H/O breast biopsy, 5 years ago, mass was benign and self-resolved  History of appendectomy  History of cataract surgery    SOCHX:   tobacco,  -  alcohol      FAMILY HISTORY: FA/MO  - contributory     ROS reviewed below with positive findings marked (+) :  GEN:  fever, chills ENT: tracheostomy,   epistaxis,  sinusitis COR: CAD, CHF,  HTN, dysrhythmia PUL: +COPD, ILD, asthma, pneumonia GI: PEG, dysphagia, hemorrhage, other HALLIE: kidney disease, electrolyte disorder HEM:  anemia, thrombus, coagulopathy, cancer ENDO:  thyroid disease, diabetes mellitus CNS:  dementia, stroke, seizure, PSY:  depression, anxiety, other        MEDICATIONS  (STANDING):  albuterol/ipratropium for Nebulization 3 milliLiter(s) Nebulizer every 6 hours  budesonide  80 MICROgram(s)/formoterol 4.5 MICROgram(s) Inhaler 2 Puff(s) Inhalation two times a day  enoxaparin Injectable 30 milliGRAM(s) SubCutaneous every 24 hours  folic acid 1 milliGRAM(s) Oral daily  meropenem  IVPB 1000 milliGRAM(s) IV Intermittent every 12 hours  pantoprazole  Injectable 40 milliGRAM(s) IV Push every 12 hours  polyethylene glycol 3350 17 Gram(s) Oral daily  senna 2 Tablet(s) Oral at bedtime  tamsulosin 0.4 milliGRAM(s) Oral at bedtime  trimethoprim / sulfamethoxazole IVPB 200 milliGRAM(s) IV Intermittent every 12 hours    MEDICATIONS  (PRN):  acetaminophen   Tablet .. 650 milliGRAM(s) Oral every 6 hours PRN Mild Pain (1 - 3)  benzocaine 20% Spray 1 Spray(s) Topical four times a day PRN throat pain      Vital Signs Last 24 Hrs  T(C): 36.2 (08 Mar 2021 05:42), Max: 36.4 (07 Mar 2021 11:21)  T(F): 97.2 (08 Mar 2021 05:42), Max: 97.6 (07 Mar 2021 11:21)  HR: 84 (08 Mar 2021 07:45) (84 - 94)  BP: 91/51 (08 Mar 2021 07:45) (84/48 - 96/52)  RR: 16 (08 Mar 2021 05:42) (16 - 20)  SpO2: 98% (08 Mar 2021 05:42) (93% - 98%)    GENERAL:         comfortable,  - distress.  HEENT:            - trauma,  - icterus,  - injection,  - nasal discharge.  NECK:              - jugular venous distention, - thyromegaly.  RESP:              + crackles,   - rhonchi,   - wheezes.   COR:                S1S2   - gallops,  - rubs.  ABD:                bowel sounds,   soft, - tender, - distended.  EXT/MSC:         - cyanosis,  - clubbing, - edema.    NEURO:           + alert and oriented                             7.8    6.31  )-----------( 345      ( 08 Mar 2021 06:16 )             25.4     03-08    131<L>  |  100  |  23  ----------------------------<  89  5.3   |  23  |  1.16    Ca    8.5      08 Mar 2021 06:16  Phos  2.9     03-07  Mg     2.3     03-08      X-ray Chest  (03.04.21)  Frontal examination of the chest demonstrates the heart to be within normal limits in transverse diameter. No interval change lung infiltrates in comparison to prior examination of the chest 3/1/2021. Right Port-A-Cath noted with tip overlying junction superior vena cava and right atrium. No evidence of pneumothorax.  IMPRESSION: Bilateral infiltrates.    Echo Complete w/o Contrast w/ Doppler (03.02.21)  1.  Normal left and right ventricular size and systolic function.   2. Grade I left ventricular diastolic dysfunction.   3. Mild tricuspid regurgitation.   4. No evidence of pulmonary hypertension, pulmonary artery systolic pressure is 27 mmHg.   5. No pericardial effusion.      CT Angio Chest PE Protocol w/ IV Cont (03.01.21) Pulmonary arteries: No evidence of pulmonary embolus to the segmental level.  Aorta: No aneurysm of the aorta. No dissection of the aorta.  Veins: MediPort terminates in the SVC.  Lungs: Opacities in the right upper lobe may represent atelectasis/pneumonia versus neoplasm.  Diffuse ground-glass opacities throughout the lung fields may represent pulmonary edema or diffuse pneumonia.  Pleural spaces: Moderate to large bilateral pleural effusions..  Heart: Unremarkable. No cardiomegaly. No pericardial effusion.  Lymph nodes: Unremarkable. No enlarged lymph nodes.  Kidneys and ureters: Simple cysts in the left kidney.  Bones/joints: Compression fracture T9 of unknown age.  Soft tissues: Unremarkable.   Other findings: Motion artifact degrades images.    IMPRESSION:  1. No evidence of pulmonary embolus to the segmental level.  2. No aneurysm of the aorta.  3. No dissection of the aorta.  4. Moderate to large bilateral pleural effusions.  5. Opacities in the right upper lobe may represent atelectasis/pneumonia versus neoplasm.    CT Angio Chest PE Protocol w/ IV Cont (02.03.21)  Pulmonary arteries: No pulmonary embolus seen.  Lungs and large airways: Respiratory motion limits assessment of fine pulmonary parenchymal detail. Trace dependent secretions distal trachea. No nodular and no tracheal or endobronchial lesion.  Bilateral apical pleural parenchymal scarring. Unchanged mild peribronchial wall thickening and cylindrical bronchiectasis bilaterally. Scattered nodular groundglass opacities bilaterally, more confluent in right upper lobe posterior segment with apparent interlobular septal thickening. Mild compressive atelectasis right lower lobe. Few nodular consolidative opacities left lower lobe, subsegmental atelectasis versus mild consolidation. No measurable residual masses in right upper lobe anterior segment with linear opacity in this region probably representing scarring. New few scattered bilateral pulmonary nodules, for example:  *  Right upper lobe subpleural 0.5 cm (series 6 image 160).  *  Right upper lobe anterior segment 0.5 cm (series 6 image 132)  *  Left lower lobe 0.5 cm (series 6 image 204)  Pleura:  Resolved right pneumothorax. Decreased trace residual right pleural effusion.  Mediastinum and hilar regions: Significantly decreased mediastinal and hilar adenopathy, for example right lower paratracheal 1.1 x 0.8 cm, previously 4.4 x 2.9 cm. No significant change mildly prominent subcarinal node 1.2 cm short axis. Resolved right lower cervical adenopathy.  Heart and pericardium:  Heart size is normal. No pericardial effusion.  Vessels:  No aneurysm. Mild scattered calcific atherosclerosis.  Chest wall and lower neck:  Normal.  Upper abdomen: Unchanged few left renal cysts, largest 3.5 cm.  Bones: No suspicious lytic or blastic lesion. Mild degenerative changes thoracic spine with prominent degenerative Schmorl's node inferior endplate T9 vertebral body.    IMPRESSION:  1.  No pulmonary embolus.  2.  New few small scattered nodular opacities bilaterally with more confluent groundglass density and suspected interlobular septal thickening in posterior right upper lobe. Differential includes aspiration/infection versus lymphangitic carcinomatosis. Assessment limited by artifact from respiratory motion. Recommend clinical correlation and short-term follow-up would be helpful.  .  No measurable residual right upper lobe mass with scarring in this region.  4.  Significantly decreased thoracic adenopathy.  5.  Decreased trace residual right pleural effusion.  6.  Resolved right pneumothorax.      ASSESSMENT/PLAN    1)  Pneumonia/pneumonitis  2)  Pleural effusions  3)  Chronic obstructive pulmonary disease  4)  Bronchiectasis  5)  Lung cancer    Oxygen as needed for a satisfactory SpO2  Pleural gram stain and cultures negative to date.  Follow up results of gallium scan performed yesterday  Bronchodilators:  Atrovent/ albuterol q 4 – 6 hours as needed  ID/Antibiotics:  trimethoprim / sulfamethoxazole, meropenem  Cardiac/HTN: diuresis as needed  GI: Rx/ prophylaxis c PPI/H2B  Heme: Rx/VT prophylaxis   Discuss with Dr. Angel.

## 2021-03-08 NOTE — CHART NOTE - NSCHARTNOTEFT_GEN_A_CORE
Admitting Diagnosis:   Patient is a 86y old  Female who presents with a chief complaint of Sepsis (08 Mar 2021 09:03)    PAST MEDICAL & SURGICAL HISTORY:  Chronic obstructive pulmonary disease, unspecified COPD type    Hearing loss  left ear - sees Dr. Garces ENT    Basal cell carcinoma  x2 - upper lip and arm    Chronic obstructive pulmonary disease    Osteoporosis  Osteoporosis    H/O breast biopsy  &gt; 5 years ago, mass was benign and self-resolved    History of appendectomy    History of cataract surgery    Current Nutrition Order:  regular  Ensure Clear BID (each 240kcal/8gpro)    PO Intake: Good (%) [   ]  Fair (50-75%) [ x ] Poor (<25%) [   ]    GI Issues:   Abdominal pain noted in flowsheet  last BM 3/7  On miralax, senna, protonix     Pain:  On pain regimen    Skin Integrity:  No edema noted  +surgical incision    Labs:   -    131<L>  |  100  |  23  ----------------------------<  89  5.3   |  23  |  1.16    Ca    8.5      08 Mar 2021 06:16  Phos  2.9     03-07  Mg     2.3     03-08    TPro  5.1<L>  /  Alb  2.3<L>  /  TBili  0.2  /  DBili  x   /  AST  22  /  ALT  35  /  AlkPhos  54  03-07    CAPILLARY BLOOD GLUCOSE    Medications:  MEDICATIONS  (STANDING):  albuterol/ipratropium for Nebulization 3 milliLiter(s) Nebulizer every 6 hours  budesonide  80 MICROgram(s)/formoterol 4.5 MICROgram(s) Inhaler 2 Puff(s) Inhalation two times a day  enoxaparin Injectable 30 milliGRAM(s) SubCutaneous every 24 hours  folic acid 1 milliGRAM(s) Oral daily  meropenem  IVPB 1000 milliGRAM(s) IV Intermittent every 12 hours  pantoprazole  Injectable 40 milliGRAM(s) IV Push every 12 hours  polyethylene glycol 3350 17 Gram(s) Oral daily  senna 2 Tablet(s) Oral at bedtime  tamsulosin 0.4 milliGRAM(s) Oral at bedtime  trimethoprim / sulfamethoxazole IVPB 200 milliGRAM(s) IV Intermittent every 12 hours    MEDICATIONS  (PRN):  acetaminophen   Tablet .. 650 milliGRAM(s) Oral every 6 hours PRN Mild Pain (1 - 3)  benzocaine 20% Spray 1 Spray(s) Topical four times a day PRN throat pain    Anthropometric Measurements:    Weight Assessment:  · Height for BMI (INCHES)	60 Inch(s)  · Weight for BMI (lbs)	90 lb  · Weight for BMI (kg)	40.8 kg  · Body Mass Index	17.6   · Ideal Body Weight (lbs)	100   · Ideal Body Weight (kg)	45.3     Weight Change: Per chart review, pt was 91lbs 2020, 2/3 83lbs/90lbs?-conflicting wts. ABW 90lbs. Recommend trend biweekly wts.    Estimated energy needs:   ABW used for calculations as pt between % of IBW (90%), adjusted for PCM, CA, age  30-35kcal/k-1428kcal  1.4-1.6g/k-65gprotein  30-35kcal/k-1428ml fluid    Subjective:   86F PMH COPD, lung adenocarcinoma s/p radiation, now on chemo recently admitted for UGIB and Chlamydia PNA a/f sepsis 2/2 suspected HAP c/b fluid overload.     Pt seen in room, sitting up in bed eating breakfast. Pt currently on regular diet +Ensure Clear BID (each 240kcal/8gpro). Pt previously reporting decreased PO intake 2/2 sore throat. Improved at present, observed pt eating ~50% of oatmeal, bread+butter, yogurt. Pt reports softer things are easier to get down. Ensure bottle on table, unopened. Will continue to encourage PO intake. Please see recs below. Will continue to follow per RD protocol.    Previous Nutrition Diagnosis: Suspect severe PCM RT chronic illness AEB NFPE findings     Active [  x ]  Resolved [   ]    If resolved, new PES:     Goal: Meet >75% EER, show no further s/s PCM    Recommendations:  1. Continue regular diet, encourage PO intake and monitor receptiveness to ONS   2. Greek yogurt and ice cream with meals 2/2 pt preference   3. Trend biweekly wts  4. Pain and bowel regimens per team  5. RD to remain available prn    Education: Encouraged PO intake     Risk Level: High [   ] Moderate [ x ] Low [   ].

## 2021-03-08 NOTE — PROGRESS NOTE ADULT - SUBJECTIVE AND OBJECTIVE BOX
INTERVAL HPI/OVERNIGHT EVENTS:    ANTIBIOTICS/RELEVANT:    MEDICATIONS  (STANDING):  albuterol/ipratropium for Nebulization 3 milliLiter(s) Nebulizer every 6 hours  budesonide  80 MICROgram(s)/formoterol 4.5 MICROgram(s) Inhaler 2 Puff(s) Inhalation two times a day  enoxaparin Injectable 30 milliGRAM(s) SubCutaneous every 24 hours  folic acid 1 milliGRAM(s) Oral daily  meropenem  IVPB 1000 milliGRAM(s) IV Intermittent every 12 hours  pantoprazole  Injectable 40 milliGRAM(s) IV Push every 12 hours  polyethylene glycol 3350 17 Gram(s) Oral daily  senna 2 Tablet(s) Oral at bedtime  tamsulosin 0.4 milliGRAM(s) Oral at bedtime  trimethoprim / sulfamethoxazole IVPB 200 milliGRAM(s) IV Intermittent every 12 hours    MEDICATIONS  (PRN):  acetaminophen   Tablet .. 650 milliGRAM(s) Oral every 6 hours PRN Mild Pain (1 - 3)  benzocaine 20% Spray 1 Spray(s) Topical four times a day PRN throat pain      Allergies    No Known Allergies    Intolerances        Vital Signs Last 24 Hrs  T(C): 36.7 (08 Mar 2021 11:32), Max: 36.7 (08 Mar 2021 11:32)  T(F): 98 (08 Mar 2021 11:32), Max: 98 (08 Mar 2021 11:32)  HR: 91 (08 Mar 2021 11:32) (84 - 94)  BP: 99/54 (08 Mar 2021 11:32) (84/48 - 99/54)  BP(mean): --  RR: 20 (08 Mar 2021 11:32) (16 - 20)  SpO2: 98% (08 Mar 2021 11:32) (93% - 98%)    PHYSICAL EXAM:            LABS:                        7.8    6.31  )-----------( 345      ( 08 Mar 2021 06:16 )             25.4     03-08    131<L>  |  100  |  23  ----------------------------<  89  5.3   |  23  |  1.16    Ca    8.5      08 Mar 2021 06:16  Phos  2.9     03-07  Mg     2.3     03-08    TPro  5.1<L>  /  Alb  2.3<L>  /  TBili  0.2  /  DBili  x   /  AST  22  /  ALT  35  /  AlkPhos  54  03-07      MICROBIOLOGY:    RADIOLOGY & ADDITIONAL STUDIES:    < from: NM Inflammatory Loc Wholebody Gallium, Single Day (03.07.21 @ 09:57) >  EXAM:  NM INFLAMM LOC GALLIUM WB SD                          PROCEDURE DATE:  03/07/2021          INTERPRETATION:  History: 86-year-old female with history of lung cancer. Concern for infection.    Comparison made with most recent CTA of chest from3/1/2021 and with multiple additional prior imaging studies dating back to 1/28/2015.    Radiopharmaceutical: Ga-67 citrate.    Dose: 6 mCi.    Date of injection: 3/5/2021  Dates of scanning: Anterior and posterior whole body images were obtained on 3/6/2021 and anterior view of abdomen obtained on 3/7/2021.    SPECT imaging: None.    Head: Normal.    Thorax: Normal.    Abdomen and pelvis: Normal.    Spine: Normal.    Extremities: Normal.    Liver spleen and kidneys: Normal.    Soft tissue: Normal.      Impression: Normal whole-body gallium scan.           INTERVAL HPI/OVERNIGHT EVENTS:    Remains clinically stable and improved    MEDICATIONS  (STANDING):  albuterol/ipratropium for Nebulization 3 milliLiter(s) Nebulizer every 6 hours  budesonide  80 MICROgram(s)/formoterol 4.5 MICROgram(s) Inhaler 2 Puff(s) Inhalation two times a day  enoxaparin Injectable 30 milliGRAM(s) SubCutaneous every 24 hours  folic acid 1 milliGRAM(s) Oral daily  meropenem  IVPB 1000 milliGRAM(s) IV Intermittent every 12 hours  pantoprazole  Injectable 40 milliGRAM(s) IV Push every 12 hours  polyethylene glycol 3350 17 Gram(s) Oral daily  senna 2 Tablet(s) Oral at bedtime  tamsulosin 0.4 milliGRAM(s) Oral at bedtime  trimethoprim / sulfamethoxazole IVPB 200 milliGRAM(s) IV Intermittent every 12 hours    MEDICATIONS  (PRN):  acetaminophen   Tablet .. 650 milliGRAM(s) Oral every 6 hours PRN Mild Pain (1 - 3)  benzocaine 20% Spray 1 Spray(s) Topical four times a day PRN throat pain      Allergies    No Known Allergies    EXAM  Vital Signs Last 24 Hrs  T(C): 36.7 (08 Mar 2021 11:32), Max: 36.7 (08 Mar 2021 11:32)  T(F): 98 (08 Mar 2021 11:32), Max: 98 (08 Mar 2021 11:32)  HR: 91 (08 Mar 2021 11:32) (84 - 94)  BP: 99/54 (08 Mar 2021 11:32) (84/48 - 99/54)  BP(mean): --  RR: 20 (08 Mar 2021 11:32) (16 - 20)  SpO2: 98% (08 Mar 2021 11:32) (93% - 98%)  On NC O2  No rash  No thrush  RR  Chest with crackles at bases  Abd soft NT  LEs no edema      LABS:                        7.8    6.31  )-----------( 345      ( 08 Mar 2021 06:16 )             25.4     03-08    131<L>  |  100  |  23  ----------------------------<  89  5.3   |  23  |  1.16    Ca    8.5      08 Mar 2021 06:16  Phos  2.9     03-07  Mg     2.3     03-08    TPro  5.1<L>  /  Alb  2.3<L>  /  TBili  0.2  /  DBili  x   /  AST  22  /  ALT  35  /  AlkPhos  54  03-07        RADIOLOGY & ADDITIONAL STUDIES:    NM Inflammatory Loc Wholebody Gallium, Single Day (03.07.21 @ 09:57) >  EXAM:  NM INFLAMM LOC GALLIUM WB SD                          PROCEDURE DATE:  03/07/2021          INTERPRETATION:  History: 86-year-old female with history of lung cancer. Concern for infection.    Comparison made with most recent CTA of chest from3/1/2021 and with multiple additional prior imaging studies dating back to 1/28/2015.    Radiopharmaceutical: Ga-67 citrate.    Dose: 6 mCi.    Date of injection: 3/5/2021  Dates of scanning: Anterior and posterior whole body images were obtained on 3/6/2021 and anterior view of abdomen obtained on 3/7/2021.    SPECT imaging: None.    Head: Normal.    Thorax: Normal.    Abdomen and pelvis: Normal.    Spine: Normal.    Extremities: Normal.    Liver spleen and kidneys: Normal.    Soft tissue: Normal.      Impression: Normal whole-body gallium scan.

## 2021-03-08 NOTE — PROGRESS NOTE ADULT - SUBJECTIVE AND OBJECTIVE BOX
INCOMPLETE    O/N Events:    Subjective/ROS: Patient seen and examined at bedside.     Denies Fever/Chills, HA, CP, SOB, n/v, changes in bowel/urinary habits.  12pt ROS otherwise negative.    VITALS  Vital Signs Last 24 Hrs  T(C): 36.2 (07 Mar 2021 21:40), Max: 36.4 (07 Mar 2021 11:21)  T(F): 97.2 (07 Mar 2021 21:40), Max: 97.6 (07 Mar 2021 11:21)  HR: 90 (07 Mar 2021 21:40) (90 - 94)  BP: 96/52 (07 Mar 2021 21:40) (91/53 - 96/52)  BP(mean): --  RR: 20 (07 Mar 2021 21:40) (17 - 20)  SpO2: 93% (07 Mar 2021 21:40) (93% - 98%)    CAPILLARY BLOOD GLUCOSE    PHYSICAL EXAM  General: NAD  Respiratory:  Cardiovascular:  Gastrointestinal: Soft; NTND; bowel sounds normal and present  Extremities: WWP; no edema/cyanosis  Neurological: A&Ox3, CNII-XII grossly intact; no obvious focal deficits    MEDICATIONS  (STANDING):  albuterol/ipratropium for Nebulization 3 milliLiter(s) Nebulizer every 6 hours  budesonide  80 MICROgram(s)/formoterol 4.5 MICROgram(s) Inhaler 2 Puff(s) Inhalation two times a day  enoxaparin Injectable 30 milliGRAM(s) SubCutaneous every 24 hours  folic acid 1 milliGRAM(s) Oral daily  meropenem  IVPB 1000 milliGRAM(s) IV Intermittent every 12 hours  pantoprazole  Injectable 40 milliGRAM(s) IV Push every 12 hours  psyllium Powder 1 Packet(s) Oral daily  senna 2 Tablet(s) Oral at bedtime  tamsulosin 0.4 milliGRAM(s) Oral at bedtime  trimethoprim / sulfamethoxazole IVPB 200 milliGRAM(s) IV Intermittent every 12 hours    MEDICATIONS  (PRN):  acetaminophen   Tablet .. 650 milliGRAM(s) Oral every 6 hours PRN Mild Pain (1 - 3)  benzocaine 20% Spray 1 Spray(s) Topical four times a day PRN throat pain      No Known Allergies      LABS                        7.0    9.47  )-----------( 373      ( 07 Mar 2021 08:04 )             22.8     03-07    132<L>  |  102  |  22  ----------------------------<  98  4.7   |  21<L>  |  1.07    Ca    8.5      07 Mar 2021 08:03  Phos  2.9     03-07  Mg     2.1     03-07    TPro  5.1<L>  /  Alb  2.3<L>  /  TBili  0.2  /  DBili  x   /  AST  22  /  ALT  35  /  AlkPhos  54  03-07                IMAGING/EKG/ETC   INCOMPLETE    O/N Events: EZE    Subjective/ROS: Patient seen and examined at bedside. Says that she is having some abdominal pain. Last bowel movement was 3 days ago. No difficulty breathing.    Denies Fever/Chills, HA, CP, SOB, n/v, changes in bowel/urinary habits.  12pt ROS otherwise negative.    VITALS  Vital Signs Last 24 Hrs  T(C): 36.2 (07 Mar 2021 21:40), Max: 36.4 (07 Mar 2021 11:21)  T(F): 97.2 (07 Mar 2021 21:40), Max: 97.6 (07 Mar 2021 11:21)  HR: 90 (07 Mar 2021 21:40) (90 - 94)  BP: 96/52 (07 Mar 2021 21:40) (91/53 - 96/52)  BP(mean): --  RR: 20 (07 Mar 2021 21:40) (17 - 20)  SpO2: 93% (07 Mar 2021 21:40) (93% - 98%)    CAPILLARY BLOOD GLUCOSE    PHYSICAL EXAM  General: NAD, breathing well on NC  Respiratory: R sided anterior crackles, L side clear to auscultation  Cardiovascular: RRR, mrg  Gastrointestinal: Soft; NTND; bowel sounds normal and present  Extremities: WWP; no edema/cyanosis  Neurological: A&Ox3, CNII-XII grossly intact; no obvious focal deficits    MEDICATIONS  (STANDING):  albuterol/ipratropium for Nebulization 3 milliLiter(s) Nebulizer every 6 hours  budesonide  80 MICROgram(s)/formoterol 4.5 MICROgram(s) Inhaler 2 Puff(s) Inhalation two times a day  enoxaparin Injectable 30 milliGRAM(s) SubCutaneous every 24 hours  folic acid 1 milliGRAM(s) Oral daily  meropenem  IVPB 1000 milliGRAM(s) IV Intermittent every 12 hours  pantoprazole  Injectable 40 milliGRAM(s) IV Push every 12 hours  psyllium Powder 1 Packet(s) Oral daily  senna 2 Tablet(s) Oral at bedtime  tamsulosin 0.4 milliGRAM(s) Oral at bedtime  trimethoprim / sulfamethoxazole IVPB 200 milliGRAM(s) IV Intermittent every 12 hours    MEDICATIONS  (PRN):  acetaminophen   Tablet .. 650 milliGRAM(s) Oral every 6 hours PRN Mild Pain (1 - 3)  benzocaine 20% Spray 1 Spray(s) Topical four times a day PRN throat pain      No Known Allergies    LABS                        7.8    6.31  )-----------( 345      ( 08 Mar 2021 06:16 )             25.4     03-07    132<L>  |  102  |  22  ----------------------------<  98  4.7   |  21<L>  |  1.07    Ca    8.5      07 Mar 2021 08:03  Phos  2.9     03-07  Mg     2.1     03-07    TPro  5.1<L>  /  Alb  2.3<L>  /  TBili  0.2  /  DBili  x   /  AST  22  /  ALT  35  /  AlkPhos  54  03-07               O/N Events: EZE    Subjective/ROS: Patient seen and examined at bedside. Says that she is having some abdominal pain. Last bowel movement was 3 days ago. No difficulty breathing.    Denies Fever/Chills, HA, CP, SOB, n/v, changes in bowel/urinary habits.  12pt ROS otherwise negative.    VITALS  Vital Signs Last 24 Hrs  T(C): 36.2 (07 Mar 2021 21:40), Max: 36.4 (07 Mar 2021 11:21)  T(F): 97.2 (07 Mar 2021 21:40), Max: 97.6 (07 Mar 2021 11:21)  HR: 90 (07 Mar 2021 21:40) (90 - 94)  BP: 96/52 (07 Mar 2021 21:40) (91/53 - 96/52)  BP(mean): --  RR: 20 (07 Mar 2021 21:40) (17 - 20)  SpO2: 93% (07 Mar 2021 21:40) (93% - 98%)    CAPILLARY BLOOD GLUCOSE    PHYSICAL EXAM  General: NAD, breathing well on NC  Respiratory: R sided anterior crackles, L side clear to auscultation  Cardiovascular: RRR, mrg  Gastrointestinal: Soft; NTND; bowel sounds normal and present  Extremities: WWP; no edema/cyanosis  Neurological: A&Ox3, CNII-XII grossly intact; no obvious focal deficits    MEDICATIONS  (STANDING):  albuterol/ipratropium for Nebulization 3 milliLiter(s) Nebulizer every 6 hours  budesonide  80 MICROgram(s)/formoterol 4.5 MICROgram(s) Inhaler 2 Puff(s) Inhalation two times a day  enoxaparin Injectable 30 milliGRAM(s) SubCutaneous every 24 hours  folic acid 1 milliGRAM(s) Oral daily  meropenem  IVPB 1000 milliGRAM(s) IV Intermittent every 12 hours  pantoprazole  Injectable 40 milliGRAM(s) IV Push every 12 hours  psyllium Powder 1 Packet(s) Oral daily  senna 2 Tablet(s) Oral at bedtime  tamsulosin 0.4 milliGRAM(s) Oral at bedtime  trimethoprim / sulfamethoxazole IVPB 200 milliGRAM(s) IV Intermittent every 12 hours    MEDICATIONS  (PRN):  acetaminophen   Tablet .. 650 milliGRAM(s) Oral every 6 hours PRN Mild Pain (1 - 3)  benzocaine 20% Spray 1 Spray(s) Topical four times a day PRN throat pain      No Known Allergies    LABS                        7.8    6.31  )-----------( 345      ( 08 Mar 2021 06:16 )             25.4     03-08    131<L>  |  100  |  23  ----------------------------<  89  5.3   |  23  |  1.16    Ca    8.5      08 Mar 2021 06:16  Phos  2.9     03-07  Mg     2.3     03-08    TPro  5.1<L>  /  Alb  2.3<L>  /  TBili  0.2  /  DBili  x   /  AST  22  /  ALT  35  /  AlkPhos  54  03-07

## 2021-03-08 NOTE — PROGRESS NOTE ADULT - PROBLEM SELECTOR PLAN 10
DVT: Lovenox  GI: not indicated    F: s/p 2.5L  E: K > 4, Mg> 2  N: PO    Code status: DNR/DNI F: s/p 2.5L  E: K > 4, Mg> 2  N: PO  DVT: Lovenox  GI: not indicated    Code status: DNR/DNI F: s/p 2.5L  E: K > 4, Mg> 2  N: PO  DVT: Lovenox  Bowel regimen: Adding on miralax  GI: not indicated    Code status: DNR/DNI F: s/p 2.5L  E: K > 4, Mg> 2  N: PO  DVT: Lovenox  Bowel regimen: Adding on miralax  GI: not indicated  Patient refusing to work with Physical Therapy   Code status: DNR/DNI

## 2021-03-08 NOTE — PROGRESS NOTE ADULT - ASSESSMENT
RECOMMEND  D/C Bactrim  Taper steroids to off  Continue Meropenem - next 24-48 hours or until discharge, whichever is sooner  Repeat 2-day treatement course with Ivermectin, 3 weeks after the 1st series of Ivermectin    Pneumonitis  Acute hypoxic respiratory failure  Lung CA,  S/P Chemotherapy  Strongyloidiasis        RECOMMEND  D/C Bactrim  Taper steroids to off  Continue Meropenem - next 24-48 hours or until discharge, whichever is sooner  Repeat 2-day treatement course with Ivermectin, 3 weeks after the 1st series of Ivermectin

## 2021-03-08 NOTE — PROGRESS NOTE ADULT - PROBLEM SELECTOR PLAN 1
2/4 SIRS criteria POA with Fever and tachycardia. Patient with weakly positive UA(redmond not replaced in ED), CXR with progression of b/l infiltrates, abdominal pain, and pain at chemoport site. No new rashes or diarrhea. Concerning given patient recently teated for atypical PNA. ddx for source includes; HAP vs. Atypical vs. chemo vs. tumor burden fever vs abdominal source vs infection of chemoport  - s/p vanc d/c'ed after MRSA swab negative, s/p azithromycin after urine legionella negative, RVP negative, COVID negative  - UCx only growing 50,000 CFU of enterococcus - unlikely UTI  - f/u blood cultures, f/u cultures from chemoport, f/u urine cultures  - strongyloides antibody +, s/p ivemectin 3/6-7, will repeat in 2-3 weeks per ID  - tx PNA as below    #Acute Hypoxic Respiratory Failure  -hx of COPD, but not on home O2  -likely in the setting of HAP c/b fluid overload (got 4L of fluid outpatient)  -CT PE showing b/l pleural effusions and RUL consolidation  -tx PNA as below  -diurese as needed, s/p lasix 20 IV 3/3PM  - s/p IR drainage of R pleural effusion 3/4PM, f/u fluid studies 2/4 SIRS criteria POA with Fever and tachycardia. Patient with weakly positive UA(redmond not replaced in ED), CXR with progression of b/l infiltrates, abdominal pain, and pain at chemoport site. No new rashes or diarrhea. Concerning given patient recently teated for atypical PNA. ddx for source includes; HAP vs. Atypical vs. chemo vs. tumor burden fever vs abdominal source vs infection of chemoport  RESOLVED  - s/p vanc d/c'ed after MRSA swab negative, s/p azithromycin after urine legionella negative, RVP negative, COVID negative  - UCx only growing 50,000 CFU of enterococcus - unlikely UTI  - f/u blood cultures, f/u cultures from chemoport, f/u urine cultures  - strongyloides antibody +, s/p ivemectin 3/6-7, will repeat in 2-3 weeks per ID  - tx PNA as below    #Acute Hypoxic Respiratory Failure  -hx of COPD, but not on home O2  -likely in the setting of HAP c/b fluid overload (got 4L of fluid outpatient)  -CT PE showing b/l pleural effusions and RUL consolidation  -tx PNA as below  -diurese as needed, s/p lasix 20 IV 3/3PM  - s/p IR drainage of R pleural effusion 3/4PM, f/u fluid studies

## 2021-03-08 NOTE — PROGRESS NOTE ADULT - PROBLEM SELECTOR PLAN 3
hx of UGIB, no recent melena or hemtochezia. Hgb unchanged from recent discharge. Fe studies c/w anemia of chronic disease  - maintain active type and screen  - pt has repeatedly refused transfusions   - heme following appreciate recs.

## 2021-03-09 NOTE — DISCHARGE NOTE NURSING/CASE MANAGEMENT/SOCIAL WORK - PATIENT PORTAL LINK FT
You can access the FollowMyHealth Patient Portal offered by [facility] by registering at the following website: [email or web address]. By joining [facility]’s FollowMyHealth portal, you will also be able to view your health information using other applications (apps) compatible with our system.

## 2021-03-09 NOTE — PROGRESS NOTE ADULT - SUBJECTIVE AND OBJECTIVE BOX
INCOMPLETE    O/N Events: Lactate wnl    Subjective/ROS: Patient seen and examined at bedside.     Denies Fever/Chills, HA, CP, SOB, n/v, changes in bowel/urinary habits.  12pt ROS otherwise negative.    VITALS  Vital Signs Last 24 Hrs  T(C): 36.4 (08 Mar 2021 21:53), Max: 36.7 (08 Mar 2021 11:32)  T(F): 97.6 (08 Mar 2021 21:53), Max: 98 (08 Mar 2021 11:32)  HR: 79 (08 Mar 2021 21:53) (79 - 91)  BP: 94/52 (08 Mar 2021 21:53) (84/48 - 100/54)  BP(mean): --  RR: 20 (08 Mar 2021 21:53) (16 - 20)  SpO2: 98% (08 Mar 2021 21:53) (97% - 98%)    CAPILLARY BLOOD GLUCOSE          PHYSICAL EXAM  General: NAD  Head: NC/AT; MMM; PERRL; EOMI;  Neck: Supple; no JVD  Respiratory: CTAB; no wheezes/rales/rhonchi  Cardiovascular: Regular rhythm/rate; S1/S2+, no murmurs, rubs gallops   Gastrointestinal: Soft; NTND; bowel sounds normal and present  Extremities: WWP; no edema/cyanosis  Neurological: A&Ox3, CNII-XII grossly intact; no obvious focal deficits    MEDICATIONS  (STANDING):  albuterol/ipratropium for Nebulization 3 milliLiter(s) Nebulizer every 6 hours  budesonide  80 MICROgram(s)/formoterol 4.5 MICROgram(s) Inhaler 2 Puff(s) Inhalation two times a day  enoxaparin Injectable 30 milliGRAM(s) SubCutaneous every 24 hours  folic acid 1 milliGRAM(s) Oral daily  meropenem  IVPB 1000 milliGRAM(s) IV Intermittent every 12 hours  pantoprazole  Injectable 40 milliGRAM(s) IV Push every 12 hours  polyethylene glycol 3350 17 Gram(s) Oral daily  senna 2 Tablet(s) Oral at bedtime  tamsulosin 0.4 milliGRAM(s) Oral at bedtime    MEDICATIONS  (PRN):  acetaminophen   Tablet .. 650 milliGRAM(s) Oral every 6 hours PRN Mild Pain (1 - 3)  benzocaine 20% Spray 1 Spray(s) Topical four times a day PRN throat pain      No Known Allergies      LABS                        7.8    6.31  )-----------( 345      ( 08 Mar 2021 06:16 )             25.4     03-08    131<L>  |  100  |  23  ----------------------------<  89  5.3   |  23  |  1.16    Ca    8.5      08 Mar 2021 06:16  Phos  2.9     03-07  Mg     2.3     03-08    TPro  5.1<L>  /  Alb  2.3<L>  /  TBili  0.2  /  DBili  x   /  AST  22  /  ALT  35  /  AlkPhos  54  03-07                IMAGING/EKG/ETC   INCOMPLETE    O/N Events: Lactate wnl    Subjective/ROS: Patient seen and examined at bedside. Says that she is breathing well on NC, still having abdominal cramping. No more episodes of bleeding around redmond. No BM for a few days    Denies Fever/Chills, HA, CP, SOB, n/v  12pt ROS otherwise negative.    VITALS  Vital Signs Last 24 Hrs  T(C): 36.4 (08 Mar 2021 21:53), Max: 36.7 (08 Mar 2021 11:32)  T(F): 97.6 (08 Mar 2021 21:53), Max: 98 (08 Mar 2021 11:32)  HR: 79 (08 Mar 2021 21:53) (79 - 91)  BP: 94/52 (08 Mar 2021 21:53) (84/48 - 100/54)  BP(mean): --  RR: 20 (08 Mar 2021 21:53) (16 - 20)  SpO2: 98% (08 Mar 2021 21:53) (97% - 98%)    CAPILLARY BLOOD GLUCOSE    PHYSICAL EXAM  General: NAD  Head: NC/AT; MMM; PERRL; EOMI;  Neck: Supple; no JVD  Respiratory: Left lung anterior clear, right lung crackles  Cardiovascular: Regular rhythm/rate; S1/S2+, no murmurs, rubs gallops   Gastrointestinal: Soft; NTND; bowel sounds normal and present  : No bleeding around redmond site  Extremities: WWP; no edema/cyanosis  Neurological: A&Ox3, CNII-XII grossly intact; no obvious focal deficits    MEDICATIONS  (STANDING):  albuterol/ipratropium for Nebulization 3 milliLiter(s) Nebulizer every 6 hours  budesonide  80 MICROgram(s)/formoterol 4.5 MICROgram(s) Inhaler 2 Puff(s) Inhalation two times a day  enoxaparin Injectable 30 milliGRAM(s) SubCutaneous every 24 hours  folic acid 1 milliGRAM(s) Oral daily  meropenem  IVPB 1000 milliGRAM(s) IV Intermittent every 12 hours  pantoprazole  Injectable 40 milliGRAM(s) IV Push every 12 hours  polyethylene glycol 3350 17 Gram(s) Oral daily  senna 2 Tablet(s) Oral at bedtime  tamsulosin 0.4 milliGRAM(s) Oral at bedtime    MEDICATIONS  (PRN):  acetaminophen   Tablet .. 650 milliGRAM(s) Oral every 6 hours PRN Mild Pain (1 - 3)  benzocaine 20% Spray 1 Spray(s) Topical four times a day PRN throat pain      No Known Allergies      LABS                        7.8    6.31  )-----------( 345      ( 08 Mar 2021 06:16 )             25.4     03-08    131<L>  |  100  |  23  ----------------------------<  89  5.3   |  23  |  1.16    Ca    8.5      08 Mar 2021 06:16  Phos  2.9     03-07  Mg     2.3     03-08    TPro  5.1<L>  /  Alb  2.3<L>  /  TBili  0.2  /  DBili  x   /  AST  22  /  ALT  35  /  AlkPhos  54  03-07                IMAGING/EKG/ETC   O/N Events: Lactate wnl    Subjective/ROS: Patient seen and examined at bedside. Says that she is breathing well on NC, still having abdominal cramping. No more episodes of bleeding around redmond. No BM for a few days    Denies Fever/Chills, HA, CP, SOB, n/v  12pt ROS otherwise negative.    VITALS  Vital Signs Last 24 Hrs  T(C): 36.4 (08 Mar 2021 21:53), Max: 36.7 (08 Mar 2021 11:32)  T(F): 97.6 (08 Mar 2021 21:53), Max: 98 (08 Mar 2021 11:32)  HR: 79 (08 Mar 2021 21:53) (79 - 91)  BP: 94/52 (08 Mar 2021 21:53) (84/48 - 100/54)  BP(mean): --  RR: 20 (08 Mar 2021 21:53) (16 - 20)  SpO2: 98% (08 Mar 2021 21:53) (97% - 98%)    CAPILLARY BLOOD GLUCOSE    PHYSICAL EXAM  General: NAD  Head: NC/AT; MMM; PERRL; EOMI;  Neck: Supple; no JVD  Respiratory: Left lung anterior clear, right lung crackles  Cardiovascular: Regular rhythm/rate; S1/S2+, no murmurs, rubs gallops   Gastrointestinal: Soft; NTND; bowel sounds normal and present  : No bleeding around redmond site  Extremities: WWP; no edema/cyanosis  Neurological: A&Ox3, CNII-XII grossly intact; no obvious focal deficits    MEDICATIONS  (STANDING):  albuterol/ipratropium for Nebulization 3 milliLiter(s) Nebulizer every 6 hours  budesonide  80 MICROgram(s)/formoterol 4.5 MICROgram(s) Inhaler 2 Puff(s) Inhalation two times a day  enoxaparin Injectable 30 milliGRAM(s) SubCutaneous every 24 hours  folic acid 1 milliGRAM(s) Oral daily  meropenem  IVPB 1000 milliGRAM(s) IV Intermittent every 12 hours  pantoprazole  Injectable 40 milliGRAM(s) IV Push every 12 hours  polyethylene glycol 3350 17 Gram(s) Oral daily  senna 2 Tablet(s) Oral at bedtime  tamsulosin 0.4 milliGRAM(s) Oral at bedtime    MEDICATIONS  (PRN):  acetaminophen   Tablet .. 650 milliGRAM(s) Oral every 6 hours PRN Mild Pain (1 - 3)  benzocaine 20% Spray 1 Spray(s) Topical four times a day PRN throat pain      No Known Allergies      LABS                        7.8    6.31  )-----------( 345      ( 08 Mar 2021 06:16 )             25.4     03-08    131<L>  |  100  |  23  ----------------------------<  89  5.3   |  23  |  1.16    Ca    8.5      08 Mar 2021 06:16  Phos  2.9     03-07  Mg     2.3     03-08    TPro  5.1<L>  /  Alb  2.3<L>  /  TBili  0.2  /  DBili  x   /  AST  22  /  ALT  35  /  AlkPhos  54  03-07                IMAGING/EKG/ETC   O/N Events: Lactate wnl, given percocet for abdominal pain    Subjective/ROS: Patient seen and examined at bedside. Says that she is breathing well on NC, still having abdominal cramping. No more episodes of bleeding around redmond. No BM for a few days    Denies Fever/Chills, HA, CP, SOB, n/v  12pt ROS otherwise negative.    VITALS  Vital Signs Last 24 Hrs  T(C): 36.4 (08 Mar 2021 21:53), Max: 36.7 (08 Mar 2021 11:32)  T(F): 97.6 (08 Mar 2021 21:53), Max: 98 (08 Mar 2021 11:32)  HR: 79 (08 Mar 2021 21:53) (79 - 91)  BP: 94/52 (08 Mar 2021 21:53) (84/48 - 100/54)  BP(mean): --  RR: 20 (08 Mar 2021 21:53) (16 - 20)  SpO2: 98% (08 Mar 2021 21:53) (97% - 98%)    CAPILLARY BLOOD GLUCOSE    PHYSICAL EXAM  General: NAD  Head: NC/AT; MMM; PERRL; EOMI;  Neck: Supple; no JVD  Respiratory: Left lung anterior clear, right lung crackles  Cardiovascular: Regular rhythm/rate; S1/S2+, no murmurs, rubs gallops   Gastrointestinal: Soft; NTND; bowel sounds normal and present  : No bleeding around redmond site  Extremities: WWP; no edema/cyanosis  Neurological: A&Ox3, CNII-XII grossly intact; no obvious focal deficits    MEDICATIONS  (STANDING):  albuterol/ipratropium for Nebulization 3 milliLiter(s) Nebulizer every 6 hours  budesonide  80 MICROgram(s)/formoterol 4.5 MICROgram(s) Inhaler 2 Puff(s) Inhalation two times a day  enoxaparin Injectable 30 milliGRAM(s) SubCutaneous every 24 hours  folic acid 1 milliGRAM(s) Oral daily  meropenem  IVPB 1000 milliGRAM(s) IV Intermittent every 12 hours  pantoprazole  Injectable 40 milliGRAM(s) IV Push every 12 hours  polyethylene glycol 3350 17 Gram(s) Oral daily  senna 2 Tablet(s) Oral at bedtime  tamsulosin 0.4 milliGRAM(s) Oral at bedtime    MEDICATIONS  (PRN):  acetaminophen   Tablet .. 650 milliGRAM(s) Oral every 6 hours PRN Mild Pain (1 - 3)  benzocaine 20% Spray 1 Spray(s) Topical four times a day PRN throat pain      No Known Allergies      LABS                        7.8    6.31  )-----------( 345      ( 08 Mar 2021 06:16 )             25.4     03-08    131<L>  |  100  |  23  ----------------------------<  89  5.3   |  23  |  1.16    Ca    8.5      08 Mar 2021 06:16  Phos  2.9     03-07  Mg     2.3     03-08    TPro  5.1<L>  /  Alb  2.3<L>  /  TBili  0.2  /  DBili  x   /  AST  22  /  ALT  35  /  AlkPhos  54  03-07                IMAGING/EKG/ETC   O/N Events: Lactate wnl, given percocet for abdominal pain    Subjective/ROS: Patient seen and examined at bedside. Says that she is breathing well on NC, still having abdominal cramping. No more episodes of bleeding around redmond. No BM for a few days    Denies Fever/Chills, HA, CP, SOB, n/v  12pt ROS otherwise negative.    VITALS  Vital Signs Last 24 Hrs  T(C): 36.4 (08 Mar 2021 21:53), Max: 36.7 (08 Mar 2021 11:32)  T(F): 97.6 (08 Mar 2021 21:53), Max: 98 (08 Mar 2021 11:32)  HR: 79 (08 Mar 2021 21:53) (79 - 91)  BP: 94/52 (08 Mar 2021 21:53) (84/48 - 100/54)  BP(mean): --  RR: 20 (08 Mar 2021 21:53) (16 - 20)  SpO2: 98% (08 Mar 2021 21:53) (97% - 98%)    CAPILLARY BLOOD GLUCOSE    PHYSICAL EXAM  General: NAD  Head: NC/AT; MMM; PERRL; EOMI;  Neck: Supple; no JVD  Respiratory: Left lung anterior clear, right lung crackles  Cardiovascular: Regular rhythm/rate; S1/S2+, no murmurs, rubs gallops   Gastrointestinal: Soft; NTND; bowel sounds normal and present  : No bleeding around redmond site  Extremities: WWP; no edema/cyanosis  Neurological: A&Ox3, CNII-XII grossly intact; no obvious focal deficits    MEDICATIONS  (STANDING):  albuterol/ipratropium for Nebulization 3 milliLiter(s) Nebulizer every 6 hours  budesonide  80 MICROgram(s)/formoterol 4.5 MICROgram(s) Inhaler 2 Puff(s) Inhalation two times a day  enoxaparin Injectable 30 milliGRAM(s) SubCutaneous every 24 hours  folic acid 1 milliGRAM(s) Oral daily  meropenem  IVPB 1000 milliGRAM(s) IV Intermittent every 12 hours  pantoprazole  Injectable 40 milliGRAM(s) IV Push every 12 hours  polyethylene glycol 3350 17 Gram(s) Oral daily  senna 2 Tablet(s) Oral at bedtime  tamsulosin 0.4 milliGRAM(s) Oral at bedtime    MEDICATIONS  (PRN):  acetaminophen   Tablet .. 650 milliGRAM(s) Oral every 6 hours PRN Mild Pain (1 - 3)  benzocaine 20% Spray 1 Spray(s) Topical four times a day PRN throat pain      No Known Allergies    LABS                        8.5    6.52  )-----------( 367      ( 09 Mar 2021 07:13 )             29.3     03-08    131<L>  |  100  |  23  ----------------------------<  89  5.3   |  23  |  1.16    Ca    8.5      08 Mar 2021 06:16  Phos  2.9     03-07  Mg     2.3     03-08    TPro  5.1<L>  /  Alb  2.3<L>  /  TBili  0.2  /  DBili  x   /  AST  22  /  ALT  35  /  AlkPhos  54  03-07

## 2021-03-09 NOTE — PROGRESS NOTE ADULT - PROBLEM SELECTOR PLAN 2
Concern for post-obstructive PNA vs. HAP vs. atypical pna. 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   - pt with worsening O2 status on 3/1 requiring HFNC, weaned to NC  - zosyn 3.375mg q8hr switched to meropenem 1000mg BID  - ID concerned for PCP pna, started on bactrim 200mg BID and prednisone 40mg BID, s/p both  - obtain sputum culture if cough becomes productive  - fungitell neg, f/u galactomannan  - Gallium scan wnl

## 2021-03-09 NOTE — PROGRESS NOTE ADULT - PROBLEM SELECTOR PROBLEM 5
Malnutrition

## 2021-03-09 NOTE — PROGRESS NOTE ADULT - PROBLEM SELECTOR PLAN 1
2/4 SIRS criteria POA with Fever and tachycardia. Patient with weakly positive UA(redmond not replaced in ED), CXR with progression of b/l infiltrates, abdominal pain, and pain at chemoport site. No new rashes or diarrhea. Concerning given patient recently teated for atypical PNA. ddx for source includes; HAP vs. Atypical vs. chemo vs. tumor burden fever vs abdominal source vs infection of chemoport  RESOLVED  - s/p vanc d/c'ed after MRSA swab negative, s/p azithromycin after urine legionella negative, RVP negative, COVID negative  - UCx only growing 50,000 CFU of enterococcus - unlikely UTI  - f/u blood cultures, f/u cultures from chemoport, f/u urine cultures  - strongyloides antibody +, s/p ivemectin 3/6-7, will repeat in 2-3 weeks per ID  - tx PNA as below    #Acute Hypoxic Respiratory Failure  -hx of COPD, but not on home O2  -likely in the setting of HAP c/b fluid overload (got 4L of fluid outpatient)  -CT PE showing b/l pleural effusions and RUL consolidation  -tx PNA as below  -diurese as needed, s/p lasix 20 IV 3/3PM  - s/p IR drainage of R pleural effusion 3/4PM, f/u fluid studies

## 2021-03-09 NOTE — PROGRESS NOTE ADULT - PROBLEM SELECTOR PLAN 10
F: s/p 2.5L  E: K > 4, Mg> 2  N: PO  DVT: Lovenox  Bowel regimen: Adding on miralax  GI: not indicated  Patient refusing to work with Physical Therapy   Code status: DNR/DNI

## 2021-03-09 NOTE — PROGRESS NOTE ADULT - PROBLEM SELECTOR PLAN 4
New GAYLE. s/p 2.5L IVF of NS at PCP. likely due to IVF  RESOLVED  - trend BMPs    #Hyponatremia: 131 this AM, asymptomatic, most likely 2/2 decreased intake   - Continue to monitor   - Encourage PO intake

## 2021-03-09 NOTE — PROGRESS NOTE ADULT - NUTRITIONAL ASSESSMENT
This patient has been assessed with a concern for Malnutrition and has been determined to have a diagnosis/diagnoses of Severe protein-calorie malnutrition and Underweight (BMI < 19).    This patient is being managed with:   Diet Regular-  Supplement Feeding Modality:  Oral  Ensure Clear Cans or Servings Per Day:  1       Frequency:  Two Times a day  Entered: Feb 26 2021  3:36PM    
This patient has been assessed with a concern for Malnutrition and has been determined to have a diagnosis/diagnoses of Severe protein-calorie malnutrition and Underweight (BMI < 19).    This patient is being managed with:   Diet NPO after Midnight-     NPO Start Date: 04-Mar-2021   NPO Start Time: 23:59  Except Medications  Entered: Mar  4 2021  1:53PM    Diet Regular-  Supplement Feeding Modality:  Oral  Ensure Clear Cans or Servings Per Day:  1       Frequency:  Two Times a day  Entered: Feb 26 2021  3:36PM    
This patient has been assessed with a concern for Malnutrition and has been determined to have a diagnosis/diagnoses of Severe protein-calorie malnutrition and Underweight (BMI < 19).    This patient is being managed with:   Diet NPO after Midnight-     NPO Start Date: 05-Mar-2021   NPO Start Time: 23:59  Entered: Mar  5 2021 11:07AM    Diet Regular-  Supplement Feeding Modality:  Oral  Ensure Clear Cans or Servings Per Day:  1       Frequency:  Two Times a day  Entered: Feb 26 2021  3:36PM    
This patient has been assessed with a concern for Malnutrition and has been determined to have a diagnosis/diagnoses of Severe protein-calorie malnutrition and Underweight (BMI < 19).    This patient is being managed with:   Diet Regular-  Supplement Feeding Modality:  Oral  Ensure Clear Cans or Servings Per Day:  1       Frequency:  Two Times a day  Entered: Feb 26 2021  3:36PM    
This patient has been assessed with a concern for Malnutrition and has been determined to have a diagnosis/diagnoses of Severe protein-calorie malnutrition and Underweight (BMI < 19).    This patient is being managed with:   Diet Regular-  Supplement Feeding Modality:  Oral  Ensure Clear Cans or Servings Per Day:  1       Frequency:  Two Times a day  Entered: Mar  6 2021 11:12AM    
This patient has been assessed with a concern for Malnutrition and has been determined to have a diagnosis/diagnoses of Severe protein-calorie malnutrition and Underweight (BMI < 19).    This patient is being managed with:   Diet Regular-  Supplement Feeding Modality:  Oral  Ensure Clear Cans or Servings Per Day:  1       Frequency:  Two Times a day  Entered: Mar  6 2021 11:12AM

## 2021-03-09 NOTE — PROGRESS NOTE ADULT - PROBLEM SELECTOR PROBLEM 4
Metabolic acidosis

## 2021-03-09 NOTE — PROGRESS NOTE ADULT - SUBJECTIVE AND OBJECTIVE BOX
the patient feels well enough to go home.  Her oxygen needs have been declining.  She has not been ambulating with the oxygen mask removed.    Appears malnourished.  The patient is on low flow nasal oxygen  at 2 liters/minute  Skin: No rash noted. No lesions. Poor turgor.  Eyes: Eyes with PERRLA. EOM's in­tact Sclera clear,no jaundice noted. Conjunctiva clear.   Ears, Nose,Mouth & Throat: Teeth normal, no missing teeth or dentures present. No ulcers. No thrush Auditory canals normal.  Description: %% Oropharynx without lesions. Mucositis None.    The patient has  Evidence for thrush over the upper aerodigestive tract.  Neck: No thyromegaly. No lymphadenopathy noted.   Cardiovascular: Normal sinus  Tachycardia rhythm S1, S2 without murmurs. No Extrasystole.   Breast: Normal. No breast masses appreciated. No axillary lymphadenopathy. No skin changes. No nipple discharge present. No dimpling  noted.  Chest/Respiratory: No dyspnea. No rhonchi. No wheezing. No decreased breath sounds at bases. No rales. Unlabored breathing. No  accessory muscle use.   Gastrointestinal: Abdomen is soft and non­tender to touch. No hepatomegaly noted. No splenomegaly noted. No abdominal pain or  guarding noted. No abdominal mass(es) appreciated. No ascites noted.  There is soft stool in the rectal vault with no evidence of fecal  impaction.  Genito­Urinary: Deferred.  Hematologic/Lymphatic: No petechiae noted. No purpura noted. No lymphadenopathy noted.   Musculoskeletal: No Kyphosis. No weakness. No spinal tenderness on percussion. No pain on hip rotation reported by patient. Normal  range of motion in upper and lower extremities.   Neurologic: Affect normal. No tremors observed. Biceps, brachioradialis, patellar and plantar reflexes symmetrical. Normal  proprioception/position sense Cranial nerves intact.   Psychiatry: Oriented to person/place/time. No anxiety observed during exam. No signs of depression noted during exam. Intact short­term  and long­term memory.   Extremities: No clubbing noted to fingers or toes. No cyanosis noted. Capillary refill less than two seconds. No edema. No calf  tenderness reported by patient.      The

## 2021-03-09 NOTE — PROGRESS NOTE ADULT - PROBLEM SELECTOR PROBLEM 7
Urinary retention

## 2021-03-09 NOTE — PROGRESS NOTE ADULT - PROBLEM SELECTOR PLAN 6
Elevated Lfts possibly in setting of sepsis, although given slight uptrending coags concern for early stages of liver failure  RESOLVED  - now downtrending

## 2021-03-09 NOTE — PROGRESS NOTE ADULT - PROBLEM SELECTOR PLAN 5
BMI <18, low albumin.  - nutrition consult.

## 2021-03-09 NOTE — PROGRESS NOTE ADULT - PROBLEM SELECTOR PROBLEM 2
Pneumonia
Pleural effusion due to another disorder
Pneumonia

## 2021-03-09 NOTE — PROGRESS NOTE ADULT - PROBLEM SELECTOR PLAN 7
Redmond placed on prior admission. Now patient septic again-redmond not replaced in ED.  - sepsis likely 2/2 lung process  - if continues to be septic, consider redmond replacement

## 2021-03-09 NOTE — DISCHARGE NOTE NURSING/CASE MANAGEMENT/SOCIAL WORK - NSDCFUADDAPPT_GEN_ALL_CORE_FT
Please follow up with your primary care provider Dr. Worthington on Monday 3/15 at 2pm. The appointment has already been made for you.     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     Please call Dr. Rogers of Urology to set up a follow up appointment within 2 weeks regarding management of your redmond    Please call Dr. Jones's office to schedule a follow up appointment within 2 weeks of discharge

## 2021-03-09 NOTE — PROGRESS NOTE ADULT - PROBLEM SELECTOR PLAN 8
On chemotx with Dr. Jones. On previous admission continued patient on megestrol.  - f/u Dr. Jones recs

## 2021-03-09 NOTE — PROGRESS NOTE ADULT - PROVIDER SPECIALTY LIST ADULT
Infectious Disease
Infectious Disease
Internal Medicine
Pulmonology
Heme/Onc
Infectious Disease
Pulmonology
Heme/Onc
Infectious Disease
Infectious Disease
Internal Medicine
Internal Medicine
Pulmonology
Heme/Onc
Infectious Disease
Infectious Disease
Internal Medicine
Internal Medicine
Heme/Onc
Internal Medicine
Heme/Onc
Heme/Onc
Infectious Disease
Internal Medicine
Cardiology
Internal Medicine

## 2021-03-09 NOTE — PROGRESS NOTE ADULT - PROBLEM SELECTOR PROBLEM 8
Lung cancer

## 2021-03-09 NOTE — PROGRESS NOTE ADULT - PROBLEM SELECTOR PROBLEM 1
Severe sepsis
SOB (shortness of breath)
Severe sepsis

## 2021-03-09 NOTE — PROGRESS NOTE ADULT - REASON FOR ADMISSION
Sepsis

## 2021-03-09 NOTE — PROGRESS NOTE ADULT - ASSESSMENT
the patient may be medically ready to go home.  She may need to have supplemental oxygen as needed.  She will need to go home on her anabolic steroids

## 2021-03-09 NOTE — PROGRESS NOTE ADULT - ATTENDING COMMENTS
Continue current therapy.  Encourage rehabilitation to get the patient out of bed in a chair for at least some part of the day and to walk in place at least some part of every day.
Diflucan should be started to address the oral thrush.  Dietary should see the patient about seen whether or not she can get more calories in her with high caloric shakes.  Physical therapy should work on getting the patient out of bed into a chair and looking for orthostatic issues.
Patient seen and examined and evaluation completed
Patient seen and examined and evaluation completed by me in person
continue antibiotics per Infectious Disease.    Ambulate in the hallway to see if the patient can care for herself and is oxygen independent when active.    PET-CT scan to see if there is any residual cancer.  The patient has been treated with combination chemotherapy and radiation therapy but has not completed and likely will not be able to complete her ideal an optimal therapy for this disease.
Metamucil daily with 2 Senokot tablets.    Pulmonary consultation Dr. Dereje Artis   gallium scan to determine the likely source of the patient's sepsis.    Review of the CT scans of the chest to determine whether or not there is a pulmonary source to the sepsis and has it been adequately treated.    Consideration of bronchoscopy to see if there is some unusual process that we are not covering without this invasive procedure.
Patient seen and examined and evaluation completed by me in person
the patient should be out of bed.  The patient should be ambulated in the hallway with assistance or a walker to see if she may be physically able to return home sometime this week.  the patient should have the GI service be arranged for treatment of her hemorrhoid this week as is the major palliative problem today
Patient seen and examined and evaluation completed by me in person
discharge plan should be made for the patient.    The patient should go home on anabolic steroids to improve her muscle strength and appetite.    The patient will have a PET-CT scan to determine whether there is any residual disease as she has not completed her radiation chemotherapy.  The patient is probably too ill to continue with this kind of treatment.
Interventional Radiology to tap the patient's largest pleural effusion.    Oxandrolone 10 mg p.o. b.i.d. with careful monitoring of the patient's liver function as a performance enhancing drugs.    Nutrition to provide calorie counts and nutritional supplements for the patient
Continuation of treatment for Pneumocystis  pneumonia along with treatment for her pulmonary toilet.

## 2021-03-09 NOTE — PROGRESS NOTE ADULT - SUBJECTIVE AND OBJECTIVE BOX
CC/ HPI Patient is an 86 year old female with chronic obstructive pulmonary disease, lung cancer, poorly differentiated adenocarcinoma, status post radiation therapy, Dec 2020, discharged after hospital stay for severe anemia, upper GI bleed, RVP positive Chlamydia pneumonia, (2.06.21) status post Zithromax, cefepime, this admission with progressive pulmonary infiltrates, sepsis, pulmonary edema, seen this morning the patient is resting comfortably on nasal cannula, she denies respiratory complaint.      PAST MEDICAL & SURGICAL HISTORY:  Chronic obstructive pulmonary disease, unspecified COPD type  Hearing loss  left ear - sees Dr. Garces ENT  Basal cell carcinoma  x2 - upper lip and arm  Osteoporosis  H/O breast biopsy, 5 years ago, mass was benign and self-resolved  History of appendectomy  History of cataract surgery    SOCHX:   tobacco,  -  alcohol      FAMILY HISTORY: FA/MO  - contributory     ROS reviewed below with positive findings marked (+) :  GEN:  fever, chills ENT: tracheostomy,   epistaxis,  sinusitis COR: CAD, CHF,  HTN, dysrhythmia PUL: +COPD, ILD, asthma, pneumonia GI: PEG, dysphagia, hemorrhage, other HALLIE: kidney disease, electrolyte disorder HEM:  anemia, thrombus, coagulopathy, cancer ENDO:  thyroid disease, diabetes mellitus CNS:  dementia, stroke, seizure, PSY:  depression, anxiety, other        MEDICATIONS  (STANDING):  albuterol/ipratropium for Nebulization 3 milliLiter(s) Nebulizer every 6 hours  budesonide  80 MICROgram(s)/formoterol 4.5 MICROgram(s) Inhaler 2 Puff(s) Inhalation two times a day  enoxaparin Injectable 30 milliGRAM(s) SubCutaneous every 24 hours  folic acid 1 milliGRAM(s) Oral daily  meropenem  IVPB 1000 milliGRAM(s) IV Intermittent every 12 hours  pantoprazole  Injectable 40 milliGRAM(s) IV Push every 12 hours  polyethylene glycol 3350 17 Gram(s) Oral daily  senna 2 Tablet(s) Oral at bedtime  tamsulosin 0.4 milliGRAM(s) Oral at bedtime    MEDICATIONS  (PRN):  acetaminophen   Tablet .. 650 milliGRAM(s) Oral every 6 hours PRN Mild Pain (1 - 3)  benzocaine 20% Spray 1 Spray(s) Topical four times a day PRN throat pain      Vital Signs Last 24 Hrs  T(C): 36.7 (09 Mar 2021 05:52), Max: 36.7 (08 Mar 2021 11:32)  T(F): 98 (09 Mar 2021 05:52), Max: 98 (08 Mar 2021 11:32)  HR: 73 (09 Mar 2021 05:52) (73 - 91)  BP: 98/66 (09 Mar 2021 05:52) (94/52 - 100/54)  RR: 18 (09 Mar 2021 05:52) (18 - 20)  SpO2: 98% (09 Mar 2021 05:52) (97% - 98%)    GENERAL:         comfortable,  - distress.  HEENT:            - trauma,  - icterus,  - injection,  - nasal discharge.  NECK:              - jugular venous distention  RESP:             + few basilar crackles  - rhonchi,   - wheezes.   COR:                S1S2   - gallops,  - rubs.  ABD:                bowel sounds,   soft, - tender, - distended.  EXT/MSC:         - cyanosis,  - clubbing, - edema.    NEURO:           + alert and oriented                             8.5    6.52  )-----------( 367      ( 09 Mar 2021 07:13 )             29.3     03-09    130<L>  |  98  |  21  ----------------------------<  90  5.1   |  23  |  1.14    Ca    8.4      09 Mar 2021 07:13  Phos  3.7     03-09  Mg     2.2     03-09            X-ray Chest  (03.04.21)  Frontal examination of the chest demonstrates the heart to be within normal limits in transverse diameter. No interval change lung infiltrates in comparison to prior examination of the chest 3/1/2021. Right Port-A-Cath noted with tip overlying junction superior vena cava and right atrium. No evidence of pneumothorax.  IMPRESSION: Bilateral infiltrates.    Echo Complete w/o Contrast w/ Doppler (03.02.21)  1.  Normal left and right ventricular size and systolic function.   2. Grade I left ventricular diastolic dysfunction.   3. Mild tricuspid regurgitation.   4. No evidence of pulmonary hypertension, pulmonary artery systolic pressure is 27 mmHg.   5. No pericardial effusion.      CT Angio Chest PE Protocol w/ IV Cont (03.01.21) Pulmonary arteries: No evidence of pulmonary embolus to the segmental level.  Aorta: No aneurysm of the aorta. No dissection of the aorta.  Veins: MediPort terminates in the SVC.  Lungs: Opacities in the right upper lobe may represent atelectasis/pneumonia versus neoplasm.  Diffuse ground-glass opacities throughout the lung fields may represent pulmonary edema or diffuse pneumonia.  Pleural spaces: Moderate to large bilateral pleural effusions..  Heart: Unremarkable. No cardiomegaly. No pericardial effusion.  Lymph nodes: Unremarkable. No enlarged lymph nodes.  Kidneys and ureters: Simple cysts in the left kidney.  Bones/joints: Compression fracture T9 of unknown age.  Soft tissues: Unremarkable.   Other findings: Motion artifact degrades images.    IMPRESSION:  1. No evidence of pulmonary embolus to the segmental level.  2. No aneurysm of the aorta.  3. No dissection of the aorta.  4. Moderate to large bilateral pleural effusions.  5. Opacities in the right upper lobe may represent atelectasis/pneumonia versus neoplasm.    CT Angio Chest PE Protocol w/ IV Cont (02.03.21)  Pulmonary arteries: No pulmonary embolus seen.  Lungs and large airways: Respiratory motion limits assessment of fine pulmonary parenchymal detail. Trace dependent secretions distal trachea. No nodular and no tracheal or endobronchial lesion.  Bilateral apical pleural parenchymal scarring. Unchanged mild peribronchial wall thickening and cylindrical bronchiectasis bilaterally. Scattered nodular groundglass opacities bilaterally, more confluent in right upper lobe posterior segment with apparent interlobular septal thickening. Mild compressive atelectasis right lower lobe. Few nodular consolidative opacities left lower lobe, subsegmental atelectasis versus mild consolidation. No measurable residual masses in right upper lobe anterior segment with linear opacity in this region probably representing scarring. New few scattered bilateral pulmonary nodules, for example:  *  Right upper lobe subpleural 0.5 cm (series 6 image 160).  *  Right upper lobe anterior segment 0.5 cm (series 6 image 132)  *  Left lower lobe 0.5 cm (series 6 image 204)  Pleura:  Resolved right pneumothorax. Decreased trace residual right pleural effusion.  Mediastinum and hilar regions: Significantly decreased mediastinal and hilar adenopathy, for example right lower paratracheal 1.1 x 0.8 cm, previously 4.4 x 2.9 cm. No significant change mildly prominent subcarinal node 1.2 cm short axis. Resolved right lower cervical adenopathy.  Heart and pericardium:  Heart size is normal. No pericardial effusion.  Vessels:  No aneurysm. Mild scattered calcific atherosclerosis.  Chest wall and lower neck:  Normal.  Upper abdomen: Unchanged few left renal cysts, largest 3.5 cm.  Bones: No suspicious lytic or blastic lesion. Mild degenerative changes thoracic spine with prominent degenerative Schmorl's node inferior endplate T9 vertebral body.    IMPRESSION:  1.  No pulmonary embolus.  2.  New few small scattered nodular opacities bilaterally with more confluent groundglass density and suspected interlobular septal thickening in posterior right upper lobe. Differential includes aspiration/infection versus lymphangitic carcinomatosis. Assessment limited by artifact from respiratory motion. Recommend clinical correlation and short-term follow-up would be helpful.  .  No measurable residual right upper lobe mass with scarring in this region.  4.  Significantly decreased thoracic adenopathy.  5.  Decreased trace residual right pleural effusion.  6.  Resolved right pneumothorax.      ASSESSMENT/PLAN    1)  Pneumonia/pneumonitis  2)  Pleural effusions  3)  Chronic obstructive pulmonary disease  4)  Bronchiectasis  5)  Lung cancer  6)  Strongyloidiasis       Oxygen as needed for a satisfactory SpO2  Pleural gram stain and cultures remain negative to date.  Gallium scan is negative  Bronchodilators:  Atrovent/ albuterol q 4 – 6 hours as needed  ID/Antibiotics:  status post meropenem, Ivermectin  Follow up ID recommendations  Cardiac/HTN: diuresis as needed  GI: Rx/ prophylaxis c PPI/H2B  Heme: Rx/VT prophylaxis   Discuss with Dr. Angel and the medical resident.

## 2021-03-25 NOTE — ASSESSMENT
[FreeTextEntry1] : \par \par Impression/plan: 86 year-old female with PMHx of Lung Ca on chemo and RT (last treatment 3 weeks ago) presents to the office with a Schumacher catheter that was placed at [facility] at the end of February due to urinary retention. TOV today.\par \par 1. Urine c+s, urinary retention and r/o UTI. \par 2. F/u urodynamics study, to assess bladder function r/t urinary retention. \par

## 2021-03-25 NOTE — PHYSICAL EXAM
[Normal Appearance] : normal appearance [General Appearance - In No Acute Distress] : no acute distress [Edema] : no peripheral edema [Exaggerated Use Of Accessory Muscles For Inspiration] : no accessory muscle use [] : no rash [Oriented To Time, Place, And Person] : oriented to person, place, and time [Affect] : the affect was normal [Normal Station and Gait] : the gait and station were normal for the patient's age [FreeTextEntry1] : No prolapse noted.

## 2021-03-25 NOTE — HISTORY OF PRESENT ILLNESS
[FreeTextEntry1] : 86 year-old female with PMHx of Lung Ca on chemo and RT (last treatment 3 weeks ago) presents to the office with a Redmond catheter that was placed at [facility] at the end of February due to urinary retention. Home health aide present during this visit. Patient and health aide state that the retention began sometime in the beginning of Feb. Pt was in hospital for 2 weeks in the beginning of Feb with sepsis pneumonia and was bedbound due to severe weakness and lethargy. She was also hospitalized again at the end of February into March for pneumonia. Health aide states that patient has been dehydrated. On average patient is drinking 3-4 4oz juice bottle and 1-2 8 oz. water. Redmond is draining anywhere from 300-500 cc clear yellow urine daily. Health aide denies irritative symptoms, hematuria or foul smelling urine. Patient feeling much better, she is able to walk around and denies fevers, chills, N/V, diarrhea, constipation, SOB, chest pain. \par \par Before urinary retention patient would go through 3-4 depends daily and use the bathroom at times. Patient is mostly incontinent of urine.  Pt. denies hx of kidney stones and states that she had a UTI a couple of years ago. \par \par TOV today. Instilled 210 ml of sterile water into bladder and deflated Redmond catheter balloon and removed catheter.\par Patient and health aide walked to bathroom. Patient unable to urinate. Patient drank 8 oz. of water and was still unable to urinate. \par \par New redmond catheter reinserted without difficulty under aseptic technique. Urine noted to be draining into redmond leg bag.

## 2021-03-31 NOTE — HISTORY OF PRESENT ILLNESS
[FreeTextEntry1] : 86 year-old female presents to the office with c/o of vaginal pain and home care RN unable to replace Redmond catheter. Patient has been c/o discomfort and pain in regards to her Redmond catheter. Patient and home health aide states that they noticed leaking around the catheter. Home care RN removed previous catheter and tried to replace it but patient was in excruciating pain as per patient and home health aide. Patient presents to office without a catheter. \par \par Failed TOV on 3/19 and 14Fr Redmond catheter was placed without difficulty and urine emptying into leg bag. \par \par 3/25- Nurse care manager called office regarding patient having increased discomfort and pain from the catheter. as per manager, patient was experiencing pain and discomfort. denied any changes in urine output and the color of the urine. Advised to have patient try AZO OTC and to apply lubrication to the catheter to prevent any irritation at the entrance. \par \par New redmond catheter (14Fr coude) reinserted under aseptic technique. Bladder irrigated with sterile water and Redmond catheter draining orange colored urine.

## 2021-03-31 NOTE — PHYSICAL EXAM
[Normal Appearance] : normal appearance [Well Groomed] : well groomed [General Appearance - In No Acute Distress] : no acute distress [Edema] : no peripheral edema [] : no respiratory distress [Respiration, Rhythm And Depth] : normal respiratory rhythm and effort [Exaggerated Use Of Accessory Muscles For Inspiration] : no accessory muscle use [Affect] : the affect was normal

## 2021-03-31 NOTE — ASSESSMENT
[FreeTextEntry1] : Impression/Plan: 86 year-old female presents to the office with c/o of vaginal pain and home care RN unable to replace Schumacher catheter. Patient has been c/o discomfort and pain in regards to her Schumacher catheter. Patient and home health aide states that they noticed leaking around the catheter. Home care RN removed previous catheter and tried to replace it but patient was in excruciating pain as per patient and home health aide. Patient presents to office without a catheter. \par \par 1. Continue to monitor discomfort and pain associated with Schumacher catheter. \par 2. Continue AZO OTC as needed for pain. \par 3. Continue to increase fluid intake throughout the day.\par 4. F/u UDS to assess bladder function.

## 2021-04-09 NOTE — HISTORY OF PRESENT ILLNESS
[FreeTextEntry1] : Patient presents to the office for a UDS study. Urine collected from Schumacher catheter showed large leuks and positive nitrates. Will send urine out for culture. Will reschedule UDS for after treatment with course of abx.

## 2021-05-07 NOTE — HISTORY OF PRESENT ILLNESS
[FreeTextEntry1] : 86 year-old female with PMHx of Lung Ca on chemo and RT presents to the office for UDS procedure for history of urinary retention. Presents to the office without a Redmond catheter. Patient and home health aide state that the Redmond catheter was discontinued 2-3 weeks ago in the ER. Patient had gone to the ER for SOB. Since discontinuation patient has not had any problems with urinary retention. Patient has been able to empty bladder. \par Denies fevers, chills, n/v/d. Denies irritative urinary symptoms. \par \par PVR: 22 ml\par \par \par 3/19/21: Patient presents to the office with a Redmond catheter that was placed at [facility] at the end of February due to urinary retention. Home health aide present during this visit. Patient and health aide state that the retention began sometime in the beginning of Feb. Pt was in hospital for 2 weeks in the beginning of Feb with sepsis pneumonia and was bedbound due to severe weakness and lethargy. She was also hospitalized again at the end of February into March for pneumonia. Health aide states that patient has been dehydrated. On average patient is drinking 3-4 4oz juice bottle and 1-2 8 oz. water. Redmond is draining anywhere from 300-500 cc clear yellow urine daily. Health aide denies irritative symptoms, hematuria or foul smelling urine. Patient feeling much better, she is able to walk around and denies fevers, chills, N/V, diarrhea, constipation, SOB, chest pain.  Failed TOV and catheter replaced. \par \par 3/25- Nurse care manager called office regarding patient having increased discomfort and pain from the catheter. as per manager, patient was experiencing pain and discomfort. denied any changes in urine output and the color of the urine. Advised to have patient try AZO OTC and to apply lubrication to the catheter to prevent any irritation at the entrance. \par \par 3/31/21: Presents to the office with c/o of vaginal pain and home care RN unable to replace Redmond catheter. Patient has been c/o discomfort and pain in regards to her Redmond catheter. Patient and home health aide states that they noticed leaking around the catheter. Home care RN removed previous catheter and tried to replace it but patient was in excruciating pain as per patient and home health aide. Patient presents to office without a catheter. \par New redmond catheter (14Fr coude) reinserted under aseptic technique. Bladder irrigated with sterile water and Redmond catheter draining orange colored urine.

## 2021-05-07 NOTE — PHYSICAL EXAM
[General Appearance - Well Developed] : well developed [General Appearance - Well Nourished] : well nourished [Normal Appearance] : normal appearance [Well Groomed] : well groomed [General Appearance - In No Acute Distress] : no acute distress [] : no respiratory distress [Respiration, Rhythm And Depth] : normal respiratory rhythm and effort [Exaggerated Use Of Accessory Muscles For Inspiration] : no accessory muscle use [Oriented To Time, Place, And Person] : oriented to person, place, and time [Affect] : the affect was normal [Not Anxious] : not anxious [Mood] : the mood was normal [Normal Station and Gait] : the gait and station were normal for the patient's age

## 2021-06-14 NOTE — ED ADULT NURSE NOTE - OBJECTIVE STATEMENT
85yo female BIBA c/o head injury. Pt. reports that she was walking up some steps to a doctor appointment when she had a mechanical fall and hit her forehead. Denies LOC. Reports taking Eliquis, unsure what Eliquis is taken for. Pt. has HHA eight hours/day for five days/week, reports ambulating with cane at baseline. H/O lung cancer. Denies chest pain, SOB, fever/chills, double/blurry vision, n/v/d.

## 2021-06-14 NOTE — ED PROVIDER NOTE - OBJECTIVE STATEMENT
86F PMH COPD, lung adenocarcinoma s/p radiation/chemo, UGIB, presenting s/p mechanical fall while at home. Aid at bedside states that pt tripped as she was entering her apt, falling down and striking her head on the hard floor. No LOC was observed. Pt was reluctant to come to the ED as she was feeling well but was eventually convinced to go. Pt on eliquis. Pt denies assoc headache, MSK pains, hip pain, chest pain, vision change, nausea/vomiting,.

## 2021-06-14 NOTE — ED PROVIDER NOTE - PATIENT PORTAL LINK FT
You can access the FollowMyHealth Patient Portal offered by [facility] by registering at the following website: [email or web address]. By joining [employer]’s FollowMyHealth portal, you will also be able to view your health information using other applications (apps) compatible with our system.

## 2021-06-14 NOTE — ED ADULT TRIAGE NOTE - OTHER COMPLAINTS
patient BIBEMS s/p mechanical fall-- patient reports striking head, denies LOC/dizziness-- reports taking eliquis

## 2021-06-14 NOTE — ED ADULT NURSE NOTE - PMH
Basal cell carcinoma  x2 - upper lip and arm  Chronic obstructive pulmonary disease, unspecified COPD type    Hearing loss  left ear - sees Dr. Garces ENT  Osteoporosis  Osteoporosis

## 2021-06-14 NOTE — ED PROVIDER NOTE - CLINICAL SUMMARY MEDICAL DECISION MAKING FREE TEXT BOX
s/p mechanical fall at home. No midling cspine ttp. No evidence of depressed skull dx. Pt looks and feels well. PERRLA. Will scan given eliquis and dc home if negative.

## 2021-07-28 PROBLEM — J33.9 NASAL POLYPS: Status: ACTIVE | Noted: 2017-01-26

## 2021-07-28 PROBLEM — J31.0 CHRONIC RHINITIS: Status: ACTIVE | Noted: 2017-01-26

## 2021-07-28 PROBLEM — H61.22 IMPACTED CERUMEN OF LEFT EAR: Status: ACTIVE | Noted: 2021-01-01

## 2021-07-28 PROBLEM — H93.8X2 SENSATION OF PLUGGED EAR ON LEFT SIDE: Status: ACTIVE | Noted: 2021-01-01

## 2021-07-28 PROBLEM — Z85.118 HISTORY OF MALIGNANT NEOPLASM OF LUNG: Status: RESOLVED | Noted: 2021-01-01 | Resolved: 2021-01-01

## 2021-07-28 PROBLEM — N39.0 ACUTE UTI: Status: RESOLVED | Noted: 2021-01-01 | Resolved: 2021-01-01

## 2021-07-28 PROBLEM — H90.5 ASYMMETRIC SNHL (SENSORINEURAL HEARING LOSS): Status: ACTIVE | Noted: 2018-02-14

## 2021-07-28 NOTE — CONSULT LETTER
[Dear  ___] : Dear  [unfilled], [Courtesy Letter:] : I had the pleasure of seeing your patient, [unfilled], in my office today. [Please see my note below.] : Please see my note below. [Consult Closing:] : Thank you very much for allowing me to participate in the care of this patient.  If you have any questions, please do not hesitate to contact me. [Sincerely,] : Sincerely, [FreeTextEntry2] : Alena Worthington MD\par [address] [facility]\par [place] [id number] [FreeTextEntry1] : \par \par \par \par \par  [FreeTextEntry3] : \par Genny Garces MD \par Otolaryngology, Head and Neck Surgery \par Residency site , [facility]\par

## 2021-07-28 NOTE — ASSESSMENT
[FreeTextEntry1] : Ms. JONES was evaluated for the following issues today:\par \par 1.) Left ear clogging was relieved after removal of cerumen impaction\par Known left asymmetric SNHL, with MRI IAC negative for lesion/abnormal enhancement\par \par 2.) Chronic rhinitis and small nasal polyps\par --> continue current nasal sprays\par \par Return as needed

## 2021-07-28 NOTE — DATA REVIEWED
[de-identified] : \par AUDIOGRAM (11/04/2020)\par RIGHT:  Hearing within normal limits except for mild SNHL at 8K Hz\par LEFT:   Hearing within normal limits through 2K Hz, sloping precipitously to mild to moderately severe SNHL through 4K Hz, then rising to moderate HL\par Tympanograms  B  bilateral \par Word recognition 100%  on right; 90% on left\par \par AUDIOGRAM (4/22/2019)\par RIGHT:  Hearing within normal limits through 3K Hz, sloping to mild SNHL.\par LEFT:   Hearing within normal limits through 1K Hz, sloping to mild to moderately-severe mixed HL\par Tympanograms  B  bilateral \par Word recognition 100%  on right; 90% on left\par \par AUDIOGRAM  (02/14/2018)\par RIGHT:  Hearing within normal limits.\par LEFT:   Hearing within normal limits through 2K Hz, sloping to moderate to mild SNHL.\par Tympanograms  As on left; C on right  \par Word recognition 100%  bilateral\par \par AUDIOGRAM (4/25/2010)\par RIGHT:  Hearing with mild conductive HL lower frequencies, otherwise normal limits.    \par LEFT:   Hearing with mild conductive HL lower frequencies, otherwise normal limits.     \par Tympanograms  A on  right; B on  left\par Word recognition 90% on right; 88%  on left\par

## 2021-07-28 NOTE — HISTORY OF PRESENT ILLNESS
[de-identified] : Ms. Decker reports that left ear has been clogged x months.  No change in hearing .  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 \par MRI IAC +/- negative for IAC/CPA lesion in 2018.\par She hit her head the other day and got an abrasion on left forehead.\par She was diagnosed with lung cancer, treated with RT/chemo.\par \par VISIT 11/04/2020:\par 1.) Ms. Decker reports that she has been having random episodes of feeling funny x a few minutes each time over last 3-4 weeks.  First episode occurred when she was walking outside - "felt funny" so stopped for a few minutes.\par Since then, episodes can happen at any time - "weird feeling" happens - when standing, when sitting still. \par Occ nausea with the episodes but no vomiting.\par No actual vertigo. No headache or blurred vision. No preceding illness or head trauma.\par Went to Urgent Care and EKG was done --> told to go to [facility] ED where multiple tests were done but not abnormal.\par She saw Dr. Hewitt who concluded no cardiac issues; maybe vasovagal reaction.\par 2.) Feels worse hearing on left side x few months. 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 \par MRI IAC +/- negative for IAC/CPA lesion in 2018.\par 3.) Nose was fine re rhinorrhea until the cold weather started. Rhinorrhea is clear. Mild nasal congestion.\par Hx of chronic sinusitis and tiny polyps.\par \par \par MR BRAIN with/without contrast (03/05/2018) at [facility]: \par - comparison to CT sinuses dated 8/5/2017 and CT temporal bones dated 4/24/2009.\par FINDINGS: \par -- Internal auditory canals and cerebellopontine angles demonstrate no mass lesion or abnormal enhancement. The left mastoid air cells are opacified at the mastoid process which appears to be a chronic finding comparing back to 2009.\par -- Ventricles, cisternal spaces, and cortical sulci to be appropriate for the patient's stated age. A few scattered \par punctate foci of increased T2 signal within the periventricular and subcortical white matter which is nonspecific and most likely represents the sequela of small vessel ischemic disease in this patient, mild in degree. The diffusion-weighted images demonstrate no evidence of recent infarction. The postcontrast images demonstrate no abnormal intracranial \par enhancement. There are preserved large vascular flow-voids. \par -- Pansinus inflammatory mucosal thickening with moderate circumferential mucosal thickening in the maxillary sinuses, and diffuse moderate opacification of the ethmoid air cells and right sphenoid sinus, similar to the prior CT sinus findings of 8/5/17. \par \par CT SINUS noncontrast (June 21, 2018) at [facility] \par - Prior Studies: 08/05/2017 CT \par - Findings:\par * Prior Surgery: None is evident\par * Sinuses: As on the prior study there is paranasal sinus mucosal thickening throughout. Compared with the prior study, there is increase in mucosal thickening and minimal opacification of the left maxillary sinus whereas the right maxillary sinus shows similar but slightly decreased mucosal thickening. The sphenoid sinuses show fairly similar \par mild circumflex proximal mucosal thickening, and the ethmoid sinuses show persistent moderate bilateral opacification, though slightly improved. The frontal sinuses are better ventilated on the current exam. No fluid level, although there is bubbly secretions/debris in the left maxillary sinus lumen. No evidence of odontogenic sinusitis.\par * Sinocranial and Sino-orbital Junctions: There remains intact and symmetric excretion at the anterior skull base. The bony orbits are preserved.\par * Ostiomeatal Units: The bilateral maxillary ostia are opacified and again, the uncinate processes appear lateralized or even atelectatic along the lateral walls nasal cavity.\par * Frontal Sinus Outflow Tracts: Difficult to trace bilaterally, though likely narrowed in the presence of ovarian frontal ethmoidal air cells. Sagittal image 38 shows a 6 mm rounded focus of tissue in the anterior middle meatus that could be a polyp.\par * Sphenoethmoidal Recesses: Opacified on the right, presumably from inflammatory mucosa or polyp there is similar to slightly increased from the prior study. The left sphenoethmoidal recess is ventilated.\par * Nasal Cavity/Nasopharynx: As above, there is small rounded focus of soft tissue the left middle meatus lateral to the middle turbinate that could be a polyp. There is additional asymmetric mucosal thickening or other soft tissue lateral the posterior middle turbinate. There is diffuse nasal cavity mucosal thickening at the olfactory clefts and sphenoethmoidal recesses, as on prior imaging. The nasal septum this predominantly midline. The noncontrast CT appearance of the nasopharynx is unremarkable.\par * Orbits: No abnormal soft tissue or mass effect. The native ocular lenses have been replaced. There is unchanged symmetric degree of enophthalmos but is likely age-related with relative paucity of intraorbital fat. 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

## 2021-07-28 NOTE — PHYSICAL EXAM
[FreeTextEntry1] : No hoarseness.  [de-identified] : RIGHT EAC clear.  LEFT EAC with impacted cerumen, removed with curette. [] : septum deviated to the left [de-identified] : Mild .edema [Midline] : trachea located in midline position [Removed] : palatine tonsils previously removed [de-identified] : dental restorations [Normal] : no neck adenopathy [FreeTextEntry2] : Abrasion healing on left forehead

## 2021-10-13 NOTE — ASSESSMENT
[FreeTextEntry1] : \par \par Impression/plan: 86 year-old female with PMHx of Lung Ca on chemo and RT, previous history of urinary retention, currently voiding well with no issues.  Postvoid residuals 25 ml.  At this point she can follow-up with me on an as-needed basis.  Urine culture was sent per request. Additional Progress Note...

## 2021-10-13 NOTE — HISTORY OF PRESENT ILLNESS
[FreeTextEntry1] : 86 year-old female with PMHx of Lung Ca on chemo and RT, previous history of urinary retention which she passed a voiding trial at her last visit 6 months ago.  She is here today just for follow-up and did not ensure she continues to empty her bladder well.  Her postvoid residual is 25 mL.  She states she has occasional lower abdominal pain associated with bowel movements and sometimes with voiding would like to have a urine culture done to rule out a urinary tract infection.  She denies fever, chills, nausea, vomiting.  She denies any irritative or obstructive voiding symptoms.

## 2021-10-30 NOTE — DIETITIAN INITIAL EVALUATION ADULT. - REASON FOR ADMISSION
Patient cleared and ready for behavioral bed placement: Yes     S Pt is a 41 year old male at the [facility] ed    B Pt sent in from NP office. Pt is paranoid that people are following him and doing him harm. Pt believes he is in a movie. Pt says every place is a trap. Pt is declining to answer many questions. Pt thinks he is in a assisted right now. Pt says medications are poison and refusing medications. Covid19 negative. Pt denies HI and SI. Pt is having AH but would not describe. Pt is medically cleared ed.     A Vol     R Placed on adult worklist   Sepsis

## 2021-11-01 PROBLEM — N85.8 UTERINE MASS: Status: ACTIVE | Noted: 2021-01-01

## 2021-11-17 NOTE — PATIENT PROFILE ADULT - FALL HARM RISK
age(85 years old or older)/coagulation(Bleeding disorder R/T clinical cond/anti-coags) age(85 years old or older)

## 2021-11-17 NOTE — PATIENT PROFILE ADULT - HOW PATIENT ADDRESSED, PROFILE
How Severe Is It?: moderate Is This A New Presentation, Or A Follow-Up?: Follow Up Acne When Was Your Last Visit?: 10/04/18 Patient Reported Weight In Pounds (Required For Calculation): 112 Ms. Decker

## 2021-11-18 NOTE — H&P ADULT - NSICDXPASTMEDICALHX_GEN_ALL_CORE_FT
PAST MEDICAL HISTORY:  Basal cell carcinoma x2 - upper lip and arm    Chronic obstructive pulmonary disease, unspecified COPD type     Hearing loss left ear - sees Dr. Garces ENT    Lung cancer treated with chemo & RT-2021    Osteoporosis Osteoporosis    Uterine mass

## 2021-11-18 NOTE — H&P ADULT - NSICDXPASTSURGICALHX_GEN_ALL_CORE_FT
PAST SURGICAL HISTORY:  H/O breast biopsy > 5 years ago, mass was benign and self-resolved    History of appendectomy     History of cataract surgery b/l

## 2021-11-18 NOTE — BRIEF OPERATIVE NOTE - NSICDXBRIEFPROCEDURE_GEN_ALL_CORE_FT
PROCEDURES:  NICOLE-BSO, with total omentectomy 18-Nov-2021 11:18:38 12cm R adenxal mass Elida Mcgovern

## 2021-11-18 NOTE — H&P ADULT - ASSESSMENT
A/P:  A/P: 87yo with h/o lung cancer s/p chemotherapy with Gefitinib and Tamoxifen 2021 c/b multiple episodes of pneumonia presents for scheduled hysterectomy and BSO for new masslike enlargement of the uterus with increased FDG uptake seen on PET/CT 2/2021 which was done as follow up for her lung cancer.  - Consents signed  - COVID neg  - ERAS protocol

## 2021-11-18 NOTE — PACU DISCHARGE NOTE - COMMENTS
Pt  post-op exploratory Laparotomy total Hysterectomy, Bilateral salpingo oophorectomy. Pt is Neurologically and hemodynamically stable. Pt has met PACU discharge requirement. Report given to Suhail Gomez RN. Transported in bed.

## 2021-11-18 NOTE — BRIEF OPERATIVE NOTE - OPERATION/FINDINGS
Exam under anesthesia, 12cm firm midline mass appreciated, mobile; Pfannenstiel incision performed, Cherney done, 12cm R adnexal solid mass appreciated, normal appearing uterus, normal appearing bilateral fallopian tubes and L ovary, normal appearing bowel and omentum; total abdominal hysterectomy, bilateral salpingo-oophorectomy, and omentectomy performed; uterine cavity irrigated; hemoblast used; exparel used; EBL 200cc, IVF 1200, UOP 450cc

## 2021-11-18 NOTE — H&P ADULT - HISTORY OF PRESENT ILLNESS
85yo with h/o lung cancer s/p chemotherapy with Gefitinib and Tamoxifen 2021 c/b multiple episodes of pneumonia presents for scheduled hysterectomy and BSO for new masslike enlargement of the uterus with increased FDG uptake seen on PET/CT 2/2021 which was done as follow up for her lung cancer. She completed lung cancer treatment 3/2021. She had c/o dull pelvic pain, dull pain with urination and bowel movements. She overall feels well this morning and has no complaints.     85yo with h/o lung cancer s/p chemotherapy with Gefitinib and Tamoxifen 2021 c/b multiple episodes of pneumonia presents for scheduled hysterectomy and BSO for new masslike enlargement of the uterus with increased FDG uptake seen on PET/CT 2/2021 which was done as follow up for her lung cancer. She completed lung cancer treatment 3/2021. She had c/o dull pelvic pain, dull pain with urination and bowel movements. She overall feels well this morning and has no complaints.    ObHx: G0  GynHx: denies prior to this PET/CT finding  PMH: lung cancer s/p chemo (Gefitinib and Tamoxifen) and radiation c/b multiple episodes of pneumonia with chlamydia pneumonia and suspected PCP pneumonia most recently 3/2021; COPD; osteoporosis; hearing loss; basal cell carcinoma  PSH: open appendectomy (intact) age 17yo, cataract surgery     87yo with h/o lung cancer s/p chemotherapy with Gefitinib and Tamoxifen 2021 c/b multiple episodes of pneumonia presents for scheduled hysterectomy and BSO for new masslike enlargement of the uterus with increased FDG uptake seen on PET/CT 2/2021 which was done as follow up for her lung cancer. She completed lung cancer treatment 3/2021. She had c/o dull pelvic pain, dull pain with urination and bowel movements. She overall feels well this morning and has no complaints.    ObHx: G0  GynHx: denies prior to this PET/CT finding  PMH: lung cancer s/p chemo (Gefitinib and Tamoxifen) and radiation c/b multiple episodes of pneumonia with chlamydia pneumonia and suspected PCP pneumonia most recently 3/2021; COPD; osteoporosis; hearing loss; basal cell carcinoma  PSH: open appendectomy (intact) age 15yo, cataract surgery  NKDA  Meds: Trelegy 100mcg/62.5mcg/25mcg

## 2021-11-19 NOTE — DIETITIAN INITIAL EVALUATION ADULT. - OTHER CALCULATIONS
ABW for estimated needs d/t % IBW between %, adjusted for metastatic disease, post-op healing, advanced age

## 2021-11-19 NOTE — DIETITIAN INITIAL EVALUATION ADULT. - MALNUTRITION
severe Severe malnutrition in the context of chronic illness AEB severe muscle wasting in upper and lower body

## 2021-11-19 NOTE — DIETITIAN NUTRITION RISK NOTIFICATION - PHYSICAL ASSESSMENT THIGH
Discharge instructions discussed with patient and patient's daughter.  Emphasized home medication regimen including pain management, s/s of infection and signs of calcium deficiency.  All follow up questions answered and belongings accounted for.  Patient escorted out via wheelchair.   moderate

## 2021-11-19 NOTE — DIETITIAN INITIAL EVALUATION ADULT. - ORAL NUTRITION SUPPLEMENTS
Pt states has tried a variety of Ensures and does not like any. Decline Ensure. Encouraged protein source daily at meals once diet advanced

## 2021-11-19 NOTE — DIETITIAN INITIAL EVALUATION ADULT. - ADD RECOMMEND
1. Bowel regimen PRN  2. Protein source at every meal/ snack   3. If po intake <50% of nutrient needs, consider appetite stimulate

## 2021-11-19 NOTE — DIETITIAN INITIAL EVALUATION ADULT. - PERSON TAUGHT/METHOD
Emphasized importance of nutrient dense foods/ protein at every meal/snack/verbal instruction/patient instructed

## 2021-11-19 NOTE — DIETITIAN INITIAL EVALUATION ADULT. - ORAL INTAKE PTA/DIET HISTORY
Spoke with pt in room this morning. Pt states appetite is intact, eats 2 meals per day and 1-2 snacks daily. Breakfast is oatmeal with 2 cups of coffee, lunch is yogurt and omelette and dinner is protein + starch + vegetable. Cooks most dinner at home and occasionally goes out to eat for lunch. Likes Burkinan and [place]ian cuisine.  Primarily drinks water, but also drinks orange juice, tomato juice and coffee daily. Pt reports dysgeusia, but not impacting po intake. Denies N/V or mucositis.

## 2021-11-22 NOTE — PROVIDER CONTACT NOTE (OTHER) - ACTION/TREATMENT ORDERED:
EKG. Will continue to monitor.
MD notified and has no recommendations at this time; no action recommended; will continue to monitor.

## 2021-11-22 NOTE — DISCHARGE NOTE NURSING/CASE MANAGEMENT/SOCIAL WORK - PATIENT PORTAL LINK FT
You can access the FollowMyHealth Patient Portal offered by [facility] by registering at the following website: [email or web address]. By joining [facility]’s FollowMyHealth portal, you will also be able to view your health information using other applications (apps) compatible with our system.

## 2021-11-22 NOTE — DISCHARGE NOTE PROVIDER - NSDCFUADDINST_GEN_ALL_CORE_FT
Pelvic rest (nothing in vagina) x6 weeks or unless otherwise instructed by physician. Schedule follow up appointment with Dr. Amanda on 11/24 and 12/20. Go to nearest ED if fever >100.4 F, severe abdominal pain or heavy vaginal bleeding.   Wound care: Showering is allowed, no baths. Do not scrub incision area. Pat and dry all areas where incisions are.   Take Motrin 600mg every 6 hours or Tylenol 1000mg every 8 hours as needed for pain

## 2021-11-22 NOTE — DISCHARGE NOTE PROVIDER - NSDCFUSCHEDAPPT_GEN_ALL_CORE_FT
BETTIE JONES ; 11/24/2021 ; [facility] [address]  BETTIE JONES ; 12/20/2021 ; [facility] [address]

## 2021-11-22 NOTE — PROGRESS NOTE ADULT - NUTRITIONAL ASSESSMENT
This patient has been assessed with a concern for Malnutrition and has been determined to have a diagnosis/diagnoses of Severe protein-calorie malnutrition and Underweight (BMI < 19).    This patient is being managed with:   Diet Regular-  Entered: Nov 19 2021  5:46PM    
This patient has been assessed with a concern for Malnutrition and has been determined to have a diagnosis/diagnoses of Severe protein-calorie malnutrition and Underweight (BMI < 19).    This patient is being managed with:   Diet Regular-  Entered: Nov 19 2021  5:46PM

## 2021-11-22 NOTE — PAST MEDICAL HISTORY
[Menarche Age ____] : age at menarche was [unfilled] [Total Preg ___] : G[unfilled] [Postmenopausal] : The patient is postmenopausal

## 2021-11-22 NOTE — DISCHARGE NOTE PROVIDER - CARE PROVIDER_API CALL
Melva Amanda)  Obstetrics and Gynecology  [address], [address], [place] [address]  Phone: [phone]  Fax: [phone]  Follow Up Time: 2 weeks

## 2021-11-22 NOTE — PROVIDER CONTACT NOTE (OTHER) - ASSESSMENT
V/S: 119/57, HR: 80, RR:20, O2: 100 on RA. NSR. Denies CP, chest palpitations, or dizziness. Pt resting comfortably in bed
pt had 6 beats on v-tach on monitor and was tachy up to 163; /57; HR immediately afterwards 88; pt asymptomatic and was sleeping in bed during episode.

## 2021-11-22 NOTE — HISTORY OF PRESENT ILLNESS
[FreeTextEntry1] : Problem List\par \par Results/Previous therapy\par 1) PET CT 10/18/2021: \par   a) Since 2/26/2021, new masslike enlargement of the uterus with increased FDG uptake, central photopenia, likely reflecting central necrosis. Again a partially calcified lesion in the right adnexa. \par   b) Lymph nodes: New intense focal FDG avidity in the retroperitoneum, difficult to correlate on CT. The findings may reflect FDG avid periaortic nodes. \par \par 86 year old female with past medical hx of lung cancer on chemo and RT. Recently had a restaging PET CT for lung cancer, found to have new masslike enlargement of the uterus. Pt reports she finished tx for lung cancer in March 2021 and had PET/CT scheduled for follow up. Pt reports she went for PET/CT and right before (1 month ago) she started having dull pelvic pain, and dull pain with urination and bowel movements (straining specifically). Her symptoms along with the results from the PET CT prompted a referral for evaluation today. \par Pt overall feeling well. Denies CP, SOB, hematuria, vaginal bleeding, fatigue, weight loss. \par \par OBHx: Denies\par GYNHx: Denies hx fibroids, cysts, STIs, abnormal pap smears\par MedHx: Lung cancer (s/p chemo and radiation)- tx finished 3/2021\par SurgHx: Open appendectomy at age 16. Cataract surgery\par Meds: Trelegy QD prn, PO iron, Magnesium\par Allergies: NKDA\par \par Health Maintenance \par DXA- last scan showed osteoporosis.\par Mammogram- WNL. R breast biopsy yrs ago, WNL

## 2021-11-22 NOTE — DISCHARGE NOTE PROVIDER - NSDCCPTREATMENT_GEN_ALL_CORE_FT
PRINCIPAL PROCEDURE  Procedure: NICOLE-BSO, with total omentectomy  Findings and Treatment: 12cm R adenxal mass

## 2021-11-22 NOTE — DISCHARGE NOTE PROVIDER - HOSPITAL COURSE
85yo with h/o lung cancer s/p chemotherapy with Gefitinib and Tamoxifen 2021 c/b multiple episodes of pneumonia presents for scheduled hysterectomy and BSO for new masslike enlargement of the uterus with increased FDG uptake seen on PET/CT 2/2021 which was done as follow up for her lung cancer. She completed lung cancer treatment 3/2021. She had c/o dull pelvic pain, dull pain with urination and bowel movements. She overall feels well this morning and has no complaints.  Pt is now POD4 s/p NICOLE BSO omentectomy for 12cm R ovarian mass, frozen c/w likely met for lung.  Pt is currently passing all post operative milestones and stable for discharge.

## 2021-11-22 NOTE — PHYSICAL EXAM
[Normal] : Recto-Vaginal Exam: Normal [Restricted in physically strenuous activity but ambulatory and able to carry out work of a light or sedentary nature] : Status 1- Restricted in physically strenuous activity but ambulatory and able to carry out work of a light or sedentary nature, e.g., light house work, office work [de-identified] : mass in lower abdomen to mid lower abdomen. with marker outlining mass.

## 2021-11-22 NOTE — DISCHARGE NOTE PROVIDER - NSDCMRMEDTOKEN_GEN_ALL_CORE_FT
acetaminophen 325 mg oral tablet: 2 tab(s) orally every 6 hours  Clear Eyes 0.012% ophthalmic solution: to each affected eye 2 times a day  ibuprofen 600 mg oral tablet: 1 tab(s) orally every 6 hours, As needed, Mild Pain (1 - 3)  magnesium: 250 milligram(s) orally once a day (at bedtime)  Vitamin D3 125 mcg (5000 intl units) oral capsule: 1 cap(s) orally once a day

## 2021-11-22 NOTE — PROGRESS NOTE ADULT - SUBJECTIVE AND OBJECTIVE BOX
GYN POC    Pt seen and examined at bedside. Pt complains of mild abdominal pain but feels some relief from tylenol. Pt feels ready to try some clear liquids and is requesting assistance.   Pt denies any fever, chills, chest pain, SOB, nausea or vomiting     T(F): 97.8 (11-18-21 @ 11:02), Max: 97.8 (11-18-21 @ 11:02)  HR: 72 (11-18-21 @ 16:30) (53 - 79)  BP: 152/69 (11-18-21 @ 16:30) (152/69 - 163/70)  RR: 16 (11-18-21 @ 16:30) (16 - 20)  SpO2: 99% (11-18-21 @ 16:30) (95% - 100%)  Wt(kg): --    11-18 @ 07:01  -  11-18 @ 19:22  --------------------------------------------------------  IN: 620 mL / OUT: 880 mL / NET: -260 mL        acetaminophen     Tablet .. 650 milliGRAM(s) Oral every 6 hours  BUpivacaine liposome 1.3% Injectable (no eMAR) 20 milliLiter(s) Local Injection once  enoxaparin Injectable 30 milliGRAM(s) SubCutaneous every 24 hours  ibuprofen  Tablet. 600 milliGRAM(s) Oral every 6 hours PRN Mild Pain (1 - 3)  ketorolac   Injectable 15 milliGRAM(s) IV Push every 8 hours  lactated ringers. 1000 milliLiter(s) IV Continuous <Continuous>  metoclopramide Injectable 5 milliGRAM(s) IV Push every 6 hours PRN n/v second line  ondansetron Injectable 4 milliGRAM(s) IV Push every 6 hours PRN Nausea and/or Vomiting - 1st line  oxyCODONE    IR 5 milliGRAM(s) Oral every 6 hours PRN Severe Pain (7 - 10)  simethicone 80 milliGRAM(s) Chew every 6 hours PRN Gas      Physical exam:  Constitutional: NAD  Pulmonary: clear to auscultation bilaterally  Cardiovascular: regular rate and rhythm  Abdomen: incision site clean, dry and intact. Soft, mildly tender, nondistended  Extremities: no lower extremity edema, or calve tenderness. SCDs in place      
GYN PROGRESS NOTE    Patient evaluated at the bedside. No acute events overnight. Patient was slightly confused this morning but was easily directed to nature of her hospital stay.     Denies CP/SOB/dizziness/nausea/vomiting/abdominal pain/calf pain.    Pain well controlled on oral pain medications. Patient is ambulating independently, passing flatus and tolerating a clear diet.    O:   T(C): 36.9 (11-19-21 @ 05:11), Max: 36.9 (11-19-21 @ 05:11)  HR: 78 (11-19-21 @ 08:31) (53 - 84)  BP: 117/59 (11-19-21 @ 08:31) (96/46 - 163/70)  RR: 17 (11-19-21 @ 08:31) (16 - 20)  SpO2: 96% (11-19-21 @ 08:31) (94% - 100%)  Wt(kg): --    GEN: patient appears well  LUNGS: no respiratory distress  ABD: soft, appropriately tender to palpation, +BS, no rebound, no guarding.   Pelvic: no vaginal bleeding   EXT: no calf tenderness        11-18 @ 07:01  -  11-19 @ 07:00  --------------------------------------------------------  IN: 1340 mL / OUT: 2130 mL / NET: -790 mL      MEDICATIONS  (STANDING):  acetaminophen     Tablet .. 650 milliGRAM(s) Oral every 6 hours  BUpivacaine liposome 1.3% Injectable (no eMAR) 20 milliLiter(s) Local Injection once  enoxaparin Injectable 30 milliGRAM(s) SubCutaneous every 24 hours  ketorolac   Injectable 15 milliGRAM(s) IV Push every 8 hours  lactated ringers. 1000 milliLiter(s) (60 mL/Hr) IV Continuous <Continuous>  magnesium sulfate  IVPB 2 Gram(s) IV Intermittent once    MEDICATIONS  (PRN):  ibuprofen  Tablet. 600 milliGRAM(s) Oral every 6 hours PRN Mild Pain (1 - 3)  metoclopramide Injectable 5 milliGRAM(s) IV Push every 6 hours PRN n/v second line  ondansetron Injectable 4 milliGRAM(s) IV Push every 6 hours PRN Nausea and/or Vomiting - 1st line  oxyCODONE    IR 5 milliGRAM(s) Oral every 6 hours PRN Severe Pain (7 - 10)  simethicone 80 milliGRAM(s) Chew every 6 hours PRN Gas              
Gyn POC    Patient evaluated at bedside, sleeping. She c/o pain and would like something for breakthrough as she is not yet due for Tylenol or Toradol. She otherwise says she is "okay," denies nausea/vomiting, lightheadedness. Redmond in place, she has not yet gotten out of bed.     Afebrile  /70, HR 65  SpO2 100% on 2L NC    GA: NAD, sleeping  Resp: normal respiratory effort on 2L NC  Abd: soft, appropriately tender, non-distended, no rebound or guarding, low transverse incision with dermabond c/d/i  : redmond in place with clear urine  Extrem: SCDs in place, no LE edema       11-18 @ 07:01  -  11-18 @ 23:00  --------------------------------------------------------  IN: 620 mL / OUT: 880 mL / NET: -260 mL            MEDICATIONS  (STANDING):  acetaminophen     Tablet .. 650 milliGRAM(s) Oral every 6 hours  BUpivacaine liposome 1.3% Injectable (no eMAR) 20 milliLiter(s) Local Injection once  enoxaparin Injectable 30 milliGRAM(s) SubCutaneous every 24 hours  ketorolac   Injectable 15 milliGRAM(s) IV Push every 8 hours  lactated ringers. 1000 milliLiter(s) (60 mL/Hr) IV Continuous <Continuous>    MEDICATIONS  (PRN):  ibuprofen  Tablet. 600 milliGRAM(s) Oral every 6 hours PRN Mild Pain (1 - 3)  metoclopramide Injectable 5 milliGRAM(s) IV Push every 6 hours PRN n/v second line  ondansetron Injectable 4 milliGRAM(s) IV Push every 6 hours PRN Nausea and/or Vomiting - 1st line  oxyCODONE    IR 5 milliGRAM(s) Oral every 6 hours PRN Severe Pain (7 - 10)  simethicone 80 milliGRAM(s) Chew every 6 hours PRN Gas      
GYN PROGRESS NOTE    Patient evaluated at the bedside. Overnight, patient with episode of 6 beats of v- tach non sustained noted on monitor. Patient was sleeping during episode and was asymptomatic.    Denies CP/SOB/dizziness/nausea/vomiting/abdominal pain/calf pain.    Pain well controlled on oral pain medications. Patient is ambulating independently, passing flatus and tolerating a regular diet.    O:   T(C): 36.9 (11-22-21 @ 05:13), Max: 37.2 (11-21-21 @ 14:00)  HR: 88 (11-22-21 @ 04:07) (80 - 104)  BP: 116/57 (11-22-21 @ 04:07) (116/57 - 147/63)  RR: 18 (11-22-21 @ 04:07) (18 - 20)  SpO2: 96% (11-22-21 @ 04:07) (95% - 100%)  Wt(kg): --    GEN: patient appears well  LUNGS: no respiratory distress  ABD: soft, appropriately tender, crepitus noted in LLQ, +BS, no rebound, no guarding   EXT: no calf tenderness        11-21 @ 07:01  -  11-22 @ 07:00  --------------------------------------------------------  IN: 480 mL / OUT: 800 mL / NET: -320 mL      MEDICATIONS  (STANDING):  acetaminophen     Tablet .. 650 milliGRAM(s) Oral every 6 hours  BUpivacaine liposome 1.3% Injectable (no eMAR) 20 milliLiter(s) Local Injection once  enoxaparin Injectable 30 milliGRAM(s) SubCutaneous every 24 hours  sodium chloride 0.9% lock flush 3 milliLiter(s) IV Push every 8 hours    MEDICATIONS  (PRN):  ibuprofen  Tablet. 600 milliGRAM(s) Oral every 6 hours PRN Mild Pain (1 - 3)  metoclopramide Injectable 5 milliGRAM(s) IV Push every 6 hours PRN n/v second line  ondansetron Injectable 4 milliGRAM(s) IV Push every 6 hours PRN Nausea and/or Vomiting - 1st line  oxyCODONE    IR 5 milliGRAM(s) Oral every 6 hours PRN Severe Pain (7 - 10)  simethicone 80 milliGRAM(s) Chew every 6 hours PRN Gas              
Patient evaluated at bedside. No acute events overnight. Patient denies chest pain, SOB, nausea, vomiting. Pain well controlled on medications. Patient is voiding spontaneously. She passed flatus yesterday, but not yet this AM. Tolerating diet.      T(C): 36.6 (11-21-21 @ 05:05), Max: 36.8 (11-20-21 @ 14:45)  HR: 70 (11-21-21 @ 03:58) (70 - 94)  BP: 125/56 (11-21-21 @ 03:58) (97/55 - 149/69)  RR: 17 (11-21-21 @ 03:58) (17 - 19)  SpO2: 98% (11-21-21 @ 03:58) (96% - 98%)  Wt(kg): --    Gen: Well-appearing. NAD.  Resp: Breathing comfortably on RA.  Abd: Mildly tense with crepitus. AAppropriately TTP post-op. No rebound or guarding. Hyperactive bowel sounds, no high-pitched.  : Primafit in place  Ext: No calf tenderness        11-19 @ 07:01  -  11-20 @ 07:00  --------------------------------------------------------  IN: 1050 mL / OUT: 700 mL / NET: 350 mL    11-20 @ 07:01  -  11-21 @ 06:43  --------------------------------------------------------  IN: 749.8 mL / OUT: 1250 mL / NET: -500.2 mL            acetaminophen     Tablet .. 650 milliGRAM(s) Oral every 6 hours  BUpivacaine liposome 1.3% Injectable (no eMAR) 20 milliLiter(s) Local Injection once  enoxaparin Injectable 30 milliGRAM(s) SubCutaneous every 24 hours  ibuprofen  Tablet. 600 milliGRAM(s) Oral every 6 hours PRN  metoclopramide Injectable 5 milliGRAM(s) IV Push every 6 hours PRN  ondansetron Injectable 4 milliGRAM(s) IV Push every 6 hours PRN  oxyCODONE    IR 5 milliGRAM(s) Oral every 6 hours PRN  simethicone 80 milliGRAM(s) Chew every 6 hours PRN  sodium chloride 0.9% lock flush 3 milliLiter(s) IV Push every 8 hours            
Pt seen and examined at bedside. Pt states mild abdominal pain. Pt [+] ambulating with assistance, tolerating reg diet, [+] flatus,  [+] urinating adequately. Had several beats of V tach overnight, pt asymptomatic, EKG performed - NSR. No repeat episodes of V tach afterwards.   Pt denies fever, chills, chest pain, SOB, nausea, vomiting, lightheadedness, dizziness.      T(F): 98.6 (11-20-21 @ 05:00), Max: 98.8 (11-19-21 @ 14:24)  HR: 78 (11-20-21 @ 04:00) (74 - 90)  BP: 131/60 (11-20-21 @ 04:00) (104/52 - 132/60)  RR: 18 (11-20-21 @ 04:00) (17 - 18)  SpO2: 97% (11-20-21 @ 04:00) (96% - 98%)  Wt(kg): --  I&O's Summary    19 Nov 2021 07:01  -  20 Nov 2021 07:00  --------------------------------------------------------  IN: 1050 mL / OUT: 700 mL / NET: 350 mL        MEDICATIONS  (STANDING):  acetaminophen     Tablet .. 650 milliGRAM(s) Oral every 6 hours  BUpivacaine liposome 1.3% Injectable (no eMAR) 20 milliLiter(s) Local Injection once  enoxaparin Injectable 30 milliGRAM(s) SubCutaneous every 24 hours  sodium chloride 0.9% lock flush 3 milliLiter(s) IV Push every 8 hours    MEDICATIONS  (PRN):  ibuprofen  Tablet. 600 milliGRAM(s) Oral every 6 hours PRN Mild Pain (1 - 3)  metoclopramide Injectable 5 milliGRAM(s) IV Push every 6 hours PRN n/v second line  ondansetron Injectable 4 milliGRAM(s) IV Push every 6 hours PRN Nausea and/or Vomiting - 1st line  oxyCODONE    IR 5 milliGRAM(s) Oral every 6 hours PRN Severe Pain (7 - 10)  simethicone 80 milliGRAM(s) Chew every 6 hours PRN Gas      Physical Exam:  Constitutional: NAD  Pulmonary: no increased work of breathing  Cardiovascular: Regular rate and rhythm   Abdomen: incision site clean, dry, intact. Soft, mildly tender, [mildly] distended, no guarding, no rebound, [+] bowel sounds  Extremities: no lower extremity edema or calf tenderness. SCDs in place     LABS:                        11.4   8.58  )-----------( 241      ( 20 Nov 2021 06:59 )             35.3     11-20    141  |  108  |  12  ----------------------------<  92  4.6   |  26  |  0.93    Ca    8.4      20 Nov 2021 06:59  Phos  3.2     11-19  Mg     2.2     11-20            RADIOLOGY & ADDITIONAL TESTS:

## 2021-11-22 NOTE — PROGRESS NOTE ADULT - ASSESSMENT
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
Assessment and Plan:   Neuro: Tylenol/Toradol; Oxy prn   CV: Tele  Resp: RA  GI: CLD/ADAT  : nyla  Heme:   DVT ppx: lovenox 30mg qd      
A/P: 85yo POD0 s/p NICOLE BSO omentectomy for 12cm R ovarian mass, frozen c/w likely met for lung; s/p exparel,     Neuro: Tylenol/Toradol, Oxy prn - will give Oxy now for breakthrough pain  CV: Hypertensive, continue to monitor, likely due to pain  Resp: SpO2 100% on 2L NC, can likely wean to RA tonight/tomorrow  GI: ADAT, CLD for now  : redmond in place, likely remove in AM  DVT ppx: lovenox tonight, SCDs  Heme: EBL 200cc, AM labs  Dispo: when stable and meeting all post-op milestones
A/P: 85yo POD2 s/p NICOLE BSO omentectomy for 12cm R ovarian mass, frozen c/w likely met for lung; s/p exparel,     Neuro: Tylenol/Toradol, Oxy prn - will give Oxy now for breakthrough pain  CV: Hypertensive, continue to monitor, likely due to pain  Resp: SpO2 100% on 2L NC, can likely wean to RA today  GI: ADAT, reg diet   : voiding   DVT ppx: lovenox 30mg QD, SCDs  Heme: EBL 200cc, AM labs  Dispo: when stable and meeting all post-op milestones
A/P: 85yo POD4 s/p NICOLE BSO omentectomy for 12cm R ovarian mass, frozen c/w likely metastatic for lung; s/p exparel,     Neuro: Tylenol/motrin, Oxy prn - will give Oxy now for breakthrough pain  CV: VSS, continue to monitor, episode of v-tach overnight patient asymptomatic, EKG with NSR after episode x 2, will consult medicine today to determine if this should be worked up while in the hospital  Resp: On RA  GI: reg diet   : voiding   DVT ppx: lovenox 30mg QD, SCDs  Heme: EBL 200cc, AM labs  Dispo: when stable and meeting all post-op milestones

## 2021-11-22 NOTE — DISCUSSION/SUMMARY
[Reviewed Clinical Lab Test(s)] : Results of clinical tests were reviewed. [Reviewed Radiology Report(s)] : Radiology reports were reviewed. [Discuss Alternatives/Risks/Benefits w/Patient] : All alternatives, risks, and benefits were discussed with the patient/family and all questions were answered.  Patient expressed good understanding and appreciates the importance of follow up as recommended. [Visit Time ___ Minutes] : [unfilled] minutes [Pre Op] : The differential diagnosis was discussed in detail. The indications, risks, benefits and alternatives were discussed. [unfilled] expressed an understanding of the treatment rationale and her questions were answered to her apparent satisfaction. [FreeTextEntry1] : 86 year old female with past medical hx of lung cancer on chemo and RT. Recently had a restaging PET CT for lung cancer, found to have new masslike enlargement of the uterus\par \par [] Plan for Ex Lap NICOLE BSO, possible pelvic and para aortic lymph node dissection. \par [] Medical clearance\par \par I discussed with the patient and suzanne Becker the aid of diagrams, reviewed the findings on history and physical examination, and reviewed the imaging studies in detail.  \par \par We discussed that this could be uterine cancer; however, metastatic disease from lung cancer could be possible.  \par  \par with the aid of diagrams the different surgical approaches discussed including minimally invasive and open approaches.\par \par At this point, I recommend exploratory laparotomy, total hysterectomy, and bilateral salpingo-oophorectomy with frozen section.  If there is evidence of malignancy, I recommend omentectomy, appendectomy, possible para-aortic nodes and pelvic node dissection. We also discussed the role of tumor debulking and possible bowel resection.\par \par Complications that include, but are not limited to: bleeding, infection, injury to other organs including bowel, bladder, ureters, blood vessels, nerves; infections, blood clots, lymphedema, pneumonia, wound complications and prolonged hospital stay have all been discussed with the patient. Whenever minimally invasive surgery is attempted, there is a chance of needing to convert to laparotomy. The risk of occult injury requiring additional surgery also discussed. I have also provided her with the diagrams. \par  Patient is very motivated for surgery and feels very fit to have it.  \par Surgical scheduling was discussed and instructions for optimization prior to surgery were given. will follow the Enhanced Recovery After Surgery (ERAS) protocol. \par \par No aspirin or NSAID products for 1 week prior.\par \par She will choose a surgical date.\par \par medical clearance.\par \par The total encounter time was 60 minutes, of which over 50% was spent face-to-face, examining and counseling the patient, or coordinating care.

## 2021-11-24 NOTE — DISCUSSION/SUMMARY
[Reviewed Clinical Lab Test(s)] : Results of clinical tests were reviewed. [Reviewed Radiology Report(s)] : Radiology reports were reviewed. [Discuss Alternatives/Risks/Benefits w/Patient] : All alternatives, risks, and benefits were discussed with the patient/family and all questions were answered.  Patient expressed good understanding and appreciates the importance of follow up as recommended. [Visit Time ___ Minutes] : [unfilled] minutes [Pre Op] : The differential diagnosis was discussed in detail. The indications, risks, benefits and alternatives were discussed. [unfilled] expressed an understanding of the treatment rationale and her questions were answered to her apparent satisfaction. [FreeTextEntry1] : Excellent post operative recovery\par Pathology discussed in detail - will need to discuss with pathologist and Dr. Eugene\par Will call patient Monday with more information\par Post operative restrictions reviewed\par Add Ibuprofen to Tylenol for pain management\par good bowel sounds on exam - milk of magnesia and colace usage reviewed for constipation\par Follow up in 3 weeks with Dr. Eugene\par

## 2021-11-24 NOTE — HISTORY OF PRESENT ILLNESS
[FreeTextEntry1] : Problem List\par \par Results/Previous therapy\par 1) PET CT 10/18/2021: \par   a) Since 2/26/2021, new masslike enlargement of the uterus with increased FDG uptake, central photopenia, likely reflecting central necrosis. Again a partially calcified lesion in the right adnexa. \par   b) Lymph nodes: New intense focal FDG avidity in the retroperitoneum, difficult to correlate on CT. The findings may reflect FDG avid periaortic nodes. 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 atrophic endometrium with focal glandular architecture complexity \par \par Patient here for 1 week post op. Feeling well - off percocet. Has not yet had a bowel movement. not eating much. pressure with urination. \par \par OBHx: Denies\par GYNHx: Denies hx fibroids, cysts, STIs, abnormal pap smears\par MedHx: Lung cancer (s/p chemo and radiation)- tx finished 3/2021\par SurgHx: Open appendectomy at age 16. Cataract surgery\par Meds: Trelegy QD prn, PO iron, Magnesium\par Allergies: NKDA\par \par Health Maintenance \par DXA- last scan showed osteoporosis.\par Mammogram- WNL. R breast biopsy yrs ago, WNL

## 2021-11-24 NOTE — PAST MEDICAL HISTORY
[Postmenopausal] : The patient is postmenopausal [Menarche Age ____] : age at menarche was [unfilled] [Total Preg ___] : G[unfilled]

## 2021-11-24 NOTE — PHYSICAL EXAM
[Normal] : Recto-Vaginal Exam: Normal [de-identified] : mass in lower abdomen to mid lower abdomen. with marker outlining mass. [Restricted in physically strenuous activity but ambulatory and able to carry out work of a light or sedentary nature] : Status 1- Restricted in physically strenuous activity but ambulatory and able to carry out work of a light or sedentary nature, e.g., light house work, office work

## 2021-11-26 PROBLEM — N85.8 OTHER SPECIFIED NONINFLAMMATORY DISORDERS OF UTERUS: Chronic | Status: ACTIVE | Noted: 2021-01-01

## 2021-11-26 NOTE — H&P ADULT - HISTORY OF PRESENT ILLNESS
85yo G0 with h/o lung cancer s/p chemotherapy with Gefitinib and Tamoxifen (completed March 2021) c/b multiple episodes of pneumonia (last march 2021) now POD 8 s/p NICOLE, BSO for masslike enlargement of the uterus presents with N/V, abd pain. She reports 3 days of nausea with vomiting starting 11/25 during the day. She had some turkey, potatoes, and broccoli, and reports being able to keep down some but not all of the food. Most recent episode of vomiting was this AM when she tried to have broccoli. The abd pain is in the b/l lower quadrants, now rated 10/10, constant, sharp, denies alleviating/aggravating factors. Last BM was 11/17 (before her surgery), but has been passing flatus intermittently since being post op. Last episode of flatus was 2 days ago. Denies any current F/C, CP, SOB, palpitations. Endorse some dysuria. Of note, the masslike enlargement of the uterus was discovered with increased FDG uptake seen on PET/CT 2/2021 which was done as follow up for her lung cancer.    ObHx: G0  GynHx: masslike uterine enlargement discovered on PET/CT in February 2021 as part of w/u for hx of lung cancer (primary vs lung metastasis)  PMH: lung cancer s/p chemo (Gefitinib and Tamoxifen) and radiation c/b multiple episodes of pneumonia with chlamydia pneumonia and suspected PCP pneumonia most recently 3/2021; COPD; osteoporosis; hearing loss; basal cell carcinoma  PSH: open appendectomy (intact) age 17yo, cataract surgery, basal cell carcinoma surgery in 2016  NKDA  Meds: Trelegy 100mcg/62.5mcg/25mcg, Vit D, tylenol/motrin/oxy prn (for pain)

## 2021-11-26 NOTE — ED ADULT NURSE NOTE - NSIMPLEMENTINTERV_GEN_ALL_ED
Implemented All Universal Safety Interventions:  [place] to call system. Call bell, personal items and telephone within reach. Instruct patient to call for assistance. Room bathroom lighting operational. Non-slip footwear when patient is off stretcher. Physically safe environment: no spills, clutter or unnecessary equipment. Stretcher in lowest position, wheels locked, appropriate side rails in place. Implemented All Fall with Harm Risk Interventions:  [place] to call system. Call bell, personal items and telephone within reach. Instruct patient to call for assistance. Room bathroom lighting operational. Non-slip footwear when patient is off stretcher. Physically safe environment: no spills, clutter or unnecessary equipment. Stretcher in lowest position, wheels locked, appropriate side rails in place. Provide visual cue, wrist band, yellow gown, etc. Monitor gait and stability. Monitor for mental status changes and reorient to person, place, and time. Review medications for side effects contributing to fall risk. Reinforce activity limits and safety measures with patient and family. Provide visual clues: red socks.

## 2021-11-26 NOTE — ED PROVIDER NOTE - PROGRESS NOTE DETAILS
AXR w few afl and dilated loop concerning for sbo but will try ct w po/iv contrast since no diffuse dilated loops.  CXR w/o free air.  GYN consulted and at bedside.  CT ordered.  Labs pending. CT w new liver mets, necrotic ln, no sbo but + dilated loops and foci of air likely post op.  GYN updated and re-eval pt; GYN want to admit, Dr Amanda.

## 2021-11-26 NOTE — ED ADULT NURSE NOTE - OBJECTIVE STATEMENT
PT presents s/p laparascopic hysterectomy 11/18 reporting 2 wks w/ no BM, uncertain if passing flatus, now reporting diffuse abdominal pain and 2 episodes of vomiting since yesterday.

## 2021-11-26 NOTE — ED ADULT TRIAGE NOTE - CHIEF COMPLAINT QUOTE
Pt had hysterectomy on Thursday, reports worsening abd pain since yesterday with vomiting. Pt also reports no BM in 2 weeks.

## 2021-11-26 NOTE — H&P ADULT - NSHPLABSRESULTS_GEN_ALL_CORE
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    COMPARISON: PET/CT 10/18/2021.    PROCEDURE:  CT of the Abdomen and Pelvis was performed with intravenous contrast.  Intravenous contrast: 90 ml Omnipaque 350.10 ml discarded.  Oral contrast: positive contrast was administered.  Sagittal and coronal reformats were performed.    FINDINGS:    Lower chest: Trace left pleural effusion. 5 mm solid nodule in the posterior basal segment right lower lobe, likely inflammatory. Paraseptal emphysema.    Liver: Normal in size and morphology. New hypodense lesions in the liver, the largest measuring 3.6 x 2.5 cm in the segment 5/6. Smaller lesions are seen predominantly in the right hepatic lobe, except 0.9 cm lesion in the caudate lobe (3:26). The main portal vein is patent.  Gallbladder and biliary ducts: No gallstones. No intra/extrahepatic biliary ductal dilatation. Common bile duct measures 8 mm.  Spleen: Within normal limits.  Pancreas: Within normal limits.  Adrenals: Within normal limits.  Kidneys/ureters: No hydronephrosis. No urinary calculi. Unchanged left renal cysts.    Bladder: Within normal limits.  Reproductive organs: Interval total hysterectomy and bilateral salpingo-oophorectomy.    Bowel: Dilated small bowel loops are seen in the left abdomen, without evidence of high-grade obstruction. Distal to these dilated bowel loops, there are multiple air abdominal small bowel loops demonstrating diffuse bowel wall thickening and mesenteric fashioning, likely from recent postoperative change.  Peritoneum/retroperitoneum: Small pelvic and perihepatic ascites without rim enhancement, likely postoperative. No collection. No peritoneal nodule..  Vasculature:  The aorta and its branches are normal in caliber. Severe calcification in the infrarenal aorta and iliac arteries.  Lymph nodes: Centrally necrotic retroperitoneal lymph nodes are seen, which correlate to FDG-avid foci on 10/18/2021 PET/CT: 2.0 x 3.3 cm Aortocaval node which extends inferiorly along the right common iliac vessels (3:68), 1.6 x 1.1 cm left para-aortic node (3:59).  Bones and soft tissue: Edematous appearance of rectus abdominis muscle. Small subcutaneous emphysema and edema in the anterior abdominopelvic wall, likely postoperative.. Comparison deformity of T9 vertebral body.      IMPRESSION:  1.  No bowel obstruction. Inflammatory changes in the small bowel loops in the anterior lower abdomen, with dilated proximal small bowel loops in the left abdomen, likely postoperative.  2.  New hepatic metastases.  3.  Metastatic retroperitoneal lymphadenopathy.    < end of copied text >

## 2021-11-26 NOTE — ED PROVIDER NOTE - OBJECTIVE STATEMENT
87 yo female h/o copd, lung cancer s/p chemotherapy with Gefitinib and Tamoxifen 2021 finished 3/21 c/b multiple episodes of pneumonia s/p TAHBSO omentectomy 11/20/21 for new masslike enlargement of the uterus with increased FDG uptake seen on PET/CT 2/2021 c/w likely met from lung returns today c/o 87 yo female h/o copd, lung cancer s/p chemotherapy with Gefitinib and Tamoxifen 2021 finished 3/21 c/b multiple episodes of pneumonia s/p TAHBSO omentectomy 11/20/21 for new masslike enlargement of the uterus with increased FDG uptake seen on PET/CT 2/2021 c/w likely met from lung returns today c/o increased pain in abd, distension, n/v x 2 since last pm.  Pain 10/10.  No fever.  Pt notes chronic dysuria x years, no change today.  Last bm prior to her surgery but pt was passing gas after surgery, however, no flatus x several days.  No erythema, drainage at incision.  No cp, sob.

## 2021-11-26 NOTE — ED PROVIDER NOTE - CONSTITUTIONAL, MLM
normal... ill appearing, awake, alert, oriented to person, place, time/situation and appears uncomfortable

## 2021-11-26 NOTE — H&P ADULT - ASSESSMENT
87yo G0 with h/o lung cancer s/p chemotherapy with Gefitinib and Tamoxifen (completed March 2021) c/b multiple episodes of pneumonia (last march 2021) now POD 8 s/p NICOLE, BSO for masslike enlargement of the uterus presents with N/V, abd pain.  - given patient's last BM was 11/17, with large stool burden noted on abd imaging today, abd pain likely 2/2 to constipation. No evidence of SBO noted CT.   - CT shows new hepatic lesions suspicious for metastatic disease (non reported on PET scan from 10/18/21). Given these findings, disease progression may also be contributory to abd pain.  - Patient reports improvement with morphine, but opts to stay over the weekend. Will admit patient and begin the following bowel regimen: colace 100mg TID, Doculax 5mg bid, miralax 17g QHS  - regular diet, heplocked

## 2021-11-26 NOTE — ED PROVIDER NOTE - NSICDXPASTSURGICALHX_GEN_ALL_CORE_FT
PAST SURGICAL HISTORY:  H/O breast biopsy > 5 years ago, mass was benign and self-resolved    H/O of hysterectomy with bilateral oophorectomy     History of appendectomy     History of cataract surgery b/l

## 2021-11-26 NOTE — ED ADULT NURSE NOTE - COVID-19 ORDERING FACILITY
(3) walks occasionally
[facility]  - [facility] Gynecologic Oncology at [facility]

## 2021-11-26 NOTE — PATIENT PROFILE ADULT - NSASFALLNEEDSASSISTWITH_GEN_A_NUR
no blood in stool/no diarrhea/no burning urination/no chills/no vomiting/no abdominal distension/no fever/no dysuria standing/walking

## 2021-11-26 NOTE — CONSULT NOTE ADULT - ASSESSMENT
85yo G0 with h/o lung cancer s/p chemotherapy with Gefitinib and Tamoxifen (completed March 2021) c/b multiple episodes of pneumonia (last march 2021) now POD 8 s/p NICOLE, BSO for masslike enlargement of the uterus presents with N/V, abd pain.  - given patient's last BM was 11/17, with large stool burden noted on abd imaging today, abd pain likely 2/2 to constipation. No evidence of SBO noted CT.   - CT shows new hepatic lesions suspicious for metastatic disease (non reported on PET scan from 10/18/21). Given these findings, disease progression may also be contributory to abd pain.  - Patient reports improvement with morphine, but opts to stay over the weekend. Will admit patient and begin the following bowel regimen: colace 100mg TID, Doculax 5mg bid, miralax 17g QHS  - regular diet, heplocked  - d/w Dr. Amanda

## 2021-11-26 NOTE — CONSULT NOTE ADULT - SUBJECTIVE AND OBJECTIVE BOX
85yo G) with h/o lung cancer s/p chemotherapy with Gefitinib and Tamoxifen (completed March 2021) c/b multiple episodes of pneumonia (last march 2021) now POD 8 s/p NICOLE, BSO for masslike enlargement of the uterus presents with N/V, abd pain. She reports 3 days of nausea with vomiting starting 11/25 during the day. She had some turkey, potatoes, and broccoli, and reports being able to keep down some but not all of the food. Most recent episode of vomiting was this AM when she tried to have broccoli. The abd pain is in the b/l lower quadrants, now rated 10/10, constant, sharp, denies alleviating/aggravating factors. Last BM was 11/17 (before her surgery), but has been passing flatus intermittently since being post op. Last episode of flatus was 2 days ago. Denies any current F/C, CP, SOB, palpitations. Endorse some dysuria. Of note, the masslike enlargement of the uterus was discovered with increased FDG uptake seen on PET/CT 2/2021 which was done as follow up for her lung cancer.    ObHx: G0  GynHx: masslike uterine enlargement discovered on PET/CT in February 2021 as part of w/u for hx of lung cancer (primary vs lung metastasis)  PMH: lung cancer s/p chemo (Gefitinib and Tamoxifen) and radiation c/b multiple episodes of pneumonia with chlamydia pneumonia and suspected PCP pneumonia most recently 3/2021; COPD; osteoporosis; hearing loss; basal cell carcinoma  PSH: open appendectomy (intact) age 17yo, cataract surgery, basal cell carcinoma surgery in 2016  NKDA  Meds: Trelegy 100mcg/62.5mcg/25mcg, Vit D, tylenol/motrin/oxy prn (for pain)    Vital Signs Last 24 Hrs  T(C): 36.3 (26 Nov 2021 08:50), Max: 36.3 (26 Nov 2021 08:50)  T(F): 97.4 (26 Nov 2021 08:50), Max: 97.4 (26 Nov 2021 08:50)  HR: 101 (26 Nov 2021 08:50) (101 - 101)  BP: 96/62 (26 Nov 2021 08:50) (96/62 - 96/62)  BP(mean): --  RR: 16 (26 Nov 2021 08:50) (16 - 16)  SpO2: --    Physical Exam:  Gen: appears uncomfortable  Lungs: CTAB  GI: guarding, tender to palpation in all four quadrants, distended hyperactive BS, without rebound  Ext: no edema, erythema, tenderness     LABS:                        13.5   14.51 )-----------( 442      ( 26 Nov 2021 10:15 )             40.9     11-26    138  |  101  |  x   ----------------------------<  x   4.4   |  x   |  x               RADIOLOGY & ADDITIONAL STUDIES:   85yo G0 with h/o lung cancer s/p chemotherapy with Gefitinib and Tamoxifen (completed March 2021) c/b multiple episodes of pneumonia (last march 2021) now POD 8 s/p NICOLE, BSO for masslike enlargement of the uterus presents with N/V, abd pain. She reports 3 days of nausea with vomiting starting 11/25 during the day. She had some turkey, potatoes, and broccoli, and reports being able to keep down some but not all of the food. Most recent episode of vomiting was this AM when she tried to have broccoli. The abd pain is in the b/l lower quadrants, now rated 10/10, constant, sharp, denies alleviating/aggravating factors. Last BM was 11/17 (before her surgery), but has been passing flatus intermittently since being post op. Last episode of flatus was 2 days ago. Denies any current F/C, CP, SOB, palpitations. Endorse some dysuria. Of note, the masslike enlargement of the uterus was discovered with increased FDG uptake seen on PET/CT 2/2021 which was done as follow up for her lung cancer.    ObHx: G0  GynHx: masslike uterine enlargement discovered on PET/CT in February 2021 as part of w/u for hx of lung cancer (primary vs lung metastasis)  PMH: lung cancer s/p chemo (Gefitinib and Tamoxifen) and radiation c/b multiple episodes of pneumonia with chlamydia pneumonia and suspected PCP pneumonia most recently 3/2021; COPD; osteoporosis; hearing loss; basal cell carcinoma  PSH: open appendectomy (intact) age 15yo, cataract surgery, basal cell carcinoma surgery in 2016  NKDA  Meds: Trelegy 100mcg/62.5mcg/25mcg, Vit D, tylenol/motrin/oxy prn (for pain)    Vital Signs Last 24 Hrs  T(C): 36.3 (26 Nov 2021 08:50), Max: 36.3 (26 Nov 2021 08:50)  T(F): 97.4 (26 Nov 2021 08:50), Max: 97.4 (26 Nov 2021 08:50)  HR: 101 (26 Nov 2021 08:50) (101 - 101)  BP: 96/62 (26 Nov 2021 08:50) (96/62 - 96/62)  BP(mean): --  RR: 16 (26 Nov 2021 08:50) (16 - 16)  SpO2: --    Physical Exam:  Gen: appears uncomfortable  Lungs: CTAB  GI: guarding, tender to palpation in all four quadrants, distended hyperactive BS, without rebound  Ext: no edema, erythema, tenderness     LABS:                        13.5   14.51 )-----------( 442      ( 26 Nov 2021 10:15 )             40.9     11-26    138  |  101  |  x   ----------------------------<  x   4.4   |  x   |  x               RADIOLOGY & ADDITIONAL STUDIES:

## 2021-11-26 NOTE — H&P ADULT - NSHPPHYSICALEXAM_GEN_ALL_CORE
Vital Signs Last 24 Hrs  T(C): 36.3 (26 Nov 2021 08:50), Max: 36.3 (26 Nov 2021 08:50)  T(F): 97.4 (26 Nov 2021 08:50), Max: 97.4 (26 Nov 2021 08:50)  HR: 101 (26 Nov 2021 08:50) (101 - 101)  BP: 96/62 (26 Nov 2021 08:50) (96/62 - 96/62)  BP(mean): --  RR: 16 (26 Nov 2021 08:50) (16 - 16)  SpO2: --    Physical Exam:  Gen: appears uncomfortable  Lungs: CTAB  GI: guarding, tender to palpation in all four quadrants, distended hyperactive BS, without rebound  Ext: no edema, erythema, tenderness

## 2021-11-26 NOTE — ED PROVIDER NOTE - NSICDXPASTMEDICALHX_GEN_ALL_CORE_FT
PAST MEDICAL HISTORY:  Basal cell carcinoma x2 - upper lip and arm    Chronic obstructive pulmonary disease, unspecified COPD type     Hearing loss left ear - sees Dr. Garces ENT    Lung cancer treated with chemo & RT-2021, metastatic    Osteoporosis Osteoporosis    Uterine mass

## 2021-11-26 NOTE — PATIENT PROFILE ADULT - DO YOU NEED ADDITIONAL SERVICES TO MANAGE ANY OF THESE MEDICAL CONDITIONS AT HOME?
Constipation (Adult)  Constipation means that you have bowel movements that are less frequent than usual. Stools often become very hard and difficult to pass.  Constipation is very common. At some point in life it affects almost everyone. Since everyone's bowel habits are different, what is constipation to one person may not be to another. Your healthcare provider may do tests to diagnose constipation. It depends on what he or she finds when evaluating you.    Symptoms of constipation include:  · Abdominal pain  · Bloating  · Vomiting  · Painful bowel movements  · Itching, swelling, bleeding, or pain around the anus  Causes  Constipation can have many causes. These include:  · Diet low in fiber  · Too much dairy  · Not drinking enough liquids  · Lack of exercise or physical activity. This is especially true for older adults.  · Changes in lifestyle or daily routine, including pregnancy, aging, work, and travel  · Frequent use or misuse of laxatives  · Ignoring the urge to have a bowel movement or delaying it until later  · Medicines, such as certain prescription pain medicines, iron supplements, antacids, certain antidepressants, and calcium supplements  · Diseases like irritable bowel syndrome, bowel obstructions, stroke, diabetes, thyroid disease, Parkinson disease, hemorrhoids, and colon cancer  Complications  Potential complications of constipation can include:  · Hemorrhoids  · Rectal bleeding from hemorrhoids or anal fissures (skin tears)  · Hernias  · Dependency on laxatives  · Chronic constipation  · Fecal impaction  · Bowel obstruction or perforation  Home care  All treatment should be done after talking with your healthcare provider. This is especially true if you have another medical problems, are taking prescription medicines, or are an older adult. Treatment most often involves lifestyle changes. You may also need medicines. Your healthcare provider will tell you which will work best for you. Follow  the advice below to help avoid this problem in the future.  Lifestyle changes  These lifestyle changes can help prevent constipation:  · Diet. Eat a high-fiber diet, with fresh fruit and vegetables, and reduce dairy intake, meats, and processed foods  · Fluids. It's important to get enough fluids each day. Drink plenty of water when you eat more fiber. If you are on diet that limits the amount of fluid you can have, talk about this with your healthcare provider.  · Regular exercise. Check with your healthcare provider first.  Medicines  Take any medicines as directed. Some laxatives are safe to use only every now and then. Others can be taken on a regular basis. Talk with your doctor or pharmacist if you have questions.  Prescription pain medicines can cause constipation. If you are taking this kind of medicine, ask your healthcare provider if you should also take a stool softener.  Medicines you may take to treat constipation include:  · Fiber supplements  · Stool softeners  · Laxatives  · Enemas  · Rectal suppositories  Follow-up care  Follow up with your healthcare provider if symptoms don't get better in the next few days. You may need to have more tests or see a specialist.  Call 911  Call 911 if any of these occur:  · Trouble breathing  · Stiff, rigid abdomen that is severely painful to touch  · Confusion  · Fainting or loss of consciousness  · Rapid heart rate  · Chest pain  When to seek medical advice  Call your healthcare provider right away if any of these occur:  · Fever of 100.4°F (38°C) or higher, or as directed by your healthcare provider  · Failure to resume normal bowel movements  · Pain in your abdomen or back gets worse  · Nausea or vomiting  · Swelling in your abdomen  · Blood in the stool  · Black, tarry stool  · Involuntary weight loss  · Weakness  Date Last Reviewed: 12/30/2015  © 5683-6486 [employer]. [address] [place] [address], [place] [address]. All rights reserved. This  information is not intended as a substitute for professional medical care. Always follow your healthcare professional's instructions.         no

## 2021-11-26 NOTE — ED PROVIDER NOTE - CLINICAL SUMMARY MEDICAL DECISION MAKING FREE TEXT BOX
Pt s/p recent tahbso, omentectomy c/o increased pain, no flatus, new n/v, + distension on exam concerning for sbo, ? other post op complication.  Plan abd xray to determine safety of po contrast, ct abd, gyn consult, labs, pain/n meds, ivf (bp slightly low, sbp 119 on day of dc); reassess.

## 2021-11-26 NOTE — ED PROVIDER NOTE - COVID-19 ORDERING FACILITY
[facility]  - [facility] Gynecologic Oncology at [facility]

## 2021-11-27 NOTE — DIETITIAN INITIAL EVALUATION ADULT. - OTHER INFO
Pt h/o lung cancer s/p chemotherapy with Gefitinib and Tamoxifen (completed March 2021) c/b multiple episodes of pneumonia (last march 2021) now POD 8 s/p NICOLE, BSO for masslike enlargement of the uterus presents with N/V, abd pain. She reports 3 days of nausea with vomiting starting 11/25 during the day. She had some turkey, potatoes, and broccoli, and reports being able to keep down some but not all of the food. Most recent episode of vomiting was this AM when she tried to have broccoli. The abd pain is in the b/l lower quadrants, now rated 10/10, constant, sharp, denies alleviating/aggravating factors. Last BM was 11/17 (before her surgery), but has been passing flatus intermittently since being post op. Last episode of flatus was 2 days ago. Denies any current F/C, CP, SOB, palpitations. Endorse some dysuria. Of note, the masslike enlargement of the uterus was discovered with increased FDG uptake seen on PET/CT 2/2021 which was done as follow up for her lung cancer.  No evidence of SBO per CT. CT shows new hepatic lesions suspicious for metastatic disease which may contribute to abd pain. Currently on regular diet and tolerating per RN without N/V, eating small amounts. Pt previously declined Ensures d/t not liking any variety. Informed RN to continue to encourage po intake, protein first at meals/ snacks.     GI: No BM since admit, multiple bowel meds ordered and given to promote bowel regularity  Skin: Intact  Pain: 10/10 abd pain    Per RD note on 11/19:   Pt states UBW for many years was 84-86# (38.2-39kg), but reported weight decline to ~70# during chemotherapy. States since end of treatment has regained weight to UBW of ~86#. Per EMR, wt fluctuation between 38kg (84#) to 41kg (90#) which is not significant.   2/3/21: 38.1kg (84#)  6/7/21: 41.3kg (91#)

## 2021-11-27 NOTE — PROGRESS NOTE ADULT - ASSESSMENT
87yo G0 with h/o lung cancer s/p chemotherapy with Gefitinib and Tamoxifen (completed March 2021) c/b multiple episodes of pneumonia (last march 2021) now POD 8 s/p NICOLE, BSO for masslike enlargement of the uterus readmitted for N/V and abd pain. Given patient's last BM was 11/17, with large stool burden noted on abd imaging, abd pain likely 2/2 to constipation. No evidence of SBO noted CT. CT shows new hepatic lesions suspicious for metastatic disease (non reported on PET scan from 10/18/21). Given these findings, disease progression may also be contributory to abd pain.    Neuro: Tylenol standing, has not required additional pain medication   Cardio: vital signs stable, continue to monitor per protocol  Pulm: incentive spirometer at least 10 times per hour while awake   GI:  Tolerating reg diet. Nausea improved. Continue bowel regimen of colace 100mg TID, Doculax 5mg bid, miralax 17g QHS Zofran prn, Simethicone. Will monitor for flatus and bowel movement.  : voiding  Follow up AM labs   DVT ppx: SCDs, lovenox 30mg QD  Activity: OOB

## 2021-11-27 NOTE — DIETITIAN INITIAL EVALUATION ADULT. - ORAL INTAKE PTA/DIET HISTORY
Pt busy x2 attempts today. This RD recently complete full assessment on pt during admission on 11/18-11/22. Per EMR, pt's po intake remains decreased to N/V and abd pain. No cultural, ethnic, [employer] food preferences noted, NKFA

## 2021-11-27 NOTE — DIETITIAN INITIAL EVALUATION ADULT. - MALNUTRITION
Severe malnutrition in the context of chronic illness AEB severe muscle wasting in upper and lower body, Severe

## 2021-11-28 NOTE — PROGRESS NOTE ADULT - ASSESSMENT
87yo G0 with h/o lung cancer s/p chemotherapy with Gefitinib and Tamoxifen (completed March 2021) c/b multiple episodes of pneumonia (last march 2021) now POD 8 s/p NICOLE, BSO for masslike enlargement of the uterus readmitted for N/V and abd pain. Given patient's last BM was 11/17, with large stool burden noted on abd imaging, abd pain likely 2/2 to constipation. No evidence of SBO noted CT. CT shows new hepatic lesions suspicious for metastatic disease (non reported on PET scan from 10/18/21). Given these findings, disease progression may also be contributory to abd pain.    Neuro: Tylenol/Motrin PRN, has not required additional pain medication   Cardio: vital signs stable, continue to monitor per protocol  Pulm: incentive spirometer at least 10 times per hour while awake   GI:  Tolerating reg diet. Nausea improved. Continue bowel regimen of colace 100mg TID, Doculax 5mg bid, miralax 17g QHS, adding milk of magnesia, Zofran prn, Simethicone. +flatus, -bm  : voiding  Pt refused AM labs, re-order  DVT ppx: SCDs, lovenox 30mg QD  Activity: OOB           87yo G0 with h/o lung cancer s/p chemotherapy with Gefitinib and Tamoxifen (completed March 2021) c/b multiple episodes of pneumonia (last march 2021) now POD10 s/p NICOLE, BSO for masslike enlargement of the uterus readmitted for N/V and abd pain. Given patient's last BM was 11/17, with large stool burden noted on abd imaging, abd pain likely 2/2 to constipation. No evidence of SBO noted CT. CT shows new hepatic lesions suspicious for metastatic disease (non reported on PET scan from 10/18/21). Given these findings, disease progression may also be contributory to abd pain.    Neuro: Tylenol/Motrin PRN, has not required additional pain medication   Cardio: vital signs stable, continue to monitor per protocol  Pulm: incentive spirometer at least 10 times per hour while awake   GI:  Tolerating reg diet. Nausea improved. Continue bowel regimen of colace 100mg TID, Doculax 5mg bid, miralax 17g QHS, adding milk of magnesia, Zofran prn, Simethicone. +flatus, -bm  : voiding  Pt refused AM labs, re-order  DVT ppx: SCDs, lovenox 30mg QD  Activity: OOB

## 2021-11-29 NOTE — DISCHARGE NOTE PROVIDER - DETAILS OF MALNUTRITION DIAGNOSIS/DIAGNOSES
This patient has been assessed with a concern for Malnutrition and was treated during this hospitalization for the following Nutrition diagnosis/diagnoses:     -  11/27/2021: Severe protein-calorie malnutrition   -  11/27/2021: Underweight (BMI < 19)

## 2021-11-29 NOTE — DISCHARGE NOTE PROVIDER - HOSPITAL COURSE
87yo HD4/POD11 admitted on POD 8 s/p ex lap NICOLE BSO with worsening N/V and pain. CT was performed, no evidence of bowel obstruction or thickening of bowel. Pt was found to have a large stool burden with bowel dilation. Pt was admitted for pain control and bowel regimen. Pain is now well controlled. pt has passed flatus and BM and is stable for discharge home today with home health aid. 87yo HD4/POD11 admitted on POD 8 s/p ex lap NICOLE BSO with worsening N/V and pain. CT was performed, no evidence of bowel obstruction or thickening of bowel. Pt was found to have a large stool burden with bowel dilation due to chronic constipation, not related to postprocedural NICOLE/BSO. Pt was admitted for pain control and bowel regimen. Pain is now well controlled. pt has passed flatus and BM and is stable for discharge home today with home health aid.

## 2021-11-29 NOTE — DISCHARGE NOTE NURSING/CASE MANAGEMENT/SOCIAL WORK - PATIENT PORTAL LINK FT
You can access the FollowMyHealth Patient Portal offered by [facility] by registering at the following website: [email or web address]. By joining [email or web address]’s FollowMyHealth portal, you will also be able to view your health information using other applications (apps) compatible with our system.

## 2021-11-29 NOTE — DISCHARGE NOTE PROVIDER - CARE PROVIDER_API CALL
Melva Amanda)  Obstetrics and Gynecology  [address], [address], [place] [address]  Phone: [phone]  Fax: [phone]  Follow Up Time:

## 2021-11-29 NOTE — DISCHARGE NOTE PROVIDER - NSDCMRMEDTOKEN_GEN_ALL_CORE_FT
acetaminophen 325 mg oral tablet: 2 tab(s) orally every 6 hours  Clear Eyes 0.012% ophthalmic solution: to each affected eye 2 times a day  ibuprofen 600 mg oral tablet: 1 tab(s) orally every 6 hours, As needed, Mild Pain (1 - 3)  ibuprofen 800 mg oral tablet: 1 tab(s) orally every 6 hours   magnesium: 250 milligram(s) orally once a day (at bedtime)  oxyCODONE 5 mg oral tablet: 1 tab(s) orally every 6 hours MDD:2 tablets  Vitamin D3 125 mcg (5000 intl units) oral capsule: 1 cap(s) orally once a day   acetaminophen 325 mg oral tablet: 2 tab(s) orally every 6 hours, As Needed  docusate sodium 100 mg oral capsule: 1 cap(s) orally 3 times a day  ibuprofen 600 mg oral tablet: 1 tab(s) orally every 6 hours, As needed, Mild Pain (1 - 3)  oxyCODONE 5 mg oral tablet: 1 tab(s) orally every 6 hours, As Needed MDD:2 tablets   polyethylene glycol 3350 oral powder for reconstitution: 17 gram(s) orally every 24 hours  senna oral tablet: 2 tab(s) orally once a day (at bedtime)   acetaminophen 325 mg oral tablet: 2 tab(s) orally every 6 hours, As Needed  docusate sodium 100 mg oral capsule: 1 cap(s) orally 3 times a day  ibuprofen 600 mg oral tablet: 1 tab(s) orally every 6 hours, As needed, Mild Pain (1 - 3)  polyethylene glycol 3350 oral powder for reconstitution: 17 gram(s) orally every 24 hours  senna oral tablet: 2 tab(s) orally once a day (at bedtime)

## 2021-11-29 NOTE — PROGRESS NOTE ADULT - SUBJECTIVE AND OBJECTIVE BOX
Pt seen and examined at bedside. Pt states mild abdominal pain. Pt [+] ambulating, tolerating reg diet, now passing flatus, no BMs,  [+] urinating adequately.   Pt denies fever, chills, chest pain, SOB, nausea, vomiting, lightheadedness, dizziness.      T(F): 97.5 (21 @ 05:23), Max: 98.7 (21 @ 08:52)  HR: 90 (21 @ 05:23) (75 - 90)  BP: 126/77 (21 @ 05:23) (102/61 - 126/77)  RR: 17 (21 @ 05:23) (16 - 17)  SpO2: 99% (21 @ 05:23) (95% - 99%)    I&O's Summary    2021 07:01  -  2021 07:00  --------------------------------------------------------  IN: 920 mL / OUT: 850 mL / NET: 70 mL    Physical Exam:  Constitutional: NAD  Pulmonary: no increased work of breathing  Cardiovascular: Regular rate and rhythm   Abdomen: incision site clean, dry, intact. Soft, mildly tender, [mildly] distended, no guarding, no rebound, [+] bowel sounds  Extremities: no lower extremity edema or calf tenderness. SCDs in place.    MEDICATIONS  (STANDING):  bisacodyl 5 milliGRAM(s) Oral every 12 hours  budesonide  80 MICROgram(s)/formoterol 4.5 MICROgram(s) Inhaler 2 Puff(s) Inhalation two times a day  docusate sodium 100 milliGRAM(s) Oral three times a day  enoxaparin Injectable 30 milliGRAM(s) SubCutaneous every 24 hours  polyethylene glycol 3350 17 Gram(s) Oral every 24 hours  sodium chloride 0.9% lock flush 3 milliLiter(s) IV Push every 8 hours  tiotropium 18 MICROgram(s) Capsule 1 Capsule(s) Inhalation daily    MEDICATIONS  (PRN):  acetaminophen     Tablet .. 650 milliGRAM(s) Oral every 6 hours PRN Temp greater or equal to 38C (100.4F), Mild Pain (1 - 3)  albuterol/ipratropium for Nebulization 3 milliLiter(s) Nebulizer every 6 hours PRN Shortness of Breath and/or Wheezing  ibuprofen  Tablet. 600 milliGRAM(s) Oral every 6 hours PRN Moderate Pain (4 - 6)    LABS:                        10.6   9.66  )-----------( 359      ( 2021 07:15 )             33.4         134<L>  |  100  |  20  ----------------------------<  82  4.8   |  23  |  1.03    Ca    9.1      2021 07:15  Phos  2.8       Mg     2.1         TPro  6.2  /  Alb  3.2<L>  /  TBili  0.4  /  DBili  x   /  AST  18  /  ALT  15  /  AlkPhos  83        Urinalysis Basic - ( 2021 12:05 )    Color: Yellow / Appearance: Clear / S.020 / pH: x  Gluc: x / Ketone: 15 mg/dL  / Bili: Negative / Urobili: 0.2 E.U./dL   Blood: x / Protein: NEGATIVE mg/dL / Nitrite: NEGATIVE   Leuk Esterase: Trace / RBC: < 5 /HPF / WBC 5-10 /HPF   Sq Epi: x / Non Sq Epi: Many /HPF / Bacteria: Present /HPF          
GYN PROGRESS NOTE    Patient evaluated at the bedside. No acute events. Patient reporting having an other BM overnight, did not make it to rest room. Continues to pass flatus, abdominal pain improving.     Denies CP/SOB/dizziness/nausea/vomiting/abdominal pain/calf pain.    Pain well controlled on oral pain medications. Patient is ambulating independently, passing flatus and tolerating a regular diet.    O:   T(C): 36.7 (11-29-21 @ 04:58), Max: 37.2 (11-28-21 @ 20:45)  HR: 78 (11-29-21 @ 04:58) (78 - 97)  BP: 118/68 (11-29-21 @ 04:58) (100/60 - 118/68)  RR: 18 (11-29-21 @ 04:58) (17 - 18)  SpO2: 97% (11-29-21 @ 04:58) (95% - 99%)  Wt(kg): --    GEN: patient appears well  LUNGS: no respiratory distress  ABD: soft, non tender, mildly distended, +BS, no rebound, no guarding, incision C/D/I  Pelvic: no VB  EXT: no calf tenderness        11-28 @ 07:01  -  11-29 @ 07:00  --------------------------------------------------------  IN: 1340 mL / OUT: 3 mL / NET: 1337 mL              MEDICATIONS  (STANDING):  budesonide  80 MICROgram(s)/formoterol 4.5 MICROgram(s) Inhaler 2 Puff(s) Inhalation two times a day  docusate sodium 100 milliGRAM(s) Oral three times a day  enoxaparin Injectable 30 milliGRAM(s) SubCutaneous every 24 hours  polyethylene glycol 3350 17 Gram(s) Oral every 24 hours  senna 2 Tablet(s) Oral at bedtime  simethicone 80 milliGRAM(s) Chew every 6 hours  sodium chloride 0.9% lock flush 3 milliLiter(s) IV Push every 8 hours  tiotropium 18 MICROgram(s) Capsule 1 Capsule(s) Inhalation daily          
Pt seen and examined at bedside. Pt states mild abdominal pain. Pt [x] ambulating, tolerating reg diet, [no] flatus,  [+] urinating adequately.   Pt denies fever, chills, chest pain, SOB, nausea, vomiting, lightheadedness, dizziness.      T(F): 98.2 (21 @ 04:47), Max: 99 (21 @ 20:45)  HR: 92 (21 @ 04:47) (80 - 101)  BP: 101/61 (21 @ 04:47) (95/54 - 118/63)  RR: 17 (21 @ 04:47) (16 - 20)  SpO2: 96% (21 @ 04:47) (95% - 98%)  Wt(kg): --  I&O's Summary    2021 07:01  -  2021 06:19  --------------------------------------------------------  IN: 270 mL / OUT: 400 mL / NET: -130 mL        MEDICATIONS  (STANDING):  bisacodyl 5 milliGRAM(s) Oral every 12 hours  budesonide  80 MICROgram(s)/formoterol 4.5 MICROgram(s) Inhaler 2 Puff(s) Inhalation two times a day  docusate sodium 100 milliGRAM(s) Oral three times a day  enoxaparin Injectable 30 milliGRAM(s) SubCutaneous every 24 hours  polyethylene glycol 3350 17 Gram(s) Oral every 24 hours  sodium chloride 0.9% lock flush 3 milliLiter(s) IV Push every 8 hours  tiotropium 18 MICROgram(s) Capsule 1 Capsule(s) Inhalation daily    MEDICATIONS  (PRN):  acetaminophen     Tablet .. 650 milliGRAM(s) Oral every 6 hours PRN Temp greater or equal to 38C (100.4F), Mild Pain (1 - 3)  albuterol/ipratropium for Nebulization 3 milliLiter(s) Nebulizer every 6 hours PRN Shortness of Breath and/or Wheezing      Physical Exam:  Constitutional: NAD  Pulmonary: no increased work of breathing  Cardiovascular: Regular rate and rhythm   Abdomen: incision site clean, dry, intact. Soft, mildly tender, [mildly] distended, no guarding, no rebound, [+] bowel sounds  Extremities: no lower extremity edema or calf tenderness. SCDs in place     LABS:                        13.5   14.51 )-----------( 442      ( 2021 10:15 )             40.9         138  |  101  |  19  ----------------------------<  142<H>  4.4   |  24  |  1.17    Ca    10.3      2021 10:15    TPro  7.4  /  Alb  3.9  /  TBili  0.6  /  DBili  x   /  AST  28  /  ALT  23  /  AlkPhos  104        Urinalysis Basic - ( 2021 12:05 )    Color: Yellow / Appearance: Clear / S.020 / pH: x  Gluc: x / Ketone: 15 mg/dL  / Bili: Negative / Urobili: 0.2 E.U./dL   Blood: x / Protein: NEGATIVE mg/dL / Nitrite: NEGATIVE   Leuk Esterase: Trace / RBC: < 5 /HPF / WBC 5-10 /HPF   Sq Epi: x / Non Sq Epi: Many /HPF / Bacteria: Present /HPF        RADIOLOGY & ADDITIONAL TESTS:

## 2021-11-29 NOTE — PROGRESS NOTE ADULT - ASSESSMENT
85yo G0 with h/o lung cancer s/p chemotherapy with Gefitinib and Tamoxifen (completed March 2021) c/b multiple episodes of pneumonia (last march 2021) now POD11 s/p NICOLE, BSO for masslike enlargement of the uterus readmitted for N/V and abd pain. Given patient's last BM was 11/17, with large stool burden noted on abd imaging, abd pain likely 2/2 to constipation. No evidence of SBO noted CT. CT shows new hepatic lesions suspicious for metastatic disease (non reported on PET scan from 10/18/21). Given these findings, disease progression may also be contributory to abd pain.    Neuro: Tylenol/Motrin PRN, has not required additional pain medication   Cardio: vital signs stable, continue to monitor per protocol  Pulm: incentive spirometer at least 10 times per hour while awake   GI:  Tolerating reg diet. Nausea improved. Continue bowel regimen of colace 100mg TID, Doculax 5mg bid, miralax 17g QHS, adding milk of magnesia, Zofran prn, Simethicone. +flatus, +bm  : voiding  Heme: AM labs pending   DVT ppx: SCDs, lovenox 30mg QD  Activity: OOB

## 2021-12-20 PROBLEM — C34.90 MALIGNANT NEOPLASM OF UNSPECIFIED PART OF UNSPECIFIED BRONCHUS OR LUNG: Chronic | Status: ACTIVE | Noted: 2021-01-01

## 2021-12-20 NOTE — PHYSICAL EXAM
[Normal] : Recto-Vaginal Exam: Normal [Restricted in physically strenuous activity but ambulatory and able to carry out work of a light or sedentary nature] : Status 1- Restricted in physically strenuous activity but ambulatory and able to carry out work of a light or sedentary nature, e.g., light house work, office work [Abnormal] : General appearance: Abnormal [Chronically Ill] : chronically ill [Underweight] : underweight [Absent] : Adnexa(ae): Absent [de-identified] : healed low transvers incision.

## 2021-12-20 NOTE — DISCUSSION/SUMMARY
[Reviewed Clinical Lab Test(s)] : Results of clinical tests were reviewed. [Reviewed Radiology Report(s)] : Radiology reports were reviewed. [Discuss Alternatives/Risks/Benefits w/Patient] : All alternatives, risks, and benefits were discussed with the patient/family and all questions were answered.  Patient expressed good understanding and appreciates the importance of follow up as recommended. [Diagnosis/Stage ___] : Given this data, a diagnosis of [unfilled] is rendered. [FreeTextEntry1] : Excellent post operative recovery\par \par \par []follow up with dr. alatorre for treatment\par []follow up here  for exam in 6 months.\par \par \par

## 2021-12-20 NOTE — HISTORY OF PRESENT ILLNESS
[FreeTextEntry1] : Problem List\par \par Results/Previous therapy\par 1) PET CT 10/18/2021: \par   a) Since 2/26/2021, new masslike enlargement of the uterus with increased FDG uptake, central photopenia, likely reflecting central necrosis. Again a partially calcified lesion in the right adnexa. \par   b) Lymph nodes: New intense focal FDG avidity in the retroperitoneum, difficult to correlate on CT. The findings may reflect FDG avid periaortic nodes. 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 atrophic endometrium with focal glandular architecture complexity. tumor cells diffusely/strongly positive for CK7, p53, p16, and havve ki-67 proliferation index.  \par    b) PDL-1 negative on IHC staining. \par   c) Pelvic washings negative\par \par Patient here for 1 month follow up. Patient endorses abdominal pain with urination and bowel movements. Otherwise, patient endorses fatigue and some dizziness when she first gets up from bed in the morning (similar to before the surgery). \par Denies SOB, CP, sharp abdominal pain, hematuria, vaginal bleeding. \par \par Health Maintenance \par DXA- last scan showed osteoporosis.\par Mammogram- WNL. R breast biopsy yrs ago, WNL

## 2021-12-30 NOTE — ED ADULT TRIAGE NOTE - BP NONINVASIVE DIASTOLIC (MM HG)
Jennifer from [facility] called and stated the pt has been having falls and wants advice if PCP would order PT. Jennifer stated the pt can be contacted re the referral at   Telephone Information:   Mobile [phone]     Jennifer stated if there are any questions she can be reached at the main number of [phone].  
Order placed.    Dr. Cazares  
Please see message below.  Jennifer from [facility] is calling requesting a referral for PT for falls.  Order entered and pended.  PCP is currently out of the office until 01/05/21.  Please advise if willing to place order of if ok to wait for PCP to return and address.   
58

## 2022-01-01 ENCOUNTER — APPOINTMENT (OUTPATIENT)
Dept: UROLOGY | Facility: CLINIC | Age: 87
End: 2022-01-01
Payer: MEDICARE

## 2022-01-01 ENCOUNTER — APPOINTMENT (OUTPATIENT)
Dept: INTERVENTIONAL RADIOLOGY/VASCULAR | Facility: HOSPITAL | Age: 87
End: 2022-01-01

## 2022-01-01 VITALS
OXYGEN SATURATION: 98 % | DIASTOLIC BLOOD PRESSURE: 64 MMHG | TEMPERATURE: 97.6 F | HEART RATE: 55 BPM | SYSTOLIC BLOOD PRESSURE: 95 MMHG

## 2022-01-01 DIAGNOSIS — R33.9 RETENTION OF URINE, UNSPECIFIED: ICD-10-CM

## 2022-01-01 PROCEDURE — XXXXX: CPT

## 2022-01-01 PROCEDURE — 99213 OFFICE O/P EST LOW 20 MIN: CPT

## 2022-02-02 PROBLEM — R33.9 URINARY RETENTION: Status: ACTIVE | Noted: 2021-01-01

## 2022-02-06 NOTE — PHYSICAL EXAM
[General Appearance - Well Developed] : well developed [General Appearance - Well Nourished] : well nourished [Normal Appearance] : normal appearance [Well Groomed] : well groomed [General Appearance - In No Acute Distress] : no acute distress [FreeTextEntry1] : Does appear weakened.  [] : no respiratory distress [Respiration, Rhythm And Depth] : normal respiratory rhythm and effort [Exaggerated Use Of Accessory Muscles For Inspiration] : no accessory muscle use [Oriented To Time, Place, And Person] : oriented to person, place, and time

## 2022-02-06 NOTE — HISTORY OF PRESENT ILLNESS
[FreeTextEntry1] : 86 year-old female with PMHx of Lung Ca on chemo and RT, previous history of urinary retention which she passed a voiding trial at her last year. She did have a NICOLE/BSO/omentectomy last month, currently has a Schumacher catheter after episode of urinary retention.

## 2022-02-06 NOTE — ASSESSMENT
[FreeTextEntry1] : \par \par Impression/plan: 86 year-old female with PMHx of Lung Ca on chemo and RT, previous history of urinary retention which she passed a voiding trial at her last year. She did have a NICOLE/BSO/omentectomy last month, currently has a Schumacher catheter after episode of urinary retention. \par \par 1. She will return for a TOV.

## 2022-02-08 PROBLEM — R33.9 INCOMPLETE BLADDER EMPTYING: Status: ACTIVE | Noted: 2021-01-01

## 2022-02-08 NOTE — HISTORY OF PRESENT ILLNESS
[FreeTextEntry1] : 86 year-old female with PMHx of Lung Ca on chemo and RT, previous history of urinary retention which she passed a voiding trial at her last year. She did have a NICOLE/BSO/omentectomy last month, currently has a Redmond catheter after episode of urinary retention. \par \par 2/8/22- Returns for TOV. Patient reports doing well since well. Light, tea colored urine draining in redmond bag. Denies hematuria.

## 2022-02-08 NOTE — PHYSICAL EXAM
[General Appearance - Well Developed] : well developed [General Appearance - Well Nourished] : well nourished [Normal Appearance] : normal appearance [Well Groomed] : well groomed [General Appearance - In No Acute Distress] : no acute distress [] : no respiratory distress [Respiration, Rhythm And Depth] : normal respiratory rhythm and effort [Exaggerated Use Of Accessory Muscles For Inspiration] : no accessory muscle use [Oriented To Time, Place, And Person] : oriented to person, place, and time [FreeTextEntry1] : Does appear weakened.

## 2022-02-08 NOTE — ASSESSMENT
[FreeTextEntry1] : 86 year-old female with PMHx of Lung Ca on chemo and RT, previous history of urinary retention. Has redmond catheter after previous episode of urinary retention. \par Removed redmond catheter today, TOV passed. \par PVR 18. \par Advised patient to time void, reviewed return precautions with patient and caregiver. \par

## 2022-06-02 NOTE — ED ADULT NURSE NOTE - COVID-19 RESULT
1  Use Pataday 1 drop into each eye daily  2  Use Flonase 2 sprays each nostril daily  3  Recommend Zyrtec 10mg  Daily  4   Follow up with PCP in 3-5 days if symptoms persist 
NEGATIVE

## 2022-06-22 ENCOUNTER — APPOINTMENT (OUTPATIENT)
Dept: GYNECOLOGIC ONCOLOGY | Facility: CLINIC | Age: 87
End: 2022-06-22

## 2022-11-03 NOTE — ED PROVIDER NOTE - TOBACCO USE
Former smoker Split-Thickness Skin Graft Text: The defect edges were debeveled with a #15 scalpel blade.  Given the location of the defect, shape of the defect and the proximity to free margins a split thickness skin graft was deemed most appropriate.  Using a sterile surgical marker, the primary defect shape was transferred to the donor site. The split thickness graft was then harvested.  The skin graft was then placed in the primary defect and oriented appropriately.

## 2023-05-31 NOTE — PROVIDER CONTACT NOTE (OTHER) - NAME OF MD/NP/PA/DO NOTIFIED:
Pt is running fever since yesterday, and pointing to tongue. Mom states when looked at it looked like lesion on back of tongue . Spoke with nurse advised to go have looked at ER, mom said breathing was chronic, so nurse recommended going to UC as felt should be looked at due to possible swelling in mouth and blocking of pathway.
Kecia Raines MD
MD Raines

## 2024-01-13 NOTE — ED PROVIDER NOTE - GENITOURINARY, MLM
No discharge, lesions. Schumacher catheter port on right leg.
Patient with recent cardioversion on eliquis now with abdominal cramping and blood in urine. on 2L O2 PRN

## 2025-02-20 NOTE — DISCHARGE NOTE NURSING/CASE MANAGEMENT/SOCIAL WORK - NSTRANSFERBELONGINGSDISPO_GEN_A_NUR
Pt called and states that [facility] needs PA  I do not see that we were aware that PA was needed.     [phone] to leave detailed message    [facility]  Key: [id number]       with patient

## 2025-03-20 NOTE — H&P ADULT - PROBLEM SELECTOR PLAN 5
Call placed to [facility] pharmacy to confirm OOP cost for Farxiga. Entresto still needs auth.     Spoke with one of the pharmacy techs. They will not dispense at this time and just keep on file. Okay to fill metoprolol.     Insurance is limiting only 30 day supply  Entresto: $479  Farxiga: $199    Patient has medicare insurance so will been patient assistance or shantelle funding. Message sent to patient to confirm household size and income.    F: none  E: replete K<4.0, Mg<2.0, Phos<2.5  N: Liquid  DVT prophylaxis: Lovenox  Access: PIV L arm  Code Status: DNR/DNI no pressors, MOLST in chart Per chart has history of COPD but patient adamantly denies history of COPD. Former smoker quit in 1999 unable to recall when she started to quantify pack-years. Not wheezing on admission exam.  - consider duonebs if wheezing or hypoxic  - allow desaturations to 88% if patient is asymptomatic
